# Patient Record
Sex: MALE | Race: BLACK OR AFRICAN AMERICAN | NOT HISPANIC OR LATINO | Employment: OTHER | URBAN - METROPOLITAN AREA
[De-identification: names, ages, dates, MRNs, and addresses within clinical notes are randomized per-mention and may not be internally consistent; named-entity substitution may affect disease eponyms.]

---

## 2017-01-04 ENCOUNTER — PATIENT MESSAGE (OUTPATIENT)
Dept: INTERNAL MEDICINE | Facility: CLINIC | Age: 32
End: 2017-01-04

## 2017-01-04 ENCOUNTER — PATIENT MESSAGE (OUTPATIENT)
Dept: PHYSICAL MEDICINE AND REHAB | Facility: CLINIC | Age: 32
End: 2017-01-04

## 2017-01-04 DIAGNOSIS — G82.20 PARAPLEGIA: ICD-10-CM

## 2017-01-04 DIAGNOSIS — R05.3 CHRONIC COUGH: Primary | ICD-10-CM

## 2017-01-04 NOTE — TELEPHONE ENCOUNTER
Chronic cough productive of sputum. Neg CXR. Will get CT scan. Please call pt to schedule. Also can you check on the ostomy supplies? I've filled out the paperwork for it and it should've been faxed. Can you see what else they need? Same goes for the transfer board as I've written for the script and should've been faxed also.

## 2017-01-05 ENCOUNTER — PATIENT MESSAGE (OUTPATIENT)
Dept: PHYSICAL MEDICINE AND REHAB | Facility: CLINIC | Age: 32
End: 2017-01-05

## 2017-01-05 RX ORDER — OXYCODONE HYDROCHLORIDE 30 MG/1
30 TABLET, FILM COATED, EXTENDED RELEASE ORAL EVERY 12 HOURS
Qty: 60 TABLET | Refills: 0 | Status: SHIPPED | OUTPATIENT
Start: 2017-01-05 | End: 2017-02-06 | Stop reason: SDUPTHER

## 2017-01-06 ENCOUNTER — PATIENT MESSAGE (OUTPATIENT)
Dept: INTERNAL MEDICINE | Facility: CLINIC | Age: 32
End: 2017-01-06

## 2017-01-06 ENCOUNTER — PATIENT MESSAGE (OUTPATIENT)
Dept: UROLOGY | Facility: CLINIC | Age: 32
End: 2017-01-06

## 2017-01-09 ENCOUNTER — PATIENT MESSAGE (OUTPATIENT)
Dept: INTERNAL MEDICINE | Facility: CLINIC | Age: 32
End: 2017-01-09

## 2017-01-10 ENCOUNTER — PATIENT MESSAGE (OUTPATIENT)
Dept: INTERNAL MEDICINE | Facility: CLINIC | Age: 32
End: 2017-01-10

## 2017-01-11 NOTE — TELEPHONE ENCOUNTER
i dont have anything previously done for him for the board or supplies, they refaxed the ostomy supply order its pending signature

## 2017-01-16 ENCOUNTER — PATIENT MESSAGE (OUTPATIENT)
Dept: PHYSICAL MEDICINE AND REHAB | Facility: CLINIC | Age: 32
End: 2017-01-16

## 2017-01-16 ENCOUNTER — PATIENT MESSAGE (OUTPATIENT)
Dept: UROLOGY | Facility: CLINIC | Age: 32
End: 2017-01-16

## 2017-01-16 RX ORDER — OXYCODONE AND ACETAMINOPHEN 10; 325 MG/1; MG/1
TABLET ORAL
Qty: 135 TABLET | Refills: 0 | Status: SHIPPED | OUTPATIENT
Start: 2017-01-16 | End: 2017-02-15 | Stop reason: SDUPTHER

## 2017-01-20 ENCOUNTER — OFFICE VISIT (OUTPATIENT)
Dept: UROLOGY | Facility: CLINIC | Age: 32
End: 2017-01-20
Payer: MEDICAID

## 2017-01-20 VITALS
HEIGHT: 68 IN | SYSTOLIC BLOOD PRESSURE: 132 MMHG | HEART RATE: 49 BPM | BODY MASS INDEX: 30.31 KG/M2 | WEIGHT: 200 LBS | DIASTOLIC BLOOD PRESSURE: 76 MMHG

## 2017-01-20 DIAGNOSIS — N31.9 NEUROGENIC BLADDER: ICD-10-CM

## 2017-01-20 DIAGNOSIS — R82.71 BACTERIA IN URINE: ICD-10-CM

## 2017-01-20 DIAGNOSIS — R82.90 ABNORMAL URINE SEDIMENT: ICD-10-CM

## 2017-01-20 DIAGNOSIS — Z43.5 ENCOUNTER FOR SUPRAPUBIC CATHETER CARE: Primary | ICD-10-CM

## 2017-01-20 PROCEDURE — 99214 OFFICE O/P EST MOD 30 MIN: CPT | Mod: S$PBB,25,, | Performed by: NURSE PRACTITIONER

## 2017-01-20 PROCEDURE — 99214 OFFICE O/P EST MOD 30 MIN: CPT | Mod: PBBFAC | Performed by: NURSE PRACTITIONER

## 2017-01-20 PROCEDURE — 51705 CHANGE OF BLADDER TUBE: CPT | Mod: PBBFAC | Performed by: NURSE PRACTITIONER

## 2017-01-20 PROCEDURE — 99999 PR PBB SHADOW E&M-EST. PATIENT-LVL IV: CPT | Mod: PBBFAC,,, | Performed by: NURSE PRACTITIONER

## 2017-01-20 PROCEDURE — 51705 CHANGE OF BLADDER TUBE: CPT | Mod: S$PBB,,, | Performed by: NURSE PRACTITIONER

## 2017-01-20 PROCEDURE — 51700 IRRIGATION OF BLADDER: CPT | Mod: PBBFAC | Performed by: NURSE PRACTITIONER

## 2017-01-20 RX ORDER — POTASSIUM CITRATE 10 MEQ/1
10 TABLET, EXTENDED RELEASE ORAL
Qty: 90 TABLET | Refills: 11 | Status: SHIPPED | OUTPATIENT
Start: 2017-01-20 | End: 2017-09-22
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
## 2017-01-20 NOTE — MR AVS SNAPSHOT
[facility] - Urology 4th Floor  [address]  [facility] [address]  Phone: [phone]                  Nghia Edgar   2017 9:00 AM   Office Visit    Description:  Male : 1985   Provider:  Sherry Knight NP   Department:  [facility] - Urology 4th Floor           Reason for Visit     Other     Urinary Retention           Diagnoses this Visit        Comments    Encounter for suprapubic catheter care    -  Primary     Abnormal urine sediment         Bacteria in urine         Neurogenic bladder                To Do List           Goals (5 Years of Data)     None      Follow-Up and Disposition     Return in about 3 weeks (around 2/10/2017) for K level; change SPT.       These Medications        Disp Refills Start End    potassium citrate (UROCIT-K) 10 mEq (1,080 mg) TbSR 90 tablet 11 2017    Take 1 tablet (10 mEq total) by mouth 3 (three) times daily with meals. - Oral    Pharmacy: [id number] - [place] - [address] [facility] Ph #: [phone]         [facility] On [facility]     [facility] On Call Nurse Care Line -  Assistance  Registered nurses in the [facility] On Call Center provide clinical advisement, health education, appointment booking, and other advisory services.  Call for this free service at [phone].             Medications           Message regarding Medications     Verify the changes and/or additions to your medication regime listed below are the same as discussed with your clinician today.  If any of these changes or additions are incorrect, please notify your healthcare provider.        START taking these NEW medications        Refills    potassium citrate (UROCIT-K) 10 mEq (1,080 mg) TbSR 11    Sig: Take 1 tablet (10 mEq total) by mouth 3 (three) times daily with meals.    Class: Normal    Route: Oral      STOP taking these medications     UNABLE TO FIND [employer] colostomy bags size 1 3/4. 44mm.    ostomy adhesive Misc Please  "dispense ostomy pouches, paste, powder and deodorizer. Use as directed.    Dispense 3 month supply with 3 refills.    miscellaneous medical supply 0.62 " Misc Dispense 1 short transfer board - thinner quality as he has a pressure ulcer.    fentaNYL (DURAGESIC) 75 mcg/hr Place 1 patch onto the skin every 72 hours.           Verify that the below list of medications is an accurate representation of the medications you are currently taking.  If none reported, the list may be blank. If incorrect, please contact your healthcare provider. Carry this list with you in case of emergency.           Current Medications     albuterol (PROAIR HFA) 90 mcg/actuation inhaler Inhale 1-2 puffs into the lungs every 6 (six) hours as needed for Wheezing or Shortness of Breath.    ascorbic acid (VITAMIN C) 500 MG tablet Take 500 mg by mouth every morning.     BACLOFEN IT by Intrathecal route.    blood pressure test kit-medium (IN CONTROL BP MONITOR) Kit Test daily    diazePAM (VALIUM) 10 MG Tab TAKE ONE TABLET BY MOUTH THREE TIMES DAILY    ferrous sulfate 325 (65 FE) MG EC tablet Take 325 mg by mouth once daily.    fosfomycin (MONUROL) 3 gram Pack Take 3 g by mouth every other day. for a total of 3 doses.    lactulose (CHRONULAC) 10 gram/15 mL solution     leg brace (ANKLE BRACE) Misc 2 each by Misc.(Non-Drug; Combo Route) route daily as needed. Please dispense dropped foot splints    multivitamin capsule Take 1 capsule by mouth every morning.    oxybutynin (DITROPAN) 5 MG Tab TAKE ONE TABLET BY MOUTH THREE TIMES DAILY AS NEEDED FOR  BLADDER  SPASMS    oxycodone (OXYCONTIN) 30 mg TR12 12 hr tablet Take 1 tablet (30 mg total) by mouth every 12 (twelve) hours.    oxycodone-acetaminophen (PERCOCET)  mg per tablet Take 1-1/2 tablets TID PRN (BTP)    polyethylene glycol (GLYCOLAX) 17 gram PwPk Take 17 g by mouth 2 (two) times daily as needed.    potassium citrate (UROCIT-K) 10 mEq (1,080 mg) TbSR Take 1 tablet (10 mEq total) by mouth 3 " "(three) times daily with meals.    pregabalin (LYRICA) 200 MG Cap Take 1 capsule (200 mg total) by mouth 3 (three) times daily.    sildenafil (VIAGRA) 100 MG tablet Take 1 tablet (100 mg total) by mouth as needed for Erectile Dysfunction.           Clinical Reference Information           Vital Signs - Last Recorded  Most recent update: 1/20/2017  9:36 AM by Lilibeth Betancur MA    BP Pulse Ht Wt BMI    132/76 (!) 49 5' 8" (1.727 m) 90.7 kg (200 lb) 30.41 kg/m2      Blood Pressure          Most Recent Value    BP  132/76      Allergies as of 1/20/2017     Zanaflex [Tizanidine]      Immunizations Administered on Date of Encounter - 1/20/2017     None      Orders Placed During Today's Visit     Future Labs/Procedures Expected by Expires    POTASSIUM  1/20/2017 3/21/2018      "

## 2017-01-25 ENCOUNTER — PATIENT MESSAGE (OUTPATIENT)
Dept: UROLOGY | Facility: CLINIC | Age: 32
End: 2017-01-25

## 2017-01-29 ENCOUNTER — PATIENT MESSAGE (OUTPATIENT)
Dept: INTERNAL MEDICINE | Facility: CLINIC | Age: 32
End: 2017-01-29

## 2017-01-30 ENCOUNTER — PATIENT MESSAGE (OUTPATIENT)
Dept: PHYSICAL MEDICINE AND REHAB | Facility: CLINIC | Age: 32
End: 2017-01-30

## 2017-01-30 RX ORDER — DIAZEPAM 10 MG/1
10 TABLET ORAL 3 TIMES DAILY
Qty: 90 TABLET | Refills: 2 | Status: SHIPPED | OUTPATIENT
Start: 2017-01-30 | End: 2017-05-09 | Stop reason: SDUPTHER

## 2017-02-03 ENCOUNTER — PATIENT MESSAGE (OUTPATIENT)
Dept: UROLOGY | Facility: CLINIC | Age: 32
End: 2017-02-03

## 2017-02-06 ENCOUNTER — PATIENT MESSAGE (OUTPATIENT)
Dept: PHYSICAL MEDICINE AND REHAB | Facility: CLINIC | Age: 32
End: 2017-02-06

## 2017-02-06 DIAGNOSIS — M79.2 NEUROGENIC PAIN: ICD-10-CM

## 2017-02-06 DIAGNOSIS — Z93.59 SUPRAPUBIC CATHETER: Chronic | ICD-10-CM

## 2017-02-06 DIAGNOSIS — N30.00 ACUTE CYSTITIS WITHOUT HEMATURIA: ICD-10-CM

## 2017-02-06 RX ORDER — GRANULES FOR ORAL 3 G/1
3 POWDER ORAL EVERY OTHER DAY
Qty: 3 G | Refills: 0 | Status: SHIPPED | OUTPATIENT
Start: 2017-02-06 | End: 2017-04-26

## 2017-02-06 RX ORDER — OXYCODONE HYDROCHLORIDE 30 MG/1
30 TABLET, FILM COATED, EXTENDED RELEASE ORAL EVERY 12 HOURS
Qty: 60 TABLET | Refills: 0 | Status: SHIPPED | OUTPATIENT
Start: 2017-02-06 | End: 2017-03-15 | Stop reason: SDUPTHER

## 2017-02-13 ENCOUNTER — PATIENT MESSAGE (OUTPATIENT)
Dept: UROLOGY | Facility: CLINIC | Age: 32
End: 2017-02-13

## 2017-02-13 ENCOUNTER — OFFICE VISIT (OUTPATIENT)
Dept: UROLOGY | Facility: CLINIC | Age: 32
End: 2017-02-13
Payer: MEDICAID

## 2017-02-13 VITALS
HEIGHT: 68 IN | HEART RATE: 57 BPM | WEIGHT: 200 LBS | BODY MASS INDEX: 30.31 KG/M2 | DIASTOLIC BLOOD PRESSURE: 74 MMHG | SYSTOLIC BLOOD PRESSURE: 108 MMHG

## 2017-02-13 DIAGNOSIS — R33.9 URINARY RETENTION: Primary | ICD-10-CM

## 2017-02-13 DIAGNOSIS — N31.9 NEUROGENIC BLADDER: ICD-10-CM

## 2017-02-13 DIAGNOSIS — Z43.5 ENCOUNTER FOR SUPRAPUBIC CATHETER CARE: ICD-10-CM

## 2017-02-13 PROCEDURE — 87077 CULTURE AEROBIC IDENTIFY: CPT

## 2017-02-13 PROCEDURE — 87186 SC STD MICRODIL/AGAR DIL: CPT

## 2017-02-13 PROCEDURE — 99999 PR PBB SHADOW E&M-EST. PATIENT-LVL IV: CPT | Mod: PBBFAC,,, | Performed by: NURSE PRACTITIONER

## 2017-02-13 PROCEDURE — 51705 CHANGE OF BLADDER TUBE: CPT | Mod: PBBFAC | Performed by: NURSE PRACTITIONER

## 2017-02-13 PROCEDURE — 87086 URINE CULTURE/COLONY COUNT: CPT

## 2017-02-13 PROCEDURE — 51705 CHANGE OF BLADDER TUBE: CPT | Mod: S$PBB,,, | Performed by: NURSE PRACTITIONER

## 2017-02-13 PROCEDURE — 51700 IRRIGATION OF BLADDER: CPT | Mod: PBBFAC | Performed by: NURSE PRACTITIONER

## 2017-02-13 PROCEDURE — 99213 OFFICE O/P EST LOW 20 MIN: CPT | Mod: S$PBB,25,, | Performed by: NURSE PRACTITIONER

## 2017-02-13 PROCEDURE — 87088 URINE BACTERIA CULTURE: CPT

## 2017-02-13 PROCEDURE — 99214 OFFICE O/P EST MOD 30 MIN: CPT | Mod: PBBFAC | Performed by: NURSE PRACTITIONER
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
## 2017-02-13 NOTE — MR AVS SNAPSHOT
[facility] - Urology 4th Floor  [address]  [facility] [address]  Phone: [phone]                  Nghia Edgar   2017 9:00 AM   Office Visit    Description:  Male : 1985   Provider:  Sherry Knight NP   Department:  [facility] - Urology 4th Floor           Reason for Visit     Catheterization     Urinary Retention           Diagnoses this Visit        Comments    Urinary retention    -  Primary     Neurogenic bladder         Encounter for suprapubic catheter care                To Do List           Future Appointments        Provider Department Dept Phone    2017 10:40 AM Brent Butcher MD [place] [facility] [phone]      Goals (5 Years of Data)     None      Follow-Up and Disposition     Return in about 3 weeks (around 3/6/2017).      [facility]     [facility] -  Assistance  Registered nurses in the [facility] provide clinical advisement, health education, appointment booking, and other advisory services.  Call for this free service at [phone].             Medications           Message regarding Medications     Verify the changes and/or additions to your medication regime listed below are the same as discussed with your clinician today.  If any of these changes or additions are incorrect, please notify your healthcare provider.             Verify that the below list of medications is an accurate representation of the medications you are currently taking.  If none reported, the list may be blank. If incorrect, please contact your healthcare provider. Carry this list with you in case of emergency.           Current Medications     albuterol (PROAIR HFA) 90 mcg/actuation inhaler Inhale 1-2 puffs into the lungs every 6 (six) hours as needed for Wheezing or Shortness of Breath.    ascorbic acid (VITAMIN C) 500 MG tablet Take 500 mg by mouth every morning.     BACLOFEN IT by Intrathecal route.    blood  "pressure test kit-medium (IN CONTROL BP MONITOR) Kit Test daily    diazePAM (VALIUM) 10 MG Tab Take 1 tablet (10 mg total) by mouth 3 (three) times daily.    ferrous sulfate 325 (65 FE) MG EC tablet Take 325 mg by mouth once daily.    fosfomycin (MONUROL) 3 gram Pack Take 3 g by mouth every other day. for a total of 3 doses.    lactulose (CHRONULAC) 10 gram/15 mL solution     leg brace (ANKLE BRACE) Misc 2 each by Misc.(Non-Drug; Combo Route) route daily as needed. Please dispense dropped foot splints    multivitamin capsule Take 1 capsule by mouth every morning.    oxybutynin (DITROPAN) 5 MG Tab TAKE ONE TABLET BY MOUTH THREE TIMES DAILY AS NEEDED FOR  BLADDER  SPASMS    oxycodone (OXYCONTIN) 30 mg TR12 12 hr tablet Take 1 tablet (30 mg total) by mouth every 12 (twelve) hours.    oxycodone-acetaminophen (PERCOCET)  mg per tablet Take 1-1/2 tablets TID PRN (BTP)    polyethylene glycol (GLYCOLAX) 17 gram PwPk Take 17 g by mouth 2 (two) times daily as needed.    potassium citrate (UROCIT-K) 10 mEq (1,080 mg) TbSR Take 1 tablet (10 mEq total) by mouth 3 (three) times daily with meals.    pregabalin (LYRICA) 200 MG Cap Take 1 capsule (200 mg total) by mouth 3 (three) times daily.    sildenafil (VIAGRA) 100 MG tablet Take 1 tablet (100 mg total) by mouth as needed for Erectile Dysfunction.           Clinical Reference Information           Your Vitals Were     BP Pulse Height Weight BMI    108/74 57 5' 8" (1.727 m) 90.7 kg (200 lb) 30.41 kg/m2      Blood Pressure          Most Recent Value    BP  108/74      Allergies as of 2/13/2017     Zanaflex [Tizanidine]      Immunizations Administered on Date of Encounter - 2/13/2017     None      Orders Placed During Today's Visit      Normal Orders This Visit    Urinalysis     Urine culture       Language Assistance Services     ATTENTION: Language assistance services are available, free of charge. Please call [phone].      ATENCIÓN: tyrell Burroughs " disposición servicios gratuitos de asistencia lingüística. Xiao al [phone].     VANESSA Ý: N?u b?n nói Ti?ng Vi?t, có các d?ch v? h? tr? ngôn ng? mi?n phí dành cho b?n. G?i s? [phone].         [facility] 4th Floor complies with applicable Federal civil rights laws and does not discriminate on the basis of race, color, national origin, age, disability, or sex.

## 2017-02-15 ENCOUNTER — PATIENT MESSAGE (OUTPATIENT)
Dept: INFECTIOUS DISEASES | Facility: CLINIC | Age: 32
End: 2017-02-15

## 2017-02-15 ENCOUNTER — PATIENT MESSAGE (OUTPATIENT)
Dept: PHYSICAL MEDICINE AND REHAB | Facility: CLINIC | Age: 32
End: 2017-02-15

## 2017-02-15 ENCOUNTER — PATIENT MESSAGE (OUTPATIENT)
Dept: UROLOGY | Facility: CLINIC | Age: 32
End: 2017-02-15

## 2017-02-15 RX ORDER — OXYCODONE AND ACETAMINOPHEN 10; 325 MG/1; MG/1
TABLET ORAL
Qty: 135 TABLET | Refills: 0 | Status: SHIPPED | OUTPATIENT
Start: 2017-02-15 | End: 2017-03-15 | Stop reason: SDUPTHER

## 2017-02-16 LAB — BACTERIA UR CULT: NORMAL

## 2017-02-27 ENCOUNTER — PATIENT MESSAGE (OUTPATIENT)
Dept: INTERNAL MEDICINE | Facility: CLINIC | Age: 32
End: 2017-02-27

## 2017-02-27 ENCOUNTER — PATIENT MESSAGE (OUTPATIENT)
Dept: UROLOGY | Facility: CLINIC | Age: 32
End: 2017-02-27

## 2017-02-27 DIAGNOSIS — N39.0 URINARY TRACT INFECTION ASSOCIATED WITH CATHETERIZATION OF URINARY TRACT, UNSPECIFIED INDWELLING URINARY CATHETER TYPE, SUBSEQUENT ENCOUNTER: Primary | ICD-10-CM

## 2017-02-27 DIAGNOSIS — T83.511D URINARY TRACT INFECTION ASSOCIATED WITH CATHETERIZATION OF URINARY TRACT, UNSPECIFIED INDWELLING URINARY CATHETER TYPE, SUBSEQUENT ENCOUNTER: Primary | ICD-10-CM

## 2017-02-27 DIAGNOSIS — N39.0 RECURRENT URINARY TRACT INFECTION: Primary | ICD-10-CM

## 2017-02-27 NOTE — TELEPHONE ENCOUNTER
Notify pt dr. hoang is out of the office Would recommend appt with id Follow up for further evaluation regarding longterm treatment recommendations   With recurrant infection assist in emilia

## 2017-03-01 ENCOUNTER — TELEPHONE (OUTPATIENT)
Dept: INTERNAL MEDICINE | Facility: CLINIC | Age: 32
End: 2017-03-01

## 2017-03-01 NOTE — TELEPHONE ENCOUNTER
Please call pt and let him know the bacteria is Klebsiella that's resistant to all oral medications except probably Monurol. Monurol dosing is only one dose per UTI. Alternative dosing will have to come from infectious disease. As his bacteria is resistant to multiple medications, he needs to follow w/ ID. Referral in. Please try to schedule on same day he's seeing Sherry Knight NP on 3/8/17.

## 2017-03-02 ENCOUNTER — PATIENT MESSAGE (OUTPATIENT)
Dept: PHYSICAL MEDICINE AND REHAB | Facility: CLINIC | Age: 32
End: 2017-03-02

## 2017-03-08 ENCOUNTER — PATIENT MESSAGE (OUTPATIENT)
Dept: UROLOGY | Facility: CLINIC | Age: 32
End: 2017-03-08

## 2017-03-08 ENCOUNTER — OFFICE VISIT (OUTPATIENT)
Dept: UROLOGY | Facility: CLINIC | Age: 32
End: 2017-03-08
Payer: MEDICAID

## 2017-03-08 VITALS
TEMPERATURE: 98 F | RESPIRATION RATE: 16 BRPM | HEIGHT: 68 IN | DIASTOLIC BLOOD PRESSURE: 58 MMHG | SYSTOLIC BLOOD PRESSURE: 112 MMHG | WEIGHT: 200 LBS | HEART RATE: 48 BPM | BODY MASS INDEX: 30.31 KG/M2

## 2017-03-08 DIAGNOSIS — R33.9 URINARY RETENTION: ICD-10-CM

## 2017-03-08 DIAGNOSIS — R82.71 BACTERIA IN URINE: ICD-10-CM

## 2017-03-08 DIAGNOSIS — N31.9 NEUROGENIC BLADDER: Primary | ICD-10-CM

## 2017-03-08 DIAGNOSIS — Z43.5 ENCOUNTER FOR SUPRAPUBIC CATHETER CARE: ICD-10-CM

## 2017-03-08 LAB
AMORPH CRY UR QL COMP ASSIST: ABNORMAL
BACTERIA #/AREA URNS AUTO: ABNORMAL /HPF
BILIRUB UR QL STRIP: NEGATIVE
CLARITY UR REFRACT.AUTO: CLEAR
COLOR UR AUTO: YELLOW
GLUCOSE UR QL STRIP: NEGATIVE
HGB UR QL STRIP: ABNORMAL
KETONES UR QL STRIP: NEGATIVE
LEUKOCYTE ESTERASE UR QL STRIP: ABNORMAL
MICROSCOPIC COMMENT: ABNORMAL
NITRITE UR QL STRIP: NEGATIVE
PH UR STRIP: 7 [PH] (ref 5–8)
PROT UR QL STRIP: NEGATIVE
RBC #/AREA URNS AUTO: 12 /HPF (ref 0–4)
SP GR UR STRIP: 1.01 (ref 1–1.03)
URN SPEC COLLECT METH UR: ABNORMAL
UROBILINOGEN UR STRIP-ACNC: NEGATIVE EU/DL
WBC #/AREA URNS AUTO: 2 /HPF (ref 0–5)

## 2017-03-08 PROCEDURE — 87181 SC STD AGAR DILUTION PER AGT: CPT

## 2017-03-08 PROCEDURE — 99215 OFFICE O/P EST HI 40 MIN: CPT | Mod: PBBFAC | Performed by: NURSE PRACTITIONER

## 2017-03-08 PROCEDURE — 87077 CULTURE AEROBIC IDENTIFY: CPT

## 2017-03-08 PROCEDURE — 81001 URINALYSIS AUTO W/SCOPE: CPT

## 2017-03-08 PROCEDURE — 51705 CHANGE OF BLADDER TUBE: CPT | Mod: S$PBB,,, | Performed by: NURSE PRACTITIONER

## 2017-03-08 PROCEDURE — 51705 CHANGE OF BLADDER TUBE: CPT | Mod: PBBFAC | Performed by: NURSE PRACTITIONER

## 2017-03-08 PROCEDURE — 87186 SC STD MICRODIL/AGAR DIL: CPT | Mod: 59

## 2017-03-08 PROCEDURE — 87086 URINE CULTURE/COLONY COUNT: CPT

## 2017-03-08 PROCEDURE — 99214 OFFICE O/P EST MOD 30 MIN: CPT | Mod: S$PBB,25,, | Performed by: NURSE PRACTITIONER

## 2017-03-08 PROCEDURE — 87088 URINE BACTERIA CULTURE: CPT

## 2017-03-08 PROCEDURE — 99999 PR PBB SHADOW E&M-EST. PATIENT-LVL V: CPT | Mod: PBBFAC,,, | Performed by: NURSE PRACTITIONER
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
## 2017-03-08 NOTE — MR AVS SNAPSHOT
[facility]  [address]  [facility] [address]  Phone: [phone]                  Nghia Edgar   3/8/2017 9:40 AM   Office Visit    Description:  Male : 1985   Provider:  Sherry Knigth NP   Department:  [facility]           Reason for Visit     Urinary Tract Infection     Urinary Retention           Diagnoses this Visit        Comments    Neurogenic bladder    -  Primary     Bacteria in urine         Urinary retention         Encounter for suprapubic catheter care                To Do List           Future Appointments        Provider Department Dept Phone    3/20/2017 10:00 AM MD [facility] [phone]    3/29/2017 10:00 AM JENN Osorio  [facility] [phone]    2017 10:40 AM Brent Butcher MD [place] - Physical Med & Rehab [phone]      Goals (5 Years of Data)     None      Follow-Up and Disposition     Return in about 3 weeks (around 3/29/2017).      [facility]     [facility] On Call Nurse Care Line -  Assistance  Registered nurses in the [facility] On Call Center provide clinical advisement, health education, appointment booking, and other advisory services.  Call for this free service at [phone].             Medications           Message regarding Medications     Verify the changes and/or additions to your medication regime listed below are the same as discussed with your clinician today.  If any of these changes or additions are incorrect, please notify your healthcare provider.             Verify that the below list of medications is an accurate representation of the medications you are currently taking.  If none reported, the list may be blank. If incorrect, please contact your healthcare provider. Carry this list with you in case of emergency.           Current Medications     albuterol (PROAIR HFA) 90 mcg/actuation inhaler Inhale 1-2 puffs into the lungs  "every 6 (six) hours as needed for Wheezing or Shortness of Breath.    ascorbic acid (VITAMIN C) 500 MG tablet Take 500 mg by mouth every morning.     BACLOFEN IT by Intrathecal route.    blood pressure test kit-medium (IN CONTROL BP MONITOR) Kit Test daily    diazePAM (VALIUM) 10 MG Tab Take 1 tablet (10 mg total) by mouth 3 (three) times daily.    ferrous sulfate 325 (65 FE) MG EC tablet Take 325 mg by mouth once daily.    fosfomycin (MONUROL) 3 gram Pack Take 3 g by mouth every other day. for a total of 3 doses.    lactulose (CHRONULAC) 10 gram/15 mL solution     leg brace (ANKLE BRACE) Misc 2 each by Misc.(Non-Drug; Combo Route) route daily as needed. Please dispense dropped foot splints    multivitamin capsule Take 1 capsule by mouth every morning.    oxybutynin (DITROPAN) 5 MG Tab TAKE ONE TABLET BY MOUTH THREE TIMES DAILY AS NEEDED FOR  BLADDER  SPASMS    oxycodone (OXYCONTIN) 30 mg TR12 12 hr tablet Take 1 tablet (30 mg total) by mouth every 12 (twelve) hours.    oxycodone-acetaminophen (PERCOCET)  mg per tablet Take 1-1/2 tablets TID PRN (BTP)    polyethylene glycol (GLYCOLAX) 17 gram PwPk Take 17 g by mouth 2 (two) times daily as needed.    potassium citrate (UROCIT-K) 10 mEq (1,080 mg) TbSR Take 1 tablet (10 mEq total) by mouth 3 (three) times daily with meals.    pregabalin (LYRICA) 200 MG Cap Take 1 capsule (200 mg total) by mouth 3 (three) times daily.    sildenafil (VIAGRA) 100 MG tablet Take 1 tablet (100 mg total) by mouth as needed for Erectile Dysfunction.           Clinical Reference Information           Your Vitals Were     BP Pulse Temp Resp Height Weight    112/58 48 98 °F (36.7 °C) (Axillary) 16 5' 8" (1.727 m) 90.7 kg (200 lb)    BMI                30.41 kg/m2          Blood Pressure          Most Recent Value    BP  (!)  112/58      Allergies as of 3/8/2017     Zanaflex [Tizanidine]      Immunizations Administered on Date of Encounter - 3/8/2017     None      Orders Placed During " Today's Visit      Normal Orders This Visit    Urinalysis     Urine culture       Language Assistance Services     ATTENTION: Language assistance services are available, free of charge. Please call [phone].      ATENCIÓN: Si habla niko, tiene a garcia disposición servicios gratuitos de asistencia lingüística. Llame al [phone].     CHÚ Ý: N?u b?n nói Ti?ng Vi?t, có các d?ch v? h? tr? ngôn ng? mi?n phí dành cho b?n. G?i s? [phone].         [facility] complies with applicable Federal civil rights laws and does not discriminate on the basis of race, color, national origin, age, disability, or sex.

## 2017-03-13 ENCOUNTER — PATIENT MESSAGE (OUTPATIENT)
Dept: UROLOGY | Facility: CLINIC | Age: 32
End: 2017-03-13

## 2017-03-14 ENCOUNTER — PATIENT MESSAGE (OUTPATIENT)
Dept: UROLOGY | Facility: CLINIC | Age: 32
End: 2017-03-14

## 2017-03-15 ENCOUNTER — PATIENT MESSAGE (OUTPATIENT)
Dept: PHYSICAL MEDICINE AND REHAB | Facility: CLINIC | Age: 32
End: 2017-03-15

## 2017-03-15 ENCOUNTER — PATIENT MESSAGE (OUTPATIENT)
Dept: INTERNAL MEDICINE | Facility: CLINIC | Age: 32
End: 2017-03-15

## 2017-03-15 RX ORDER — OXYCODONE HYDROCHLORIDE 30 MG/1
30 TABLET, FILM COATED, EXTENDED RELEASE ORAL EVERY 12 HOURS
Qty: 60 TABLET | Refills: 0 | Status: CANCELLED | OUTPATIENT
Start: 2017-03-15 | End: 2017-04-14

## 2017-03-15 RX ORDER — ALBUTEROL SULFATE 90 UG/1
1-2 AEROSOL, METERED RESPIRATORY (INHALATION) EVERY 6 HOURS PRN
Qty: 18 G | Refills: 3 | Status: SHIPPED | OUTPATIENT
Start: 2017-03-15 | End: 2018-09-18 | Stop reason: SDUPTHER

## 2017-03-15 RX ORDER — OXYCODONE AND ACETAMINOPHEN 10; 325 MG/1; MG/1
TABLET ORAL
Qty: 135 TABLET | Refills: 0 | Status: CANCELLED | OUTPATIENT
Start: 2017-03-15

## 2017-03-15 RX ORDER — OXYCODONE HCL 40 MG/1
40 TABLET, FILM COATED, EXTENDED RELEASE ORAL EVERY 12 HOURS
Qty: 60 TABLET | Refills: 0 | Status: SHIPPED | OUTPATIENT
Start: 2017-03-15 | End: 2017-05-15 | Stop reason: SDUPTHER

## 2017-03-15 RX ORDER — OXYCODONE AND ACETAMINOPHEN 10; 325 MG/1; MG/1
TABLET ORAL
Qty: 135 TABLET | Refills: 0 | Status: SHIPPED | OUTPATIENT
Start: 2017-03-15 | End: 2017-04-17 | Stop reason: SDUPTHER

## 2017-03-27 ENCOUNTER — PATIENT MESSAGE (OUTPATIENT)
Dept: UROLOGY | Facility: CLINIC | Age: 32
End: 2017-03-27

## 2017-03-30 ENCOUNTER — PATIENT MESSAGE (OUTPATIENT)
Dept: UROLOGY | Facility: CLINIC | Age: 32
End: 2017-03-30

## 2017-04-03 ENCOUNTER — PATIENT MESSAGE (OUTPATIENT)
Dept: UROLOGY | Facility: CLINIC | Age: 32
End: 2017-04-03

## 2017-04-03 ENCOUNTER — OFFICE VISIT (OUTPATIENT)
Dept: UROLOGY | Facility: CLINIC | Age: 32
End: 2017-04-03
Payer: MEDICAID

## 2017-04-03 VITALS — HEART RATE: 46 BPM | SYSTOLIC BLOOD PRESSURE: 143 MMHG | DIASTOLIC BLOOD PRESSURE: 82 MMHG

## 2017-04-03 DIAGNOSIS — N31.9 NEUROGENIC BLADDER: Primary | ICD-10-CM

## 2017-04-03 DIAGNOSIS — R33.9 URINARY RETENTION: ICD-10-CM

## 2017-04-03 DIAGNOSIS — Z43.5 ENCOUNTER FOR SUPRAPUBIC CATHETER CARE: ICD-10-CM

## 2017-04-03 DIAGNOSIS — R22.0 FACIAL SWELLING: ICD-10-CM

## 2017-04-03 DIAGNOSIS — R82.71 BACTERIA IN URINE: ICD-10-CM

## 2017-04-03 DIAGNOSIS — K04.7 DENTAL ABSCESS: ICD-10-CM

## 2017-04-03 LAB
BACTERIA #/AREA URNS AUTO: ABNORMAL /HPF
BILIRUB UR QL STRIP: NEGATIVE
CLARITY UR REFRACT.AUTO: CLEAR
COLOR UR AUTO: YELLOW
GLUCOSE UR QL STRIP: NEGATIVE
HGB UR QL STRIP: ABNORMAL
KETONES UR QL STRIP: NEGATIVE
LEUKOCYTE ESTERASE UR QL STRIP: ABNORMAL
MICROSCOPIC COMMENT: ABNORMAL
NITRITE UR QL STRIP: NEGATIVE
PH UR STRIP: 8 [PH] (ref 5–8)
PROT UR QL STRIP: NEGATIVE
RBC #/AREA URNS AUTO: 17 /HPF (ref 0–4)
SP GR UR STRIP: 1 (ref 1–1.03)
URN SPEC COLLECT METH UR: ABNORMAL
UROBILINOGEN UR STRIP-ACNC: NEGATIVE EU/DL
WBC #/AREA URNS AUTO: 11 /HPF (ref 0–5)

## 2017-04-03 PROCEDURE — 51705 CHANGE OF BLADDER TUBE: CPT | Mod: PBBFAC | Performed by: NURSE PRACTITIONER

## 2017-04-03 PROCEDURE — 87186 SC STD MICRODIL/AGAR DIL: CPT | Mod: 59

## 2017-04-03 PROCEDURE — 99214 OFFICE O/P EST MOD 30 MIN: CPT | Mod: S$PBB,25,, | Performed by: NURSE PRACTITIONER

## 2017-04-03 PROCEDURE — 99999 PR PBB SHADOW E&M-EST. PATIENT-LVL IV: CPT | Mod: PBBFAC,,, | Performed by: NURSE PRACTITIONER

## 2017-04-03 PROCEDURE — 87077 CULTURE AEROBIC IDENTIFY: CPT | Mod: 59

## 2017-04-03 PROCEDURE — 81001 URINALYSIS AUTO W/SCOPE: CPT

## 2017-04-03 PROCEDURE — 87088 URINE BACTERIA CULTURE: CPT

## 2017-04-03 PROCEDURE — 99214 OFFICE O/P EST MOD 30 MIN: CPT | Mod: PBBFAC | Performed by: NURSE PRACTITIONER

## 2017-04-03 PROCEDURE — 51705 CHANGE OF BLADDER TUBE: CPT | Mod: S$PBB,,, | Performed by: NURSE PRACTITIONER

## 2017-04-03 PROCEDURE — 87086 URINE CULTURE/COLONY COUNT: CPT

## 2017-04-03 RX ORDER — CEPHALEXIN 500 MG/1
500 CAPSULE ORAL EVERY 8 HOURS
Qty: 30 CAPSULE | Refills: 0 | Status: SHIPPED | OUTPATIENT
Start: 2017-04-03 | End: 2017-04-13

## 2017-04-03 NOTE — MR AVS SNAPSHOT
[facility] - Urology 4th Floor  [address]  [facility] [address]  Phone: [phone]                  Nghia Edgar   4/3/2017 9:00 AM   Office Visit    Description:  Male : 1985   Provider:  Sherry Knight NP   Department:  [facility] - Urology 4th Floor           Reason for Visit     Urinary Retention     Urinary Tract Infection     Facial Swelling     Fever           Diagnoses this Visit        Comments    Neurogenic bladder    -  Primary     Urinary retention         Bacteria in urine         Encounter for suprapubic catheter care         Facial swelling                To Do List           Future Appointments        Provider Department Dept Phone    4/3/2017 11:00 AM LAB, SAME DAY [facility] [phone]    2017 10:40 AM Brent Butcher MD [place] - Physical Med & Rehab [phone]      Goals (5 Years of Data)     None      Follow-Up and Disposition     Return for 16 fr spt change.      [facility]     [facility] Nurse Care Line - 24/ Assistance  Unless otherwise directed by your provider, please contact [facility], our nurse care line that is available for / assistance.     Registered nurses in the [facility] Center provide: appointment scheduling, clinical advisement, health education, and other advisory services.  Call: [phone] (toll free)               Medications           Message regarding Medications     Verify the changes and/or additions to your medication regime listed below are the same as discussed with your clinician today.  If any of these changes or additions are incorrect, please notify your healthcare provider.             Verify that the below list of medications is an accurate representation of the medications you are currently taking.  If none reported, the list may be blank. If incorrect, please contact your healthcare provider. Carry this list with you in case of emergency.           Current  Medications     albuterol (PROAIR HFA) 90 mcg/actuation inhaler Inhale 1-2 puffs into the lungs every 6 (six) hours as needed for Wheezing or Shortness of Breath.    ascorbic acid (VITAMIN C) 500 MG tablet Take 500 mg by mouth every morning.     BACLOFEN IT by Intrathecal route.    blood pressure test kit-medium (IN CONTROL BP MONITOR) Kit Test daily    diazePAM (VALIUM) 10 MG Tab Take 1 tablet (10 mg total) by mouth 3 (three) times daily.    ferrous sulfate 325 (65 FE) MG EC tablet Take 325 mg by mouth once daily.    fosfomycin (MONUROL) 3 gram Pack Take 3 g by mouth every other day. for a total of 3 doses.    lactulose (CHRONULAC) 10 gram/15 mL solution     leg brace (ANKLE BRACE) Misc 2 each by Misc.(Non-Drug; Combo Route) route daily as needed. Please dispense dropped foot splints    multivitamin capsule Take 1 capsule by mouth every morning.    oxybutynin (DITROPAN) 5 MG Tab TAKE ONE TABLET BY MOUTH THREE TIMES DAILY AS NEEDED FOR  BLADDER  SPASMS    oxycodone (OXYCONTIN) 40 mg 12 hr tablet Take 1 tablet (40 mg total) by mouth every 12 (twelve) hours.    oxycodone-acetaminophen (PERCOCET)  mg per tablet Take 1-1/2 tablets TID PRN (BTP)    polyethylene glycol (GLYCOLAX) 17 gram PwPk Take 17 g by mouth 2 (two) times daily as needed.    potassium citrate (UROCIT-K) 10 mEq (1,080 mg) TbSR Take 1 tablet (10 mEq total) by mouth 3 (three) times daily with meals.    pregabalin (LYRICA) 200 MG Cap Take 1 capsule (200 mg total) by mouth 3 (three) times daily.    sildenafil (VIAGRA) 100 MG tablet Take 1 tablet (100 mg total) by mouth as needed for Erectile Dysfunction.           Clinical Reference Information           Your Vitals Were     BP Pulse                143/82 (BP Location: Right arm, Patient Position: Lying, BP Method: Automatic) 46          Blood Pressure          Most Recent Value    BP  (!)  143/82      Allergies as of 4/3/2017     Zanaflex [Tizanidine]      Immunizations Administered on Date of  Encounter - 4/3/2017     None      Orders Placed During Today's Visit      Normal Orders This Visit    Urinalysis     Urine culture     Future Labs/Procedures Expected by Expires    CBC auto differential  4/3/2017 6/2/2018    Comprehensive metabolic panel  4/3/2017 6/2/2018    Magnesium  4/3/2017 6/2/2018    Phosphorus  4/3/2017 4/3/2018    Uric acid  4/3/2017 4/3/2018      Language Assistance Services     ATTENTION: Language assistance services are available, free of charge. Please call [phone].      ATENCIÓN: Si habla español, tiene a garcia disposición servicios gratuitos de asistencia lingüística. Llame al [phone].     CHÚ Ý: N?u b?n nói Ti?ng Vi?t, có các d?ch v? h? tr? ngôn ng? mi?n phí dành cho b?n. G?i s? [phone].         [facility] complies with applicable Federal civil rights laws and does not discriminate on the basis of race, color, national origin, age, disability, or sex.
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
## 2017-04-05 ENCOUNTER — PATIENT MESSAGE (OUTPATIENT)
Dept: UROLOGY | Facility: CLINIC | Age: 32
End: 2017-04-05

## 2017-04-05 LAB
BACTERIA UR CULT: NORMAL
BACTERIA UR CULT: NORMAL

## 2017-04-10 ENCOUNTER — PATIENT MESSAGE (OUTPATIENT)
Dept: INTERNAL MEDICINE | Facility: CLINIC | Age: 32
End: 2017-04-10

## 2017-04-10 DIAGNOSIS — M79.673 PAIN OF FOOT, UNSPECIFIED LATERALITY: Primary | ICD-10-CM

## 2017-04-11 ENCOUNTER — PATIENT MESSAGE (OUTPATIENT)
Dept: INFECTIOUS DISEASES | Facility: CLINIC | Age: 32
End: 2017-04-11

## 2017-04-12 LAB
BACTERIA UR CULT: NORMAL

## 2017-04-17 ENCOUNTER — PATIENT MESSAGE (OUTPATIENT)
Dept: PHYSICAL MEDICINE AND REHAB | Facility: CLINIC | Age: 32
End: 2017-04-17

## 2017-04-17 RX ORDER — OXYCODONE AND ACETAMINOPHEN 10; 325 MG/1; MG/1
TABLET ORAL
Qty: 135 TABLET | Refills: 0 | Status: SHIPPED | OUTPATIENT
Start: 2017-04-17 | End: 2017-05-15 | Stop reason: SDUPTHER

## 2017-04-24 ENCOUNTER — PATIENT MESSAGE (OUTPATIENT)
Dept: INFECTIOUS DISEASES | Facility: CLINIC | Age: 32
End: 2017-04-24

## 2017-04-24 ENCOUNTER — PATIENT MESSAGE (OUTPATIENT)
Dept: PHYSICAL MEDICINE AND REHAB | Facility: CLINIC | Age: 32
End: 2017-04-24

## 2017-04-24 ENCOUNTER — TELEPHONE (OUTPATIENT)
Dept: PHYSICAL MEDICINE AND REHAB | Facility: CLINIC | Age: 32
End: 2017-04-24

## 2017-04-24 ENCOUNTER — PATIENT MESSAGE (OUTPATIENT)
Dept: UROLOGY | Facility: CLINIC | Age: 32
End: 2017-04-24

## 2017-04-24 DIAGNOSIS — G82.50 SPASTIC QUADRIPLEGIA: Primary | ICD-10-CM

## 2017-04-24 DIAGNOSIS — G82.50 SPASTIC QUADRIPARESIS: Primary | ICD-10-CM

## 2017-04-26 ENCOUNTER — OFFICE VISIT (OUTPATIENT)
Dept: INFECTIOUS DISEASES | Facility: CLINIC | Age: 32
End: 2017-04-26
Payer: MEDICAID

## 2017-04-26 ENCOUNTER — OFFICE VISIT (OUTPATIENT)
Dept: UROLOGY | Facility: CLINIC | Age: 32
End: 2017-04-26
Payer: MEDICAID

## 2017-04-26 ENCOUNTER — PATIENT MESSAGE (OUTPATIENT)
Dept: UROLOGY | Facility: CLINIC | Age: 32
End: 2017-04-26

## 2017-04-26 VITALS
HEART RATE: 65 BPM | TEMPERATURE: 98 F | HEART RATE: 65 BPM | HEIGHT: 68 IN | WEIGHT: 199.94 LBS | DIASTOLIC BLOOD PRESSURE: 65 MMHG | SYSTOLIC BLOOD PRESSURE: 129 MMHG | DIASTOLIC BLOOD PRESSURE: 65 MMHG | BODY MASS INDEX: 30.3 KG/M2 | SYSTOLIC BLOOD PRESSURE: 129 MMHG

## 2017-04-26 DIAGNOSIS — Z43.5 ENCOUNTER FOR SUPRAPUBIC CATHETER CARE: ICD-10-CM

## 2017-04-26 DIAGNOSIS — R33.9 URINARY RETENTION: ICD-10-CM

## 2017-04-26 DIAGNOSIS — N30.00 ACUTE CYSTITIS WITHOUT HEMATURIA: ICD-10-CM

## 2017-04-26 DIAGNOSIS — N30.00 ACUTE CYSTITIS WITHOUT HEMATURIA: Primary | ICD-10-CM

## 2017-04-26 DIAGNOSIS — N31.9 NEUROGENIC BLADDER: Primary | ICD-10-CM

## 2017-04-26 DIAGNOSIS — R82.71 BACTERIA IN URINE: ICD-10-CM

## 2017-04-26 LAB
BACTERIA #/AREA URNS AUTO: ABNORMAL /HPF
BILIRUB UR QL STRIP: NEGATIVE
CLARITY UR REFRACT.AUTO: CLEAR
COLOR UR AUTO: ABNORMAL
GLUCOSE UR QL STRIP: NEGATIVE
HGB UR QL STRIP: ABNORMAL
KETONES UR QL STRIP: NEGATIVE
LEUKOCYTE ESTERASE UR QL STRIP: ABNORMAL
MICROSCOPIC COMMENT: ABNORMAL
NITRITE UR QL STRIP: NEGATIVE
PH UR STRIP: 8 [PH] (ref 5–8)
PROT UR QL STRIP: NEGATIVE
RBC #/AREA URNS AUTO: 11 /HPF (ref 0–4)
SP GR UR STRIP: 1 (ref 1–1.03)
URN SPEC COLLECT METH UR: ABNORMAL
UROBILINOGEN UR STRIP-ACNC: NEGATIVE EU/DL
WBC #/AREA URNS AUTO: 20 /HPF (ref 0–5)

## 2017-04-26 PROCEDURE — 99213 OFFICE O/P EST LOW 20 MIN: CPT | Mod: PBBFAC,25,27 | Performed by: INTERNAL MEDICINE

## 2017-04-26 PROCEDURE — 99214 OFFICE O/P EST MOD 30 MIN: CPT | Mod: S$PBB,,, | Performed by: INTERNAL MEDICINE

## 2017-04-26 PROCEDURE — 99999 PR PBB SHADOW E&M-EST. PATIENT-LVL III: CPT | Mod: PBBFAC,,, | Performed by: INTERNAL MEDICINE

## 2017-04-26 PROCEDURE — 99214 OFFICE O/P EST MOD 30 MIN: CPT | Mod: S$PBB,25,, | Performed by: NURSE PRACTITIONER

## 2017-04-26 PROCEDURE — 51705 CHANGE OF BLADDER TUBE: CPT | Mod: S$PBB,,, | Performed by: NURSE PRACTITIONER

## 2017-04-26 PROCEDURE — 99999 PR PBB SHADOW E&M-EST. PATIENT-LVL IV: CPT | Mod: PBBFAC,,, | Performed by: NURSE PRACTITIONER

## 2017-04-26 RX ORDER — GRANULES FOR ORAL 3 G/1
3 POWDER ORAL EVERY OTHER DAY
Qty: 9 G | Refills: 0 | Status: SHIPPED | OUTPATIENT
Start: 2017-04-26 | End: 2017-05-01

## 2017-04-26 RX ORDER — GRANULES FOR ORAL 3 G/1
3 POWDER ORAL EVERY OTHER DAY
Qty: 9 G | Refills: 0 | Status: CANCELLED | OUTPATIENT
Start: 2017-04-26 | End: 2017-05-01

## 2017-04-26 NOTE — PROGRESS NOTES
Subjective:      Patient ID: Nghia Edgar is a 31 y.o. male.    Chief Complaint:Follow-up      History of Present Illness    Mr. Edgar is a 30 y/o male with a history of cervical spine injury with subsequent quadriplegia.  He has a history of neurogenic bladder and has occasional UTIs.  He presents with night sweats, burning when urine going through catheter, lower abdominal pain, foul odor to urine.  Gets catheter changed every 3 weeks now instead of every 4 weeks.    Review of Systems   Constitution: Positive for weakness, malaise/fatigue, night sweats and weight gain. Negative for chills, decreased appetite, fever and weight loss.   HENT: Positive for headaches. Negative for congestion, ear pain, hearing loss, hoarse voice, sore throat and tinnitus.    Eyes: Negative for blurred vision, redness and visual disturbance.   Cardiovascular: Positive for leg swelling. Negative for chest pain and palpitations.   Respiratory: Positive for cough. Negative for hemoptysis, shortness of breath and sputum production.    Hematologic/Lymphatic: Negative for adenopathy. Does not bruise/bleed easily.   Skin: Negative for dry skin, itching, rash and suspicious lesions.   Musculoskeletal: Positive for joint pain, myalgias and neck pain. Negative for back pain.   Gastrointestinal: Positive for abdominal pain. Negative for constipation, diarrhea, heartburn, nausea and vomiting.   Genitourinary: Positive for frequency and urgency. Negative for dysuria, flank pain, hematuria and hesitancy.   Neurological: Positive for numbness and paresthesias. Negative for dizziness.   Psychiatric/Behavioral: Negative for depression and memory loss. The patient has insomnia. The patient is not nervous/anxious.      Objective:   Physical Exam   Constitutional: He is oriented to person, place, and time. He appears well-developed and well-nourished. No distress.   HENT:   Head: Normocephalic and atraumatic.   Right Ear: External ear normal.    Left Ear: External ear normal.   Nose: Nose normal.   Mouth/Throat: Oropharynx is clear and moist. No oropharyngeal exudate.   Eyes: Conjunctivae and EOM are normal. Pupils are equal, round, and reactive to light. Right eye exhibits no discharge. Left eye exhibits no discharge. No scleral icterus.   Neck: Normal range of motion. Neck supple. No JVD present. No tracheal deviation present. No thyromegaly present.   Cardiovascular: Normal rate, regular rhythm and intact distal pulses.  Exam reveals no gallop and no friction rub.    No murmur heard.  Pulmonary/Chest: Effort normal and breath sounds normal. No stridor. No respiratory distress. He has no wheezes. He has no rales. He exhibits no tenderness.   Abdominal: Soft. Bowel sounds are normal. He exhibits no distension and no mass. There is tenderness (Mild lower abdominal tenderness). There is no rebound and no guarding.   Left lower quadrant colostomy.  Right lower quadrant suprapubic catheter.   Musculoskeletal: Normal range of motion. He exhibits no edema or tenderness.   Lymphadenopathy:     He has no cervical adenopathy.   Neurological: He is alert and oriented to person, place, and time. He displays abnormal reflex. No cranial nerve deficit. He exhibits abnormal muscle tone. Coordination abnormal.   Paraplegia.   Skin: Skin is warm. No rash noted. He is not diaphoretic. No erythema. No pallor.   Psychiatric: He has a normal mood and affect. His behavior is normal. Judgment and thought content normal.   Nursing note and vitals reviewed.    Assessment:       1. Acute cystitis without hematuria        Mr. Edgar has ESBL Klebsiella UTI.  Patient is unable to afford fosfomycin.  Will plan to place a picc line and start IV ertapenem 1 gram q 24 hours for 7 days.  Plan:       Acute cystitis without hematuria  -     IV ertapenem.
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
## 2017-04-26 NOTE — MR AVS SNAPSHOT
[facility]  [address]  [facility] [address]  Phone: [phone]                  Nghia Edgar   2017 10:20 AM   Office Visit    Description:  Male : 1985   Provider:  Sherry Knight NP   Department:  [facility]           Reason for Visit     Neurogenic Bladder           Diagnoses this Visit        Comments    Neurogenic bladder    -  Primary     Encounter for suprapubic catheter care         Bacteria in urine         Urinary retention                To Do List           Future Appointments        Provider Department Dept Phone    2017 10:40 AM Brent Butcher MD [facility] [phone]      Goals (5 Years of Data)     None      Follow-Up and Disposition     Return in about 4 weeks (around 2017).      [facility]     [facility] Nurse Care Line -  Assistance  Unless otherwise directed by your provider, please contact [facility], our nurse care line that is available for  assistance.     Registered nurses in the [facility] provide: appointment scheduling, clinical advisement, health education, and other advisory services.  Call: [phone] (toll free)               Medications           Message regarding Medications     Verify the changes and/or additions to your medication regime listed below are the same as discussed with your clinician today.  If any of these changes or additions are incorrect, please notify your healthcare provider.             Verify that the below list of medications is an accurate representation of the medications you are currently taking.  If none reported, the list may be blank. If incorrect, please contact your healthcare provider. Carry this list with you in case of emergency.           Current Medications     albuterol (PROAIR HFA) 90 mcg/actuation inhaler Inhale 1-2 puffs into the lungs every 6 (six) hours as needed for Wheezing or Shortness of  Breath.    ascorbic acid (VITAMIN C) 500 MG tablet Take 500 mg by mouth every morning.     BACLOFEN IT by Intrathecal route.    blood pressure test kit-medium (IN CONTROL BP MONITOR) Kit Test daily    diazePAM (VALIUM) 10 MG Tab Take 1 tablet (10 mg total) by mouth 3 (three) times daily.    ferrous sulfate 325 (65 FE) MG EC tablet Take 325 mg by mouth once daily.    fosfomycin (MONUROL) 3 gram Pack Take 3 g by mouth every other day.    lactulose (CHRONULAC) 10 gram/15 mL solution     leg brace (ANKLE BRACE) Misc 2 each by Misc.(Non-Drug; Combo Route) route daily as needed. Please dispense dropped foot splints    multivitamin capsule Take 1 capsule by mouth every morning.    oxybutynin (DITROPAN) 5 MG Tab TAKE ONE TABLET BY MOUTH THREE TIMES DAILY AS NEEDED FOR  BLADDER  SPASMS    oxycodone (OXYCONTIN) 40 mg 12 hr tablet Take 1 tablet (40 mg total) by mouth every 12 (twelve) hours.    oxycodone-acetaminophen (PERCOCET)  mg per tablet Take 1-1/2 tablets TID PRN (BTP)    polyethylene glycol (GLYCOLAX) 17 gram PwPk Take 17 g by mouth 2 (two) times daily as needed.    potassium citrate (UROCIT-K) 10 mEq (1,080 mg) TbSR Take 1 tablet (10 mEq total) by mouth 3 (three) times daily with meals.    pregabalin (LYRICA) 200 MG Cap Take 1 capsule (200 mg total) by mouth 3 (three) times daily.    sildenafil (VIAGRA) 100 MG tablet Take 1 tablet (100 mg total) by mouth as needed for Erectile Dysfunction.           Clinical Reference Information           Your Vitals Were     BP Pulse                129/65 65          Blood Pressure          Most Recent Value    BP  129/65      Allergies as of 4/26/2017     Zanaflex [Tizanidine]      Immunizations Administered on Date of Encounter - 4/26/2017     None      Orders Placed During Today's Visit      Normal Orders This Visit    Urinalysis     Urine culture       Language Assistance Services     ATTENTION: Language assistance services are available, free of charge. Please call  [phone].      ATENCIÓN: Si habla español, tiene a garcia disposición servicios gratuitos de asistencia lingüística. Llame al [phone].     CHÚ Ý: N?u b?n nói Ti?ng Vi?t, có các d?ch v? h? tr? ngôn ng? mi?n phí dành cho b?n. G?i s? [phone].         [facility] complies with applicable Federal civil rights laws and does not discriminate on the basis of race, color, national origin, age, disability, or sex.

## 2017-04-28 ENCOUNTER — PATIENT MESSAGE (OUTPATIENT)
Dept: INFECTIOUS DISEASES | Facility: CLINIC | Age: 32
End: 2017-04-28

## 2017-04-28 ENCOUNTER — TELEPHONE (OUTPATIENT)
Dept: INFECTIOUS DISEASES | Facility: CLINIC | Age: 32
End: 2017-04-28

## 2017-04-28 DIAGNOSIS — T83.510D URINARY TRACT INFECTION ASSOCIATED WITH CYSTOSTOMY CATHETER, SUBSEQUENT ENCOUNTER: Primary | ICD-10-CM

## 2017-04-28 DIAGNOSIS — N39.0 URINARY TRACT INFECTION ASSOCIATED WITH CYSTOSTOMY CATHETER, SUBSEQUENT ENCOUNTER: Primary | ICD-10-CM

## 2017-04-28 LAB — BACTERIA UR CULT: NORMAL

## 2017-04-28 NOTE — TELEPHONE ENCOUNTER
Mr. Edgar has ESBL Klebsiella.  His insurance doesn't cover fosfomycin.  Plan is to place a picc line and start IV ertapenem 1 gram q 24 hours for 7 days.  Will place the order for picc line today.  Will need cbc, cmp once a week.

## 2017-05-01 ENCOUNTER — PATIENT MESSAGE (OUTPATIENT)
Dept: INFECTIOUS DISEASES | Facility: CLINIC | Age: 32
End: 2017-05-01

## 2017-05-02 ENCOUNTER — PATIENT MESSAGE (OUTPATIENT)
Dept: INTERNAL MEDICINE | Facility: CLINIC | Age: 32
End: 2017-05-02

## 2017-05-02 ENCOUNTER — PATIENT MESSAGE (OUTPATIENT)
Dept: UROLOGY | Facility: CLINIC | Age: 32
End: 2017-05-02

## 2017-05-02 ENCOUNTER — TELEPHONE (OUTPATIENT)
Dept: INFECTIOUS DISEASES | Facility: CLINIC | Age: 32
End: 2017-05-02

## 2017-05-02 NOTE — TELEPHONE ENCOUNTER
Tito boggs/ [facility] advised pt's vanc trough was 22.3 and a creatine of 1.3. Currently on 1g q12.    Per Dr. Awad, pt was decreased to 750mg q12, will hold a dose and have vanc trough repeated before the 4th dose.

## 2017-05-04 ENCOUNTER — PATIENT MESSAGE (OUTPATIENT)
Dept: PHYSICAL MEDICINE AND REHAB | Facility: CLINIC | Age: 32
End: 2017-05-04

## 2017-05-04 ENCOUNTER — OFFICE VISIT (OUTPATIENT)
Dept: PHYSICAL MEDICINE AND REHAB | Facility: CLINIC | Age: 32
End: 2017-05-04
Payer: MEDICAID

## 2017-05-04 DIAGNOSIS — M79.2 NEUROPATHIC PAIN: ICD-10-CM

## 2017-05-04 DIAGNOSIS — G82.50 SPASTIC QUADRIPARESIS: Primary | ICD-10-CM

## 2017-05-04 DIAGNOSIS — K59.03 THERAPEUTIC OPIOID INDUCED CONSTIPATION: ICD-10-CM

## 2017-05-04 DIAGNOSIS — T40.2X5A THERAPEUTIC OPIOID INDUCED CONSTIPATION: ICD-10-CM

## 2017-05-04 PROCEDURE — 62370 ANL SP INF PMP W/MDREPRG&FIL: CPT | Mod: PBBFAC,PO | Performed by: PHYSICAL MEDICINE & REHABILITATION

## 2017-05-04 PROCEDURE — 62370 ANL SP INF PMP W/MDREPRG&FIL: CPT | Mod: S$PBB,,, | Performed by: PHYSICAL MEDICINE & REHABILITATION

## 2017-05-04 PROCEDURE — 99211 OFF/OP EST MAY X REQ PHY/QHP: CPT | Mod: PBBFAC,25,PO | Performed by: PHYSICAL MEDICINE & REHABILITATION

## 2017-05-04 PROCEDURE — 99999 PR PBB SHADOW E&M-EST. PATIENT-LVL I: CPT | Mod: PBBFAC,,, | Performed by: PHYSICAL MEDICINE & REHABILITATION

## 2017-05-04 PROCEDURE — 99214 OFFICE O/P EST MOD 30 MIN: CPT | Mod: 25,S$PBB,, | Performed by: PHYSICAL MEDICINE & REHABILITATION

## 2017-05-04 RX ADMIN — BACLOFEN 25 MCG: 2 INJECTION INTRATHECAL at 11:05
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
## 2017-05-04 NOTE — MR AVS SNAPSHOT
[place] [facility]  [address]  [facility] [address]  Phone: [phone]                  Nghia Edgar   2017 10:40 AM   Office Visit    Description:  Male : 1985   Provider:  Brent Butcher MD   Department:  [facility]           Diagnoses this Visit        Comments    Spastic quadriparesis    -  Primary     Neuropathic pain         Therapeutic opioid induced constipation                To Do List           Future Appointments        Provider Department Dept Phone    2017  9:30 AM [facility] [id number] [facility] [phone]      Goals (5 Years of Data)     None      [facility]     [facility] Nurse Care Line -  Assistance  Unless otherwise directed by your provider, please contact [facility], our nurse care line that is available for  assistance.     Registered nurses in the [facility] provide: appointment scheduling, clinical advisement, health education, and other advisory services.  Call: [phone] (toll free)               Medications           Message regarding Medications     Verify the changes and/or additions to your medication regime listed below are the same as discussed with your clinician today.  If any of these changes or additions are incorrect, please notify your healthcare provider.             Verify that the below list of medications is an accurate representation of the medications you are currently taking.  If none reported, the list may be blank. If incorrect, please contact your healthcare provider. Carry this list with you in case of emergency.           Current Medications     albuterol (PROAIR HFA) 90 mcg/actuation inhaler Inhale 1-2 puffs into the lungs every 6 (six) hours as needed for Wheezing or Shortness of Breath.    ascorbic acid (VITAMIN C) 500 MG tablet Take 500 mg by mouth every morning.     BACLOFEN IT by Intrathecal route.    blood pressure  test kit-medium (IN CONTROL BP MONITOR) Kit Test daily    diazePAM (VALIUM) 10 MG Tab Take 1 tablet (10 mg total) by mouth 3 (three) times daily.    ferrous sulfate 325 (65 FE) MG EC tablet Take 325 mg by mouth once daily.    lactulose (CHRONULAC) 10 gram/15 mL solution     leg brace (ANKLE BRACE) Misc 2 each by Misc.(Non-Drug; Combo Route) route daily as needed. Please dispense dropped foot splints    multivitamin capsule Take 1 capsule by mouth every morning.    oxybutynin (DITROPAN) 5 MG Tab TAKE ONE TABLET BY MOUTH THREE TIMES DAILY AS NEEDED FOR  BLADDER  SPASMS    oxycodone (OXYCONTIN) 40 mg 12 hr tablet Take 1 tablet (40 mg total) by mouth every 12 (twelve) hours.    oxycodone-acetaminophen (PERCOCET)  mg per tablet Take 1-1/2 tablets TID PRN (BTP)    polyethylene glycol (GLYCOLAX) 17 gram PwPk Take 17 g by mouth 2 (two) times daily as needed.    potassium citrate (UROCIT-K) 10 mEq (1,080 mg) TbSR Take 1 tablet (10 mEq total) by mouth 3 (three) times daily with meals.    pregabalin (LYRICA) 200 MG Cap Take 1 capsule (200 mg total) by mouth 3 (three) times daily.    sildenafil (VIAGRA) 100 MG tablet Take 1 tablet (100 mg total) by mouth as needed for Erectile Dysfunction.           Clinical Reference Information           Allergies as of 5/4/2017     Zanaflex [Tizanidine]      Immunizations Administered on Date of Encounter - 5/4/2017     None      Language Assistance Services     ATTENTION: Language assistance services are available, free of charge. Please call [phone].      ATENCIÓN: Si habla español, tiene a garcia disposición servicios gratuitos de asistencia lingüística. Llame al [phone].     VANESSA Ý: N?u b?n nói Ti?ng Vi?t, có các d?ch v? h? tr? ngôn ng? mi?n phí dành cho b?n. G?i s? [phone].         [facility] complies with applicable Federal civil rights laws and does not discriminate on the basis of race, color, national origin, age, disability, or sex.

## 2017-05-05 ENCOUNTER — HOSPITAL ENCOUNTER (OUTPATIENT)
Dept: INTERVENTIONAL RADIOLOGY/VASCULAR | Facility: HOSPITAL | Age: 32
Discharge: HOME | End: 2017-05-05
Attending: INTERNAL MEDICINE
Payer: MEDICAID

## 2017-05-05 DIAGNOSIS — T83.510D URINARY TRACT INFECTION ASSOCIATED WITH CYSTOSTOMY CATHETER, SUBSEQUENT ENCOUNTER: ICD-10-CM

## 2017-05-05 DIAGNOSIS — N39.0 URINARY TRACT INFECTION ASSOCIATED WITH CYSTOSTOMY CATHETER, SUBSEQUENT ENCOUNTER: ICD-10-CM

## 2017-05-05 PROCEDURE — 36569 INSJ PICC 5 YR+ W/O IMAGING: CPT | Mod: LT,,, | Performed by: RADIOLOGY

## 2017-05-05 PROCEDURE — 76937 US GUIDE VASCULAR ACCESS: CPT | Mod: TC

## 2017-05-05 PROCEDURE — 76937 US GUIDE VASCULAR ACCESS: CPT | Mod: 26,,, | Performed by: RADIOLOGY

## 2017-05-05 PROCEDURE — 77001 FLUOROGUIDE FOR VEIN DEVICE: CPT | Mod: TC

## 2017-05-05 PROCEDURE — 77001 FLUOROGUIDE FOR VEIN DEVICE: CPT | Mod: 26,,, | Performed by: RADIOLOGY

## 2017-05-05 RX ORDER — SODIUM CHLORIDE 0.9 % (FLUSH) 0.9 %
10 SYRINGE (ML) INJECTION
Status: DISCONTINUED | OUTPATIENT
Start: 2017-05-05 | End: 2017-05-06 | Stop reason: HOSPADM

## 2017-05-05 RX ORDER — SODIUM CHLORIDE 0.9 % (FLUSH) 0.9 %
10 SYRINGE (ML) INJECTION EVERY 6 HOURS
Status: DISCONTINUED | OUTPATIENT
Start: 2017-05-05 | End: 2017-05-06 | Stop reason: HOSPADM

## 2017-05-05 NOTE — PROCEDURES
Radiology Post-Procedure Note    Pre Op Diagnosis: UTI    Post Op Diagnosis: Same    Procedure: PICC placement    Procedure performed by: Alex Dixon MD    Written Informed Consent Obtained: Yes    Specimen Removed: No    Estimated Blood Loss: Minimal    Findings:   Using realtime U/S guidance a 5 Fr PICC catheter was placed into the left Basilic Vein with tip of the catheter in the SVC.    PICC is ready for use.     Alex Dixon MD  Staff Radiologist  Pager: [phone]

## 2017-05-05 NOTE — DISCHARGE INSTRUCTIONS
[facility] [phone] (MON-FRI 8 AM- 5PM). Radiology Resident on call [phone].

## 2017-05-05 NOTE — IP AVS SNAPSHOT
[facility]  [address]  [facility] [address]  Phone: [phone]           Patient Discharge Instructions   Our goal is to set you up for success. This packet includes information on your condition, medications, and your home care.  It will help you care for yourself to prevent having to return to the hospital.     Please ask your nurse if you have any questions.      There are many details to remember when preparing to leave the hospital. Here is what you will need to do:    1. Take your medicine. If you are prescribed medications, review your Medication List on the following pages. You may have new medications to  at the pharmacy and others that you'll need to stop taking. Review the instructions for how and when to take your medications. Talk with your doctor or nurses if you are unsure of what to do.     2. Go to your follow-up appointments. Specific follow-up information is listed in the following pages. Your may be contacted by a nurse or clinical provider about future appointments. Be sure we have all of the phone numbers to reach you. Please contact your provider's office if you are unable to make an appointment.     3. Watch for warning signs. Your doctor or nurse will give you detailed warning signs to watch for and when to call for assistance. These instructions may also include educational information about your condition. If you experience any of warning signs to your health, call your doctor.           [facility]  Unless otherwise directed by your provider, please   contact [facility], our nurse care line   that is available for 24/7 assistance.     [phone] (toll-free)     Registered nurses in the [facility]   provide: appointment scheduling, clinical advisement, health education, and other advisory services.                  ** Verify the list of medication(s) below is accurate and up to date. Carry this with you in case of  emergency. If your medications have changed, please notify your healthcare provider.             Medication List      TAKE these medications        Additional Info                      albuterol 90 mcg/actuation inhaler   Commonly known as:  PROAIR HFA   Quantity:  18 g   Refills:  3   Dose:  1-2 puff    Instructions:  Inhale 1-2 puffs into the lungs every 6 (six) hours as needed for Wheezing or Shortness of Breath.     Begin Date    AM    Noon    PM    Bedtime       BACLOFEN IT   Refills:  0    Instructions:  by Intrathecal route.     Begin Date    AM    Noon    PM    Bedtime       blood pressure test kit-medium Kit   Commonly known as:  INCONTROL BP MONITOR   Quantity:  1 each   Refills:  0    Instructions:  Test daily     Begin Date    AM    Noon    PM    Bedtime       diazePAM 10 MG Tab   Commonly known as:  VALIUM   Quantity:  90 tablet   Refills:  2   Dose:  10 mg    Instructions:  Take 1 tablet (10 mg total) by mouth 3 (three) times daily.     Begin Date    AM    Noon    PM    Bedtime       ferrous sulfate 325 (65 FE) MG EC tablet   Refills:  0   Dose:  325 mg    Instructions:  Take 325 mg by mouth once daily.     Begin Date    AM    Noon    PM    Bedtime       lactulose 10 gram/15 mL solution   Commonly known as:  CHRONULAC   Refills:  0      Begin Date    AM    Noon    PM    Bedtime       leg brace Misc   Commonly known as:  ANKLE BRACE   Quantity:  2 each   Refills:  0   Dose:  2 each    Instructions:  2 each by Misc.(Non-Drug; Combo Route) route daily as needed. Please dispense dropped foot splints     Begin Date    AM    Noon    PM    Bedtime       multivitamin capsule   Refills:  0   Dose:  1 capsule    Instructions:  Take 1 capsule by mouth every morning.     Begin Date    AM    Noon    PM    Bedtime       oxybutynin 5 MG Tab   Commonly known as:  DITROPAN   Quantity:  90 tablet   Refills:  3    Instructions:  TAKE ONE TABLET BY MOUTH THREE TIMES DAILY AS NEEDED FOR  BLADDER  SPASMS     Begin Date     AM    Noon    PM    Bedtime       oxycodone 40 mg 12 hr tablet   Commonly known as:  OXYCONTIN   Quantity:  60 tablet   Refills:  0   Dose:  40 mg    Instructions:  Take 1 tablet (40 mg total) by mouth every 12 (twelve) hours.     Begin Date    AM    Noon    PM    Bedtime       oxycodone-acetaminophen  mg per tablet   Commonly known as:  PERCOCET   Quantity:  135 tablet   Refills:  0    Instructions:  Take 1-1/2 tablets TID PRN (BTP)     Begin Date    AM    Noon    PM    Bedtime       polyethylene glycol 17 gram Pwpk   Commonly known as:  GLYCOLAX   Quantity:  60 packet   Refills:  11   Dose:  17 g    Instructions:  Take 17 g by mouth 2 (two) times daily as needed.     Begin Date    AM    Noon    PM    Bedtime       potassium citrate 10 mEq (1,080 mg) Tbsr   Commonly known as:  UROCIT-K   Quantity:  90 tablet   Refills:  11   Dose:  10 mEq    Instructions:  Take 1 tablet (10 mEq total) by mouth 3 (three) times daily with meals.     Begin Date    AM    Noon    PM    Bedtime       pregabalin 200 MG Cap   Commonly known as:  LYRICA   Quantity:  90 capsule   Refills:  5   Dose:  200 mg    Instructions:  Take 1 capsule (200 mg total) by mouth 3 (three) times daily.     Begin Date    AM    Noon    PM    Bedtime       sildenafil 100 MG tablet   Commonly known as:  VIAGRA   Quantity:  6 tablet   Refills:  12   Dose:  100 mg    Instructions:  Take 1 tablet (100 mg total) by mouth as needed for Erectile Dysfunction.     Begin Date    AM    Noon    PM    Bedtime       VITAMIN C 500 MG tablet   Refills:  0   Dose:  500 mg   Generic drug:  ascorbic acid (vitamin C)    Instructions:  Take 500 mg by mouth every morning.     Begin Date    AM    Noon    PM    Bedtime                  Please bring to all follow up appointments:    1. A copy of your discharge instructions.  2. All medicines you are currently taking in their original bottles.  3. Identification and insurance card.    Please arrive 15 minutes ahead of scheduled  appointment time.    Please call 24 hours in advance if you must reschedule your appointment and/or time.            Discharge Instructions       [facility] [phone] (MON-FRI 8 AM- 5PM). Radiology Resident on call [phone].      Discharge References/Attachments     CHANGING THE DRESSING ON YOUR PERIPHERALLY INSERTED CENTRAL CATHETER (PICC), DISCHARGE INSTRUCTIONS  (ENGLISH)        Admission Information     Date & Time Provider Department CSN    5/5/2017  9:30 AM Rafi Awad MD [facility] [id number]      Care Providers     Provider Role Specialty Primary office phone    Rafi Awad MD Attending Provider Infectious Diseases [phone]      Recent Lab Values        7/20/2015                           3:25 PM           A1C 4.7                       Allergies as of 5/5/2017        Reactions    Zanaflex [Tizanidine] Other (See Comments)    Get hallucinations from meds      Advance Directives     An advance directive is a document which, in the event you are no longer able to make decisions for yourself, tells your healthcare team what kind of treatment you do or do not want to receive, or who you would like to make those decisions for you.  If you do not currently have an advance directive, [facility] encourages you to create one.  For more information call:  [phone]), [phone] ([phone]),  or log on to [email or web address].        Language Assistance Services     ATTENTION: Language assistance services are available, free of charge. Please call [phone].      ATENCIÓN: Si habla español, tiene a garcia disposición servicios gratuitos de asistencia lingüística. Llame al [phone].     [facility] Ý: N?u b?n nói Ti?ng Vi?t, có các d?ch v? h? tr? ngôn ng? mi?n phí dành cho b?n. G?i s? [phone].         [facility] complies with applicable Federal civil rights laws and does not discriminate on the basis of race, color, national origin, age,  disability, or sex.

## 2017-05-05 NOTE — PROGRESS NOTES
PICC line placement completed, pt tolerated well. No apparent distress noted. Dressing applied CDI. Discharge instructions reviewed and acknowledged. Pt discharged via stretcher, accompanied by EMS and family.

## 2017-05-05 NOTE — PROGRESS NOTES
Pt arrived to IR room 189 for PICC placement, no acute distress noted. Orders and labs reviewed on chart. Accompanied by EMS and family. Awaiting consent.
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
## 2017-05-09 ENCOUNTER — PATIENT MESSAGE (OUTPATIENT)
Dept: PHYSICAL MEDICINE AND REHAB | Facility: CLINIC | Age: 32
End: 2017-05-09

## 2017-05-09 RX ORDER — DIAZEPAM 10 MG/1
10 TABLET ORAL 3 TIMES DAILY PRN
Qty: 90 TABLET | Refills: 5 | Status: SHIPPED | OUTPATIENT
Start: 2017-05-09 | End: 2017-11-16 | Stop reason: SDUPTHER

## 2017-05-09 RX ORDER — DIAZEPAM 10 MG/1
TABLET ORAL
Qty: 90 TABLET | Refills: 0 | Status: SHIPPED | OUTPATIENT
Start: 2017-05-09 | End: 2017-05-09 | Stop reason: SDUPTHER

## 2017-05-10 ENCOUNTER — TELEPHONE (OUTPATIENT)
Dept: INFECTIOUS DISEASES | Facility: CLINIC | Age: 32
End: 2017-05-10

## 2017-05-10 NOTE — TELEPHONE ENCOUNTER
----- Message from Cammy Aguilar MA sent at 5/10/2017 11:29 AM CDT -----  [facility] advised pt's eoc for iv abx is tomorrow. Please advise.

## 2017-05-11 NOTE — TELEPHONE ENCOUNTER
Tito with [facility] advised to stop iv abx, spoke to pt and will arrange for him to go to the [facility] office in [place] to have his picc line removed.

## 2017-05-15 ENCOUNTER — PATIENT MESSAGE (OUTPATIENT)
Dept: PHYSICAL MEDICINE AND REHAB | Facility: CLINIC | Age: 32
End: 2017-05-15

## 2017-05-15 RX ORDER — OXYCODONE AND ACETAMINOPHEN 10; 325 MG/1; MG/1
TABLET ORAL
Qty: 135 TABLET | Refills: 0 | Status: SHIPPED | OUTPATIENT
Start: 2017-05-15 | End: 2017-06-15 | Stop reason: SDUPTHER

## 2017-05-15 RX ORDER — OXYCODONE HCL 40 MG/1
40 TABLET, FILM COATED, EXTENDED RELEASE ORAL EVERY 12 HOURS
Qty: 60 TABLET | Refills: 0 | Status: SHIPPED | OUTPATIENT
Start: 2017-05-15 | End: 2017-06-15 | Stop reason: SDUPTHER

## 2017-05-16 ENCOUNTER — PATIENT MESSAGE (OUTPATIENT)
Dept: INFECTIOUS DISEASES | Facility: CLINIC | Age: 32
End: 2017-05-16

## 2017-05-30 ENCOUNTER — PATIENT MESSAGE (OUTPATIENT)
Dept: UROLOGY | Facility: CLINIC | Age: 32
End: 2017-05-30

## 2017-06-05 ENCOUNTER — PATIENT MESSAGE (OUTPATIENT)
Dept: UROLOGY | Facility: CLINIC | Age: 32
End: 2017-06-05

## 2017-06-08 ENCOUNTER — PATIENT MESSAGE (OUTPATIENT)
Dept: UROLOGY | Facility: CLINIC | Age: 32
End: 2017-06-08

## 2017-06-08 ENCOUNTER — OFFICE VISIT (OUTPATIENT)
Dept: UROLOGY | Facility: CLINIC | Age: 32
End: 2017-06-08
Payer: MEDICAID

## 2017-06-08 VITALS — SYSTOLIC BLOOD PRESSURE: 122 MMHG | DIASTOLIC BLOOD PRESSURE: 57 MMHG | HEART RATE: 58 BPM

## 2017-06-08 DIAGNOSIS — N31.9 NEUROGENIC BLADDER: Primary | ICD-10-CM

## 2017-06-08 DIAGNOSIS — Z43.5 ENCOUNTER FOR SUPRAPUBIC CATHETER CARE: ICD-10-CM

## 2017-06-08 PROCEDURE — 99213 OFFICE O/P EST LOW 20 MIN: CPT | Mod: PBBFAC | Performed by: NURSE PRACTITIONER

## 2017-06-08 PROCEDURE — 99213 OFFICE O/P EST LOW 20 MIN: CPT | Mod: S$PBB,25,, | Performed by: NURSE PRACTITIONER

## 2017-06-08 PROCEDURE — 51705 CHANGE OF BLADDER TUBE: CPT | Mod: S$PBB,,, | Performed by: NURSE PRACTITIONER

## 2017-06-08 PROCEDURE — 51705 CHANGE OF BLADDER TUBE: CPT | Mod: PBBFAC | Performed by: NURSE PRACTITIONER

## 2017-06-08 PROCEDURE — 99999 PR PBB SHADOW E&M-EST. PATIENT-LVL III: CPT | Mod: PBBFAC,,, | Performed by: NURSE PRACTITIONER
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
## 2017-06-12 ENCOUNTER — PATIENT MESSAGE (OUTPATIENT)
Dept: UROLOGY | Facility: CLINIC | Age: 32
End: 2017-06-12

## 2017-06-13 ENCOUNTER — PATIENT MESSAGE (OUTPATIENT)
Dept: PHYSICAL MEDICINE AND REHAB | Facility: CLINIC | Age: 32
End: 2017-06-13

## 2017-06-13 RX ORDER — PREGABALIN 200 MG/1
CAPSULE ORAL
Qty: 90 CAPSULE | Refills: 5 | Status: SHIPPED | OUTPATIENT
Start: 2017-06-13 | End: 2018-01-08 | Stop reason: SDUPTHER

## 2017-06-14 RX ORDER — PREGABALIN 200 MG/1
200 CAPSULE ORAL 3 TIMES DAILY
Qty: 90 CAPSULE | Refills: 5 | OUTPATIENT
Start: 2017-06-14

## 2017-06-15 RX ORDER — OXYCODONE HCL 40 MG/1
40 TABLET, FILM COATED, EXTENDED RELEASE ORAL EVERY 12 HOURS
Qty: 60 TABLET | Refills: 0 | Status: SHIPPED | OUTPATIENT
Start: 2017-06-15 | End: 2017-07-14 | Stop reason: SDUPTHER

## 2017-06-15 RX ORDER — DIAZEPAM 10 MG/1
TABLET ORAL
Qty: 90 TABLET | Refills: 4 | Status: ON HOLD | OUTPATIENT
Start: 2017-06-15 | End: 2017-07-11 | Stop reason: SDUPTHER

## 2017-06-15 RX ORDER — OXYCODONE AND ACETAMINOPHEN 10; 325 MG/1; MG/1
TABLET ORAL
Qty: 135 TABLET | Refills: 0 | Status: SHIPPED | OUTPATIENT
Start: 2017-06-15 | End: 2017-07-14 | Stop reason: SDUPTHER

## 2017-06-19 ENCOUNTER — PATIENT MESSAGE (OUTPATIENT)
Dept: UROLOGY | Facility: CLINIC | Age: 32
End: 2017-06-19

## 2017-06-22 ENCOUNTER — OFFICE VISIT (OUTPATIENT)
Dept: UROLOGY | Facility: CLINIC | Age: 32
End: 2017-06-22
Payer: MEDICAID

## 2017-06-22 VITALS — HEART RATE: 88 BPM | DIASTOLIC BLOOD PRESSURE: 74 MMHG | SYSTOLIC BLOOD PRESSURE: 129 MMHG

## 2017-06-22 DIAGNOSIS — R33.9 URINARY RETENTION: Primary | ICD-10-CM

## 2017-06-22 DIAGNOSIS — R82.90 ABNORMAL URINE SEDIMENT: ICD-10-CM

## 2017-06-22 DIAGNOSIS — Z43.5 ENCOUNTER FOR SUPRAPUBIC CATHETER CARE: ICD-10-CM

## 2017-06-22 PROCEDURE — 87086 URINE CULTURE/COLONY COUNT: CPT

## 2017-06-22 PROCEDURE — 87088 URINE BACTERIA CULTURE: CPT

## 2017-06-22 PROCEDURE — 87077 CULTURE AEROBIC IDENTIFY: CPT

## 2017-06-22 PROCEDURE — 51705 CHANGE OF BLADDER TUBE: CPT | Mod: S$PBB,,, | Performed by: NURSE PRACTITIONER

## 2017-06-22 PROCEDURE — 99214 OFFICE O/P EST MOD 30 MIN: CPT | Mod: S$PBB,25,, | Performed by: NURSE PRACTITIONER

## 2017-06-22 PROCEDURE — 87186 SC STD MICRODIL/AGAR DIL: CPT

## 2017-06-22 PROCEDURE — 51705 CHANGE OF BLADDER TUBE: CPT | Mod: PBBFAC | Performed by: NURSE PRACTITIONER

## 2017-06-22 PROCEDURE — 99214 OFFICE O/P EST MOD 30 MIN: CPT | Mod: PBBFAC | Performed by: NURSE PRACTITIONER

## 2017-06-22 PROCEDURE — 99999 PR PBB SHADOW E&M-EST. PATIENT-LVL IV: CPT | Mod: PBBFAC,,, | Performed by: NURSE PRACTITIONER
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
## 2017-06-24 LAB — BACTERIA UR CULT: NORMAL

## 2017-06-28 ENCOUNTER — PATIENT MESSAGE (OUTPATIENT)
Dept: INTERNAL MEDICINE | Facility: CLINIC | Age: 32
End: 2017-06-28

## 2017-06-28 NOTE — TELEPHONE ENCOUNTER
Schedule follow up. I don't have any availabilities soon due to out of town and he usually needs a few days to arrange transport. Please see if he wants to see another provider sooner.

## 2017-07-03 ENCOUNTER — PATIENT MESSAGE (OUTPATIENT)
Dept: PLASTIC SURGERY | Facility: CLINIC | Age: 32
End: 2017-07-03

## 2017-07-04 ENCOUNTER — PATIENT MESSAGE (OUTPATIENT)
Dept: INTERNAL MEDICINE | Facility: CLINIC | Age: 32
End: 2017-07-04

## 2017-07-06 ENCOUNTER — PATIENT MESSAGE (OUTPATIENT)
Dept: UROLOGY | Facility: CLINIC | Age: 32
End: 2017-07-06

## 2017-07-06 NOTE — TELEPHONE ENCOUNTER
Patient declined UC appt, next avail is showing 08/31 for EP and 09/18 For urgent EP, pt had surgery with DR marya velásquez f/u with him.

## 2017-07-11 ENCOUNTER — HOSPITAL ENCOUNTER (INPATIENT)
Facility: HOSPITAL | Age: 32
LOS: 2 days | Discharge: HOME | DRG: 698 | End: 2017-07-13
Attending: HOSPITALIST | Admitting: HOSPITALIST
Payer: MEDICAID

## 2017-07-11 ENCOUNTER — HOSPITAL ENCOUNTER (EMERGENCY)
Facility: HOSPITAL | Age: 32
Discharge: ANOTHER HOSPITAL | End: 2017-07-11
Attending: SURGERY
Payer: MEDICAID

## 2017-07-11 ENCOUNTER — PATIENT MESSAGE (OUTPATIENT)
Dept: INTERNAL MEDICINE | Facility: CLINIC | Age: 32
End: 2017-07-11

## 2017-07-11 VITALS
RESPIRATION RATE: 18 BRPM | OXYGEN SATURATION: 98 % | BODY MASS INDEX: 30.26 KG/M2 | WEIGHT: 199 LBS | HEART RATE: 53 BPM | TEMPERATURE: 98 F | DIASTOLIC BLOOD PRESSURE: 49 MMHG | SYSTOLIC BLOOD PRESSURE: 105 MMHG

## 2017-07-11 DIAGNOSIS — E86.0 DEHYDRATION: ICD-10-CM

## 2017-07-11 DIAGNOSIS — N39.0 URINARY TRACT INFECTION: ICD-10-CM

## 2017-07-11 DIAGNOSIS — I95.9 HYPOTENSION: ICD-10-CM

## 2017-07-11 DIAGNOSIS — N39.0 URINARY TRACT INFECTION ASSOCIATED WITH CYSTOSTOMY CATHETER: ICD-10-CM

## 2017-07-11 DIAGNOSIS — R00.1 BRADYCARDIA: ICD-10-CM

## 2017-07-11 DIAGNOSIS — R79.89 ELEVATED TROPONIN: ICD-10-CM

## 2017-07-11 DIAGNOSIS — N39.0 COMPLICATED UTI (URINARY TRACT INFECTION): Primary | ICD-10-CM

## 2017-07-11 DIAGNOSIS — T83.510A URINARY TRACT INFECTION ASSOCIATED WITH CYSTOSTOMY CATHETER: ICD-10-CM

## 2017-07-11 PROBLEM — Z86.19 HISTORY OF ESBL KLEBSIELLA PNEUMONIAE INFECTION: Chronic | Status: ACTIVE | Noted: 2017-04-03

## 2017-07-11 LAB
ALBUMIN SERPL BCP-MCNC: 3.4 G/DL
ALP SERPL-CCNC: 115 U/L
ALT SERPL W/O P-5'-P-CCNC: 35 U/L
ANION GAP SERPL CALC-SCNC: 9 MMOL/L
APTT BLDCRRT: 31.6 SEC
AST SERPL-CCNC: 28 U/L
BACTERIA #/AREA URNS HPF: ABNORMAL /HPF
BASOPHILS # BLD AUTO: 0.03 K/UL
BASOPHILS NFR BLD: 0.3 %
BILIRUB SERPL-MCNC: 0.3 MG/DL
BILIRUB UR QL STRIP: NEGATIVE
BNP SERPL-MCNC: 25 PG/ML
BUN SERPL-MCNC: 8 MG/DL
CALCIUM SERPL-MCNC: 8.6 MG/DL
CHLORIDE SERPL-SCNC: 109 MMOL/L
CK MB SERPL-MCNC: 2 NG/ML
CK MB SERPL-RTO: 1.9 %
CK SERPL-CCNC: 107 U/L
CK SERPL-CCNC: 107 U/L
CLARITY UR: CLEAR
CO2 SERPL-SCNC: 23 MMOL/L
COLOR UR: YELLOW
CREAT SERPL-MCNC: 0.5 MG/DL
DIFFERENTIAL METHOD: ABNORMAL
EOSINOPHIL # BLD AUTO: 0.1 K/UL
EOSINOPHIL NFR BLD: 1 %
ERYTHROCYTE [DISTWIDTH] IN BLOOD BY AUTOMATED COUNT: 13.7 %
EST. GFR  (AFRICAN AMERICAN): >60 ML/MIN/1.73 M^2
EST. GFR  (NON AFRICAN AMERICAN): >60 ML/MIN/1.73 M^2
GLUCOSE SERPL-MCNC: 118 MG/DL
GLUCOSE UR QL STRIP: NEGATIVE
HCT VFR BLD AUTO: 41 %
HGB BLD-MCNC: 13 G/DL
HGB UR QL STRIP: ABNORMAL
INR PPP: 1.1
KETONES UR QL STRIP: NEGATIVE
LACTATE SERPL-SCNC: 0.8 MMOL/L
LEUKOCYTE ESTERASE UR QL STRIP: ABNORMAL
LIPASE SERPL-CCNC: 74 U/L
LYMPHOCYTES # BLD AUTO: 1.6 K/UL
LYMPHOCYTES NFR BLD: 13.7 %
MAGNESIUM SERPL-MCNC: 2 MG/DL
MCH RBC QN AUTO: 30 PG
MCHC RBC AUTO-ENTMCNC: 31.7 %
MCV RBC AUTO: 95 FL
MICROSCOPIC COMMENT: ABNORMAL
MONOCYTES # BLD AUTO: 0.8 K/UL
MONOCYTES NFR BLD: 7.1 %
NEUTROPHILS # BLD AUTO: 8.8 K/UL
NEUTROPHILS NFR BLD: 77.9 %
NITRITE UR QL STRIP: POSITIVE
PH UR STRIP: 8 [PH] (ref 5–8)
PHOSPHATE SERPL-MCNC: 3.5 MG/DL
PLATELET # BLD AUTO: 339 K/UL
PMV BLD AUTO: 10.8 FL
POCT GLUCOSE: 97 MG/DL (ref 70–110)
POTASSIUM SERPL-SCNC: 3.7 MMOL/L
PROT SERPL-MCNC: 6.7 G/DL
PROT UR QL STRIP: NEGATIVE
PROTHROMBIN TIME: 11.1 SEC
RBC # BLD AUTO: 4.34 M/UL
RBC #/AREA URNS HPF: 2 /HPF (ref 0–4)
SODIUM SERPL-SCNC: 141 MMOL/L
SP GR UR STRIP: 1.01 (ref 1–1.03)
SQUAMOUS #/AREA URNS HPF: 0 /HPF
TROPONIN I SERPL DL<=0.01 NG/ML-MCNC: 0.09 NG/ML
URATE CRY URNS QL MICRO: ABNORMAL
URN SPEC COLLECT METH UR: ABNORMAL
UROBILINOGEN UR STRIP-ACNC: NEGATIVE EU/DL
WBC # BLD AUTO: 11.35 K/UL
WBC #/AREA URNS HPF: 15 /HPF (ref 0–5)
WBC CLUMPS URNS QL MICRO: ABNORMAL

## 2017-07-11 PROCEDURE — 83880 ASSAY OF NATRIURETIC PEPTIDE: CPT

## 2017-07-11 PROCEDURE — 82962 GLUCOSE BLOOD TEST: CPT

## 2017-07-11 PROCEDURE — 93010 ELECTROCARDIOGRAM REPORT: CPT | Mod: ,,, | Performed by: INTERNAL MEDICINE

## 2017-07-11 PROCEDURE — 96361 HYDRATE IV INFUSION ADD-ON: CPT

## 2017-07-11 PROCEDURE — 85610 PROTHROMBIN TIME: CPT

## 2017-07-11 PROCEDURE — 85025 COMPLETE CBC W/AUTO DIFF WBC: CPT

## 2017-07-11 PROCEDURE — 87040 BLOOD CULTURE FOR BACTERIA: CPT | Mod: 59

## 2017-07-11 PROCEDURE — 27100171 HC OXYGEN HIGH FLOW UP TO 24 HOURS

## 2017-07-11 PROCEDURE — 84484 ASSAY OF TROPONIN QUANT: CPT

## 2017-07-11 PROCEDURE — 94761 N-INVAS EAR/PLS OXIMETRY MLT: CPT

## 2017-07-11 PROCEDURE — 87077 CULTURE AEROBIC IDENTIFY: CPT

## 2017-07-11 PROCEDURE — 87186 SC STD MICRODIL/AGAR DIL: CPT

## 2017-07-11 PROCEDURE — 81000 URINALYSIS NONAUTO W/SCOPE: CPT

## 2017-07-11 PROCEDURE — 80053 COMPREHEN METABOLIC PANEL: CPT

## 2017-07-11 PROCEDURE — 83735 ASSAY OF MAGNESIUM: CPT

## 2017-07-11 PROCEDURE — 96365 THER/PROPH/DIAG IV INF INIT: CPT

## 2017-07-11 PROCEDURE — 36415 COLL VENOUS BLD VENIPUNCTURE: CPT

## 2017-07-11 PROCEDURE — 82553 CREATINE MB FRACTION: CPT

## 2017-07-11 PROCEDURE — 99285 EMERGENCY DEPT VISIT HI MDM: CPT | Mod: 25

## 2017-07-11 PROCEDURE — 87088 URINE BACTERIA CULTURE: CPT

## 2017-07-11 PROCEDURE — 25000003 PHARM REV CODE 250: Performed by: SURGERY

## 2017-07-11 PROCEDURE — 96375 TX/PRO/DX INJ NEW DRUG ADDON: CPT

## 2017-07-11 PROCEDURE — 83690 ASSAY OF LIPASE: CPT

## 2017-07-11 PROCEDURE — 25000003 PHARM REV CODE 250: Performed by: PHYSICIAN ASSISTANT

## 2017-07-11 PROCEDURE — 63600175 PHARM REV CODE 636 W HCPCS: Performed by: HOSPITALIST

## 2017-07-11 PROCEDURE — 11000001 HC ACUTE MED/SURG PRIVATE ROOM

## 2017-07-11 PROCEDURE — 25000003 PHARM REV CODE 250: Performed by: HOSPITALIST

## 2017-07-11 PROCEDURE — 83605 ASSAY OF LACTIC ACID: CPT

## 2017-07-11 PROCEDURE — 87086 URINE CULTURE/COLONY COUNT: CPT

## 2017-07-11 PROCEDURE — 93005 ELECTROCARDIOGRAM TRACING: CPT

## 2017-07-11 PROCEDURE — 63600175 PHARM REV CODE 636 W HCPCS: Performed by: SURGERY

## 2017-07-11 PROCEDURE — 84100 ASSAY OF PHOSPHORUS: CPT

## 2017-07-11 PROCEDURE — 85730 THROMBOPLASTIN TIME PARTIAL: CPT

## 2017-07-11 RX ORDER — DIAZEPAM 5 MG/1
10 TABLET ORAL 3 TIMES DAILY PRN
Status: DISCONTINUED | OUTPATIENT
Start: 2017-07-11 | End: 2017-07-13 | Stop reason: HOSPADM

## 2017-07-11 RX ORDER — OXYCODONE AND ACETAMINOPHEN 10; 325 MG/1; MG/1
1 TABLET ORAL EVERY 8 HOURS PRN
Status: DISCONTINUED | OUTPATIENT
Start: 2017-07-11 | End: 2017-07-13 | Stop reason: HOSPADM

## 2017-07-11 RX ORDER — SODIUM CHLORIDE 9 MG/ML
1000 INJECTION, SOLUTION INTRAVENOUS
Status: COMPLETED | OUTPATIENT
Start: 2017-07-11 | End: 2017-07-11

## 2017-07-11 RX ORDER — ONDANSETRON 2 MG/ML
4 INJECTION INTRAMUSCULAR; INTRAVENOUS
Status: COMPLETED | OUTPATIENT
Start: 2017-07-11 | End: 2017-07-11

## 2017-07-11 RX ORDER — ACETAMINOPHEN 325 MG/1
650 TABLET ORAL EVERY 6 HOURS PRN
Status: DISCONTINUED | OUTPATIENT
Start: 2017-07-11 | End: 2017-07-13 | Stop reason: HOSPADM

## 2017-07-11 RX ORDER — ENOXAPARIN SODIUM 100 MG/ML
40 INJECTION SUBCUTANEOUS EVERY 24 HOURS
Status: DISCONTINUED | OUTPATIENT
Start: 2017-07-11 | End: 2017-07-13 | Stop reason: HOSPADM

## 2017-07-11 RX ORDER — CIPROFLOXACIN 2 MG/ML
400 INJECTION, SOLUTION INTRAVENOUS
Status: COMPLETED | OUTPATIENT
Start: 2017-07-11 | End: 2017-07-11

## 2017-07-11 RX ORDER — HYDROMORPHONE HYDROCHLORIDE 1 MG/ML
0.5 INJECTION, SOLUTION INTRAMUSCULAR; INTRAVENOUS; SUBCUTANEOUS
Status: COMPLETED | OUTPATIENT
Start: 2017-07-11 | End: 2017-07-11

## 2017-07-11 RX ORDER — OXYCODONE HCL 20 MG/1
40 TABLET, FILM COATED, EXTENDED RELEASE ORAL EVERY 12 HOURS
Status: DISCONTINUED | OUTPATIENT
Start: 2017-07-11 | End: 2017-07-13 | Stop reason: HOSPADM

## 2017-07-11 RX ORDER — FERROUS SULFATE 325(65) MG
325 TABLET, DELAYED RELEASE (ENTERIC COATED) ORAL DAILY
Status: DISCONTINUED | OUTPATIENT
Start: 2017-07-12 | End: 2017-07-13 | Stop reason: HOSPADM

## 2017-07-11 RX ORDER — IPRATROPIUM BROMIDE AND ALBUTEROL SULFATE 2.5; .5 MG/3ML; MG/3ML
3 SOLUTION RESPIRATORY (INHALATION) EVERY 4 HOURS PRN
Status: DISCONTINUED | OUTPATIENT
Start: 2017-07-11 | End: 2017-07-13 | Stop reason: HOSPADM

## 2017-07-11 RX ORDER — OXYBUTYNIN CHLORIDE 5 MG/1
5 TABLET ORAL 3 TIMES DAILY PRN
Status: DISCONTINUED | OUTPATIENT
Start: 2017-07-11 | End: 2017-07-13 | Stop reason: HOSPADM

## 2017-07-11 RX ORDER — POLYETHYLENE GLYCOL 3350 17 G/17G
17 POWDER, FOR SOLUTION ORAL 2 TIMES DAILY PRN
Status: DISCONTINUED | OUTPATIENT
Start: 2017-07-11 | End: 2017-07-13 | Stop reason: HOSPADM

## 2017-07-11 RX ORDER — ASCORBIC ACID 500 MG
500 TABLET ORAL EVERY MORNING
Status: DISCONTINUED | OUTPATIENT
Start: 2017-07-12 | End: 2017-07-13 | Stop reason: HOSPADM

## 2017-07-11 RX ORDER — POTASSIUM CITRATE 10 MEQ/1
10 TABLET, EXTENDED RELEASE ORAL
Status: DISCONTINUED | OUTPATIENT
Start: 2017-07-12 | End: 2017-07-13 | Stop reason: HOSPADM

## 2017-07-11 RX ADMIN — OXYBUTYNIN CHLORIDE 5 MG: 5 TABLET ORAL at 08:07

## 2017-07-11 RX ADMIN — HYDROMORPHONE HYDROCHLORIDE 0.5 MG: 1 INJECTION, SOLUTION INTRAMUSCULAR; INTRAVENOUS; SUBCUTANEOUS at 10:07

## 2017-07-11 RX ADMIN — ENOXAPARIN SODIUM 40 MG: 100 INJECTION SUBCUTANEOUS at 06:07

## 2017-07-11 RX ADMIN — SODIUM CHLORIDE 1000 ML: 0.9 INJECTION, SOLUTION INTRAVENOUS at 06:07

## 2017-07-11 RX ADMIN — PREGABALIN 200 MG: 75 CAPSULE ORAL at 09:07

## 2017-07-11 RX ADMIN — CIPROFLOXACIN 400 MG: 2 INJECTION, SOLUTION INTRAVENOUS at 08:07

## 2017-07-11 RX ADMIN — ONDANSETRON 4 MG: 2 INJECTION INTRAMUSCULAR; INTRAVENOUS at 10:07

## 2017-07-11 RX ADMIN — OXYCODONE HYDROCHLORIDE AND ACETAMINOPHEN 1 TABLET: 10; 325 TABLET ORAL at 08:07

## 2017-07-11 RX ADMIN — MEROPENEM 1 G: 1 INJECTION, POWDER, FOR SOLUTION INTRAVENOUS at 09:07

## 2017-07-11 RX ADMIN — OXYCODONE HYDROCHLORIDE 40 MG: 20 TABLET, FILM COATED, EXTENDED RELEASE ORAL at 09:07

## 2017-07-11 RX ADMIN — DIAZEPAM 10 MG: 5 TABLET ORAL at 08:07
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
## 2017-07-11 NOTE — ASSESSMENT & PLAN NOTE
Spastic quadriparesis; Autonomic dysreflexia; Neuropathic pain; Therapeutic opioid induced constipation  Has baclofen pump managed by Dr. Butcher, [facility]. Last clinic visit 5/4/2017. Due for refill on 10/26/2017.  Continue home diazepam, lyrica, oxycodone, Percocet, Miralax.
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
## 2017-07-11 NOTE — ED PROVIDER NOTES
[facility]                                        July 11, 2017                   Chief Complaint  31 y.o. male with Abdominal Pain; Hypotension; and Headache    History of Present Illness  Nghia Edgar presents to the emergency room with hypotension today  Patient awoke in the early morning with a headache with lower abdominal cramps   Patient states he was very diaphoretic, called EMS because he did not feel well  EMS states pt's initial systolic blood pressure was 68, requested [facility]  EMS did not think that patient was stable enough to [facility], diverted here  Patient on arrival is obviously diaphoretic and hypotensive, no fever on triage  States he has identical symptoms when he has recurrent urinary tract infections    The history is provided by the patient    Past Medical History   -- Abnormal EKG    -- Anemia    -- Anxiety    -- Arthritis    -- Asthma    -- Blood transfusion    -- Cervical spinal cord injury    -- Depression    -- Edema    -- Hypertension    -- Neurogenic bladder    -- Osteomyelitis    -- Paraplegia following spinal cord injury    -- Seizures    -- Suicide attempt    -- Urinary tract infection      Past Surgical History   -- ABDOMINAL SURGERY     -- BACK SURGERY     -- BACLOFEN PUMP IMPLANTATION     -- CERVICAL FUSION     -- COLOSTOMY     -- MUSCLE FLAP     -- sacral flaps     -- SPINE SURGERY     -- SUPRAPUBIC TUBE PLACEMENT        ALLERGIES: Zanaflex    Review of Systems and Physical Exam     Review of Systems  -- Constitution -  fever, fatigue, weakness, chills  -- Eyes - no tearing or redness, no visual disturbance  -- Ear, Nose - no tinnitus or earache, no nasal congestion or discharge  -- Mouth,Throat - no sore throat, no toothache, normal voice, normal swallowing  -- Respiratory - denies cough and congestion, no shortness of breath, no PETERSEN  -- Cardiovascular - denies chest pain, no palpitations, denies claudication  -- Gastrointestinal - nausea,  no abdominal pain vomiting, or diarrhea  -- Genitourinary - dysuria, no denies flank pain, no hematuria or frequency   -- Musculoskeletal - denies back pain, negative for myalgias and arthralgias   -- Neurological - headache, denies weakness or seizure; no LOC  -- Skin - denies pallor, rash, or changes in skin. no hives or welts noted    Vital Signs  -- His oral temperature is 97.7 °F (36.5 °C).   -- His blood pressure is 100/48 (abnormal) and his pulse is 51   -- His respiration is 20 and oxygen saturation is 99%.      Physical Exam  -- Nursing note and vitals reviewed  -- Head: Atraumatic. Normocephalic. No obvious abnormality  -- Eyes: Pupils are equal and reactive to light. Normal conjunctiva and lids  -- Nose: Nose normal in appearance, nares grossly normal. No discharge  -- Throat: Mucous membranes moist, pharynx normal, normal tonsils. No lesions   -- Ears: External ears and TM normal bilaterally. Normal hearing and no drainage  -- Cardiac: Normal rate, regular rhythm and normal heart sounds  -- Pulmonary: Normal respiratory effort, breath sounds clear to auscultation  -- Abdominal: Soft, no tenderness. Normal bowel sounds. Normal liver edge  -- Genitourinary: Suprapubic catheter in place, appears clean dry and intact  -- Musculoskeletal: Normal range of motion, no effusions. Joints stable   -- Neurological: No new weakness, previously described quadriplegia  -- Skin: Bilateral decubitus ulcers in the area between the thigh and buttocks    Emergency Room Course     Treatment and Evaluation  -- The CT of the head performed in the ER today was negative for acute pathology   -- The EKG findings today were without concerning findings from baseline   -- Chest x-ray showed no infiltrate and showed no acute pathology   -- The electrolytes drawn in the ER today were within normal limits   -- The CBC drawn in the ER today was within normal limits   -- Blood cultures have also been drawn, results are pending   -- The  urine today has been sent for lab culture, results pending   -- Lactic Acid drawn in the ER today was normal   -- The BNP drawn in the ER today were within normal limits   -- The PT, PTT, and INR were within normal limits.    -- The magnesium and phosphorus were within normal limits     Abnormal lab values  -- Lipase 74 (*)   -- Troponin I 0.090 (*)   -- Occult Blood UA Trace (*)   -- Nitrite, UA Positive (*)   -- Leukocytes, UA 3+ (*)   -- WBC, UA 15 (*)   -- WBC Clumps, UA Few (*)   -- Bacteria, UA Moderate (*)     Medications Given  -- ciprofloxacin (CIPRO)400mg/200ml D5W IVPB 400 mg (400 mg Intravenous New Bag 7/11/17 0838)   -- 0.9%  NaCl infusion (1,000 mLs Intravenous New Bag 7/11/17 0644)     Critical Care ED Physician Time (minutes):  -- Performed by: Eliud Payan M.D.  -- Date/Time: 8:41 AM 7/11/2017   -- Direct Patient Care (Face Time): 10  -- Additional History from Records or Taking Additional History:  10  -- Ordering, Reviewing, and Interpreting Diagnostic Studies: 10  -- Total Time in Documentation: 5  -- Consultation with Other Physicians: 10  -- Consultation with Family Related to Condition: 0  -- Total Critical Care Time: 45    Diagnosis  -- Complicated UTI (urinary tract infection)  -- Hypotension  -- Dehydration  -- Bradycardia     Disposition and Plan  -- Disposition: transfer  -- Condition: stable  -- The patient needs a higher level of care  -- The patient is appropriate for transfer    This note is dictated on Dragon Natural Speaking word recognition program.  There are word recognition mistakes that are occasionally missed on review.           Eliud Payan MD  07/11/17 0888

## 2017-07-11 NOTE — ASSESSMENT & PLAN NOTE
Suprapubic catheter  Pt has monthly catheter change at [facility] - Sherry Knight NP. Last changed 6/22/2017.  Consult Urology here for catheter exchange. Continue oxybutynin and potassium citrate. Routine catheter care.

## 2017-07-11 NOTE — ED NOTES
Spoke to William with transfer center. Pt going to Room 473 at [facility]. Awaiting transport per [facility]

## 2017-07-11 NOTE — ASSESSMENT & PLAN NOTE
History of ESBL Klebsiella pneumoniae infection  Pt hypotensive, bradycardic and diaphoretic this morning at home.  Afebrile. No elevation in WBC.  U/A: + nitrite, 3+ leukocytes, 15 WBC, moderate bacteria.  Pt has h/o recurrent UTIs. For 2017: 2/13 Klebsiella pneumoniae ESBL; 3/8 Klebsiella pneumoniae ESBL and E. Coli ESBL; 4/3 Klebsiella pneumoniae; 6/22 Proteus vulgaris.  Blood and urine cultures collected at [place].  Given ciprofloxacin and IVF bolus at [place] and transferred to [facility] for Urology consult.  Starting Meropenem here as per previous susceptibilities.
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
## 2017-07-11 NOTE — ED TRIAGE NOTES
Pt reports lower abd pain and head pain. Pt severely hypotensive in route. bp reported 60's/30's by EMS.
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
## 2017-07-12 ENCOUNTER — TELEPHONE (OUTPATIENT)
Dept: CARDIOLOGY | Facility: CLINIC | Age: 32
End: 2017-07-12

## 2017-07-12 PROBLEM — R79.89 ELEVATED TROPONIN: Status: ACTIVE | Noted: 2017-07-12

## 2017-07-12 LAB
ANION GAP SERPL CALC-SCNC: 6 MMOL/L
BASOPHILS # BLD AUTO: 0.02 K/UL
BASOPHILS NFR BLD: 0.3 %
BUN SERPL-MCNC: 4 MG/DL
CALCIUM SERPL-MCNC: 8.6 MG/DL
CHLORIDE SERPL-SCNC: 108 MMOL/L
CO2 SERPL-SCNC: 25 MMOL/L
CREAT SERPL-MCNC: 0.5 MG/DL
DIASTOLIC DYSFUNCTION: NO
DIFFERENTIAL METHOD: ABNORMAL
EOSINOPHIL # BLD AUTO: 0.1 K/UL
EOSINOPHIL NFR BLD: 1.3 %
ERYTHROCYTE [DISTWIDTH] IN BLOOD BY AUTOMATED COUNT: 13.6 %
EST. GFR  (AFRICAN AMERICAN): >60 ML/MIN/1.73 M^2
EST. GFR  (NON AFRICAN AMERICAN): >60 ML/MIN/1.73 M^2
ESTIMATED PA SYSTOLIC PRESSURE: 7.75
GLOBAL PERICARDIAL EFFUSION: NORMAL
GLUCOSE SERPL-MCNC: 88 MG/DL
HCT VFR BLD AUTO: 37.2 %
HGB BLD-MCNC: 11.9 G/DL
LYMPHOCYTES # BLD AUTO: 2.6 K/UL
LYMPHOCYTES NFR BLD: 37.8 %
MAGNESIUM SERPL-MCNC: 1.9 MG/DL
MCH RBC QN AUTO: 29.4 PG
MCHC RBC AUTO-ENTMCNC: 32 %
MCV RBC AUTO: 92 FL
MONOCYTES # BLD AUTO: 0.5 K/UL
MONOCYTES NFR BLD: 7.9 %
NEUTROPHILS # BLD AUTO: 3.6 K/UL
NEUTROPHILS NFR BLD: 52.4 %
PHOSPHATE SERPL-MCNC: 3.9 MG/DL
PLATELET # BLD AUTO: 301 K/UL
PMV BLD AUTO: 10.9 FL
POTASSIUM SERPL-SCNC: 3.6 MMOL/L
RBC # BLD AUTO: 4.05 M/UL
RETIRED EF AND QEF - SEE NOTES: 65 (ref 55–65)
SODIUM SERPL-SCNC: 139 MMOL/L
TRICUSPID VALVE REGURGITATION: NORMAL
TROPONIN I SERPL DL<=0.01 NG/ML-MCNC: 0.56 NG/ML
TROPONIN I SERPL DL<=0.01 NG/ML-MCNC: 0.67 NG/ML
TROPONIN I SERPL DL<=0.01 NG/ML-MCNC: 0.84 NG/ML
WBC # BLD AUTO: 6.82 K/UL

## 2017-07-12 PROCEDURE — 84484 ASSAY OF TROPONIN QUANT: CPT

## 2017-07-12 PROCEDURE — 51705 CHANGE OF BLADDER TUBE: CPT | Mod: ,,, | Performed by: UROLOGY

## 2017-07-12 PROCEDURE — 97161 PT EVAL LOW COMPLEX 20 MIN: CPT

## 2017-07-12 PROCEDURE — 25000003 PHARM REV CODE 250: Performed by: HOSPITALIST

## 2017-07-12 PROCEDURE — 84100 ASSAY OF PHOSPHORUS: CPT

## 2017-07-12 PROCEDURE — 99232 SBSQ HOSP IP/OBS MODERATE 35: CPT | Mod: ,,, | Performed by: INTERNAL MEDICINE

## 2017-07-12 PROCEDURE — 63600175 PHARM REV CODE 636 W HCPCS: Performed by: HOSPITALIST

## 2017-07-12 PROCEDURE — 93306 TTE W/DOPPLER COMPLETE: CPT | Mod: 26,,, | Performed by: INTERNAL MEDICINE

## 2017-07-12 PROCEDURE — 97165 OT EVAL LOW COMPLEX 30 MIN: CPT

## 2017-07-12 PROCEDURE — 0T2BX0Z CHANGE DRAINAGE DEVICE IN BLADDER, EXTERNAL APPROACH: ICD-10-PCS | Performed by: UROLOGY

## 2017-07-12 PROCEDURE — 94761 N-INVAS EAR/PLS OXIMETRY MLT: CPT

## 2017-07-12 PROCEDURE — 80048 BASIC METABOLIC PNL TOTAL CA: CPT

## 2017-07-12 PROCEDURE — 99231 SBSQ HOSP IP/OBS SF/LOW 25: CPT | Mod: 25,,, | Performed by: UROLOGY

## 2017-07-12 PROCEDURE — 83735 ASSAY OF MAGNESIUM: CPT

## 2017-07-12 PROCEDURE — 11000001 HC ACUTE MED/SURG PRIVATE ROOM

## 2017-07-12 PROCEDURE — 96374 THER/PROPH/DIAG INJ IV PUSH: CPT

## 2017-07-12 PROCEDURE — 25000003 PHARM REV CODE 250: Performed by: PHYSICIAN ASSISTANT

## 2017-07-12 PROCEDURE — 36415 COLL VENOUS BLD VENIPUNCTURE: CPT

## 2017-07-12 PROCEDURE — 85025 COMPLETE CBC W/AUTO DIFF WBC: CPT

## 2017-07-12 RX ORDER — ASPIRIN 325 MG
325 TABLET ORAL ONCE
Status: COMPLETED | OUTPATIENT
Start: 2017-07-12 | End: 2017-07-12

## 2017-07-12 RX ADMIN — OXYBUTYNIN CHLORIDE 5 MG: 5 TABLET ORAL at 12:07

## 2017-07-12 RX ADMIN — DIAZEPAM 10 MG: 5 TABLET ORAL at 12:07

## 2017-07-12 RX ADMIN — ERTAPENEM SODIUM 1 G: 1 INJECTION, POWDER, LYOPHILIZED, FOR SOLUTION INTRAMUSCULAR; INTRAVENOUS at 03:07

## 2017-07-12 RX ADMIN — DIAZEPAM 10 MG: 5 TABLET ORAL at 09:07

## 2017-07-12 RX ADMIN — PREGABALIN 200 MG: 75 CAPSULE ORAL at 02:07

## 2017-07-12 RX ADMIN — MEROPENEM 1 G: 1 INJECTION, POWDER, FOR SOLUTION INTRAVENOUS at 05:07

## 2017-07-12 RX ADMIN — POTASSIUM CITRATE 10 MEQ: 10 TABLET ORAL at 09:07

## 2017-07-12 RX ADMIN — POTASSIUM CITRATE 10 MEQ: 10 TABLET ORAL at 12:07

## 2017-07-12 RX ADMIN — POTASSIUM CITRATE 10 MEQ: 10 TABLET ORAL at 07:07

## 2017-07-12 RX ADMIN — OXYBUTYNIN CHLORIDE 5 MG: 5 TABLET ORAL at 09:07

## 2017-07-12 RX ADMIN — OXYCODONE HYDROCHLORIDE 40 MG: 20 TABLET, FILM COATED, EXTENDED RELEASE ORAL at 09:07

## 2017-07-12 RX ADMIN — FERROUS SULFATE TAB EC 325 MG (65 MG FE EQUIVALENT) 325 MG: 325 (65 FE) TABLET DELAYED RESPONSE at 09:07

## 2017-07-12 RX ADMIN — OXYCODONE HYDROCHLORIDE AND ACETAMINOPHEN 500 MG: 500 TABLET ORAL at 07:07

## 2017-07-12 RX ADMIN — ENOXAPARIN SODIUM 40 MG: 100 INJECTION SUBCUTANEOUS at 07:07

## 2017-07-12 RX ADMIN — PREGABALIN 200 MG: 75 CAPSULE ORAL at 09:07

## 2017-07-12 RX ADMIN — OXYCODONE HYDROCHLORIDE AND ACETAMINOPHEN 1 TABLET: 10; 325 TABLET ORAL at 09:07

## 2017-07-12 RX ADMIN — OXYCODONE HYDROCHLORIDE AND ACETAMINOPHEN 1 TABLET: 10; 325 TABLET ORAL at 07:07

## 2017-07-12 RX ADMIN — PREGABALIN 200 MG: 75 CAPSULE ORAL at 05:07

## 2017-07-12 RX ADMIN — ASPIRIN 325 MG ORAL TABLET 325 MG: 325 PILL ORAL at 12:07
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
## 2017-07-12 NOTE — HPI
Patient admitted to medicine for management of UTI.  Patient states he started getting hot and fevers.  Similar to symptoms in the past. Urology consulted for sp tube change. Patient feeling well today.

## 2017-07-12 NOTE — PLAN OF CARE
Problem: Physical Therapy Goal  Goal: Physical Therapy Goal  Goals to be met by: 17     Patient will increase functional independence with mobility by performin. Slide board transfer to wc with mod I  2.  Able to sit up in chair > 2hrs  3.  Pt able to perform wc pushup for pressure relief when up in chair    Outcome: Ongoing (interventions implemented as appropriate)  PT eval with OT.  Pt is mod I for bed mobility.  Will obtain sliding board and wc and have pt practice transfer at next visit to get OOB and prevent pt from any loss of mobility during hospital stay.  REC:  Home -resume OP PT  No DME needs

## 2017-07-12 NOTE — HOSPITAL COURSE
Suprapubic catheter changed by Urologist, Dr. Brent Roca.  Noted elevation in troponin 0.09 >> 0.844 which trended down to 0.563. Pt denies CP or h/o heart disease.  2D Echocardiogram: EF 65% with no diastolic dysfunction. Pt reported to have extra P waves this morning with HR in 30s. Asymptomatic. Cardiology reviewed and suspect either artifact or brief flutter.  Recommending Holter monitor at home.  Urine culture: Proteus penneri that is pan-sensitive. Discharging on Augmentin to complete 2 week course of antibiotics for catheter associated UTI.

## 2017-07-12 NOTE — TELEPHONE ENCOUNTER
----- Message from Ghada Baltazar sent at 7/12/2017 10:59 AM CDT -----  CONSULTS  Patient:  Nghia Edgar  Facility:  [facility]  Room /bed number:  Room 473  Referring MD:  DIANA Ball  Diagnosis:  Elevated Troponin   Person calling & phone number:  Claudette  No. [phone]

## 2017-07-12 NOTE — PLAN OF CARE
Problem: Patient Care Overview  Goal: Plan of Care Review  Outcome: Ongoing (interventions implemented as appropriate)  Pt on RA with sats of 99. Will continue to monitor.

## 2017-07-12 NOTE — PLAN OF CARE
Problem: Occupational Therapy Goal  Goal: Occupational Therapy Goal  Goals to be met by: 7/18/2017    Patient will increase functional independence with ADLs by performing:    Slide Board  transfers bed<>wc with Modified [place].    Outcome: Ongoing (interventions implemented as appropriate)  Pt. limited with OOB activity and fall risk for transfers at this time. Hx SCI with trunk and LB paralysis. OT to follow up for bed<>wc slide board t/f.  Pt. At baseline for ADLS Max  Assist for LB AD at bed level, UB ADLS min assist; bed mobility Lizbeth supine<>sit and scooting to HOB in long sitting.  No Dc needs anticipated. Continue with OT for OOB transfer 2/2 skin and  Debility if left in bed during hospitalization.
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
## 2017-07-12 NOTE — ASSESSMENT & PLAN NOTE
Suprapubic catheter  Catheter changed on 7/12/17 by Dr. Brent Roca, Urology.  Pt has monthly catheter change at [facility] - Sherry Knight NP. Continue oxybutynin and potassium citrate. Routine catheter care.
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
## 2017-07-12 NOTE — PLAN OF CARE
Problem: Fall Risk (Adult)  Goal: Absence of Falls  Patient will demonstrate the desired outcomes by discharge/transition of care.   Outcome: Ongoing (interventions implemented as appropriate)  Bed alarm on and bed in lowest position. Call bell in reach at all times.

## 2017-07-12 NOTE — ASSESSMENT & PLAN NOTE
"Pt woke in early morning on 7/11 with diaphoresis, "feeling bad".  Called 911 who noted hypotension and bradycardia. Troponin at [facility] 0.09.  Pt denies CP then or now.  Troponin increased to 0.844 this morning.  Denies personal or family h/o CAD. Denies smoking. Check serial troponin and monitor on telemetry. Giving  now.  Check lipid profile. 2D Echo. Consulting Cardiology.       "

## 2017-07-12 NOTE — PLAN OF CARE
Problem: Infection, Risk/Actual (Adult)  Goal: Infection Prevention/Resolution  Patient will demonstrate the desired outcomes by discharge/transition of care.   Outcome: Ongoing (interventions implemented as appropriate)  Administer antibiotic as ordered.

## 2017-07-12 NOTE — ASSESSMENT & PLAN NOTE
History of ESBL Klebsiella pneumoniae infection  Hypotension and bradycardia persist.  No elevation in WBC.  U/A: + nitrite, 3+ leukocytes, 15 WBC, moderate bacteria.  Pt has h/o recurrent UTIs. For 2017: 2/13 Klebsiella pneumoniae ESBL; 3/8 Klebsiella pneumoniae ESBL and E. Coli ESBL; 4/3 Klebsiella pneumoniae; 6/22 Proteus vulgaris.  Blood and urine cultures collected at [facility].  Given ciprofloxacin and IVF bolus at [facility] and transferred to [facility] for Urology consult.  Continue Meropenem, day #2, here as per previous susceptibilities.   Catheter changed by Dr. Brent Roca, Urology, this morning.
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
## 2017-07-12 NOTE — PLAN OF CARE
"Turn Q 2hours for comfort and prevent skin break down. Colostomy intact. Suprapubic catheter draining QS avis urine to drainage bag.telemetry monitor noted with HR as low as 34bpm. Pt asymptomatic. States" my heart rate runs that low when I am sleeping or resting"Bryan NP called and notified of bradycardia as low as 34bpm.no new order received.Resting quietly at present.  "

## 2017-07-12 NOTE — PLAN OF CARE
Problem: Pressure Ulcer Risk (Juan Jose Scale) (Adult,Obstetrics,Pediatric)  Goal: Skin Integrity  Patient will demonstrate the desired outcomes by discharge/transition of care.   Outcome: Ongoing (interventions implemented as appropriate)  Turn Q 2 hour and PRN to prevent further skin break down.

## 2017-07-13 VITALS
BODY MASS INDEX: 27.28 KG/M2 | SYSTOLIC BLOOD PRESSURE: 114 MMHG | TEMPERATURE: 98 F | DIASTOLIC BLOOD PRESSURE: 61 MMHG | RESPIRATION RATE: 18 BRPM | HEIGHT: 68 IN | WEIGHT: 180 LBS | OXYGEN SATURATION: 97 % | HEART RATE: 46 BPM

## 2017-07-13 DIAGNOSIS — I48.92 INTERMITTENT ATRIAL FLUTTER: ICD-10-CM

## 2017-07-13 DIAGNOSIS — R00.1 BRADYCARDIA: Primary | ICD-10-CM

## 2017-07-13 LAB
ANION GAP SERPL CALC-SCNC: 4 MMOL/L
BACTERIA UR CULT: NORMAL
BASOPHILS # BLD AUTO: 0.02 K/UL
BASOPHILS NFR BLD: 0.4 %
BUN SERPL-MCNC: 5 MG/DL
CALCIUM SERPL-MCNC: 8.5 MG/DL
CHLORIDE SERPL-SCNC: 108 MMOL/L
CHOLEST/HDLC SERPL: 4.8 {RATIO}
CO2 SERPL-SCNC: 28 MMOL/L
CREAT SERPL-MCNC: 0.6 MG/DL
DIFFERENTIAL METHOD: ABNORMAL
EOSINOPHIL # BLD AUTO: 0.1 K/UL
EOSINOPHIL NFR BLD: 1.5 %
ERYTHROCYTE [DISTWIDTH] IN BLOOD BY AUTOMATED COUNT: 13.7 %
EST. GFR  (AFRICAN AMERICAN): >60 ML/MIN/1.73 M^2
EST. GFR  (NON AFRICAN AMERICAN): >60 ML/MIN/1.73 M^2
GLUCOSE SERPL-MCNC: 106 MG/DL
HCT VFR BLD AUTO: 36.2 %
HDL/CHOLESTEROL RATIO: 20.6 %
HDLC SERPL-MCNC: 126 MG/DL
HDLC SERPL-MCNC: 26 MG/DL
HGB BLD-MCNC: 11.5 G/DL
LDLC SERPL CALC-MCNC: 84 MG/DL
LYMPHOCYTES # BLD AUTO: 2.2 K/UL
LYMPHOCYTES NFR BLD: 41.5 %
MCH RBC QN AUTO: 29.6 PG
MCHC RBC AUTO-ENTMCNC: 31.8 %
MCV RBC AUTO: 93 FL
MONOCYTES # BLD AUTO: 0.4 K/UL
MONOCYTES NFR BLD: 7.3 %
NEUTROPHILS # BLD AUTO: 2.5 K/UL
NEUTROPHILS NFR BLD: 48.9 %
NONHDLC SERPL-MCNC: 100 MG/DL
PLATELET # BLD AUTO: 252 K/UL
PMV BLD AUTO: 11.1 FL
POTASSIUM SERPL-SCNC: 3.9 MMOL/L
RBC # BLD AUTO: 3.89 M/UL
SODIUM SERPL-SCNC: 140 MMOL/L
TRIGL SERPL-MCNC: 80 MG/DL
WBC # BLD AUTO: 5.18 K/UL

## 2017-07-13 PROCEDURE — 25000003 PHARM REV CODE 250: Performed by: PHYSICIAN ASSISTANT

## 2017-07-13 PROCEDURE — 93005 ELECTROCARDIOGRAM TRACING: CPT

## 2017-07-13 PROCEDURE — 80061 LIPID PANEL: CPT

## 2017-07-13 PROCEDURE — 97530 THERAPEUTIC ACTIVITIES: CPT

## 2017-07-13 PROCEDURE — 93010 ELECTROCARDIOGRAM REPORT: CPT | Mod: ,,, | Performed by: INTERNAL MEDICINE

## 2017-07-13 PROCEDURE — 99232 SBSQ HOSP IP/OBS MODERATE 35: CPT | Mod: ,,, | Performed by: INTERNAL MEDICINE

## 2017-07-13 PROCEDURE — 94761 N-INVAS EAR/PLS OXIMETRY MLT: CPT

## 2017-07-13 PROCEDURE — 85025 COMPLETE CBC W/AUTO DIFF WBC: CPT

## 2017-07-13 PROCEDURE — 80048 BASIC METABOLIC PNL TOTAL CA: CPT

## 2017-07-13 RX ORDER — AMOXICILLIN AND CLAVULANATE POTASSIUM 875; 125 MG/1; MG/1
1 TABLET, FILM COATED ORAL EVERY 12 HOURS
Status: DISCONTINUED | OUTPATIENT
Start: 2017-07-13 | End: 2017-07-13 | Stop reason: HOSPADM

## 2017-07-13 RX ORDER — AMOXICILLIN AND CLAVULANATE POTASSIUM 875; 125 MG/1; MG/1
1 TABLET, FILM COATED ORAL EVERY 12 HOURS
Qty: 24 TABLET | Refills: 0 | Status: SHIPPED | OUTPATIENT
Start: 2017-07-13 | End: 2017-07-25

## 2017-07-13 RX ADMIN — DIAZEPAM 10 MG: 5 TABLET ORAL at 06:07

## 2017-07-13 RX ADMIN — OXYCODONE HYDROCHLORIDE AND ACETAMINOPHEN 500 MG: 500 TABLET ORAL at 06:07

## 2017-07-13 RX ADMIN — OXYCODONE HYDROCHLORIDE AND ACETAMINOPHEN 1 TABLET: 10; 325 TABLET ORAL at 02:07

## 2017-07-13 RX ADMIN — AMOXICILLIN AND CLAVULANATE POTASSIUM 1 TABLET: 875; 125 TABLET, FILM COATED ORAL at 12:07

## 2017-07-13 RX ADMIN — OXYCODONE HYDROCHLORIDE 40 MG: 20 TABLET, FILM COATED, EXTENDED RELEASE ORAL at 09:07

## 2017-07-13 RX ADMIN — FERROUS SULFATE TAB EC 325 MG (65 MG FE EQUIVALENT) 325 MG: 325 (65 FE) TABLET DELAYED RESPONSE at 09:07

## 2017-07-13 RX ADMIN — OXYBUTYNIN CHLORIDE 5 MG: 5 TABLET ORAL at 06:07

## 2017-07-13 RX ADMIN — POTASSIUM CITRATE 10 MEQ: 10 TABLET ORAL at 11:07

## 2017-07-13 RX ADMIN — OXYCODONE HYDROCHLORIDE AND ACETAMINOPHEN 1 TABLET: 10; 325 TABLET ORAL at 11:07

## 2017-07-13 RX ADMIN — PREGABALIN 200 MG: 75 CAPSULE ORAL at 06:07

## 2017-07-13 RX ADMIN — POTASSIUM CITRATE 10 MEQ: 10 TABLET ORAL at 09:07

## 2017-07-13 RX ADMIN — OXYBUTYNIN CHLORIDE 5 MG: 5 TABLET ORAL at 11:07

## 2017-07-13 NOTE — PROGRESS NOTES
[facility]  Cardiology  Progress Note    Patient Name: Nghia Edgar  MRN: [id number]  Admission Date: 7/11/2017  Hospital Length of Stay: 2 days  Code Status: Prior   Attending Physician: Dayton Gibson MD   Primary Care Physician: Sophia Massey MD  Expected Discharge Date: 7/13/2017  Principal Problem:Urinary tract infection associated with cystostomy catheter    Subjective:     Patient doing well. He denies chest pain or dyspnea. No dizziness or syncope. ECGs and telemetry reviewed.    Review of Systems   Cardiovascular: Negative for chest pain, dyspnea on exertion, leg swelling, orthopnea, palpitations, paroxysmal nocturnal dyspnea and syncope.   Respiratory: Negative for cough, shortness of breath and wheezing.      Objective:     Vital Signs (Most Recent):  Temp: 98.4 °F (36.9 °C) (07/13/17 0800)  Pulse: (!) 46 (nurse alva was notify) (07/13/17 0800)  Resp: 18 (07/13/17 0800)  BP: 114/61 (07/13/17 0800)  SpO2: 98 % (07/13/17 0758) Vital Signs (24h Range):  Temp:  [97.4 °F (36.3 °C)-98.4 °F (36.9 °C)] 98.4 °F (36.9 °C)  Pulse:  [44-47] 46  Resp:  [15-18] 18  SpO2:  [96 %-98 %] 98 %  BP: ()/(45-61) 114/61     Weight: 81.6 kg (180 lb)  Body mass index is 27.37 kg/m².    SpO2: 98 %  O2 Device (Oxygen Therapy): room air      Intake/Output Summary (Last 24 hours) at 07/13/17 1147  Last data filed at 07/13/17 0425   Gross per 24 hour   Intake              960 ml   Output             1500 ml   Net             -540 ml       Lines/Drains/Airways     Drain                 Colostomy  Descending/sigmoid LLQ 50383 days         Colostomy Ascending LLQ 13554 days         Colostomy LLQ 88020 days         Colostomy LLQ 07253 days         Suprapubic Catheter 97529 days         Suprapubic Catheter 44027 days         Colostomy 06/15/13 2248 LLQ 1488 days         Urethral Catheter 03/02/16 1040 Latex;Double-lumen 16 Fr. 498 days         Suprapubic Catheter 10/15/16 1613 16 Fr. 270 days          Pressure  Ulcer                 Pressure Ulcer 12/07/15 2216 Left medial buttocks Stage  days          Peripheral Intravenous Line                 Peripheral IV - Single Lumen 07/20/16 2144 Right Hand 357 days         Peripheral IV - Single Lumen 07/11/17 0607 Left Hand 2 days                Physical Exam   Constitutional: He appears well-developed and well-nourished. No distress.   HENT:   Head: Normocephalic and atraumatic.   Neck: No JVD present.   Cardiovascular: Regular rhythm.  Bradycardia present.    No murmur heard.  Pulmonary/Chest: Effort normal and breath sounds normal.   Musculoskeletal: He exhibits no edema.   Skin: Skin is warm and dry. He is not diaphoretic.   Psychiatric: He has a normal mood and affect. His behavior is normal. Judgment and thought content normal.       Significant Labs:   BMP:   Recent Labs  Lab 07/12/17  0549 07/13/17  0442   GLU 88 106    140   K 3.6 3.9    108   CO2 25 28   BUN 4* 5*   CREATININE 0.5 0.6   CALCIUM 8.6* 8.5*   MG 1.9  --    , CMP   Recent Labs  Lab 07/12/17  0549 07/13/17  0442    140   K 3.6 3.9    108   CO2 25 28   GLU 88 106   BUN 4* 5*   CREATININE 0.5 0.6   CALCIUM 8.6* 8.5*   ANIONGAP 6* 4*   ESTGFRAFRICA >60 >60   EGFRNONAA >60 >60   , CBC   Recent Labs  Lab 07/12/17  0549 07/13/17  0442   WBC 6.82 5.18   HGB 11.9* 11.5*   HCT 37.2* 36.2*    252   , Lipid Panel   Recent Labs  Lab 07/13/17  0442   CHOL 126   HDL 26*   LDLCALC 84.0   TRIG 80   CHOLHDL 20.6   , Troponin   Recent Labs  Lab 07/12/17  0549 07/12/17  1320 07/12/17  1930   TROPONINI 0.844* 0.668* 0.563*    and All pertinent lab results from the last 24 hours have been reviewed.    Significant Imaging: Echocardiogram: 2D echo with color flow doppler: No results found. However, due to the size of the patient record, not all encounters were searched. Please check Results Review for a complete set of results.  Assessment and Plan:     Brief HPI: Patient seen this morning on  rounds sitting in wheelchair anxious for discharge. Telemetry review and echo discussed with patient. Plan for DC today and holter as outpatient. Patient verbalized understanding of plan and agrees. All questions answered to patient's satisfaction.     Active Diagnoses:    Diagnosis Date Noted POA    Bradycardia felt to be related to SCI and AD; possible flutter waves noted on telemetry overnight; isolated incident lasting a few seconds without recurrence; plan for holter as outpatient to further evaluate  07/11/2017 Yes    Elevated troponin [R74.8].09, 0.84, 0.668, 0.563; and felt to be demand in setting of UTI and visceral sympathetic stimulation in setting of autonomic dysreflexia; echo with bubble study negative with normal EF and no WMA; [place] Risk Score 0.2% risk of MI in the next 10 years 07/12/2017 Yes      Problems Resolved During this Admission:    Diagnosis Date Noted Date Resolved POA       VTE Risk Mitigation         Ordered     enoxaparin injection 40 mg  Daily     Route:  Subcutaneous        07/11/17 1255     High Risk of VTE  Once      07/11/17 1255          Cardiology  [facility]

## 2017-07-13 NOTE — ASSESSMENT & PLAN NOTE
Suprapubic catheter  Catheter changed on 7/12/17 by Dr. Brent Roca, Urology.  Pt has monthly catheter change at [facility] - Sherry Knight NP. Continue oxybutynin and potassium citrate. Routine catheter care.

## 2017-07-13 NOTE — PLAN OF CARE
Problem: Physical Therapy Goal  Goal: Physical Therapy Goal  Goals to be met by: 17     Patient will increase functional independence with mobility by performin. Slide board transfer to  with mod I  2.  Able to sit up in chair > 2hrs MET 2017  3.  Pt able to perform wc pushup for pressure relief when up in chair MET 2017       Outcome: Ongoing (interventions implemented as appropriate)  Pt met 2 goals today and completed transfers with min A.

## 2017-07-13 NOTE — SUBJECTIVE & OBJECTIVE
Interval History: Pt c/o irritation around suprapubic catheter site. Denies CP,SOB, N/V.  States ready for discharge.  Cardiology evaluating for reported telemetry event with bradycardia.  Sent EPIC message to Sherry Knight NP, in Urology at [facility] to ask for antibiotic suggestion.     Review of Systems   Constitutional: Negative for chills and fatigue.   Respiratory: Negative for cough and shortness of breath.    Cardiovascular: Negative for chest pain and palpitations.   Gastrointestinal: Positive for abdominal pain.   Neurological: Negative for dizziness and light-headedness.   Psychiatric/Behavioral: Negative for confusion.     Objective:     Vital Signs (Most Recent):  Temp: 98.4 °F (36.9 °C) (07/13/17 0800)  Pulse: (!) 46 (nurse alva was notify) (07/13/17 0800)  Resp: 18 (07/13/17 0800)  BP: 114/61 (07/13/17 0800)  SpO2: 98 % (07/13/17 0758) Vital Signs (24h Range):  Temp:  [97.4 °F (36.3 °C)-98.4 °F (36.9 °C)] 98.4 °F (36.9 °C)  Pulse:  [44-47] 46  Resp:  [15-18] 18  SpO2:  [96 %-98 %] 98 %  BP: ()/(45-61) 114/61     Weight: 81.6 kg (180 lb)  Body mass index is 27.37 kg/m².    Intake/Output Summary (Last 24 hours) at 07/13/17 0920  Last data filed at 07/13/17 0427   Gross per 24 hour   Intake              960 ml   Output             1500 ml   Net             -540 ml      Physical Exam   Constitutional: He is oriented to person, place, and time. No distress.   Cardiovascular: Regular rhythm.    Bradycardia    Pulmonary/Chest: Effort normal and breath sounds normal. No respiratory distress. He has no wheezes.   Abdominal: Soft. Bowel sounds are normal. He exhibits no distension.   Musculoskeletal: He exhibits no edema.   Neurological: He is alert and oriented to person, place, and time.   No movement of BLE.    Psychiatric: He has a normal mood and affect. Thought content normal.   Nursing note and vitals reviewed.      Significant Labs:   BMP:   Recent Labs  Lab 07/12/17  0549 07/13/17  3082   GLU  88 106    140   K 3.6 3.9    108   CO2 25 28   BUN 4* 5*   CREATININE 0.5 0.6   CALCIUM 8.6* 8.5*   MG 1.9  --      CBC:   Recent Labs  Lab 07/12/17  0549 07/13/17  0442   WBC 6.82 5.18   HGB 11.9* 11.5*   HCT 37.2* 36.2*    252     Lipid Panel:   Recent Labs  Lab 07/13/17  0442   CHOL 126   HDL 26*   LDLCALC 84.0   TRIG 80   CHOLHDL 20.6     Troponin:   Recent Labs  Lab 07/12/17  0549 07/12/17  1320 07/12/17  1930   TROPONINI 0.844* 0.668* 0.563*       Significant Imaging:     Venous ultrasound of BLE 7/12/17  No evidence of lower extremity DVT.

## 2017-07-13 NOTE — PT/OT/SLP PROGRESS
Physical Therapy  Treatment    Nghia Edgar   MRN: [id number]   Admitting Diagnosis: Urinary tract infection associated with cystostomy catheter    PT Received On: 07/13/17  PT Start Time: 1039 (am) - 1229(pm)  PT Stop Time: 1053  (am) - 1237 (pm)  PT Total Time (min): 14 min  Co-tx in am;  8 min in pm     Billable Minutes:  Therapeutic Activity 15    Treatment Type: Treatment  PT/PTA: PT             General Precautions: Standard, fall  Orthopedic Precautions: N/A   Braces: N/A    Do you have any cultural, spiritual, Alevism conflicts, given your current situation?: Pentecostal    Subjective:  Communicated with RN Tito prior to session.  Pt reports his bed and w/c at home are much easier to get in/out of and his w/c is much easier to propel.    Pain/Comfort  Pain Rating 1: 0/10  Pain Rating Post-Intervention 1: 0/10    Objective:   Patient found with: telemetry    Functional Mobility:  Bed Mobility:   Scooting/Bridging: Independent  Supine to Sit: Modified Independent  Sit to Supine: Moderate Assistance (to raise BLEs into bed)    Transfers:  Sit <> Stand Assistance: Activity did not occur  Bed <> Chair Assistance: Activity did not occur    Gait:   Gait Distance: activity did not occur 2/2 pt's quadraplegic dx    Balance:   Static Sit: FAIR: Maintains without assist, but unable to take any challenges   Dynamic Sit: FAIR: Cannot move trunk without losing balance  Static Stand: activity did not occur  Dynamic stand: activity did not occur    Therapeutic Activities and Exercises:  Pt instructed in 3 lateral transfers from w/c>bed 2x and from bed>w/c 1x. Pt completed transfers with min A provided by PT to anterior shoulders due to pt leaning forward for momentum during transfer. Pt propelled manual w/c ~250 ft with BUEs and supervision.  Pt tolerated >2 hrs of sitting in w/c prior to assist to transfer back to hospital bed. Pt's personal Roho cushion used in w/c.    AM-PAC 6 CLICK MOBILITY  How much help from  another person does this patient currently need?   1 = Unable, Total/Dependent Assistance  2 = A lot, Maximum/Moderate Assistance  3 = A little, Minimum/Contact Guard/Supervision  4 = None, Modified [place]/Independent    Turning over in bed (including adjusting bedclothes, sheets and blankets)?: 4  Sitting down on and standing up from a chair with arms (e.g., wheelchair, bedside commode, etc.): 1  Moving from lying on back to sitting on the side of the bed?: 4  Moving to and from a bed to a chair (including a wheelchair)?: 3  Need to walk in hospital room?: 1  Climbing 3-5 steps with a railing?: 1  Total Score: 14    AM-PAC Raw Score CMS G-Code Modifier Level of Impairment Assistance   6 % Total / Unable   7 - 9 CM 80 - 100% Maximal Assist   10 - 14 CL 60 - 80% Moderate Assist   15 - 19 CK 40 - 60% Moderate Assist   20 - 22 CJ 20 - 40% Minimal Assist   23 CI 1-20% SBA / CGA   24 CH 0% Independent/ Mod I     Patient left HOB elevated with call button in reach and bed alarm on and RN notified.    Assessment:  Nghia Edgar is a 31 y.o. male with a medical diagnosis of Urinary tract infection associated with cystostomy catheter and presents with quadriplegia diagnosis. Pt completed transfers with min A to block anterior shoulders.     Rehab identified problem list/impairments: Rehab identified problem list/impairments: impaired functional mobilty, abnormal tone, impaired fine motor, weakness, impaired coordination    Rehab potential is good.    Activity tolerance: Good    Discharge recommendations: Discharge Facility/Level Of Care Needs: home     Barriers to discharge: Barriers to Discharge: None    Equipment recommendations: Equipment Needed After Discharge: none     GOALS:    Physical Therapy Goals        Problem: Physical Therapy Goal    Goal Priority Disciplines Outcome Goal Variances Interventions   Physical Therapy Goal     PT/OT, PT Ongoing (interventions implemented as appropriate)      Description:  Goals to be met by: 17     Patient will increase functional independence with mobility by performin. Slide board transfer to  with mod I  2.  Able to sit up in chair > 2hrs MET 2017  3.  Pt able to perform wc pushup for pressure relief when up in chair MET 2017                         PLAN:    Patient to be seen 6 x/week  to address the above listed problems via therapeutic activities, wheelchair management/training  Plan of Care expires: 17  Plan of Care reviewed with: patient         Sary Barcenas, PT  2017

## 2017-07-13 NOTE — ASSESSMENT & PLAN NOTE
"Trending down. 0.844 >> 0.668 >> 0.563.  2D Echo with no abnormalities. Lipid panel: , HDL 26, LDL 84, TG 80.  Pt woke in early morning on 7/11 with diaphoresis, "feeling bad".  Called 911 who noted hypotension and bradycardia. Troponin at [place] ED 0.09.  Pt denies CP then or now.   Denies personal or family h/o CAD. Denies smoking.  Appreciate Cardiology consult.       "

## 2017-07-13 NOTE — ASSESSMENT & PLAN NOTE
History of ESBL Klebsiella pneumoniae infection  HR 44-47. SBP .  No elevation in WBC.  U/A: + nitrite, 3+ leukocytes, 15 WBC, moderate bacteria.  Pt has h/o recurrent UTIs. For 2017: 2/13 Klebsiella pneumoniae ESBL; 3/8 Klebsiella pneumoniae ESBL and E. Coli ESBL; 4/3 Klebsiella pneumoniae; 6/22 Proteus vulgaris.  Given ciprofloxacin and IVF bolus at [place] and transferred to [facility] for Urology consult.  Continue Meropenem, day #3, here as per previous susceptibilities.   Catheter changed by Dr. Brent Roca, Urology, this morning.  Urine culture: presumptive Proteus species. Blood culture NG x 2 days.

## 2017-07-13 NOTE — PLAN OF CARE
Problem: Occupational Therapy Goal  Goal: Occupational Therapy Goal  Goals to be met by: 7/18/2017    Patient will increase functional independence with ADLs by performing:    Slide Board  transfers bed<>wc with Modified [place].---goal not met     Outcome: Unable to achieve outcome(s) by discharge  Pt. Min assist with bed>wc slide board t/f; he did state that his bed at home is more conductive to him doing slide board /scoot t/f Lizbeth level unlike current HB he is in now being too soft. He used his w/c roho cushion from home.  Pt. Performed wc mobility on unit; wc management to lock and unlock brakes Lizbeth; assisted with chair setup to remove leg rests and position chair next to bed for t/f.  Pt. Demonstrated lateral WS pressure releases with Supervision for safety. DC OT 2/2 pt DC home.  Pt at baseline with ADLS and mobility.  DC home to PLOF and assist.

## 2017-07-13 NOTE — ASSESSMENT & PLAN NOTE
History of ESBL Klebsiella pneumoniae infection  HR 44-47. SBP .  No elevation in WBC.  U/A: + nitrite, 3+ leukocytes, 15 WBC, moderate bacteria.  Pt has h/o recurrent UTIs. For 2017: 2/13 Klebsiella pneumoniae ESBL; 3/8 Klebsiella pneumoniae ESBL and E. Coli ESBL; 4/3 Klebsiella pneumoniae; 6/22 Proteus vulgaris.  Given ciprofloxacin and IVF bolus at [place] and transferred to [facility] for Urology consult.  Received Meropenem x 2 doses then Ertapenem x 1 dose. Catheter changed by Dr. Brent Roca, Urology, this morning. Urine culture: Proteus penneri that is pan-sensitive. Discharging on Augmentin to complete 2 week course of antibiotics for catheter associated UTI.  Blood culture NG x 2 days.

## 2017-07-13 NOTE — ASSESSMENT & PLAN NOTE
"Bradycardia   Troponin trended down. 0.844 >> 0.668 >> 0.563.  Was likely elevated from demand ischemia with hypotension and bradycardia.  2D Echo with no abnormalities. Lipid panel: , HDL 26, LDL 84, TG 80.  Pt woke in early morning on 7/11 with diaphoresis, "feeling bad".  Called 911 who noted hypotension and bradycardia. Troponin at [place] ED 0.09.  Pt denies CP then or now.   Denies personal or family h/o CAD. Denies smoking.  Appreciate Cardiology consult. Recommending Holter monitor for noted bradycardia while inpatient.  Cardiology will f/u with patient as needed.       "

## 2017-07-13 NOTE — ASSESSMENT & PLAN NOTE
Suprapubic catheter  Catheter changed on 7/12/17 by Dr. Brent Roca, Urology.  Pt has monthly catheter change at [facility] - Sherry Knight NP. Continue oxybutynin and potassium citrate. Routine catheter care.

## 2017-07-13 NOTE — PLAN OF CARE
AMbulance transport arranged by ELIZABETH Peralta.  Follow-up With  Details  Why  Contact Info   Sophia Massey MD    As needed  [address]  [place] [address]  [phone]   Sherry Knight NP  Go on 7/19/2017  @10:20am  [address] [facility] [address] [address]  [phone]   Brent Butcher MD    as scheduled   [address]  [facility] [address]  [phone]   Jaciel Denton NP    NP will contact patient with Holter monitor results   [address] [place] [address]  [phone]             07/13/17 1503   Final Note   Assessment Type Final Discharge Note   Discharge Disposition Home   Discharge planning education complete? Yes

## 2017-07-13 NOTE — PROGRESS NOTES
Pharmacy New Medication Education     Patient accepted medication education.     Pharmacy educated patient on the following medications, using the teach-back method.   ertapenem      Learners of pharmacy medication education included:  patient     Patient +/- learner response:  verbalize understanding
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
## 2017-07-13 NOTE — PT/OT/SLP PROGRESS
Occupational Therapy  Treatment/Discharge Summary    Nghia Edgar   MRN: [id number]   Admitting Diagnosis: Urinary tract infection associated with cystostomy catheter    OT Date of Treatment: 07/13/17   OT Start Time: 1020  OT Stop Time: 1053  OT Total Time (min): 33 min    Billable Minutes:  Therapeutic Activity 15 and Total Time 23 COTX with PT    General Precautions: Standard, fall  Orthopedic Precautions: N/A  Braces: N/A    Do you have any cultural, spiritual, Mosque conflicts, given your current situation?: Taoist    Subjective:  Communicated with nurse prior to session.    Pain/Comfort  Pain Rating 1:  (no compalints but stated he has constant pain from SCI)    Objective:  Patient found with: telemetry     Functional Mobility:  Bed Mobility:  Scooting/Bridging: Modified Independent  Supine to Sit: Modified Independent  Sit to Supine: Modified Independent    Transfers:   Sit <> Stand Assistance: Activity did not occur  Bed <> Chair Technique: Slide Board  Bed <> Chair Transfer Assistance: Minimum Assistance (blocking at shoulders )  Bed <> Chair Assistive Device: Slide Board    Functional Ambulation: Lizbeth wc on unit propelling with BUE    Activities of Daily Living:   NA                     Balance:   Static Sit: FAIR: Maintains without assist, but unable to take any challenges   Dynamic Sit: POOR: N/A  Static Stand: NA      Therapeutic Activities and Exercises:  Pt. Instructed in bed>wc slide board t/f min assist blocking at B shoulders 2/2 increased forward trunk momentum with anterior WS ; he did state that his bed at home is more conductive to him doing slide board /scoot t/f Lizbeth level unlike current HB he is in now being too soft. He used his w/c roho cushion from home.  Pt. Performed wc mobility on unit; wc management to lock and unlock brakes Lizbeth; assisted with chair setup to remove leg rests and position chair next to bed for t/f.  Pt. Demonstrated lateral WS pressure releases from wc with  "Supervision for safety        AM-PAC 6 CLICK ADL   How much help from another person does this patient currently need?   1 = Unable, Total/Dependent Assistance  2 = A lot, Maximum/Moderate Assistance  3 = A little, Minimum/Contact Guard/Supervision  4 = None, Modified [employer]/Independent    Putting on and taking off regular lower body clothing? : 2  Bathing (including washing, rinsing, drying)?: 2  Toileting, which includes using toilet, bedpan, or urinal? : 2  Putting on and taking off regular upper body clothing?: 3  Taking care of personal grooming such as brushing teeth?: 3  Eating meals?: 3  Total Score: 15     AM-PAC Raw Score CMS "G-Code Modifier Level of Impairment Assistance   6 % Total / Unable   7 - 8 CM 80 - 100% Maximal Assist   9-13 CL 60 - 80% Moderate Assist   14 - 19 CK 40 - 60% Moderate Assist   20 - 22 CJ 20 - 40% Minimal Assist   23 CI 1-20% SBA / CGA   24 CH 0% Independent/ Mod I       Patient left up in chair with chair alarm on, PT and nurse notified and PT present    ASSESSMENT:  Nghia Edgar is a 31 y.o. male with a medical diagnosis of Urinary tract infection associated with cystostomy catheter and presents with OT goals not met 2/2 environment not conductive to pt being Lizbeth level like at home per pt. report. He did get to  with min assist with slide board.  He has caregiver assist and environmental modifications at home. DC OT 2/2 pt to DC home today.  Pt at baseline with ADLS and mobility.  DC home to PLOF and assist.       Rehab identified problem list/impairments: Rehab identified problem list/impairments: impaired functional mobilty, weakness, abnormal tone, impaired fine motor, impaired coordination, pain    Rehab potential is good.    Activity tolerance: Good    Discharge recommendations: Discharge Facility/Level Of Care Needs: home     Barriers to discharge: Barriers to Discharge: None    Equipment recommendations: none     GOALS:    Occupational Therapy " Goals        Problem: Occupational Therapy Goal    Goal Priority Disciplines Outcome Interventions   Occupational Therapy Goal     OT, PT/OT Unable to achieve outcome(s) by discharge    Description:  Goals to be met by: 7/18/2017    Patient will increase functional independence with ADLs by performing:    Slide Board  transfers bed<>wc with Modified [place].---goal not met                       Plan:  Patient to be seen 3 x/week to address the above listed problems via therapeutic activities  Plan of Care expires: 08/12/17  Plan of Care reviewed with: patient         Luma Salo OT  07/13/2017

## 2017-07-13 NOTE — PLAN OF CARE
"Problem: Pressure Ulcer Risk (Juan Jose Scale) (Adult,Obstetrics,Pediatric)  Goal: Identify Related Risk Factors and Signs and Symptoms  Related risk factors and signs and symptoms are identified upon initiation of Human Response Clinical Practice Guideline (CPG)   Outcome: Ongoing (interventions implemented as appropriate)  HR  35-55 during shift. Patient states " that is how I sleep" Patient c/o pain during shift. Administered pain meds per MD order. Turn q2. Suprapubic and Colostomy in place. Call light within reach. Will continue to monitor.       "

## 2017-07-13 NOTE — ASSESSMENT & PLAN NOTE
Spastic quadriparesis; Autonomic dysreflexia; Neuropathic pain; Therapeutic opioid induced constipation  Has baclofen pump managed by Dr. Butcher, Physical Medicine and Rehab. Last clinic visit 5/4/2017. Due for refill on 10/26/2017.  Continue home diazepam, lyrica, oxycodone, Percocet, Miralax.  Continue outpatient PT, OT.

## 2017-07-13 NOTE — ASSESSMENT & PLAN NOTE
Spastic quadriparesis; Autonomic dysreflexia; Neuropathic pain; Therapeutic opioid induced constipation  Has baclofen pump managed by Dr. Butcher, Physical Medicine and Rehab. Last clinic visit 5/4/2017. Due for refill on 10/26/2017.  Continue home diazepam, lyrica, oxycodone, Percocet, Miralax.  Continue PT, OT.

## 2017-07-14 ENCOUNTER — PATIENT MESSAGE (OUTPATIENT)
Dept: INTERNAL MEDICINE | Facility: CLINIC | Age: 32
End: 2017-07-14

## 2017-07-14 ENCOUNTER — PATIENT MESSAGE (OUTPATIENT)
Dept: PHYSICAL MEDICINE AND REHAB | Facility: CLINIC | Age: 32
End: 2017-07-14

## 2017-07-14 RX ORDER — OXYCODONE AND ACETAMINOPHEN 10; 325 MG/1; MG/1
TABLET ORAL
Qty: 135 TABLET | Refills: 0 | Status: SHIPPED | OUTPATIENT
Start: 2017-07-15 | End: 2017-07-17 | Stop reason: SDUPTHER

## 2017-07-14 RX ORDER — OXYCODONE HCL 40 MG/1
40 TABLET, FILM COATED, EXTENDED RELEASE ORAL EVERY 12 HOURS
Qty: 60 TABLET | Refills: 0 | Status: SHIPPED | OUTPATIENT
Start: 2017-07-15 | End: 2017-07-17 | Stop reason: SDUPTHER

## 2017-07-16 LAB
BACTERIA BLD CULT: NORMAL
BACTERIA BLD CULT: NORMAL

## 2017-07-17 ENCOUNTER — PATIENT MESSAGE (OUTPATIENT)
Dept: PHYSICAL MEDICINE AND REHAB | Facility: CLINIC | Age: 32
End: 2017-07-17

## 2017-07-17 RX ORDER — OXYCODONE HCL 40 MG/1
40 TABLET, FILM COATED, EXTENDED RELEASE ORAL EVERY 12 HOURS
Qty: 60 TABLET | Refills: 0 | Status: SHIPPED | OUTPATIENT
Start: 2017-07-17 | End: 2017-08-18 | Stop reason: SDUPTHER

## 2017-07-17 RX ORDER — OXYCODONE AND ACETAMINOPHEN 10; 325 MG/1; MG/1
TABLET ORAL
Qty: 135 TABLET | Refills: 0 | Status: SHIPPED | OUTPATIENT
Start: 2017-07-17 | End: 2017-08-18 | Stop reason: SDUPTHER

## 2017-07-18 ENCOUNTER — PATIENT MESSAGE (OUTPATIENT)
Dept: PHYSICAL MEDICINE AND REHAB | Facility: CLINIC | Age: 32
End: 2017-07-18

## 2017-07-18 ENCOUNTER — PATIENT MESSAGE (OUTPATIENT)
Dept: UROLOGY | Facility: CLINIC | Age: 32
End: 2017-07-18

## 2017-07-26 ENCOUNTER — PATIENT MESSAGE (OUTPATIENT)
Dept: INTERNAL MEDICINE | Facility: CLINIC | Age: 32
End: 2017-07-26

## 2017-08-03 ENCOUNTER — PATIENT MESSAGE (OUTPATIENT)
Dept: INTERNAL MEDICINE | Facility: CLINIC | Age: 32
End: 2017-08-03

## 2017-08-03 ENCOUNTER — OFFICE VISIT (OUTPATIENT)
Dept: UROLOGY | Facility: CLINIC | Age: 32
End: 2017-08-03
Payer: MEDICAID

## 2017-08-03 ENCOUNTER — PATIENT MESSAGE (OUTPATIENT)
Dept: UROLOGY | Facility: CLINIC | Age: 32
End: 2017-08-03

## 2017-08-03 VITALS — DIASTOLIC BLOOD PRESSURE: 70 MMHG | HEART RATE: 70 BPM | SYSTOLIC BLOOD PRESSURE: 119 MMHG

## 2017-08-03 DIAGNOSIS — N31.9 NEUROGENIC BLADDER: ICD-10-CM

## 2017-08-03 DIAGNOSIS — N39.0 RECURRENT UTI: ICD-10-CM

## 2017-08-03 DIAGNOSIS — R33.9 URINARY RETENTION: Primary | ICD-10-CM

## 2017-08-03 DIAGNOSIS — Z43.5 ENCOUNTER FOR SUPRAPUBIC CATHETER CARE: ICD-10-CM

## 2017-08-03 DIAGNOSIS — R82.71 BACTERIA IN URINE: ICD-10-CM

## 2017-08-03 LAB
BACTERIA #/AREA URNS AUTO: ABNORMAL /HPF
BILIRUB UR QL STRIP: NEGATIVE
CLARITY UR REFRACT.AUTO: ABNORMAL
COLOR UR AUTO: ABNORMAL
GLUCOSE UR QL STRIP: NEGATIVE
HGB UR QL STRIP: ABNORMAL
HYALINE CASTS UR QL AUTO: 0 /LPF
KETONES UR QL STRIP: NEGATIVE
LEUKOCYTE ESTERASE UR QL STRIP: ABNORMAL
MICROSCOPIC COMMENT: ABNORMAL
NITRITE UR QL STRIP: NEGATIVE
PH UR STRIP: 7 [PH] (ref 5–8)
PROT UR QL STRIP: ABNORMAL
RBC #/AREA URNS AUTO: 15 /HPF (ref 0–4)
SP GR UR STRIP: 1 (ref 1–1.03)
SQUAMOUS #/AREA URNS AUTO: 1 /HPF
URN SPEC COLLECT METH UR: ABNORMAL
UROBILINOGEN UR STRIP-ACNC: NEGATIVE EU/DL
WBC #/AREA URNS AUTO: 6 /HPF (ref 0–5)

## 2017-08-03 PROCEDURE — 87077 CULTURE AEROBIC IDENTIFY: CPT

## 2017-08-03 PROCEDURE — 87086 URINE CULTURE/COLONY COUNT: CPT

## 2017-08-03 PROCEDURE — 87186 SC STD MICRODIL/AGAR DIL: CPT

## 2017-08-03 PROCEDURE — 87088 URINE BACTERIA CULTURE: CPT

## 2017-08-03 PROCEDURE — 51705 CHANGE OF BLADDER TUBE: CPT | Mod: S$PBB,,, | Performed by: NURSE PRACTITIONER

## 2017-08-03 PROCEDURE — 99999 PR PBB SHADOW E&M-EST. PATIENT-LVL IV: CPT | Mod: PBBFAC,,, | Performed by: NURSE PRACTITIONER

## 2017-08-03 PROCEDURE — 99214 OFFICE O/P EST MOD 30 MIN: CPT | Mod: PBBFAC | Performed by: NURSE PRACTITIONER

## 2017-08-03 PROCEDURE — 99214 OFFICE O/P EST MOD 30 MIN: CPT | Mod: S$PBB,25,, | Performed by: NURSE PRACTITIONER

## 2017-08-03 PROCEDURE — 51700 IRRIGATION OF BLADDER: CPT | Mod: PBBFAC | Performed by: NURSE PRACTITIONER

## 2017-08-03 PROCEDURE — 51705 CHANGE OF BLADDER TUBE: CPT | Mod: PBBFAC | Performed by: NURSE PRACTITIONER

## 2017-08-03 PROCEDURE — 81001 URINALYSIS AUTO W/SCOPE: CPT
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
## 2017-08-05 LAB — BACTERIA UR CULT: NORMAL

## 2017-08-06 ENCOUNTER — PATIENT MESSAGE (OUTPATIENT)
Dept: UROLOGY | Facility: CLINIC | Age: 32
End: 2017-08-06

## 2017-08-14 ENCOUNTER — PATIENT MESSAGE (OUTPATIENT)
Dept: PHYSICAL MEDICINE AND REHAB | Facility: CLINIC | Age: 32
End: 2017-08-14

## 2017-08-15 ENCOUNTER — PATIENT MESSAGE (OUTPATIENT)
Dept: PHYSICAL MEDICINE AND REHAB | Facility: CLINIC | Age: 32
End: 2017-08-15

## 2017-08-16 ENCOUNTER — PATIENT MESSAGE (OUTPATIENT)
Dept: PHYSICAL MEDICINE AND REHAB | Facility: CLINIC | Age: 32
End: 2017-08-16

## 2017-08-18 ENCOUNTER — OFFICE VISIT (OUTPATIENT)
Dept: PHYSICAL MEDICINE AND REHAB | Facility: CLINIC | Age: 32
End: 2017-08-18
Payer: MEDICAID

## 2017-08-18 DIAGNOSIS — M79.2 NEUROPATHIC PAIN: Chronic | ICD-10-CM

## 2017-08-18 DIAGNOSIS — G82.50 SPASTIC QUADRIPARESIS: Primary | Chronic | ICD-10-CM

## 2017-08-18 DIAGNOSIS — T40.2X5A THERAPEUTIC OPIOID INDUCED CONSTIPATION: ICD-10-CM

## 2017-08-18 DIAGNOSIS — K59.03 THERAPEUTIC OPIOID INDUCED CONSTIPATION: ICD-10-CM

## 2017-08-18 PROCEDURE — 3008F BODY MASS INDEX DOCD: CPT | Mod: ,,, | Performed by: PHYSICAL MEDICINE & REHABILITATION

## 2017-08-18 PROCEDURE — 99213 OFFICE O/P EST LOW 20 MIN: CPT | Mod: S$PBB,,, | Performed by: PHYSICAL MEDICINE & REHABILITATION

## 2017-08-18 RX ORDER — OXYCODONE HCL 40 MG/1
40 TABLET, FILM COATED, EXTENDED RELEASE ORAL EVERY 12 HOURS
Qty: 60 TABLET | Refills: 0 | Status: SHIPPED | OUTPATIENT
Start: 2017-08-18 | End: 2017-09-25 | Stop reason: SDUPTHER

## 2017-08-18 RX ORDER — OXYCODONE AND ACETAMINOPHEN 10; 325 MG/1; MG/1
TABLET ORAL
Qty: 135 TABLET | Refills: 0 | Status: SHIPPED | OUTPATIENT
Start: 2017-08-18 | End: 2017-09-25
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
## 2017-08-22 ENCOUNTER — PATIENT MESSAGE (OUTPATIENT)
Dept: UROLOGY | Facility: CLINIC | Age: 32
End: 2017-08-22

## 2017-08-22 DIAGNOSIS — R82.71 BACTERIA IN URINE: Primary | ICD-10-CM

## 2017-08-23 ENCOUNTER — PATIENT MESSAGE (OUTPATIENT)
Dept: UROLOGY | Facility: CLINIC | Age: 32
End: 2017-08-23

## 2017-08-23 RX ORDER — CEPHALEXIN 500 MG/1
500 CAPSULE ORAL EVERY 8 HOURS
Qty: 21 CAPSULE | Refills: 0 | Status: SHIPPED | OUTPATIENT
Start: 2017-08-23 | End: 2017-08-30

## 2017-08-23 NOTE — TELEPHONE ENCOUNTER
Discussed plan of care;  Keflex 500mg TID to ensure bladder clear for upcoming SPT change;  Suppressive therapy with Hiprex;  Discussed needed f/u labs.  Voiced understanding

## 2017-08-24 ENCOUNTER — OFFICE VISIT (OUTPATIENT)
Dept: UROLOGY | Facility: CLINIC | Age: 32
End: 2017-08-24
Payer: MEDICAID

## 2017-08-24 VITALS
WEIGHT: 180 LBS | SYSTOLIC BLOOD PRESSURE: 123 MMHG | BODY MASS INDEX: 27.28 KG/M2 | DIASTOLIC BLOOD PRESSURE: 76 MMHG | HEIGHT: 68 IN | HEART RATE: 60 BPM

## 2017-08-24 DIAGNOSIS — N39.0 RECURRENT UTI: ICD-10-CM

## 2017-08-24 DIAGNOSIS — N31.9 NEUROGENIC BLADDER: Primary | ICD-10-CM

## 2017-08-24 DIAGNOSIS — R82.71 BACTERIA IN URINE: ICD-10-CM

## 2017-08-24 DIAGNOSIS — Z43.5 ENCOUNTER FOR SUPRAPUBIC CATHETER CARE: ICD-10-CM

## 2017-08-24 DIAGNOSIS — Z93.59 CHRONIC SUPRAPUBIC CATHETER: ICD-10-CM

## 2017-08-24 PROCEDURE — 3074F SYST BP LT 130 MM HG: CPT | Mod: ,,, | Performed by: NURSE PRACTITIONER

## 2017-08-24 PROCEDURE — 51705 CHANGE OF BLADDER TUBE: CPT | Mod: S$PBB,,, | Performed by: NURSE PRACTITIONER

## 2017-08-24 PROCEDURE — 51705 CHANGE OF BLADDER TUBE: CPT | Mod: PBBFAC | Performed by: NURSE PRACTITIONER

## 2017-08-24 PROCEDURE — 3078F DIAST BP <80 MM HG: CPT | Mod: ,,, | Performed by: NURSE PRACTITIONER

## 2017-08-24 PROCEDURE — 99214 OFFICE O/P EST MOD 30 MIN: CPT | Mod: S$PBB,25,, | Performed by: NURSE PRACTITIONER

## 2017-08-24 PROCEDURE — 99215 OFFICE O/P EST HI 40 MIN: CPT | Mod: PBBFAC | Performed by: NURSE PRACTITIONER

## 2017-08-24 PROCEDURE — 51700 IRRIGATION OF BLADDER: CPT | Mod: PBBFAC | Performed by: NURSE PRACTITIONER

## 2017-08-24 PROCEDURE — 99999 PR PBB SHADOW E&M-EST. PATIENT-LVL V: CPT | Mod: PBBFAC,,, | Performed by: NURSE PRACTITIONER

## 2017-08-24 PROCEDURE — 3008F BODY MASS INDEX DOCD: CPT | Mod: ,,, | Performed by: NURSE PRACTITIONER

## 2017-08-24 RX ORDER — METHENAMINE HIPPURATE 1000 MG/1
1 TABLET ORAL 2 TIMES DAILY
Qty: 60 TABLET | Refills: 12 | Status: SHIPPED | OUTPATIENT
Start: 2017-08-24 | End: 2017-11-24

## 2017-08-24 NOTE — PATIENT INSTRUCTIONS
Methenamine Hippurate tablets  What is this medicine?  METHENAMINE (meth EN a meen) is used to prevent urinary tract infections due to bacteria. It is not used to treat an active infection. It will not work for colds, flu, or other viral infections.  How should I use this medicine?  Take this medicine by mouth with a full glass of water. Follow the directions on the prescription label. Do not crush or chew. Take your medicine at regular intervals. Do not take your medicine more often than directed. Take all of your medicine as directed even if you think you are better. Do not skip doses or stop your medicine early.  Talk to your pediatrician regarding the use of this medicine in children. While this drug may be prescribed for children as young as 6 years old for selected conditions, precautions do apply.  What side effects may I notice from receiving this medicine?  Side effects that you should report to your doctor or health care professional as soon as possible:  · allergic reactions like skin rash, itching or hives, swelling of the face, lips, or tongue  · bladder pain, irritation  · dark urine  · lower back pain  · painful, frequent urination  Side effects that usually do not require medical attention (report to your doctor or health care professional if they continue or are bothersome):  · diarrhea  · loss of appetite  · mouth sores  · nausea  · stomach upset  What may interact with this medicine?  Do not take this medicine with any of the following medications:  · sulfa drugs  This medicine may also interact with the following medications:  · acetazolamide  · antacids  · methazolamide  · sodium bicarbonate  What if I miss a dose?  If you miss a dose, take it as soon as you can. If it is almost time for your next dose, take only that dose. Do not take double or extra doses.  Where should I keep my medicine?  Keep out of the reach of children.  Store between 15 and 30 degrees C (59 and 86 degrees F). Keep  container tightly closed. Throw away any unused medicine after the expiration date.  What should I tell my health care provider before I take this medicine?  They need to know if you have any of these conditions:  · dehydrated  · kidney disease  · liver disease  · an unusual or allergic reaction to methenamine, tartrazine dye, other medicines, foods, dyes, or preservatives  · pregnant or trying to get pregnant  · breast-feeding  What should I watch for while using this medicine?  Tell your doctor or health care professional if your symptoms do not improve or if you get new symptoms.  You will need to be on a special diet while taking this medicine. Ask your health care professional how many glasses of water or other fluids to drink each day. Also, ask which foods to include and which to avoid to help keep your urine acidic. Your urine must be acidic for this medicine to work.  Date Last Reviewed:   NOTE:This sheet is a summary. It may not cover all possible information. If you have questions about this medicine, talk to your doctor, pharmacist, or health care provider. Copyright© 2016 [employer]
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
## 2017-08-25 ENCOUNTER — PATIENT MESSAGE (OUTPATIENT)
Dept: INTERNAL MEDICINE | Facility: CLINIC | Age: 32
End: 2017-08-25

## 2017-08-25 ENCOUNTER — PATIENT MESSAGE (OUTPATIENT)
Dept: UROLOGY | Facility: CLINIC | Age: 32
End: 2017-08-25

## 2017-08-25 DIAGNOSIS — L89.159 DECUBITUS ULCER OF SACRAL REGION, UNSPECIFIED ULCER STAGE: Primary | ICD-10-CM

## 2017-08-25 RX ORDER — OXYCODONE AND ACETAMINOPHEN 10; 325 MG/1; MG/1
TABLET ORAL
Qty: 135 TABLET | Refills: 0 | Status: SHIPPED | OUTPATIENT
Start: 2017-08-25 | End: 2017-09-25 | Stop reason: SDUPTHER

## 2017-08-25 NOTE — TELEPHONE ENCOUNTER
"Please remind pt to come in for f/u visit as I haven't seen him in a while. Please confirm where the "spot that opened up" is. Is it at the sacrum? (So I can associate the referral to Dr. Philippe w/ diagnosis). Once I have that info, I'll place the referral.  "

## 2017-08-26 ENCOUNTER — PATIENT MESSAGE (OUTPATIENT)
Dept: INTERNAL MEDICINE | Facility: CLINIC | Age: 32
End: 2017-08-26

## 2017-08-26 NOTE — TELEPHONE ENCOUNTER
Patient reports recent fall in which one of his previous sacral ulcer wound had opened up (size of pencil eraser).  Wants referral back to see Dr. Philippe which he has seen last year.  Will place referral. Pt to call his office to schedule.

## 2017-08-28 ENCOUNTER — PATIENT MESSAGE (OUTPATIENT)
Dept: PHYSICAL MEDICINE AND REHAB | Facility: CLINIC | Age: 32
End: 2017-08-28

## 2017-09-06 ENCOUNTER — PATIENT MESSAGE (OUTPATIENT)
Dept: INTERNAL MEDICINE | Facility: CLINIC | Age: 32
End: 2017-09-06

## 2017-09-08 ENCOUNTER — PATIENT MESSAGE (OUTPATIENT)
Dept: UROLOGY | Facility: CLINIC | Age: 32
End: 2017-09-08

## 2017-09-12 ENCOUNTER — TELEPHONE (OUTPATIENT)
Dept: INTERNAL MEDICINE | Facility: CLINIC | Age: 32
End: 2017-09-12

## 2017-09-12 NOTE — TELEPHONE ENCOUNTER
Dr. Massey-It looks like you have been trying to get Mr. Edgar in to see you but scheduling has been difficult.  Just as an FYI he is booked to see me tomorrow.  Looks like a fairly complex patient so please let me know if you have any thoughts on what you would like to get accomplished for him, and I will try to assist.

## 2017-09-12 NOTE — TELEPHONE ENCOUNTER
Breanna, he's supposed to come in to see me so that I can assess his sacral ulcer that he reports and I haven't seen him for a while so I have to have a face to face/office visit in order for me to continue signing for his colostomy supplies. I'm not sure how he ended up on your schedule. If you don't mind, can you just assess the sacral ulcer and possibly if needed to get HH for wound care if needed? I've already placed the referral for Dr. Philippe on 8/26 so he just needs to make an appointment. Also, I'll send this to [facility] to see where we can put him on the schedule for follow up. Thanks!

## 2017-09-13 ENCOUNTER — PATIENT MESSAGE (OUTPATIENT)
Dept: INTERNAL MEDICINE | Facility: CLINIC | Age: 32
End: 2017-09-13

## 2017-09-13 DIAGNOSIS — L98.429 SACRAL ULCER, WITH UNSPECIFIED SEVERITY: Primary | ICD-10-CM
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
## 2017-09-14 ENCOUNTER — PATIENT MESSAGE (OUTPATIENT)
Dept: UROLOGY | Facility: CLINIC | Age: 32
End: 2017-09-14

## 2017-09-18 NOTE — TELEPHONE ENCOUNTER
Please call let pt know that he hasn't been able to set up an appt w/ me or w/ Breanna Pulido. He needs to have this evaluated. Needs to be seen ASAP. Recommend he be added to the waiting list. Also will refer to wound care. Please schedule w/ him.

## 2017-09-22 ENCOUNTER — OFFICE VISIT (OUTPATIENT)
Dept: UROLOGY | Facility: CLINIC | Age: 32
End: 2017-09-22
Payer: MEDICAID

## 2017-09-22 VITALS — DIASTOLIC BLOOD PRESSURE: 69 MMHG | HEART RATE: 57 BPM | SYSTOLIC BLOOD PRESSURE: 106 MMHG

## 2017-09-22 DIAGNOSIS — R33.9 URINARY RETENTION: ICD-10-CM

## 2017-09-22 DIAGNOSIS — N31.9 NEUROGENIC BLADDER: Primary | ICD-10-CM

## 2017-09-22 DIAGNOSIS — R82.71 BACTERIA IN URINE: ICD-10-CM

## 2017-09-22 DIAGNOSIS — Z93.59 CHRONIC SUPRAPUBIC CATHETER: ICD-10-CM

## 2017-09-22 LAB
BACTERIA #/AREA URNS AUTO: ABNORMAL /HPF
BILIRUB UR QL STRIP: NEGATIVE
CLARITY UR REFRACT.AUTO: ABNORMAL
COLOR UR AUTO: YELLOW
GLUCOSE UR QL STRIP: NEGATIVE
HGB UR QL STRIP: ABNORMAL
HYALINE CASTS UR QL AUTO: 0 /LPF
KETONES UR QL STRIP: NEGATIVE
LEUKOCYTE ESTERASE UR QL STRIP: ABNORMAL
MICROSCOPIC COMMENT: ABNORMAL
NITRITE UR QL STRIP: NEGATIVE
PH UR STRIP: 6 [PH] (ref 5–8)
PROT UR QL STRIP: ABNORMAL
RBC #/AREA URNS AUTO: 8 /HPF (ref 0–4)
SP GR UR STRIP: 1 (ref 1–1.03)
URN SPEC COLLECT METH UR: ABNORMAL
UROBILINOGEN UR STRIP-ACNC: NEGATIVE EU/DL
WBC #/AREA URNS AUTO: 4 /HPF (ref 0–5)

## 2017-09-22 PROCEDURE — 87086 URINE CULTURE/COLONY COUNT: CPT

## 2017-09-22 PROCEDURE — 51705 CHANGE OF BLADDER TUBE: CPT | Mod: S$PBB,,, | Performed by: NURSE PRACTITIONER

## 2017-09-22 PROCEDURE — 99999 PR PBB SHADOW E&M-EST. PATIENT-LVL IV: CPT | Mod: PBBFAC,,, | Performed by: NURSE PRACTITIONER

## 2017-09-22 PROCEDURE — 99214 OFFICE O/P EST MOD 30 MIN: CPT | Mod: S$PBB,25,, | Performed by: NURSE PRACTITIONER

## 2017-09-22 PROCEDURE — 87077 CULTURE AEROBIC IDENTIFY: CPT

## 2017-09-22 PROCEDURE — 87186 SC STD MICRODIL/AGAR DIL: CPT

## 2017-09-22 PROCEDURE — 51705 CHANGE OF BLADDER TUBE: CPT | Mod: PBBFAC | Performed by: NURSE PRACTITIONER

## 2017-09-22 PROCEDURE — 87088 URINE BACTERIA CULTURE: CPT

## 2017-09-22 PROCEDURE — 81001 URINALYSIS AUTO W/SCOPE: CPT

## 2017-09-22 PROCEDURE — 3008F BODY MASS INDEX DOCD: CPT | Mod: ,,, | Performed by: NURSE PRACTITIONER

## 2017-09-22 PROCEDURE — 3074F SYST BP LT 130 MM HG: CPT | Mod: ,,, | Performed by: NURSE PRACTITIONER

## 2017-09-22 PROCEDURE — 3078F DIAST BP <80 MM HG: CPT | Mod: ,,, | Performed by: NURSE PRACTITIONER

## 2017-09-22 PROCEDURE — 99214 OFFICE O/P EST MOD 30 MIN: CPT | Mod: PBBFAC | Performed by: NURSE PRACTITIONER
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
## 2017-09-23 ENCOUNTER — TELEPHONE (OUTPATIENT)
Dept: INTERNAL MEDICINE | Facility: CLINIC | Age: 32
End: 2017-09-23

## 2017-09-24 NOTE — TELEPHONE ENCOUNTER
Pt has a follow up scheduled w/ me on 10/3 and 10/26. Please see which one is correct and cancel incorrect one.

## 2017-09-25 ENCOUNTER — PATIENT MESSAGE (OUTPATIENT)
Dept: PHYSICAL MEDICINE AND REHAB | Facility: CLINIC | Age: 32
End: 2017-09-25

## 2017-09-25 DIAGNOSIS — M79.2 NEUROPATHIC PAIN: Chronic | ICD-10-CM

## 2017-09-25 LAB — BACTERIA UR CULT: NORMAL

## 2017-09-25 RX ORDER — OXYCODONE AND ACETAMINOPHEN 10; 325 MG/1; MG/1
TABLET ORAL
Qty: 135 TABLET | Refills: 0 | Status: SHIPPED | OUTPATIENT
Start: 2017-12-24 | End: 2017-12-26 | Stop reason: SDUPTHER

## 2017-09-25 RX ORDER — OXYCODONE HCL 40 MG/1
40 TABLET, FILM COATED, EXTENDED RELEASE ORAL EVERY 12 HOURS
Qty: 60 TABLET | Refills: 0 | Status: SHIPPED | OUTPATIENT
Start: 2017-09-25 | End: 2017-09-25 | Stop reason: SDUPTHER

## 2017-09-25 RX ORDER — OXYCODONE HCL 40 MG/1
40 TABLET, FILM COATED, EXTENDED RELEASE ORAL EVERY 12 HOURS
Qty: 60 TABLET | Refills: 0 | Status: SHIPPED | OUTPATIENT
Start: 2017-10-25 | End: 2017-09-25 | Stop reason: SDUPTHER

## 2017-09-25 RX ORDER — OXYCODONE HCL 40 MG/1
40 TABLET, FILM COATED, EXTENDED RELEASE ORAL EVERY 12 HOURS
Qty: 60 TABLET | Refills: 0 | Status: SHIPPED | OUTPATIENT
Start: 2017-11-24 | End: 2017-09-25 | Stop reason: SDUPTHER

## 2017-09-25 RX ORDER — OXYCODONE HCL 40 MG/1
40 TABLET, FILM COATED, EXTENDED RELEASE ORAL EVERY 12 HOURS
Qty: 60 TABLET | Refills: 0 | Status: SHIPPED | OUTPATIENT
Start: 2017-12-24 | End: 2017-12-26 | Stop reason: SDUPTHER

## 2017-09-25 RX ORDER — OXYCODONE AND ACETAMINOPHEN 10; 325 MG/1; MG/1
TABLET ORAL
Qty: 135 TABLET | Refills: 0 | Status: SHIPPED | OUTPATIENT
Start: 2017-10-25 | End: 2017-09-25 | Stop reason: SDUPTHER

## 2017-09-25 RX ORDER — OXYCODONE AND ACETAMINOPHEN 10; 325 MG/1; MG/1
TABLET ORAL
Qty: 135 TABLET | Refills: 0 | Status: SHIPPED | OUTPATIENT
Start: 2017-11-24 | End: 2017-09-25 | Stop reason: SDUPTHER

## 2017-09-25 RX ORDER — OXYCODONE AND ACETAMINOPHEN 10; 325 MG/1; MG/1
TABLET ORAL
Qty: 135 TABLET | Refills: 0 | Status: SHIPPED | OUTPATIENT
Start: 2017-09-25 | End: 2017-09-25 | Stop reason: SDUPTHER

## 2017-09-26 ENCOUNTER — PATIENT MESSAGE (OUTPATIENT)
Dept: PHYSICAL MEDICINE AND REHAB | Facility: CLINIC | Age: 32
End: 2017-09-26

## 2017-09-26 ENCOUNTER — TELEPHONE (OUTPATIENT)
Dept: PHYSICAL MEDICINE AND REHAB | Facility: CLINIC | Age: 32
End: 2017-09-26

## 2017-09-26 DIAGNOSIS — G82.50 SPASTIC QUADRIPARESIS: Primary | ICD-10-CM

## 2017-10-02 ENCOUNTER — TELEPHONE (OUTPATIENT)
Dept: PLASTIC SURGERY | Facility: CLINIC | Age: 32
End: 2017-10-02

## 2017-10-02 NOTE — TELEPHONE ENCOUNTER
Pt was incorrectly scheduled on Dr. Philippe' Minor Schedule. Pt has not been seen by Dr. Philippe since July 2016. Pt has to come in for a consult first.

## 2017-10-03 ENCOUNTER — PATIENT MESSAGE (OUTPATIENT)
Dept: INTERNAL MEDICINE | Facility: CLINIC | Age: 32
End: 2017-10-03

## 2017-10-03 ENCOUNTER — OFFICE VISIT (OUTPATIENT)
Dept: WOUND CARE | Facility: CLINIC | Age: 32
End: 2017-10-03
Payer: MEDICAID

## 2017-10-03 VITALS
TEMPERATURE: 98 F | BODY MASS INDEX: 27.74 KG/M2 | SYSTOLIC BLOOD PRESSURE: 127 MMHG | HEART RATE: 75 BPM | DIASTOLIC BLOOD PRESSURE: 81 MMHG | WEIGHT: 183 LBS | HEIGHT: 68 IN

## 2017-10-03 DIAGNOSIS — G82.20 PARAPLEGIA FOLLOWING SPINAL CORD INJURY: Primary | Chronic | ICD-10-CM

## 2017-10-03 PROCEDURE — 99999 PR PBB SHADOW E&M-EST. PATIENT-LVL IV: CPT | Mod: PBBFAC,,, | Performed by: NURSE PRACTITIONER

## 2017-10-03 PROCEDURE — 99212 OFFICE O/P EST SF 10 MIN: CPT | Mod: S$PBB,,, | Performed by: NURSE PRACTITIONER

## 2017-10-03 PROCEDURE — 99214 OFFICE O/P EST MOD 30 MIN: CPT | Mod: PBBFAC | Performed by: NURSE PRACTITIONER
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
## 2017-10-03 NOTE — PATIENT INSTRUCTIONS
"Keep pressure off bony prominences at all times.  Rotate position every 2 hours.    Cleanse left ischial wounds with wound .  Medihoney gel to right ischial wound.  Medihoney gel to left ischial wound, pack with 2" roll gauze, and cover both wounds with ABD pad.  Use underwear to hold dressing in place.  Change dressing daily and as needed.  Keep appointment with Dr. Philippe as scheduled.    "

## 2017-10-06 ENCOUNTER — PATIENT MESSAGE (OUTPATIENT)
Dept: PLASTIC SURGERY | Facility: CLINIC | Age: 32
End: 2017-10-06

## 2017-10-13 ENCOUNTER — PATIENT MESSAGE (OUTPATIENT)
Dept: UROLOGY | Facility: CLINIC | Age: 32
End: 2017-10-13

## 2017-10-13 ENCOUNTER — TELEPHONE (OUTPATIENT)
Dept: INTERNAL MEDICINE | Facility: CLINIC | Age: 32
End: 2017-10-13

## 2017-10-13 ENCOUNTER — OFFICE VISIT (OUTPATIENT)
Dept: UROLOGY | Facility: CLINIC | Age: 32
End: 2017-10-13
Payer: MEDICAID

## 2017-10-13 VITALS — DIASTOLIC BLOOD PRESSURE: 70 MMHG | SYSTOLIC BLOOD PRESSURE: 120 MMHG | HEART RATE: 79 BPM

## 2017-10-13 DIAGNOSIS — R33.9 URINARY RETENTION: ICD-10-CM

## 2017-10-13 DIAGNOSIS — N31.9 NEUROGENIC BLADDER: Primary | ICD-10-CM

## 2017-10-13 DIAGNOSIS — Z43.5 ENCOUNTER FOR SUPRAPUBIC CATHETER CARE: ICD-10-CM

## 2017-10-13 DIAGNOSIS — Z93.59 CHRONIC SUPRAPUBIC CATHETER: ICD-10-CM

## 2017-10-13 DIAGNOSIS — R82.71 BACTERIA IN URINE: ICD-10-CM

## 2017-10-13 LAB
BACTERIA #/AREA URNS AUTO: ABNORMAL /HPF
BILIRUB UR QL STRIP: NEGATIVE
CLARITY UR REFRACT.AUTO: CLEAR
COLOR UR AUTO: ABNORMAL
GLUCOSE UR QL STRIP: NEGATIVE
HGB UR QL STRIP: ABNORMAL
KETONES UR QL STRIP: NEGATIVE
LEUKOCYTE ESTERASE UR QL STRIP: ABNORMAL
MICROSCOPIC COMMENT: ABNORMAL
NITRITE UR QL STRIP: NEGATIVE
PH UR STRIP: 8 [PH] (ref 5–8)
PROT UR QL STRIP: NEGATIVE
RBC #/AREA URNS AUTO: 47 /HPF (ref 0–4)
SP GR UR STRIP: 1.01 (ref 1–1.03)
URN SPEC COLLECT METH UR: ABNORMAL
UROBILINOGEN UR STRIP-ACNC: NEGATIVE EU/DL
WBC #/AREA URNS AUTO: 5 /HPF (ref 0–5)

## 2017-10-13 PROCEDURE — 99214 OFFICE O/P EST MOD 30 MIN: CPT | Mod: PBBFAC | Performed by: NURSE PRACTITIONER

## 2017-10-13 PROCEDURE — 99999 PR PBB SHADOW E&M-EST. PATIENT-LVL IV: CPT | Mod: PBBFAC,,, | Performed by: NURSE PRACTITIONER

## 2017-10-13 PROCEDURE — 81001 URINALYSIS AUTO W/SCOPE: CPT

## 2017-10-13 PROCEDURE — 51705 CHANGE OF BLADDER TUBE: CPT | Mod: S$PBB,,, | Performed by: NURSE PRACTITIONER

## 2017-10-13 PROCEDURE — 99214 OFFICE O/P EST MOD 30 MIN: CPT | Mod: S$PBB,25,, | Performed by: NURSE PRACTITIONER

## 2017-10-13 PROCEDURE — 51705 CHANGE OF BLADDER TUBE: CPT | Mod: PBBFAC | Performed by: NURSE PRACTITIONER

## 2017-10-13 PROCEDURE — 87086 URINE CULTURE/COLONY COUNT: CPT

## 2017-10-13 NOTE — TELEPHONE ENCOUNTER
Pt needs to be in a 40 min appt slot on 10/25/17 (hasn't been seen since 12/2016 and multiple visits and hosp visits since then). Please change him to the AREX if ok w/ pt - 40 min slot instead of the 20 min. Otherwise has to be scheduled for another day.
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
## 2017-10-16 LAB — BACTERIA UR CULT: NO GROWTH

## 2017-10-18 ENCOUNTER — OFFICE VISIT (OUTPATIENT)
Dept: PLASTIC SURGERY | Facility: CLINIC | Age: 32
End: 2017-10-18
Payer: MEDICAID

## 2017-10-18 VITALS — SYSTOLIC BLOOD PRESSURE: 115 MMHG | TEMPERATURE: 98 F | DIASTOLIC BLOOD PRESSURE: 80 MMHG | HEART RATE: 74 BPM

## 2017-10-18 DIAGNOSIS — L89.324 DECUBITUS ULCER OF LEFT ISCHIUM, STAGE 4: Primary | ICD-10-CM

## 2017-10-18 PROCEDURE — 99212 OFFICE O/P EST SF 10 MIN: CPT | Mod: PBBFAC,PO | Performed by: SURGERY

## 2017-10-18 PROCEDURE — 99999 PR PBB SHADOW E&M-EST. PATIENT-LVL II: CPT | Mod: PBBFAC,,, | Performed by: SURGERY

## 2017-10-18 PROCEDURE — 99212 OFFICE O/P EST SF 10 MIN: CPT | Mod: S$PBB,,, | Performed by: SURGERY

## 2017-10-18 NOTE — PROGRESS NOTES
Nghia Edgar is a patient well known to me.  We have done multiple pressure   sores on him in the past.  He now presents with a recurrent left ischial   pressure sore as well as a superficial sore developing on the right side.  He   understands that a reoperation on a pressure sore again the failure rates are   over 80%.  I think that we will not re-advance the biceps femoris flap, but we   will do a local fasciocutaneous flap and bring the gluteus jt muscle down.    We will go ahead and schedule him for this and he will be on a [place] bed and then   be transferred to a SNF shortly after surgery.        /kenny [id number] lora(s)        MARISSA  dd: 10/18/2017 12:30:33 (CDT)  td: 10/19/2017 06:07:54 (CDT)  Doc ID   #[id number]  Job ID #[id number]    CC:

## 2017-10-18 NOTE — LETTER
October 18, 2017      Elaine Acosta, JENN  [address]  [facility] [address]           [facility]  [address]  [facility] [address]  Phone: [phone]  Fax: [phone]          Patient: Nghia Edgar   MR Number: [id number]   YOB: 1985   Date of Visit: 10/18/2017       Dear Elaine Acosta:    Thank you for referring Nghia Edgar to me for evaluation. Attached you will find relevant portions of my assessment and plan of care.    If you have questions, please do not hesitate to call me. I look forward to following Nghia Edgar along with you.    Sincerely,    Kalin Philippe MD    Enclosure  CC:  No Recipients    If you would like to receive this communication electronically, please contact [email or web address] or [phone] to request more information on [employer] access.    For providers and/or their staff who would like to refer a patient to [facility], please contact us through our one-stop-shop provider referral line, [facility], at [phone].    If you feel you have received this communication in error or would no longer like to receive these types of communications, please e-mail [email or web address]

## 2017-10-25 ENCOUNTER — PATIENT MESSAGE (OUTPATIENT)
Dept: INTERNAL MEDICINE | Facility: CLINIC | Age: 32
End: 2017-10-25

## 2017-10-25 ENCOUNTER — OFFICE VISIT (OUTPATIENT)
Dept: INTERNAL MEDICINE | Facility: CLINIC | Age: 32
End: 2017-10-25
Payer: MEDICAID

## 2017-10-25 ENCOUNTER — LAB VISIT (OUTPATIENT)
Dept: LAB | Facility: HOSPITAL | Age: 32
End: 2017-10-25
Attending: INTERNAL MEDICINE
Payer: MEDICAID

## 2017-10-25 VITALS
RESPIRATION RATE: 17 BRPM | HEIGHT: 68 IN | HEART RATE: 71 BPM | SYSTOLIC BLOOD PRESSURE: 118 MMHG | DIASTOLIC BLOOD PRESSURE: 62 MMHG

## 2017-10-25 DIAGNOSIS — D64.9 NORMOCYTIC ANEMIA: ICD-10-CM

## 2017-10-25 DIAGNOSIS — G82.20 PARAPLEGIA FOLLOWING SPINAL CORD INJURY: Chronic | ICD-10-CM

## 2017-10-25 DIAGNOSIS — M21.372 FOOT DROP, BILATERAL: ICD-10-CM

## 2017-10-25 DIAGNOSIS — Z93.3 COLOSTOMY STATUS: Chronic | ICD-10-CM

## 2017-10-25 DIAGNOSIS — G82.50 SPASTIC QUADRIPARESIS: Chronic | ICD-10-CM

## 2017-10-25 DIAGNOSIS — F11.20 OPIATE DEPENDENCE, CONTINUOUS: ICD-10-CM

## 2017-10-25 DIAGNOSIS — L89.153 DECUBITUS ULCER OF SACRAL REGION, STAGE 3: ICD-10-CM

## 2017-10-25 DIAGNOSIS — R05.3 CHRONIC COUGH: Primary | ICD-10-CM

## 2017-10-25 DIAGNOSIS — M21.371 FOOT DROP, BILATERAL: ICD-10-CM

## 2017-10-25 LAB
BASOPHILS # BLD AUTO: 0.02 K/UL
BASOPHILS NFR BLD: 0.2 %
DIFFERENTIAL METHOD: ABNORMAL
EOSINOPHIL # BLD AUTO: 0.1 K/UL
EOSINOPHIL NFR BLD: 1 %
ERYTHROCYTE [DISTWIDTH] IN BLOOD BY AUTOMATED COUNT: 14.3 %
HCT VFR BLD AUTO: 37.8 %
HGB BLD-MCNC: 11.7 G/DL
LYMPHOCYTES # BLD AUTO: 1.5 K/UL
LYMPHOCYTES NFR BLD: 17.4 %
MCH RBC QN AUTO: 28.3 PG
MCHC RBC AUTO-ENTMCNC: 31 G/DL
MCV RBC AUTO: 92 FL
MONOCYTES # BLD AUTO: 0.8 K/UL
MONOCYTES NFR BLD: 9.3 %
NEUTROPHILS # BLD AUTO: 6.3 K/UL
NEUTROPHILS NFR BLD: 71.6 %
NRBC BLD-RTO: 0 /100 WBC
PLATELET # BLD AUTO: 291 K/UL
PMV BLD AUTO: 11.5 FL
RBC # BLD AUTO: 4.13 M/UL
WBC # BLD AUTO: 8.81 K/UL

## 2017-10-25 PROCEDURE — 85025 COMPLETE CBC W/AUTO DIFF WBC: CPT

## 2017-10-25 PROCEDURE — 36415 COLL VENOUS BLD VENIPUNCTURE: CPT

## 2017-10-25 PROCEDURE — 99214 OFFICE O/P EST MOD 30 MIN: CPT | Mod: PBBFAC | Performed by: INTERNAL MEDICINE

## 2017-10-25 PROCEDURE — 99215 OFFICE O/P EST HI 40 MIN: CPT | Mod: S$PBB,,, | Performed by: INTERNAL MEDICINE

## 2017-10-25 PROCEDURE — 99999 PR PBB SHADOW E&M-EST. PATIENT-LVL IV: CPT | Mod: PBBFAC,,, | Performed by: INTERNAL MEDICINE

## 2017-10-25 RX ORDER — BENZONATATE 100 MG/1
100 CAPSULE ORAL 3 TIMES DAILY PRN
Qty: 30 CAPSULE | Refills: 3 | Status: SHIPPED | OUTPATIENT
Start: 2017-10-25 | End: 2017-11-04
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
## 2017-10-26 ENCOUNTER — PATIENT MESSAGE (OUTPATIENT)
Dept: INTERNAL MEDICINE | Facility: CLINIC | Age: 32
End: 2017-10-26

## 2017-10-26 ENCOUNTER — OFFICE VISIT (OUTPATIENT)
Dept: PHYSICAL MEDICINE AND REHAB | Facility: CLINIC | Age: 32
End: 2017-10-26
Payer: MEDICAID

## 2017-10-26 ENCOUNTER — PATIENT MESSAGE (OUTPATIENT)
Dept: PHYSICAL MEDICINE AND REHAB | Facility: CLINIC | Age: 32
End: 2017-10-26

## 2017-10-26 ENCOUNTER — TELEPHONE (OUTPATIENT)
Dept: PHYSICAL MEDICINE AND REHAB | Facility: CLINIC | Age: 32
End: 2017-10-26

## 2017-10-26 DIAGNOSIS — M25.552 LEFT HIP PAIN: Primary | ICD-10-CM

## 2017-10-26 DIAGNOSIS — G82.50 SPASTIC QUADRIPARESIS: Chronic | ICD-10-CM

## 2017-10-26 DIAGNOSIS — R22.9 SKIN NODULE: ICD-10-CM

## 2017-10-26 DIAGNOSIS — R25.2 CRAMP OF LIMB: Primary | ICD-10-CM

## 2017-10-26 DIAGNOSIS — M25.551 RIGHT HIP PAIN: Primary | ICD-10-CM

## 2017-10-26 PROCEDURE — 99999 PR PBB SHADOW E&M-EST. PATIENT-LVL II: CPT | Mod: PBBFAC,,, | Performed by: PHYSICAL MEDICINE & REHABILITATION

## 2017-10-26 PROCEDURE — 62368 ANALYZE SP INF PUMP W/REPROG: CPT | Mod: PBBFAC,PO | Performed by: PHYSICAL MEDICINE & REHABILITATION

## 2017-10-26 PROCEDURE — 62368 ANALYZE SP INF PUMP W/REPROG: CPT | Mod: S$PBB,,, | Performed by: PHYSICAL MEDICINE & REHABILITATION

## 2017-10-26 PROCEDURE — 99212 OFFICE O/P EST SF 10 MIN: CPT | Mod: PBBFAC,PO | Performed by: PHYSICAL MEDICINE & REHABILITATION

## 2017-10-26 PROCEDURE — 99213 OFFICE O/P EST LOW 20 MIN: CPT | Mod: S$PBB,,, | Performed by: PHYSICAL MEDICINE & REHABILITATION

## 2017-10-26 RX ORDER — GUAIFENESIN/DEXTROMETHORPHAN 100-10MG/5
5 SYRUP ORAL 3 TIMES DAILY PRN
Qty: 118 ML | Refills: 0 | Status: SHIPPED | OUTPATIENT
Start: 2017-10-26 | End: 2017-11-03

## 2017-10-26 NOTE — TELEPHONE ENCOUNTER
Called and spoke w/ pt. Discussed our email exchange including reasoning for why I'm not prescribing tussionex. Pt also reports told me about the right hip pain but I do not recall. Pt says that there's a quarter sized knot that he feels on the right hip for the last week that's painful. No growth in size. No drainage. Not especially warm to touch. Not red. No fevers/chills. Will upload a picture via portal rafael.     Bijal, can you schedule Mr. Edgar for a pulmonary appt, hip xray, US and CT of the chest on same day if possible since transport is an issue for him. He says any day is ok, just let him know via the portal. Thanks!

## 2017-10-27 ENCOUNTER — PATIENT MESSAGE (OUTPATIENT)
Dept: INTERNAL MEDICINE | Facility: CLINIC | Age: 32
End: 2017-10-27
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
## 2017-10-30 ENCOUNTER — PATIENT MESSAGE (OUTPATIENT)
Dept: WOUND CARE | Facility: CLINIC | Age: 32
End: 2017-10-30

## 2017-10-31 ENCOUNTER — PATIENT MESSAGE (OUTPATIENT)
Dept: PLASTIC SURGERY | Facility: CLINIC | Age: 32
End: 2017-10-31

## 2017-11-03 ENCOUNTER — HOSPITAL ENCOUNTER (OUTPATIENT)
Dept: RADIOLOGY | Facility: HOSPITAL | Age: 32
Discharge: HOME | End: 2017-11-03
Attending: INTERNAL MEDICINE
Payer: MEDICAID

## 2017-11-03 ENCOUNTER — PATIENT MESSAGE (OUTPATIENT)
Dept: UROLOGY | Facility: CLINIC | Age: 32
End: 2017-11-03

## 2017-11-03 ENCOUNTER — TELEPHONE (OUTPATIENT)
Dept: INTERNAL MEDICINE | Facility: CLINIC | Age: 32
End: 2017-11-03

## 2017-11-03 ENCOUNTER — OFFICE VISIT (OUTPATIENT)
Dept: UROLOGY | Facility: CLINIC | Age: 32
End: 2017-11-03
Payer: MEDICAID

## 2017-11-03 VITALS — SYSTOLIC BLOOD PRESSURE: 156 MMHG | DIASTOLIC BLOOD PRESSURE: 76 MMHG | HEART RATE: 74 BPM

## 2017-11-03 DIAGNOSIS — R33.9 URINARY RETENTION: ICD-10-CM

## 2017-11-03 DIAGNOSIS — R22.9 SKIN NODULE: ICD-10-CM

## 2017-11-03 DIAGNOSIS — N31.9 NEUROGENIC BLADDER: Primary | ICD-10-CM

## 2017-11-03 DIAGNOSIS — Z43.5 ENCOUNTER FOR SUPRAPUBIC CATHETER CARE: ICD-10-CM

## 2017-11-03 DIAGNOSIS — M25.559 ARTHRALGIA OF HIP, UNSPECIFIED LATERALITY: Primary | ICD-10-CM

## 2017-11-03 DIAGNOSIS — Z93.59 CHRONIC SUPRAPUBIC CATHETER: ICD-10-CM

## 2017-11-03 DIAGNOSIS — M25.551 RIGHT HIP PAIN: ICD-10-CM

## 2017-11-03 DIAGNOSIS — R05.3 CHRONIC COUGH: ICD-10-CM

## 2017-11-03 PROCEDURE — 71250 CT THORAX DX C-: CPT | Mod: TC

## 2017-11-03 PROCEDURE — 99999 PR PBB SHADOW E&M-EST. PATIENT-LVL III: CPT | Mod: PBBFAC,,, | Performed by: NURSE PRACTITIONER

## 2017-11-03 PROCEDURE — 99213 OFFICE O/P EST LOW 20 MIN: CPT | Mod: PBBFAC,25 | Performed by: NURSE PRACTITIONER

## 2017-11-03 PROCEDURE — 99214 OFFICE O/P EST MOD 30 MIN: CPT | Mod: S$PBB,25,, | Performed by: NURSE PRACTITIONER

## 2017-11-03 PROCEDURE — 76882 US LMTD JT/FCL EVL NVASC XTR: CPT | Mod: TC

## 2017-11-03 PROCEDURE — 76882 US LMTD JT/FCL EVL NVASC XTR: CPT | Mod: 26,,, | Performed by: RADIOLOGY

## 2017-11-03 PROCEDURE — 73502 X-RAY EXAM HIP UNI 2-3 VIEWS: CPT | Mod: TC,RT

## 2017-11-03 PROCEDURE — 51705 CHANGE OF BLADDER TUBE: CPT | Mod: S$PBB,,, | Performed by: NURSE PRACTITIONER

## 2017-11-03 PROCEDURE — 51705 CHANGE OF BLADDER TUBE: CPT | Mod: PBBFAC | Performed by: NURSE PRACTITIONER

## 2017-11-03 PROCEDURE — 73502 X-RAY EXAM HIP UNI 2-3 VIEWS: CPT | Mod: 26,RT,, | Performed by: RADIOLOGY

## 2017-11-03 PROCEDURE — 71250 CT THORAX DX C-: CPT | Mod: 26,,, | Performed by: RADIOLOGY
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
## 2017-11-10 ENCOUNTER — PATIENT MESSAGE (OUTPATIENT)
Dept: INTERNAL MEDICINE | Facility: CLINIC | Age: 32
End: 2017-11-10

## 2017-11-16 ENCOUNTER — PATIENT MESSAGE (OUTPATIENT)
Dept: PHYSICAL MEDICINE AND REHAB | Facility: CLINIC | Age: 32
End: 2017-11-16

## 2017-11-16 ENCOUNTER — PATIENT MESSAGE (OUTPATIENT)
Dept: INTERNAL MEDICINE | Facility: CLINIC | Age: 32
End: 2017-11-16

## 2017-11-16 DIAGNOSIS — M62.838 MUSCLE SPASM: ICD-10-CM

## 2017-11-16 DIAGNOSIS — F41.9 ANXIETY: Primary | ICD-10-CM

## 2017-11-16 DIAGNOSIS — R25.2 SPASTICITY: ICD-10-CM

## 2017-11-16 DIAGNOSIS — R93.89 ABNORMAL CT OF THE CHEST: Primary | ICD-10-CM

## 2017-11-16 RX ORDER — DIAZEPAM 10 MG/1
10 TABLET ORAL 3 TIMES DAILY PRN
Qty: 90 TABLET | Refills: 5 | Status: SHIPPED | OUTPATIENT
Start: 2017-11-16 | End: 2018-05-19 | Stop reason: SDUPTHER

## 2017-11-18 ENCOUNTER — PATIENT MESSAGE (OUTPATIENT)
Dept: WOUND CARE | Facility: CLINIC | Age: 32
End: 2017-11-18

## 2017-11-20 ENCOUNTER — PATIENT MESSAGE (OUTPATIENT)
Dept: INTERNAL MEDICINE | Facility: CLINIC | Age: 32
End: 2017-11-20

## 2017-11-22 ENCOUNTER — PATIENT MESSAGE (OUTPATIENT)
Dept: PHYSICAL MEDICINE AND REHAB | Facility: CLINIC | Age: 32
End: 2017-11-22

## 2017-11-24 ENCOUNTER — OFFICE VISIT (OUTPATIENT)
Dept: UROLOGY | Facility: CLINIC | Age: 32
End: 2017-11-24
Payer: MEDICAID

## 2017-11-24 ENCOUNTER — PATIENT MESSAGE (OUTPATIENT)
Dept: UROLOGY | Facility: CLINIC | Age: 32
End: 2017-11-24

## 2017-11-24 VITALS
OXYGEN SATURATION: 97 % | RESPIRATION RATE: 18 BRPM | SYSTOLIC BLOOD PRESSURE: 110 MMHG | HEART RATE: 62 BPM | DIASTOLIC BLOOD PRESSURE: 69 MMHG

## 2017-11-24 DIAGNOSIS — R33.9 URINARY RETENTION: ICD-10-CM

## 2017-11-24 DIAGNOSIS — N31.9 NEUROGENIC BLADDER: Primary | ICD-10-CM

## 2017-11-24 DIAGNOSIS — Z43.5 ENCOUNTER FOR SUPRAPUBIC CATHETER CARE: ICD-10-CM

## 2017-11-24 DIAGNOSIS — Z93.59 CHRONIC SUPRAPUBIC CATHETER: ICD-10-CM

## 2017-11-24 PROCEDURE — 99214 OFFICE O/P EST MOD 30 MIN: CPT | Mod: S$PBB,25,, | Performed by: NURSE PRACTITIONER

## 2017-11-24 PROCEDURE — 99213 OFFICE O/P EST LOW 20 MIN: CPT | Mod: PBBFAC,25 | Performed by: NURSE PRACTITIONER

## 2017-11-24 PROCEDURE — 51705 CHANGE OF BLADDER TUBE: CPT | Mod: S$PBB,,, | Performed by: NURSE PRACTITIONER

## 2017-11-24 PROCEDURE — 51705 CHANGE OF BLADDER TUBE: CPT | Mod: PBBFAC | Performed by: NURSE PRACTITIONER

## 2017-11-24 PROCEDURE — 99999 PR PBB SHADOW E&M-EST. PATIENT-LVL III: CPT | Mod: PBBFAC,,, | Performed by: NURSE PRACTITIONER
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
## 2017-11-27 ENCOUNTER — PATIENT MESSAGE (OUTPATIENT)
Dept: INTERNAL MEDICINE | Facility: CLINIC | Age: 32
End: 2017-11-27

## 2017-11-27 ENCOUNTER — PATIENT MESSAGE (OUTPATIENT)
Dept: PHYSICAL MEDICINE AND REHAB | Facility: CLINIC | Age: 32
End: 2017-11-27

## 2017-11-29 ENCOUNTER — TELEPHONE (OUTPATIENT)
Dept: PHYSICAL MEDICINE AND REHAB | Facility: CLINIC | Age: 32
End: 2017-11-29

## 2017-11-29 ENCOUNTER — INITIAL CONSULT (OUTPATIENT)
Dept: OTOLARYNGOLOGY | Facility: CLINIC | Age: 32
End: 2017-11-29
Payer: MEDICAID

## 2017-11-29 ENCOUNTER — PATIENT MESSAGE (OUTPATIENT)
Dept: PHYSICAL MEDICINE AND REHAB | Facility: CLINIC | Age: 32
End: 2017-11-29

## 2017-11-29 ENCOUNTER — PATIENT MESSAGE (OUTPATIENT)
Dept: OTOLARYNGOLOGY | Facility: CLINIC | Age: 32
End: 2017-11-29

## 2017-11-29 ENCOUNTER — PATIENT MESSAGE (OUTPATIENT)
Dept: INTERNAL MEDICINE | Facility: CLINIC | Age: 32
End: 2017-11-29

## 2017-11-29 ENCOUNTER — TELEPHONE (OUTPATIENT)
Dept: PULMONOLOGY | Facility: CLINIC | Age: 32
End: 2017-11-29

## 2017-11-29 VITALS — HEART RATE: 71 BPM | SYSTOLIC BLOOD PRESSURE: 109 MMHG | DIASTOLIC BLOOD PRESSURE: 70 MMHG | TEMPERATURE: 98 F

## 2017-11-29 DIAGNOSIS — R93.89 ABNORMAL CHEST CT: ICD-10-CM

## 2017-11-29 DIAGNOSIS — M54.2 NECK PAIN: Primary | ICD-10-CM

## 2017-11-29 PROCEDURE — 99203 OFFICE O/P NEW LOW 30 MIN: CPT | Mod: S$PBB,,, | Performed by: OTOLARYNGOLOGY

## 2017-11-29 PROCEDURE — 99999 PR PBB SHADOW E&M-EST. PATIENT-LVL III: CPT | Mod: PBBFAC,,, | Performed by: OTOLARYNGOLOGY

## 2017-11-29 PROCEDURE — 99213 OFFICE O/P EST LOW 20 MIN: CPT | Mod: PBBFAC | Performed by: OTOLARYNGOLOGY

## 2017-11-29 NOTE — LETTER
December 5, 2017      Sophia Massey MD  [address]  [facility] [address]           [facility]  [address]  [facility] [address]  Phone: [phone]  Fax: [phone]          Patient: Nghia Edgar   MR Number: [id number]   YOB: 1985   Date of Visit: 11/29/2017       Dear Dr. Sophia Massey:    Thank you for referring Nghia Edgar to me for evaluation. Attached you will find relevant portions of my assessment and plan of care.    If you have questions, please do not hesitate to call me. I look forward to following Nghia Edgar along with you.    Sincerely,    Adelfo Saldivar  CC:  No Recipients    If you would like to receive this communication electronically, please contact [email or web address] or [phone] to request more information on [facility] access.    For providers and/or their staff who would like to refer a patient to [facility], please contact us through our one-stop-shop provider referral line, [facility], at [phone].    If you feel you have received this communication in error or would no longer like to receive these types of communications, please e-mail [email or web address]

## 2017-11-29 NOTE — TELEPHONE ENCOUNTER
"----- Message from Chloe Ojeda RN sent at 11/29/2017 10:46 AM CST -----  Regarding: referral  Hi Dr. Melendrez,  Mr. Edgar is being seen today by Dr. Patiño for a "neck mass". On review of his imaging, Dr. Patiño sees that it is a pretracheal mass just above the anushka. Can you please take a look at this image and let us know if you are the correct provider to refer him to or would it be cardiothoracic surgeons? Hoping for this guidance so we can inform the pt today.     Thank you,  Chloe Ojeda, MURALI  [id number]  "
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
## 2017-11-29 NOTE — TELEPHONE ENCOUNTER
Please let me know if this patient should see you or CTS and I will call Dr. Patiño's office back. Please note they are not asking for an appointment today.

## 2017-11-30 ENCOUNTER — TELEPHONE (OUTPATIENT)
Dept: OTOLARYNGOLOGY | Facility: CLINIC | Age: 32
End: 2017-11-30

## 2017-12-01 ENCOUNTER — PATIENT MESSAGE (OUTPATIENT)
Dept: UROLOGY | Facility: CLINIC | Age: 32
End: 2017-12-01

## 2017-12-01 ENCOUNTER — TELEPHONE (OUTPATIENT)
Dept: UROLOGY | Facility: CLINIC | Age: 32
End: 2017-12-01

## 2017-12-01 ENCOUNTER — TELEPHONE (OUTPATIENT)
Dept: WOUND CARE | Facility: CLINIC | Age: 32
End: 2017-12-01

## 2017-12-01 ENCOUNTER — PATIENT MESSAGE (OUTPATIENT)
Dept: WOUND CARE | Facility: CLINIC | Age: 32
End: 2017-12-01

## 2017-12-01 DIAGNOSIS — N32.89 BLADDER SPASM: ICD-10-CM

## 2017-12-01 DIAGNOSIS — R50.9 FEVER IN ADULT: Primary | ICD-10-CM

## 2017-12-01 DIAGNOSIS — R39.15 URGENCY OF URINATION: ICD-10-CM

## 2017-12-01 DIAGNOSIS — N31.9 NEUROGENIC BLADDER: ICD-10-CM

## 2017-12-01 DIAGNOSIS — Z93.59 CHRONIC SUPRAPUBIC CATHETER: ICD-10-CM

## 2017-12-01 DIAGNOSIS — R82.71 BACTERIA IN URINE: ICD-10-CM

## 2017-12-01 RX ORDER — OXYBUTYNIN CHLORIDE 5 MG/1
TABLET ORAL
Qty: 90 TABLET | Refills: 3 | Status: SHIPPED | OUTPATIENT
Start: 2017-12-01 | End: 2018-07-05 | Stop reason: SDUPTHER

## 2017-12-01 RX ORDER — SULFAMETHOXAZOLE AND TRIMETHOPRIM 800; 160 MG/1; MG/1
1 TABLET ORAL 2 TIMES DAILY
Qty: 20 TABLET | Refills: 0 | Status: SHIPPED | OUTPATIENT
Start: 2017-12-01 | End: 2017-12-11

## 2017-12-01 RX ORDER — OXYBUTYNIN CHLORIDE 5 MG/1
TABLET ORAL
Qty: 90 TABLET | Refills: 3 | Status: CANCELLED | OUTPATIENT
Start: 2017-12-01

## 2017-12-01 NOTE — TELEPHONE ENCOUNTER
----- Message from Liasha Gomez LPN sent at 11/30/2017 12:10 PM CST -----  Contact: pt [phone]      ----- Message -----  From: Ree Knight  Sent: 11/30/2017  11:34 AM  To: Eugene QUINTANILLA Staff    Pt states he is having chills and a low grade fever. Pt states temp was 103 last night. Currently 100.9 after taking motrin. Pt states he believes he has a UTI and would like antibiotics sent to his rx.     [facility] - [place], [place] -   [address]   [phone] (Phone)  [phone] (Fax)

## 2017-12-04 ENCOUNTER — PATIENT MESSAGE (OUTPATIENT)
Dept: PULMONOLOGY | Facility: CLINIC | Age: 32
End: 2017-12-04

## 2017-12-06 ENCOUNTER — TELEPHONE (OUTPATIENT)
Dept: PHYSICAL MEDICINE AND REHAB | Facility: CLINIC | Age: 32
End: 2017-12-06

## 2017-12-06 NOTE — TELEPHONE ENCOUNTER
----- Message from Celio Penaloza sent at 12/6/2017 11:01 AM CST -----  Contact: Pt. [phone]  The patient would like to speak to someone regarding a prior authorization for Percocet  mg. He had to pay out of pocket and needs the PA to receive his refund. Please contact the patient to discuss further.

## 2017-12-08 ENCOUNTER — PATIENT MESSAGE (OUTPATIENT)
Dept: WOUND CARE | Facility: CLINIC | Age: 32
End: 2017-12-08

## 2017-12-08 ENCOUNTER — OFFICE VISIT (OUTPATIENT)
Dept: WOUND CARE | Facility: CLINIC | Age: 32
End: 2017-12-08
Payer: MEDICAID

## 2017-12-08 ENCOUNTER — PATIENT MESSAGE (OUTPATIENT)
Dept: PLASTIC SURGERY | Facility: CLINIC | Age: 32
End: 2017-12-08

## 2017-12-08 ENCOUNTER — TELEPHONE (OUTPATIENT)
Dept: INTERNAL MEDICINE | Facility: CLINIC | Age: 32
End: 2017-12-08

## 2017-12-08 VITALS
HEIGHT: 68 IN | DIASTOLIC BLOOD PRESSURE: 84 MMHG | TEMPERATURE: 98 F | SYSTOLIC BLOOD PRESSURE: 122 MMHG | HEART RATE: 73 BPM

## 2017-12-08 DIAGNOSIS — L89.324 DECUBITUS ULCER OF ISCHIAL AREA, LEFT, STAGE IV: ICD-10-CM

## 2017-12-08 DIAGNOSIS — G82.20 PARAPLEGIA FOLLOWING SPINAL CORD INJURY: Primary | Chronic | ICD-10-CM

## 2017-12-08 DIAGNOSIS — L89.314 DECUBITUS ULCER OF RIGHT ISCHIUM, STAGE 4: ICD-10-CM

## 2017-12-08 DIAGNOSIS — L89.324 DECUBITUS ULCER OF LEFT BUTTOCK, STAGE 4: Primary | ICD-10-CM

## 2017-12-08 PROCEDURE — 99999 PR PBB SHADOW E&M-EST. PATIENT-LVL IV: CPT | Mod: PBBFAC,,, | Performed by: NURSE PRACTITIONER

## 2017-12-08 PROCEDURE — 99214 OFFICE O/P EST MOD 30 MIN: CPT | Mod: PBBFAC | Performed by: NURSE PRACTITIONER

## 2017-12-08 PROCEDURE — 99212 OFFICE O/P EST SF 10 MIN: CPT | Mod: S$PBB,,, | Performed by: NURSE PRACTITIONER

## 2017-12-08 RX ORDER — POTASSIUM CITRATE 10 MEQ/1
10 TABLET, EXTENDED RELEASE ORAL 3 TIMES DAILY
COMMUNITY
Start: 2017-12-01

## 2017-12-08 NOTE — TELEPHONE ENCOUNTER
Will consult CM to see which HH company pt can use. Once that's done, will order  for wound vac care.

## 2017-12-08 NOTE — PATIENT INSTRUCTIONS
"Keep pressure off bony prominences at all times.  Rotate position every 2 hours.    Cleanse ischial wounds with wound .  Medihoney gel to right ischial wound, cover with gauze and secure with mepore island dressing.  Medihoney gel to left ischial wound, pack with 2" roll gauze, and cover with ABD pad and secure with medipore tape.  Use underwear to hold dressing in place.  Change dressing daily and as needed.  "

## 2017-12-08 NOTE — Clinical Note
He is requesting wound vac placement but does not have home health.  I cannot sign home health orders.  If you can order home health and let me know which agency I can send them wound care orders. Thanks, Sara
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
## 2017-12-12 ENCOUNTER — PATIENT MESSAGE (OUTPATIENT)
Dept: INTERNAL MEDICINE | Facility: CLINIC | Age: 32
End: 2017-12-12

## 2017-12-12 DIAGNOSIS — L89.153 PRESSURE ULCER OF SACRAL REGION, STAGE 3: ICD-10-CM

## 2017-12-12 DIAGNOSIS — G82.20 PARAPLEGIA: Primary | ICD-10-CM

## 2017-12-13 ENCOUNTER — TELEPHONE (OUTPATIENT)
Dept: WOUND CARE | Facility: CLINIC | Age: 32
End: 2017-12-13

## 2017-12-13 ENCOUNTER — PATIENT MESSAGE (OUTPATIENT)
Dept: INTERNAL MEDICINE | Facility: CLINIC | Age: 32
End: 2017-12-13

## 2017-12-13 ENCOUNTER — PATIENT MESSAGE (OUTPATIENT)
Dept: PLASTIC SURGERY | Facility: CLINIC | Age: 32
End: 2017-12-13

## 2017-12-13 NOTE — TELEPHONE ENCOUNTER
Ordered . Please fax to [facility]  and then scan.    Cleanse ischial wounds with wound cleanser.   Apply white foam to undermined areas of left ischial wound and cover with black foam.  Run wound vac at 150 mmHg on continuous suction.   Apply medihoney gel to right ischial wound, cover with hydrofiber and gauze and secure with a mepore island dressing.   Change all dressings three times weekly    Let pt know when done.    Thanks!

## 2017-12-13 NOTE — TELEPHONE ENCOUNTER
----- Message from Celeste Lubin sent at 12/13/2017 10:25 AM CST -----  Contact: Replaced by [facility]/ mario Rader States that he  needs a call returned by a nurse in reference to last clinic notes for nick , Please call Mario @ [phone] ext [phone] . Thanks :)

## 2017-12-14 ENCOUNTER — OFFICE VISIT (OUTPATIENT)
Dept: UROLOGY | Facility: CLINIC | Age: 32
End: 2017-12-14
Payer: MEDICAID

## 2017-12-14 ENCOUNTER — PATIENT MESSAGE (OUTPATIENT)
Dept: INTERNAL MEDICINE | Facility: CLINIC | Age: 32
End: 2017-12-14

## 2017-12-14 ENCOUNTER — TELEPHONE (OUTPATIENT)
Dept: INTERNAL MEDICINE | Facility: CLINIC | Age: 32
End: 2017-12-14

## 2017-12-14 ENCOUNTER — PATIENT MESSAGE (OUTPATIENT)
Dept: UROLOGY | Facility: CLINIC | Age: 32
End: 2017-12-14

## 2017-12-14 VITALS
HEIGHT: 68 IN | DIASTOLIC BLOOD PRESSURE: 70 MMHG | WEIGHT: 200 LBS | SYSTOLIC BLOOD PRESSURE: 114 MMHG | HEART RATE: 60 BPM | BODY MASS INDEX: 30.31 KG/M2

## 2017-12-14 DIAGNOSIS — R33.9 URINARY RETENTION: ICD-10-CM

## 2017-12-14 DIAGNOSIS — Z43.5 ENCOUNTER FOR SUPRAPUBIC CATHETER CARE: ICD-10-CM

## 2017-12-14 DIAGNOSIS — Z93.59 CHRONIC SUPRAPUBIC CATHETER: ICD-10-CM

## 2017-12-14 DIAGNOSIS — N31.9 NEUROGENIC BLADDER: Primary | ICD-10-CM

## 2017-12-14 PROCEDURE — 99214 OFFICE O/P EST MOD 30 MIN: CPT | Mod: S$PBB,25,, | Performed by: NURSE PRACTITIONER

## 2017-12-14 PROCEDURE — 51705 CHANGE OF BLADDER TUBE: CPT | Mod: PBBFAC | Performed by: NURSE PRACTITIONER

## 2017-12-14 PROCEDURE — 51705 CHANGE OF BLADDER TUBE: CPT | Mod: S$PBB,,, | Performed by: NURSE PRACTITIONER

## 2017-12-14 PROCEDURE — 99213 OFFICE O/P EST LOW 20 MIN: CPT | Mod: PBBFAC,25 | Performed by: NURSE PRACTITIONER

## 2017-12-14 PROCEDURE — 99999 PR PBB SHADOW E&M-EST. PATIENT-LVL III: CPT | Mod: PBBFAC,,, | Performed by: NURSE PRACTITIONER

## 2017-12-14 NOTE — TELEPHONE ENCOUNTER
----- Message from Sera Loja sent at 12/13/2017  2:16 PM CST -----  Contact: Linda/Intake Nurse at [facility]/[phone]   Linda called in regards receiving a request for wound care, but she stated that that they don't serve at [facility].       Thank you!!!
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
## 2017-12-14 NOTE — TELEPHONE ENCOUNTER
Ok, will you let pt know then? He was the one that wanted to get [facility]  as they accept his insurance. There should be a  on board also.

## 2017-12-14 NOTE — TELEPHONE ENCOUNTER
Dr Massey, I spoke to Layla/ intake nurse  At [facility] and she stated, they do not have staffing ability to service patient at this time. Sergio advise.

## 2017-12-15 ENCOUNTER — PATIENT MESSAGE (OUTPATIENT)
Dept: INTERNAL MEDICINE | Facility: CLINIC | Age: 32
End: 2017-12-15

## 2017-12-15 ENCOUNTER — TELEPHONE (OUTPATIENT)
Dept: INTERNAL MEDICINE | Facility: CLINIC | Age: 32
End: 2017-12-15

## 2017-12-15 NOTE — TELEPHONE ENCOUNTER
----- Message from Cari Flores sent at 12/15/2017  8:18 AM CST -----  Contact: Sherry/[facility]/[phone]  Facility states that they can not accept patient because they are at the max of accepting patient's insurance types. Please advise

## 2017-12-15 NOTE — TELEPHONE ENCOUNTER
----- Message from Annie Richard sent at 12/15/2017 10:15 AM CST -----  Contact: self/[phone]  Pt is returning a call from the office. Please call and advise.    Thank You

## 2017-12-18 ENCOUNTER — PATIENT MESSAGE (OUTPATIENT)
Dept: WOUND CARE | Facility: CLINIC | Age: 32
End: 2017-12-18

## 2017-12-18 ENCOUNTER — PATIENT MESSAGE (OUTPATIENT)
Dept: INTERNAL MEDICINE | Facility: CLINIC | Age: 32
End: 2017-12-18

## 2017-12-19 ENCOUNTER — TELEPHONE (OUTPATIENT)
Dept: INTERNAL MEDICINE | Facility: CLINIC | Age: 32
End: 2017-12-19

## 2017-12-19 NOTE — TELEPHONE ENCOUNTER
----- Message from Tosin Limon sent at 12/19/2017  9:56 AM CST -----  Contact: Patient [phone]  Needs home health orders faxed to [facility] [phone] fax# [phone]. The orders are for a wound vac.    Please call and advise.    Thank You

## 2017-12-20 ENCOUNTER — OFFICE VISIT (OUTPATIENT)
Dept: PLASTIC SURGERY | Facility: CLINIC | Age: 32
End: 2017-12-20
Payer: MEDICAID

## 2017-12-20 VITALS
SYSTOLIC BLOOD PRESSURE: 122 MMHG | BODY MASS INDEX: 27.28 KG/M2 | DIASTOLIC BLOOD PRESSURE: 58 MMHG | TEMPERATURE: 98 F | WEIGHT: 180 LBS | HEART RATE: 62 BPM | HEIGHT: 68 IN

## 2017-12-20 DIAGNOSIS — Z09 SURGERY FOLLOW-UP EXAMINATION: Primary | ICD-10-CM

## 2017-12-20 PROCEDURE — 99213 OFFICE O/P EST LOW 20 MIN: CPT | Mod: PBBFAC,PO | Performed by: SURGERY

## 2017-12-20 PROCEDURE — 99999 PR PBB SHADOW E&M-EST. PATIENT-LVL III: CPT | Mod: PBBFAC,,, | Performed by: SURGERY

## 2017-12-20 PROCEDURE — 99212 OFFICE O/P EST SF 10 MIN: CPT | Mod: S$PBB,,, | Performed by: SURGERY

## 2017-12-20 NOTE — PROGRESS NOTES
Nghia Edgar presents to discuss his pressure sore.  I discussed with him that   we are currently setting up a Skilled Nursing Unit at [facility].  They built a   new facility.  We will have the availability of a [place] bed there.  He has   already had one failure of this surgery from years ago.  The second time again   has at least an 80% failure rate.  I would like to wait till everything is set   up.  We will contact him in the next few months to schedule his surgery.  If the   facility is not able to accommodate him by March or April, we will go ahead and   do the surgery in the [facility] and then transfer him to an outside   Nursing Facility.      DREW/JOSÉ  dd: 12/20/2017 14:28:46 (CST)  td: 12/21/2017 08:00:03 (CST)  Doc ID   #[id number]  Job ID #[id number]    CC:

## 2017-12-21 ENCOUNTER — OUTPATIENT CASE MANAGEMENT (OUTPATIENT)
Dept: ADMINISTRATIVE | Facility: OTHER | Age: 32
End: 2017-12-21

## 2017-12-21 ENCOUNTER — PATIENT MESSAGE (OUTPATIENT)
Dept: INTERNAL MEDICINE | Facility: CLINIC | Age: 32
End: 2017-12-21

## 2017-12-21 NOTE — TELEPHONE ENCOUNTER
Bijal, can you call  to give verbal for Medihoney, 4X4, SALINE SOLUTION, ABDPADS,GLOVES and have them fax the orders for me to sign? Do they need the actual orders for the supplies or do they just need directions on what to do with them?

## 2017-12-21 NOTE — PROGRESS NOTES
Please note the following patients information has been forwarded to [employer] for Case Management.    Please contact [facility]patient Complex Care Management at ext [phone] with any questions.    Thank you      Victorina Grissom, SSC

## 2017-12-22 RX ORDER — DRESSING,ODOR ABSORBENT,H-POLY 4" X 4"
BANDAGE MISCELLANEOUS
Qty: 30 EACH | Refills: 3 | Status: SHIPPED | OUTPATIENT
Start: 2017-12-22 | End: 2018-01-31 | Stop reason: SDUPTHER

## 2017-12-22 RX ORDER — CAMPHOR/EUCALYPTUS/MENTHOL
LIQUID (ML) MISCELLANEOUS
Qty: 30 EACH | Refills: 3 | Status: SHIPPED | OUTPATIENT
Start: 2017-12-22 | End: 2019-06-24

## 2017-12-22 NOTE — TELEPHONE ENCOUNTER
Per Elaine Acosta NP in wound care, the instructions are: Cleanse ischial wounds with wound cleanser.   Apply white foam to undermined areas of left ischial wound and cover with black foam.  Run wound vac at 150 mmHg on continuous suction.   Apply medihoney gel to right ischial wound, cover with hydrofiber and gauze and secure with a mepore island dressing.   Change all dressings three times weekly    Will order medihoney gel, gloves, gauze, hydrofiber and mepore island dressing. Please fax to [facility].

## 2017-12-26 ENCOUNTER — PATIENT MESSAGE (OUTPATIENT)
Dept: PHYSICAL MEDICINE AND REHAB | Facility: CLINIC | Age: 32
End: 2017-12-26

## 2017-12-26 DIAGNOSIS — M79.2 NEUROPATHIC PAIN: Chronic | ICD-10-CM

## 2017-12-26 RX ORDER — OXYCODONE HCL 40 MG/1
40 TABLET, FILM COATED, EXTENDED RELEASE ORAL EVERY 12 HOURS
Qty: 60 TABLET | Refills: 0 | Status: SHIPPED | OUTPATIENT
Start: 2017-12-26 | End: 2018-01-26 | Stop reason: SDUPTHER

## 2017-12-26 RX ORDER — OXYCODONE AND ACETAMINOPHEN 10; 325 MG/1; MG/1
TABLET ORAL
Qty: 135 TABLET | Refills: 0 | Status: SHIPPED | OUTPATIENT
Start: 2017-12-26 | End: 2018-01-26 | Stop reason: SDUPTHER

## 2017-12-26 NOTE — TELEPHONE ENCOUNTER
Last refill--  last office visit--10/26/2017  F/U--2018    Pharmacy states script  and patient  will need a new one

## 2017-12-26 NOTE — TELEPHONE ENCOUNTER
----- Message from Edwar Hoover MA sent at 12/26/2017 12:41 PM CST -----  Contact: Pt   Pt called and would like a refill on his meds Oxycontin 40 mgs , percocet  mgs.    Pt can be reached at [phone].    Thanks

## 2017-12-27 ENCOUNTER — PATIENT MESSAGE (OUTPATIENT)
Dept: INTERNAL MEDICINE | Facility: CLINIC | Age: 32
End: 2017-12-27

## 2018-01-04 ENCOUNTER — OFFICE VISIT (OUTPATIENT)
Dept: UROLOGY | Facility: CLINIC | Age: 33
End: 2018-01-04
Payer: MEDICAID

## 2018-01-04 ENCOUNTER — PATIENT MESSAGE (OUTPATIENT)
Dept: UROLOGY | Facility: CLINIC | Age: 33
End: 2018-01-04

## 2018-01-04 VITALS
SYSTOLIC BLOOD PRESSURE: 116 MMHG | WEIGHT: 180 LBS | HEART RATE: 70 BPM | HEIGHT: 68 IN | DIASTOLIC BLOOD PRESSURE: 62 MMHG | BODY MASS INDEX: 27.28 KG/M2

## 2018-01-04 DIAGNOSIS — N31.9 NEUROGENIC BLADDER: Primary | ICD-10-CM

## 2018-01-04 DIAGNOSIS — R33.9 URINARY RETENTION: ICD-10-CM

## 2018-01-04 DIAGNOSIS — R82.71 BACTERIA IN URINE: ICD-10-CM

## 2018-01-04 DIAGNOSIS — Z93.59 SUPRAPUBIC CATHETER: ICD-10-CM

## 2018-01-04 DIAGNOSIS — Z43.5 ENCOUNTER FOR CARE OR REPLACEMENT OF SUPRAPUBIC TUBE: ICD-10-CM

## 2018-01-04 PROCEDURE — 99214 OFFICE O/P EST MOD 30 MIN: CPT | Mod: PBBFAC | Performed by: NURSE PRACTITIONER

## 2018-01-04 PROCEDURE — 51705 CHANGE OF BLADDER TUBE: CPT | Mod: PBBFAC | Performed by: NURSE PRACTITIONER

## 2018-01-04 PROCEDURE — 99214 OFFICE O/P EST MOD 30 MIN: CPT | Mod: S$PBB,25,, | Performed by: NURSE PRACTITIONER

## 2018-01-04 PROCEDURE — 51705 CHANGE OF BLADDER TUBE: CPT | Mod: S$PBB,,, | Performed by: NURSE PRACTITIONER

## 2018-01-04 PROCEDURE — 99999 PR PBB SHADOW E&M-EST. PATIENT-LVL IV: CPT | Mod: PBBFAC,,, | Performed by: NURSE PRACTITIONER
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
## 2018-01-05 ENCOUNTER — OFFICE VISIT (OUTPATIENT)
Dept: WOUND CARE | Facility: CLINIC | Age: 33
End: 2018-01-05
Payer: MEDICAID

## 2018-01-05 ENCOUNTER — PATIENT MESSAGE (OUTPATIENT)
Dept: WOUND CARE | Facility: CLINIC | Age: 33
End: 2018-01-05

## 2018-01-05 VITALS
WEIGHT: 180 LBS | SYSTOLIC BLOOD PRESSURE: 119 MMHG | HEART RATE: 66 BPM | HEIGHT: 68 IN | DIASTOLIC BLOOD PRESSURE: 76 MMHG | BODY MASS INDEX: 27.28 KG/M2 | TEMPERATURE: 99 F

## 2018-01-05 DIAGNOSIS — L89.324 DECUBITUS ULCER OF ISCHIAL AREA, LEFT, STAGE IV: Primary | ICD-10-CM

## 2018-01-05 DIAGNOSIS — G82.20 PARAPLEGIA FOLLOWING SPINAL CORD INJURY: Chronic | ICD-10-CM

## 2018-01-05 DIAGNOSIS — L89.314 DECUBITUS ULCER OF RIGHT ISCHIUM, STAGE 4: ICD-10-CM

## 2018-01-05 PROCEDURE — 97605 NEG PRS WND THER DME<=50SQCM: CPT | Mod: S$PBB,,, | Performed by: NURSE PRACTITIONER

## 2018-01-05 PROCEDURE — 99215 OFFICE O/P EST HI 40 MIN: CPT | Mod: PBBFAC,25 | Performed by: NURSE PRACTITIONER

## 2018-01-05 PROCEDURE — 99999 PR PBB SHADOW E&M-EST. PATIENT-LVL V: CPT | Mod: PBBFAC,,, | Performed by: NURSE PRACTITIONER

## 2018-01-05 PROCEDURE — 99212 OFFICE O/P EST SF 10 MIN: CPT | Mod: 25,S$PBB,, | Performed by: NURSE PRACTITIONER

## 2018-01-05 PROCEDURE — 97605 NEG PRS WND THER DME<=50SQCM: CPT | Mod: PBBFAC | Performed by: NURSE PRACTITIONER
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
## 2018-01-08 ENCOUNTER — PATIENT MESSAGE (OUTPATIENT)
Dept: PULMONOLOGY | Facility: CLINIC | Age: 33
End: 2018-01-08

## 2018-01-08 ENCOUNTER — PATIENT MESSAGE (OUTPATIENT)
Dept: PHYSICAL MEDICINE AND REHAB | Facility: CLINIC | Age: 33
End: 2018-01-08

## 2018-01-08 DIAGNOSIS — R52 PAIN: Primary | ICD-10-CM

## 2018-01-08 RX ORDER — PREGABALIN 200 MG/1
200 CAPSULE ORAL 3 TIMES DAILY
Qty: 90 CAPSULE | Refills: 5 | Status: SHIPPED | OUTPATIENT
Start: 2018-01-08 | End: 2018-08-19 | Stop reason: SDUPTHER

## 2018-01-12 ENCOUNTER — TELEPHONE (OUTPATIENT)
Dept: WOUND CARE | Facility: CLINIC | Age: 33
End: 2018-01-12

## 2018-01-15 ENCOUNTER — TELEPHONE (OUTPATIENT)
Dept: INTERNAL MEDICINE | Facility: CLINIC | Age: 33
End: 2018-01-15

## 2018-01-15 ENCOUNTER — PATIENT MESSAGE (OUTPATIENT)
Dept: INFECTIOUS DISEASES | Facility: CLINIC | Age: 33
End: 2018-01-15

## 2018-01-15 ENCOUNTER — PATIENT MESSAGE (OUTPATIENT)
Dept: PLASTIC SURGERY | Facility: CLINIC | Age: 33
End: 2018-01-15

## 2018-01-15 ENCOUNTER — PATIENT MESSAGE (OUTPATIENT)
Dept: WOUND CARE | Facility: CLINIC | Age: 33
End: 2018-01-15

## 2018-01-15 NOTE — TELEPHONE ENCOUNTER
----- Message from Chandrika Stone sent at 1/15/2018  8:26 AM CST -----  Contact: Patient [phone]  Patient has swelling in left leg. Patient wants you to call him to see what to do.    Please call and advise.    Thank You

## 2018-01-17 ENCOUNTER — TELEPHONE (OUTPATIENT)
Dept: WOUND CARE | Facility: CLINIC | Age: 33
End: 2018-01-17

## 2018-01-17 ENCOUNTER — PATIENT MESSAGE (OUTPATIENT)
Dept: UROLOGY | Facility: CLINIC | Age: 33
End: 2018-01-17

## 2018-01-17 ENCOUNTER — PATIENT MESSAGE (OUTPATIENT)
Dept: WOUND CARE | Facility: CLINIC | Age: 33
End: 2018-01-17

## 2018-01-17 ENCOUNTER — PATIENT MESSAGE (OUTPATIENT)
Dept: INTERNAL MEDICINE | Facility: CLINIC | Age: 33
End: 2018-01-17

## 2018-01-17 ENCOUNTER — PATIENT MESSAGE (OUTPATIENT)
Dept: PULMONOLOGY | Facility: CLINIC | Age: 33
End: 2018-01-17

## 2018-01-17 ENCOUNTER — TELEPHONE (OUTPATIENT)
Dept: UROLOGY | Facility: CLINIC | Age: 33
End: 2018-01-17

## 2018-01-18 ENCOUNTER — PATIENT MESSAGE (OUTPATIENT)
Dept: INTERNAL MEDICINE | Facility: CLINIC | Age: 33
End: 2018-01-18

## 2018-01-18 ENCOUNTER — HOSPITAL ENCOUNTER (EMERGENCY)
Facility: HOSPITAL | Age: 33
Discharge: HOME | End: 2018-01-18
Attending: EMERGENCY MEDICINE
Payer: MEDICAID

## 2018-01-18 VITALS
SYSTOLIC BLOOD PRESSURE: 105 MMHG | RESPIRATION RATE: 20 BRPM | HEART RATE: 51 BPM | DIASTOLIC BLOOD PRESSURE: 59 MMHG | TEMPERATURE: 96 F | OXYGEN SATURATION: 99 %

## 2018-01-18 DIAGNOSIS — R07.89 CHEST PAIN, NON-CARDIAC: Primary | ICD-10-CM

## 2018-01-18 LAB
ALBUMIN SERPL BCP-MCNC: 3.1 G/DL
ALP SERPL-CCNC: 107 U/L
ALT SERPL W/O P-5'-P-CCNC: 17 U/L
ANION GAP SERPL CALC-SCNC: 6 MMOL/L
AST SERPL-CCNC: 12 U/L
BASOPHILS # BLD AUTO: 0.02 K/UL
BASOPHILS NFR BLD: 0.3 %
BILIRUB SERPL-MCNC: 0.2 MG/DL
BNP SERPL-MCNC: <10 PG/ML
BUN SERPL-MCNC: 5 MG/DL
CALCIUM SERPL-MCNC: 8.3 MG/DL
CHLORIDE SERPL-SCNC: 111 MMOL/L
CK MB SERPL-MCNC: 1.7 NG/ML
CK MB SERPL-RTO: 1.6 %
CK SERPL-CCNC: 107 U/L
CK SERPL-CCNC: 107 U/L
CO2 SERPL-SCNC: 24 MMOL/L
CREAT SERPL-MCNC: 0.5 MG/DL
D DIMER PPP IA.FEU-MCNC: 0.37 MG/L FEU
DIFFERENTIAL METHOD: ABNORMAL
EOSINOPHIL # BLD AUTO: 0.1 K/UL
EOSINOPHIL NFR BLD: 1.3 %
ERYTHROCYTE [DISTWIDTH] IN BLOOD BY AUTOMATED COUNT: 15.1 %
EST. GFR  (AFRICAN AMERICAN): >60 ML/MIN/1.73 M^2
EST. GFR  (NON AFRICAN AMERICAN): >60 ML/MIN/1.73 M^2
GLUCOSE SERPL-MCNC: 92 MG/DL
HCT VFR BLD AUTO: 37.3 %
HGB BLD-MCNC: 11.4 G/DL
LYMPHOCYTES # BLD AUTO: 1.3 K/UL
LYMPHOCYTES NFR BLD: 18.2 %
MCH RBC QN AUTO: 27.4 PG
MCHC RBC AUTO-ENTMCNC: 30.6 G/DL
MCV RBC AUTO: 90 FL
MONOCYTES # BLD AUTO: 0.5 K/UL
MONOCYTES NFR BLD: 7.7 %
NEUTROPHILS # BLD AUTO: 5 K/UL
NEUTROPHILS NFR BLD: 72.5 %
PLATELET # BLD AUTO: 237 K/UL
PMV BLD AUTO: 10.7 FL
POTASSIUM SERPL-SCNC: 4.1 MMOL/L
PROT SERPL-MCNC: 6.1 G/DL
RBC # BLD AUTO: 4.16 M/UL
SODIUM SERPL-SCNC: 141 MMOL/L
TROPONIN I SERPL DL<=0.01 NG/ML-MCNC: 0.08 NG/ML
WBC # BLD AUTO: 6.87 K/UL

## 2018-01-18 PROCEDURE — 84484 ASSAY OF TROPONIN QUANT: CPT

## 2018-01-18 PROCEDURE — 63600175 PHARM REV CODE 636 W HCPCS: Performed by: EMERGENCY MEDICINE

## 2018-01-18 PROCEDURE — 99285 EMERGENCY DEPT VISIT HI MDM: CPT | Mod: 25

## 2018-01-18 PROCEDURE — 96375 TX/PRO/DX INJ NEW DRUG ADDON: CPT

## 2018-01-18 PROCEDURE — 25500020 PHARM REV CODE 255: Performed by: EMERGENCY MEDICINE

## 2018-01-18 PROCEDURE — 82553 CREATINE MB FRACTION: CPT

## 2018-01-18 PROCEDURE — 96374 THER/PROPH/DIAG INJ IV PUSH: CPT | Mod: 59

## 2018-01-18 PROCEDURE — 93010 ELECTROCARDIOGRAM REPORT: CPT | Mod: ,,, | Performed by: INTERNAL MEDICINE

## 2018-01-18 PROCEDURE — 83880 ASSAY OF NATRIURETIC PEPTIDE: CPT

## 2018-01-18 PROCEDURE — 36415 COLL VENOUS BLD VENIPUNCTURE: CPT

## 2018-01-18 PROCEDURE — 80053 COMPREHEN METABOLIC PANEL: CPT

## 2018-01-18 PROCEDURE — 93005 ELECTROCARDIOGRAM TRACING: CPT

## 2018-01-18 PROCEDURE — 85025 COMPLETE CBC W/AUTO DIFF WBC: CPT

## 2018-01-18 PROCEDURE — 85379 FIBRIN DEGRADATION QUANT: CPT

## 2018-01-18 RX ORDER — KETOROLAC TROMETHAMINE 10 MG/1
10 TABLET, FILM COATED ORAL EVERY 6 HOURS
Qty: 20 TABLET | Refills: 0 | Status: SHIPPED | OUTPATIENT
Start: 2018-01-18 | End: 2018-02-08

## 2018-01-18 RX ORDER — KETOROLAC TROMETHAMINE 30 MG/ML
30 INJECTION, SOLUTION INTRAMUSCULAR; INTRAVENOUS
Status: COMPLETED | OUTPATIENT
Start: 2018-01-18 | End: 2018-01-18

## 2018-01-18 RX ORDER — HYDROMORPHONE HYDROCHLORIDE 2 MG/ML
1 INJECTION, SOLUTION INTRAMUSCULAR; INTRAVENOUS; SUBCUTANEOUS
Status: COMPLETED | OUTPATIENT
Start: 2018-01-18 | End: 2018-01-18

## 2018-01-18 RX ADMIN — KETOROLAC TROMETHAMINE 30 MG: 30 INJECTION, SOLUTION INTRAMUSCULAR at 12:01

## 2018-01-18 RX ADMIN — HYDROMORPHONE HYDROCHLORIDE 1 MG: 2 INJECTION INTRAMUSCULAR; INTRAVENOUS; SUBCUTANEOUS at 09:01

## 2018-01-18 RX ADMIN — IOHEXOL 100 ML: 350 INJECTION, SOLUTION INTRAVENOUS at 09:01

## 2018-01-18 NOTE — DISCHARGE INSTRUCTIONS
1.  You have been evaluated by cardiology today who does not feel that the source of your chest pain is coming from the heart or lungs.  Dr. Siu would like to reevaluate you in clinic but does not feel you need to be admitted to the hospital today.  You may call his office at the number above and make an appointment first available.  2.  The next step needed to properly evaluate your chest pain is for you to schedule a stress test with Dr. Siu.  No additional testing is available in the ER for this specific episode of pain.  You must go to clinic.  3.  We are not sure why you're having chest pain possible causes include acid reflux or a muscle strain.  4.  You have been prescribed Toradol which is a strong anti-inflammatory for pain.  You may take your normal pain medicines for pain as well.
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
## 2018-01-18 NOTE — TELEPHONE ENCOUNTER
Spoke with pt's wife. Pt's wife says that he is being seen over at [facility] location for chest pains & leg edema.

## 2018-01-19 ENCOUNTER — PATIENT MESSAGE (OUTPATIENT)
Dept: PHYSICAL MEDICINE AND REHAB | Facility: CLINIC | Age: 33
End: 2018-01-19

## 2018-01-23 ENCOUNTER — PATIENT MESSAGE (OUTPATIENT)
Dept: PHYSICAL MEDICINE AND REHAB | Facility: CLINIC | Age: 33
End: 2018-01-23

## 2018-01-24 ENCOUNTER — PATIENT MESSAGE (OUTPATIENT)
Dept: PHYSICAL MEDICINE AND REHAB | Facility: CLINIC | Age: 33
End: 2018-01-24

## 2018-01-24 ENCOUNTER — PATIENT MESSAGE (OUTPATIENT)
Dept: INTERNAL MEDICINE | Facility: CLINIC | Age: 33
End: 2018-01-24

## 2018-01-25 ENCOUNTER — OFFICE VISIT (OUTPATIENT)
Dept: WOUND CARE | Facility: CLINIC | Age: 33
End: 2018-01-25
Payer: MEDICAID

## 2018-01-25 ENCOUNTER — OFFICE VISIT (OUTPATIENT)
Dept: UROLOGY | Facility: CLINIC | Age: 33
End: 2018-01-25
Payer: MEDICAID

## 2018-01-25 ENCOUNTER — OFFICE VISIT (OUTPATIENT)
Dept: INFECTIOUS DISEASES | Facility: CLINIC | Age: 33
End: 2018-01-25
Payer: MEDICAID

## 2018-01-25 ENCOUNTER — PATIENT MESSAGE (OUTPATIENT)
Dept: WOUND CARE | Facility: CLINIC | Age: 33
End: 2018-01-25

## 2018-01-25 VITALS
SYSTOLIC BLOOD PRESSURE: 116 MMHG | HEIGHT: 68 IN | HEART RATE: 71 BPM | DIASTOLIC BLOOD PRESSURE: 85 MMHG | BODY MASS INDEX: 27.26 KG/M2 | WEIGHT: 179.88 LBS

## 2018-01-25 VITALS
WEIGHT: 180 LBS | BODY MASS INDEX: 27.28 KG/M2 | TEMPERATURE: 98 F | SYSTOLIC BLOOD PRESSURE: 134 MMHG | HEART RATE: 76 BPM | DIASTOLIC BLOOD PRESSURE: 79 MMHG | HEIGHT: 68 IN

## 2018-01-25 DIAGNOSIS — Z43.5 ENCOUNTER FOR SUPRAPUBIC CATHETER CARE: ICD-10-CM

## 2018-01-25 DIAGNOSIS — L89.893: Primary | ICD-10-CM

## 2018-01-25 DIAGNOSIS — L89.314 DECUBITUS ULCER OF RIGHT ISCHIUM, STAGE 4: Primary | ICD-10-CM

## 2018-01-25 DIAGNOSIS — Z93.59 CHRONIC SUPRAPUBIC CATHETER: ICD-10-CM

## 2018-01-25 DIAGNOSIS — L89.324 DECUBITUS ULCER OF ISCHIAL AREA, LEFT, STAGE IV: ICD-10-CM

## 2018-01-25 DIAGNOSIS — G82.20 PARAPLEGIA FOLLOWING SPINAL CORD INJURY: Chronic | ICD-10-CM

## 2018-01-25 DIAGNOSIS — R33.9 URINARY RETENTION: ICD-10-CM

## 2018-01-25 DIAGNOSIS — N31.9 NEUROGENIC BLADDER: Primary | ICD-10-CM

## 2018-01-25 PROCEDURE — 97605 NEG PRS WND THER DME<=50SQCM: CPT | Mod: PBBFAC | Performed by: NURSE PRACTITIONER

## 2018-01-25 PROCEDURE — 99214 OFFICE O/P EST MOD 30 MIN: CPT | Mod: S$PBB,25,, | Performed by: NURSE PRACTITIONER

## 2018-01-25 PROCEDURE — 51705 CHANGE OF BLADDER TUBE: CPT | Mod: S$PBB,,, | Performed by: NURSE PRACTITIONER

## 2018-01-25 PROCEDURE — 99213 OFFICE O/P EST LOW 20 MIN: CPT | Mod: PBBFAC,25,27 | Performed by: INTERNAL MEDICINE

## 2018-01-25 PROCEDURE — 97605 NEG PRS WND THER DME<=50SQCM: CPT | Mod: S$PBB,,, | Performed by: NURSE PRACTITIONER

## 2018-01-25 PROCEDURE — 99213 OFFICE O/P EST LOW 20 MIN: CPT | Mod: S$PBB,,, | Performed by: INTERNAL MEDICINE

## 2018-01-25 PROCEDURE — 99999 PR PBB SHADOW E&M-EST. PATIENT-LVL V: CPT | Mod: PBBFAC,,, | Performed by: NURSE PRACTITIONER

## 2018-01-25 PROCEDURE — 99215 OFFICE O/P EST HI 40 MIN: CPT | Mod: PBBFAC,27,25 | Performed by: NURSE PRACTITIONER

## 2018-01-25 PROCEDURE — 99999 PR PBB SHADOW E&M-EST. PATIENT-LVL IV: CPT | Mod: PBBFAC,,, | Performed by: NURSE PRACTITIONER

## 2018-01-25 PROCEDURE — 99214 OFFICE O/P EST MOD 30 MIN: CPT | Mod: PBBFAC,25 | Performed by: NURSE PRACTITIONER

## 2018-01-25 PROCEDURE — 99999 PR PBB SHADOW E&M-EST. PATIENT-LVL III: CPT | Mod: PBBFAC,,, | Performed by: INTERNAL MEDICINE

## 2018-01-25 PROCEDURE — 51705 CHANGE OF BLADDER TUBE: CPT | Mod: PBBFAC | Performed by: NURSE PRACTITIONER

## 2018-01-25 PROCEDURE — 99212 OFFICE O/P EST SF 10 MIN: CPT | Mod: 25,S$PBB,, | Performed by: NURSE PRACTITIONER
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
## 2018-01-26 ENCOUNTER — PATIENT MESSAGE (OUTPATIENT)
Dept: PHYSICAL MEDICINE AND REHAB | Facility: CLINIC | Age: 33
End: 2018-01-26

## 2018-01-26 DIAGNOSIS — M79.2 NEUROPATHIC PAIN: Chronic | ICD-10-CM

## 2018-01-26 RX ORDER — OXYCODONE HCL 40 MG/1
40 TABLET, FILM COATED, EXTENDED RELEASE ORAL EVERY 12 HOURS
Qty: 60 TABLET | Refills: 0 | Status: SHIPPED | OUTPATIENT
Start: 2018-01-26 | End: 2018-03-07 | Stop reason: SDUPTHER

## 2018-01-26 RX ORDER — OXYCODONE AND ACETAMINOPHEN 10; 325 MG/1; MG/1
TABLET ORAL
Qty: 135 TABLET | Refills: 0 | Status: SHIPPED | OUTPATIENT
Start: 2018-01-26 | End: 2018-02-26 | Stop reason: SDUPTHER

## 2018-01-29 ENCOUNTER — TELEPHONE (OUTPATIENT)
Dept: INTERNAL MEDICINE | Facility: CLINIC | Age: 33
End: 2018-01-29

## 2018-01-29 ENCOUNTER — TELEPHONE (OUTPATIENT)
Dept: PHYSICAL MEDICINE AND REHAB | Facility: CLINIC | Age: 33
End: 2018-01-29

## 2018-01-29 NOTE — TELEPHONE ENCOUNTER
I have not see this pt, and I am not taking new patients.  He will need to get this from his PCP    Please cancel any appt he has with me to establish care since I am not taking new patients

## 2018-01-29 NOTE — TELEPHONE ENCOUNTER
----- Message from Magi Charles sent at 1/29/2018 12:19 PM CST -----  Contact: Self [phone]  Pt is requesting a prior authorization be sent to the pharmacy.    oxyCODONE (OXYCONTIN) 40 mg 12 hr tablet 60 tablet 0 1/26/2018 2/25/2018      Pharmacy Contact     Telephone Fax  [phone]    Pt would like a phone call once that has been done and may be reached at [phone].    Thank you.  LC

## 2018-01-29 NOTE — TELEPHONE ENCOUNTER
----- Message from Boone Harvey MA sent at 1/29/2018 10:51 AM CST -----  Contact: Self [phone]  Please advise .  ----- Message -----  From: Tessa Hernandez  Sent: 1/29/2018  10:43 AM  To: Sandeep SAMUEL Staff    Pt states he will be a new pt to Dr Hopkins from Dr Sophia Massey but he states he needs a order for his medical supplies for his Wound Vac sent to [facility] Fax number [phone] . He states he needs 4x4 Gauges ,Skin Prep for the wound vac and saline solution to clean the wound,please call

## 2018-01-30 ENCOUNTER — PATIENT MESSAGE (OUTPATIENT)
Dept: INTERNAL MEDICINE | Facility: CLINIC | Age: 33
End: 2018-01-30

## 2018-01-31 ENCOUNTER — PATIENT MESSAGE (OUTPATIENT)
Dept: INTERNAL MEDICINE | Facility: CLINIC | Age: 33
End: 2018-01-31

## 2018-01-31 ENCOUNTER — PATIENT MESSAGE (OUTPATIENT)
Dept: PHYSICAL MEDICINE AND REHAB | Facility: CLINIC | Age: 33
End: 2018-01-31

## 2018-01-31 ENCOUNTER — TELEPHONE (OUTPATIENT)
Dept: PHYSICAL MEDICINE AND REHAB | Facility: CLINIC | Age: 33
End: 2018-01-31

## 2018-01-31 RX ORDER — DRESSING,ODOR ABSORBENT,H-POLY 4" X 4"
BANDAGE MISCELLANEOUS
Qty: 30 EACH | Refills: 3 | Status: SHIPPED | OUTPATIENT
Start: 2018-01-31 | End: 2018-12-04 | Stop reason: SDUPTHER

## 2018-01-31 NOTE — TELEPHONE ENCOUNTER
----- Message from Jelly Pollack sent at 1/31/2018 11:55 AM CST -----  Contact: Leon from [facility]  Leon is calling to speak with nurse in regards to prior authorization for Oxycodone and can be reached at [phone].    Thank you

## 2018-02-01 ENCOUNTER — PATIENT MESSAGE (OUTPATIENT)
Dept: PHYSICAL MEDICINE AND REHAB | Facility: CLINIC | Age: 33
End: 2018-02-01

## 2018-02-01 ENCOUNTER — TELEPHONE (OUTPATIENT)
Dept: PHYSICAL MEDICINE AND REHAB | Facility: CLINIC | Age: 33
End: 2018-02-01

## 2018-02-01 DIAGNOSIS — M62.838 MUSCLE SPASM: Primary | ICD-10-CM

## 2018-02-01 DIAGNOSIS — R25.2 SPASTICITY: ICD-10-CM

## 2018-02-02 ENCOUNTER — HOSPITAL ENCOUNTER (OUTPATIENT)
Dept: PULMONOLOGY | Facility: HOSPITAL | Age: 33
Discharge: HOME | End: 2018-02-02
Attending: INTERNAL MEDICINE
Payer: MEDICAID

## 2018-02-02 VITALS
RESPIRATION RATE: 20 BRPM | SYSTOLIC BLOOD PRESSURE: 155 MMHG | BODY MASS INDEX: 27.37 KG/M2 | WEIGHT: 180 LBS | HEART RATE: 121 BPM | DIASTOLIC BLOOD PRESSURE: 92 MMHG

## 2018-02-02 DIAGNOSIS — R79.89 ELEVATED TROPONIN: ICD-10-CM

## 2018-02-02 DIAGNOSIS — R07.9 CHEST PAIN, UNSPECIFIED: Primary | ICD-10-CM

## 2018-02-02 DIAGNOSIS — R26.1 PARAPLEGIC GAIT: ICD-10-CM

## 2018-02-02 LAB
DIASTOLIC DYSFUNCTION: NO
RETIRED EF AND QEF - SEE NOTES: 60 (ref 55–65)

## 2018-02-02 PROCEDURE — 93321 DOPPLER ECHO F-UP/LMTD STD: CPT

## 2018-02-02 PROCEDURE — 63600175 PHARM REV CODE 636 W HCPCS: Performed by: INTERNAL MEDICINE

## 2018-02-02 PROCEDURE — 25000003 PHARM REV CODE 250: Performed by: INTERNAL MEDICINE

## 2018-02-02 RX ORDER — ATROPINE SULFATE 0.1 MG/ML
0.25 INJECTION INTRAVENOUS
Status: COMPLETED | OUTPATIENT
Start: 2018-02-02 | End: 2018-02-02

## 2018-02-02 RX ORDER — ATROPINE SULFATE 1 MG/ML
1 INJECTION, SOLUTION INTRAMUSCULAR; INTRAVENOUS; SUBCUTANEOUS ONCE
Status: DISCONTINUED | OUTPATIENT
Start: 2018-02-02 | End: 2018-02-02

## 2018-02-02 RX ADMIN — SODIUM CHLORIDE 10 MCG/KG/MIN: 900 INJECTION, SOLUTION INTRAVENOUS at 11:02

## 2018-02-02 RX ADMIN — ATROPINE SULFATE 0.25 MG: 0.1 INJECTION, SOLUTION ENDOTRACHEAL; INTRAMUSCULAR; INTRAVENOUS; SUBCUTANEOUS at 12:02

## 2018-02-05 ENCOUNTER — TELEPHONE (OUTPATIENT)
Dept: PHYSICAL MEDICINE AND REHAB | Facility: CLINIC | Age: 33
End: 2018-02-05

## 2018-02-05 DIAGNOSIS — M62.838 MUSCLE SPASM: ICD-10-CM

## 2018-02-05 DIAGNOSIS — R25.2 SPASTICITY: Primary | ICD-10-CM

## 2018-02-05 NOTE — TELEPHONE ENCOUNTER
----- Message from Edwar Pretty sent at 2/5/2018  9:29 AM CST -----  Contact: [place] @ [phone]  Caller is calling to get a Prior Authorization for the (oxyCODONE (OXYCONTIN) 40 mg 12 hr tablet )     [id number] - [place] - [address]  [address]  [place] [address]  Phone: [phone] Fax: [phone]

## 2018-02-08 ENCOUNTER — HOSPITAL ENCOUNTER (OUTPATIENT)
Dept: PULMONOLOGY | Facility: CLINIC | Age: 33
Discharge: HOME | End: 2018-02-08
Payer: MEDICAID

## 2018-02-08 ENCOUNTER — PATIENT MESSAGE (OUTPATIENT)
Dept: PULMONOLOGY | Facility: CLINIC | Age: 33
End: 2018-02-08

## 2018-02-08 ENCOUNTER — OFFICE VISIT (OUTPATIENT)
Dept: PULMONOLOGY | Facility: CLINIC | Age: 33
End: 2018-02-08
Payer: MEDICAID

## 2018-02-08 VITALS
DIASTOLIC BLOOD PRESSURE: 68 MMHG | HEART RATE: 61 BPM | OXYGEN SATURATION: 96 % | HEIGHT: 68 IN | BODY MASS INDEX: 27.28 KG/M2 | SYSTOLIC BLOOD PRESSURE: 108 MMHG | WEIGHT: 180 LBS

## 2018-02-08 DIAGNOSIS — J45.20 MILD INTERMITTENT ASTHMA WITHOUT COMPLICATION: ICD-10-CM

## 2018-02-08 DIAGNOSIS — J98.4 BRONCHOGENIC CYST: ICD-10-CM

## 2018-02-08 DIAGNOSIS — J31.0 CHRONIC RHINITIS, UNSPECIFIED TYPE: ICD-10-CM

## 2018-02-08 DIAGNOSIS — J45.20 MILD INTERMITTENT ASTHMA WITHOUT COMPLICATION: Primary | ICD-10-CM

## 2018-02-08 DIAGNOSIS — R05.8 UPPER AIRWAY COUGH SYNDROME: ICD-10-CM

## 2018-02-08 LAB
POST FEV1 FVC: 0.85
POST FEV1: 2.57
POST FVC: 3.03
PRE FEV1 FVC: 87
PRE FEV1: 2.41
PRE FVC: 2.77
PREDICTED FEV1 FVC: 84
PREDICTED FEV1: 3.98
PREDICTED FVC: 4.78

## 2018-02-08 PROCEDURE — 99999 PR PBB SHADOW E&M-EST. PATIENT-LVL III: CPT | Mod: PBBFAC,,, | Performed by: INTERNAL MEDICINE

## 2018-02-08 PROCEDURE — 94060 EVALUATION OF WHEEZING: CPT | Mod: PBBFAC | Performed by: INTERNAL MEDICINE

## 2018-02-08 PROCEDURE — 94729 DIFFUSING CAPACITY: CPT | Mod: 26,S$PBB,, | Performed by: INTERNAL MEDICINE

## 2018-02-08 PROCEDURE — 3008F BODY MASS INDEX DOCD: CPT | Mod: ,,, | Performed by: INTERNAL MEDICINE

## 2018-02-08 PROCEDURE — 99213 OFFICE O/P EST LOW 20 MIN: CPT | Mod: PBBFAC | Performed by: INTERNAL MEDICINE

## 2018-02-08 PROCEDURE — 94729 DIFFUSING CAPACITY: CPT | Mod: PBBFAC | Performed by: INTERNAL MEDICINE

## 2018-02-08 PROCEDURE — 94060 EVALUATION OF WHEEZING: CPT | Mod: 26,S$PBB,, | Performed by: INTERNAL MEDICINE

## 2018-02-08 PROCEDURE — 99204 OFFICE O/P NEW MOD 45 MIN: CPT | Mod: 25,S$PBB,, | Performed by: INTERNAL MEDICINE

## 2018-02-08 RX ORDER — FLUTICASONE PROPIONATE 50 MCG
2 SPRAY, SUSPENSION (ML) NASAL DAILY
Qty: 16 G | Refills: 6 | Status: SHIPPED | OUTPATIENT
Start: 2018-02-08 | End: 2018-03-10

## 2018-02-08 NOTE — PATIENT INSTRUCTIONS
Simply saline Mist irrigate sinuses daily.  flonase 2 sprays per nostril daily    Allegra in morning.

## 2018-02-08 NOTE — LETTER
February 8, 2018      Carey Patiño MD  [address] [address]           [facility] - [facility]  [address]  [facility] [address]  Phone: [phone]          Patient: Nghia Edgar   MR Number: [id number]   YOB: 1985   Date of Visit: 2/8/2018       Dear Dr. Carey Patiño:    Thank you for referring Nghia Edgar to me for evaluation. Attached you will find relevant portions of my assessment and plan of care.    If you have questions, please do not hesitate to call me. I look forward to following Nghia Edgar along with you.    Sincerely,    Selina Melendrez MD    Enclosure  CC:  No Recipients    If you would like to receive this communication electronically, please contact [email or web address] or [phone] to request more information on [employer] access.    For providers and/or their staff who would like to refer a patient to [facility], please contact us through our one-stop-shop provider referral line, [facility], at [phone].    If you feel you have received this communication in error or would no longer like to receive these types of communications, please e-mail [email or web address]
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
## 2018-02-08 NOTE — LETTER
February 8, 2018        Carey Patiño MD  [address]  [facility] [address]             [facility] - [facility]  [address]  [facility] [address]  Phone: [phone]   Patient: Nghia Edgar   MR Number: [id number]   YOB: 1985   Date of Visit: 2/8/2018       Dear Dr. Patiño:    Thank you for referring Nghia Edgar to me for evaluation. Below are the relevant portions of my assessment and plan of care.        1. Mild intermittent asthma without complication    2. Chronic rhinitis, unspecified type    3. Bronchogenic cyst    4. Upper airway cough syndrome        No further follow up recommended for bronchogenic cyst.  This is a benign finding. There is a risk of infection but this is low.  No current recommendations for intervention.  Cough with phlegm production due to PND and upper airway cough.  PFTs not c/w asthma.  Mild restriction likely from mild diaphragmatic accessory muscle weakness in the setting of spinal cord injury.  Asymptomatic and seems to have a strong cough.  -Simply saline Mist irrigate sinuses daily.  -flonase 2 sprays per nostril daily  -Allegra in morning.    No pulmonary follow up is recommended.              If you have questions, please do not hesitate to call me. I look forward to following Nghia along with you.    Sincerely,      Selina Melendrez MD           CC  Sophia Massey MD

## 2018-02-13 ENCOUNTER — PATIENT MESSAGE (OUTPATIENT)
Dept: PLASTIC SURGERY | Facility: CLINIC | Age: 33
End: 2018-02-13

## 2018-02-15 ENCOUNTER — OFFICE VISIT (OUTPATIENT)
Dept: UROLOGY | Facility: CLINIC | Age: 33
End: 2018-02-15
Payer: MEDICAID

## 2018-02-15 VITALS
HEART RATE: 65 BPM | HEIGHT: 68 IN | WEIGHT: 180 LBS | SYSTOLIC BLOOD PRESSURE: 113 MMHG | BODY MASS INDEX: 27.28 KG/M2 | DIASTOLIC BLOOD PRESSURE: 66 MMHG

## 2018-02-15 DIAGNOSIS — R33.9 URINARY RETENTION: ICD-10-CM

## 2018-02-15 DIAGNOSIS — Z43.5 ENCOUNTER FOR SUPRAPUBIC CATHETER CARE: Primary | ICD-10-CM

## 2018-02-15 DIAGNOSIS — N31.9 NEUROGENIC BLADDER: Chronic | ICD-10-CM

## 2018-02-15 DIAGNOSIS — Z93.59 SUPRAPUBIC CATHETER: Chronic | ICD-10-CM

## 2018-02-15 PROCEDURE — 51705 CHANGE OF BLADDER TUBE: CPT | Mod: PBBFAC | Performed by: NURSE PRACTITIONER

## 2018-02-15 PROCEDURE — 3008F BODY MASS INDEX DOCD: CPT | Mod: ,,, | Performed by: NURSE PRACTITIONER

## 2018-02-15 PROCEDURE — 99999 PR PBB SHADOW E&M-EST. PATIENT-LVL IV: CPT | Mod: PBBFAC,,, | Performed by: NURSE PRACTITIONER

## 2018-02-15 PROCEDURE — 99214 OFFICE O/P EST MOD 30 MIN: CPT | Mod: 25,S$PBB,, | Performed by: NURSE PRACTITIONER

## 2018-02-15 PROCEDURE — 51705 CHANGE OF BLADDER TUBE: CPT | Mod: S$PBB,,, | Performed by: NURSE PRACTITIONER

## 2018-02-15 PROCEDURE — 99214 OFFICE O/P EST MOD 30 MIN: CPT | Mod: PBBFAC,25 | Performed by: NURSE PRACTITIONER

## 2018-02-15 NOTE — PATIENT INSTRUCTIONS
What Is a Urostomy?  Urostomy is surgery that provides a new way for the body to get rid of urine (waste fluid). It is done when the bladder is diseased or damaged. During the surgery, the surgeon brings part of the urinary tract or some of the digestive tract through the abdominal wall. Urostomy can be done in any of the ways described below. In each case, a small opening (stoma) is made on the abdomen (belly). This allows urine and mucus to pass out of the body.        The urinary tract  This organ system rids the body of urine and is made up of many parts. They include 2 kidneys, 2 ureters, the bladder, and the urethra. The kidneys remove unneeded substances and extra water from the blood. This makes urine. The ureters transport urine from the kidneys to the bladder. The bladder stores urine. The urethra releases urine from the bladder to outside the body.  Common types of urostomies  These include:  · Ileal conduit. This surgery makes a passage (conduit) from part of the ileum. This is the last section of the small intestine. Urine leaves the body through this passage. One end of the conduit is sewn shut. The other end is brought through the abdominal wall to form a stoma. The ureters are detached from the bladder and connected to the conduit. Urine flows through the ureters and into the conduit. Urine then leaves the body through the stoma. This surgery does not change the way stool passes from the body. An ileal conduit is the most common type of urostomy.  · Colon conduit. This surgery is done much like an ileal conduit. But with a colon conduit the passage is made from a piece of the colon and not the ileum. The resulting stoma is bigger, as the colon is wider than the ileum.  · Ureterostomy. This surgery brings the ureters through the abdominal wall to form one or two stomas. In this case, the stoma or stomas are smaller. This is because the ureters are more slender than the ileum or the  colon.  · Continent cutaneous diversion. A pouch may be created under the skin of the abdomen using tissue from the stomach or intestines. The urine is collected in the pouch and is kept from leaving because of a valve. The patient does not wear a bag. Instead, a thin tube called a catheter is used to occasionally empty the pouch.  · Orthotopic neobladder. This is commonly known as a neobladder because, in some people, it may be possible to create a new bladder from a section of bowel. Because the new bladder is connected to the urethra, a person can urinate normally. No stoma is needed since the urine empties naturally through the urethra. If the new bladder doesnt function as a normal bladder, a catheter may have to be inserted to drain urine.  The stoma  The stoma is an opening on the abdomen through which urine and mucus can pass. It is made by bringing the end of the ileum, the colon, or one or both ureters through the abdominal wall. This end is then turned back on itself, like a cuff.  · The stoma is pink or red and moist. This is because the insides of the ileum, the colon, and the ureters are like the inside of the mouth.  · The stoma shrinks to its final size 6 to 8 weeks after surgery. Then it will be round or oval. The stoma will either be flat or it will sit ¼ to ½ inch above the skin.  · With an ileal or a colon conduit, both urine and mucus pass through the stoma. After a ureterostomy, only urine comes out of the stoma.  · Urine draining from the stoma is typically collected into an attachable pouch. Urine can be drained from the pouch at your convenience or when the pouch is full.  Date Last Reviewed: 1/1/2017  © 8300-0215 [employer]. [address] [place] [address], [place] [address]. All rights reserved. This information is not intended as a substitute for professional medical care. Always follow your healthcare professional's instructions.        Total Cystectomy with Incontinent Urinary  Diversion in Men    Total cystectomy is surgery to remove the bladder. It is most often done to treat bladder cancer. After the bladder is removed, the surgeon must make a new way to drain urine from your body. This is called urinary diversion. With incontinent urinary diversion, urine drains through a hole in your abdomen into a bag. Continent urinary diversion involves creating an internal storage for urine. It is discussed in a different information sheet.  Changes to your body  Total cystectomy and urinary diversion result in permanent changes to your body:  · During a simple cystectomy, the bladder is removed. During a radical cystectomy, nearby lymph nodes and organs that the cancer may spread to are also removed. These may include some reproductive organs, such as the prostate and seminal vesicles. Removal of these organs can lead to problems with sexual function, including the ability to get or keep an erection. It can also cause infertility. Your doctor can tell you more about this and your options.  · After the surgery, urine no longer leaves the body through your urethra. Instead, it drains through a hole made in your lower belly called a stoma. Urine then passes through the stoma and drains into a bag worn outside the body, under your clothes. Youll need to wear a bag all of the time. And youll need to empty and change the bag regularly. Youll also need to take care of your stoma and the skin around it. Youll be taught how to do this while youre in the hospital.  Preparing for surgery  Prepare for the surgery as you have been told. In addition:  · Tell your doctor about all medicinesyou take. This includes herbs and other supplements. It also includes any blood thinners, such as warfarin, clopidogren, or daily aspirin. You may need to stop taking some or all of them before surgery, as directed by your doctor.  · Do not eat or drink during the 8 hours before your surgery. This includes coffee, water,  gum, and mints. (If you have been instructed to take medicines, take them with a small sip of water.)  · If you have been told to, prepare your bowel for surgery (bowel prep). This process begins 1 to 2 days before the surgery. Your doctor may tell you to restrict your diet to clear liquids. You may also be asked to take laxatives or to give yourself an enema. Follow all instructions you are given.  The day of surgery  The surgery takes 4 to 6 hours. Afterward, you will stay in the hospital for 5 to 7 nights.  Before the surgery begins:  · An IV line is put into a vein in your arm or hand. This delivers fluids and medicine (such as antibiotics). In some cases, a central or arterial line is inserted into a vein somewhere else on the body. Your doctor can tell you more.  · You may get medicine to prevent blood clots in your veins.  · To keep you free of pain during the surgery, youre given general anesthesia. This medicine puts you into a state like deep sleep through the surgery. A tube may be inserted into your throat to help you breathe.  · You may have an epidural to help control post-surgery pain. A small tube is inserted into your back to deliver pain medicine that numbs the lower body. Talk to your doctor or anesthesiologist about this option.  During the surgery:  · An incision is made in the lower abdomen.  · The lymph nodes near the bladder are removed. These are checked for cancer cells (a sign that cancer has spread).  · During a simple cystectomy, the bladder is removed. During radical cystectomy, certain nearby organs, such as the prostate, are also removed. If possible, nerves that affect sexual function are spared.  · A piece of your large or small intestine is removed. This is used to make a conduit for the urine to flow from the ureters out of the body. If a piece of small intestine is used, this is called an ileal conduit. If a piece of large intestine is used, this is called a colon conduit.  With either type, one end is connected to the ureters. The other end is brought through an incision in the abdominal wall, to form the stoma.  · When the surgery is complete, the incisions are closed with stitches (sutures) or staples.  · A small tube (drain) may be placed near the incisions. This drains fluid that may build up after the surgery.  · Tubes called stents may be placed through the stoma into the ureters. These help drain urine until healing is complete.  Recovering in the hospital  After the surgery, you will be taken to a recovery room. Here, youll wake up from the anesthesia. You may feel sleepy and nauseated. If a breathing tube was used, your throat may be sore at first. When you are ready, you will be taken to your hospital room. While in the hospital:  · You will be given medicine to manage pain. Let your providers know if your pain is not controlled.  · Youll first receive IV fluids. In a day or so, you will start on a liquid diet. You will then slowly return to a normal diet.  · As soon as youre able, you will get up and walk.  · Youll be taught coughing and breathing techniques to help keep your lungs clear and prevent pneumonia.  · An ostomy nurse will show you how to care for your stoma and bag.  · Drains and stents will likely be removed while you are in the hospital. If not, you will be shown how to care for them at home.  · Make sure to get a contact number for the doctor, ostomy nurse, and hospital before going home. This is in case you have problems or questions after the surgery.  Recovering at home  You will be sent home with an adult family member or friend. Have someone stay with you for the next few days. Recovery time varies for each person. Your doctor will tell you when you can return to your normal routine. Until then, follow the instructions you have been given. Make sure to:  · Take all medicines as directed.  · Care for your incision and stoma as instructed.  · Follow  your doctors guidelines for showering. Avoid swimming, bathing, using a hot tub, and other activities that cause the incision to be covered with water until your doctor says its OK.  · Avoid heavy lifting and strenuous activities as directed.  · Avoid driving until your doctor says its OK. Do not drive if youre taking medicines that make you drowsy or sleepy.  · Walk a few times daily. As you feel able, slowly increase your pace and distance.  · Avoid straining to pass stool. If needed, take stool softeners as directed by your doctor.  · Drink plenty of water every day. This helps prevent urine odor and dehydration.  When to call the doctor  Call the Doctor If You Have Any of the Following:  · Chest pain or trouble breathing (call 911)  · Fever of 100.4°F (38.0°C) or higher  · Unusual pain, redness, swelling, bleeding, or drainage at the incision site  · Pain, redness, swelling, odor, or drainage at the stoma site  · Decreased or no urine output for longer than 4 hours  · Bloody urine with clots  · Pain that cannot be controlled with medicines  · Nausea or vomiting that doesnt go away  · Pain or swelling in the legs   Follow-up care  You will have follow-up visits so your doctor can check how well youre healing. Sutures, staples, or tubes will be removed. You and your doctor can also discuss any further treatment you may need. If you are having trouble adjusting to your stoma and bag, talk to your doctor or ostomy nurse. You may also want to consider a support group for people with stomas. Ask your nurse for more information.  Risks and complications  Risks and possible complications include:   · Bleeding (may require a blood transfusion)  · Infection  · Blood clots  · Pneumonia or other lung problems  · Failure to remove all of the cancer, or cancer comes back  · Problems with the stoma  · Abnormal levels of vitamins or minerals in the blood, requiring lifelong medication  · Scarring and narrowing of the  ureters  · Bowel obstruction  · Problems with sexual function or with fertility  · Risks of anesthesia (the anesthesiologist will discuss these with you)   Date Last Reviewed: 5/31/2015 © 2000-2017 [employer] [address] [place] [address], [place] [address]. All rights reserved. This information is not intended as a substitute for professional medical care. Always follow your healthcare professional's instructions.        Total Cystectomy with Incontinent Urinary Diversion in Men    Total cystectomy is surgery to remove the bladder. It is most often done to treat bladder cancer. After the bladder is removed, the surgeon must make a new way to drain urine from your body. This is called urinary diversion. With incontinent urinary diversion, urine drains through a hole in your abdomen into a bag. Continent urinary diversion involves creating an internal storage for urine. It is discussed in a different information sheet.  Changes to your body  Total cystectomy and urinary diversion result in permanent changes to your body:  · During a simple cystectomy, the bladder is removed. During a radical cystectomy, nearby lymph nodes and organs that the cancer may spread to are also removed. These may include some reproductive organs, such as the prostate and seminal vesicles. Removal of these organs can lead to problems with sexual function, including the ability to get or keep an erection. It can also cause infertility. Your doctor can tell you more about this and your options.  · After the surgery, urine no longer leaves the body through your urethra. Instead, it drains through a hole made in your lower belly called a stoma. Urine then passes through the stoma and drains into a bag worn outside the body, under your clothes. Youll need to wear a bag all of the time. And youll need to empty and change the bag regularly. Youll also need to take care of your stoma and the skin around it. Youll be taught how to do  this while youre in the hospital.  Preparing for surgery  Prepare for the surgery as you have been told. In addition:  · Tell your doctor about all medicinesyou take. This includes herbs and other supplements. It also includes any blood thinners, such as warfarin, clopidogren, or daily aspirin. You may need to stop taking some or all of them before surgery, as directed by your doctor.  · Do not eat or drink during the 8 hours before your surgery. This includes coffee, water, gum, and mints. (If you have been instructed to take medicines, take them with a small sip of water.)  · If you have been told to, prepare your bowel for surgery (bowel prep). This process begins 1 to 2 days before the surgery. Your doctor may tell you to restrict your diet to clear liquids. You may also be asked to take laxatives or to give yourself an enema. Follow all instructions you are given.  The day of surgery  The surgery takes 4 to 6 hours. Afterward, you will stay in the hospital for 5 to 7 nights.  Before the surgery begins:  · An IV line is put into a vein in your arm or hand. This delivers fluids and medicine (such as antibiotics). In some cases, a central or arterial line is inserted into a vein somewhere else on the body. Your doctor can tell you more.  · You may get medicine to prevent blood clots in your veins.  · To keep you free of pain during the surgery, youre given general anesthesia. This medicine puts you into a state like deep sleep through the surgery. A tube may be inserted into your throat to help you breathe.  · You may have an epidural to help control post-surgery pain. A small tube is inserted into your back to deliver pain medicine that numbs the lower body. Talk to your doctor or anesthesiologist about this option.  During the surgery:  · An incision is made in the lower abdomen.  · The lymph nodes near the bladder are removed. These are checked for cancer cells (a sign that cancer has spread).  · During a  simple cystectomy, the bladder is removed. During radical cystectomy, certain nearby organs, such as the prostate, are also removed. If possible, nerves that affect sexual function are spared.  · A piece of your large or small intestine is removed. This is used to make a conduit for the urine to flow from the ureters out of the body. If a piece of small intestine is used, this is called an ileal conduit. If a piece of large intestine is used, this is called a colon conduit. With either type, one end is connected to the ureters. The other end is brought through an incision in the abdominal wall, to form the stoma.  · When the surgery is complete, the incisions are closed with stitches (sutures) or staples.  · A small tube (drain) may be placed near the incisions. This drains fluid that may build up after the surgery.  · Tubes called stents may be placed through the stoma into the ureters. These help drain urine until healing is complete.  Recovering in the hospital  After the surgery, you will be taken to a recovery room. Here, youll wake up from the anesthesia. You may feel sleepy and nauseated. If a breathing tube was used, your throat may be sore at first. When you are ready, you will be taken to your hospital room. While in the hospital:  · You will be given medicine to manage pain. Let your providers know if your pain is not controlled.  · Youll first receive IV fluids. In a day or so, you will start on a liquid diet. You will then slowly return to a normal diet.  · As soon as youre able, you will get up and walk.  · Youll be taught coughing and breathing techniques to help keep your lungs clear and prevent pneumonia.  · An ostomy nurse will show you how to care for your stoma and bag.  · Drains and stents will likely be removed while you are in the hospital. If not, you will be shown how to care for them at home.  · Make sure to get a contact number for the doctor, ostomy nurse, and hospital before going  home. This is in case you have problems or questions after the surgery.  Recovering at home  You will be sent home with an adult family member or friend. Have someone stay with you for the next few days. Recovery time varies for each person. Your doctor will tell you when you can return to your normal routine. Until then, follow the instructions you have been given. Make sure to:  · Take all medicines as directed.  · Care for your incision and stoma as instructed.  · Follow your doctors guidelines for showering. Avoid swimming, bathing, using a hot tub, and other activities that cause the incision to be covered with water until your doctor says its OK.  · Avoid heavy lifting and strenuous activities as directed.  · Avoid driving until your doctor says its OK. Do not drive if youre taking medicines that make you drowsy or sleepy.  · Walk a few times daily. As you feel able, slowly increase your pace and distance.  · Avoid straining to pass stool. If needed, take stool softeners as directed by your doctor.  · Drink plenty of water every day. This helps prevent urine odor and dehydration.  When to call the doctor  Call the Doctor If You Have Any of the Following:  · Chest pain or trouble breathing (call 911)  · Fever of 100.4°F (38.0°C) or higher  · Unusual pain, redness, swelling, bleeding, or drainage at the incision site  · Pain, redness, swelling, odor, or drainage at the stoma site  · Decreased or no urine output for longer than 4 hours  · Bloody urine with clots  · Pain that cannot be controlled with medicines  · Nausea or vomiting that doesnt go away  · Pain or swelling in the legs   Follow-up care  You will have follow-up visits so your doctor can check how well youre healing. Sutures, staples, or tubes will be removed. You and your doctor can also discuss any further treatment you may need. If you are having trouble adjusting to your stoma and bag, talk to your doctor or ostomy nurse. You may also want  to consider a support group for people with stomas. Ask your nurse for more information.  Risks and complications  Risks and possible complications include:   · Bleeding (may require a blood transfusion)  · Infection  · Blood clots  · Pneumonia or other lung problems  · Failure to remove all of the cancer, or cancer comes back  · Problems with the stoma  · Abnormal levels of vitamins or minerals in the blood, requiring lifelong medication  · Scarring and narrowing of the ureters  · Bowel obstruction  · Problems with sexual function or with fertility  · Risks of anesthesia (the anesthesiologist will discuss these with you)   Date Last Reviewed: 5/31/2015  © 7912-8986 [employer]. [address] [place] [address]. All rights reserved. This information is not intended as a substitute for professional medical care. Always follow your healthcare professional's instructions.        Total Cystectomy with Continent Urinary Diversion in Men    Total cystectomy is surgery to remove the bladder. It is most often done to treat bladder cancer. After the bladder is removed, a new bladder (neobladder) or a pouch is made to collect urine. This is called a continent urinary diversion. Incontinent urinary diversion involves continual drainage of urine into an external bag.  Changes to your body  During simple cystectomy, the bladder is removed. During radical cystectomy, nearby lymph nodes and organs that the cancer may spread to are also removed. These may include some reproductive organs, such as the prostate and seminal vesicles. Removal of these organs can lead to problems with sexual function, including the ability to get or keep an erection. It may also lead to infertility. Your doctor can tell you more about this and your options.  Two types of urinary diversion  To collect urine inside your body, you may have a neobladder or a pouch. Your doctor will discuss which option is best for you.  · A neobladder  allows urine to follow the usual path out of the body. A neobladder is made from a section of your small intestine that has been removed. With a neobladder, youll no longer have nerves that signal when your bladder is full. You will need to empty the bladder on a set schedule. To do this, you use your pelvic and abdominal muscles to help push the urine out of your body.  · A pouch connects to the ureters on one end. The other connects to a small, permanent opening (stoma) made in the wall of the abdomen. Most of the time, the stoma is covered with a small bandage. To empty urine from the pouch, you pass a catheter through the stoma into the pouch. This is done on a set schedule. Unlike with some treatments, no bag is needed to collect urine.  Preparing for surgery  Prepare for the surgery as you have been told. In addition:  · Tell your doctor about all medications you take. This includes herbs and other supplements. It also includes any blood thinners, such as warfarin, clopidogrel, or daily aspirin. You may need to stop taking some or all of them before surgery, as instructed by your doctor.  · Do not eat or drink during the 8 hours before your surgery. This includes coffee, water, gum, and mints. (If you have been instructed to take medications, take them with a small sip of water.)  · If you have been told to, prepare your bowel for surgery (bowel prep). This process begins 1-2 days before the surgery. Your doctor may tell you to restrict your diet to clear liquids. You may also be asked to take laxatives or to give yourself an enema. Follow all instructions you are given.  The day of surgery  The surgery takes 4 to 6 hours. Afterward, you will stay in the hospital for 5 to 7 nights.  Before the surgery begins:  · An IV line is put into a vein in your arm or hand. This delivers fluids and medications (such as antibiotics). In some cases, a central or arterial line is inserted into a blood vessel somewhere  else on the body. Your doctor can tell you more.  · You may get medicine to prevent blood clots in your veins.  · To keep you free of pain during the surgery, youre given general anesthesia. This medication puts you into a state like deep sleep through the surgery. A tube may be inserted into your throat to help you breathe.  · You may have an epidural to help control post-surgery pain. A small tube is inserted into your back to deliver pain medication that numbs the lower body. Talk to your doctor or anesthesiologist about this option.  During the surgery:   · An incision is made in the lower abdomen.  · The lymph nodes near the bladder may be removed. These are checked for cancer cells (a sign that cancer has spread).  · During simple cystectomy, the bladder is removed. During radical cystectomy, certain nearby organs, such as the prostate and seminal vesicles, may also be removed. If possible, nerves that affect sexual function are spared.  · For a neobladder, a piece of the small intestine is removed. It is attached to the ureters on one end and to the urethra on the other end.  · For a pouch, the end of the small intestine and first part of the large intestine is removed. A stoma is made in the wall of your lower belly. The piece of intestine is then connected to the ureters on one end and to the stoma on the other.  · Thin tubes (stents) may be placed through the abdomen into the ureters to the kidneys. These help drain urine during healing.  · If you have a neobladder, a catheter may be placed into it to help drain urine. If you have a pouch, a catheter may be placed through the stoma into the pouch to keep the pathway open. Another catheter may be placed through the abdomen into the pouch to help drain mucus and urine.  · Once surgery is done, the abdominal incision is closed with sutures or staples. A tube (drain) may be placed to drain excess fluid from the surgical area.  Recovering in the  hospital  After the surgery, you will be taken to a recovery room. Here, youll wake up from the anesthesia. You may feel sleepy and nauseated. If a breathing tube was used, your throat may be sore at first. When you are ready, you will be taken to your hospital room. While in the hospital:  · You will be given medication to manage pain. Let your providers know if your pain is not controlled.  · Youll first receive IV fluids. In a day or so, you will start on a liquid diet. You will then slowly return to a normal diet.  · As soon as youre able, you will get up and walk.  · Youll be taught coughing and breathing techniques to help keep your lungs clear and prevent pneumonia.  · A nurse or doctor will show you how to care for your neobladder or pouch and stoma. Youll also learn how to care for any drains and tubes that you have. You may be taught to flush your pouch with fluid, to remove mucus.  Recovering at home  After your hospital stay, you will be released to an adult family member or friend. Have someone stay with you for the next few days, to help care for you. Recovery time varies for each person. Your doctor will tell you when you can return to your normal routine. Until then, follow the instructions you have been given. Make sure to:  · Take all medications as directed.  · Care for your incision as instructed. If you went home with drains or catheters, care for these as you were shown.  · If you have a stoma, care for it as instructed.  · Follow your doctors guidelines for showering. Avoid swimming, bathing, using a hot tub, and other activities that cause the incision to be covered with water until your doctor says its OK.  · Avoid heavy lifting and strenuous activities as directed.  · Do not drive until your doctor says its okay. Do not drive if youre taking medications that make you drowsy or sleepy.  · Walk a few times daily. As you feel able, slowly increase your pace and distance.  · Avoid  straining to pass stool. If needed, take stool softeners as advised by your doctor.  · Do pelvic floor (Kegel) exercises as instructed.  When to call the doctor  Call the doctor if you have any of the following:  · Chest pain or trouble breathing (call 911)  · Fever of 100.4°F (38.0°C) or higher  · Symptoms of infection at an incision site, such as increased redness or swelling, warmth, worsening pain, or foul-smelling drainage  · Pain, redness, swelling, odor, or drainage at the stoma site  · Little or no urine output for longer than 4 hours  · Burning or pain when passing urine or frequent need to pass urine  · Bloody urine with clots  · Pain or swelling in the legs  · Nausea or vomiting that doesnt go away  · Pain that cannot be controlled with medications  · Problems with any drains, stents, or catheters   Follow-up care  You will have follow-up visits so your doctor can check how well youre healing. Sutures, staples, or tubes will be removed. You may be taught how to drain your pouch using a catheter. If you have a neobladder, you may be taught pelvic floor exercises to strengthen the muscles around it. This helps prevent urine leakage. You and your doctor can also discuss any further treatment you may need.  Risks and complications  Risks and complications may include:  · Bleeding (may require a blood transfusion)  · Infection  · Blood clots  · Pneumonia or other lung problems  · Abnormal levels of vitamins and minerals in the blood, requiring lifelong medication  · Cancer recurrence or failure to remove all of the cancer  · Problems with the neobladder or stoma  · Scarring and narrowing of the ureters  · Incontinence (unable to control urine)  · Bowel obstruction  · Problems with sexual function or with fertility  · Risks of anesthesia (the anesthesiologist will discuss these with you)   Date Last Reviewed: 7/6/2015  © 9462-4421 [employer]. [address] [place] [address], [place] [address]. All  rights reserved. This information is not intended as a substitute for professional medical care. Always follow your healthcare professional's instructions.        Urostomy: Selecting Your Pouch  After a urostomy, urine drains into a disposable pouch. The pouch sticks to your belly (abdomen) on the skin around the stoma. An adhesive skin barrier holds the pouch in place. This barrier also protects the skin and prevents leakage. Most pouches are made of lightweight, odorproof plastic and lie flat against the body.      One-piece drainable pouch       Two-piece drainable pouch      Types of pouches  There are many styles of pouches. Your Wound Ostomy and Continence (WOC) nurse or other healthcare provider will help you select the one thats best for you. The skin barrier has to fit around the stoma without touching it. The barrier must stick well to prevent leakage or odor from the pouch.  One-piece drainable pouch  · The skin barrier and pouch come as one piece. The skin barrier holds the pouch onto the skin.  · Urine is emptied from the bottom of the pouch through a drain. The drain can be opened and tightly closed. Drain styles differ, depending on the pouch selected.  Two-piece drainable pouch  · The skin barrier and pouch are separate pieces. The skin barrier is applied to the skin. The pouch snaps onto the skin barrier.  · Urine is emptied from the bottom of the pouch through a drain. The drain can be opened and tightly closed. Drain styles differ, depending on the pouch selected.  Sizing the stoma opening  For the pouch to fit around the stoma, the opening of the skin barrier must be the right size. Some openings are precut, and others must be measured and cut. To find the correct size for your stoma, use a measuring guide. Most pouches come with a guide in the box. Your health care provider may also give you one.  · Find the smallest hole on the guide that fits around the stoma without touching it. There should  "be about 1/8" between the stoma and the hole on the guide.  · To cut the opening yourself, center the guide hole on the back of the skin barrier. Trace the pattern. Then cut the opening using curved scissors.  · For precut openings, order pouches with the sized openings that match the size of the hole on the measuring guide.  Pouch accessories  You can buy other stoma care products through special catalogs, at medical supply stores, on the Internet, and at some drugstores. The accessories include the following:  · Powders and wipes add an extra layer of skin barrier. They also help protect irritated skin.  · Stoma paste and skin barrier wafers fill in uneven places in the skin around the stoma. This helps the pouch stick better.  · Convex skin barriers help make a better seal. They are used if the skin around the stoma is uneven or the stoma is level with or sinks below the skin. Some convex barriers come with a pouch attached. Others are inserts that can be added to any pouch.  · Pouch filters and deodorants help prevent odor.  · Ostomy belts help keep the pouch in place.  · Pouch covers help keep the skin under the pouch dry and comfortable.  · A night drainage system lets you sleep all night without emptying your pouch. It also helps prevent urine from pooling around the stoma, which leads to skin irritation and infection.   Date Last Reviewed: 1/1/2017  © 5700-0782 [employer]. [address] [place] [address], [place] [address]. All rights reserved. This information is not intended as a substitute for professional medical care. Always follow your healthcare professional's instructions.        "
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
## 2018-02-20 ENCOUNTER — PATIENT MESSAGE (OUTPATIENT)
Dept: INTERNAL MEDICINE | Facility: CLINIC | Age: 33
End: 2018-02-20

## 2018-02-21 ENCOUNTER — PATIENT MESSAGE (OUTPATIENT)
Dept: UROLOGY | Facility: CLINIC | Age: 33
End: 2018-02-21

## 2018-02-22 ENCOUNTER — TELEPHONE (OUTPATIENT)
Dept: WOUND CARE | Facility: CLINIC | Age: 33
End: 2018-02-22

## 2018-02-22 NOTE — TELEPHONE ENCOUNTER
----- Message from Celeste Lubin sent at 2/22/2018  9:01 AM CST -----  Contact: self   Nghia States that he needs a call returned by a nurse in reference to his transportation  From the ambulance not being able to bring him to his appointment and needs to reschedule.                        Please call Nghia @ [phone] . Thanks :)

## 2018-02-23 ENCOUNTER — TELEPHONE (OUTPATIENT)
Dept: INTERNAL MEDICINE | Facility: CLINIC | Age: 33
End: 2018-02-23

## 2018-02-26 ENCOUNTER — PATIENT MESSAGE (OUTPATIENT)
Dept: PHYSICAL MEDICINE AND REHAB | Facility: CLINIC | Age: 33
End: 2018-02-26

## 2018-02-26 DIAGNOSIS — M79.2 NEUROPATHIC PAIN: Chronic | ICD-10-CM

## 2018-02-26 RX ORDER — OXYCODONE AND ACETAMINOPHEN 10; 325 MG/1; MG/1
TABLET ORAL
Qty: 135 TABLET | Refills: 0 | Status: SHIPPED | OUTPATIENT
Start: 2018-02-26 | End: 2018-03-26 | Stop reason: SDUPTHER

## 2018-02-28 ENCOUNTER — PATIENT MESSAGE (OUTPATIENT)
Dept: WOUND CARE | Facility: CLINIC | Age: 33
End: 2018-02-28

## 2018-03-07 ENCOUNTER — PATIENT MESSAGE (OUTPATIENT)
Dept: PHYSICAL MEDICINE AND REHAB | Facility: CLINIC | Age: 33
End: 2018-03-07

## 2018-03-07 DIAGNOSIS — M79.2 NEUROPATHIC PAIN: Chronic | ICD-10-CM

## 2018-03-07 RX ORDER — OXYCODONE HCL 40 MG/1
40 TABLET, FILM COATED, EXTENDED RELEASE ORAL EVERY 12 HOURS
Qty: 60 TABLET | Refills: 0 | Status: SHIPPED | OUTPATIENT
Start: 2018-03-07 | End: 2018-04-05 | Stop reason: SDUPTHER

## 2018-03-08 ENCOUNTER — PATIENT MESSAGE (OUTPATIENT)
Dept: UROLOGY | Facility: CLINIC | Age: 33
End: 2018-03-08

## 2018-03-08 ENCOUNTER — PATIENT MESSAGE (OUTPATIENT)
Dept: WOUND CARE | Facility: CLINIC | Age: 33
End: 2018-03-08

## 2018-03-08 ENCOUNTER — OFFICE VISIT (OUTPATIENT)
Dept: UROLOGY | Facility: CLINIC | Age: 33
End: 2018-03-08
Payer: MEDICAID

## 2018-03-08 ENCOUNTER — LAB VISIT (OUTPATIENT)
Dept: LAB | Facility: HOSPITAL | Age: 33
End: 2018-03-08
Payer: MEDICAID

## 2018-03-08 VITALS
HEIGHT: 68 IN | WEIGHT: 180 LBS | BODY MASS INDEX: 27.28 KG/M2 | HEART RATE: 66 BPM | DIASTOLIC BLOOD PRESSURE: 58 MMHG | SYSTOLIC BLOOD PRESSURE: 99 MMHG

## 2018-03-08 DIAGNOSIS — G82.20 PARAPLEGIA FOLLOWING SPINAL CORD INJURY: ICD-10-CM

## 2018-03-08 DIAGNOSIS — Z43.5 ENCOUNTER FOR CARE OR REPLACEMENT OF SUPRAPUBIC TUBE: ICD-10-CM

## 2018-03-08 DIAGNOSIS — N31.9 NEUROGENIC BLADDER: ICD-10-CM

## 2018-03-08 DIAGNOSIS — R82.71 BACTERIA IN URINE: ICD-10-CM

## 2018-03-08 DIAGNOSIS — Z97.8 CHRONIC INDWELLING FOLEY CATHETER: ICD-10-CM

## 2018-03-08 DIAGNOSIS — N31.9 NEUROGENIC BLADDER: Primary | ICD-10-CM

## 2018-03-08 LAB
ANION GAP SERPL CALC-SCNC: 7 MMOL/L
BASOPHILS # BLD AUTO: 0.04 K/UL
BASOPHILS NFR BLD: 0.5 %
BUN SERPL-MCNC: 8 MG/DL
CALCIUM SERPL-MCNC: 8.8 MG/DL
CHLORIDE SERPL-SCNC: 105 MMOL/L
CO2 SERPL-SCNC: 27 MMOL/L
CREAT SERPL-MCNC: 0.6 MG/DL
DIFFERENTIAL METHOD: ABNORMAL
EOSINOPHIL # BLD AUTO: 0.1 K/UL
EOSINOPHIL NFR BLD: 1.7 %
ERYTHROCYTE [DISTWIDTH] IN BLOOD BY AUTOMATED COUNT: 15.1 %
EST. GFR  (AFRICAN AMERICAN): >60 ML/MIN/1.73 M^2
EST. GFR  (NON AFRICAN AMERICAN): >60 ML/MIN/1.73 M^2
GLUCOSE SERPL-MCNC: 84 MG/DL
HCT VFR BLD AUTO: 37.4 %
HGB BLD-MCNC: 11.3 G/DL
IMM GRANULOCYTES # BLD AUTO: 0.03 K/UL
IMM GRANULOCYTES NFR BLD AUTO: 0.4 %
LYMPHOCYTES # BLD AUTO: 1.9 K/UL
LYMPHOCYTES NFR BLD: 23.5 %
MCH RBC QN AUTO: 26.5 PG
MCHC RBC AUTO-ENTMCNC: 30.2 G/DL
MCV RBC AUTO: 88 FL
MONOCYTES # BLD AUTO: 0.8 K/UL
MONOCYTES NFR BLD: 10.1 %
NEUTROPHILS # BLD AUTO: 5.3 K/UL
NEUTROPHILS NFR BLD: 63.8 %
NRBC BLD-RTO: 0 /100 WBC
PLATELET # BLD AUTO: 268 K/UL
PMV BLD AUTO: 10.4 FL
POTASSIUM SERPL-SCNC: 4.4 MMOL/L
RBC # BLD AUTO: 4.26 M/UL
SODIUM SERPL-SCNC: 139 MMOL/L
WBC # BLD AUTO: 8.23 K/UL

## 2018-03-08 PROCEDURE — 99214 OFFICE O/P EST MOD 30 MIN: CPT | Mod: S$PBB,25,, | Performed by: NURSE PRACTITIONER

## 2018-03-08 PROCEDURE — 51705 CHANGE OF BLADDER TUBE: CPT | Mod: S$PBB,,, | Performed by: NURSE PRACTITIONER

## 2018-03-08 PROCEDURE — 80048 BASIC METABOLIC PNL TOTAL CA: CPT

## 2018-03-08 PROCEDURE — 36415 COLL VENOUS BLD VENIPUNCTURE: CPT

## 2018-03-08 PROCEDURE — 85025 COMPLETE CBC W/AUTO DIFF WBC: CPT

## 2018-03-08 PROCEDURE — 99999 PR PBB SHADOW E&M-EST. PATIENT-LVL IV: CPT | Mod: PBBFAC,,, | Performed by: NURSE PRACTITIONER

## 2018-03-08 PROCEDURE — 99214 OFFICE O/P EST MOD 30 MIN: CPT | Mod: PBBFAC,25 | Performed by: NURSE PRACTITIONER

## 2018-03-08 PROCEDURE — 51705 CHANGE OF BLADDER TUBE: CPT | Mod: PBBFAC | Performed by: NURSE PRACTITIONER
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
## 2018-03-12 ENCOUNTER — PATIENT MESSAGE (OUTPATIENT)
Dept: UROLOGY | Facility: CLINIC | Age: 33
End: 2018-03-12

## 2018-03-13 ENCOUNTER — TELEPHONE (OUTPATIENT)
Dept: UROLOGY | Facility: CLINIC | Age: 33
End: 2018-03-13

## 2018-03-13 NOTE — TELEPHONE ENCOUNTER
"Called patient in response to message sent by patient, stated he has been having a fever. I asked what was his temperature, he replied 102.3. I informed patient if his temp is over 102 he should go to the ER. Patient also receives home health he was encouraged  To notify home health nurse with any problems. Patient was very rude and abrupt on the phone, he want urine cultures to be done with each SPT change, I explained to him we collected a sterile sample and dip it in the office, that last two visits his urine dipped clear. The patient said very rudely "I wasnt ya'll to do your job and do what I ask for. Patient also have a wound with a wound vac, I explained his fever may be coming from his wound. He raised his voice and hung up the phone.  "

## 2018-03-14 ENCOUNTER — OFFICE VISIT (OUTPATIENT)
Dept: PLASTIC SURGERY | Facility: CLINIC | Age: 33
End: 2018-03-14
Payer: MEDICAID

## 2018-03-14 ENCOUNTER — PATIENT MESSAGE (OUTPATIENT)
Dept: PHYSICAL MEDICINE AND REHAB | Facility: CLINIC | Age: 33
End: 2018-03-14

## 2018-03-14 ENCOUNTER — PATIENT MESSAGE (OUTPATIENT)
Dept: UROLOGY | Facility: CLINIC | Age: 33
End: 2018-03-14

## 2018-03-14 VITALS
DIASTOLIC BLOOD PRESSURE: 91 MMHG | HEART RATE: 87 BPM | WEIGHT: 180 LBS | BODY MASS INDEX: 27.28 KG/M2 | SYSTOLIC BLOOD PRESSURE: 138 MMHG | HEIGHT: 68 IN | TEMPERATURE: 100 F

## 2018-03-14 DIAGNOSIS — K59.00 CONSTIPATION, UNSPECIFIED CONSTIPATION TYPE: Primary | ICD-10-CM

## 2018-03-14 DIAGNOSIS — G82.20 PARAPLEGIA FOLLOWING SPINAL CORD INJURY: Primary | ICD-10-CM

## 2018-03-14 PROCEDURE — 99212 OFFICE O/P EST SF 10 MIN: CPT | Mod: S$PBB,,, | Performed by: SURGERY

## 2018-03-14 PROCEDURE — 99213 OFFICE O/P EST LOW 20 MIN: CPT | Mod: PBBFAC,PO | Performed by: SURGERY

## 2018-03-14 PROCEDURE — 99999 PR PBB SHADOW E&M-EST. PATIENT-LVL III: CPT | Mod: PBBFAC,,, | Performed by: SURGERY

## 2018-03-14 RX ORDER — POLYETHYLENE GLYCOL 3350 17 G/17G
17 POWDER, FOR SOLUTION ORAL 2 TIMES DAILY PRN
Qty: 60 PACKET | Refills: 11 | Status: SHIPPED | OUTPATIENT
Start: 2018-03-14 | End: 2019-06-24

## 2018-03-14 RX ORDER — POTASSIUM CITRATE 10 MEQ/1
TABLET, EXTENDED RELEASE ORAL
Status: CANCELLED | OUTPATIENT
Start: 2018-03-14

## 2018-03-14 NOTE — PROGRESS NOTES
Nghia Edgar presented today.  He was upset.  He has had multiple recurrent   ischial pressure sores.  He is a huge risk surgically for failure.  I discussed   with him as I have the past three months that I would do his surgery when they   open up the new [facility].  They will actually purchase the   [place] bed, which he will need.  It is very difficult to find placement for   him after surgery and get the appropriate bed that I need.  He is frustrated by   this, but I told him that I would be more than happy to refer him down to   [facility] if he would like to try to get it done earlier, but I   think he has to fully understand that his chance of success is maybe 10% at   best.      DREW/JOSÉ  dd: 03/14/2018 14:26:24 (CDT)  td: 03/15/2018 07:45:32 (CDT)  Doc ID   #[id number]  Job ID #[id number]    CC:

## 2018-03-15 ENCOUNTER — PATIENT MESSAGE (OUTPATIENT)
Dept: UROLOGY | Facility: CLINIC | Age: 33
End: 2018-03-15

## 2018-03-15 ENCOUNTER — TELEPHONE (OUTPATIENT)
Dept: UROLOGY | Facility: CLINIC | Age: 33
End: 2018-03-15

## 2018-03-15 NOTE — TELEPHONE ENCOUNTER
Discussed that I will send in refill for K citrate. He states he went to the ED at [facility] yesterday for fever and treated with IV antibiotics, IVF, and d/c'ed home on po antibiotics. He believes a UC was ordered. Afebrile now. He wanted to be able to irrigate SPT. Explained I could educated him at the next visit. Verbalized understanding.

## 2018-03-19 ENCOUNTER — HOSPITAL ENCOUNTER (EMERGENCY)
Facility: HOSPITAL | Age: 33
Discharge: HOME | End: 2018-03-19
Attending: SURGERY
Payer: MEDICAID

## 2018-03-19 VITALS
DIASTOLIC BLOOD PRESSURE: 73 MMHG | RESPIRATION RATE: 16 BRPM | HEART RATE: 66 BPM | SYSTOLIC BLOOD PRESSURE: 122 MMHG | TEMPERATURE: 97 F | BODY MASS INDEX: 27.37 KG/M2 | WEIGHT: 180 LBS

## 2018-03-19 DIAGNOSIS — R53.83 FATIGUE, UNSPECIFIED TYPE: Primary | ICD-10-CM

## 2018-03-19 DIAGNOSIS — R50.9 FEVER: ICD-10-CM

## 2018-03-19 LAB
ALBUMIN SERPL BCP-MCNC: 3 G/DL
ALP SERPL-CCNC: 109 U/L
ALT SERPL W/O P-5'-P-CCNC: 39 U/L
ANION GAP SERPL CALC-SCNC: 8 MMOL/L
APTT BLDCRRT: 31.3 SEC
AST SERPL-CCNC: 22 U/L
BASOPHILS # BLD AUTO: 0.01 K/UL
BASOPHILS NFR BLD: 0.1 %
BILIRUB SERPL-MCNC: 0.3 MG/DL
BILIRUB UR QL STRIP: NEGATIVE
BUN SERPL-MCNC: 8 MG/DL
CALCIUM SERPL-MCNC: 8.6 MG/DL
CHLORIDE SERPL-SCNC: 103 MMOL/L
CK MB SERPL-MCNC: 1.1 NG/ML
CK MB SERPL-RTO: 0.6 %
CK SERPL-CCNC: 182 U/L
CK SERPL-CCNC: 182 U/L
CLARITY UR: CLEAR
CO2 SERPL-SCNC: 26 MMOL/L
COLOR UR: YELLOW
CREAT SERPL-MCNC: 0.5 MG/DL
DIFFERENTIAL METHOD: ABNORMAL
EOSINOPHIL # BLD AUTO: 0.2 K/UL
EOSINOPHIL NFR BLD: 2.1 %
ERYTHROCYTE [DISTWIDTH] IN BLOOD BY AUTOMATED COUNT: 15.4 %
EST. GFR  (AFRICAN AMERICAN): >60 ML/MIN/1.73 M^2
EST. GFR  (NON AFRICAN AMERICAN): >60 ML/MIN/1.73 M^2
FLUAV AG SPEC QL IA: NEGATIVE
FLUBV AG SPEC QL IA: NEGATIVE
GLUCOSE SERPL-MCNC: 106 MG/DL
GLUCOSE UR QL STRIP: NEGATIVE
HCT VFR BLD AUTO: 33.8 %
HGB BLD-MCNC: 10.5 G/DL
HGB UR QL STRIP: NEGATIVE
INR PPP: 1.1
KETONES UR QL STRIP: NEGATIVE
LACTATE SERPL-SCNC: 0.7 MMOL/L
LEUKOCYTE ESTERASE UR QL STRIP: NEGATIVE
LYMPHOCYTES # BLD AUTO: 1.6 K/UL
LYMPHOCYTES NFR BLD: 19.9 %
MAGNESIUM SERPL-MCNC: 2 MG/DL
MCH RBC QN AUTO: 27.7 PG
MCHC RBC AUTO-ENTMCNC: 31.1 G/DL
MCV RBC AUTO: 89 FL
MONOCYTES # BLD AUTO: 0.7 K/UL
MONOCYTES NFR BLD: 9 %
NEUTROPHILS # BLD AUTO: 5.7 K/UL
NEUTROPHILS NFR BLD: 68.9 %
NITRITE UR QL STRIP: NEGATIVE
PH UR STRIP: 7 [PH] (ref 5–8)
PHOSPHATE SERPL-MCNC: 3.2 MG/DL
PLATELET # BLD AUTO: 279 K/UL
PMV BLD AUTO: 10.2 FL
POTASSIUM SERPL-SCNC: 3.9 MMOL/L
PROT SERPL-MCNC: 7 G/DL
PROT UR QL STRIP: NEGATIVE
PROTHROMBIN TIME: 11 SEC
RBC # BLD AUTO: 3.79 M/UL
SODIUM SERPL-SCNC: 137 MMOL/L
SP GR UR STRIP: <=1.005 (ref 1–1.03)
SPECIMEN SOURCE: NORMAL
TROPONIN I SERPL DL<=0.01 NG/ML-MCNC: 0.03 NG/ML
URN SPEC COLLECT METH UR: ABNORMAL
UROBILINOGEN UR STRIP-ACNC: NEGATIVE EU/DL
WBC # BLD AUTO: 8.25 K/UL

## 2018-03-19 PROCEDURE — 82553 CREATINE MB FRACTION: CPT

## 2018-03-19 PROCEDURE — 87040 BLOOD CULTURE FOR BACTERIA: CPT | Mod: 59

## 2018-03-19 PROCEDURE — 83605 ASSAY OF LACTIC ACID: CPT

## 2018-03-19 PROCEDURE — 83735 ASSAY OF MAGNESIUM: CPT

## 2018-03-19 PROCEDURE — 85025 COMPLETE CBC W/AUTO DIFF WBC: CPT

## 2018-03-19 PROCEDURE — 87086 URINE CULTURE/COLONY COUNT: CPT

## 2018-03-19 PROCEDURE — 84484 ASSAY OF TROPONIN QUANT: CPT

## 2018-03-19 PROCEDURE — 87400 INFLUENZA A/B EACH AG IA: CPT | Mod: 59

## 2018-03-19 PROCEDURE — 96361 HYDRATE IV INFUSION ADD-ON: CPT

## 2018-03-19 PROCEDURE — 82550 ASSAY OF CK (CPK): CPT

## 2018-03-19 PROCEDURE — 81003 URINALYSIS AUTO W/O SCOPE: CPT

## 2018-03-19 PROCEDURE — 80053 COMPREHEN METABOLIC PANEL: CPT

## 2018-03-19 PROCEDURE — 93010 ELECTROCARDIOGRAM REPORT: CPT | Mod: ,,, | Performed by: INTERNAL MEDICINE

## 2018-03-19 PROCEDURE — 93005 ELECTROCARDIOGRAM TRACING: CPT

## 2018-03-19 PROCEDURE — 36415 COLL VENOUS BLD VENIPUNCTURE: CPT

## 2018-03-19 PROCEDURE — 85610 PROTHROMBIN TIME: CPT

## 2018-03-19 PROCEDURE — 25000003 PHARM REV CODE 250: Performed by: SURGERY

## 2018-03-19 PROCEDURE — 85730 THROMBOPLASTIN TIME PARTIAL: CPT

## 2018-03-19 PROCEDURE — 96360 HYDRATION IV INFUSION INIT: CPT

## 2018-03-19 PROCEDURE — 99285 EMERGENCY DEPT VISIT HI MDM: CPT | Mod: 25

## 2018-03-19 PROCEDURE — 84100 ASSAY OF PHOSPHORUS: CPT

## 2018-03-19 RX ORDER — SODIUM CHLORIDE 9 MG/ML
1000 INJECTION, SOLUTION INTRAVENOUS
Status: COMPLETED | OUTPATIENT
Start: 2018-03-19 | End: 2018-03-19

## 2018-03-19 RX ORDER — HYDROCODONE BITARTRATE AND ACETAMINOPHEN 10; 325 MG/1; MG/1
1 TABLET ORAL
Status: COMPLETED | OUTPATIENT
Start: 2018-03-19 | End: 2018-03-19

## 2018-03-19 RX ORDER — CEFUROXIME AXETIL 500 MG/1
500 TABLET ORAL
COMMUNITY
End: 2018-03-28

## 2018-03-19 RX ADMIN — HYDROCODONE BITARTRATE AND ACETAMINOPHEN 1 TABLET: 10; 325 TABLET ORAL at 02:03

## 2018-03-19 RX ADMIN — SODIUM CHLORIDE 1000 ML: 0.9 INJECTION, SOLUTION INTRAVENOUS at 01:03

## 2018-03-19 RX ADMIN — SODIUM CHLORIDE 1000 ML: 0.9 INJECTION, SOLUTION INTRAVENOUS at 02:03

## 2018-03-19 NOTE — ED TRIAGE NOTES
Dx with UTI 1 week ago at [facility]  Still remains with fever  States was 105 earlier today   The lowest he could get fever was 101.

## 2018-03-19 NOTE — ED NOTES
[facility] is here for transfer    Discharged to home/self care.    - Condition at discharge: Good  - Mode of Discharge: stretcher  - The patient left the ED accompanied by EMS.  - The discharge instructions were discussed with the patient and family member.  - They stated an understanding of the discharge instructions.  -follow-up instructions given to patient, verbalized understanding.

## 2018-03-19 NOTE — ED NOTES
Received verbal report from Claudette, RN.  Pt in ED room 1B lying in stretcher, HOB 30 degrees. Stretcher is in low, locked position, side rails up x2. Call bell within reach of pt, pt instructed on use, pt verbalizes understanding of call bell use. The patient is awake, alert and cooperative with a calm affect, patient is aware of environment. Airway is open and patent, respirations are spontaneous, normal respiratory effort and rate noted, skin warm and dry, appearance: NAD noted, resting comfortably. Agree with previous assessment.

## 2018-03-19 NOTE — ED PROVIDER NOTES
[facility]                                                 Chief Complaint  32 y.o. male with Fever (dx with UTI 1 week ago)    History of Present Illness  Nghia Edgar presents to the emergency room with fever today  Patient states that he went to the emergency room at [place] last week  Patient states he was diagnosed with a UTI, was prescribed antibiotics then  Patient then states he's had fever this week, no fever in the ER this morning  Patient has chronic UTIs due to the suprapubic catheter, last one in 2017  Pt has no fever, no signs of meningitis, no complicating factors on exam    The history is provided by the patient     Past Medical History   -- Abnormal EKG     -- Anemia     -- Anxiety     -- Arthritis     -- Asthma     -- Blood transfusion     -- Cervical spinal cord injury     -- Depression     -- Edema     -- Hypertension     -- Neurogenic bladder     -- Osteomyelitis     -- Paraplegia following spinal cord injury     -- Seizures     -- Suicide attempt     -- Urinary tract infection        Past Surgical History   -- ABDOMINAL SURGERY       -- BACK SURGERY       -- BACLOFEN PUMP IMPLANTATION       -- CERVICAL FUSION       -- COLOSTOMY       -- MUSCLE FLAP       -- sacral flaps       -- SPINE SURGERY       -- SUPRAPUBIC TUBE PLACEMENT          ALLERGIES: Zanaflex    Review of Systems and Physical Exam      Review of Systems  -- Constitution - fever, denies fatigue, no weakness, no chills  -- Eyes - no tearing or redness, no visual disturbance  -- Ear, Nose - no tinnitus or earache, no nasal congestion or discharge  -- Mouth,Throat - no sore throat, no toothache, normal voice, normal swallowing  -- Respiratory - denies cough and congestion, no shortness of breath, no PETERSEN  -- Cardiovascular - denies chest pain, no palpitations, denies claudication  -- Gastrointestinal - denies abdominal pain, nausea, vomiting, or diarrhea  -- Genitourinary - no dysuria, no denies flank  pain, no hematuria or frequency   -- Musculoskeletal - denies back pain, negative for myalgias and arthralgias   -- Neurological - no headache, denies weakness or seizure; no LOC  -- Skin - denies pallor, rash, or changes in skin. no hives or welts noted    BP (!) 108/56  Pulse 67   Temp 96.9 °F (36.1 °C) (Oral)   Resp 20     Physical Exam  -- Head: Atraumatic. Normocephalic. No obvious abnormality  -- Eyes: Pupils are equal and reactive to light. Normal conjunctiva and lids  -- Nose: Nose normal in appearance, nares grossly normal. No discharge  -- Throat: Mucous membranes moist, pharynx normal, normal tonsils. No lesions   -- Ears: External ears and TM normal bilaterally. Normal hearing and no drainage  -- Neck: Normal range of motion. Neck supple. No masses, trachea midline  -- Cardiac: Normal rate, regular rhythm and normal heart sounds  -- Pulmonary: Normal respiratory effort, breath sounds clear to auscultation  -- Abdominal: Soft, no tenderness. Normal bowel sounds. Normal liver edge  -- Musculoskeletal: Normal range of motion, no effusions. Joints stable   -- Neurological: Showed good interaction with staff and no new deficits  -- Skin: Wound VAC to the sacral area    Emergency Room Course      Labs     K 3.9      CO2 26   BUN 8   CREATININE 0.5      ALKPHOS 109   AST 22   ALT 39   BILITOT 0.3   ALBUMIN 3.0 (L)   PROT 7.0   WBC 8.25   HGB 10.5 (L)   HCT 33.8 (L)            CPKMB 1.1   TROPONINI 0.027 (H)   INR 1.1   BNP <10   DDIMER 0.37   LACTATE 0.7   MG 2.0     Urinalysis  -- Urinalysis performed during this ER visit showed no signs of infection     EKG  -- The EKG findings today were without concerning findings from baseline    Radiology  -- Chest x-ray showed no infiltrate and showed no acute pathology    Medications Given  -- 0.9%  NaCl infusion (0 mLs Intravenous Stopped 3/19/18 6021)   -- 0.9%  NaCl infusion (1,000 mLs Intravenous New Bag 3/19/18 6118)   --  hydrocodone-acetaminophen 10-325mg per tablet 1 tablet (1 tablet Oral Given 3/19/18 8537)     Medical Decision Making  -- Diagnosis management comments: 32 y.o. male with reported fever at home  -- No fever with a normal white count, normal lactic acid and negative urinalysis  -- Patient is asymptomatic on arrival, good vital signs on ER evaluation today  -- Made and appointment in 48 hours with internal medicine for further assessment    Diagnosis  -- Fatigue    Disposition and Plan  -- Disposition: home  -- Condition: stable  -- Follow-up: Follow up with internal medicine in 48 hours on Wednesday, March 21  -- I advised the patient that we have found no life threatening condition today  -- At this time, I believe the patient is clinically stable for discharge.   -- The patient acknowledges that close follow up with a MD is required   -- Patient agrees to comply with all instruction and direction given in the ER    This note is dictated on Dragon Natural Speaking word recognition program.  There are word recognition mistakes that are occasionally missed on review.             Eliud Payan MD  03/19/18 2897

## 2018-03-19 NOTE — ED NOTES
The patient is awake, alert and cooperative with a calm affect, patient is aware of environment. Airway is open and patent, respirations are spontaneous, normal respiratory effort and rate noted, skin warm and dry, appearance: no apparent distress noted, resting comfortably.  VSS, no change from previous assessment.  Bed in low, locked position, SR x2, HOB 30 degrees. Call bell within reach of pt.  Pt verbalizes understanding of use.  Will continue to monitor.

## 2018-03-20 ENCOUNTER — TELEPHONE (OUTPATIENT)
Dept: INTERNAL MEDICINE | Facility: CLINIC | Age: 33
End: 2018-03-20

## 2018-03-20 ENCOUNTER — PATIENT MESSAGE (OUTPATIENT)
Dept: INTERNAL MEDICINE | Facility: CLINIC | Age: 33
End: 2018-03-20

## 2018-03-20 LAB — BACTERIA UR CULT: NO GROWTH

## 2018-03-20 NOTE — TELEPHONE ENCOUNTER
From ED note from yesterday pt was seen ER in [facility] for UTI. Pt then went to ED on yesterday.      FYI

## 2018-03-20 NOTE — TELEPHONE ENCOUNTER
----- Message from Frannie Rahman sent at 3/14/2018 10:50 AM CDT -----  Contact: self/[phone]  Pt called in regards to getting a Rx due to having a uti, low grade fever and  order for colostomy bag and Rx refill. He would like someone to call him ASAP.    [facility] pharmacy  Please advise

## 2018-03-21 ENCOUNTER — PATIENT MESSAGE (OUTPATIENT)
Dept: UROLOGY | Facility: CLINIC | Age: 33
End: 2018-03-21

## 2018-03-22 ENCOUNTER — PATIENT MESSAGE (OUTPATIENT)
Dept: WOUND CARE | Facility: CLINIC | Age: 33
End: 2018-03-22

## 2018-03-24 LAB
BACTERIA BLD CULT: NORMAL
BACTERIA BLD CULT: NORMAL

## 2018-03-25 ENCOUNTER — PATIENT MESSAGE (OUTPATIENT)
Dept: INTERNAL MEDICINE | Facility: CLINIC | Age: 33
End: 2018-03-25

## 2018-03-25 ENCOUNTER — HOSPITAL ENCOUNTER (EMERGENCY)
Facility: HOSPITAL | Age: 33
Discharge: HOME | End: 2018-03-26
Attending: EMERGENCY MEDICINE
Payer: MEDICAID

## 2018-03-25 DIAGNOSIS — M79.604 ACUTE LEG PAIN, RIGHT: ICD-10-CM

## 2018-03-25 DIAGNOSIS — N39.0 URINARY TRACT INFECTION ASSOCIATED WITH CYSTOSTOMY CATHETER, INITIAL ENCOUNTER: Primary | ICD-10-CM

## 2018-03-25 DIAGNOSIS — R50.9 FEVER: ICD-10-CM

## 2018-03-25 DIAGNOSIS — T83.510A URINARY TRACT INFECTION ASSOCIATED WITH CYSTOSTOMY CATHETER, INITIAL ENCOUNTER: Primary | ICD-10-CM

## 2018-03-25 LAB
ALBUMIN SERPL BCP-MCNC: 2.9 G/DL
ALP SERPL-CCNC: 100 U/L
ALT SERPL W/O P-5'-P-CCNC: 36 U/L
ANION GAP SERPL CALC-SCNC: 8 MMOL/L
AST SERPL-CCNC: 28 U/L
BACTERIA #/AREA URNS AUTO: ABNORMAL /HPF
BASOPHILS # BLD AUTO: 0.05 K/UL
BASOPHILS NFR BLD: 0.4 %
BILIRUB SERPL-MCNC: 0.4 MG/DL
BILIRUB UR QL STRIP: NEGATIVE
BUN SERPL-MCNC: 8 MG/DL
CALCIUM SERPL-MCNC: 8.5 MG/DL
CHLORIDE SERPL-SCNC: 104 MMOL/L
CLARITY UR REFRACT.AUTO: CLEAR
CO2 SERPL-SCNC: 24 MMOL/L
COLOR UR AUTO: YELLOW
CREAT SERPL-MCNC: 0.6 MG/DL
DIFFERENTIAL METHOD: ABNORMAL
EOSINOPHIL # BLD AUTO: 0.1 K/UL
EOSINOPHIL NFR BLD: 0.4 %
ERYTHROCYTE [DISTWIDTH] IN BLOOD BY AUTOMATED COUNT: 15.3 %
ERYTHROCYTE [SEDIMENTATION RATE] IN BLOOD BY WESTERGREN METHOD: 45 MM/HR
EST. GFR  (AFRICAN AMERICAN): >60 ML/MIN/1.73 M^2
EST. GFR  (NON AFRICAN AMERICAN): >60 ML/MIN/1.73 M^2
GLUCOSE SERPL-MCNC: 93 MG/DL
GLUCOSE UR QL STRIP: NEGATIVE
HCT VFR BLD AUTO: 37.5 %
HGB BLD-MCNC: 11.5 G/DL
HGB UR QL STRIP: NEGATIVE
IMM GRANULOCYTES # BLD AUTO: 0.07 K/UL
IMM GRANULOCYTES NFR BLD AUTO: 0.5 %
KETONES UR QL STRIP: ABNORMAL
LACTATE SERPL-SCNC: 1.1 MMOL/L
LEUKOCYTE ESTERASE UR QL STRIP: ABNORMAL
LIPASE SERPL-CCNC: 13 U/L
LYMPHOCYTES # BLD AUTO: 1.8 K/UL
LYMPHOCYTES NFR BLD: 13.4 %
MCH RBC QN AUTO: 27.6 PG
MCHC RBC AUTO-ENTMCNC: 30.7 G/DL
MCV RBC AUTO: 90 FL
MICROSCOPIC COMMENT: ABNORMAL
MONOCYTES # BLD AUTO: 0.8 K/UL
MONOCYTES NFR BLD: 6 %
NEUTROPHILS # BLD AUTO: 10.5 K/UL
NEUTROPHILS NFR BLD: 79.3 %
NITRITE UR QL STRIP: NEGATIVE
NRBC BLD-RTO: 0 /100 WBC
PH UR STRIP: 6 [PH] (ref 5–8)
PLATELET # BLD AUTO: 369 K/UL
PMV BLD AUTO: 11.8 FL
POTASSIUM SERPL-SCNC: 4.3 MMOL/L
PROT SERPL-MCNC: 7.1 G/DL
PROT UR QL STRIP: NEGATIVE
RBC # BLD AUTO: 4.17 M/UL
RBC #/AREA URNS AUTO: 0 /HPF (ref 0–4)
SODIUM SERPL-SCNC: 136 MMOL/L
SP GR UR STRIP: 1 (ref 1–1.03)
TSH SERPL DL<=0.005 MIU/L-ACNC: 0.47 UIU/ML
URN SPEC COLLECT METH UR: ABNORMAL
UROBILINOGEN UR STRIP-ACNC: NEGATIVE EU/DL
WBC # BLD AUTO: 13.27 K/UL
WBC #/AREA URNS AUTO: 11 /HPF (ref 0–5)

## 2018-03-25 PROCEDURE — 85025 COMPLETE CBC W/AUTO DIFF WBC: CPT

## 2018-03-25 PROCEDURE — 87086 URINE CULTURE/COLONY COUNT: CPT

## 2018-03-25 PROCEDURE — 93005 ELECTROCARDIOGRAM TRACING: CPT

## 2018-03-25 PROCEDURE — 85651 RBC SED RATE NONAUTOMATED: CPT

## 2018-03-25 PROCEDURE — 81001 URINALYSIS AUTO W/SCOPE: CPT

## 2018-03-25 PROCEDURE — 63600175 PHARM REV CODE 636 W HCPCS: Performed by: EMERGENCY MEDICINE

## 2018-03-25 PROCEDURE — 87088 URINE BACTERIA CULTURE: CPT

## 2018-03-25 PROCEDURE — 93010 ELECTROCARDIOGRAM REPORT: CPT | Mod: ,,, | Performed by: INTERNAL MEDICINE

## 2018-03-25 PROCEDURE — 87040 BLOOD CULTURE FOR BACTERIA: CPT | Mod: 59

## 2018-03-25 PROCEDURE — 87186 SC STD MICRODIL/AGAR DIL: CPT

## 2018-03-25 PROCEDURE — 83605 ASSAY OF LACTIC ACID: CPT

## 2018-03-25 PROCEDURE — 99284 EMERGENCY DEPT VISIT MOD MDM: CPT | Mod: 25

## 2018-03-25 PROCEDURE — 83690 ASSAY OF LIPASE: CPT

## 2018-03-25 PROCEDURE — 80053 COMPREHEN METABOLIC PANEL: CPT

## 2018-03-25 PROCEDURE — 96374 THER/PROPH/DIAG INJ IV PUSH: CPT

## 2018-03-25 PROCEDURE — 87077 CULTURE AEROBIC IDENTIFY: CPT | Mod: 59

## 2018-03-25 PROCEDURE — 86140 C-REACTIVE PROTEIN: CPT

## 2018-03-25 PROCEDURE — 84443 ASSAY THYROID STIM HORMONE: CPT

## 2018-03-25 RX ORDER — KETOROLAC TROMETHAMINE 30 MG/ML
10 INJECTION, SOLUTION INTRAMUSCULAR; INTRAVENOUS
Status: DISCONTINUED | OUTPATIENT
Start: 2018-03-25 | End: 2018-03-26 | Stop reason: HOSPADM

## 2018-03-25 RX ORDER — OXYCODONE HYDROCHLORIDE 5 MG/1
15 TABLET ORAL
Status: COMPLETED | OUTPATIENT
Start: 2018-03-26 | End: 2018-03-25

## 2018-03-25 RX ORDER — CEFTRIAXONE 1 G/1
1 INJECTION, POWDER, FOR SOLUTION INTRAMUSCULAR; INTRAVENOUS
Status: COMPLETED | OUTPATIENT
Start: 2018-03-26 | End: 2018-03-25

## 2018-03-25 RX ADMIN — CEFTRIAXONE SODIUM 1 G: 1 INJECTION, POWDER, FOR SOLUTION INTRAMUSCULAR; INTRAVENOUS at 11:03

## 2018-03-25 RX ADMIN — OXYCODONE HYDROCHLORIDE 15 MG: 5 TABLET ORAL at 11:03

## 2018-03-26 ENCOUNTER — OFFICE VISIT (OUTPATIENT)
Dept: PHYSICAL MEDICINE AND REHAB | Facility: CLINIC | Age: 33
End: 2018-03-26
Payer: MEDICAID

## 2018-03-26 ENCOUNTER — TELEPHONE (OUTPATIENT)
Dept: INTERNAL MEDICINE | Facility: CLINIC | Age: 33
End: 2018-03-26

## 2018-03-26 VITALS
WEIGHT: 175 LBS | HEIGHT: 68 IN | DIASTOLIC BLOOD PRESSURE: 87 MMHG | HEART RATE: 72 BPM | BODY MASS INDEX: 26.52 KG/M2 | SYSTOLIC BLOOD PRESSURE: 144 MMHG

## 2018-03-26 VITALS
HEIGHT: 68 IN | OXYGEN SATURATION: 98 % | RESPIRATION RATE: 18 BRPM | HEART RATE: 69 BPM | TEMPERATURE: 99 F | DIASTOLIC BLOOD PRESSURE: 59 MMHG | SYSTOLIC BLOOD PRESSURE: 104 MMHG | BODY MASS INDEX: 25.76 KG/M2 | WEIGHT: 170 LBS

## 2018-03-26 DIAGNOSIS — Z93.3 COLOSTOMY IN PLACE: Primary | ICD-10-CM

## 2018-03-26 DIAGNOSIS — M79.2 NEUROPATHIC PAIN: Chronic | ICD-10-CM

## 2018-03-26 LAB — CRP SERPL-MCNC: 42.8 MG/L

## 2018-03-26 PROCEDURE — 62367 ANALYZE SPINE INFUS PUMP: CPT | Mod: S$PBB,,, | Performed by: PHYSICAL MEDICINE & REHABILITATION

## 2018-03-26 PROCEDURE — 99213 OFFICE O/P EST LOW 20 MIN: CPT | Mod: PBBFAC,25,PO | Performed by: PHYSICAL MEDICINE & REHABILITATION

## 2018-03-26 PROCEDURE — 99213 OFFICE O/P EST LOW 20 MIN: CPT | Mod: 25,S$PBB,, | Performed by: PHYSICAL MEDICINE & REHABILITATION

## 2018-03-26 PROCEDURE — 99999 PR PBB SHADOW E&M-EST. PATIENT-LVL III: CPT | Mod: PBBFAC,,, | Performed by: PHYSICAL MEDICINE & REHABILITATION

## 2018-03-26 PROCEDURE — 62367 ANALYZE SPINE INFUS PUMP: CPT | Mod: PBBFAC,PO | Performed by: PHYSICAL MEDICINE & REHABILITATION

## 2018-03-26 PROCEDURE — 25000003 PHARM REV CODE 250: Performed by: EMERGENCY MEDICINE

## 2018-03-26 PROCEDURE — 63600175 PHARM REV CODE 636 W HCPCS: Performed by: EMERGENCY MEDICINE

## 2018-03-26 RX ORDER — CIPROFLOXACIN 500 MG/1
500 TABLET ORAL 2 TIMES DAILY
Qty: 20 TABLET | Refills: 0 | Status: SHIPPED | OUTPATIENT
Start: 2018-03-26 | End: 2018-04-05

## 2018-03-26 RX ORDER — OXYCODONE AND ACETAMINOPHEN 10; 325 MG/1; MG/1
TABLET ORAL
Qty: 135 TABLET | Refills: 0 | Status: SHIPPED | OUTPATIENT
Start: 2018-03-26 | End: 2018-04-26 | Stop reason: SDUPTHER

## 2018-03-26 NOTE — TELEPHONE ENCOUNTER
----- Message from Argenis Bella sent at 3/26/2018 12:17 PM CDT -----  Contact: self  Pt is calling in regards to having his prescription for his colostomy bag sent over to [facility]. Their number is [phone]. Per pt would like this done as soon as possible.     Pt can be reached at [phone].    Thank you

## 2018-03-26 NOTE — ED NOTES
Patient snow catheter bag emptied, patient updated on plan of care - awaiting radiology results. Patient acknowledged understanding, patient denies any further needs/questions at this time.
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
## 2018-03-26 NOTE — TELEPHONE ENCOUNTER
Can you call pt to see how often he's changing his ostomy bag and how many he goes through in 3 months?

## 2018-03-26 NOTE — ED NOTES
Adult Physical Assessment  LOC: Nghia Edgar, 32 y.o. male verified via two identifiers.  The patient is awake, alert, oriented and speaking appropriately at this time.    Patient arrived in personal wheelchair d/t paraplegia - assisted to ED cinthya per this nurse and [facility] ER tech. Patient has indwelling snow catheter, clear yellow urine output noted. Patient c/o intermittent fever ongoing for 3 weeks pta. Pt states has been seen in numerous hospitals for ongoing fever, last ED visit being 3/20. Patient states discharged from ED every time. Patient is A&O x's 4, no acute distress noted. Patient denies any current pain.     APPEARANCE: Patient resting comfortably and appears to be in no acute distress at this time. Patient is clean and well groomed, patient's clothing is properly fastened.  SKIN:The skin is warm and dry, color consistent with ethnicity, patient has normal skin turgor and moist mucus membranes, skin intact, no breakdown or brusing noted.  MUSCULOSKELETAL: Patient moving upper extremities well, unable to move lower extremities d/t paraplegia.   RESPIRATORY: Airway is open and patent, respirations are spontaneous, patient has a normal effort and rate, no accessory muscle use noted.  CARDIAC: Patient has a normal rate and rhythm, no periphreal edema noted in any extremity, capillary refill < 3 seconds in all extremities  ABDOMEN: Soft and non tender to palpation, no abdominal distention noted.   NEUROLOGIC: Eyes open spontaneously, behavior appropriate to situation, follows commands, facial expression symmetrical, bilateral hand grasp equal and even, purposeful motor response noted

## 2018-03-26 NOTE — TELEPHONE ENCOUNTER
----- Message from Wes Norris sent at 3/23/2018  4:44 PM CDT -----  Contact: Patient [phone]  Patient is calling in a request to Refill  His Holister 1 piece Pouch. Address and contact below.    Contact number [phone] [address]   [place] [id number]    Please call and advise  Thank you

## 2018-03-26 NOTE — ED NOTES
"Patient states "toradol doesn't do anything for me that doesn't even work the only thing he can give me for pain is 2mg of Dilaudid". Toradol IVP held/not given, Dr Gustavo Mitchell notified.  "

## 2018-03-27 ENCOUNTER — PES CALL (OUTPATIENT)
Dept: ADMINISTRATIVE | Facility: CLINIC | Age: 33
End: 2018-03-27

## 2018-03-27 ENCOUNTER — PATIENT MESSAGE (OUTPATIENT)
Dept: INFECTIOUS DISEASES | Facility: CLINIC | Age: 33
End: 2018-03-27

## 2018-03-27 NOTE — TELEPHONE ENCOUNTER
Clarify what supplies pt is needing  Or have company fax request  For supplies or he can bring to his appt

## 2018-03-28 ENCOUNTER — PATIENT MESSAGE (OUTPATIENT)
Dept: INFECTIOUS DISEASES | Facility: CLINIC | Age: 33
End: 2018-03-28

## 2018-03-28 ENCOUNTER — TELEPHONE (OUTPATIENT)
Dept: INFECTIOUS DISEASES | Facility: CLINIC | Age: 33
End: 2018-03-28

## 2018-03-28 DIAGNOSIS — N30.00 ACUTE CYSTITIS WITHOUT HEMATURIA: Primary | ICD-10-CM

## 2018-03-28 LAB — BACTERIA UR CULT: NORMAL

## 2018-03-28 RX ORDER — AMOXICILLIN 500 MG/1
500 CAPSULE ORAL 3 TIMES DAILY
Qty: 21 CAPSULE | Refills: 0 | Status: SHIPPED | OUTPATIENT
Start: 2018-03-28 | End: 2018-04-05

## 2018-03-28 NOTE — TELEPHONE ENCOUNTER
Urine cultures were positive for E faecalis.  Lab reporting it as sensitive to cipro but it is known that cipro doesn't have activity against E faecalis.      Plan  1.  Will start amoxicillin 500 mg tid for 7 days.

## 2018-03-28 NOTE — TELEPHONE ENCOUNTER
Can you call [employer] to see if they can send me his supply request? I need the size of the pouches and the specific skin protector.

## 2018-03-28 NOTE — TELEPHONE ENCOUNTER
Pt confirmed he changes his colostomy daily, script for supplies should be for 30 days pt req that you include paste and skin protector

## 2018-03-29 ENCOUNTER — OFFICE VISIT (OUTPATIENT)
Dept: UROLOGY | Facility: CLINIC | Age: 33
End: 2018-03-29
Payer: MEDICAID

## 2018-03-29 ENCOUNTER — PATIENT MESSAGE (OUTPATIENT)
Dept: UROLOGY | Facility: CLINIC | Age: 33
End: 2018-03-29

## 2018-03-29 VITALS
WEIGHT: 175 LBS | BODY MASS INDEX: 26.52 KG/M2 | HEIGHT: 68 IN | DIASTOLIC BLOOD PRESSURE: 69 MMHG | HEART RATE: 70 BPM | SYSTOLIC BLOOD PRESSURE: 108 MMHG

## 2018-03-29 DIAGNOSIS — Z93.59 SUPRAPUBIC CATHETER: Chronic | ICD-10-CM

## 2018-03-29 DIAGNOSIS — N31.9 NEUROGENIC BLADDER: Primary | Chronic | ICD-10-CM

## 2018-03-29 PROCEDURE — 99999 PR PBB SHADOW E&M-EST. PATIENT-LVL IV: CPT | Mod: PBBFAC,,, | Performed by: NURSE PRACTITIONER

## 2018-03-29 PROCEDURE — 51705 CHANGE OF BLADDER TUBE: CPT | Mod: S$PBB,,, | Performed by: NURSE PRACTITIONER

## 2018-03-29 PROCEDURE — 99213 OFFICE O/P EST LOW 20 MIN: CPT | Mod: S$PBB,25,, | Performed by: NURSE PRACTITIONER

## 2018-03-29 PROCEDURE — 99214 OFFICE O/P EST MOD 30 MIN: CPT | Mod: PBBFAC,25 | Performed by: NURSE PRACTITIONER

## 2018-03-29 PROCEDURE — 51705 CHANGE OF BLADDER TUBE: CPT | Mod: PBBFAC | Performed by: NURSE PRACTITIONER
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
## 2018-03-30 LAB
BACTERIA BLD CULT: NORMAL
BACTERIA BLD CULT: NORMAL

## 2018-04-02 ENCOUNTER — PATIENT MESSAGE (OUTPATIENT)
Dept: UROLOGY | Facility: CLINIC | Age: 33
End: 2018-04-02
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
## 2018-04-05 ENCOUNTER — PATIENT MESSAGE (OUTPATIENT)
Dept: PHYSICAL MEDICINE AND REHAB | Facility: CLINIC | Age: 33
End: 2018-04-05

## 2018-04-05 ENCOUNTER — PATIENT MESSAGE (OUTPATIENT)
Dept: INTERNAL MEDICINE | Facility: CLINIC | Age: 33
End: 2018-04-05

## 2018-04-05 ENCOUNTER — OFFICE VISIT (OUTPATIENT)
Dept: INTERNAL MEDICINE | Facility: CLINIC | Age: 33
End: 2018-04-05
Payer: MEDICAID

## 2018-04-05 VITALS — DIASTOLIC BLOOD PRESSURE: 82 MMHG | HEART RATE: 64 BPM | SYSTOLIC BLOOD PRESSURE: 122 MMHG

## 2018-04-05 DIAGNOSIS — L89.153 PRESSURE ULCER OF SACRAL REGION, STAGE 3: Primary | ICD-10-CM

## 2018-04-05 DIAGNOSIS — N31.9 NEUROGENIC BLADDER: Chronic | ICD-10-CM

## 2018-04-05 DIAGNOSIS — J45.20 MILD INTERMITTENT ASTHMA WITHOUT COMPLICATION: ICD-10-CM

## 2018-04-05 DIAGNOSIS — Z93.3 COLOSTOMY STATUS: Chronic | ICD-10-CM

## 2018-04-05 DIAGNOSIS — K59.2 NEUROGENIC BOWEL: Chronic | ICD-10-CM

## 2018-04-05 DIAGNOSIS — M79.2 NEUROPATHIC PAIN: Chronic | ICD-10-CM

## 2018-04-05 DIAGNOSIS — R89.9 ABNORMAL LABORATORY TEST: ICD-10-CM

## 2018-04-05 DIAGNOSIS — G82.20 PARAPLEGIA FOLLOWING SPINAL CORD INJURY: Chronic | ICD-10-CM

## 2018-04-05 PROCEDURE — 99214 OFFICE O/P EST MOD 30 MIN: CPT | Mod: S$PBB,,, | Performed by: INTERNAL MEDICINE

## 2018-04-05 PROCEDURE — 99214 OFFICE O/P EST MOD 30 MIN: CPT | Mod: PBBFAC | Performed by: INTERNAL MEDICINE

## 2018-04-05 PROCEDURE — 99999 PR PBB SHADOW E&M-EST. PATIENT-LVL IV: CPT | Mod: PBBFAC,,, | Performed by: INTERNAL MEDICINE

## 2018-04-05 RX ORDER — OXYCODONE HCL 40 MG/1
40 TABLET, FILM COATED, EXTENDED RELEASE ORAL EVERY 12 HOURS
Qty: 60 TABLET | Refills: 0 | Status: SHIPPED | OUTPATIENT
Start: 2018-04-05 | End: 2018-05-07 | Stop reason: SDUPTHER
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
## 2018-04-06 ENCOUNTER — PATIENT MESSAGE (OUTPATIENT)
Dept: UROLOGY | Facility: CLINIC | Age: 33
End: 2018-04-06

## 2018-04-06 ENCOUNTER — PATIENT MESSAGE (OUTPATIENT)
Dept: PHYSICAL MEDICINE AND REHAB | Facility: CLINIC | Age: 33
End: 2018-04-06

## 2018-04-12 ENCOUNTER — PATIENT MESSAGE (OUTPATIENT)
Dept: UROLOGY | Facility: CLINIC | Age: 33
End: 2018-04-12

## 2018-04-13 ENCOUNTER — PATIENT MESSAGE (OUTPATIENT)
Dept: INFECTIOUS DISEASES | Facility: CLINIC | Age: 33
End: 2018-04-13

## 2018-04-13 ENCOUNTER — PATIENT MESSAGE (OUTPATIENT)
Dept: WOUND CARE | Facility: CLINIC | Age: 33
End: 2018-04-13

## 2018-04-13 ENCOUNTER — OFFICE VISIT (OUTPATIENT)
Dept: WOUND CARE | Facility: CLINIC | Age: 33
End: 2018-04-13
Payer: MEDICAID

## 2018-04-13 VITALS
WEIGHT: 172 LBS | SYSTOLIC BLOOD PRESSURE: 136 MMHG | TEMPERATURE: 100 F | HEART RATE: 101 BPM | DIASTOLIC BLOOD PRESSURE: 83 MMHG | HEIGHT: 68 IN | BODY MASS INDEX: 26.07 KG/M2

## 2018-04-13 DIAGNOSIS — L89.314 DECUBITUS ULCER OF RIGHT ISCHIUM, STAGE 4: ICD-10-CM

## 2018-04-13 DIAGNOSIS — L89.324 DECUBITUS ULCER OF ISCHIAL AREA, LEFT, STAGE IV: Primary | ICD-10-CM

## 2018-04-13 DIAGNOSIS — G82.20 PARAPLEGIA FOLLOWING SPINAL CORD INJURY: Chronic | ICD-10-CM

## 2018-04-13 PROCEDURE — 99999 PR PBB SHADOW E&M-EST. PATIENT-LVL V: CPT | Mod: PBBFAC,,, | Performed by: NURSE PRACTITIONER

## 2018-04-13 PROCEDURE — 97605 NEG PRS WND THER DME<=50SQCM: CPT | Mod: S$PBB,,, | Performed by: NURSE PRACTITIONER

## 2018-04-13 PROCEDURE — 99215 OFFICE O/P EST HI 40 MIN: CPT | Mod: PBBFAC,25 | Performed by: NURSE PRACTITIONER

## 2018-04-13 PROCEDURE — 97605 NEG PRS WND THER DME<=50SQCM: CPT | Mod: PBBFAC | Performed by: NURSE PRACTITIONER

## 2018-04-13 PROCEDURE — 99212 OFFICE O/P EST SF 10 MIN: CPT | Mod: 25,S$PBB,, | Performed by: NURSE PRACTITIONER
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
## 2018-04-16 ENCOUNTER — TELEPHONE (OUTPATIENT)
Dept: UROLOGY | Facility: CLINIC | Age: 33
End: 2018-04-16

## 2018-04-16 ENCOUNTER — PATIENT MESSAGE (OUTPATIENT)
Dept: INTERNAL MEDICINE | Facility: CLINIC | Age: 33
End: 2018-04-16

## 2018-04-16 DIAGNOSIS — N31.9 NEUROGENIC BLADDER: Primary | ICD-10-CM

## 2018-04-16 DIAGNOSIS — Z93.3 COLOSTOMY IN PLACE: ICD-10-CM

## 2018-04-16 NOTE — TELEPHONE ENCOUNTER
Diagnoses and all orders for this visit:    Neurogenic bladder  -     Case Request Operating Room: CYSTOSCOPY, INJECTION-BOTOX

## 2018-04-18 ENCOUNTER — OFFICE VISIT (OUTPATIENT)
Dept: INFECTIOUS DISEASES | Facility: CLINIC | Age: 33
End: 2018-04-18
Payer: MEDICAID

## 2018-04-18 ENCOUNTER — LAB VISIT (OUTPATIENT)
Dept: LAB | Facility: HOSPITAL | Age: 33
End: 2018-04-18
Payer: MEDICAID

## 2018-04-18 ENCOUNTER — TELEPHONE (OUTPATIENT)
Dept: INTERNAL MEDICINE | Facility: CLINIC | Age: 33
End: 2018-04-18

## 2018-04-18 ENCOUNTER — PATIENT MESSAGE (OUTPATIENT)
Dept: INTERNAL MEDICINE | Facility: CLINIC | Age: 33
End: 2018-04-18

## 2018-04-18 VITALS
HEIGHT: 68 IN | HEART RATE: 83 BPM | TEMPERATURE: 98 F | DIASTOLIC BLOOD PRESSURE: 65 MMHG | WEIGHT: 172 LBS | SYSTOLIC BLOOD PRESSURE: 110 MMHG | BODY MASS INDEX: 26.07 KG/M2

## 2018-04-18 DIAGNOSIS — T83.510A URINARY TRACT INFECTION ASSOCIATED WITH CYSTOSTOMY CATHETER, INITIAL ENCOUNTER: ICD-10-CM

## 2018-04-18 DIAGNOSIS — Z87.440 HISTORY OF RECURRENT UTIS: ICD-10-CM

## 2018-04-18 DIAGNOSIS — G82.20 PARAPLEGIA FOLLOWING SPINAL CORD INJURY: Chronic | ICD-10-CM

## 2018-04-18 DIAGNOSIS — Z86.19 HISTORY OF ESBL KLEBSIELLA PNEUMONIAE INFECTION: Chronic | ICD-10-CM

## 2018-04-18 DIAGNOSIS — R50.9 FUO (FEVER OF UNKNOWN ORIGIN): ICD-10-CM

## 2018-04-18 DIAGNOSIS — L89.324 DECUBITUS ULCER OF ISCHIAL AREA, LEFT, STAGE IV: ICD-10-CM

## 2018-04-18 DIAGNOSIS — N31.9 NEUROGENIC BLADDER: Chronic | ICD-10-CM

## 2018-04-18 DIAGNOSIS — Z93.59 SUPRAPUBIC CATHETER: Chronic | ICD-10-CM

## 2018-04-18 DIAGNOSIS — R50.9 FUO (FEVER OF UNKNOWN ORIGIN): Primary | ICD-10-CM

## 2018-04-18 DIAGNOSIS — G82.50 SPASTIC QUADRIPARESIS: Chronic | ICD-10-CM

## 2018-04-18 DIAGNOSIS — Z93.3 COLOSTOMY STATUS: Chronic | ICD-10-CM

## 2018-04-18 DIAGNOSIS — L89.314 DECUBITUS ULCER OF RIGHT ISCHIUM, STAGE 4: ICD-10-CM

## 2018-04-18 DIAGNOSIS — N39.0 URINARY TRACT INFECTION ASSOCIATED WITH CYSTOSTOMY CATHETER, INITIAL ENCOUNTER: ICD-10-CM

## 2018-04-18 DIAGNOSIS — S14.106S: ICD-10-CM

## 2018-04-18 DIAGNOSIS — R10.2 SUPRAPUBIC PAIN: ICD-10-CM

## 2018-04-18 LAB
ALBUMIN SERPL BCP-MCNC: 2.9 G/DL
ALP SERPL-CCNC: 107 U/L
ALT SERPL W/O P-5'-P-CCNC: 22 U/L
ANION GAP SERPL CALC-SCNC: 7 MMOL/L
AST SERPL-CCNC: 14 U/L
BASOPHILS # BLD AUTO: 0.03 K/UL
BASOPHILS NFR BLD: 0.3 %
BILIRUB SERPL-MCNC: 0.2 MG/DL
BILIRUB UR QL STRIP: NEGATIVE
BUN SERPL-MCNC: 7 MG/DL
CALCIUM SERPL-MCNC: 8.7 MG/DL
CHLORIDE SERPL-SCNC: 100 MMOL/L
CLARITY UR REFRACT.AUTO: CLEAR
CO2 SERPL-SCNC: 30 MMOL/L
COLOR UR AUTO: YELLOW
CREAT SERPL-MCNC: 0.6 MG/DL
CRP SERPL-MCNC: 51.1 MG/L
DIFFERENTIAL METHOD: ABNORMAL
EOSINOPHIL # BLD AUTO: 0.1 K/UL
EOSINOPHIL NFR BLD: 1.6 %
ERYTHROCYTE [DISTWIDTH] IN BLOOD BY AUTOMATED COUNT: 15.3 %
ERYTHROCYTE [SEDIMENTATION RATE] IN BLOOD BY WESTERGREN METHOD: 65 MM/HR
EST. GFR  (AFRICAN AMERICAN): >60 ML/MIN/1.73 M^2
EST. GFR  (NON AFRICAN AMERICAN): >60 ML/MIN/1.73 M^2
GLUCOSE SERPL-MCNC: 97 MG/DL
GLUCOSE UR QL STRIP: NEGATIVE
HCT VFR BLD AUTO: 33.9 %
HGB BLD-MCNC: 9.9 G/DL
HGB UR QL STRIP: NEGATIVE
IMM GRANULOCYTES # BLD AUTO: 0.03 K/UL
IMM GRANULOCYTES NFR BLD AUTO: 0.3 %
KETONES UR QL STRIP: NEGATIVE
LEUKOCYTE ESTERASE UR QL STRIP: NEGATIVE
LYMPHOCYTES # BLD AUTO: 1.8 K/UL
LYMPHOCYTES NFR BLD: 20.3 %
MCH RBC QN AUTO: 25.6 PG
MCHC RBC AUTO-ENTMCNC: 29.2 G/DL
MCV RBC AUTO: 88 FL
MONOCYTES # BLD AUTO: 0.8 K/UL
MONOCYTES NFR BLD: 9 %
NEUTROPHILS # BLD AUTO: 5.9 K/UL
NEUTROPHILS NFR BLD: 68.5 %
NITRITE UR QL STRIP: NEGATIVE
NRBC BLD-RTO: 0 /100 WBC
PH UR STRIP: 8 [PH] (ref 5–8)
PLATELET # BLD AUTO: 359 K/UL
PMV BLD AUTO: 9.8 FL
POTASSIUM SERPL-SCNC: 3.8 MMOL/L
PROT SERPL-MCNC: 7.1 G/DL
PROT UR QL STRIP: NEGATIVE
RBC # BLD AUTO: 3.87 M/UL
SODIUM SERPL-SCNC: 137 MMOL/L
SP GR UR STRIP: 1.01 (ref 1–1.03)
URN SPEC COLLECT METH UR: NORMAL
UROBILINOGEN UR STRIP-ACNC: NEGATIVE EU/DL
WBC # BLD AUTO: 8.66 K/UL

## 2018-04-18 PROCEDURE — 87086 URINE CULTURE/COLONY COUNT: CPT

## 2018-04-18 PROCEDURE — 99214 OFFICE O/P EST MOD 30 MIN: CPT | Mod: S$PBB,,, | Performed by: PHYSICIAN ASSISTANT

## 2018-04-18 PROCEDURE — 99215 OFFICE O/P EST HI 40 MIN: CPT | Mod: PBBFAC | Performed by: PHYSICIAN ASSISTANT

## 2018-04-18 PROCEDURE — 81003 URINALYSIS AUTO W/O SCOPE: CPT

## 2018-04-18 PROCEDURE — 87040 BLOOD CULTURE FOR BACTERIA: CPT | Mod: 59

## 2018-04-18 PROCEDURE — 36415 COLL VENOUS BLD VENIPUNCTURE: CPT

## 2018-04-18 PROCEDURE — 80053 COMPREHEN METABOLIC PANEL: CPT

## 2018-04-18 PROCEDURE — 85651 RBC SED RATE NONAUTOMATED: CPT

## 2018-04-18 PROCEDURE — 99999 PR PBB SHADOW E&M-EST. PATIENT-LVL V: CPT | Mod: PBBFAC,,, | Performed by: PHYSICIAN ASSISTANT

## 2018-04-18 PROCEDURE — 85025 COMPLETE CBC W/AUTO DIFF WBC: CPT

## 2018-04-18 PROCEDURE — 86140 C-REACTIVE PROTEIN: CPT

## 2018-04-18 RX ORDER — AMOXICILLIN AND CLAVULANATE POTASSIUM 500; 125 MG/1; MG/1
1 TABLET, FILM COATED ORAL 3 TIMES DAILY
Qty: 14 TABLET | Refills: 0 | Status: SHIPPED | OUTPATIENT
Start: 2018-04-18 | End: 2018-05-03 | Stop reason: ALTCHOICE

## 2018-04-18 NOTE — TELEPHONE ENCOUNTER
Spoke w pt; informed that Rx was sent. Pt verbalized understanding and states that he will give the company a call.

## 2018-04-18 NOTE — TELEPHONE ENCOUNTER
----- Message from Aida Varela sent at 4/18/2018  2:58 PM CDT -----  Contact: self/[phone]  Pt would like the doctor to fax over a prescription for ostomy supplies to [facility] fax [phone]. Pt stated he only has 3 bags left.
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
## 2018-04-19 LAB — BACTERIA UR CULT: NORMAL

## 2018-04-20 ENCOUNTER — PATIENT MESSAGE (OUTPATIENT)
Dept: INFECTIOUS DISEASES | Facility: CLINIC | Age: 33
End: 2018-04-20

## 2018-04-20 ENCOUNTER — TELEPHONE (OUTPATIENT)
Dept: INFECTIOUS DISEASES | Facility: CLINIC | Age: 33
End: 2018-04-20

## 2018-04-23 ENCOUNTER — PATIENT MESSAGE (OUTPATIENT)
Dept: UROLOGY | Facility: CLINIC | Age: 33
End: 2018-04-23

## 2018-04-23 LAB
BACTERIA BLD CULT: NORMAL
BACTERIA BLD CULT: NORMAL

## 2018-04-25 ENCOUNTER — OFFICE VISIT (OUTPATIENT)
Dept: UROLOGY | Facility: CLINIC | Age: 33
End: 2018-04-25
Payer: MEDICAID

## 2018-04-25 VITALS — SYSTOLIC BLOOD PRESSURE: 128 MMHG | DIASTOLIC BLOOD PRESSURE: 79 MMHG | HEART RATE: 54 BPM

## 2018-04-25 DIAGNOSIS — Z01.818 PRE-OP TESTING: ICD-10-CM

## 2018-04-25 DIAGNOSIS — Z93.59 CHRONIC SUPRAPUBIC CATHETER: ICD-10-CM

## 2018-04-25 DIAGNOSIS — N32.89 BLADDER SPASMS: ICD-10-CM

## 2018-04-25 DIAGNOSIS — N31.9 NEUROGENIC BLADDER: Primary | ICD-10-CM

## 2018-04-25 DIAGNOSIS — Z43.5 ENCOUNTER FOR CARE OR REPLACEMENT OF SUPRAPUBIC TUBE: ICD-10-CM

## 2018-04-25 LAB
BACTERIA #/AREA URNS AUTO: NORMAL /HPF
BILIRUB UR QL STRIP: NEGATIVE
CLARITY UR REFRACT.AUTO: CLEAR
COLOR UR AUTO: YELLOW
GLUCOSE UR QL STRIP: NEGATIVE
HGB UR QL STRIP: ABNORMAL
KETONES UR QL STRIP: NEGATIVE
LEUKOCYTE ESTERASE UR QL STRIP: NEGATIVE
MICROSCOPIC COMMENT: NORMAL
NITRITE UR QL STRIP: NEGATIVE
PH UR STRIP: 5 [PH] (ref 5–8)
PROT UR QL STRIP: NEGATIVE
RBC #/AREA URNS AUTO: 3 /HPF (ref 0–4)
SP GR UR STRIP: 1 (ref 1–1.03)
URN SPEC COLLECT METH UR: ABNORMAL
UROBILINOGEN UR STRIP-ACNC: NEGATIVE EU/DL
WBC #/AREA URNS AUTO: 1 /HPF (ref 0–5)

## 2018-04-25 PROCEDURE — 81001 URINALYSIS AUTO W/SCOPE: CPT

## 2018-04-25 PROCEDURE — 51705 CHANGE OF BLADDER TUBE: CPT | Mod: S$PBB,,, | Performed by: NURSE PRACTITIONER

## 2018-04-25 PROCEDURE — 87086 URINE CULTURE/COLONY COUNT: CPT

## 2018-04-25 PROCEDURE — 99215 OFFICE O/P EST HI 40 MIN: CPT | Mod: PBBFAC | Performed by: NURSE PRACTITIONER

## 2018-04-25 PROCEDURE — 99999 PR PBB SHADOW E&M-EST. PATIENT-LVL V: CPT | Mod: PBBFAC,,, | Performed by: NURSE PRACTITIONER

## 2018-04-25 PROCEDURE — 51705 CHANGE OF BLADDER TUBE: CPT | Mod: PBBFAC | Performed by: NURSE PRACTITIONER

## 2018-04-25 PROCEDURE — 99214 OFFICE O/P EST MOD 30 MIN: CPT | Mod: S$PBB,25,, | Performed by: NURSE PRACTITIONER
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
## 2018-04-26 ENCOUNTER — PATIENT MESSAGE (OUTPATIENT)
Dept: PHYSICAL MEDICINE AND REHAB | Facility: CLINIC | Age: 33
End: 2018-04-26

## 2018-04-26 ENCOUNTER — ANESTHESIA EVENT (OUTPATIENT)
Dept: SURGERY | Facility: HOSPITAL | Age: 33
End: 2018-04-26
Payer: MEDICAID

## 2018-04-26 DIAGNOSIS — M79.2 NEUROPATHIC PAIN: Chronic | ICD-10-CM

## 2018-04-26 LAB — BACTERIA UR CULT: NO GROWTH

## 2018-04-26 RX ORDER — OXYCODONE AND ACETAMINOPHEN 10; 325 MG/1; MG/1
TABLET ORAL
Qty: 135 TABLET | Refills: 0 | Status: SHIPPED | OUTPATIENT
Start: 2018-04-26 | End: 2018-07-02 | Stop reason: SDUPTHER

## 2018-04-26 RX ORDER — ASPIRIN 81 MG/1
81 TABLET ORAL DAILY
Status: ON HOLD | COMMUNITY
End: 2020-07-08 | Stop reason: HOSPADM
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
## 2018-05-03 ENCOUNTER — PATIENT MESSAGE (OUTPATIENT)
Dept: UROLOGY | Facility: CLINIC | Age: 33
End: 2018-05-03

## 2018-05-03 DIAGNOSIS — Z93.59 CHRONIC SUPRAPUBIC CATHETER: Primary | ICD-10-CM

## 2018-05-03 DIAGNOSIS — R82.71 BACTERIA IN URINE: ICD-10-CM

## 2018-05-03 DIAGNOSIS — N32.89 BLADDER SPASM: ICD-10-CM

## 2018-05-03 RX ORDER — AMOXICILLIN AND CLAVULANATE POTASSIUM 500; 125 MG/1; MG/1
1 TABLET, FILM COATED ORAL 2 TIMES DAILY
Qty: 14 TABLET | Refills: 0 | Status: SHIPPED | OUTPATIENT
Start: 2018-05-03 | End: 2018-05-10

## 2018-05-03 NOTE — PROGRESS NOTES
Cysto with Botox scheduled 5/09/2018 with Dr. Luna.  Last urine cx clear;  Will start Augmentin to ensure bladder clear of infection

## 2018-05-06 ENCOUNTER — PATIENT MESSAGE (OUTPATIENT)
Dept: PLASTIC SURGERY | Facility: CLINIC | Age: 33
End: 2018-05-06

## 2018-05-07 ENCOUNTER — TELEPHONE (OUTPATIENT)
Dept: PHYSICAL MEDICINE AND REHAB | Facility: CLINIC | Age: 33
End: 2018-05-07

## 2018-05-07 ENCOUNTER — PATIENT MESSAGE (OUTPATIENT)
Dept: PHYSICAL MEDICINE AND REHAB | Facility: CLINIC | Age: 33
End: 2018-05-07

## 2018-05-07 DIAGNOSIS — G82.20 PARAPLEGIA FOLLOWING SPINAL CORD INJURY: Primary | ICD-10-CM

## 2018-05-07 DIAGNOSIS — M79.2 NEUROPATHIC PAIN: Chronic | ICD-10-CM

## 2018-05-07 RX ORDER — OXYCODONE HCL 40 MG/1
40 TABLET, FILM COATED, EXTENDED RELEASE ORAL EVERY 12 HOURS
Qty: 60 TABLET | Refills: 0 | Status: SHIPPED | OUTPATIENT
Start: 2018-05-07 | End: 2018-07-02 | Stop reason: SDUPTHER

## 2018-05-08 ENCOUNTER — TELEPHONE (OUTPATIENT)
Dept: UROLOGY | Facility: CLINIC | Age: 33
End: 2018-05-08

## 2018-05-08 NOTE — TELEPHONE ENCOUNTER
Called pt to confirm 1pm arrival time for procedure. Gave pt NPO instructions and gave pt opportunity to ask questions. Pt verbalized understanding.

## 2018-05-09 ENCOUNTER — PATIENT MESSAGE (OUTPATIENT)
Dept: SURGERY | Facility: HOSPITAL | Age: 33
End: 2018-05-09

## 2018-05-09 ENCOUNTER — HOSPITAL ENCOUNTER (OUTPATIENT)
Facility: HOSPITAL | Age: 33
Discharge: HOME | End: 2018-05-09
Attending: UROLOGY | Admitting: UROLOGY
Payer: MEDICAID

## 2018-05-09 ENCOUNTER — ANESTHESIA (OUTPATIENT)
Dept: SURGERY | Facility: HOSPITAL | Age: 33
End: 2018-05-09
Payer: MEDICAID

## 2018-05-09 DIAGNOSIS — N31.9 NEUROGENIC BLADDER: Primary | ICD-10-CM

## 2018-05-09 PROCEDURE — 52287 CYSTOSCOPY CHEMODENERVATION: CPT | Mod: ,,, | Performed by: UROLOGY

## 2018-05-09 PROCEDURE — 51705 CHANGE OF BLADDER TUBE: CPT | Mod: 51,,, | Performed by: UROLOGY

## 2018-05-09 PROCEDURE — 36000706: Performed by: UROLOGY

## 2018-05-09 PROCEDURE — 25000003 PHARM REV CODE 250: Performed by: UROLOGY

## 2018-05-09 PROCEDURE — 63600175 PHARM REV CODE 636 W HCPCS: Performed by: STUDENT IN AN ORGANIZED HEALTH CARE EDUCATION/TRAINING PROGRAM

## 2018-05-09 PROCEDURE — 25000003 PHARM REV CODE 250: Performed by: NURSE ANESTHETIST, CERTIFIED REGISTERED

## 2018-05-09 PROCEDURE — 36000707: Performed by: UROLOGY

## 2018-05-09 PROCEDURE — 37000009 HC ANESTHESIA EA ADD 15 MINS: Performed by: UROLOGY

## 2018-05-09 PROCEDURE — 71000015 HC POSTOP RECOV 1ST HR: Performed by: UROLOGY

## 2018-05-09 PROCEDURE — 71000044 HC DOSC ROUTINE RECOVERY FIRST HOUR: Performed by: UROLOGY

## 2018-05-09 PROCEDURE — D9220A PRA ANESTHESIA: Mod: CRNA,,, | Performed by: NURSE ANESTHETIST, CERTIFIED REGISTERED

## 2018-05-09 PROCEDURE — 00910 ANES TRANSURETHRAL PX NOS: CPT | Performed by: UROLOGY

## 2018-05-09 PROCEDURE — 63600175 PHARM REV CODE 636 W HCPCS: Performed by: NURSE ANESTHETIST, CERTIFIED REGISTERED

## 2018-05-09 PROCEDURE — D9220A PRA ANESTHESIA: Mod: ANES,,, | Performed by: ANESTHESIOLOGY

## 2018-05-09 PROCEDURE — 25000003 PHARM REV CODE 250: Performed by: STUDENT IN AN ORGANIZED HEALTH CARE EDUCATION/TRAINING PROGRAM

## 2018-05-09 PROCEDURE — 37000008 HC ANESTHESIA 1ST 15 MINUTES: Performed by: UROLOGY

## 2018-05-09 PROCEDURE — 63600175 PHARM REV CODE 636 W HCPCS: Mod: JG | Performed by: UROLOGY

## 2018-05-09 RX ORDER — FENTANYL CITRATE 50 UG/ML
50 INJECTION, SOLUTION INTRAMUSCULAR; INTRAVENOUS EVERY 5 MIN PRN
Status: DISCONTINUED | OUTPATIENT
Start: 2018-05-09 | End: 2018-05-09 | Stop reason: HOSPADM

## 2018-05-09 RX ORDER — SODIUM CHLORIDE 9 MG/ML
INJECTION, SOLUTION INTRAVENOUS CONTINUOUS
Status: DISCONTINUED | OUTPATIENT
Start: 2018-05-09 | End: 2018-05-09 | Stop reason: HOSPADM

## 2018-05-09 RX ORDER — OXYCODONE HYDROCHLORIDE 5 MG/1
10 TABLET ORAL ONCE AS NEEDED
Status: DISCONTINUED | OUTPATIENT
Start: 2018-05-09 | End: 2018-05-09 | Stop reason: HOSPADM

## 2018-05-09 RX ORDER — PROPOFOL 10 MG/ML
VIAL (ML) INTRAVENOUS CONTINUOUS PRN
Status: DISCONTINUED | OUTPATIENT
Start: 2018-05-09 | End: 2018-05-09

## 2018-05-09 RX ORDER — FENTANYL CITRATE 50 UG/ML
INJECTION, SOLUTION INTRAMUSCULAR; INTRAVENOUS
Status: DISCONTINUED | OUTPATIENT
Start: 2018-05-09 | End: 2018-05-09

## 2018-05-09 RX ORDER — LIDOCAINE HCL/PF 100 MG/5ML
SYRINGE (ML) INTRAVENOUS
Status: DISCONTINUED | OUTPATIENT
Start: 2018-05-09 | End: 2018-05-09

## 2018-05-09 RX ORDER — MIDAZOLAM HYDROCHLORIDE 1 MG/ML
INJECTION, SOLUTION INTRAMUSCULAR; INTRAVENOUS
Status: DISCONTINUED | OUTPATIENT
Start: 2018-05-09 | End: 2018-05-09

## 2018-05-09 RX ORDER — PROPOFOL 10 MG/ML
VIAL (ML) INTRAVENOUS
Status: DISCONTINUED | OUTPATIENT
Start: 2018-05-09 | End: 2018-05-09

## 2018-05-09 RX ORDER — SODIUM CHLORIDE 0.9 % (FLUSH) 0.9 %
3 SYRINGE (ML) INJECTION
Status: DISCONTINUED | OUTPATIENT
Start: 2018-05-09 | End: 2018-05-09 | Stop reason: HOSPADM

## 2018-05-09 RX ORDER — LIDOCAINE HYDROCHLORIDE 10 MG/ML
1 INJECTION, SOLUTION EPIDURAL; INFILTRATION; INTRACAUDAL; PERINEURAL ONCE
Status: COMPLETED | OUTPATIENT
Start: 2018-05-09 | End: 2018-05-09

## 2018-05-09 RX ADMIN — LIDOCAINE HYDROCHLORIDE 2 MG: 10 INJECTION, SOLUTION EPIDURAL; INFILTRATION; INTRACAUDAL; PERINEURAL at 01:05

## 2018-05-09 RX ADMIN — SODIUM CHLORIDE: 0.9 INJECTION, SOLUTION INTRAVENOUS at 01:05

## 2018-05-09 RX ADMIN — MIDAZOLAM HYDROCHLORIDE 2 MG: 1 INJECTION, SOLUTION INTRAMUSCULAR; INTRAVENOUS at 03:05

## 2018-05-09 RX ADMIN — AMPICILLIN SODIUM 1 G: 1 INJECTION, POWDER, FOR SOLUTION INTRAMUSCULAR; INTRAVENOUS at 03:05

## 2018-05-09 RX ADMIN — GENTAMICIN SULFATE 240 MG: 40 INJECTION, SOLUTION INTRAMUSCULAR; INTRAVENOUS at 03:05

## 2018-05-09 RX ADMIN — PROPOFOL 50 MG: 10 INJECTION, EMULSION INTRAVENOUS at 03:05

## 2018-05-09 RX ADMIN — PROPOFOL 50 MCG/KG/MIN: 10 INJECTION, EMULSION INTRAVENOUS at 03:05

## 2018-05-09 RX ADMIN — LIDOCAINE HYDROCHLORIDE 60 MG: 20 INJECTION, SOLUTION INTRAVENOUS at 03:05

## 2018-05-09 RX ADMIN — FENTANYL CITRATE 25 MCG: 50 INJECTION, SOLUTION INTRAMUSCULAR; INTRAVENOUS at 03:05

## 2018-05-09 RX ADMIN — SODIUM CHLORIDE, SODIUM GLUCONATE, SODIUM ACETATE, POTASSIUM CHLORIDE, MAGNESIUM CHLORIDE, SODIUM PHOSPHATE, DIBASIC, AND POTASSIUM PHOSPHATE: .53; .5; .37; .037; .03; .012; .00082 INJECTION, SOLUTION INTRAVENOUS at 04:05

## 2018-05-09 NOTE — INTERVAL H&P NOTE
The patient has been examined and the H&P has been reviewed:    I concur with the findings and no changes have occurred since H&P was written.    Anesthesia/Surgery risks, benefits and alternative options discussed and understood by patient/family.          Active Hospital Problems    Diagnosis  POA    Neurogenic bladder [N31.9]  Yes     Chronic      Resolved Hospital Problems    Diagnosis Date Resolved POA   No resolved problems to display.

## 2018-05-09 NOTE — DISCHARGE SUMMARY
[facility]  Discharge Note  Short Stay    Admit Date: 5/9/2018    Discharge Date and Time: 05/09/2018 4:12 PM      Attending Physician: Dk Luna MD     Discharge Provider: Clement Alarcon    Diagnoses:  Active Hospital Problems    Diagnosis  POA    *Neurogenic bladder [N31.9]  Yes     Chronic      Resolved Hospital Problems    Diagnosis Date Resolved POA   No resolved problems to display.       Discharged Condition: good    Hospital Course: Patient was admitted for cystoscopy and botox injections and tolerated the procedure well with no complications. The patient was discharged home in good condition on the same day.       Final Diagnoses: Same as principal problem.    Disposition: Home or Self Care    Follow up/Patient Instructions:    Medications:  Reconciled Home Medications: Current Discharge Medication List      CONTINUE these medications which have NOT CHANGED    Details   albuterol (PROAIR HFA) 90 mcg/actuation inhaler Inhale 1-2 puffs into the lungs every 6 (six) hours as needed for Wheezing or Shortness of Breath.  Qty: 18 g, Refills: 3      amoxicillin-clavulanate 500-125mg (AUGMENTIN) 500-125 mg Tab Take 1 tablet (500 mg total) by mouth 2 (two) times daily.  Qty: 14 tablet, Refills: 0    Associated Diagnoses: Chronic suprapubic catheter; Bladder spasm; Bacteria in urine      ascorbic acid (VITAMIN C) 500 MG tablet Take 500 mg by mouth every morning.       aspirin (ECOTRIN) 81 MG EC tablet Take 81 mg by mouth once daily.      BACLOFEN IT by Intrathecal route.      diazePAM (VALIUM) 10 MG Tab Take 1 tablet (10 mg total) by mouth 3 (three) times daily as needed.  Qty: 90 tablet, Refills: 5    Associated Diagnoses: Anxiety      ferrous sulfate 325 (65 FE) MG EC tablet Take 325 mg by mouth once daily.      lactulose (CHRONULAC) 10 gram/15 mL solution       leg brace (ANKLE BRACE) Misc 2 each by Misc.(Non-Drug; Combo Route) route daily as needed. Please dispense dropped foot splints  Qty: 2 each,  "Refills: 0    Associated Diagnoses: Foot drop, bilateral      multivitamin capsule Take 1 capsule by mouth every morning.      oxybutynin (DITROPAN) 5 MG Tab TAKE ONE TABLET BY MOUTH THREE TIMES DAILY AS NEEDED FOR  BLADDER  SPASMS  Qty: 90 tablet, Refills: 3    Associated Diagnoses: Urgency of urination; Bladder spasm; Neurogenic bladder      oxyCODONE (OXYCONTIN) 40 mg 12 hr tablet Take 1 tablet (40 mg total) by mouth every 12 (twelve) hours.  Qty: 60 tablet, Refills: 0    Associated Diagnoses: Neuropathic pain      oxyCODONE-acetaminophen (PERCOCET)  mg per tablet Take 1-1/2 tablets (15mg) TID PRN (pain) ICD-10: M79.2  Qty: 135 tablet, Refills: 0    Associated Diagnoses: Neuropathic pain      polyethylene glycol (GLYCOLAX) 17 gram PwPk Take 17 g by mouth 2 (two) times daily as needed.  Qty: 60 packet, Refills: 11    Associated Diagnoses: Constipation, unspecified constipation type      potassium citrate (UROCIT-K) 10 mEq (1,080 mg) TbSR       pregabalin (LYRICA) 200 MG Cap Take 1 capsule (200 mg total) by mouth 3 (three) times daily.  Qty: 90 capsule, Refills: 5    Associated Diagnoses: Pain      adhesive bandage 3 1/2 X 10 " Bndg Apply medihoney gel to right ischial wound, cover with hydrofiber and gauze and secure with a mepore island dressing. Change all dressings three times weekly  Qty: 30 each, Refills: 3      blood pressure test kit-medium (IN CONTROL BP MONITOR) Kit Test daily  Qty: 1 each, Refills: 0      colostomy bags Misc Change daily  Qty: 30 each, Refills: 11    Comments: Holister 1 piece pouch. Please also dispense paste and skin protector.  Associated Diagnoses: Colostomy in place      disposable gloves Misc Apply medihoney gel to right ischial wound, cover with hydrofiber and gauze and secure with a mepore island dressing. Change all dressings three times weekly  Qty: 30 each, Refills: 3      gauze bandage Bndg Apply medihoney gel to right ischial wound, cover with hydrofiber and gauze and " "secure with a mepore island dressing. Change all dressings three times weekly  Qty: 30 each, Refills: 3      honey (MEDIHONEY, HONEY,) 80 % Gel Apply 1 each topically as needed. Apply medihoney gel to right ischial wound, cover with hydrofiber and gauze and secure with a mepore island dressing. Change all dressings three times weekly  Qty: 44 mL, Refills: 3      hydrocolloid dressing 4 X 4 " Bndg Apply medihoney gel to right ischial wound, cover with hydrofiber and gauze and secure with a mepore island dressing. Change all dressings three times weekly  Qty: 30 each, Refills: 3             Discharge Procedure Orders  Call MD for:  persistent nausea and vomiting or diarrhea     Call MD for:  severe uncontrolled pain     Call MD for:  persistent dizziness, light-headedness, or visual disturbances     Call MD for:   Order Comments: Temperature > 101F       Follow-up Information     Dk Luna MD In 3 months.    Specialty:  Urology  Contact information:  [address] [place] [id number]  [phone]                   Discharge Procedure Orders (must include Diet, Follow-up, Activity):    Discharge Procedure Orders (must include Diet, Follow-up, Activity)  Call MD for:  persistent nausea and vomiting or diarrhea     Call MD for:  severe uncontrolled pain     Call MD for:  persistent dizziness, light-headedness, or visual disturbances     Call MD for:   Order Comments: Temperature > 101F          "
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
## 2018-05-09 NOTE — TRANSFER OF CARE
"Anesthesia Transfer of Care Note    Patient: Nghia Edgar    Procedure(s) Performed: Procedure(s) (LRB):  CYSTOSCOPY (N/A)  INJECTION-BOTOX (N/A)  EXCHANGE -SUPRA PUBIC CATHETER (N/A)    Patient location: [facility]    Anesthesia Type: general    Transport from OR: Transported from OR on room air with adequate spontaneous ventilation    Post pain: adequate analgesia    Post assessment: no apparent anesthetic complications and tolerated procedure well    Post vital signs: stable    Level of consciousness: awake, alert and oriented    Nausea/Vomiting: no nausea/vomiting    Complications: none    Transfer of care protocol was followed      Last vitals:   Visit Vitals  BP (!) 110/58 (BP Location: Right arm, Patient Position: Lying)   Pulse 81   Temp 37.2 °C (99 °F) (Temporal)   Resp 16   Ht 5' 7" (1.702 m)   Wt 77.1 kg (170 lb)   SpO2 99%   BMI 26.63 kg/m²     "

## 2018-05-09 NOTE — INTERVAL H&P NOTE
The patient has been examined and the H&P has been reviewed:    Cysto botox injection 300 units with suprapubic catheter replacement    I concur with the findings and no changes have occurred since H&P was written.    Anesthesia/Surgery risks, benefits and alternative options discussed and understood by patient/family.          Active Hospital Problems    Diagnosis  POA    Neurogenic bladder [N31.9]  Yes     Chronic      Resolved Hospital Problems    Diagnosis Date Resolved POA   No resolved problems to display.

## 2018-05-09 NOTE — DISCHARGE INSTRUCTIONS
Recovery After Procedural Sedation (Adult)  You have been given medicine by vein to make you sleep during your surgery. This may have included both a pain medicine and sleeping medicine. Most of the effects have worn off. But you may still have some drowsiness for the next 6 to 8 hours.  Home care  Follow these guidelines when you get home:  · For the next 8 hours, you should be watched by a responsible adult. This person should make sure your condition is not getting worse.  · Don't drink any alcohol for the next 24 hours.  · Don't drive, operate dangerous machinery, or make important business or personal decisions during the next 24 hours.  Note: Your healthcare provider may tell you not to take any medicine by mouth for pain or sleep in the next 4 hours. These medicines may react with the medicines you were given in the hospital. This could cause a much stronger response than usual.  Follow-up care  Follow up with your healthcare provider if you are not alert and back to your usual level of activity within 12 hours.  When to seek medical advice  Call your healthcare provider right away if any of these occur:  · Drowsiness gets worse  · Weakness or dizziness gets worse  · Repeated vomiting  · You can't be awakened   Date Last Reviewed: 10/18/2016  © 2445-2450 [employer]. [address] [place] [address], [place] [address]. All rights reserved. This information is not intended as a substitute for professional medical care. Always follow your healthcare professional's instructions.        Cystoscopy    Cystoscopy is a procedure that lets your doctor look directly inside your urethra and bladder. It can be used to:  · Help diagnose a problem with your urethra, bladder, or kidneys.  · Take a sample (biopsy) of bladder or urethral tissue.  · Treat certain problems (such as removing kidney stones).  · Place a stent to bypass an obstruction.  · Take special X-rays of the kidneys.  Based on the findings, your  doctor may recommend other tests or treatments.  What is a cystoscope?  A cystoscope is a telescope-like instrument that contains lenses and fiberoptics (small glass wires that make bright light). The cystoscope may be straight and rigid, or flexible to bend around curves in the urethra. The doctor may look directly into the cystoscope, or project the image onto a monitor.  Getting ready  · Ask your doctor if you should stop taking any medicines before the procedure.  · Ask whether you should avoid eating or drinking anything after midnight before the procedure.  · Follow any other instructions your doctor gives you.  Tell your doctor before the exam if you:  · Take any medicines, such as aspirin or blood thinners  · Have allergies to any medicines  · Are pregnant   The procedure  Cystoscopy is done in the doctors office, surgery center, or hospital. The doctor and a nurse are present during the procedure. It takes only a few minutes, longer if a biopsy, X-ray, or treatment needs to be done.  During the procedure:  · You lie on an exam table on your back, knees bent and legs apart. You are covered with a drape.  · Your urethra and the area around it are washed. Anesthetic jelly may be applied to numb the urethra. Other pain medicine is usually not needed. In some cases, you may be offered a mild sedative to help you relax. If a more extensive procedure is to be done, such as a biopsy or kidney stone removal, general anesthesia may be needed.  · The cystoscope is inserted. A sterile fluid is put into the bladder to expand it. You may feel pressure from this fluid.  · When the procedure is done, the cystoscope is removed.  After the procedure  If you had a sedative, general anesthesia, or spinal anesthesia, you must have someone drive you home. Once youre home:  · Drink plenty of fluids.  · You may have burning or light bleeding when you urinate--this is normal.  · Medicines may be prescribed to ease any discomfort  or prevent infection. Take these as directed.  · Call your doctor if you have heavy bleeding or blood clots, burning that lasts more than a day, a fever over 100°F  (38° C), or trouble urinating.  Date Last Reviewed: 1/1/2017  © 0094-3378 [employer]. [address] [place] [address], [place] [address]. All rights reserved. This information is not intended as a substitute for professional medical care. Always follow your healthcare professional's instructions.

## 2018-05-09 NOTE — PLAN OF CARE
Patient and girlfriend state they are ready to be discharged. Instructions given to patient and SO. Both verbalize understanding. Patient tolerating po liquids with no difficulty. Patient states pain is at a tolerable level for them. Anesthesia consent and surgical consent in chart upon patient's discharge from [facility].

## 2018-05-09 NOTE — OP NOTE
[facility]  Operative Note    Date: 05/09/2018    Pre-Op Diagnosis: neurogenic bladder  Patient Active Problem List   Diagnosis    Injury at C6 level of cervical spinal cord    Neurogenic bladder    Neurogenic bowel    Suprapubic catheter    Colostomy status    Paraplegia following spinal cord injury    Major depression in partial remission    Spastic quadriparesis    Bursitis of shoulder    Autonomic dysreflexia    Neuropathic pain    Abnormal EKG    Spina bifida    Abnormal albumin    Urinary urgency    Anemia    Therapeutic opioid induced constipation    Urinary tract infection associated with cystostomy catheter    History of ESBL Klebsiella pneumoniae infection    Elevated troponin    Decubitus ulcer of ischial area, left, stage IV    Decubitus ulcer of right ischium, stage 4    Mild intermittent asthma without complication       Post-Op Diagnosis: same    Procedure(s) Performed:   1.  Cystoscopy with bladder botox injection  2.  Suprapubic catheter exchange    Specimen(s): none    Staff Surgeon:  Dk Luna MD    Assistant Surgeon: Clement Alarcon MD    Anesthesia: Monitored Local Anesthesia with Sedation    Indications: Nghia Edgar is a 32 y.o. male with neurogenic bladder who benefits from bladder botox injections.     Findings:   300u in 30cc injected into bladder detrusor  18fr suprapubic catheter exchanged    Estimated Blood Loss: min    Drains: none    Procedure in Detail:  After informed consent was obtained the patient was brought to the cystoscopy suite and placed in the supine position.  SCDs were applied and working.  Anesthesia was administered.  When the patient was adequately sedated she was placed in the dorsal lithotomy position and prepped and draped in the usual sterile fashion.      Before the start of the procedure we removed the patient's indwelling suprapubic catheter. We then prepped the site with betadine and placed a new 18 fr snow  cathter through the suprapubic site and placed 10cc in the snow balloon.     A rigid cystoscope in a 22 Fr sheath was introduced into the patients's bladder via the urethra.  This passed easily.  Formal cystoscopy was performed which revealed the ureteral orifices in their normal anatomic position bilaterally.  No bladder masses, trabeculations, stones or diverticuli were seen.      300 units of botox was injected into the detrusor muscle throughout the bladder.  Good wheals were raised.  The patient's bladder was drained and the cystoscope was removed.     The patient tolerated the procedure well and was transferred to the recovery room in stable condition.      Disposition:  The patient will follow up with Dr. Luna in 3 months.     Clement Alarcon MD

## 2018-05-10 VITALS
HEART RATE: 82 BPM | OXYGEN SATURATION: 99 % | HEIGHT: 67 IN | TEMPERATURE: 98 F | BODY MASS INDEX: 26.68 KG/M2 | DIASTOLIC BLOOD PRESSURE: 68 MMHG | WEIGHT: 170 LBS | SYSTOLIC BLOOD PRESSURE: 111 MMHG | RESPIRATION RATE: 16 BRPM

## 2018-05-10 NOTE — ANESTHESIA POSTPROCEDURE EVALUATION
"Anesthesia Post Evaluation    Patient: Nghia Edgar    Procedure(s) Performed: Procedure(s) (LRB):  CYSTOSCOPY (N/A)  INJECTION-BOTOX (N/A)  EXCHANGE -SUPRA PUBIC CATHETER (N/A)    Final Anesthesia Type: MAC  Patient location during evaluation: PACU  Patient participation: Yes- Able to Participate  Level of consciousness: awake and alert  Post-procedure vital signs: reviewed and stable  Pain management: adequate  Airway patency: patent  PONV status at discharge: No PONV  Anesthetic complications: no      Cardiovascular status: stable  Respiratory status: unassisted and spontaneous ventilation  Hydration status: euvolemic  Follow-up not needed.        Visit Vitals  /68   Pulse 82   Temp 36.8 °C (98.3 °F) (Skin)   Resp 16   Ht 5' 7" (1.702 m)   Wt 77.1 kg (170 lb)   SpO2 99%   BMI 26.63 kg/m²       Pain/Carmina Score: Pain Assessment Performed: Yes (5/9/2018  5:00 PM)  Presence of Pain: denies (5/9/2018  5:00 PM)  Carmina Score: 10 (5/9/2018  4:22 PM)      "

## 2018-05-11 ENCOUNTER — OFFICE VISIT (OUTPATIENT)
Dept: WOUND CARE | Facility: CLINIC | Age: 33
End: 2018-05-11
Payer: MEDICAID

## 2018-05-11 ENCOUNTER — PATIENT MESSAGE (OUTPATIENT)
Dept: WOUND CARE | Facility: CLINIC | Age: 33
End: 2018-05-11

## 2018-05-11 VITALS — TEMPERATURE: 99 F | SYSTOLIC BLOOD PRESSURE: 124 MMHG | DIASTOLIC BLOOD PRESSURE: 72 MMHG | HEART RATE: 89 BPM

## 2018-05-11 DIAGNOSIS — L89.324 DECUBITUS ULCER OF ISCHIAL AREA, LEFT, STAGE IV: Primary | ICD-10-CM

## 2018-05-11 DIAGNOSIS — L89.314 DECUBITUS ULCER OF RIGHT ISCHIUM, STAGE 4: ICD-10-CM

## 2018-05-11 PROCEDURE — 97605 NEG PRS WND THER DME<=50SQCM: CPT | Mod: PBBFAC | Performed by: NURSE PRACTITIONER

## 2018-05-11 PROCEDURE — 99999 PR PBB SHADOW E&M-EST. PATIENT-LVL IV: CPT | Mod: PBBFAC,,, | Performed by: NURSE PRACTITIONER

## 2018-05-11 PROCEDURE — 97605 NEG PRS WND THER DME<=50SQCM: CPT | Mod: S$PBB,,, | Performed by: NURSE PRACTITIONER

## 2018-05-11 PROCEDURE — 99214 OFFICE O/P EST MOD 30 MIN: CPT | Mod: PBBFAC,25 | Performed by: NURSE PRACTITIONER

## 2018-05-11 PROCEDURE — 99212 OFFICE O/P EST SF 10 MIN: CPT | Mod: 25,S$PBB,, | Performed by: NURSE PRACTITIONER
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
## 2018-05-14 DIAGNOSIS — G82.20 PARAPLEGIA: Primary | ICD-10-CM

## 2018-05-15 ENCOUNTER — ANESTHESIA EVENT (OUTPATIENT)
Dept: SURGERY | Facility: HOSPITAL | Age: 33
DRG: 573 | End: 2018-05-15
Payer: MEDICAID
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
## 2018-05-16 ENCOUNTER — PATIENT MESSAGE (OUTPATIENT)
Dept: PLASTIC SURGERY | Facility: CLINIC | Age: 33
End: 2018-05-16

## 2018-05-16 ENCOUNTER — PATIENT MESSAGE (OUTPATIENT)
Dept: UROLOGY | Facility: CLINIC | Age: 33
End: 2018-05-16

## 2018-05-16 ENCOUNTER — PATIENT MESSAGE (OUTPATIENT)
Dept: SURGERY | Facility: HOSPITAL | Age: 33
End: 2018-05-16

## 2018-05-19 DIAGNOSIS — F41.9 ANXIETY: ICD-10-CM

## 2018-05-21 ENCOUNTER — PATIENT MESSAGE (OUTPATIENT)
Dept: PHYSICAL MEDICINE AND REHAB | Facility: CLINIC | Age: 33
End: 2018-05-21

## 2018-05-21 ENCOUNTER — TELEPHONE (OUTPATIENT)
Dept: PLASTIC SURGERY | Facility: CLINIC | Age: 33
End: 2018-05-21

## 2018-05-21 RX ORDER — DIAZEPAM 10 MG/1
TABLET ORAL
Qty: 90 TABLET | Refills: 5 | Status: SHIPPED | OUTPATIENT
Start: 2018-05-21 | End: 2018-07-02 | Stop reason: SDUPTHER

## 2018-05-21 NOTE — TELEPHONE ENCOUNTER
Mr. Edgar wasn't available to personally accept my call, but I was able to leave a voice message asking him to report to 2nd floor [facility] on Tuesday, May 22, 2018 at 0800 with a surgery start time of 1000. I reminded him of NPO status and to shower using antibacterial soap. I left my direct contact information for Mr. Edgar if he had any questions.

## 2018-05-22 ENCOUNTER — PATIENT MESSAGE (OUTPATIENT)
Dept: SURGERY | Facility: HOSPITAL | Age: 33
End: 2018-05-22

## 2018-05-22 ENCOUNTER — ANESTHESIA (OUTPATIENT)
Dept: SURGERY | Facility: HOSPITAL | Age: 33
DRG: 573 | End: 2018-05-22
Payer: MEDICAID

## 2018-05-22 ENCOUNTER — HOSPITAL ENCOUNTER (INPATIENT)
Facility: HOSPITAL | Age: 33
LOS: 21 days | Discharge: NURSING, REHAB OR LONG-TERM CARE | DRG: 573 | End: 2018-06-12
Attending: SURGERY | Admitting: SURGERY
Payer: MEDICAID

## 2018-05-22 DIAGNOSIS — L89.324 DECUBITUS ULCER OF ISCHIAL AREA, LEFT, STAGE IV: ICD-10-CM

## 2018-05-22 DIAGNOSIS — L89.324: ICD-10-CM

## 2018-05-22 DIAGNOSIS — L89.314 DECUBITUS ULCER OF RIGHT ISCHIAL AREA, STAGE IV: Primary | ICD-10-CM

## 2018-05-22 LAB
GRAM STN SPEC: NORMAL

## 2018-05-22 PROCEDURE — 63600175 PHARM REV CODE 636 W HCPCS: Performed by: NURSE ANESTHETIST, CERTIFIED REGISTERED

## 2018-05-22 PROCEDURE — 63600175 PHARM REV CODE 636 W HCPCS: Performed by: STUDENT IN AN ORGANIZED HEALTH CARE EDUCATION/TRAINING PROGRAM

## 2018-05-22 PROCEDURE — 25000003 PHARM REV CODE 250: Performed by: STUDENT IN AN ORGANIZED HEALTH CARE EDUCATION/TRAINING PROGRAM

## 2018-05-22 PROCEDURE — 87077 CULTURE AEROBIC IDENTIFY: CPT

## 2018-05-22 PROCEDURE — 11000001 HC ACUTE MED/SURG PRIVATE ROOM

## 2018-05-22 PROCEDURE — D9220A PRA ANESTHESIA: Mod: ANES,,, | Performed by: ANESTHESIOLOGY

## 2018-05-22 PROCEDURE — 87102 FUNGUS ISOLATION CULTURE: CPT

## 2018-05-22 PROCEDURE — 0QB20ZZ EXCISION OF RIGHT PELVIC BONE, OPEN APPROACH: ICD-10-PCS | Performed by: SURGERY

## 2018-05-22 PROCEDURE — 36000706: Performed by: SURGERY

## 2018-05-22 PROCEDURE — 87070 CULTURE OTHR SPECIMN AEROBIC: CPT | Mod: 59

## 2018-05-22 PROCEDURE — 25000003 PHARM REV CODE 250: Performed by: SURGERY

## 2018-05-22 PROCEDURE — 88304 TISSUE EXAM BY PATHOLOGIST: CPT | Mod: 26,,, | Performed by: PATHOLOGY

## 2018-05-22 PROCEDURE — 71000039 HC RECOVERY, EACH ADD'L HOUR: Performed by: SURGERY

## 2018-05-22 PROCEDURE — 88304 TISSUE EXAM BY PATHOLOGIST: CPT | Performed by: PATHOLOGY

## 2018-05-22 PROCEDURE — 15946 EXC ISCH PR ULC PREP MUS FLP: CPT | Mod: 50,,, | Performed by: SURGERY

## 2018-05-22 PROCEDURE — 0JXM0ZC TRANSFER LEFT UPPER LEG SUBCUTANEOUS TISSUE AND FASCIA WITH SKIN, SUBCUTANEOUS TISSUE AND FASCIA, OPEN APPROACH: ICD-10-PCS | Performed by: SURGERY

## 2018-05-22 PROCEDURE — 36000707: Performed by: SURGERY

## 2018-05-22 PROCEDURE — 87205 SMEAR GRAM STAIN: CPT | Mod: 59

## 2018-05-22 PROCEDURE — 87147 CULTURE TYPE IMMUNOLOGIC: CPT

## 2018-05-22 PROCEDURE — C1729 CATH, DRAINAGE: HCPCS | Performed by: SURGERY

## 2018-05-22 PROCEDURE — 87186 SC STD MICRODIL/AGAR DIL: CPT

## 2018-05-22 PROCEDURE — 94761 N-INVAS EAR/PLS OXIMETRY MLT: CPT

## 2018-05-22 PROCEDURE — 63600175 PHARM REV CODE 636 W HCPCS: Performed by: ANESTHESIOLOGY

## 2018-05-22 PROCEDURE — D9220A PRA ANESTHESIA: Mod: CRNA,,, | Performed by: NURSE ANESTHETIST, CERTIFIED REGISTERED

## 2018-05-22 PROCEDURE — 63600175 PHARM REV CODE 636 W HCPCS: Performed by: SURGERY

## 2018-05-22 PROCEDURE — 27000221 HC OXYGEN, UP TO 24 HOURS

## 2018-05-22 PROCEDURE — 25000003 PHARM REV CODE 250: Performed by: NURSE ANESTHETIST, CERTIFIED REGISTERED

## 2018-05-22 PROCEDURE — 87076 CULTURE ANAEROBE IDENT EACH: CPT | Mod: 59

## 2018-05-22 PROCEDURE — 15734 MUSCLE-SKIN GRAFT TRUNK: CPT | Mod: 51,,, | Performed by: SURGERY

## 2018-05-22 PROCEDURE — 87176 TISSUE HOMOGENIZATION CULTR: CPT

## 2018-05-22 PROCEDURE — 25000003 PHARM REV CODE 250

## 2018-05-22 PROCEDURE — 0JXL0ZC TRANSFER RIGHT UPPER LEG SUBCUTANEOUS TISSUE AND FASCIA WITH SKIN, SUBCUTANEOUS TISSUE AND FASCIA, OPEN APPROACH: ICD-10-PCS | Performed by: SURGERY

## 2018-05-22 PROCEDURE — 71000033 HC RECOVERY, INTIAL HOUR: Performed by: SURGERY

## 2018-05-22 PROCEDURE — 87075 CULTR BACTERIA EXCEPT BLOOD: CPT

## 2018-05-22 PROCEDURE — 37000008 HC ANESTHESIA 1ST 15 MINUTES: Performed by: SURGERY

## 2018-05-22 PROCEDURE — 37000009 HC ANESTHESIA EA ADD 15 MINS: Performed by: SURGERY

## 2018-05-22 PROCEDURE — 87106 FUNGI IDENTIFICATION YEAST: CPT

## 2018-05-22 PROCEDURE — 15002 WOUND PREP TRK/ARM/LEG: CPT | Mod: 51,,, | Performed by: SURGERY

## 2018-05-22 PROCEDURE — 0QB30ZZ EXCISION OF LEFT PELVIC BONE, OPEN APPROACH: ICD-10-PCS | Performed by: SURGERY

## 2018-05-22 RX ORDER — ACETAMINOPHEN 325 MG/1
650 TABLET ORAL EVERY 4 HOURS PRN
Status: DISCONTINUED | OUTPATIENT
Start: 2018-05-22 | End: 2018-05-22

## 2018-05-22 RX ORDER — OXYCODONE HYDROCHLORIDE 5 MG/1
10 TABLET ORAL ONCE AS NEEDED
Status: ACTIVE | OUTPATIENT
Start: 2018-05-22 | End: 2018-05-22

## 2018-05-22 RX ORDER — MIDAZOLAM HYDROCHLORIDE 1 MG/ML
INJECTION, SOLUTION INTRAMUSCULAR; INTRAVENOUS
Status: DISCONTINUED | OUTPATIENT
Start: 2018-05-22 | End: 2018-05-22

## 2018-05-22 RX ORDER — PREGABALIN 50 MG/1
200 CAPSULE ORAL 3 TIMES DAILY
Status: DISCONTINUED | OUTPATIENT
Start: 2018-05-22 | End: 2018-06-12 | Stop reason: HOSPADM

## 2018-05-22 RX ORDER — OXYCODONE AND ACETAMINOPHEN 10; 325 MG/1; MG/1
1 TABLET ORAL EVERY 6 HOURS PRN
Status: DISCONTINUED | OUTPATIENT
Start: 2018-05-22 | End: 2018-05-22

## 2018-05-22 RX ORDER — HYDROCODONE BITARTRATE AND ACETAMINOPHEN 5; 325 MG/1; MG/1
1 TABLET ORAL EVERY 4 HOURS PRN
Status: DISCONTINUED | OUTPATIENT
Start: 2018-05-22 | End: 2018-05-22

## 2018-05-22 RX ORDER — LIDOCAINE HYDROCHLORIDE 10 MG/ML
1 INJECTION, SOLUTION EPIDURAL; INFILTRATION; INTRACAUDAL; PERINEURAL ONCE
Status: COMPLETED | OUTPATIENT
Start: 2018-05-22 | End: 2018-05-22

## 2018-05-22 RX ORDER — CEFAZOLIN SODIUM 1 G/3ML
2 INJECTION, POWDER, FOR SOLUTION INTRAMUSCULAR; INTRAVENOUS
Status: DISCONTINUED | OUTPATIENT
Start: 2018-05-22 | End: 2018-05-22

## 2018-05-22 RX ORDER — ASCORBIC ACID 500 MG
500 TABLET ORAL EVERY MORNING
Status: DISCONTINUED | OUTPATIENT
Start: 2018-05-23 | End: 2018-06-12 | Stop reason: HOSPADM

## 2018-05-22 RX ORDER — OXYCODONE AND ACETAMINOPHEN 7.5; 325 MG/1; MG/1
2 TABLET ORAL EVERY 8 HOURS PRN
Status: DISCONTINUED | OUTPATIENT
Start: 2018-05-22 | End: 2018-06-12 | Stop reason: HOSPADM

## 2018-05-22 RX ORDER — NEOSTIGMINE METHYLSULFATE 1 MG/ML
INJECTION, SOLUTION INTRAVENOUS
Status: DISCONTINUED | OUTPATIENT
Start: 2018-05-22 | End: 2018-05-22

## 2018-05-22 RX ORDER — POTASSIUM CITRATE 10 MEQ/1
10 TABLET, EXTENDED RELEASE ORAL DAILY
Status: DISCONTINUED | OUTPATIENT
Start: 2018-05-23 | End: 2018-06-12 | Stop reason: HOSPADM

## 2018-05-22 RX ORDER — GLYCOPYRROLATE 0.2 MG/ML
INJECTION INTRAMUSCULAR; INTRAVENOUS
Status: DISCONTINUED | OUTPATIENT
Start: 2018-05-22 | End: 2018-05-22

## 2018-05-22 RX ORDER — CEFAZOLIN SODIUM 1 G/3ML
2 INJECTION, POWDER, FOR SOLUTION INTRAMUSCULAR; INTRAVENOUS
Status: COMPLETED | OUTPATIENT
Start: 2018-05-22 | End: 2018-05-23

## 2018-05-22 RX ORDER — DEXAMETHASONE SODIUM PHOSPHATE 4 MG/ML
INJECTION, SOLUTION INTRA-ARTICULAR; INTRALESIONAL; INTRAMUSCULAR; INTRAVENOUS; SOFT TISSUE
Status: DISCONTINUED | OUTPATIENT
Start: 2018-05-22 | End: 2018-05-22

## 2018-05-22 RX ORDER — KETAMINE HYDROCHLORIDE 100 MG/ML
INJECTION, SOLUTION INTRAMUSCULAR; INTRAVENOUS
Status: DISCONTINUED | OUTPATIENT
Start: 2018-05-22 | End: 2018-05-22

## 2018-05-22 RX ORDER — PHENYLEPHRINE HYDROCHLORIDE 10 MG/ML
INJECTION INTRAVENOUS
Status: DISCONTINUED | OUTPATIENT
Start: 2018-05-22 | End: 2018-05-22

## 2018-05-22 RX ORDER — ROCURONIUM BROMIDE 10 MG/ML
INJECTION, SOLUTION INTRAVENOUS
Status: DISCONTINUED | OUTPATIENT
Start: 2018-05-22 | End: 2018-05-22

## 2018-05-22 RX ORDER — CEFAZOLIN SODIUM 1 G/3ML
2 INJECTION, POWDER, FOR SOLUTION INTRAMUSCULAR; INTRAVENOUS
Status: COMPLETED | OUTPATIENT
Start: 2018-05-22 | End: 2018-05-22

## 2018-05-22 RX ORDER — HEPARIN SODIUM 5000 [USP'U]/ML
5000 INJECTION, SOLUTION INTRAVENOUS; SUBCUTANEOUS EVERY 8 HOURS
Status: DISCONTINUED | OUTPATIENT
Start: 2018-05-22 | End: 2018-05-22 | Stop reason: HOSPADM

## 2018-05-22 RX ORDER — FENTANYL CITRATE 50 UG/ML
INJECTION, SOLUTION INTRAMUSCULAR; INTRAVENOUS
Status: DISCONTINUED | OUTPATIENT
Start: 2018-05-22 | End: 2018-05-22

## 2018-05-22 RX ORDER — PROPOFOL 10 MG/ML
VIAL (ML) INTRAVENOUS
Status: DISCONTINUED | OUTPATIENT
Start: 2018-05-22 | End: 2018-05-22

## 2018-05-22 RX ORDER — OXYCODONE HCL 20 MG/1
40 TABLET, FILM COATED, EXTENDED RELEASE ORAL EVERY 12 HOURS
Status: DISCONTINUED | OUTPATIENT
Start: 2018-05-22 | End: 2018-06-12 | Stop reason: HOSPADM

## 2018-05-22 RX ORDER — HYDROMORPHONE HYDROCHLORIDE 1 MG/ML
0.2 INJECTION, SOLUTION INTRAMUSCULAR; INTRAVENOUS; SUBCUTANEOUS EVERY 5 MIN PRN
Status: DISCONTINUED | OUTPATIENT
Start: 2018-05-22 | End: 2018-05-22 | Stop reason: HOSPADM

## 2018-05-22 RX ORDER — MUPIROCIN 20 MG/G
OINTMENT TOPICAL
Status: DISCONTINUED | OUTPATIENT
Start: 2018-05-22 | End: 2018-05-22 | Stop reason: HOSPADM

## 2018-05-22 RX ORDER — SODIUM CHLORIDE 9 MG/ML
INJECTION, SOLUTION INTRAVENOUS CONTINUOUS
Status: DISCONTINUED | OUTPATIENT
Start: 2018-05-22 | End: 2018-06-12 | Stop reason: HOSPADM

## 2018-05-22 RX ORDER — ACETAMINOPHEN 10 MG/ML
INJECTION, SOLUTION INTRAVENOUS
Status: DISCONTINUED | OUTPATIENT
Start: 2018-05-22 | End: 2018-05-22

## 2018-05-22 RX ORDER — DIAZEPAM 5 MG/1
10 TABLET ORAL 3 TIMES DAILY PRN
Status: DISCONTINUED | OUTPATIENT
Start: 2018-05-22 | End: 2018-06-12 | Stop reason: HOSPADM

## 2018-05-22 RX ORDER — LIDOCAINE HCL/PF 100 MG/5ML
SYRINGE (ML) INTRAVENOUS
Status: DISCONTINUED | OUTPATIENT
Start: 2018-05-22 | End: 2018-05-22

## 2018-05-22 RX ORDER — ALBUTEROL SULFATE 90 UG/1
2 AEROSOL, METERED RESPIRATORY (INHALATION) EVERY 6 HOURS PRN
Status: DISCONTINUED | OUTPATIENT
Start: 2018-05-22 | End: 2018-06-12 | Stop reason: HOSPADM

## 2018-05-22 RX ORDER — OXYBUTYNIN CHLORIDE 5 MG/1
5 TABLET ORAL 2 TIMES DAILY
Status: DISCONTINUED | OUTPATIENT
Start: 2018-05-22 | End: 2018-06-12 | Stop reason: HOSPADM

## 2018-05-22 RX ORDER — LIDOCAINE HYDROCHLORIDE 10 MG/ML
1 INJECTION, SOLUTION EPIDURAL; INFILTRATION; INTRACAUDAL; PERINEURAL ONCE
Status: DISCONTINUED | OUTPATIENT
Start: 2018-05-22 | End: 2018-05-22

## 2018-05-22 RX ORDER — ONDANSETRON 8 MG/1
8 TABLET, ORALLY DISINTEGRATING ORAL EVERY 8 HOURS PRN
Status: DISCONTINUED | OUTPATIENT
Start: 2018-05-22 | End: 2018-06-12 | Stop reason: HOSPADM

## 2018-05-22 RX ORDER — OXYCODONE AND ACETAMINOPHEN 10; 325 MG/1; MG/1
TABLET ORAL
Status: COMPLETED
Start: 2018-05-22 | End: 2018-05-22

## 2018-05-22 RX ADMIN — LIDOCAINE HYDROCHLORIDE 100 MG: 20 INJECTION, SOLUTION INTRAVENOUS at 12:05

## 2018-05-22 RX ADMIN — Medication 0.2 MG: at 05:05

## 2018-05-22 RX ADMIN — HEPARIN SODIUM 5000 UNITS: 5000 INJECTION, SOLUTION INTRAVENOUS; SUBCUTANEOUS at 11:05

## 2018-05-22 RX ADMIN — OXYCODONE HYDROCHLORIDE AND ACETAMINOPHEN 1 TABLET: 10; 325 TABLET ORAL at 10:05

## 2018-05-22 RX ADMIN — CEFAZOLIN 2 G: 330 INJECTION, POWDER, FOR SOLUTION INTRAMUSCULAR; INTRAVENOUS at 12:05

## 2018-05-22 RX ADMIN — DEXAMETHASONE SODIUM PHOSPHATE 4 MG: 4 INJECTION, SOLUTION INTRAMUSCULAR; INTRAVENOUS at 02:05

## 2018-05-22 RX ADMIN — SODIUM CHLORIDE, SODIUM GLUCONATE, SODIUM ACETATE, POTASSIUM CHLORIDE, MAGNESIUM CHLORIDE, SODIUM PHOSPHATE, DIBASIC, AND POTASSIUM PHOSPHATE: .53; .5; .37; .037; .03; .012; .00082 INJECTION, SOLUTION INTRAVENOUS at 12:05

## 2018-05-22 RX ADMIN — SODIUM CHLORIDE 1000 ML: 0.9 INJECTION, SOLUTION INTRAVENOUS at 04:05

## 2018-05-22 RX ADMIN — KETAMINE HYDROCHLORIDE 30 MG: 100 INJECTION, SOLUTION, CONCENTRATE INTRAMUSCULAR; INTRAVENOUS at 01:05

## 2018-05-22 RX ADMIN — OXYCODONE HYDROCHLORIDE 40 MG: 20 TABLET, FILM COATED, EXTENDED RELEASE ORAL at 10:05

## 2018-05-22 RX ADMIN — DIAZEPAM 10 MG: 5 TABLET ORAL at 10:05

## 2018-05-22 RX ADMIN — SODIUM CHLORIDE: 0.9 INJECTION, SOLUTION INTRAVENOUS at 02:05

## 2018-05-22 RX ADMIN — PROPOFOL 200 MG: 10 INJECTION, EMULSION INTRAVENOUS at 12:05

## 2018-05-22 RX ADMIN — FENTANYL CITRATE 100 MCG: 50 INJECTION, SOLUTION INTRAMUSCULAR; INTRAVENOUS at 12:05

## 2018-05-22 RX ADMIN — ACETAMINOPHEN 1000 MG: 10 INJECTION, SOLUTION INTRAVENOUS at 01:05

## 2018-05-22 RX ADMIN — ROCURONIUM BROMIDE 40 MG: 10 INJECTION, SOLUTION INTRAVENOUS at 12:05

## 2018-05-22 RX ADMIN — NEOSTIGMINE METHYLSULFATE 4 MG: 1 INJECTION INTRAVENOUS at 03:05

## 2018-05-22 RX ADMIN — GLYCOPYRROLATE 0.4 MG: 0.2 INJECTION, SOLUTION INTRAMUSCULAR; INTRAVENOUS at 03:05

## 2018-05-22 RX ADMIN — MIDAZOLAM HYDROCHLORIDE 2 MG: 1 INJECTION, SOLUTION INTRAMUSCULAR; INTRAVENOUS at 12:05

## 2018-05-22 RX ADMIN — PHENYLEPHRINE HYDROCHLORIDE 50 MCG: 10 INJECTION INTRAVENOUS at 02:05

## 2018-05-22 RX ADMIN — PHENYLEPHRINE HYDROCHLORIDE 100 MCG: 10 INJECTION INTRAVENOUS at 02:05

## 2018-05-22 RX ADMIN — PHENYLEPHRINE HYDROCHLORIDE 50 MCG: 10 INJECTION INTRAVENOUS at 03:05

## 2018-05-22 RX ADMIN — SODIUM CHLORIDE: 0.9 INJECTION, SOLUTION INTRAVENOUS at 12:05

## 2018-05-22 RX ADMIN — LIDOCAINE HYDROCHLORIDE 10 MG: 10 INJECTION, SOLUTION EPIDURAL; INFILTRATION; INTRACAUDAL; PERINEURAL at 09:05

## 2018-05-22 RX ADMIN — OXYBUTYNIN CHLORIDE 5 MG: 5 TABLET ORAL at 10:05

## 2018-05-22 RX ADMIN — PREGABALIN 200 MG: 50 CAPSULE ORAL at 10:05

## 2018-05-22 RX ADMIN — OXYCODONE HYDROCHLORIDE AND ACETAMINOPHEN 1 TABLET: 10; 325 TABLET ORAL at 03:05

## 2018-05-22 RX ADMIN — CEFAZOLIN 2 G: 1 INJECTION, POWDER, FOR SOLUTION INTRAMUSCULAR; INTRAVENOUS at 10:05

## 2018-05-22 RX ADMIN — FENTANYL CITRATE 50 MCG: 50 INJECTION, SOLUTION INTRAMUSCULAR; INTRAVENOUS at 01:05

## 2018-05-22 RX ADMIN — SODIUM CHLORIDE 1000 ML: 0.9 INJECTION, SOLUTION INTRAVENOUS at 09:05

## 2018-05-22 RX ADMIN — FENTANYL CITRATE 25 MCG: 50 INJECTION, SOLUTION INTRAMUSCULAR; INTRAVENOUS at 03:05

## 2018-05-22 RX ADMIN — MUPIROCIN: 20 OINTMENT TOPICAL at 11:05
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
## 2018-05-22 NOTE — OP NOTE
DATE OF PROCEDURE:  05/22/2018.    PREOPERATIVE DIAGNOSIS:  Bilateral ischial tuberosity pressure sores.    POSTOPERATIVE DIAGNOSIS:  Bilateral ischial tuberosity pressure sores.    PROCEDURES PERFORMED:  1.  Wound preparation for flap.  2.  Right partial ischiectomy.  3.  Closure of right ischial pressure sore using a fasciocutaneous flap.  4.  Wound preparation for flap.  5.  Partial left ischiectomy.  6.  Closure of left ischial pressure sore using a fasciocutaneous flap.    SURGEON:  Kalin Philippe M.D., F.A.C.S.    ANESTHESIA:  General.    BLOOD LOSS:  300 mL.    COMPLICATIONS:  None.    SPECIMENS SENT:  1.  Bone for culture from both right and left ischium.  2.  Bone and soft tissue for pathology.    DESCRIPTION OF PROCEDURE:  The patient was placed in the supine position after   adequate general endotracheal anesthesia and was repositioned in the prone   position.  The patient had two large ischial pressure sores with exposed bone.    Methylene blue was then inserted into the wounds.  The wound was completely   reexcised and all the methylene blue stained tissue was excised.  Next, an   osteotome and mallet were then used to perform partial ischiectomy.  All bone   was removed that was exposed; only bleeding healthy bone remained.  The wound   was then irrigated with copious amounts of irrigation.  The patient was then   reprepped using peroxide on the skin.  Gloves were changed.  Two fasciocutaneous   flaps were marked just lateral to the defect.  The incisions were made.  The   incision was deepened down through the deep fascia.  The deep fascia was   included with the flap.  It was then transposed and easily covered the defect.    A drain was then placed.  The deep tissue was closed using running 2-0 Vicryl   sutures.  The skin was closed using 0 Vicryl medially, alternating vertical   mattress with simple sutures.  The remainder were closed with interrupted 2-0   nylon sutures, alternating simple  and vertical mattress sutures.  Similar   fasciocutaneous flap and closure were performed on the opposite side.  There   were no complications.      CRB/HN  dd: 05/22/2018 15:22:01 (CDT)  td: 05/22/2018 16:01:00 (CDT)  Doc ID   #[id number]  Job ID #[id number]    CC:

## 2018-05-22 NOTE — TRANSFER OF CARE
"Anesthesia Transfer of Care Note    Patient: Nghia Edgar Jr.    Procedure(s) Performed: Procedure(s) (LRB):  OSTECTOMY (N/A)  DEBRIDEMENT    Patient location: PACU    Anesthesia Type: general    Transport from OR: Transported from OR on 6-10 L/min O2 by face mask with adequate spontaneous ventilation    Post pain: adequate analgesia    Post assessment: no apparent anesthetic complications and tolerated procedure well    Post vital signs: stable    Level of consciousness: awake and alert    Nausea/Vomiting: no nausea/vomiting    Complications: none    Transfer of care protocol was followed      Last vitals:   Visit Vitals  BP (!) 94/55   Pulse 67   Temp 36.6 °C (97.8 °F) (Temporal)   Resp 20   Ht 5' 8" (1.727 m)   Wt 81.6 kg (180 lb)   SpO2 100%   BMI 27.37 kg/m²     "

## 2018-05-22 NOTE — OP NOTE
DATE OF PROCEDURE:  05/22/2018    PREOPERATIVE DIAGNOSIS:  Bilateral ischial tuberosity pressure sores.    POSTOPERATIVE DIAGNOSIS:  Bilateral ischial tuberosity pressure sores.    PROCEDURES PERFORMED:  1.  Wound preparation for flap.  2.  Right partial ischiectomy.  3.  Closure of right ischial pressure sore using a fasciocutaneous flap.  4.  Wound preparation for flap.  5.  Partial left ischiectomy.  6.  Closure of left ischial pressure sore using a fasciocutaneous flap.    SURGEON:  Kalin Philippe M.D., F.A.C.S.    ANESTHESIA:  General.    BLOOD LOSS:  300 mL.    COMPLICATIONS:  None.    SPECIMENS SENT:  1.  Bone for culture from both right and left ischium.  2.  Bone and soft tissue for pathology.    DESCRIPTION OF PROCEDURE:  The patient was placed in the supine position after   adequate general endotracheal anesthesia and was repositioned in the prone   position.  The patient had two large ischial pressure sores with exposed bone.    Methylene blue was then inserted into the wounds.  The wound was completely   reexcised and all the methylene blue stained tissue was excised.  Next, an   osteotome and mallet were then used to perform partial ischiectomy.  All bone   was removed that was exposed; only bleeding healthy bone remained.  The wound   was then irrigated with copious amounts of irrigation.  The patient was then   reprepped using peroxide on the skin.  Gloves were changed.  Two fasciocutaneous   flaps were marked just lateral to the defect.  The incisions were made.  The   incision was deepened down through the deep fascia.  The deep fascia was   included with the flap.  It was then transposed and easily covered the defect.    A drain was then placed.  The deep tissue was closed using running 2-0 Vicryl   sutures.  The skin was closed using 0 Vicryl medially, alternating vertical   mattress with simple sutures.  The remainder were closed with interrupted 2-0   nylon sutures, alternating simple and  vertical mattress sutures.  Similar   fasciocutaneous flap and closure were performed on the opposite side.  There   were no complications.    ADDENDUM:  After the fasciocutaneous flaps were inset and closed, that left a large defect   in each thigh.  Thus, advancement flap closure of the right thigh was then   performed.  The advancement flap on the right side measured approximately 173   cm.  The defect was 11 x 10 cm and the flap was 7 x 9 cm for a total advancement   flap of 173 cm.  Next is closure of the left thigh using an advancement flap.    Flap dimensions were, the defect was 11 x 9 cm and the flap 9 x 8 cm for a total   advancement flap of 161 cm.        DREW/JOSÉ  dd: 05/22/2018 15:22:01 (CDT)  td: 05/22/2018 16:01:00 (CDT)  Doc ID   #[id number]  Job ID #[id number]    CC:

## 2018-05-22 NOTE — BRIEF OP NOTE
[facility]  Brief Operative Note    SUMMARY     Surgery Date: 5/22/2018     Surgeon(s) and Role:     * Isidro Quinones MD - Resident - Assisting     * Kalin Philippe MD - Primary        Pre-op Diagnosis:  Paraplegia [G82.20]    Post-op Diagnosis:  Post-Op Diagnosis Codes:     * Paraplegia [G82.20]    Procedure(s) (LRB):  OSTECTOMY (N/A)  DEBRIDEMENT    Anesthesia: General    Description of Procedure: bilateral posterior thigh fasciocutaneous flap     Description of the findings of the procedure: ichial tuberosity debridement bilaterally. Excision of both ischial ulcers guided with methylene blue, partial ischiectomies    Estimated Blood Loss: 300 mL         Specimens:   Specimen (12h ago through future)    Start     Ordered    05/22/18 1432  Specimen to Pathology - Surgery  Once     Comments:  1. Right ischial tissue - permanent 2. Left ischial tissue - permanent      05/22/18 1432

## 2018-05-22 NOTE — PROGRESS NOTES
Call placed to [facility] Resident at 0835 for pt orders, resident called back and stated they will put in orders after they finish the case they are in.

## 2018-05-22 NOTE — NURSING TRANSFER
Nursing Transfer Note      5/22/2018     Transfer To: [id number]    Transfer via bed    Transfer with none    Transported by rnx2    Medicines sent: none    Chart send with patient: Yes    Notified: mom and girlfriend has gone to the room    Patient reassessed at: 5/22/18

## 2018-05-22 NOTE — PLAN OF CARE
Pt vital signs wnl.  Pt verbalizes pain is tolerable.  Pt verbalizes no nausea.  Sacral dressing intact.  Bilateral анна drains intact.  Bilateral thigh incisions intact.

## 2018-05-23 LAB
ALBUMIN SERPL BCP-MCNC: 2.5 G/DL
ALP SERPL-CCNC: 81 U/L
ALT SERPL W/O P-5'-P-CCNC: 12 U/L
ANION GAP SERPL CALC-SCNC: 6 MMOL/L
AST SERPL-CCNC: 12 U/L
BASOPHILS # BLD AUTO: 0.03 K/UL
BASOPHILS NFR BLD: 0.3 %
BILIRUB SERPL-MCNC: 0.2 MG/DL
BUN SERPL-MCNC: 5 MG/DL
CALCIUM SERPL-MCNC: 8.2 MG/DL
CHLORIDE SERPL-SCNC: 105 MMOL/L
CO2 SERPL-SCNC: 26 MMOL/L
CREAT SERPL-MCNC: 0.5 MG/DL
DIFFERENTIAL METHOD: ABNORMAL
EOSINOPHIL # BLD AUTO: 0 K/UL
EOSINOPHIL NFR BLD: 0.2 %
ERYTHROCYTE [DISTWIDTH] IN BLOOD BY AUTOMATED COUNT: 15.9 %
EST. GFR  (AFRICAN AMERICAN): >60 ML/MIN/1.73 M^2
EST. GFR  (NON AFRICAN AMERICAN): >60 ML/MIN/1.73 M^2
GLUCOSE SERPL-MCNC: 83 MG/DL
HCT VFR BLD AUTO: 26.4 %
HGB BLD-MCNC: 7.8 G/DL
IMM GRANULOCYTES # BLD AUTO: 0.06 K/UL
IMM GRANULOCYTES NFR BLD AUTO: 0.5 %
LYMPHOCYTES # BLD AUTO: 2.2 K/UL
LYMPHOCYTES NFR BLD: 20.5 %
MCH RBC QN AUTO: 24.5 PG
MCHC RBC AUTO-ENTMCNC: 29.5 G/DL
MCV RBC AUTO: 83 FL
MONOCYTES # BLD AUTO: 0.9 K/UL
MONOCYTES NFR BLD: 8.4 %
NEUTROPHILS # BLD AUTO: 7.7 K/UL
NEUTROPHILS NFR BLD: 70.1 %
NRBC BLD-RTO: 0 /100 WBC
PLATELET # BLD AUTO: 278 K/UL
PMV BLD AUTO: 10.5 FL
POTASSIUM SERPL-SCNC: 3.6 MMOL/L
PROT SERPL-MCNC: 5.4 G/DL
RBC # BLD AUTO: 3.19 M/UL
SODIUM SERPL-SCNC: 137 MMOL/L
WBC # BLD AUTO: 10.94 K/UL

## 2018-05-23 PROCEDURE — 99233 SBSQ HOSP IP/OBS HIGH 50: CPT | Mod: ,,, | Performed by: INTERNAL MEDICINE

## 2018-05-23 PROCEDURE — 80053 COMPREHEN METABOLIC PANEL: CPT

## 2018-05-23 PROCEDURE — 63600175 PHARM REV CODE 636 W HCPCS: Performed by: SURGERY

## 2018-05-23 PROCEDURE — 25000003 PHARM REV CODE 250: Performed by: STUDENT IN AN ORGANIZED HEALTH CARE EDUCATION/TRAINING PROGRAM

## 2018-05-23 PROCEDURE — 25000003 PHARM REV CODE 250: Performed by: PHYSICIAN ASSISTANT

## 2018-05-23 PROCEDURE — 11000001 HC ACUTE MED/SURG PRIVATE ROOM

## 2018-05-23 PROCEDURE — 63600175 PHARM REV CODE 636 W HCPCS: Performed by: STUDENT IN AN ORGANIZED HEALTH CARE EDUCATION/TRAINING PROGRAM

## 2018-05-23 PROCEDURE — 36415 COLL VENOUS BLD VENIPUNCTURE: CPT

## 2018-05-23 PROCEDURE — 63600175 PHARM REV CODE 636 W HCPCS: Performed by: PHYSICIAN ASSISTANT

## 2018-05-23 PROCEDURE — 25000003 PHARM REV CODE 250: Performed by: SURGERY

## 2018-05-23 PROCEDURE — 85025 COMPLETE CBC W/AUTO DIFF WBC: CPT

## 2018-05-23 RX ORDER — HEPARIN SODIUM 5000 [USP'U]/ML
5000 INJECTION, SOLUTION INTRAVENOUS; SUBCUTANEOUS EVERY 8 HOURS
Status: DISCONTINUED | OUTPATIENT
Start: 2018-05-23 | End: 2018-06-12 | Stop reason: HOSPADM

## 2018-05-23 RX ORDER — MORPHINE SULFATE 2 MG/ML
2 INJECTION, SOLUTION INTRAMUSCULAR; INTRAVENOUS EVERY 6 HOURS PRN
Status: DISCONTINUED | OUTPATIENT
Start: 2018-05-23 | End: 2018-06-12 | Stop reason: HOSPADM

## 2018-05-23 RX ADMIN — Medication 2 MG: at 04:05

## 2018-05-23 RX ADMIN — PREGABALIN 200 MG: 50 CAPSULE ORAL at 08:05

## 2018-05-23 RX ADMIN — PREGABALIN 200 MG: 50 CAPSULE ORAL at 04:05

## 2018-05-23 RX ADMIN — OXYBUTYNIN CHLORIDE 5 MG: 5 TABLET ORAL at 08:05

## 2018-05-23 RX ADMIN — DIAZEPAM 10 MG: 5 TABLET ORAL at 09:05

## 2018-05-23 RX ADMIN — VANCOMYCIN HYDROCHLORIDE 1250 MG: 1 INJECTION, POWDER, LYOPHILIZED, FOR SOLUTION INTRAVENOUS at 04:05

## 2018-05-23 RX ADMIN — CEFAZOLIN 2 G: 1 INJECTION, POWDER, FOR SOLUTION INTRAMUSCULAR; INTRAVENOUS at 09:05

## 2018-05-23 RX ADMIN — AMPICILLIN SODIUM AND SULBACTAM SODIUM 3 G: 2; 1 INJECTION, POWDER, FOR SOLUTION INTRAMUSCULAR; INTRAVENOUS at 10:05

## 2018-05-23 RX ADMIN — OXYCODONE AND ACETAMINOPHEN 2 TABLET: 7.5; 325 TABLET ORAL at 10:05

## 2018-05-23 RX ADMIN — CEFAZOLIN 2 G: 1 INJECTION, POWDER, FOR SOLUTION INTRAMUSCULAR; INTRAVENOUS at 04:05

## 2018-05-23 RX ADMIN — HEPARIN SODIUM 5000 UNITS: 5000 INJECTION, SOLUTION INTRAVENOUS; SUBCUTANEOUS at 10:05

## 2018-05-23 RX ADMIN — OXYCODONE HYDROCHLORIDE 40 MG: 20 TABLET, FILM COATED, EXTENDED RELEASE ORAL at 08:05

## 2018-05-23 RX ADMIN — Medication 2 MG: at 11:05

## 2018-05-23 RX ADMIN — DIAZEPAM 10 MG: 5 TABLET ORAL at 05:05

## 2018-05-23 RX ADMIN — AMPICILLIN SODIUM AND SULBACTAM SODIUM 3 G: 2; 1 INJECTION, POWDER, FOR SOLUTION INTRAMUSCULAR; INTRAVENOUS at 06:05

## 2018-05-23 RX ADMIN — OXYCODONE AND ACETAMINOPHEN 2 TABLET: 7.5; 325 TABLET ORAL at 01:05

## 2018-05-23 RX ADMIN — Medication 1 CAPSULE: at 06:05

## 2018-05-23 RX ADMIN — POTASSIUM CITRATE 10 MEQ: 10 TABLET ORAL at 11:05

## 2018-05-23 RX ADMIN — OXYCODONE AND ACETAMINOPHEN 2 TABLET: 7.5; 325 TABLET ORAL at 04:05

## 2018-05-23 RX ADMIN — AMPICILLIN SODIUM AND SULBACTAM SODIUM 3 G: 2; 1 INJECTION, POWDER, FOR SOLUTION INTRAMUSCULAR; INTRAVENOUS at 11:05
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
## 2018-05-23 NOTE — PLAN OF CARE
CM met with patient to obtain discharge planning assessment. Patient is POD 1 for flap. Planned discharge is LTAC - Plan (A) or Rehab - Plan (B).    PCP:  Nohelia Hoover MD     Payor:  Payor: MEDICAID / Plan: HE[place]Y BLUE [place] / Product Type: Managed Medicaid /      Pharmacy:    [id number] - [place], [place] - [address]  [address]  [place] [address]  Phone: [phone] Fax: [phone]       05/23/18 1239   Discharge Assessment   Assessment Type Discharge Planning Assessment   Confirmed/corrected address and phone number on facesheet? Yes   Assessment information obtained from? Patient   Communicated expected length of stay with patient/caregiver no   Prior to hospitilization cognitive status: Alert/Oriented   Prior to hospitalization functional status: Assistive Equipment   Current cognitive status: Alert/Oriented   Current Functional Status: Assistive Equipment   Lives With alone   Able to Return to Prior Arrangements yes   Is patient able to care for self after discharge? Yes   Who are your caregiver(s) and their phone number(s)? Diana Larios - S/O [phone]   Patient's perception of discharge disposition long-term acute care facility (LTAC)   Readmission Within The Last 30 Days no previous admission in last 30 days   Patient currently being followed by outpatient case management? No   Patient currently receives any other outside agency services? Yes   Name and contact number of agency or person providing outside services Janet  for management of wound vc   Is it the patient/care giver preference to resume care with the current outside agency? Yes   Equipment Currently Used at Home wheelchair;shower chair;power chair;lift device;other (see comments)  (hand braces, foot drop braces)   Do you have any problems affording any of your prescribed medications? No   Is the patient taking medications as prescribed? yes   Does the patient have transportation home? Yes    Transportation Available family or friend will provide   Does the patient receive services at the [facility]? No   Discharge Plan A Long-term acute care facility (LTAC)   Discharge Plan B Rehab   Patient/Family In Agreement With Plan yes

## 2018-05-23 NOTE — ANESTHESIA POSTPROCEDURE EVALUATION
"Anesthesia Post Evaluation    Patient: Nghia Edgar Jr.    Procedure(s) Performed: Procedure(s) (LRB):  OSTECTOMY (N/A)  DEBRIDEMENT    Final Anesthesia Type: general  Patient location during evaluation: floor  Patient participation: Yes- Able to Participate  Level of consciousness: awake and alert  Post-procedure vital signs: reviewed and stable  Pain management: adequate  Airway patency: patent  PONV status at discharge: No PONV  Anesthetic complications: no      Cardiovascular status: blood pressure returned to baseline and hemodynamically stable  Respiratory status: unassisted and spontaneous ventilation  Hydration status: euvolemic  Follow-up not needed.        Visit Vitals  BP (!) 91/53 (BP Location: Left arm, Patient Position: Lying)   Pulse 80   Temp 36.7 °C (98.1 °F) (Oral)   Resp 18   Ht 5' 8" (1.727 m)   Wt 81.6 kg (180 lb)   SpO2 100%   BMI 27.37 kg/m²       Pain/Carmina Score: Pain Assessment Performed: Yes (5/23/2018  9:51 AM)  Presence of Pain: complains of pain/discomfort (5/23/2018  9:51 AM)  Pain Rating Prior to Med Admin: 10 (5/23/2018 11:16 AM)  Pain Rating Post Med Admin: 8 (5/23/2018  9:51 AM)  Carmina Score: 9 (5/22/2018  5:52 PM)      "

## 2018-05-23 NOTE — CONSULTS
[facility]  Infectious Disease  Consult Note    Patient Name: Nghia Edgar Jr.  MRN: [id number]  Admission Date: 5/22/2018  Hospital Length of Stay: 1 days  Attending Physician: Kalin Philippe, *  Primary Care Provider: Nohelia Hoover MD     Isolation Status: No active isolations      Inpatient consult to Infectious Diseases  Consult performed by: DAMIAN VELAZQUEZ  Consult ordered by: KALIN PORTILLO  Reason for consult: will most likely need antibiotic for chronic osteo. intraop cultrues sent (patient underwent bilateral ischial  ulcer flap closure)              ID consult received. Chart being reviewed. Pt underwent debridement and flap closure of bilateral ischial wounds. Started empiric abx with vancomycin and unasyn based on prior cultures. Tissue cultures s/p debridement growing proteus. Will follow and tailor abx accordingly.  Full note with recommendations to follow.    Thank you,  Damian Velazquez PA-C.  [facility] [phone]
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
## 2018-05-23 NOTE — CONSULTS
Spoke with Dr. Quinones who reports no need for wound care team.  Pt had bilateral posterior thigh fasciocutaneous flap yesterday.  [id number]

## 2018-05-23 NOTE — PLAN OF CARE
Problem: Patient Care Overview  Goal: Plan of Care Review    Recommendations    Recommendation/Intervention:   1. Continue regular diet.   2. Recommend adding beneprotein w/ all meals.   3. RD following.    Goals: meet >85% of EEN/EPN via po intake  Nutrition Goal Status: new

## 2018-05-23 NOTE — PLAN OF CARE
Problem: Patient Care Overview  Goal: Plan of Care Review  Outcome: Ongoing (interventions implemented as appropriate)  Patient AAOx4. No complaints of pain. VS stable, afrebrile. Neurovascular intact. Skin intact, with exception of bilateral posterior LE. Free from falls. Frequent rounds made for safety, pain and comfort. Bed at lowest position, call light within reach, side rails up x2. Mother at bedside.
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
## 2018-05-23 NOTE — ASSESSMENT & PLAN NOTE
Contributing Nutrition Diagnosis  Increased protein needs    Related to (etiology):   Wound healing    Signs and Symptoms (as evidenced by):   Decubitus ulcer of left ischial area (stage IV)    Interventions/Recommendations (treatment strategy):  See recs    Nutrition Diagnosis Status:   New
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
## 2018-05-23 NOTE — PLAN OF CARE
Problem: Patient Care Overview  Goal: Plan of Care Review  Outcome: Ongoing (interventions implemented as appropriate)  Patient resting in bed comfortably. IV intact and infusing free of infection and irration, fall precautions maintained no falls noted. Call light in reach bed locked and in lowest position. scd on, Patient instructed to call for assistance. Skin integrity maintained as patient is assisted with frequent positioning, C/o pain managed with PRN meds, No other complaints or concerns. Will continue to monitor and follow careplan of care.

## 2018-05-24 LAB
BACTERIA SPEC AEROBE CULT: NORMAL
BACTERIA SPEC AEROBE CULT: NORMAL
VANCOMYCIN TROUGH SERPL-MCNC: 9.1 UG/ML

## 2018-05-24 PROCEDURE — 11000001 HC ACUTE MED/SURG PRIVATE ROOM

## 2018-05-24 PROCEDURE — 63600175 PHARM REV CODE 636 W HCPCS: Performed by: SURGERY

## 2018-05-24 PROCEDURE — 99233 SBSQ HOSP IP/OBS HIGH 50: CPT | Mod: ,,, | Performed by: PHYSICIAN ASSISTANT

## 2018-05-24 PROCEDURE — 25000003 PHARM REV CODE 250: Performed by: STUDENT IN AN ORGANIZED HEALTH CARE EDUCATION/TRAINING PROGRAM

## 2018-05-24 PROCEDURE — 25000003 PHARM REV CODE 250: Performed by: SURGERY

## 2018-05-24 PROCEDURE — 36415 COLL VENOUS BLD VENIPUNCTURE: CPT

## 2018-05-24 PROCEDURE — 87040 BLOOD CULTURE FOR BACTERIA: CPT

## 2018-05-24 PROCEDURE — 63600175 PHARM REV CODE 636 W HCPCS: Performed by: STUDENT IN AN ORGANIZED HEALTH CARE EDUCATION/TRAINING PROGRAM

## 2018-05-24 PROCEDURE — 25000003 PHARM REV CODE 250: Performed by: PHYSICIAN ASSISTANT

## 2018-05-24 PROCEDURE — 63600175 PHARM REV CODE 636 W HCPCS: Performed by: PHYSICIAN ASSISTANT

## 2018-05-24 PROCEDURE — 80202 ASSAY OF VANCOMYCIN: CPT

## 2018-05-24 RX ADMIN — PREGABALIN 200 MG: 50 CAPSULE ORAL at 09:05

## 2018-05-24 RX ADMIN — DIAZEPAM 10 MG: 5 TABLET ORAL at 08:05

## 2018-05-24 RX ADMIN — VANCOMYCIN HYDROCHLORIDE 1250 MG: 1 INJECTION, POWDER, LYOPHILIZED, FOR SOLUTION INTRAVENOUS at 12:05

## 2018-05-24 RX ADMIN — Medication 2 MG: at 04:05

## 2018-05-24 RX ADMIN — VANCOMYCIN HYDROCHLORIDE 1250 MG: 1 INJECTION, POWDER, LYOPHILIZED, FOR SOLUTION INTRAVENOUS at 08:05

## 2018-05-24 RX ADMIN — AMPICILLIN SODIUM AND SULBACTAM SODIUM 3 G: 2; 1 INJECTION, POWDER, FOR SOLUTION INTRAMUSCULAR; INTRAVENOUS at 05:05

## 2018-05-24 RX ADMIN — Medication 2 MG: at 10:05

## 2018-05-24 RX ADMIN — PREGABALIN 200 MG: 50 CAPSULE ORAL at 02:05

## 2018-05-24 RX ADMIN — VANCOMYCIN HYDROCHLORIDE 1750 MG: 10 INJECTION, POWDER, LYOPHILIZED, FOR SOLUTION INTRAVENOUS at 05:05

## 2018-05-24 RX ADMIN — OXYBUTYNIN CHLORIDE 5 MG: 5 TABLET ORAL at 09:05

## 2018-05-24 RX ADMIN — OXYCODONE HYDROCHLORIDE 40 MG: 20 TABLET, FILM COATED, EXTENDED RELEASE ORAL at 09:05

## 2018-05-24 RX ADMIN — AMPICILLIN SODIUM AND SULBACTAM SODIUM 3 G: 2; 1 INJECTION, POWDER, FOR SOLUTION INTRAMUSCULAR; INTRAVENOUS at 12:05

## 2018-05-24 RX ADMIN — DIAZEPAM 10 MG: 5 TABLET ORAL at 09:05

## 2018-05-24 RX ADMIN — OXYBUTYNIN CHLORIDE 5 MG: 5 TABLET ORAL at 08:05

## 2018-05-24 RX ADMIN — AMPICILLIN SODIUM AND SULBACTAM SODIUM 3 G: 2; 1 INJECTION, POWDER, FOR SOLUTION INTRAMUSCULAR; INTRAVENOUS at 11:05

## 2018-05-24 RX ADMIN — Medication 2 MG: at 03:05

## 2018-05-24 RX ADMIN — OXYCODONE AND ACETAMINOPHEN 2 TABLET: 7.5; 325 TABLET ORAL at 02:05

## 2018-05-24 RX ADMIN — HEPARIN SODIUM 5000 UNITS: 5000 INJECTION, SOLUTION INTRAVENOUS; SUBCUTANEOUS at 09:05

## 2018-05-24 RX ADMIN — Medication 1 CAPSULE: at 08:05

## 2018-05-24 RX ADMIN — OXYCODONE AND ACETAMINOPHEN 2 TABLET: 7.5; 325 TABLET ORAL at 11:05

## 2018-05-24 RX ADMIN — PREGABALIN 200 MG: 50 CAPSULE ORAL at 08:05

## 2018-05-24 RX ADMIN — HEPARIN SODIUM 5000 UNITS: 5000 INJECTION, SOLUTION INTRAVENOUS; SUBCUTANEOUS at 02:05

## 2018-05-24 RX ADMIN — OXYCODONE HYDROCHLORIDE 40 MG: 20 TABLET, FILM COATED, EXTENDED RELEASE ORAL at 08:05

## 2018-05-24 RX ADMIN — OXYCODONE AND ACETAMINOPHEN 2 TABLET: 7.5; 325 TABLET ORAL at 06:05

## 2018-05-24 RX ADMIN — HEPARIN SODIUM 5000 UNITS: 5000 INJECTION, SOLUTION INTRAVENOUS; SUBCUTANEOUS at 05:05

## 2018-05-24 RX ADMIN — POTASSIUM CITRATE 10 MEQ: 10 TABLET ORAL at 08:05
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
## 2018-05-24 NOTE — SUBJECTIVE & OBJECTIVE
Interval History: 2 Days Post-Op BL ischial flaps. Doing well without complaints      Medications:  Continuous Infusions:   sodium chloride 0.9% 0 mL/hr at 05/22/18 1254     Scheduled Meds:   ampicillin-sulbactim (UNASYN) IVPB  3 g Intravenous Q6H    ascorbic acid (vitamin C)  500 mg Oral QAM    heparin (porcine)  5,000 Units Subcutaneous Q8H    Lactobacillus rhamnosus GG  1 capsule Oral Daily    oxybutynin  5 mg Oral BID    oxyCODONE  40 mg Oral Q12H    potassium citrate  10 mEq Oral Daily    pregabalin  200 mg Oral TID    vancomycin (VANCOCIN) IVPB  1,250 mg Intravenous Q8H     PRN Meds:albuterol, diazePAM, morphine, ondansetron, oxyCODONE-acetaminophen     Review of patient's allergies indicates:   Allergen Reactions    Zanaflex [tizanidine] Other (See Comments)     Get hallucinations from meds     Objective:     Vital Signs (Most Recent):  Temp: (!) 100.6 °F (38.1 °C) (05/24/18 0718)  Pulse: 89 (05/24/18 0718)  Resp: 16 (05/24/18 0718)  BP: (!) 113/58 (05/24/18 0718)  SpO2: 100 % (05/24/18 0718) Vital Signs (24h Range):  Temp:  [96.7 °F (35.9 °C)-100.6 °F (38.1 °C)] 100.6 °F (38.1 °C)  Pulse:  [80-94] 89  Resp:  [16-18] 16  SpO2:  [96 %-100 %] 100 %  BP: ()/(53-60) 113/58     Weight: 81.6 kg (180 lb)  Body mass index is 27.37 kg/m².    Intake/Output - Last 3 Shifts       05/22 0700 - 05/23 0659 05/23 0700 - 05/24 0659 05/24 0700 - 05/25 0659    P.O. 420 1100     I.V. (mL/kg) 2900 (35.5) 322 (3.9)     IV Piggyback 1000 450     Total Intake(mL/kg) 4320 (52.9) 1872 (22.9)     Urine (mL/kg/hr) 2200 4800 (2.5)     Drains 230 350 (0.2)     Blood 300      Total Output 2730 5150      Net +1590 -3278                   Physical Exam  A&O, NAD  RRR  No Resp distress  Incisions c/d/i, drains SS output    Significant Labs:  None    Significant Diagnostics:  none

## 2018-05-24 NOTE — PLAN OF CARE
Problem: Patient Care Overview  Goal: Plan of Care Review  Outcome: Ongoing (interventions implemented as appropriate)  Pt AAOx4 and VSS. Pt is progressing with plan of care. Free of skin breakdown.EMMA in functioning. SCDs in place at all times. Incentive spirometer at bedside and pt instructed on its use. Pain controlled well with PRN meds. Frequent rounds made to assess pain and safety and no complaints at this time noted. Side rails up x 2. Bed locked. Call light within reach. No falls noted. Will continue to monitor.

## 2018-05-24 NOTE — SUBJECTIVE & OBJECTIVE
Interval History:   Febrile today 100.6. No acute complaints or concerns.   Tissue cultures +proteus  Bone cultures +Group B Strep  On vancomycin and unasyn    Review of Systems   Constitutional: Negative for chills, diaphoresis, fatigue and fever.   Respiratory: Negative for cough and shortness of breath.    Gastrointestinal: Negative for abdominal pain.   Genitourinary: Negative for dysuria.   Skin: Positive for wound.   Neurological:        Paraplegia   All other systems reviewed and are negative.    Objective:     Vital Signs (Most Recent):  Temp: 98.8 °F (37.1 °C) (05/24/18 1135)  Pulse: 77 (05/24/18 1135)  Resp: 16 (05/24/18 1135)  BP: (!) 104/55 (05/24/18 1135)  SpO2: 97 % (05/24/18 1135) Vital Signs (24h Range):  Temp:  [96.7 °F (35.9 °C)-100.6 °F (38.1 °C)] 98.8 °F (37.1 °C)  Pulse:  [77-94] 77  Resp:  [16-18] 16  SpO2:  [96 %-100 %] 97 %  BP: ()/(54-60) 104/55     Weight: 81.6 kg (180 lb)  Body mass index is 27.37 kg/m².    Estimated Creatinine Clearance: 201.4 mL/min (based on SCr of 0.5 mg/dL).    Physical Exam   Constitutional: He appears well-developed and well-nourished. No distress.   HENT:   Head: Normocephalic.   Cardiovascular: Normal rate, regular rhythm and normal heart sounds.    No murmur heard.  Pulmonary/Chest: Effort normal. No respiratory distress. He has no wheezes. He has no rales.   Abdominal: Soft. He exhibits no distension. There is no tenderness.   Neurological: He is alert.   Skin: Skin is warm and dry. No rash noted. He is not diaphoretic. No erythema.   Flap wound without any signs of infection. Incisional wounds healing well. No surrounding redness   Psychiatric: He has a normal mood and affect.   Nursing note and vitals reviewed.      Significant Labs:   Blood Culture:   Recent Labs  Lab 03/19/18  1321 03/19/18  1322 03/25/18  2121 03/25/18  2212 04/18/18  1353   [facility]. No growth after 5 days. No growth after 5 days. No growth after 5 days. No  growth after 5 days.  No growth after 5 days.     CBC:   Recent Labs  Lab 05/23/18  1145   WBC 10.94   HGB 7.8*   HCT 26.4*        CMP:   Recent Labs  Lab 05/23/18  1144      K 3.6      CO2 26   GLU 83   BUN 5*   CREATININE 0.5   CALCIUM 8.2*   PROT 5.4*   ALBUMIN 2.5*   BILITOT 0.2   ALKPHOS 81   AST 12   ALT 12   ANIONGAP 6*   EGFRNONAA >60.0     Wound Culture:   Recent Labs  Lab 05/22/18  1345 05/22/18  1348 05/22/18  1401   LABAERO GRAM NEGATIVE ELISA, LACTOSE FERMENTERRareIdentification and susceptibility pendingSkin dave also present  PROTEUS MIRABILISRare STREPTOCOCCUS AGALACTIAE (GROUP B)RareBeta-hemolytic streptococci are routinely susceptible to penicillins,cephalosporins and carbapenems.Susceptibility testing not routinely performedSkin dave also present Skin dave,  no predominant organism     All pertinent labs within the past 24 hours have been reviewed.    Significant Imaging: I have reviewed all pertinent imaging results/findings within the past 24 hours.

## 2018-05-24 NOTE — ASSESSMENT & PLAN NOTE
2 Days Post-Op BL ischial flaps. Doing well, drains SS    - ABx per ID, appreciate recs  - target 100g protein/day, Boost supplements  - DVT ppx  - home meds  - Social work consult for placement, appreciate help
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
## 2018-05-24 NOTE — PLAN OF CARE
SHAE following for DC needs. SHAE in communication with CM.    SHAE sent to [facility] via [facility]. SHAE informed [facility] that patient needs clinitron bed and final ID recs.     Mlau Quintanilla, KARIME  [id number]

## 2018-05-24 NOTE — PROGRESS NOTES
[facility]  Plastic Surgery  Progress Note    Subjective:     History of Present Illness:  No notes on file    Post-Op Info:  Procedure(s) (LRB):  OSTECTOMY (N/A)  DEBRIDEMENT   2 Days Post-Op     Interval History: 2 Days Post-Op BL ischial flaps. Doing well without complaints      Medications:  Continuous Infusions:   sodium chloride 0.9% 0 mL/hr at 05/22/18 1254     Scheduled Meds:   ampicillin-sulbactim (UNASYN) IVPB  3 g Intravenous Q6H    ascorbic acid (vitamin C)  500 mg Oral QAM    heparin (porcine)  5,000 Units Subcutaneous Q8H    Lactobacillus rhamnosus GG  1 capsule Oral Daily    oxybutynin  5 mg Oral BID    oxyCODONE  40 mg Oral Q12H    potassium citrate  10 mEq Oral Daily    pregabalin  200 mg Oral TID    vancomycin (VANCOCIN) IVPB  1,250 mg Intravenous Q8H     PRN Meds:albuterol, diazePAM, morphine, ondansetron, oxyCODONE-acetaminophen     Review of patient's allergies indicates:   Allergen Reactions    Zanaflex [tizanidine] Other (See Comments)     Get hallucinations from meds     Objective:     Vital Signs (Most Recent):  Temp: (!) 100.6 °F (38.1 °C) (05/24/18 0718)  Pulse: 89 (05/24/18 0718)  Resp: 16 (05/24/18 0718)  BP: (!) 113/58 (05/24/18 0718)  SpO2: 100 % (05/24/18 0718) Vital Signs (24h Range):  Temp:  [96.7 °F (35.9 °C)-100.6 °F (38.1 °C)] 100.6 °F (38.1 °C)  Pulse:  [80-94] 89  Resp:  [16-18] 16  SpO2:  [96 %-100 %] 100 %  BP: ()/(53-60) 113/58     Weight: 81.6 kg (180 lb)  Body mass index is 27.37 kg/m².    Intake/Output - Last 3 Shifts       05/22 0700 - 05/23 0659 05/23 0700 - 05/24 0659 05/24 0700 - 05/25 0659    P.O. 420 1100     I.V. (mL/kg) 2900 (35.5) 322 (3.9)     IV Piggyback 1000 450     Total Intake(mL/kg) 4320 (52.9) 1872 (22.9)     Urine (mL/kg/hr) 2200 4800 (2.5)     Drains 230 350 (0.2)     Blood 300      Total Output 2730 5150      Net +1590 -3278                   Physical Exam  A&O, NAD  RRR  No Resp distress  Incisions c/d/i, drains SS  output    Significant Labs:  None    Significant Diagnostics:  none    Assessment/Plan:     * Decubitus ulcer of ischial area, left, stage IV    2 Days Post-Op BL ischial flaps. Doing well, drains SS    - ABx per ID, appreciate recs  - target 100g protein/day, Boost supplements  - DVT ppx  - home meds  - Social work consult for placement, appreciate help          Decubitus ulcer of left ischial area, stage IV    See principal             Isidro Quinones MD  Plastic Surgery  [facility]

## 2018-05-25 PROBLEM — L89.314: Status: ACTIVE | Noted: 2018-05-22

## 2018-05-25 LAB
ANION GAP SERPL CALC-SCNC: 7 MMOL/L
BACTERIA SPEC AEROBE CULT: NORMAL
BASOPHILS # BLD AUTO: 0.02 K/UL
BASOPHILS NFR BLD: 0.4 %
BUN SERPL-MCNC: 5 MG/DL
CALCIUM SERPL-MCNC: 8 MG/DL
CHLORIDE SERPL-SCNC: 103 MMOL/L
CO2 SERPL-SCNC: 28 MMOL/L
CREAT SERPL-MCNC: 0.5 MG/DL
DIFFERENTIAL METHOD: ABNORMAL
EOSINOPHIL # BLD AUTO: 0.1 K/UL
EOSINOPHIL NFR BLD: 1.1 %
ERYTHROCYTE [DISTWIDTH] IN BLOOD BY AUTOMATED COUNT: 16.3 %
EST. GFR  (AFRICAN AMERICAN): >60 ML/MIN/1.73 M^2
EST. GFR  (NON AFRICAN AMERICAN): >60 ML/MIN/1.73 M^2
GLUCOSE SERPL-MCNC: 122 MG/DL
HCT VFR BLD AUTO: 25 %
HGB BLD-MCNC: 7.2 G/DL
IMM GRANULOCYTES # BLD AUTO: 0.02 K/UL
IMM GRANULOCYTES NFR BLD AUTO: 0.4 %
LYMPHOCYTES # BLD AUTO: 1.3 K/UL
LYMPHOCYTES NFR BLD: 24.6 %
MCH RBC QN AUTO: 24.2 PG
MCHC RBC AUTO-ENTMCNC: 28.8 G/DL
MCV RBC AUTO: 84 FL
MONOCYTES # BLD AUTO: 0.5 K/UL
MONOCYTES NFR BLD: 9.6 %
NEUTROPHILS # BLD AUTO: 3.4 K/UL
NEUTROPHILS NFR BLD: 63.9 %
NRBC BLD-RTO: 0 /100 WBC
PLATELET # BLD AUTO: 243 K/UL
PMV BLD AUTO: 11 FL
POTASSIUM SERPL-SCNC: 3.8 MMOL/L
RBC # BLD AUTO: 2.98 M/UL
SODIUM SERPL-SCNC: 138 MMOL/L
WBC # BLD AUTO: 5.29 K/UL

## 2018-05-25 PROCEDURE — 63600175 PHARM REV CODE 636 W HCPCS: Performed by: STUDENT IN AN ORGANIZED HEALTH CARE EDUCATION/TRAINING PROGRAM

## 2018-05-25 PROCEDURE — 80048 BASIC METABOLIC PNL TOTAL CA: CPT

## 2018-05-25 PROCEDURE — 11000001 HC ACUTE MED/SURG PRIVATE ROOM

## 2018-05-25 PROCEDURE — 25000003 PHARM REV CODE 250: Performed by: SURGERY

## 2018-05-25 PROCEDURE — 63600175 PHARM REV CODE 636 W HCPCS: Performed by: PHYSICIAN ASSISTANT

## 2018-05-25 PROCEDURE — 25000003 PHARM REV CODE 250: Performed by: STUDENT IN AN ORGANIZED HEALTH CARE EDUCATION/TRAINING PROGRAM

## 2018-05-25 PROCEDURE — 36415 COLL VENOUS BLD VENIPUNCTURE: CPT

## 2018-05-25 PROCEDURE — 85025 COMPLETE CBC W/AUTO DIFF WBC: CPT

## 2018-05-25 PROCEDURE — 25000003 PHARM REV CODE 250: Performed by: PHYSICIAN ASSISTANT

## 2018-05-25 PROCEDURE — 99233 SBSQ HOSP IP/OBS HIGH 50: CPT | Mod: ,,, | Performed by: PHYSICIAN ASSISTANT

## 2018-05-25 PROCEDURE — 63600175 PHARM REV CODE 636 W HCPCS: Performed by: SURGERY

## 2018-05-25 RX ORDER — FLUCONAZOLE 200 MG/1
800 TABLET ORAL ONCE
Status: COMPLETED | OUTPATIENT
Start: 2018-05-25 | End: 2018-05-25

## 2018-05-25 RX ORDER — FLUCONAZOLE 200 MG/1
400 TABLET ORAL DAILY
Status: DISCONTINUED | OUTPATIENT
Start: 2018-05-26 | End: 2018-06-12 | Stop reason: HOSPADM

## 2018-05-25 RX ADMIN — Medication 2 MG: at 07:05

## 2018-05-25 RX ADMIN — OXYCODONE HYDROCHLORIDE 40 MG: 20 TABLET, FILM COATED, EXTENDED RELEASE ORAL at 09:05

## 2018-05-25 RX ADMIN — AMPICILLIN SODIUM AND SULBACTAM SODIUM 3 G: 2; 1 INJECTION, POWDER, FOR SOLUTION INTRAMUSCULAR; INTRAVENOUS at 05:05

## 2018-05-25 RX ADMIN — VANCOMYCIN HYDROCHLORIDE 1750 MG: 10 INJECTION, POWDER, LYOPHILIZED, FOR SOLUTION INTRAVENOUS at 05:05

## 2018-05-25 RX ADMIN — OXYCODONE AND ACETAMINOPHEN 2 TABLET: 7.5; 325 TABLET ORAL at 09:05

## 2018-05-25 RX ADMIN — HEPARIN SODIUM 5000 UNITS: 5000 INJECTION, SOLUTION INTRAVENOUS; SUBCUTANEOUS at 09:05

## 2018-05-25 RX ADMIN — ERTAPENEM SODIUM 1 G: 1 INJECTION, POWDER, LYOPHILIZED, FOR SOLUTION INTRAMUSCULAR; INTRAVENOUS at 03:05

## 2018-05-25 RX ADMIN — PREGABALIN 200 MG: 50 CAPSULE ORAL at 09:05

## 2018-05-25 RX ADMIN — HEPARIN SODIUM 5000 UNITS: 5000 INJECTION, SOLUTION INTRAVENOUS; SUBCUTANEOUS at 05:05

## 2018-05-25 RX ADMIN — OXYBUTYNIN CHLORIDE 5 MG: 5 TABLET ORAL at 09:05

## 2018-05-25 RX ADMIN — AMPICILLIN SODIUM AND SULBACTAM SODIUM 3 G: 2; 1 INJECTION, POWDER, FOR SOLUTION INTRAMUSCULAR; INTRAVENOUS at 11:05

## 2018-05-25 RX ADMIN — POTASSIUM CITRATE 10 MEQ: 10 TABLET ORAL at 09:05

## 2018-05-25 RX ADMIN — DIAZEPAM 10 MG: 5 TABLET ORAL at 10:05

## 2018-05-25 RX ADMIN — PREGABALIN 200 MG: 50 CAPSULE ORAL at 02:05

## 2018-05-25 RX ADMIN — Medication 2 MG: at 06:05

## 2018-05-25 RX ADMIN — OXYCODONE AND ACETAMINOPHEN 2 TABLET: 7.5; 325 TABLET ORAL at 05:05

## 2018-05-25 RX ADMIN — Medication 1 CAPSULE: at 09:05

## 2018-05-25 RX ADMIN — Medication 2 MG: at 12:05

## 2018-05-25 RX ADMIN — FLUCONAZOLE 800 MG: 200 TABLET ORAL at 03:05

## 2018-05-25 RX ADMIN — VANCOMYCIN HYDROCHLORIDE 1750 MG: 10 INJECTION, POWDER, LYOPHILIZED, FOR SOLUTION INTRAVENOUS at 12:05

## 2018-05-25 RX ADMIN — HEPARIN SODIUM 5000 UNITS: 5000 INJECTION, SOLUTION INTRAVENOUS; SUBCUTANEOUS at 02:05

## 2018-05-25 RX ADMIN — VANCOMYCIN HYDROCHLORIDE 1750 MG: 10 INJECTION, POWDER, LYOPHILIZED, FOR SOLUTION INTRAVENOUS at 09:05
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
## 2018-05-25 NOTE — PLAN OF CARE
Problem: Patient Care Overview  Goal: Plan of Care Review  Outcome: Ongoing (interventions implemented as appropriate)  Pt AAOx4 and VSS. Pt is progressing with plan of care. Free of skin breakdown as the pt positioned/repositioned with wedge. Clean, dry, and intact dressing noted on sacrum. EMMA drain in place and functioning. SCDs in place at all times. Incentive spirometer at bedside and pt instructed on its use. Pain controlled well with PRN meds. Frequent rounds made to assess pain and safety and no complaints at this time noted. Side rails up x 2. Bed locked. Call light within reach. No falls noted. Will continue to monitor.

## 2018-05-25 NOTE — PLAN OF CARE
SHAE following for DC needs. SHAE in communication with CM.    [facility] denied the patient due to not being able to meet his needs (no clinitron bed).     SHAE sent referral to [facility] via [facility].     Malu Quintanilla LMSW  [id number]
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
## 2018-05-25 NOTE — PLAN OF CARE
Problem: Patient Care Overview  Goal: Plan of Care Review  Outcome: Ongoing (interventions implemented as appropriate)  Pt verbalized understanding plan of care. Frequent assessments done and fall precautions maintained. Pain and discomfort controlled. Will continue to monitor.

## 2018-05-25 NOTE — PROGRESS NOTES
[facility]  Plastic Surgery  Progress Note    Subjective:     History of Present Illness:  No notes on file    Post-Op Info:  Procedure(s) (LRB):  OSTECTOMY (N/A)  DEBRIDEMENT   3 Days Post-Op     Interval History: 3 Days Post-Op BL ischial flaps. Doing well without complaints except mild left leg pain relieved with PO meds       Medications:  Continuous Infusions:   sodium chloride 0.9% 0 mL/hr at 05/22/18 1254     Scheduled Meds:   ampicillin-sulbactim (UNASYN) IVPB  3 g Intravenous Q6H    ascorbic acid (vitamin C)  500 mg Oral QAM    heparin (porcine)  5,000 Units Subcutaneous Q8H    Lactobacillus rhamnosus GG  1 capsule Oral Daily    oxybutynin  5 mg Oral BID    oxyCODONE  40 mg Oral Q12H    potassium citrate  10 mEq Oral Daily    pregabalin  200 mg Oral TID    vancomycin (VANCOCIN) IVPB  1,750 mg Intravenous Q8H     PRN Meds:albuterol, diazePAM, morphine, ondansetron, oxyCODONE-acetaminophen     Review of patient's allergies indicates:   Allergen Reactions    Zanaflex [tizanidine] Other (See Comments)     Get hallucinations from meds     Objective:     Vital Signs (Most Recent):  Temp: 98.2 °F (36.8 °C) (05/25/18 0736)  Pulse: 78 (05/25/18 0736)  Resp: 16 (05/25/18 0736)  BP: 121/76 (05/25/18 0736)  SpO2: 95 % (05/25/18 0736) Vital Signs (24h Range):  Temp:  [97.6 °F (36.4 °C)-100.2 °F (37.9 °C)] 98.2 °F (36.8 °C)  Pulse:  [77-94] 78  Resp:  [16-18] 16  SpO2:  [95 %-97 %] 95 %  BP: (104-121)/(50-76) 121/76     Weight: 81.6 kg (180 lb)  Body mass index is 27.37 kg/m².    Intake/Output - Last 3 Shifts       05/23 0700 - 05/24 0659 05/24 0700 - 05/25 0659 05/25 0700 - 05/26 0659    P.O. 1100 1494     I.V. (mL/kg) 322 (3.9)      IV Piggyback 450      Total Intake(mL/kg) 1872 (22.9) 1494 (18.3)     Urine (mL/kg/hr) 4800 (2.5) 6150 (3.1)     Emesis/NG output  0 (0)     Drains 350 (0.2) 230 (0.1)     Stool  0 (0)     Blood  0 (0)     Total Output 1837 2135      Net -[phone] -[phone]              Stool Occurrence  0 x           Physical Exam    A&O, NAD  RRR  No Resp distress  Incisions c/d/i, drains SS output    Significant Labs:  None    Significant Diagnostics:  none    Assessment/Plan:     * Decubitus ulcer of ischial area, left, stage IV    3 Days Post-Op BL ischial flaps. Doing well, drains SS    - ABx per ID, appreciate recs  - target 100g protein/day, Boost supplements  - DVT ppx  - home meds including chronic pain meds + breakthrough  - Social work consult for placement, appreciate help          Decubitus ulcer of right ischial area, stage IV    See principal             Isidro Quinones MD  Plastic Surgery  [facility]

## 2018-05-25 NOTE — SUBJECTIVE & OBJECTIVE
Interval History:   NAEON. Reports feeling warm overnight. tmax 100.2 cultures +enterobacter and proteus pansensitive. Also growing Group B strep, anaerobes, and skin dave. Have asked microlab to further workup the skin dave. On empiric vancomycin and unasyn.     Review of Systems   Constitutional: Positive for diaphoresis. Negative for chills, fatigue and fever.   Respiratory: Negative for cough and shortness of breath.    Gastrointestinal: Negative for abdominal pain.   Genitourinary: Negative for dysuria.   Musculoskeletal:        +wound pain   Skin: Positive for wound.   Neurological:        Paraplegia   All other systems reviewed and are negative.    Objective:     Vital Signs (Most Recent):  Temp: 98.6 °F (37 °C) (05/25/18 1124)  Pulse: 75 (05/25/18 1124)  Resp: 18 (05/25/18 1124)  BP: (!) 103/55 (05/25/18 1124)  SpO2: 98 % (05/25/18 1124) Vital Signs (24h Range):  Temp:  [97.6 °F (36.4 °C)-100.2 °F (37.9 °C)] 98.6 °F (37 °C)  Pulse:  [75-94] 75  Resp:  [16-18] 18  SpO2:  [95 %-98 %] 98 %  BP: (103-121)/(50-76) 103/55     Weight: 81.6 kg (180 lb)  Body mass index is 27.37 kg/m².    Estimated Creatinine Clearance: 201.4 mL/min (based on SCr of 0.5 mg/dL).    Physical Exam   Constitutional: He appears well-developed and well-nourished. No distress.   HENT:   Head: Normocephalic.   Cardiovascular: Normal rate, regular rhythm and normal heart sounds.    No murmur heard.  Pulmonary/Chest: Effort normal. No respiratory distress. He has no wheezes. He has no rales.   Abdominal: Soft. He exhibits no distension. There is no tenderness.   Neurological: He is alert.   Skin: Skin is warm and dry. No rash noted. He is not diaphoretic. No erythema.   Flap wound without any signs of infection. Incisional wounds healing well. No surrounding redness   Psychiatric: He has a normal mood and affect.   Nursing note and vitals reviewed.      Significant Labs:   Blood Culture:   Recent Labs  Lab 03/25/18 2121 03/25/18  2214  04/18/18  1353 05/24/18  1748 05/24/18  1749   LABBLOO No growth after 5 days. No growth after 5 days. No growth after 5 days.  No growth after 5 days. No Growth to date No Growth to date     CBC:   Recent Labs  Lab 05/25/18  1057   WBC 5.29   HGB 7.2*   HCT 25.0*        CMP:   Recent Labs  Lab 05/25/18  1057      K 3.8      CO2 28   *   BUN 5*   CREATININE 0.5   CALCIUM 8.0*   ANIONGAP 7*   EGFRNONAA >60.0     Wound Culture:   Recent Labs  Lab 05/22/18  1345 05/22/18  1348 05/22/18  1401   LABAERO ENTEROBACTER AEROGENESRareSkin dave also present  PROTEUS MIRABILISRare STREPTOCOCCUS AGALACTIAE (GROUP B)RareBeta-hemolytic streptococci are routinely susceptible to penicillins,cephalosporins and carbapenems.Susceptibility testing not routinely performedSkin dave also present Skin dave,  no predominant organism     All pertinent labs within the past 24 hours have been reviewed.    Significant Imaging: I have reviewed all pertinent imaging results/findings within the past 24 hours.

## 2018-05-25 NOTE — ASSESSMENT & PLAN NOTE
3 Days Post-Op BL ischial flaps. Doing well, drains SS    - ABx per ID, appreciate recs  - target 100g protein/day, Boost supplements  - DVT ppx  - home meds including chronic pain meds + breakthrough  - Social work consult for placement, appreciate help

## 2018-05-25 NOTE — SUBJECTIVE & OBJECTIVE
Interval History: 3 Days Post-Op BL ischial flaps. Doing well without complaints except mild left leg pain relieved with PO meds       Medications:  Continuous Infusions:   sodium chloride 0.9% 0 mL/hr at 05/22/18 1254     Scheduled Meds:   ampicillin-sulbactim (UNASYN) IVPB  3 g Intravenous Q6H    ascorbic acid (vitamin C)  500 mg Oral QAM    heparin (porcine)  5,000 Units Subcutaneous Q8H    Lactobacillus rhamnosus GG  1 capsule Oral Daily    oxybutynin  5 mg Oral BID    oxyCODONE  40 mg Oral Q12H    potassium citrate  10 mEq Oral Daily    pregabalin  200 mg Oral TID    vancomycin (VANCOCIN) IVPB  1,750 mg Intravenous Q8H     PRN Meds:albuterol, diazePAM, morphine, ondansetron, oxyCODONE-acetaminophen     Review of patient's allergies indicates:   Allergen Reactions    Zanaflex [tizanidine] Other (See Comments)     Get hallucinations from meds     Objective:     Vital Signs (Most Recent):  Temp: 98.2 °F (36.8 °C) (05/25/18 0736)  Pulse: 78 (05/25/18 0736)  Resp: 16 (05/25/18 0736)  BP: 121/76 (05/25/18 0736)  SpO2: 95 % (05/25/18 0736) Vital Signs (24h Range):  Temp:  [97.6 °F (36.4 °C)-100.2 °F (37.9 °C)] 98.2 °F (36.8 °C)  Pulse:  [77-94] 78  Resp:  [16-18] 16  SpO2:  [95 %-97 %] 95 %  BP: (104-121)/(50-76) 121/76     Weight: 81.6 kg (180 lb)  Body mass index is 27.37 kg/m².    Intake/Output - Last 3 Shifts       05/23 0700 - 05/24 0659 05/24 0700 - 05/25 0659 05/25 0700 - 05/26 0659    P.O. 1100 1494     I.V. (mL/kg) 322 (3.9)      IV Piggyback 450      Total Intake(mL/kg) 1872 (22.9) 1494 (18.3)     Urine (mL/kg/hr) 4800 (2.5) 6150 (3.1)     Emesis/NG output  0 (0)     Drains 350 (0.2) 230 (0.1)     Stool  0 (0)     Blood  0 (0)     Total Output 0230 7580      Net -0193 -7049             Stool Occurrence  0 x           Physical Exam    A&O, NAD  RRR  No Resp distress  Incisions c/d/i, drains SS output    Significant Labs:  None    Significant Diagnostics:  none
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
## 2018-05-26 LAB
BACTERIA SPEC AEROBE CULT: NORMAL
BACTERIA SPEC AEROBE CULT: NORMAL
VANCOMYCIN TROUGH SERPL-MCNC: 18.3 UG/ML

## 2018-05-26 PROCEDURE — 63600175 PHARM REV CODE 636 W HCPCS: Performed by: SURGERY

## 2018-05-26 PROCEDURE — 11000001 HC ACUTE MED/SURG PRIVATE ROOM

## 2018-05-26 PROCEDURE — 25000003 PHARM REV CODE 250: Performed by: PHYSICIAN ASSISTANT

## 2018-05-26 PROCEDURE — 25000003 PHARM REV CODE 250: Performed by: SURGERY

## 2018-05-26 PROCEDURE — 63600175 PHARM REV CODE 636 W HCPCS: Performed by: PHYSICIAN ASSISTANT

## 2018-05-26 PROCEDURE — 80202 ASSAY OF VANCOMYCIN: CPT

## 2018-05-26 PROCEDURE — 63600175 PHARM REV CODE 636 W HCPCS: Performed by: STUDENT IN AN ORGANIZED HEALTH CARE EDUCATION/TRAINING PROGRAM

## 2018-05-26 PROCEDURE — 25000003 PHARM REV CODE 250: Performed by: STUDENT IN AN ORGANIZED HEALTH CARE EDUCATION/TRAINING PROGRAM

## 2018-05-26 PROCEDURE — 36415 COLL VENOUS BLD VENIPUNCTURE: CPT

## 2018-05-26 RX ADMIN — POTASSIUM CITRATE 10 MEQ: 10 TABLET ORAL at 09:05

## 2018-05-26 RX ADMIN — OXYBUTYNIN CHLORIDE 5 MG: 5 TABLET ORAL at 09:05

## 2018-05-26 RX ADMIN — PREGABALIN 200 MG: 50 CAPSULE ORAL at 09:05

## 2018-05-26 RX ADMIN — HEPARIN SODIUM 5000 UNITS: 5000 INJECTION, SOLUTION INTRAVENOUS; SUBCUTANEOUS at 09:05

## 2018-05-26 RX ADMIN — DIAZEPAM 10 MG: 5 TABLET ORAL at 05:05

## 2018-05-26 RX ADMIN — OXYCODONE HYDROCHLORIDE 40 MG: 20 TABLET, FILM COATED, EXTENDED RELEASE ORAL at 09:05

## 2018-05-26 RX ADMIN — OXYCODONE AND ACETAMINOPHEN 2 TABLET: 7.5; 325 TABLET ORAL at 11:05

## 2018-05-26 RX ADMIN — HEPARIN SODIUM 5000 UNITS: 5000 INJECTION, SOLUTION INTRAVENOUS; SUBCUTANEOUS at 02:05

## 2018-05-26 RX ADMIN — VANCOMYCIN HYDROCHLORIDE 1750 MG: 10 INJECTION, POWDER, LYOPHILIZED, FOR SOLUTION INTRAVENOUS at 07:05

## 2018-05-26 RX ADMIN — FLUCONAZOLE 400 MG: 200 TABLET ORAL at 02:05

## 2018-05-26 RX ADMIN — OXYCODONE AND ACETAMINOPHEN 2 TABLET: 7.5; 325 TABLET ORAL at 09:05

## 2018-05-26 RX ADMIN — Medication 1 CAPSULE: at 09:05

## 2018-05-26 RX ADMIN — HEPARIN SODIUM 5000 UNITS: 5000 INJECTION, SOLUTION INTRAVENOUS; SUBCUTANEOUS at 05:05

## 2018-05-26 RX ADMIN — Medication 2 MG: at 05:05

## 2018-05-26 RX ADMIN — VANCOMYCIN HYDROCHLORIDE 1750 MG: 10 INJECTION, POWDER, LYOPHILIZED, FOR SOLUTION INTRAVENOUS at 09:05

## 2018-05-26 RX ADMIN — VANCOMYCIN HYDROCHLORIDE 1750 MG: 10 INJECTION, POWDER, LYOPHILIZED, FOR SOLUTION INTRAVENOUS at 01:05

## 2018-05-26 RX ADMIN — ERTAPENEM SODIUM 1 G: 1 INJECTION, POWDER, LYOPHILIZED, FOR SOLUTION INTRAMUSCULAR; INTRAVENOUS at 03:05

## 2018-05-26 RX ADMIN — DIAZEPAM 10 MG: 5 TABLET ORAL at 09:05

## 2018-05-26 RX ADMIN — PREGABALIN 200 MG: 50 CAPSULE ORAL at 02:05

## 2018-05-26 RX ADMIN — Medication 2 MG: at 03:05

## 2018-05-26 RX ADMIN — OXYCODONE AND ACETAMINOPHEN 2 TABLET: 7.5; 325 TABLET ORAL at 01:05

## 2018-05-26 RX ADMIN — DIAZEPAM 10 MG: 5 TABLET ORAL at 03:05

## 2018-05-27 LAB — VANCOMYCIN TROUGH SERPL-MCNC: 21.2 UG/ML

## 2018-05-27 PROCEDURE — 99499 UNLISTED E&M SERVICE: CPT | Mod: ,,, | Performed by: PHYSICIAN ASSISTANT

## 2018-05-27 PROCEDURE — 25000003 PHARM REV CODE 250: Performed by: SURGERY

## 2018-05-27 PROCEDURE — 63600175 PHARM REV CODE 636 W HCPCS: Performed by: SURGERY

## 2018-05-27 PROCEDURE — 99233 SBSQ HOSP IP/OBS HIGH 50: CPT | Mod: ,,, | Performed by: INTERNAL MEDICINE

## 2018-05-27 PROCEDURE — 25000003 PHARM REV CODE 250: Performed by: PHYSICIAN ASSISTANT

## 2018-05-27 PROCEDURE — 36415 COLL VENOUS BLD VENIPUNCTURE: CPT

## 2018-05-27 PROCEDURE — 63600175 PHARM REV CODE 636 W HCPCS: Performed by: PHYSICIAN ASSISTANT

## 2018-05-27 PROCEDURE — 25000003 PHARM REV CODE 250: Performed by: STUDENT IN AN ORGANIZED HEALTH CARE EDUCATION/TRAINING PROGRAM

## 2018-05-27 PROCEDURE — 11000001 HC ACUTE MED/SURG PRIVATE ROOM

## 2018-05-27 PROCEDURE — 63600175 PHARM REV CODE 636 W HCPCS: Performed by: STUDENT IN AN ORGANIZED HEALTH CARE EDUCATION/TRAINING PROGRAM

## 2018-05-27 PROCEDURE — 80202 ASSAY OF VANCOMYCIN: CPT

## 2018-05-27 RX ORDER — VANCOMYCIN HYDROCHLORIDE
1500
Status: DISCONTINUED | OUTPATIENT
Start: 2018-05-27 | End: 2018-05-28

## 2018-05-27 RX ORDER — VANCOMYCIN HYDROCHLORIDE
1500
Status: DISCONTINUED | OUTPATIENT
Start: 2018-05-28 | End: 2018-05-27

## 2018-05-27 RX ADMIN — ERTAPENEM SODIUM 1 G: 1 INJECTION, POWDER, LYOPHILIZED, FOR SOLUTION INTRAMUSCULAR; INTRAVENOUS at 06:05

## 2018-05-27 RX ADMIN — Medication 1 CAPSULE: at 09:05

## 2018-05-27 RX ADMIN — DIAZEPAM 10 MG: 5 TABLET ORAL at 09:05

## 2018-05-27 RX ADMIN — OXYBUTYNIN CHLORIDE 5 MG: 5 TABLET ORAL at 09:05

## 2018-05-27 RX ADMIN — Medication 2 MG: at 03:05

## 2018-05-27 RX ADMIN — VANCOMYCIN HYDROCHLORIDE 1750 MG: 10 INJECTION, POWDER, LYOPHILIZED, FOR SOLUTION INTRAVENOUS at 12:05

## 2018-05-27 RX ADMIN — OXYCODONE HYDROCHLORIDE 40 MG: 20 TABLET, FILM COATED, EXTENDED RELEASE ORAL at 09:05

## 2018-05-27 RX ADMIN — PREGABALIN 200 MG: 50 CAPSULE ORAL at 09:05

## 2018-05-27 RX ADMIN — POTASSIUM CITRATE 10 MEQ: 10 TABLET ORAL at 09:05

## 2018-05-27 RX ADMIN — PREGABALIN 200 MG: 50 CAPSULE ORAL at 06:05

## 2018-05-27 RX ADMIN — Medication 2 MG: at 08:05

## 2018-05-27 RX ADMIN — HEPARIN SODIUM 5000 UNITS: 5000 INJECTION, SOLUTION INTRAVENOUS; SUBCUTANEOUS at 06:05

## 2018-05-27 RX ADMIN — VANCOMYCIN HYDROCHLORIDE 1500 MG: 10 INJECTION, POWDER, LYOPHILIZED, FOR SOLUTION INTRAVENOUS at 10:05

## 2018-05-27 RX ADMIN — Medication 2 MG: at 09:05

## 2018-05-27 RX ADMIN — VANCOMYCIN HYDROCHLORIDE 1750 MG: 10 INJECTION, POWDER, LYOPHILIZED, FOR SOLUTION INTRAVENOUS at 03:05

## 2018-05-27 RX ADMIN — FLUCONAZOLE 400 MG: 200 TABLET ORAL at 02:05

## 2018-05-27 RX ADMIN — OXYCODONE AND ACETAMINOPHEN 2 TABLET: 7.5; 325 TABLET ORAL at 06:05

## 2018-05-27 RX ADMIN — OXYCODONE AND ACETAMINOPHEN 2 TABLET: 7.5; 325 TABLET ORAL at 02:05

## 2018-05-27 RX ADMIN — HEPARIN SODIUM 5000 UNITS: 5000 INJECTION, SOLUTION INTRAVENOUS; SUBCUTANEOUS at 09:05

## 2018-05-27 NOTE — NURSING
Patient complained of tingling to R leg, upon assessment; stitches intact, do drainage/pooling of blood. EMMA drain intact, clamped and bulb inverted.  Notified Dr. Quinones of patient's complaint. Will continue to monitor.

## 2018-05-27 NOTE — PROGRESS NOTES
Patient seen and examined.  He is doing well overall with minimal discomfort.  No acute events over the weekend.    VS WNL    On exam, bilateral fasciocutaneous flaps intact and viable. No wound dehiscence. No wound drainage, no skin necrosis, no cellulitis.  Drains serosang output.    Plan:  Continue Clinitron bed  Continue abx per ID recommendations  Continue dispo planning  Strip and empty drains 3-4 times a day.

## 2018-05-27 NOTE — SUBJECTIVE & OBJECTIVE
Interval History: No AEON.  Afebrile and WBC WNL.  Doing well overall.  The patient denies any recent fever, chills, or sweats.      Review of Systems   Constitutional: Positive for diaphoresis. Negative for chills, fatigue and fever.   Respiratory: Negative for cough and shortness of breath.    Gastrointestinal: Negative for abdominal pain.   Genitourinary: Negative for dysuria.   Musculoskeletal:        +wound pain   Skin: Positive for wound.   Neurological:        Paraplegia   All other systems reviewed and are negative.    Objective:     Vital Signs (Most Recent):  Temp: 97.9 °F (36.6 °C) (05/27/18 1100)  Pulse: 70 (05/27/18 1100)  Resp: 18 (05/27/18 1100)  BP: (!) 101/57 (05/27/18 1100)  SpO2: 95 % (05/27/18 1100) Vital Signs (24h Range):  Temp:  [97.9 °F (36.6 °C)-98.5 °F (36.9 °C)] 97.9 °F (36.6 °C)  Pulse:  [69-77] 70  Resp:  [16-18] 18  SpO2:  [95 %-99 %] 95 %  BP: (101-109)/(55-65) 101/57     Weight: 81.6 kg (180 lb)  Body mass index is 27.37 kg/m².    Estimated Creatinine Clearance: 201.4 mL/min (based on SCr of 0.5 mg/dL).    Physical Exam   Constitutional: He appears well-developed and well-nourished. No distress.   HENT:   Head: Normocephalic.   Cardiovascular: Normal rate, regular rhythm and normal heart sounds.    No murmur heard.  Pulmonary/Chest: Effort normal. No respiratory distress. He has no wheezes. He has no rales.   Abdominal: Soft. He exhibits no distension. There is no tenderness.   Neurological: He is alert.   Skin: Skin is warm and dry. No rash noted. He is not diaphoretic. No erythema.   Not viewed today 5/27   Psychiatric: He has a normal mood and affect.   Nursing note and vitals reviewed.      Significant Labs:   Blood Culture:   Recent Labs  Lab 03/25/18  2121 03/25/18  2212 04/18/18  1353 05/24/18  1748 05/24/18  1749   LABBLOO No growth after 5 days. No growth after 5 days. No growth after 5 days.  No growth after 5 days. No Growth to date  No Growth to date  No Growth to date  No Growth to date  No Growth to date  No Growth to date     CBC: No results for input(s): WBC, HGB, HCT, PLT in the last 48 hours.  CMP: No results for input(s): NA, K, CL, CO2, GLU, BUN, CREATININE, CALCIUM, PROT, ALBUMIN, BILITOT, ALKPHOS, AST, ALT, ANIONGAP, EGFRNONAA in the last 48 hours.    Invalid input(s): ESTGFAFRICA  Wound Culture:   Recent Labs  Lab 05/22/18  1345 05/22/18  1348 05/22/18  1401   LABAERO ENTEROBACTER AEROGENESRareSkin dave also present  PROTEUS MIRABILISRare STREPTOCOCCUS AGALACTIAE (GROUP B)RareBeta-hemolytic streptococci are routinely susceptible to penicillins,cephalosporins and carbapenems.Susceptibility testing not routinely performedSkin dave also present STAPHYLOCOCCUS EPIDERMIDISRare  CORYNEBACTERIUM STRIATUMRare     All pertinent labs within the past 24 hours have been reviewed.    Significant Imaging: I have reviewed all pertinent imaging results/findings within the past 24 hours.   Blood culture [[id number]] Collected: 05/24/18 1749   Order Status: Completed Specimen: Blood Updated: 05/26/18 1822    Blood Culture, Routine No Growth to date    Blood Culture, Routine No Growth to date    Blood Culture, Routine No Growth to date   Blood culture [[id number]] Collected: 05/24/18 1748   Order Status: Completed Specimen: Blood Updated: 05/26/18 1822    Blood Culture, Routine No Growth to date    Blood Culture, Routine No Growth to date    Blood Culture, Routine No Growth to date   Narrative:     Please draw from 2 separate sites 30 minutes apart.  Thank you   Fungus culture [[id number]] Collected: 05/22/18 1417   Order Status: Completed Specimen: Bone from Sacrum Updated: 05/25/18 0944    Fungus (Mycology) Culture Culture in progress   Narrative:     Right ischial   Culture, Anaerobe [[id number]] Collected: 05/22/18 1401   Order Status: Completed Specimen: Bone from Sacrum Updated: 05/24/18 0949    Anaerobic Culture Culture in progress   Narrative:     Right ischial   Aerobic culture  "[[id number]]  Collected: 05/22/18 1401   Order Status: Completed Specimen: Bone from Sacrum Updated: 05/26/18 1405    Aerobic Bacterial Culture --    STAPHYLOCOCCUS EPIDERMIDIS   Rare     Aerobic Bacterial Culture --    CORYNEBACTERIUM STRIATUM   Rare    Narrative:     Right ischial   Susceptibility      Staphylococcus epidermidis     CULTURE, AEROBIC  (SPECIFY SOURCE)     Clindamycin <=0.5 "><=0.5  Sensitive     Erythromycin >4  Resistant     Oxacillin >2  Resistant     Penicillin >8  Resistant     Tetracycline <=4 "><=4  Sensitive     Trimeth/Sulfa <=0.5/9.5 "><=0.5/9.5  Sensitive     Vancomycin 1  Sensitive                 Gram stain [[id number]] Collected: 05/22/18 1401   Order Status: Completed Specimen: Bone from Sacrum Updated: 05/22/18 1809    Gram Stain Result Rare WBC's     No organisms seen   Narrative:     Right ischial   Culture, Anaerobe [[id number]] Collected: 05/22/18 1356   Order Status: Completed Specimen: Tissue from Sacrum Updated: 05/24/18 0948    Anaerobic Culture Culture in progress   Narrative:     Right ischial tissue   Culture, Anaerobe [[id number]] Collected: 05/22/18 1348   Order Status: Completed Specimen: Bone from Sacrum Updated: 05/24/18 0947    Anaerobic Culture Culture in progress   Narrative:     Left ischial   Aerobic culture [[id number]] Collected: 05/22/18 1348   Order Status: Completed Specimen: Bone from Sacrum Updated: 05/24/18 1121    Aerobic Bacterial Culture --    STREPTOCOCCUS AGALACTIAE (GROUP B)   Rare   Beta-hemolytic streptococci are routinely susceptible to   penicillins,cephalosporins and carbapenems.   Susceptibility testing not routinely performed   Skin dave also present    Narrative:     Left ischial   Fungus culture [[id number]] Collected: 05/22/18 1348   Order Status: Completed Specimen: Bone from Sacrum Updated: 05/25/18 0944    Fungus (Mycology) Culture Culture in progress   Narrative:     Left ischial   Gram stain [[id number]] Collected: 05/22/18 1348   Order " "Status: Completed Specimen: Bone from Sacrum Updated: 05/22/18 1806    Gram Stain Result No WBC's     No organisms seen   Narrative:     Left ischial   Culture, Anaerobe [[id number]] Collected: 05/22/18 1345   Order Status: Completed Specimen: Tissue from Sacrum Updated: 05/24/18 1239    Anaerobic Culture --    DERMABACTER HOMINIS   Few    Narrative:     Left ischial tissue   Aerobic culture [[id number]]  Collected: 05/22/18 1345   Order Status: Completed Specimen: Tissue from Sacrum Updated: 05/25/18 1002    Aerobic Bacterial Culture --    ENTEROBACTER AEROGENES   Rare   Skin dave also present    Narrative:     Left ischial tissue   Susceptibility      Enterobacter aerogenes     CULTURE, AEROBIC  (SPECIFY SOURCE)     Cefepime <=8 "><=8  Sensitive     Ceftriaxone <=8 "><=8  Sensitive     Ciprofloxacin <=1 "><=1  Sensitive     Ertapenem <=2 "><=2  Sensitive     Gentamicin <=4 "><=4  Sensitive     Meropenem <=4 "><=4  Sensitive     Piperacillin/Tazo <=16 "><=16  Sensitive     Tetracycline <=4 "><=4  Sensitive     Tobramycin <=4 "><=4  Sensitive     Trimeth/Sulfa <=2/38 "><=2/38  Sensitive                 Fungus culture [[id number]] Collected: 05/22/18 1345   Order Status: Completed Specimen: Tissue from Sacrum Updated: 05/26/18 1325    Fungus (Mycology) Culture --    CANDIDA ALBICANS   Rare    Narrative:     Left ischial tissue   Gram stain [[id number]] Collected: 05/22/18 1345   Order Status: Completed Specimen: Tissue from Sacrum Updated: 05/22/18 1736    Gram Stain Result No WBC's     No organisms seen   Narrative:     Left ischial tissue   Culture, Anaerobe [[id number]] Collected: 05/22/18 1345   Order Status: Sent Specimen: Tissue from Sacrum Updated: 05/22/18 1345   Aerobic culture [[id number]]  Collected: 05/22/18 1345   Order Status: Completed Specimen: Tissue from Sacrum Updated: 05/24/18 1025    Aerobic Bacterial Culture --    PROTEUS MIRABILIS   Rare    Narrative:     Right ischial tissue   Susceptibility      " "Proteus mirabilis     CULTURE, AEROBIC  (SPECIFY SOURCE)     Amox/K Clav'ate <=8/4 "><=8/4  Sensitive     Amp/Sulbactam <=8/4 "><=8/4  Sensitive     Ampicillin <=8 "><=8  Sensitive     Cefazolin <=8 "><=8  Sensitive     Cefepime <=8 "><=8  Sensitive     Ceftriaxone <=8 "><=8  Sensitive     Ciprofloxacin <=1 "><=1  Sensitive     Ertapenem <=2 "><=2  Sensitive     Gentamicin <=4 "><=4  Sensitive     Meropenem <=4 "><=4  Sensitive     Piperacillin/Tazo <=16 "><=16  Sensitive     Tobramycin <=4 "><=4  Sensitive     Trimeth/Sulfa <=2/38 "><=2/38  Sensitive                 Gram stain [[id number]] Collected: 05/22/18 1345   Order Status: Completed Specimen: Tissue from Sacrum Updated: 05/22/18 1808    Gram Stain Result No WBC's     No organisms seen   Narrative:     Right ischial tissue   Fungus culture [[id number]] Collected: 05/22/18 1345   Order Status: Completed Specimen: Tissue from Sacrum Updated: 05/25/18 0944    Fungus (Mycology) Culture Culture in progress   Narrative:     Right ischial tissue   Urine culture [[id number]] Collected: 04/25/18 1057   Order Status: Completed Specimen: Urine from Urine, Catheterized Updated: 04/26/18 2041    Urine Culture, Routine No growth   Narrative:     Collected with new SPT placement   Blood culture [[id number]] Collected: 04/18/18 1353   Order Status: Completed Specimen: Blood Updated: 04/23/18 1612    Blood Culture, Routine No growth after 5 days.   Blood culture [[id number]] Collected: 04/18/18 1353   Order Status: Completed Specimen: Blood Updated: 04/23/18 1612    Blood Culture, Routine No growth after 5 days.   Urine culture [[id number]] Collected: 04/18/18 1336   Order Status: Completed Specimen: Urine from Urine, Catheterized Updated: 04/19/18 1653    Urine Culture, Routine No significant growth   Blood culture #2 **CANNOT BE ORDERED STAT** [[id number]] Collected: 03/25/18 2212   Order Status: Completed Specimen: Blood from Peripheral, Wrist, Right Updated: 03/30/18 2312    " "Blood Culture, Routine No growth after 5 days.   Urine culture [[id number]]  Collected: 03/25/18 2150   Order Status: Completed Specimen: Urine Updated: 03/28/18 0820    Urine Culture, Routine --    ENTEROCOCCUS FAECALIS   >100,000 cfu/ml    Narrative:     Preferred Collection Type->Urine, Clean Catch   Susceptibility      Enterococcus faecalis     CULTURE, URINE     Ampicillin <=2 "><=2  Sensitive     Ciprofloxacin <=1 "><=1  Sensitive     Nitrofurantoin <=32 "><=32  Sensitive     Tetracycline >8  Resistant     Vancomycin 2  Sensitive                 Blood culture #1 **CANNOT BE ORDERED STAT** [[id number]] Collected: 03/25/18 2121   Order Status: Completed Specimen: Blood from Peripheral, Antecubital, Right Updated: 03/30/18 2312    Blood Culture, Routine No growth after 5 days.   Blood culture [[id number]] Collected: 03/19/18 1322   Order Status: Completed Specimen: Blood Updated: 03/24/18 2212    Blood Culture, Routine No growth after 5 days.   Blood culture [[id number]] Collected: 03/19/18 1321   Order Status: Completed Specimen: Blood Updated: 03/24/18 2212    Blood Culture, Routine No growth after 5 days.   Urine culture [[id number]] Collected: 03/19/18 1303   Order Status: Completed Specimen: Urine from Clean Catch Updated: 03/20/18 2052    Urine Culture, Routine No growth   Urine culture [[id number]]    Order Status: Completed Specimen: Urine from Urine, Clean Catch           "

## 2018-05-27 NOTE — NURSING
Changed patient's colostomy bag, patient tolerated well; patient had 1 BM of  formed stool. EMMA drains emptied at 30 mL each; clear, pink fluid noted. Will continue to monitor.
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
## 2018-05-28 LAB
BACTERIA SPEC ANAEROBE CULT: NORMAL
CREAT SERPL-MCNC: 0.6 MG/DL
EST. GFR  (AFRICAN AMERICAN): >60 ML/MIN/1.73 M^2
EST. GFR  (NON AFRICAN AMERICAN): >60 ML/MIN/1.73 M^2
VANCOMYCIN TROUGH SERPL-MCNC: 26.1 UG/ML

## 2018-05-28 PROCEDURE — 25000003 PHARM REV CODE 250: Performed by: STUDENT IN AN ORGANIZED HEALTH CARE EDUCATION/TRAINING PROGRAM

## 2018-05-28 PROCEDURE — 63600175 PHARM REV CODE 636 W HCPCS: Performed by: STUDENT IN AN ORGANIZED HEALTH CARE EDUCATION/TRAINING PROGRAM

## 2018-05-28 PROCEDURE — 63600175 PHARM REV CODE 636 W HCPCS: Performed by: SURGERY

## 2018-05-28 PROCEDURE — 82565 ASSAY OF CREATININE: CPT

## 2018-05-28 PROCEDURE — 11000001 HC ACUTE MED/SURG PRIVATE ROOM

## 2018-05-28 PROCEDURE — 25000003 PHARM REV CODE 250: Performed by: PHYSICIAN ASSISTANT

## 2018-05-28 PROCEDURE — 63600175 PHARM REV CODE 636 W HCPCS: Performed by: PHYSICIAN ASSISTANT

## 2018-05-28 PROCEDURE — 80202 ASSAY OF VANCOMYCIN: CPT

## 2018-05-28 PROCEDURE — 99233 SBSQ HOSP IP/OBS HIGH 50: CPT | Mod: ,,, | Performed by: PHYSICIAN ASSISTANT

## 2018-05-28 PROCEDURE — 36415 COLL VENOUS BLD VENIPUNCTURE: CPT

## 2018-05-28 PROCEDURE — 25000003 PHARM REV CODE 250: Performed by: SURGERY

## 2018-05-28 RX ORDER — VANCOMYCIN HYDROCHLORIDE
1500
Status: DISCONTINUED | OUTPATIENT
Start: 2018-05-29 | End: 2018-06-06

## 2018-05-28 RX ORDER — CIPROFLOXACIN 500 MG/1
500 TABLET ORAL EVERY 12 HOURS
Status: DISCONTINUED | OUTPATIENT
Start: 2018-05-28 | End: 2018-06-12 | Stop reason: HOSPADM

## 2018-05-28 RX ADMIN — Medication 2 MG: at 05:05

## 2018-05-28 RX ADMIN — VANCOMYCIN HYDROCHLORIDE 1500 MG: 10 INJECTION, POWDER, LYOPHILIZED, FOR SOLUTION INTRAVENOUS at 06:05

## 2018-05-28 RX ADMIN — OXYCODONE AND ACETAMINOPHEN 2 TABLET: 7.5; 325 TABLET ORAL at 02:05

## 2018-05-28 RX ADMIN — Medication 2 MG: at 06:05

## 2018-05-28 RX ADMIN — DIAZEPAM 10 MG: 5 TABLET ORAL at 10:05

## 2018-05-28 RX ADMIN — OXYBUTYNIN CHLORIDE 5 MG: 5 TABLET ORAL at 08:05

## 2018-05-28 RX ADMIN — PREGABALIN 200 MG: 50 CAPSULE ORAL at 02:05

## 2018-05-28 RX ADMIN — PREGABALIN 200 MG: 50 CAPSULE ORAL at 08:05

## 2018-05-28 RX ADMIN — HEPARIN SODIUM 5000 UNITS: 5000 INJECTION, SOLUTION INTRAVENOUS; SUBCUTANEOUS at 09:05

## 2018-05-28 RX ADMIN — OXYBUTYNIN CHLORIDE 5 MG: 5 TABLET ORAL at 09:05

## 2018-05-28 RX ADMIN — POTASSIUM CITRATE 10 MEQ: 10 TABLET ORAL at 08:05

## 2018-05-28 RX ADMIN — HEPARIN SODIUM 5000 UNITS: 5000 INJECTION, SOLUTION INTRAVENOUS; SUBCUTANEOUS at 02:05

## 2018-05-28 RX ADMIN — OXYCODONE HYDROCHLORIDE 40 MG: 20 TABLET, FILM COATED, EXTENDED RELEASE ORAL at 09:05

## 2018-05-28 RX ADMIN — FLUCONAZOLE 400 MG: 200 TABLET ORAL at 02:05

## 2018-05-28 RX ADMIN — OXYCODONE HYDROCHLORIDE 40 MG: 20 TABLET, FILM COATED, EXTENDED RELEASE ORAL at 08:05

## 2018-05-28 RX ADMIN — VANCOMYCIN HYDROCHLORIDE 1500 MG: 10 INJECTION, POWDER, LYOPHILIZED, FOR SOLUTION INTRAVENOUS at 02:05

## 2018-05-28 RX ADMIN — HEPARIN SODIUM 5000 UNITS: 5000 INJECTION, SOLUTION INTRAVENOUS; SUBCUTANEOUS at 05:05

## 2018-05-28 RX ADMIN — DIAZEPAM 10 MG: 5 TABLET ORAL at 09:05

## 2018-05-28 RX ADMIN — Medication 1 CAPSULE: at 08:05

## 2018-05-28 RX ADMIN — OXYCODONE AND ACETAMINOPHEN 2 TABLET: 7.5; 325 TABLET ORAL at 05:05

## 2018-05-28 RX ADMIN — CIPROFLOXACIN HYDROCHLORIDE 500 MG: 500 TABLET, FILM COATED ORAL at 09:05

## 2018-05-28 RX ADMIN — PREGABALIN 200 MG: 50 CAPSULE ORAL at 09:05

## 2018-05-28 RX ADMIN — Medication 2 MG: at 12:05
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
## 2018-05-28 NOTE — PLAN OF CARE
Problem: Pressure Ulcer Risk (Juan Jose Scale) (Adult,Obstetrics,Pediatric)  Intervention: Promote/Optimize Nutrition  Pt plan of care reviewed and updated. Pt frequently assessed for pain and safety issues. Pt receives PRN pain meds when needed. VS charted. Will continue to monitor.

## 2018-05-28 NOTE — PLAN OF CARE
SHAE following for DC needs. [facility]AE in communication with CM.    [facility] is reviewing the patient's referral. Eduard with [facility] stated that [facility] is closed for [facility] Day on Monday but he will contact them on Tuesday.     Malu Quintanilla, KARIME  [id number]

## 2018-05-28 NOTE — PLAN OF CARE
Problem: Patient Care Overview  Goal: Plan of Care Review  Outcome: Ongoing (interventions implemented as appropriate)  No acute distress noted.  No complaints of pain voiced at this time.  Q2H rounding in progress.  Call light in reach.  Fall precautions in place.  Vitals stable at this time.

## 2018-05-28 NOTE — PLAN OF CARE
Vancomycin trough 21.2.      Vanc dose reduced to 1.5g IV q8h    Will redraw vanc level prior to next 3rd dose - ordered for 1300 on 5/28    Tomer Santana PA-C [facility]

## 2018-05-28 NOTE — SUBJECTIVE & OBJECTIVE
Interval History:   NAEON. Denies having any fever or night sweats. Reports having chills. Pain of ischial wounds. Stable. Nonseptic appearing. Drains in place. Anguiano in place with clear urine output.   No complaints today.       Review of Systems   Constitutional: Positive for chills. Negative for diaphoresis, fatigue and fever.   Respiratory: Negative for cough and shortness of breath.    Gastrointestinal: Negative for abdominal pain.   Genitourinary: Negative for dysuria.   Musculoskeletal:        +wound pain   Skin: Positive for wound.   Neurological:        Paraplegia   All other systems reviewed and are negative.    Objective:     Vital Signs (Most Recent):  Temp: 97.2 °F (36.2 °C) (05/28/18 0757)  Pulse: 75 (05/28/18 0757)  Resp: 16 (05/28/18 0757)  BP: (!) 108/57 (05/28/18 0757)  SpO2: (!) 92 % (05/28/18 0757) Vital Signs (24h Range):  Temp:  [97.2 °F (36.2 °C)-99.3 °F (37.4 °C)] 97.2 °F (36.2 °C)  Pulse:  [71-81] 75  Resp:  [16-18] 16  SpO2:  [92 %-98 %] 92 %  BP: (103-140)/(57-66) 108/57     Weight: 81.6 kg (180 lb)  Body mass index is 27.37 kg/m².    Estimated Creatinine Clearance: 167.8 mL/min (based on SCr of 0.6 mg/dL).    Physical Exam   Constitutional: He appears well-developed and well-nourished. No distress.   HENT:   Head: Normocephalic.   Eyes: Pupils are equal, round, and reactive to light.   Cardiovascular: Normal rate, regular rhythm and normal heart sounds.    No murmur heard.  Pulmonary/Chest: Effort normal. No respiratory distress. He has no wheezes. He has no rales.   Abdominal: Soft. He exhibits no distension. There is no tenderness.   Neurological: He is alert.   Skin: Skin is warm and dry. No rash noted. He is not diaphoretic. No erythema.   Psychiatric: He has a normal mood and affect.   Nursing note and vitals reviewed.      Significant Labs:   Blood Culture:   Recent Labs  Lab 03/25/18 2121 03/25/18  2212 04/18/18  1353 05/24/18  1748 05/24/18  1749   LABBLOO No growth after 5 days.  No growth after 5 days. No growth after 5 days.  No growth after 5 days. No Growth to date  No Growth to date  No Growth to date  No Growth to date No Growth to date  No Growth to date  No Growth to date  No Growth to date     CBC: No results for input(s): WBC, HGB, HCT, PLT in the last 48 hours.  CMP:   Recent Labs  Lab 05/28/18  0857   CREATININE 0.6   EGFRNONAA >60.0     Wound Culture:   Recent Labs  Lab 05/22/18  1345 05/22/18  1348 05/22/18  1401   LABAERO ENTEROBACTER AEROGENESRareSkin dave also present  PROTEUS MIRABILISRare STREPTOCOCCUS AGALACTIAE (GROUP B)RareBeta-hemolytic streptococci are routinely susceptible to penicillins,cephalosporins and carbapenems.Susceptibility testing not routinely performedSkin dave also present STAPHYLOCOCCUS EPIDERMIDISRare  CORYNEBACTERIUM STRIATUMRare     All pertinent labs within the past 24 hours have been reviewed.    Significant Imaging: I have reviewed all pertinent imaging results/findings within the past 24 hours.

## 2018-05-28 NOTE — PHYSICIAN QUERY
PT Name: Nghia Edgar Jr.  MR #: [id number]     Physician Query Form - Documentation Clarification      CDS/: Terri Major               Contact information: [email or web address]    This form is a permanent document in the medical record.     Query Date: May 28, 2018    By submitting this query, we are merely seeking further clarification of documentation. Please utilize your independent clinical judgment when addressing the question(s) below.    The Medical record reflects the following:    Supporting Clinical Findings Location in Medical Record   Reason for consult: will most likely need antibiotic for chronic osteo. intraop cultrues sent (patient underwent bilateral ischial ulcer flap closure) Infectious Disease Consult Note filed 05/23 10:36am   Right partial ischiectomy.  Partial left ischiectomy.  Next, an osteotome and mallet were then used to perform partial ischiectomy.  All bone was removed that was exposed; only bleeding healthy bone remained.   Op Note   Osteomyelitis  Treated   Plastic Surgery H&P, Past Medical History Diagnosis   S/p debridement and bilateral flap closure POD 2. Tissue cultures +Proteus, Dermabacter hominis, and Enterobacter and C Albicans. Bone cultures with Group B Strep, Staph Epi, Corynebacterium.    Infectious Disease PN 05/27   Staphylococcus epidermidis  Rare  Corynebacterium striatum  Rare      Streptococcus agalactiae (Group B)   Rare   Aerobic Culture, Right Ischial Bone Collected 05/22          Aerobic Culture, Left Ischial Bone Collected 05/22                                                                            Doctor, Please specify diagnosis or diagnoses associated with above clinical findings.    Provider Use Only      [  ] Acute Osteomyelitis    [  ] Chronic Osteomyelitis    [ x] Acute on Chronic Osteomyelitis    [  ] History of Osteomyelitis    [  ] Other diagnosis, please specify:  ______________________                                                                                                                [  ] Clinically undetermined
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
## 2018-05-28 NOTE — PHYSICIAN QUERY
"PT Name: Nghia Edgar Jr.  MR #: [id number]    Physician Query Form - Hematology Clarification      CDS/: Terri Major               Contact information: [email or web address]  This form is a permanent document in the medical record.      Query Date: May 28, 2018    By submitting this query, we are merely seeking further clarification of documentation. Please utilize your independent clinical judgment when addressing the question(s) below.    The Medical record contains the following:   Indicators  Supporting Clinical Findings Location in Medical Record   x "Anemia" documented Anemia      Anemia:  Iron Deficiency Anemia Hematology Comments: 4/18/2018 h/h 9.9/33.9  Plastic Surgery H&P, Past Medical History Diagnosis    Anesthesia Report   x H & H = Hgb= 7.8 - 7.2  Hct= 26.4 - 25.0 Labs, CBC 05/23 & 05/22    BP =                     HR=      "GI bleeding" documented     x Acute bleeding (Non GI site) Blood Loss: 300 mL    Wound preparation for flaps.  Right and Left partial ischiectomy.  Closure of right and left ischial pressure sore using a fasciocutaneous flap. Op Note    Transfusion(s)      Treatment:      Other:        Provider, please specify diagnosis or diagnoses associated with above clinical findings.    [x ] Acute blood loss anemia expected post-operatively    [  ] Iron deficiency anemia    [  ] Anemia of chronic disease ( Specify chronic disease)      [  ] Other (Specify) _______________________________     [  ] Clinically Undetermined     [  ] Other Hematological Diagnosis (please specify): _________________________________    [  ] Clinically Undetermined       Please document in your progress notes daily for the duration of treatment, until resolved, and include in your discharge summary.                                                                                                      "

## 2018-05-28 NOTE — PROGRESS NOTES
Patient seen and examined this morning.  Overall doing okay, more complaints of pain this AM.  No acute events.    AFVSS  Bilateral fasciocutaneous flaps intact and viable. No wound dehiscence. No wound drainage, no skin necrosis, no cellulitis.  Drains with minimal seroang, 30cc R leg and 25cc L leg over the previous 24 hours.     Plan:  Pain control  Continue Clinitron bed  Continue abx per ID recommendations  Continue dispo planning for rehab/LTAC  Strip and empty drains 3-4 times a day.

## 2018-05-28 NOTE — PROGRESS NOTES
Spoke with Dr Jeff regarding pt vanc trough result of 21.2.  MD decreased dose to 1.5G.  1930 dose of vanc 1.75 G not held per MD order.

## 2018-05-28 NOTE — PLAN OF CARE
POD # 3 S/P debridement/bilateral flap closure. D/C plan: Rehab facility. SW/CM will assist placement.        05/28/18 6617   Right Care Assessment   Can the patient answer the patient profile reliably? Yes, cognitively intact   How often would a person be available to care for the patient? Whenever needed   Describe the patient's ability to walk at the present time. Minor restrictions or changes   How does the patient rate their overall health at the present time? Good   Number of comorbid conditions (as recorded on the chart) Three

## 2018-05-29 LAB
BACTERIA BLD CULT: NORMAL
BACTERIA BLD CULT: NORMAL
VANCOMYCIN TROUGH SERPL-MCNC: 7.8 UG/ML

## 2018-05-29 PROCEDURE — 11000001 HC ACUTE MED/SURG PRIVATE ROOM

## 2018-05-29 PROCEDURE — 25000003 PHARM REV CODE 250: Performed by: SURGERY

## 2018-05-29 PROCEDURE — 36415 COLL VENOUS BLD VENIPUNCTURE: CPT

## 2018-05-29 PROCEDURE — 25000003 PHARM REV CODE 250: Performed by: PHYSICIAN ASSISTANT

## 2018-05-29 PROCEDURE — 63600175 PHARM REV CODE 636 W HCPCS: Performed by: STUDENT IN AN ORGANIZED HEALTH CARE EDUCATION/TRAINING PROGRAM

## 2018-05-29 PROCEDURE — 63600175 PHARM REV CODE 636 W HCPCS: Performed by: SURGERY

## 2018-05-29 PROCEDURE — 25000003 PHARM REV CODE 250: Performed by: STUDENT IN AN ORGANIZED HEALTH CARE EDUCATION/TRAINING PROGRAM

## 2018-05-29 PROCEDURE — 63600175 PHARM REV CODE 636 W HCPCS: Performed by: PHYSICIAN ASSISTANT

## 2018-05-29 PROCEDURE — 80202 ASSAY OF VANCOMYCIN: CPT

## 2018-05-29 RX ADMIN — OXYCODONE AND ACETAMINOPHEN 2 TABLET: 7.5; 325 TABLET ORAL at 02:05

## 2018-05-29 RX ADMIN — PREGABALIN 200 MG: 50 CAPSULE ORAL at 09:05

## 2018-05-29 RX ADMIN — Medication 2 MG: at 06:05

## 2018-05-29 RX ADMIN — OXYCODONE AND ACETAMINOPHEN 2 TABLET: 7.5; 325 TABLET ORAL at 05:05

## 2018-05-29 RX ADMIN — OXYBUTYNIN CHLORIDE 5 MG: 5 TABLET ORAL at 09:05

## 2018-05-29 RX ADMIN — Medication 2 MG: at 12:05

## 2018-05-29 RX ADMIN — CIPROFLOXACIN HYDROCHLORIDE 500 MG: 500 TABLET, FILM COATED ORAL at 09:05

## 2018-05-29 RX ADMIN — VANCOMYCIN HYDROCHLORIDE 1500 MG: 10 INJECTION, POWDER, LYOPHILIZED, FOR SOLUTION INTRAVENOUS at 09:05

## 2018-05-29 RX ADMIN — OXYCODONE AND ACETAMINOPHEN 2 TABLET: 7.5; 325 TABLET ORAL at 11:05

## 2018-05-29 RX ADMIN — DIAZEPAM 10 MG: 5 TABLET ORAL at 09:05

## 2018-05-29 RX ADMIN — OXYCODONE HYDROCHLORIDE 40 MG: 20 TABLET, FILM COATED, EXTENDED RELEASE ORAL at 09:05

## 2018-05-29 RX ADMIN — FLUCONAZOLE 400 MG: 200 TABLET ORAL at 02:05

## 2018-05-29 RX ADMIN — PREGABALIN 200 MG: 50 CAPSULE ORAL at 03:05

## 2018-05-29 RX ADMIN — Medication 1 CAPSULE: at 09:05

## 2018-05-29 RX ADMIN — HEPARIN SODIUM 5000 UNITS: 5000 INJECTION, SOLUTION INTRAVENOUS; SUBCUTANEOUS at 02:05

## 2018-05-29 RX ADMIN — POTASSIUM CITRATE 10 MEQ: 10 TABLET ORAL at 09:05

## 2018-05-29 RX ADMIN — HEPARIN SODIUM 5000 UNITS: 5000 INJECTION, SOLUTION INTRAVENOUS; SUBCUTANEOUS at 09:05

## 2018-05-29 RX ADMIN — DIAZEPAM 10 MG: 5 TABLET ORAL at 03:05

## 2018-05-29 RX ADMIN — HEPARIN SODIUM 5000 UNITS: 5000 INJECTION, SOLUTION INTRAVENOUS; SUBCUTANEOUS at 05:05

## 2018-05-29 RX ADMIN — OXYCODONE HYDROCHLORIDE AND ACETAMINOPHEN 500 MG: 500 TABLET ORAL at 06:05

## 2018-05-29 NOTE — PROGRESS NOTES
Patient seen and examined this morning.  Overall doing okay, still some pain, but controlled with current pain regimen.  No acute events.    AFVSS  Bilateral fasciocutaneous flaps intact and viable. No wound dehiscence. No wound drainage, no skin necrosis, no cellulitis.  Drains with minimal seroang, 25cc R leg and 15cc L leg over the previous 24 hours.     Plan:  Awaiting placement, continue dispo planning for rehab/LTAC  Pain control  Continue Clinitron bed  Continue abx per ID recommendations  Strip and empty drains 3-4 times a day.

## 2018-05-29 NOTE — PLAN OF CARE
Problem: Patient Care Overview  Goal: Plan of Care Review  Outcome: Ongoing (interventions implemented as appropriate)  Pt is AAOx4. VSS. No falls/injury as pt calls for assistance when needed. Fall precautions remain in place. Pt remains on special air mattress. Pain being monitored and controlled with PRN and scheduled meds. Antibiotics given as ordered. Bed in lowest position. Call light within reach. Will continue to monitor.

## 2018-05-29 NOTE — PLAN OF CARE
SHAE following for DC needs. SHAE in communication with CM.    SW spoke to patient in regards to LTAC options. The patient expressed that he would like to be closer to home. SHAE explained that a referral was sent to [facility] in [place] but they denied and they do not have the clinitron bed that the patient needs. The patient stated that he is okay with [facility]. SHAE left message for Eduard at [facility] stating that the patient is agreeable to go to [facility].     Malu Quintanilla LMSW  [facility]  [id number]

## 2018-05-30 ENCOUNTER — PATIENT MESSAGE (OUTPATIENT)
Dept: UROLOGY | Facility: CLINIC | Age: 33
End: 2018-05-30

## 2018-05-30 LAB
BACTERIA SPEC ANAEROBE CULT: NORMAL
VANCOMYCIN TROUGH SERPL-MCNC: 9.3 UG/ML

## 2018-05-30 PROCEDURE — 25000003 PHARM REV CODE 250: Performed by: PHYSICIAN ASSISTANT

## 2018-05-30 PROCEDURE — 63600175 PHARM REV CODE 636 W HCPCS: Performed by: PHYSICIAN ASSISTANT

## 2018-05-30 PROCEDURE — 25000003 PHARM REV CODE 250: Performed by: STUDENT IN AN ORGANIZED HEALTH CARE EDUCATION/TRAINING PROGRAM

## 2018-05-30 PROCEDURE — 63600175 PHARM REV CODE 636 W HCPCS: Performed by: SURGERY

## 2018-05-30 PROCEDURE — 11000001 HC ACUTE MED/SURG PRIVATE ROOM

## 2018-05-30 PROCEDURE — 63600175 PHARM REV CODE 636 W HCPCS: Performed by: STUDENT IN AN ORGANIZED HEALTH CARE EDUCATION/TRAINING PROGRAM

## 2018-05-30 PROCEDURE — 25000003 PHARM REV CODE 250: Performed by: SURGERY

## 2018-05-30 PROCEDURE — 80202 ASSAY OF VANCOMYCIN: CPT

## 2018-05-30 PROCEDURE — 36415 COLL VENOUS BLD VENIPUNCTURE: CPT

## 2018-05-30 RX ADMIN — POTASSIUM CITRATE 10 MEQ: 10 TABLET ORAL at 09:05

## 2018-05-30 RX ADMIN — VANCOMYCIN HYDROCHLORIDE 1500 MG: 10 INJECTION, POWDER, LYOPHILIZED, FOR SOLUTION INTRAVENOUS at 09:05

## 2018-05-30 RX ADMIN — Medication 2 MG: at 02:05

## 2018-05-30 RX ADMIN — HEPARIN SODIUM 5000 UNITS: 5000 INJECTION, SOLUTION INTRAVENOUS; SUBCUTANEOUS at 07:05

## 2018-05-30 RX ADMIN — Medication 2 MG: at 09:05

## 2018-05-30 RX ADMIN — CIPROFLOXACIN HYDROCHLORIDE 500 MG: 500 TABLET, FILM COATED ORAL at 09:05

## 2018-05-30 RX ADMIN — HEPARIN SODIUM 5000 UNITS: 5000 INJECTION, SOLUTION INTRAVENOUS; SUBCUTANEOUS at 02:05

## 2018-05-30 RX ADMIN — Medication 1 CAPSULE: at 09:05

## 2018-05-30 RX ADMIN — OXYCODONE AND ACETAMINOPHEN 2 TABLET: 7.5; 325 TABLET ORAL at 07:05

## 2018-05-30 RX ADMIN — HEPARIN SODIUM 5000 UNITS: 5000 INJECTION, SOLUTION INTRAVENOUS; SUBCUTANEOUS at 09:05

## 2018-05-30 RX ADMIN — OXYCODONE HYDROCHLORIDE 40 MG: 20 TABLET, FILM COATED, EXTENDED RELEASE ORAL at 09:05

## 2018-05-30 RX ADMIN — DIAZEPAM 10 MG: 5 TABLET ORAL at 05:05

## 2018-05-30 RX ADMIN — FLUCONAZOLE 400 MG: 200 TABLET ORAL at 03:05

## 2018-05-30 RX ADMIN — Medication 2 MG: at 05:05

## 2018-05-30 RX ADMIN — OXYBUTYNIN CHLORIDE 5 MG: 5 TABLET ORAL at 09:05

## 2018-05-30 RX ADMIN — PREGABALIN 200 MG: 50 CAPSULE ORAL at 09:05

## 2018-05-30 RX ADMIN — OXYCODONE AND ACETAMINOPHEN 2 TABLET: 7.5; 325 TABLET ORAL at 03:05

## 2018-05-30 RX ADMIN — PREGABALIN 200 MG: 50 CAPSULE ORAL at 03:05

## 2018-05-30 RX ADMIN — DIAZEPAM 10 MG: 5 TABLET ORAL at 09:05

## 2018-05-30 NOTE — PLAN OF CARE
SHAE following for DC needs. SHAE in communication with JOSEF.    SHAE sent updated notes to [facility] via [employer].     UPDATE 4:16 PM  SHAE received call from Eduard at [facility] that the patient is declined. Eduard stated that the patient reported that he has a few follow up appts at [facility] that they would not be able to transport him back and forth. Eduard also stated that the patient stated that he wanted specific pain meds and his mother to stay 24/7. Eduard stated that they can accommodate family members staying but not always 24/7.   JOSEF relayed this information to the patient and the patient stated that he was going to call Eduard at [facility].       Malu Quintanilla, KARIME  [facility]  [id number]

## 2018-05-30 NOTE — PROGRESS NOTES
" [facility]  Adult Nutrition  Progress Note    SUMMARY       Recommendations    Recommendation/Intervention:   1. Continue regular diet + boost plus w/ all meals.   2. Recommend adding beneprotein w/ all meals.   3. RD following.     Goals: meet >85% of EEN/EPN via po intake  Nutrition Goal Status: goal met  Communication of RD Recs:  (POC)    Reason for Assessment    Reason for Assessment: RD follow-up  Diagnosis:  (decubitus ulcer of L ischial area, stage IV)  Relevant Medical History: paraplegia (2012), anemia, asthma, HTN  Interdisciplinary Rounds: attended  General Information Comments: POD8 BL ischial flaps. Per infectious disease, tissue cultures +Proteus, Dermabacter hominis, and Enterobacter and C Albicans. Bone cultures with Group B Strep, Staph Epi, Corynebacterium. Per chart, pt eating 100%.   Nutrition Discharge Planning: regular diet    Nutrition Risk Screen    Nutrition Risk Screen: no indicators present    Nutrition/Diet History    Do you have any cultural, spiritual, Evangelical conflicts, given your current situation?: none  Factors Affecting Nutritional Intake: None identified at this time    Anthropometrics    Temp: 98.3 °F (36.8 °C)  Height Method: Stated  Height: 5' 8" (172.7 cm)  Height (inches): 68 in  Weight Method: Bed Scale  Weight: 81.6 kg (180 lb)  Weight (lb): 180 lb  Ideal Body Weight (IBW), Male: 154 lb  % Ideal Body Weight, Male (lb): 116.88 lb  BMI (Calculated): 27.4  BMI Grade: 25 - 29.9 - overweight       Lab/Procedures/Meds    Pertinent Labs Reviewed: reviewed  Pertinent Labs Comments: BUN 5L, glucose 122H  Pertinent Medications Reviewed: reviewed  Pertinent Medications Comments: Vitamin C, lactobacillus rhamnosus, osycodone, K citrate     Physical Findings/Assessment    Overall Physical Appearance: nourished  Tubes: other (see comments) (colostomy )  Oral/Mouth Cavity: WDL  Skin: pressure ulcer(s) (stage IV)    Estimated/Assessed Needs    Weight Used For " Calorie Calculations: 81.6 kg (179 lb 14.3 oz)  Energy Calorie Requirements (kcal): 1903 kcals/day  Energy Need Method: [place]-St Jeor (X 1.1)  Protein Requirements:  gm/day (1.1-1.5 gm/day)  Weight Used For Protein Calculations: 81.6 kg (179 lb 14.3 oz)  Fluid Requirements (mL): 1 mL/hr or per MD  Fluid Need Method: RDA Method  RDA Method (mL): 1903       Nutrition Prescription Ordered    Current Diet Order: regular   Oral Nutrition Supplement: boost plus w/ all meals     Evaluation of Received Nutrient/Fluid Intake    % Intake of Estimated Energy Needs: 75 - 100 %  % Meal Intake: 75 - 100 %    Nutrition Risk    Level of Risk/Frequency of Follow-up: low (f/u 1 X wk)     Assessment and Plan    Decubitus ulcer of right ischial area, stage IV    Contributing Nutrition Diagnosis  Increased protein needs    Related to (etiology):   Wound healing    Signs and Symptoms (as evidenced by):   Decubitus ulcer of left ischial area (stage IV)    Interventions/Recommendations (treatment strategy):  See recs    Nutrition Diagnosis Status:   Continues                  Monitor and Evaluation    Food and Nutrient Intake: energy intake, food and beverage intake  Food and Nutrient Adminstration: diet order  Physical Activity and Function: nutrition-related ADLs and IADLs  Anthropometric Measurements: weight, weight change, body mass index  Biochemical Data, Medical Tests and Procedures: electrolyte and renal panel, gastrointestinal profile, glucose/endocrine profile, inflammatory profile, lipid profile  Nutrition-Focused Physical Findings: overall appearance     Nutrition Follow-Up    RD Follow-up?: Yes

## 2018-05-30 NOTE — PLAN OF CARE
Problem: Patient Care Overview  Goal: Plan of Care Review  Outcome: Ongoing (interventions implemented as appropriate)  Pt in bed resting. C/o pain relieved with PRN pain med. In no acute distress. Activity promoted. repositioning encouraged Regular diet tolerated well with no c/o n/v. No falls noted. Will continue to monitor. Call bell intact and in reach.

## 2018-05-30 NOTE — ASSESSMENT & PLAN NOTE
Contributing Nutrition Diagnosis  Increased protein needs    Related to (etiology):   Wound healing    Signs and Symptoms (as evidenced by):   Decubitus ulcer of left ischial area (stage IV)    Interventions/Recommendations (treatment strategy):  See recs    Nutrition Diagnosis Status:   Continues

## 2018-05-31 PROCEDURE — 11000001 HC ACUTE MED/SURG PRIVATE ROOM

## 2018-05-31 PROCEDURE — 25000003 PHARM REV CODE 250: Performed by: STUDENT IN AN ORGANIZED HEALTH CARE EDUCATION/TRAINING PROGRAM

## 2018-05-31 PROCEDURE — 25000003 PHARM REV CODE 250: Performed by: SURGERY

## 2018-05-31 PROCEDURE — 25000003 PHARM REV CODE 250: Performed by: PHYSICIAN ASSISTANT

## 2018-05-31 PROCEDURE — 63600175 PHARM REV CODE 636 W HCPCS: Performed by: SURGERY

## 2018-05-31 PROCEDURE — 63600175 PHARM REV CODE 636 W HCPCS: Performed by: STUDENT IN AN ORGANIZED HEALTH CARE EDUCATION/TRAINING PROGRAM

## 2018-05-31 PROCEDURE — 63600175 PHARM REV CODE 636 W HCPCS: Performed by: PHYSICIAN ASSISTANT

## 2018-05-31 PROCEDURE — 94761 N-INVAS EAR/PLS OXIMETRY MLT: CPT

## 2018-05-31 RX ADMIN — PREGABALIN 200 MG: 50 CAPSULE ORAL at 09:05

## 2018-05-31 RX ADMIN — CIPROFLOXACIN HYDROCHLORIDE 500 MG: 500 TABLET, FILM COATED ORAL at 09:05

## 2018-05-31 RX ADMIN — Medication 2 MG: at 11:05

## 2018-05-31 RX ADMIN — DIAZEPAM 10 MG: 5 TABLET ORAL at 04:05

## 2018-05-31 RX ADMIN — HEPARIN SODIUM 5000 UNITS: 5000 INJECTION, SOLUTION INTRAVENOUS; SUBCUTANEOUS at 09:05

## 2018-05-31 RX ADMIN — VANCOMYCIN HYDROCHLORIDE 1500 MG: 10 INJECTION, POWDER, LYOPHILIZED, FOR SOLUTION INTRAVENOUS at 09:05

## 2018-05-31 RX ADMIN — OXYCODONE AND ACETAMINOPHEN 2 TABLET: 7.5; 325 TABLET ORAL at 06:05

## 2018-05-31 RX ADMIN — OXYBUTYNIN CHLORIDE 5 MG: 5 TABLET ORAL at 09:05

## 2018-05-31 RX ADMIN — FLUCONAZOLE 400 MG: 200 TABLET ORAL at 03:05

## 2018-05-31 RX ADMIN — Medication 2 MG: at 04:05

## 2018-05-31 RX ADMIN — Medication 1 CAPSULE: at 09:05

## 2018-05-31 RX ADMIN — DIAZEPAM 10 MG: 5 TABLET ORAL at 11:05

## 2018-05-31 RX ADMIN — PREGABALIN 200 MG: 50 CAPSULE ORAL at 04:05

## 2018-05-31 RX ADMIN — OXYCODONE HYDROCHLORIDE 40 MG: 20 TABLET, FILM COATED, EXTENDED RELEASE ORAL at 09:05

## 2018-05-31 RX ADMIN — DIAZEPAM 10 MG: 5 TABLET ORAL at 09:05

## 2018-05-31 RX ADMIN — HEPARIN SODIUM 5000 UNITS: 5000 INJECTION, SOLUTION INTRAVENOUS; SUBCUTANEOUS at 06:05

## 2018-05-31 RX ADMIN — HEPARIN SODIUM 5000 UNITS: 5000 INJECTION, SOLUTION INTRAVENOUS; SUBCUTANEOUS at 02:05

## 2018-05-31 RX ADMIN — Medication 2 MG: at 09:05

## 2018-05-31 RX ADMIN — OXYCODONE HYDROCHLORIDE AND ACETAMINOPHEN 500 MG: 500 TABLET ORAL at 09:05

## 2018-05-31 RX ADMIN — Medication 2 MG: at 02:05

## 2018-05-31 RX ADMIN — POTASSIUM CITRATE 10 MEQ: 10 TABLET ORAL at 09:05

## 2018-05-31 NOTE — PROGRESS NOTES
Patient seen and examined this morning.  No acute events. Pain, but controlled with current pain regimen.    AFVSS  NAD, comfortable in bed  Drains with minimal seroang, 20cc R leg and 20cc L leg over the previous 24 hours.     Plan:  Still awaiting placement, continue dispo planning for rehab/LTAC  Pain control  Continue Clinitron bed  Continue abx per ID recommendations  Strip and empty drains 3-4 times a day

## 2018-05-31 NOTE — PLAN OF CARE
Planned discharge is LTAC- Plan (A) or Rehab - Plan (B).  Patient is POD 9 for flap. Patient is on Clinitron bed and will need to be on Clinitron Bed for 6-8 weeks with IVAB therapy.     05/31/18 1203   Discharge Reassessment   Assessment Type Discharge Planning Reassessment   Provided patient/caregiver education on the expected discharge date and the discharge plan No   Do you have any problems affording any of your prescribed medications? No   Discharge Plan A Long-term acute care facility (LTAC)   Discharge Plan B Rehab   Patient choice form signed by patient/caregiver N/A   Can the patient answer the patient profile reliably? Yes, cognitively intact   How does the patient rate their overall health at the present time? Good   Describe the patient's ability to walk at the present time. Does not walk or unable to take any steps at all   How often would a person be available to care for the patient? Whenever needed   Number of comorbid conditions (as recorded on the chart) Three   During the past month, has the patient often been bothered by feeling down, depressed or hopeless? No   During the past month, has the patient often been bothered by little interest or pleasure in doing things? No

## 2018-06-01 LAB — VANCOMYCIN TROUGH SERPL-MCNC: 10.8 UG/ML

## 2018-06-01 PROCEDURE — 63600175 PHARM REV CODE 636 W HCPCS: Performed by: SURGERY

## 2018-06-01 PROCEDURE — 63600175 PHARM REV CODE 636 W HCPCS: Performed by: PHYSICIAN ASSISTANT

## 2018-06-01 PROCEDURE — 25000003 PHARM REV CODE 250: Performed by: SURGERY

## 2018-06-01 PROCEDURE — 11000001 HC ACUTE MED/SURG PRIVATE ROOM

## 2018-06-01 PROCEDURE — 25000003 PHARM REV CODE 250: Performed by: PHYSICIAN ASSISTANT

## 2018-06-01 PROCEDURE — 25000003 PHARM REV CODE 250: Performed by: STUDENT IN AN ORGANIZED HEALTH CARE EDUCATION/TRAINING PROGRAM

## 2018-06-01 PROCEDURE — 36415 COLL VENOUS BLD VENIPUNCTURE: CPT

## 2018-06-01 PROCEDURE — 63600175 PHARM REV CODE 636 W HCPCS: Performed by: STUDENT IN AN ORGANIZED HEALTH CARE EDUCATION/TRAINING PROGRAM

## 2018-06-01 PROCEDURE — 80202 ASSAY OF VANCOMYCIN: CPT

## 2018-06-01 RX ADMIN — HEPARIN SODIUM 5000 UNITS: 5000 INJECTION, SOLUTION INTRAVENOUS; SUBCUTANEOUS at 05:06

## 2018-06-01 RX ADMIN — OXYCODONE HYDROCHLORIDE 40 MG: 20 TABLET, FILM COATED, EXTENDED RELEASE ORAL at 09:06

## 2018-06-01 RX ADMIN — OXYCODONE AND ACETAMINOPHEN 2 TABLET: 7.5; 325 TABLET ORAL at 06:06

## 2018-06-01 RX ADMIN — PREGABALIN 200 MG: 50 CAPSULE ORAL at 09:06

## 2018-06-01 RX ADMIN — CIPROFLOXACIN HYDROCHLORIDE 500 MG: 500 TABLET, FILM COATED ORAL at 09:06

## 2018-06-01 RX ADMIN — DIAZEPAM 10 MG: 5 TABLET ORAL at 09:06

## 2018-06-01 RX ADMIN — OXYBUTYNIN CHLORIDE 5 MG: 5 TABLET ORAL at 09:06

## 2018-06-01 RX ADMIN — FLUCONAZOLE 400 MG: 200 TABLET ORAL at 03:06

## 2018-06-01 RX ADMIN — Medication 2 MG: at 02:06

## 2018-06-01 RX ADMIN — VANCOMYCIN HYDROCHLORIDE 1500 MG: 10 INJECTION, POWDER, LYOPHILIZED, FOR SOLUTION INTRAVENOUS at 09:06

## 2018-06-01 RX ADMIN — Medication 1 CAPSULE: at 09:06

## 2018-06-01 RX ADMIN — OXYCODONE AND ACETAMINOPHEN 2 TABLET: 7.5; 325 TABLET ORAL at 05:06

## 2018-06-01 RX ADMIN — HEPARIN SODIUM 5000 UNITS: 5000 INJECTION, SOLUTION INTRAVENOUS; SUBCUTANEOUS at 03:06

## 2018-06-01 RX ADMIN — DIAZEPAM 10 MG: 5 TABLET ORAL at 03:06

## 2018-06-01 RX ADMIN — POTASSIUM CITRATE 10 MEQ: 10 TABLET ORAL at 09:06

## 2018-06-01 RX ADMIN — PREGABALIN 200 MG: 50 CAPSULE ORAL at 03:06

## 2018-06-01 RX ADMIN — HEPARIN SODIUM 5000 UNITS: 5000 INJECTION, SOLUTION INTRAVENOUS; SUBCUTANEOUS at 09:06

## 2018-06-01 NOTE — SUBJECTIVE & OBJECTIVE
Interval History:   No acute events. VSS. Pain mostly controlled. Denied by another LTAC yesterday.    Medications:  Continuous Infusions:   sodium chloride 0.9% 0 mL/hr at 05/22/18 1254     Scheduled Meds:   ascorbic acid (vitamin C)  500 mg Oral QAM    ciprofloxacin HCl  500 mg Oral Q12H    fluconazole  400 mg Oral Daily    heparin (porcine)  5,000 Units Subcutaneous Q8H    Lactobacillus rhamnosus GG  1 capsule Oral Daily    oxybutynin  5 mg Oral BID    oxyCODONE  40 mg Oral Q12H    potassium citrate  10 mEq Oral Daily    pregabalin  200 mg Oral TID    vancomycin (VANCOCIN) IVPB  1,500 mg Intravenous Q12H     PRN Meds:albuterol, diazePAM, morphine, ondansetron, oxyCODONE-acetaminophen     Review of patient's allergies indicates:   Allergen Reactions    Zanaflex [tizanidine] Other (See Comments)     Get hallucinations from meds     Objective:     Vital Signs (Most Recent):  Temp: 98 °F (36.7 °C) (06/01/18 0734)  Pulse: 64 (06/01/18 0734)  Resp: 16 (06/01/18 0734)  BP: (!) 110/56 (06/01/18 0734)  SpO2: 99 % (06/01/18 0734) Vital Signs (24h Range):  Temp:  [97.5 °F (36.4 °C)-99.3 °F (37.4 °C)] 98 °F (36.7 °C)  Pulse:  [62-84] 64  Resp:  [16-20] 16  SpO2:  [95 %-100 %] 99 %  BP: (110-122)/(51-63) 110/56     Weight: 81.6 kg (180 lb)  Body mass index is 27.37 kg/m².    Intake/Output - Last 3 Shifts       05/30 0700 - 05/31 0659 05/31 0700 - 06/01 0659 06/01 0700 - 06/02 0659    P.O. 1440 1200     I.V. (mL/kg)  250 (3.1)     Total Intake(mL/kg) 1440 (17.6) 1450 (17.8)     Urine (mL/kg/hr) 2350 (1.2) 2450 (1.3)     Drains 40 (0) 35 (0)     Stool 450 (0.2) 500 (0.3)     Total Output 2840 2985      Net -1400 -1535                   Physical Exam   Constitutional: He is oriented to person, place, and time. No distress.   Musculoskeletal:   Flap sites healing well, c/d/i, no s/s of infection  Drains ss, minimal output   Neurological: He is alert and oriented to person, place, and time.   Nursing note and vitals  reviewed.      Significant Labs:  CBC:   Recent Labs  Lab 05/25/18  1057   WBC 5.29   RBC 2.98*   HGB 7.2*   HCT 25.0*      MCV 84   MCH 24.2*   MCHC 28.8*     BMP:   Recent Labs  Lab 05/25/18  1057 05/28/18  0857   *  --      --    K 3.8  --      --    CO2 28  --    BUN 5*  --    CREATININE 0.5 0.6   CALCIUM 8.0*  --        Significant Diagnostics:  none

## 2018-06-01 NOTE — PROGRESS NOTES
[facility]  Plastic Surgery  Progress Note    Subjective:     History of Present Illness:  No notes on file    Post-Op Info:  Procedure(s) (LRB):  OSTECTOMY (N/A)  DEBRIDEMENT   10 Days Post-Op     Interval History:   No acute events. VSS. Pain mostly controlled. Denied by another LTAC yesterday.    Medications:  Continuous Infusions:   sodium chloride 0.9% 0 mL/hr at 05/22/18 1254     Scheduled Meds:   ascorbic acid (vitamin C)  500 mg Oral QAM    ciprofloxacin HCl  500 mg Oral Q12H    fluconazole  400 mg Oral Daily    heparin (porcine)  5,000 Units Subcutaneous Q8H    Lactobacillus rhamnosus GG  1 capsule Oral Daily    oxybutynin  5 mg Oral BID    oxyCODONE  40 mg Oral Q12H    potassium citrate  10 mEq Oral Daily    pregabalin  200 mg Oral TID    vancomycin (VANCOCIN) IVPB  1,500 mg Intravenous Q12H     PRN Meds:albuterol, diazePAM, morphine, ondansetron, oxyCODONE-acetaminophen     Review of patient's allergies indicates:   Allergen Reactions    Zanaflex [tizanidine] Other (See Comments)     Get hallucinations from meds     Objective:     Vital Signs (Most Recent):  Temp: 98 °F (36.7 °C) (06/01/18 0734)  Pulse: 64 (06/01/18 0734)  Resp: 16 (06/01/18 0734)  BP: (!) 110/56 (06/01/18 0734)  SpO2: 99 % (06/01/18 0734) Vital Signs (24h Range):  Temp:  [97.5 °F (36.4 °C)-99.3 °F (37.4 °C)] 98 °F (36.7 °C)  Pulse:  [62-84] 64  Resp:  [16-20] 16  SpO2:  [95 %-100 %] 99 %  BP: (110-122)/(51-63) 110/56     Weight: 81.6 kg (180 lb)  Body mass index is 27.37 kg/m².    Intake/Output - Last 3 Shifts       05/30 0700 - 05/31 0659 05/31 0700 - 06/01 0659 06/01 0700 - 06/02 0659    P.O. 1440 1200     I.V. (mL/kg)  250 (3.1)     Total Intake(mL/kg) 1440 (17.6) 1450 (17.8)     Urine (mL/kg/hr) 2350 (1.2) 2450 (1.3)     Drains 40 (0) 35 (0)     Stool 450 (0.2) 500 (0.3)     Total Output 2840 3973      Net -2101 -4084                   Physical Exam   Constitutional: He is oriented to person, place,  and time. No distress.   Musculoskeletal:   Flap sites healing well, c/d/i, no s/s of infection  Drains ss, minimal output   Neurological: He is alert and oriented to person, place, and time.   Nursing note and vitals reviewed.      Significant Labs:  CBC:   Recent Labs  Lab 05/25/18  1057   WBC 5.29   RBC 2.98*   HGB 7.2*   HCT 25.0*      MCV 84   MCH 24.2*   MCHC 28.8*     BMP:   Recent Labs  Lab 05/25/18  1057 05/28/18  0857   *  --      --    K 3.8  --      --    CO2 28  --    BUN 5*  --    CREATININE 0.5 0.6   CALCIUM 8.0*  --        Significant Diagnostics:  none    Assessment/Plan:     * Decubitus ulcer of ischial area, left, stage IV    10 Days Post-Op BL ischial flaps. Doing well, drains SS    - ABx per ID, appreciate recs: 6 weeks from 5/22-7/3/18  - target 100g protein/day, Boost supplements  - DVT ppx  - home meds including chronic pain meds + breakthrough  - dispo pending LTAC placement, will continue discussions with SW to attempt placement          Decubitus ulcer of right ischial area, stage IV    See principal            Cedric Spears MD  Plastic Surgery  [facility]

## 2018-06-01 NOTE — PLAN OF CARE
Problem: Patient Care Overview  Goal: Plan of Care Review  Outcome: Ongoing (interventions implemented as appropriate)  Pt continues with plan of care. Pt free from falls. All drains intact. Dressing intact. Q 2 hour monitoring for pain and safety. Pain managed with PRN medication. Call light in reach.
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
## 2018-06-01 NOTE — ASSESSMENT & PLAN NOTE
10 Days Post-Op BL ischial flaps. Doing well, drains SS    - ABx per ID, appreciate recs: 6 weeks from 5/22-7/3/18  - target 100g protein/day, Boost supplements  - DVT ppx  - home meds including chronic pain meds + breakthrough  - dispo pending LTAC placement, will continue discussions with SW to attempt placement

## 2018-06-02 PROCEDURE — 25000003 PHARM REV CODE 250: Performed by: SURGERY

## 2018-06-02 PROCEDURE — 25000003 PHARM REV CODE 250: Performed by: STUDENT IN AN ORGANIZED HEALTH CARE EDUCATION/TRAINING PROGRAM

## 2018-06-02 PROCEDURE — 63600175 PHARM REV CODE 636 W HCPCS: Performed by: SURGERY

## 2018-06-02 PROCEDURE — 63600175 PHARM REV CODE 636 W HCPCS: Performed by: PHYSICIAN ASSISTANT

## 2018-06-02 PROCEDURE — 63600175 PHARM REV CODE 636 W HCPCS: Performed by: STUDENT IN AN ORGANIZED HEALTH CARE EDUCATION/TRAINING PROGRAM

## 2018-06-02 PROCEDURE — 25000003 PHARM REV CODE 250: Performed by: PHYSICIAN ASSISTANT

## 2018-06-02 PROCEDURE — 11000001 HC ACUTE MED/SURG PRIVATE ROOM

## 2018-06-02 RX ADMIN — DIAZEPAM 10 MG: 5 TABLET ORAL at 09:06

## 2018-06-02 RX ADMIN — PREGABALIN 200 MG: 50 CAPSULE ORAL at 03:06

## 2018-06-02 RX ADMIN — OXYBUTYNIN CHLORIDE 5 MG: 5 TABLET ORAL at 09:06

## 2018-06-02 RX ADMIN — CIPROFLOXACIN HYDROCHLORIDE 500 MG: 500 TABLET, FILM COATED ORAL at 08:06

## 2018-06-02 RX ADMIN — Medication 1 CAPSULE: at 09:06

## 2018-06-02 RX ADMIN — OXYBUTYNIN CHLORIDE 5 MG: 5 TABLET ORAL at 08:06

## 2018-06-02 RX ADMIN — DIAZEPAM 10 MG: 5 TABLET ORAL at 03:06

## 2018-06-02 RX ADMIN — DIAZEPAM 10 MG: 5 TABLET ORAL at 08:06

## 2018-06-02 RX ADMIN — OXYCODONE HYDROCHLORIDE 40 MG: 20 TABLET, FILM COATED, EXTENDED RELEASE ORAL at 09:06

## 2018-06-02 RX ADMIN — HEPARIN SODIUM 5000 UNITS: 5000 INJECTION, SOLUTION INTRAVENOUS; SUBCUTANEOUS at 02:06

## 2018-06-02 RX ADMIN — OXYCODONE HYDROCHLORIDE 40 MG: 20 TABLET, FILM COATED, EXTENDED RELEASE ORAL at 08:06

## 2018-06-02 RX ADMIN — VANCOMYCIN HYDROCHLORIDE 1500 MG: 10 INJECTION, POWDER, LYOPHILIZED, FOR SOLUTION INTRAVENOUS at 09:06

## 2018-06-02 RX ADMIN — Medication 2 MG: at 02:06

## 2018-06-02 RX ADMIN — POTASSIUM CITRATE 10 MEQ: 10 TABLET ORAL at 09:06

## 2018-06-02 RX ADMIN — OXYCODONE AND ACETAMINOPHEN 2 TABLET: 7.5; 325 TABLET ORAL at 02:06

## 2018-06-02 RX ADMIN — VANCOMYCIN HYDROCHLORIDE 1500 MG: 10 INJECTION, POWDER, LYOPHILIZED, FOR SOLUTION INTRAVENOUS at 08:06

## 2018-06-02 RX ADMIN — PREGABALIN 200 MG: 50 CAPSULE ORAL at 09:06

## 2018-06-02 RX ADMIN — PREGABALIN 200 MG: 50 CAPSULE ORAL at 08:06

## 2018-06-02 RX ADMIN — OXYCODONE AND ACETAMINOPHEN 2 TABLET: 7.5; 325 TABLET ORAL at 06:06

## 2018-06-02 RX ADMIN — Medication 2 MG: at 08:06

## 2018-06-02 RX ADMIN — CIPROFLOXACIN HYDROCHLORIDE 500 MG: 500 TABLET, FILM COATED ORAL at 09:06

## 2018-06-02 RX ADMIN — FLUCONAZOLE 400 MG: 200 TABLET ORAL at 02:06

## 2018-06-02 RX ADMIN — HEPARIN SODIUM 5000 UNITS: 5000 INJECTION, SOLUTION INTRAVENOUS; SUBCUTANEOUS at 06:06

## 2018-06-02 RX ADMIN — HEPARIN SODIUM 5000 UNITS: 5000 INJECTION, SOLUTION INTRAVENOUS; SUBCUTANEOUS at 09:06
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
## 2018-06-02 NOTE — PLAN OF CARE
Problem: Patient Care Overview  Goal: Plan of Care Review  Outcome: Ongoing (interventions implemented as appropriate)  Pt is AAOx4. VSS. No falls/injury as pt calls for assistance when needed. Fall precautions remain in place. Pt remains on special air mattress. EMMA drains stripped every four hours. Output recorded. Sacral foam dressing replaced today. Ostomy bag changed. Suprapubic cath cleansed with betadine. Pain being monitored and controlled with PRN and scheduled meds. Antibiotics given as ordered. Bed in lowest position. Call light within reach. Will continue to monitor.

## 2018-06-02 NOTE — PROGRESS NOTES
Dr. Mata returned page. Verbal order to page on call for urology and have them come exchange catheter. If they are not in house, it can be done tomorrow. Dr. Parekh on call. Awaiting return page.    Dr. Parekh returned page. States urology is not in house and they do not do routine changes on the floor- if we have any problems or issues with the cath, we can consult them.

## 2018-06-03 LAB — VANCOMYCIN TROUGH SERPL-MCNC: 13.9 UG/ML

## 2018-06-03 PROCEDURE — 25000003 PHARM REV CODE 250: Performed by: PHYSICIAN ASSISTANT

## 2018-06-03 PROCEDURE — 63600175 PHARM REV CODE 636 W HCPCS: Performed by: STUDENT IN AN ORGANIZED HEALTH CARE EDUCATION/TRAINING PROGRAM

## 2018-06-03 PROCEDURE — 80202 ASSAY OF VANCOMYCIN: CPT

## 2018-06-03 PROCEDURE — 63600175 PHARM REV CODE 636 W HCPCS: Performed by: SURGERY

## 2018-06-03 PROCEDURE — 11000001 HC ACUTE MED/SURG PRIVATE ROOM

## 2018-06-03 PROCEDURE — 63600175 PHARM REV CODE 636 W HCPCS: Performed by: PHYSICIAN ASSISTANT

## 2018-06-03 PROCEDURE — 25000003 PHARM REV CODE 250: Performed by: STUDENT IN AN ORGANIZED HEALTH CARE EDUCATION/TRAINING PROGRAM

## 2018-06-03 PROCEDURE — 36415 COLL VENOUS BLD VENIPUNCTURE: CPT

## 2018-06-03 PROCEDURE — 25000003 PHARM REV CODE 250: Performed by: SURGERY

## 2018-06-03 RX ADMIN — POTASSIUM CITRATE 10 MEQ: 10 TABLET ORAL at 09:06

## 2018-06-03 RX ADMIN — CIPROFLOXACIN HYDROCHLORIDE 500 MG: 500 TABLET, FILM COATED ORAL at 09:06

## 2018-06-03 RX ADMIN — OXYCODONE HYDROCHLORIDE 40 MG: 20 TABLET, FILM COATED, EXTENDED RELEASE ORAL at 08:06

## 2018-06-03 RX ADMIN — FLUCONAZOLE 400 MG: 200 TABLET ORAL at 02:06

## 2018-06-03 RX ADMIN — OXYBUTYNIN CHLORIDE 5 MG: 5 TABLET ORAL at 08:06

## 2018-06-03 RX ADMIN — DIAZEPAM 10 MG: 5 TABLET ORAL at 09:06

## 2018-06-03 RX ADMIN — DIAZEPAM 10 MG: 5 TABLET ORAL at 08:06

## 2018-06-03 RX ADMIN — Medication 2 MG: at 02:06

## 2018-06-03 RX ADMIN — PREGABALIN 200 MG: 50 CAPSULE ORAL at 02:06

## 2018-06-03 RX ADMIN — DIAZEPAM 10 MG: 5 TABLET ORAL at 02:06

## 2018-06-03 RX ADMIN — OXYCODONE HYDROCHLORIDE 40 MG: 20 TABLET, FILM COATED, EXTENDED RELEASE ORAL at 09:06

## 2018-06-03 RX ADMIN — HEPARIN SODIUM 5000 UNITS: 5000 INJECTION, SOLUTION INTRAVENOUS; SUBCUTANEOUS at 09:06

## 2018-06-03 RX ADMIN — PREGABALIN 200 MG: 50 CAPSULE ORAL at 08:06

## 2018-06-03 RX ADMIN — VANCOMYCIN HYDROCHLORIDE 1500 MG: 10 INJECTION, POWDER, LYOPHILIZED, FOR SOLUTION INTRAVENOUS at 09:06

## 2018-06-03 RX ADMIN — Medication 2 MG: at 03:06

## 2018-06-03 RX ADMIN — HEPARIN SODIUM 5000 UNITS: 5000 INJECTION, SOLUTION INTRAVENOUS; SUBCUTANEOUS at 06:06

## 2018-06-03 RX ADMIN — CIPROFLOXACIN HYDROCHLORIDE 500 MG: 500 TABLET, FILM COATED ORAL at 08:06

## 2018-06-03 RX ADMIN — PREGABALIN 200 MG: 50 CAPSULE ORAL at 09:06

## 2018-06-03 RX ADMIN — HEPARIN SODIUM 5000 UNITS: 5000 INJECTION, SOLUTION INTRAVENOUS; SUBCUTANEOUS at 02:06

## 2018-06-03 RX ADMIN — OXYBUTYNIN CHLORIDE 5 MG: 5 TABLET ORAL at 09:06

## 2018-06-03 RX ADMIN — Medication 2 MG: at 08:06

## 2018-06-03 RX ADMIN — OXYCODONE AND ACETAMINOPHEN 2 TABLET: 7.5; 325 TABLET ORAL at 12:06

## 2018-06-03 RX ADMIN — OXYCODONE AND ACETAMINOPHEN 2 TABLET: 7.5; 325 TABLET ORAL at 05:06

## 2018-06-03 RX ADMIN — VANCOMYCIN HYDROCHLORIDE 1500 MG: 10 INJECTION, POWDER, LYOPHILIZED, FOR SOLUTION INTRAVENOUS at 08:06

## 2018-06-03 RX ADMIN — Medication 1 CAPSULE: at 09:06
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
## 2018-06-03 NOTE — PLAN OF CARE
Problem: Patient Care Overview  Goal: Plan of Care Review  Outcome: Ongoing (interventions implemented as appropriate)  Pt is AAOx4. VSS. No falls/injury as pt calls for assistance when needed. Fall precautions remain in place. Pt remains on clinitron bed. EMMA drains stripped and output recorded. Sacral foam dressing clean, dry, intact. Pain being monitored and controlled with PRN and scheduled meds. Antibiotics given as ordered. Bed in lowest position. Call light within reach. Will continue to monitor.

## 2018-06-04 ENCOUNTER — PATIENT MESSAGE (OUTPATIENT)
Dept: PHYSICAL MEDICINE AND REHAB | Facility: CLINIC | Age: 33
End: 2018-06-04

## 2018-06-04 PROCEDURE — 25000003 PHARM REV CODE 250: Performed by: STUDENT IN AN ORGANIZED HEALTH CARE EDUCATION/TRAINING PROGRAM

## 2018-06-04 PROCEDURE — 25000003 PHARM REV CODE 250: Performed by: PHYSICIAN ASSISTANT

## 2018-06-04 PROCEDURE — 63600175 PHARM REV CODE 636 W HCPCS: Performed by: SURGERY

## 2018-06-04 PROCEDURE — 25000003 PHARM REV CODE 250: Performed by: SURGERY

## 2018-06-04 PROCEDURE — 63600175 PHARM REV CODE 636 W HCPCS: Performed by: PHYSICIAN ASSISTANT

## 2018-06-04 PROCEDURE — 11000001 HC ACUTE MED/SURG PRIVATE ROOM

## 2018-06-04 PROCEDURE — 63600175 PHARM REV CODE 636 W HCPCS: Performed by: STUDENT IN AN ORGANIZED HEALTH CARE EDUCATION/TRAINING PROGRAM

## 2018-06-04 RX ADMIN — CIPROFLOXACIN HYDROCHLORIDE 500 MG: 500 TABLET, FILM COATED ORAL at 08:06

## 2018-06-04 RX ADMIN — OXYCODONE AND ACETAMINOPHEN 2 TABLET: 7.5; 325 TABLET ORAL at 03:06

## 2018-06-04 RX ADMIN — OXYCODONE HYDROCHLORIDE 40 MG: 20 TABLET, FILM COATED, EXTENDED RELEASE ORAL at 08:06

## 2018-06-04 RX ADMIN — POTASSIUM CITRATE 10 MEQ: 10 TABLET ORAL at 08:06

## 2018-06-04 RX ADMIN — PREGABALIN 200 MG: 50 CAPSULE ORAL at 08:06

## 2018-06-04 RX ADMIN — FLUCONAZOLE 400 MG: 200 TABLET ORAL at 02:06

## 2018-06-04 RX ADMIN — OXYBUTYNIN CHLORIDE 5 MG: 5 TABLET ORAL at 08:06

## 2018-06-04 RX ADMIN — Medication 2 MG: at 02:06

## 2018-06-04 RX ADMIN — HEPARIN SODIUM 5000 UNITS: 5000 INJECTION, SOLUTION INTRAVENOUS; SUBCUTANEOUS at 02:06

## 2018-06-04 RX ADMIN — VANCOMYCIN HYDROCHLORIDE 1500 MG: 10 INJECTION, POWDER, LYOPHILIZED, FOR SOLUTION INTRAVENOUS at 10:06

## 2018-06-04 RX ADMIN — HEPARIN SODIUM 5000 UNITS: 5000 INJECTION, SOLUTION INTRAVENOUS; SUBCUTANEOUS at 06:06

## 2018-06-04 RX ADMIN — VANCOMYCIN HYDROCHLORIDE 1500 MG: 10 INJECTION, POWDER, LYOPHILIZED, FOR SOLUTION INTRAVENOUS at 08:06

## 2018-06-04 RX ADMIN — Medication 2 MG: at 08:06

## 2018-06-04 RX ADMIN — Medication 1 CAPSULE: at 08:06

## 2018-06-04 RX ADMIN — HEPARIN SODIUM 5000 UNITS: 5000 INJECTION, SOLUTION INTRAVENOUS; SUBCUTANEOUS at 10:06

## 2018-06-04 RX ADMIN — PREGABALIN 200 MG: 50 CAPSULE ORAL at 02:06

## 2018-06-04 RX ADMIN — OXYCODONE AND ACETAMINOPHEN 2 TABLET: 7.5; 325 TABLET ORAL at 12:06

## 2018-06-04 RX ADMIN — DIAZEPAM 10 MG: 5 TABLET ORAL at 04:06

## 2018-06-04 RX ADMIN — DIAZEPAM 10 MG: 5 TABLET ORAL at 11:06

## 2018-06-04 RX ADMIN — Medication 2 MG: at 11:06

## 2018-06-04 RX ADMIN — DIAZEPAM 10 MG: 5 TABLET ORAL at 08:06

## 2018-06-04 NOTE — SUBJECTIVE & OBJECTIVE
Interval History:   No acute events. AFVSS. Pain controlled. Abx infusing. Awaiting placement    Medications:  Continuous Infusions:   sodium chloride 0.9% 0 mL/hr at 05/22/18 1254     Scheduled Meds:   ascorbic acid (vitamin C)  500 mg Oral QAM    ciprofloxacin HCl  500 mg Oral Q12H    fluconazole  400 mg Oral Daily    heparin (porcine)  5,000 Units Subcutaneous Q8H    Lactobacillus rhamnosus GG  1 capsule Oral Daily    oxybutynin  5 mg Oral BID    oxyCODONE  40 mg Oral Q12H    potassium citrate  10 mEq Oral Daily    pregabalin  200 mg Oral TID    vancomycin (VANCOCIN) IVPB  1,500 mg Intravenous Q12H     PRN Meds:albuterol, diazePAM, morphine, ondansetron, oxyCODONE-acetaminophen     Review of patient's allergies indicates:   Allergen Reactions    Zanaflex [tizanidine] Other (See Comments)     Get hallucinations from meds     Objective:     Vital Signs (Most Recent):  Temp: 98.6 °F (37 °C) (06/04/18 1541)  Pulse: 67 (06/04/18 1541)  Resp: 18 (06/04/18 1541)  BP: 113/65 (06/04/18 1541)  SpO2: 96 % (06/04/18 1541) Vital Signs (24h Range):  Temp:  [96.8 °F (36 °C)-98.6 °F (37 °C)] 98.6 °F (37 °C)  Pulse:  [62-76] 67  Resp:  [17-18] 18  SpO2:  [96 %-100 %] 96 %  BP: ()/(54-73) 113/65     Weight: 81.6 kg (180 lb)  Body mass index is 27.37 kg/m².    Intake/Output - Last 3 Shifts       06/02 0700 - 06/03 0659 06/03 0700 - 06/04 0659 06/04 0700 - 06/05 0659    P.O. 1920 1200 240    Other  0     IV Piggyback 250 250 500    Total Intake(mL/kg) 2170 (26.6) 1450 (17.8) 740 (9.1)    Urine (mL/kg/hr) 4475 (2.3) 4850 (2.5)     Drains 40 (0) 25 (0)     Stool  0 (0)     Total Output 4515 4875      Net -2345 -3425 +740                 Physical Exam   Constitutional: He is oriented to person, place, and time. No distress.   Musculoskeletal:   Flap sites healing well, c/d/i, no s/s of infection  Drains ss, minimal output   Neurological: He is alert and oriented to person, place, and time.   Nursing note and vitals  reviewed.      Significant Labs:  none    Significant Diagnosticnonenone

## 2018-06-04 NOTE — PLAN OF CARE
Problem: Patient Care Overview  Goal: Plan of Care Review  Outcome: Ongoing (interventions implemented as appropriate)  Patient resting in bed comfortably. IV intact and free of infection and irration, fall precautions maintained no falls noted. Call light in reach bed locked and in lowest position Patient instructed to call for assistance. Skin integrity maintained as patient is in specialized bed and assisted with positioning, C/o pain managed with PRN meds,. Progressing towards goals. Will continue to monitor and follow careplan of care.

## 2018-06-04 NOTE — ASSESSMENT & PLAN NOTE
13 Days Post-Op BL ischial flaps. Doing well, drains SS    - ABx per ID, appreciate recs: 6 weeks from 5/22-7/3/18  - target 100g protein/day, Boost supplements  - DVT ppx  - home meds including chronic pain meds + breakthrough  - dispo pending LTAC placement, will continue discussions with SW to attempt placement
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
## 2018-06-05 LAB — VANCOMYCIN TROUGH SERPL-MCNC: 6.4 UG/ML

## 2018-06-05 PROCEDURE — 25000003 PHARM REV CODE 250: Performed by: PHYSICIAN ASSISTANT

## 2018-06-05 PROCEDURE — 63600175 PHARM REV CODE 636 W HCPCS: Performed by: STUDENT IN AN ORGANIZED HEALTH CARE EDUCATION/TRAINING PROGRAM

## 2018-06-05 PROCEDURE — 63600175 PHARM REV CODE 636 W HCPCS: Performed by: PHYSICIAN ASSISTANT

## 2018-06-05 PROCEDURE — 25000003 PHARM REV CODE 250: Performed by: STUDENT IN AN ORGANIZED HEALTH CARE EDUCATION/TRAINING PROGRAM

## 2018-06-05 PROCEDURE — 11000001 HC ACUTE MED/SURG PRIVATE ROOM

## 2018-06-05 PROCEDURE — 80202 ASSAY OF VANCOMYCIN: CPT

## 2018-06-05 PROCEDURE — 63600175 PHARM REV CODE 636 W HCPCS: Performed by: SURGERY

## 2018-06-05 PROCEDURE — 25000003 PHARM REV CODE 250: Performed by: SURGERY

## 2018-06-05 PROCEDURE — 36415 COLL VENOUS BLD VENIPUNCTURE: CPT

## 2018-06-05 RX ADMIN — PREGABALIN 200 MG: 50 CAPSULE ORAL at 08:06

## 2018-06-05 RX ADMIN — DIAZEPAM 10 MG: 5 TABLET ORAL at 10:06

## 2018-06-05 RX ADMIN — PREGABALIN 200 MG: 50 CAPSULE ORAL at 02:06

## 2018-06-05 RX ADMIN — HEPARIN SODIUM 5000 UNITS: 5000 INJECTION, SOLUTION INTRAVENOUS; SUBCUTANEOUS at 10:06

## 2018-06-05 RX ADMIN — HEPARIN SODIUM 5000 UNITS: 5000 INJECTION, SOLUTION INTRAVENOUS; SUBCUTANEOUS at 02:06

## 2018-06-05 RX ADMIN — CIPROFLOXACIN HYDROCHLORIDE 500 MG: 500 TABLET, FILM COATED ORAL at 02:06

## 2018-06-05 RX ADMIN — Medication 2 MG: at 02:06

## 2018-06-05 RX ADMIN — CIPROFLOXACIN HYDROCHLORIDE 500 MG: 500 TABLET, FILM COATED ORAL at 08:06

## 2018-06-05 RX ADMIN — PREGABALIN 200 MG: 50 CAPSULE ORAL at 10:06

## 2018-06-05 RX ADMIN — POTASSIUM CITRATE 10 MEQ: 10 TABLET ORAL at 08:06

## 2018-06-05 RX ADMIN — FLUCONAZOLE 400 MG: 200 TABLET ORAL at 02:06

## 2018-06-05 RX ADMIN — DIAZEPAM 10 MG: 5 TABLET ORAL at 06:06

## 2018-06-05 RX ADMIN — VANCOMYCIN HYDROCHLORIDE 1500 MG: 10 INJECTION, POWDER, LYOPHILIZED, FOR SOLUTION INTRAVENOUS at 08:06

## 2018-06-05 RX ADMIN — OXYBUTYNIN CHLORIDE 5 MG: 5 TABLET ORAL at 10:06

## 2018-06-05 RX ADMIN — OXYBUTYNIN CHLORIDE 5 MG: 5 TABLET ORAL at 08:06

## 2018-06-05 RX ADMIN — OXYCODONE HYDROCHLORIDE 40 MG: 20 TABLET, FILM COATED, EXTENDED RELEASE ORAL at 10:06

## 2018-06-05 RX ADMIN — HEPARIN SODIUM 5000 UNITS: 5000 INJECTION, SOLUTION INTRAVENOUS; SUBCUTANEOUS at 06:06

## 2018-06-05 RX ADMIN — Medication 2 MG: at 11:06

## 2018-06-05 RX ADMIN — OXYCODONE AND ACETAMINOPHEN 2 TABLET: 7.5; 325 TABLET ORAL at 06:06

## 2018-06-05 RX ADMIN — CIPROFLOXACIN HYDROCHLORIDE 500 MG: 500 TABLET, FILM COATED ORAL at 10:06

## 2018-06-05 RX ADMIN — DIAZEPAM 10 MG: 5 TABLET ORAL at 08:06

## 2018-06-05 RX ADMIN — OXYCODONE AND ACETAMINOPHEN 2 TABLET: 7.5; 325 TABLET ORAL at 10:06

## 2018-06-05 RX ADMIN — OXYCODONE HYDROCHLORIDE 40 MG: 20 TABLET, FILM COATED, EXTENDED RELEASE ORAL at 08:06

## 2018-06-05 RX ADMIN — Medication 1 CAPSULE: at 08:06

## 2018-06-05 NOTE — ASSESSMENT & PLAN NOTE
14 Days Post-Op BL ischial flaps. Doing well, drains SS    - ABx per ID, appreciate recs: 6 weeks from 5/22-7/3/18  - target 100g protein/day, Boost supplements  - DVT ppx  - home meds including chronic pain meds + breakthrough  - dispo pending LTAC placement, will continue discussions with SW to attempt placement  - possible placement this week per SW
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
## 2018-06-05 NOTE — SUBJECTIVE & OBJECTIVE
Interval History:   No acute events. SP tube changed yesterday. Pain controlled.     Medications:  Continuous Infusions:   sodium chloride 0.9% 0 mL/hr at 05/22/18 1254     Scheduled Meds:   ascorbic acid (vitamin C)  500 mg Oral QAM    ciprofloxacin HCl  500 mg Oral Q12H    fluconazole  400 mg Oral Daily    heparin (porcine)  5,000 Units Subcutaneous Q8H    Lactobacillus rhamnosus GG  1 capsule Oral Daily    oxybutynin  5 mg Oral BID    oxyCODONE  40 mg Oral Q12H    potassium citrate  10 mEq Oral Daily    pregabalin  200 mg Oral TID    vancomycin (VANCOCIN) IVPB  1,500 mg Intravenous Q12H     PRN Meds:albuterol, diazePAM, morphine, ondansetron, oxyCODONE-acetaminophen     Review of patient's allergies indicates:   Allergen Reactions    Zanaflex [tizanidine] Other (See Comments)     Get hallucinations from meds     Objective:     Vital Signs (Most Recent):  Temp: 98.5 °F (36.9 °C) (06/05/18 1149)  Pulse: 73 (06/05/18 1149)  Resp: 20 (06/05/18 1149)  BP: (!) 103/55 (06/05/18 1149)  SpO2: 99 % (06/05/18 1149) Vital Signs (24h Range):  Temp:  [96.1 °F (35.6 °C)-99.6 °F (37.6 °C)] 98.5 °F (36.9 °C)  Pulse:  [67-78] 73  Resp:  [16-20] 20  SpO2:  [96 %-99 %] 99 %  BP: (103-121)/(55-68) 103/55     Weight: 81.6 kg (180 lb)  Body mass index is 27.37 kg/m².    Intake/Output - Last 3 Shifts       06/03 0700 - 06/04 0659 06/04 0700 - 06/05 0659 06/05 0700 - 06/06 0659    P.O. 1200 2950 1200    Other 0      IV Piggyback 250 500     Total Intake(mL/kg) 1450 (17.8) 3450 (42.3) 1200 (14.7)    Urine (mL/kg/hr) 4850 (2.5) 2750 (1.4) 300 (0.4)    Drains 25 (0) 15 (0)     Stool 0 (0)      Total Output 4875 2765 300    Net -3425 +685 +900                 Physical Exam   Constitutional: He is oriented to person, place, and time. No distress.   Musculoskeletal:   Flap sites healing well, c/d/i, no s/s of infection  Drains ss, minimal output   Neurological: He is alert and oriented to person, place, and time.   Nursing note and  vitals reviewed.      Significant Labs:  none    Significant Diagnostics:  none

## 2018-06-05 NOTE — PLAN OF CARE
Problem: Patient Care Overview  Goal: Plan of Care Review  Outcome: Ongoing (interventions implemented as appropriate)  Patient resting in bed comfortably. IV intact and free of infection and irration, SP and colostomy intact, fall precautions maintained no falls noted. Call light in reach bed locked and in lowest position.. Patient instructed to call for assistance. Skin integrity maintained as patient is assisted with positioning, C/o pain managed with PRN meds, No other complaints or concerns.Will continue to monitor and follow careplan of care.

## 2018-06-06 LAB
CREAT SERPL-MCNC: 0.6 MG/DL
EST. GFR  (AFRICAN AMERICAN): >60 ML/MIN/1.73 M^2
EST. GFR  (NON AFRICAN AMERICAN): >60 ML/MIN/1.73 M^2

## 2018-06-06 PROCEDURE — 82565 ASSAY OF CREATININE: CPT

## 2018-06-06 PROCEDURE — 25000003 PHARM REV CODE 250: Performed by: PHYSICIAN ASSISTANT

## 2018-06-06 PROCEDURE — 11000001 HC ACUTE MED/SURG PRIVATE ROOM

## 2018-06-06 PROCEDURE — 63600175 PHARM REV CODE 636 W HCPCS: Performed by: STUDENT IN AN ORGANIZED HEALTH CARE EDUCATION/TRAINING PROGRAM

## 2018-06-06 PROCEDURE — 25000003 PHARM REV CODE 250: Performed by: STUDENT IN AN ORGANIZED HEALTH CARE EDUCATION/TRAINING PROGRAM

## 2018-06-06 PROCEDURE — 63600175 PHARM REV CODE 636 W HCPCS: Performed by: SURGERY

## 2018-06-06 PROCEDURE — 63600175 PHARM REV CODE 636 W HCPCS: Performed by: PHYSICIAN ASSISTANT

## 2018-06-06 PROCEDURE — 25000003 PHARM REV CODE 250: Performed by: SURGERY

## 2018-06-06 PROCEDURE — 36415 COLL VENOUS BLD VENIPUNCTURE: CPT

## 2018-06-06 RX ADMIN — OXYBUTYNIN CHLORIDE 5 MG: 5 TABLET ORAL at 08:06

## 2018-06-06 RX ADMIN — CIPROFLOXACIN HYDROCHLORIDE 500 MG: 500 TABLET, FILM COATED ORAL at 09:06

## 2018-06-06 RX ADMIN — PREGABALIN 200 MG: 50 CAPSULE ORAL at 08:06

## 2018-06-06 RX ADMIN — OXYCODONE AND ACETAMINOPHEN 2 TABLET: 7.5; 325 TABLET ORAL at 05:06

## 2018-06-06 RX ADMIN — VANCOMYCIN HYDROCHLORIDE 250 MG: 500 INJECTION, POWDER, LYOPHILIZED, FOR SOLUTION INTRAVENOUS at 03:06

## 2018-06-06 RX ADMIN — VANCOMYCIN HYDROCHLORIDE 1500 MG: 10 INJECTION, POWDER, LYOPHILIZED, FOR SOLUTION INTRAVENOUS at 12:06

## 2018-06-06 RX ADMIN — PREGABALIN 200 MG: 50 CAPSULE ORAL at 02:06

## 2018-06-06 RX ADMIN — OXYCODONE HYDROCHLORIDE 40 MG: 20 TABLET, FILM COATED, EXTENDED RELEASE ORAL at 08:06

## 2018-06-06 RX ADMIN — HEPARIN SODIUM 5000 UNITS: 5000 INJECTION, SOLUTION INTRAVENOUS; SUBCUTANEOUS at 05:06

## 2018-06-06 RX ADMIN — PREGABALIN 200 MG: 50 CAPSULE ORAL at 09:06

## 2018-06-06 RX ADMIN — POTASSIUM CITRATE 10 MEQ: 10 TABLET ORAL at 08:06

## 2018-06-06 RX ADMIN — FLUCONAZOLE 400 MG: 200 TABLET ORAL at 02:06

## 2018-06-06 RX ADMIN — DIAZEPAM 10 MG: 5 TABLET ORAL at 09:06

## 2018-06-06 RX ADMIN — OXYCODONE HYDROCHLORIDE AND ACETAMINOPHEN 500 MG: 500 TABLET ORAL at 08:06

## 2018-06-06 RX ADMIN — OXYCODONE AND ACETAMINOPHEN 2 TABLET: 7.5; 325 TABLET ORAL at 01:06

## 2018-06-06 RX ADMIN — Medication 2 MG: at 05:06

## 2018-06-06 RX ADMIN — Medication 2 MG: at 01:06

## 2018-06-06 RX ADMIN — OXYCODONE AND ACETAMINOPHEN 2 TABLET: 7.5; 325 TABLET ORAL at 09:06

## 2018-06-06 RX ADMIN — VANCOMYCIN HYDROCHLORIDE 1750 MG: 1 INJECTION, POWDER, LYOPHILIZED, FOR SOLUTION INTRAVENOUS at 11:06

## 2018-06-06 RX ADMIN — CIPROFLOXACIN HYDROCHLORIDE 500 MG: 500 TABLET, FILM COATED ORAL at 08:06

## 2018-06-06 RX ADMIN — HEPARIN SODIUM 5000 UNITS: 5000 INJECTION, SOLUTION INTRAVENOUS; SUBCUTANEOUS at 09:06

## 2018-06-06 RX ADMIN — HEPARIN SODIUM 5000 UNITS: 5000 INJECTION, SOLUTION INTRAVENOUS; SUBCUTANEOUS at 01:06

## 2018-06-06 RX ADMIN — OXYBUTYNIN CHLORIDE 5 MG: 5 TABLET ORAL at 09:06

## 2018-06-06 RX ADMIN — Medication 1 CAPSULE: at 08:06

## 2018-06-06 RX ADMIN — Medication 2 MG: at 11:06

## 2018-06-06 RX ADMIN — DIAZEPAM 10 MG: 5 TABLET ORAL at 08:06

## 2018-06-06 RX ADMIN — OXYCODONE HYDROCHLORIDE 40 MG: 20 TABLET, FILM COATED, EXTENDED RELEASE ORAL at 09:06

## 2018-06-06 NOTE — PLAN OF CARE
SHAE following for DC needs. SHAE in communication with CM.    SHAE called [facility] to follow up on patient's referral dn to notify them that the bed does not have to be clinitron but has to be less than 32 mm Hg pressure.     SHAE left a message in admissions ([phone]).    UPDATE 2:39 PM  SHAE spoke to Janel at [facility] who stated that she submitted everything to Mayito Sheehan to work on a single case agreement (SCA). Janel stated that they generally use the dolphin bed for these patient's and she will check to verify that the dolphin bed is less than 32 mm Hg pressure.     Malu Quintanilla, KARIME  [facility]  [id number]

## 2018-06-06 NOTE — PLAN OF CARE
Problem: Patient Care Overview  Goal: Plan of Care Review    Recommendations    Recommendation/Intervention:   1. Continue regular diet.   2. Recommend adding beneprotein w/ meals for wound healing.   3. RD following.     Goals: meet >85% of EEN/EPN via po intake  Nutrition Goal Status: goal met

## 2018-06-06 NOTE — ASSESSMENT & PLAN NOTE
15 Days Post-Op BL ischial flaps. Doing well, drains SS    - ABx per ID, appreciate recs: 6 weeks from 5/22-7/3/18  - target 100g protein/day, Boost supplements  - DVT ppx  - home meds including chronic pain meds + breakthrough  - dispo pending LTAC placement, will continue discussions with SW to attempt placement  - possible placement this week per SW

## 2018-06-06 NOTE — PROGRESS NOTES
" [facility]  Adult Nutrition  Progress Note    SUMMARY       Recommendations    Recommendation/Intervention:   1. Continue regular diet.   2. Recommend adding beneprotein w/ meals for wound healing.   3. RD following.     Goals: meet >85% of EEN/EPN via po intake  Nutrition Goal Status: goal met  Communication of RD Recs:  (POC)    Reason for Assessment    Reason for Assessment: RD follow-up  Diagnosis:  (decubitus ulcer of L ischial area, stage IV)  Relevant Medical History: paraplegia (2012), anemia, asthma, HTN  Interdisciplinary Rounds: attended  General Information Comments: POD 14 BL ischial flaps. Pt w/ good appetite and eating 100% of meals. Pt waiting LTAC placement.   Nutrition Discharge Planning: regular diet    Nutrition Risk Screen    Nutrition Risk Screen: no indicators present    Nutrition/Diet History    Do you have any cultural, spiritual, Zoroastrianism conflicts, given your current situation?: none  Factors Affecting Nutritional Intake: None identified at this time    Anthropometrics    Temp: 98 °F (36.7 °C)  Height Method: Stated  Height: 5' 8" (172.7 cm)  Height (inches): 68 in  Weight Method: Bed Scale  Weight: 81.6 kg (180 lb)  Weight (lb): 180 lb  Ideal Body Weight (IBW), Male: 154 lb  % Ideal Body Weight, Male (lb): 116.88 lb  BMI (Calculated): 27.4  BMI Grade: 25 - 29.9 - overweight     Lab/Procedures/Meds    Pertinent Labs Reviewed: reviewed  Pertinent Labs Comments: Noted  Pertinent Medications Reviewed: reviewed  Pertinent Medications Comments: ascorbic acid, K citrate     Physical Findings/Assessment    Overall Physical Appearance: nourished  Tubes: other (see comments) (colostomy )  Oral/Mouth Cavity: WDL  Skin: incision(s), pressure ulcer(s), other (see comments) (flaps)    Estimated/Assessed Needs    Weight Used For Calorie Calculations: 81.6 kg (179 lb 14.3 oz)  Energy Calorie Requirements (kcal): 1903 kcals/day  Energy Need Method: [place] (X 1.1)  Protein " Requirements:  gm/day (1.1-1.5 gm/day)  Weight Used For Protein Calculations: 81.6 kg (179 lb 14.3 oz)  Fluid Requirements (mL): 1 mL/hr or per MD  Fluid Need Method: RDA Method  RDA Method (mL): 1903     Nutrition Prescription Ordered    Current Diet Order: regular     Evaluation of Received Nutrient/Fluid Intake    I/O: -33.7L since admit  Tolerance: tolerating     % Intake of Estimated Energy Needs: 75 - 100 %  % Meal Intake: 75 - 100 %    Nutrition Risk    Level of Risk/Frequency of Follow-up: low (f/u 1 X wk)     Assessment and Plan    Decubitus ulcer of right ischial area, stage IV    Contributing Nutrition Diagnosis  Increased protein needs    Related to (etiology):   Wound healing    Signs and Symptoms (as evidenced by):   Decubitus ulcer of left ischial area (stage IV)    Interventions/Recommendations (treatment strategy):  See recs    Nutrition Diagnosis Status:   Continues                    Monitor and Evaluation    Food and Nutrient Intake: energy intake, food and beverage intake  Food and Nutrient Adminstration: diet order  Physical Activity and Function: nutrition-related ADLs and IADLs  Anthropometric Measurements: weight, weight change, body mass index  Biochemical Data, Medical Tests and Procedures: electrolyte and renal panel, gastrointestinal profile, glucose/endocrine profile, inflammatory profile, lipid profile  Nutrition-Focused Physical Findings: overall appearance     Nutrition Follow-Up    RD Follow-up?: Yes

## 2018-06-06 NOTE — SUBJECTIVE & OBJECTIVE
Interval History:   No acute events. Continues to await placement.     Medications:  Continuous Infusions:   sodium chloride 0.9% 0 mL/hr at 05/22/18 1254     Scheduled Meds:   ascorbic acid (vitamin C)  500 mg Oral QAM    ciprofloxacin HCl  500 mg Oral Q12H    fluconazole  400 mg Oral Daily    heparin (porcine)  5,000 Units Subcutaneous Q8H    Lactobacillus rhamnosus GG  1 capsule Oral Daily    oxybutynin  5 mg Oral BID    oxyCODONE  40 mg Oral Q12H    potassium citrate  10 mEq Oral Daily    pregabalin  200 mg Oral TID    vancomycin (VANCOCIN) IVPB  1,500 mg Intravenous Q12H     PRN Meds:albuterol, diazePAM, morphine, ondansetron, oxyCODONE-acetaminophen     Review of patient's allergies indicates:   Allergen Reactions    Zanaflex [tizanidine] Other (See Comments)     Get hallucinations from meds     Objective:     Vital Signs (Most Recent):  Temp: 96.3 °F (35.7 °C) (06/06/18 1130)  Pulse: 66 (06/06/18 1130)  Resp: 16 (06/06/18 1130)  BP: (!) 105/56 (06/06/18 1130)  SpO2: (!) 94 % (06/06/18 1130) Vital Signs (24h Range):  Temp:  [96.3 °F (35.7 °C)-98.9 °F (37.2 °C)] 96.3 °F (35.7 °C)  Pulse:  [66-71] 66  Resp:  [16-20] 16  SpO2:  [94 %-100 %] 94 %  BP: (101-122)/(50-67) 105/56     Weight: 81.6 kg (180 lb)  Body mass index is 27.37 kg/m².    Intake/Output - Last 3 Shifts       06/04 0700 - 06/05 0659 06/05 0700 - 06/06 0659 06/06 0700 - 06/07 0659    P.O. 2950 1500 240    IV Piggyback 500      Total Intake(mL/kg) 3450 (42.3) 1500 (18.4) 240 (2.9)    Urine (mL/kg/hr) 2750 (1.4) 1250 (0.6) 500 (0.8)    Emesis/NG output   0 (0)    Drains 15 (0) 0 (0) 5 (0)    Stool   0 (0)    Blood   0 (0)    Total Output 2765 1250 505    Net +685 +250 -265                 Physical Exam   Constitutional: He is oriented to person, place, and time. No distress.   Musculoskeletal:   Flap sites healing well, c/d/i, no s/s of infection  Drains ss, minimal output   Neurological: He is alert and oriented to person, place, and  time.   Nursing note and vitals reviewed.      Significant Labs:  none    Significant Diagnostics:  none

## 2018-06-07 ENCOUNTER — PATIENT MESSAGE (OUTPATIENT)
Dept: PLASTIC SURGERY | Facility: CLINIC | Age: 33
End: 2018-06-07

## 2018-06-07 LAB — VANCOMYCIN TROUGH SERPL-MCNC: 15.2 UG/ML

## 2018-06-07 PROCEDURE — 25000003 PHARM REV CODE 250: Performed by: STUDENT IN AN ORGANIZED HEALTH CARE EDUCATION/TRAINING PROGRAM

## 2018-06-07 PROCEDURE — 25000003 PHARM REV CODE 250: Performed by: PHYSICIAN ASSISTANT

## 2018-06-07 PROCEDURE — 63600175 PHARM REV CODE 636 W HCPCS: Performed by: STUDENT IN AN ORGANIZED HEALTH CARE EDUCATION/TRAINING PROGRAM

## 2018-06-07 PROCEDURE — 36569 INSJ PICC 5 YR+ W/O IMAGING: CPT

## 2018-06-07 PROCEDURE — 63600175 PHARM REV CODE 636 W HCPCS: Performed by: SURGERY

## 2018-06-07 PROCEDURE — 80202 ASSAY OF VANCOMYCIN: CPT

## 2018-06-07 PROCEDURE — 25000003 PHARM REV CODE 250: Performed by: SURGERY

## 2018-06-07 PROCEDURE — 11000001 HC ACUTE MED/SURG PRIVATE ROOM

## 2018-06-07 PROCEDURE — C1751 CATH, INF, PER/CENT/MIDLINE: HCPCS

## 2018-06-07 PROCEDURE — 36415 COLL VENOUS BLD VENIPUNCTURE: CPT

## 2018-06-07 PROCEDURE — 76937 US GUIDE VASCULAR ACCESS: CPT

## 2018-06-07 RX ADMIN — HEPARIN SODIUM 5000 UNITS: 5000 INJECTION, SOLUTION INTRAVENOUS; SUBCUTANEOUS at 06:06

## 2018-06-07 RX ADMIN — CIPROFLOXACIN HYDROCHLORIDE 500 MG: 500 TABLET, FILM COATED ORAL at 09:06

## 2018-06-07 RX ADMIN — OXYBUTYNIN CHLORIDE 5 MG: 5 TABLET ORAL at 09:06

## 2018-06-07 RX ADMIN — FLUCONAZOLE 400 MG: 200 TABLET ORAL at 03:06

## 2018-06-07 RX ADMIN — HEPARIN SODIUM 5000 UNITS: 5000 INJECTION, SOLUTION INTRAVENOUS; SUBCUTANEOUS at 01:06

## 2018-06-07 RX ADMIN — PREGABALIN 200 MG: 50 CAPSULE ORAL at 09:06

## 2018-06-07 RX ADMIN — Medication 2 MG: at 11:06

## 2018-06-07 RX ADMIN — POTASSIUM CITRATE 10 MEQ: 10 TABLET ORAL at 09:06

## 2018-06-07 RX ADMIN — OXYCODONE AND ACETAMINOPHEN 2 TABLET: 7.5; 325 TABLET ORAL at 06:06

## 2018-06-07 RX ADMIN — OXYCODONE HYDROCHLORIDE 40 MG: 20 TABLET, FILM COATED, EXTENDED RELEASE ORAL at 09:06

## 2018-06-07 RX ADMIN — VANCOMYCIN HYDROCHLORIDE 1750 MG: 1 INJECTION, POWDER, LYOPHILIZED, FOR SOLUTION INTRAVENOUS at 11:06

## 2018-06-07 RX ADMIN — DIAZEPAM 10 MG: 5 TABLET ORAL at 09:06

## 2018-06-07 RX ADMIN — HEPARIN SODIUM 5000 UNITS: 5000 INJECTION, SOLUTION INTRAVENOUS; SUBCUTANEOUS at 09:06

## 2018-06-07 RX ADMIN — OXYCODONE AND ACETAMINOPHEN 2 TABLET: 7.5; 325 TABLET ORAL at 09:06

## 2018-06-07 RX ADMIN — Medication 2 MG: at 04:06

## 2018-06-07 RX ADMIN — Medication 2 MG: at 09:06

## 2018-06-07 RX ADMIN — PREGABALIN 200 MG: 50 CAPSULE ORAL at 03:06

## 2018-06-07 RX ADMIN — OXYCODONE AND ACETAMINOPHEN 2 TABLET: 7.5; 325 TABLET ORAL at 01:06

## 2018-06-07 RX ADMIN — VANCOMYCIN HYDROCHLORIDE 1750 MG: 1 INJECTION, POWDER, LYOPHILIZED, FOR SOLUTION INTRAVENOUS at 12:06

## 2018-06-07 RX ADMIN — DIAZEPAM 10 MG: 5 TABLET ORAL at 04:06

## 2018-06-07 RX ADMIN — Medication 1 CAPSULE: at 09:06

## 2018-06-07 NOTE — CARE UPDATE
Called to patient's room by staff nurse, Marci. Marci states the patient has requested to speak with me. Upon entering room the curtain was drawn and patient's mother has stuff for bath set up. Patient states he is very upset with Stanford the [facility]. He states he made an agreement with Stanford to come after Stanford was done with VS and the patient had finished eating that he would get a bath. He states when he was ready he called [facility] and he told him it would be a while longer until he got in the room. Patient then told Marci that [facility] was unable to come give him a bath and it would be a while. Patient then states Marci just okay and then he asked to speak with the UD. Upon UD entering the room patient states what is wrong with my staff. If Stanford can not come right now to bath him why can't the nurse or another  Staff member bath him. Explained to him I would be glad to assist him with a bath. Patient and mother reluctant to take assistance, both stating you should not have to do this. Explained to patient I was glad to assist. Bath and ostomy bag change performed. Did explain to patient bath wait time may very due to priority of other patient calls and apologized. Told patient I would speak to staff about prompt service and better utilization of rounding time.

## 2018-06-07 NOTE — CONSULTS
Single lumen 20G x 8CM midline placed right basilic vein. Max dwell date 7/5/18 Lot#[id number] .  Needle advanced into the vessel under real time ultrasound guidance.  Image recorded and saved.

## 2018-06-07 NOTE — PLAN OF CARE
Planned discharge is LTAC - Plan (A) or Rehab - Plan (B).     06/07/18 1139   Discharge Reassessment   Assessment Type Discharge Planning Reassessment   Provided patient/caregiver education on the expected discharge date and the discharge plan No   Do you have any problems affording any of your prescribed medications? No   Discharge Plan A Long-term acute care facility (LTAC)   Discharge Plan B Rehab   Patient choice form signed by patient/caregiver N/A   Can the patient answer the patient profile reliably? Yes, cognitively intact   How does the patient rate their overall health at the present time? Good   Describe the patient's ability to walk at the present time. Does not walk or unable to take any steps at all   How often would a person be available to care for the patient? Whenever needed   Number of comorbid conditions (as recorded on the chart) Three   During the past month, has the patient often been bothered by feeling down, depressed or hopeless? No   During the past month, has the patient often been bothered by little interest or pleasure in doing things? No

## 2018-06-08 PROCEDURE — 25000003 PHARM REV CODE 250: Performed by: STUDENT IN AN ORGANIZED HEALTH CARE EDUCATION/TRAINING PROGRAM

## 2018-06-08 PROCEDURE — 63600175 PHARM REV CODE 636 W HCPCS: Performed by: STUDENT IN AN ORGANIZED HEALTH CARE EDUCATION/TRAINING PROGRAM

## 2018-06-08 PROCEDURE — 25000003 PHARM REV CODE 250: Performed by: PHYSICIAN ASSISTANT

## 2018-06-08 PROCEDURE — 63600175 PHARM REV CODE 636 W HCPCS: Performed by: SURGERY

## 2018-06-08 PROCEDURE — 11000001 HC ACUTE MED/SURG PRIVATE ROOM

## 2018-06-08 PROCEDURE — 25000003 PHARM REV CODE 250: Performed by: SURGERY

## 2018-06-08 RX ADMIN — PREGABALIN 200 MG: 50 CAPSULE ORAL at 09:06

## 2018-06-08 RX ADMIN — OXYCODONE AND ACETAMINOPHEN 2 TABLET: 7.5; 325 TABLET ORAL at 05:06

## 2018-06-08 RX ADMIN — DIAZEPAM 10 MG: 5 TABLET ORAL at 08:06

## 2018-06-08 RX ADMIN — Medication 2 MG: at 10:06

## 2018-06-08 RX ADMIN — POTASSIUM CITRATE 10 MEQ: 10 TABLET ORAL at 09:06

## 2018-06-08 RX ADMIN — Medication 2 MG: at 01:06

## 2018-06-08 RX ADMIN — OXYCODONE AND ACETAMINOPHEN 2 TABLET: 7.5; 325 TABLET ORAL at 08:06

## 2018-06-08 RX ADMIN — VANCOMYCIN HYDROCHLORIDE 1750 MG: 1 INJECTION, POWDER, LYOPHILIZED, FOR SOLUTION INTRAVENOUS at 12:06

## 2018-06-08 RX ADMIN — FLUCONAZOLE 400 MG: 200 TABLET ORAL at 02:06

## 2018-06-08 RX ADMIN — OXYCODONE HYDROCHLORIDE 40 MG: 20 TABLET, FILM COATED, EXTENDED RELEASE ORAL at 08:06

## 2018-06-08 RX ADMIN — PREGABALIN 200 MG: 50 CAPSULE ORAL at 02:06

## 2018-06-08 RX ADMIN — PREGABALIN 200 MG: 50 CAPSULE ORAL at 08:06

## 2018-06-08 RX ADMIN — OXYCODONE HYDROCHLORIDE 40 MG: 20 TABLET, FILM COATED, EXTENDED RELEASE ORAL at 09:06

## 2018-06-08 RX ADMIN — Medication 1 CAPSULE: at 09:06

## 2018-06-08 RX ADMIN — DIAZEPAM 10 MG: 5 TABLET ORAL at 02:06

## 2018-06-08 RX ADMIN — Medication 2 MG: at 06:06

## 2018-06-08 RX ADMIN — HEPARIN SODIUM 5000 UNITS: 5000 INJECTION, SOLUTION INTRAVENOUS; SUBCUTANEOUS at 01:06

## 2018-06-08 RX ADMIN — OXYBUTYNIN CHLORIDE 5 MG: 5 TABLET ORAL at 09:06

## 2018-06-08 RX ADMIN — CIPROFLOXACIN HYDROCHLORIDE 500 MG: 500 TABLET, FILM COATED ORAL at 08:06

## 2018-06-08 RX ADMIN — CIPROFLOXACIN HYDROCHLORIDE 500 MG: 500 TABLET, FILM COATED ORAL at 09:06

## 2018-06-08 RX ADMIN — OXYCODONE AND ACETAMINOPHEN 2 TABLET: 7.5; 325 TABLET ORAL at 12:06

## 2018-06-08 RX ADMIN — HEPARIN SODIUM 5000 UNITS: 5000 INJECTION, SOLUTION INTRAVENOUS; SUBCUTANEOUS at 05:06

## 2018-06-08 RX ADMIN — OXYBUTYNIN CHLORIDE 5 MG: 5 TABLET ORAL at 08:06

## 2018-06-08 RX ADMIN — HEPARIN SODIUM 5000 UNITS: 5000 INJECTION, SOLUTION INTRAVENOUS; SUBCUTANEOUS at 10:06

## 2018-06-08 NOTE — ASSESSMENT & PLAN NOTE
16 Days Post-Op BL ischial flaps. Doing well, drains SS    - ABx per ID, appreciate recs: 6 weeks from 5/22-7/3/18  - target 100g protein/day, Boost supplements  - DVT ppx  - home meds including chronic pain meds + breakthrough  - dispo pending LTAC placement, will continue discussions with SW to attempt placement  - possible placement this week per SW

## 2018-06-08 NOTE — SUBJECTIVE & OBJECTIVE
Subjective:     Interval History:   No acute events. Still working on placement    Post-Op Info:  Procedure(s) (LRB):  OSTECTOMY (N/A)  DEBRIDEMENT   16 Days Post-Op      Medications:  Continuous Infusions:   sodium chloride 0.9% 0 mL/hr at 05/22/18 1254     Scheduled Meds:   ascorbic acid (vitamin C)  500 mg Oral QAM    ciprofloxacin HCl  500 mg Oral Q12H    fluconazole  400 mg Oral Daily    heparin (porcine)  5,000 Units Subcutaneous Q8H    Lactobacillus rhamnosus GG  1 capsule Oral Daily    oxybutynin  5 mg Oral BID    oxyCODONE  40 mg Oral Q12H    potassium citrate  10 mEq Oral Daily    pregabalin  200 mg Oral TID    vancomycin (VANCOCIN) IVPB (custom)  1,750 mg Intravenous Q12H     PRN Meds:   albuterol    diazePAM    morphine    ondansetron    oxyCODONE-acetaminophen        Objective:     Vital Signs (Most Recent):  Temp: (!) 95.6 °F (35.3 °C) (06/07/18 1935)  Pulse: 70 (06/07/18 1935)  Resp: 20 (06/07/18 1935)  BP: 112/66 (06/07/18 1935)  SpO2: 99 % (06/07/18 1935) Vital Signs (24h Range):  Temp:  [95.6 °F (35.3 °C)-98.7 °F (37.1 °C)] 95.6 °F (35.3 °C)  Pulse:  [58-72] 70  Resp:  [16-20] 20  SpO2:  [95 %-99 %] 99 %  BP: (105-137)/(54-77) 112/66     Intake/Output - Last 3 Shifts       06/05 0700 - 06/06 0659 06/06 0700 - 06/07 0659 06/07 0700 - 06/08 0659    P.O. 1500 3880 4346    Total Intake(mL/kg) 1500 (18.4) 3880 (47.5) 4346 (53.3)    Urine (mL/kg/hr) 1250 (0.6) 4450 (2.3) 3250 (2.6)    Emesis/NG output  0 (0) 0 (0)    Drains 0 (0) 20 (0) 2 (0)    Stool  0 (0) 0 (0)    Blood  0 (0) 0 (0)    Total Output 1250 4470 3252    Net +250 -590 +1094           Stool Occurrence  0 x 0 x          Physical Exam   Constitutional: He is oriented to person, place, and time. No distress.   Musculoskeletal:   Flap sites healing well, c/d/i, no s/s of infection  Drains ss, minimal output   Neurological: He is alert and oriented to person, place, and time.   Nursing note and vitals reviewed.      Anorectal  "Exam:  Anal Skin: {CRS Anal Skin Physical Exam:57905}    Digital Rectal Exam:  Resting Tone {desc; low/normal/high/v high:56108}  Squeeze {desc; low/normal/high/v high:49487}  Relaxation with bear down {DESC; PRESENT/ABSENT:91791::"present"}  Mass {NONE:23741}  Rectocele  {DESC; PRESENT/ABSENT:86113::"present"}  Tenderness  {DESC; PRESENT/ABSENT:01469::"present"}    Anoscopy:  Hemorrhoids  {DESC; PRESENT/ABSENT:73851::"present"}  Stigmata of bleeding  {DESC; PRESENT/ABSENT:82778::"present"}  Stigmata of prolapsed  {DESC; PRESENT/ABSENT:49927::"present"}   Distal rectal mucosa {normal, inflamed, ulcerated, radiation changes:62590}    Significant Labs:  {Select Labs:47646}    Significant Diagnostics:  {Imaging Review:17727}  "

## 2018-06-08 NOTE — ASSESSMENT & PLAN NOTE
17 Days Post-Op BL ischial flaps. Doing well, drains SS    - ABx per ID, appreciate recs: 6 weeks from 5/22-7/3/18  - target 100g protein/day, Boost supplements  - DVT ppx  - home meds including chronic pain meds + breakthrough  - dispo pending LTAC placement, will continue discussions with SW to attempt placement  - possible placement this week per SW
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
## 2018-06-08 NOTE — SUBJECTIVE & OBJECTIVE
Interval History:   No acute events. Feeling well this AM.    Medications:  Continuous Infusions:   sodium chloride 0.9% 0 mL/hr at 05/22/18 1254     Scheduled Meds:   ascorbic acid (vitamin C)  500 mg Oral QAM    ciprofloxacin HCl  500 mg Oral Q12H    fluconazole  400 mg Oral Daily    heparin (porcine)  5,000 Units Subcutaneous Q8H    Lactobacillus rhamnosus GG  1 capsule Oral Daily    oxybutynin  5 mg Oral BID    oxyCODONE  40 mg Oral Q12H    potassium citrate  10 mEq Oral Daily    pregabalin  200 mg Oral TID    vancomycin (VANCOCIN) IVPB (custom)  1,750 mg Intravenous Q12H     PRN Meds:albuterol, diazePAM, morphine, ondansetron, oxyCODONE-acetaminophen     Review of patient's allergies indicates:   Allergen Reactions    Zanaflex [tizanidine] Other (See Comments)     Get hallucinations from meds     Objective:     Vital Signs (Most Recent):  Temp: 97.8 °F (36.6 °C) (06/08/18 0738)  Pulse: (!) 57 (06/08/18 0738)  Resp: 18 (06/08/18 0738)  BP: (!) 101/59 (06/08/18 0738)  SpO2: 98 % (06/08/18 0738) Vital Signs (24h Range):  Temp:  [95.6 °F (35.3 °C)-97.8 °F (36.6 °C)] 97.8 °F (36.6 °C)  Pulse:  [57-72] 57  Resp:  [16-20] 18  SpO2:  [96 %-99 %] 98 %  BP: (101-115)/(54-72) 101/59     Weight: 81.6 kg (180 lb)  Body mass index is 27.37 kg/m².    Intake/Output - Last 3 Shifts       06/06 0700 - 06/07 0659 06/07 0700 - 06/08 0659 06/08 0700 - 06/09 0659    P.O. 3880 4346     Total Intake(mL/kg) 3880 (47.5) 4346 (53.3)     Urine (mL/kg/hr) 4450 (2.3) 5000 (2.6) 400 (4.6)    Emesis/NG output 0 (0) 0 (0)     Drains 20 (0) 17 (0)     Stool 0 (0) 0 (0)     Blood 0 (0) 0 (0)     Total Output 1767 8643 748    Net -715 -000 -232           Stool Occurrence 0 x 0 x           Physical Exam   Constitutional: He is oriented to person, place, and time. No distress.   Musculoskeletal:   Flap sites healing well, c/d/i, no s/s of infection  Drains ss, minimal output   Neurological: He is alert and oriented to person, place, and  time.   Nursing note and vitals reviewed.      Significant Labs:  none    Significant Diagnostics:  none
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
## 2018-06-08 NOTE — SUBJECTIVE & OBJECTIVE
Interval History:   No acute events. Continues to await placement.    Medications:  Continuous Infusions:   sodium chloride 0.9% 0 mL/hr at 05/22/18 1254     Scheduled Meds:   ascorbic acid (vitamin C)  500 mg Oral QAM    ciprofloxacin HCl  500 mg Oral Q12H    fluconazole  400 mg Oral Daily    heparin (porcine)  5,000 Units Subcutaneous Q8H    Lactobacillus rhamnosus GG  1 capsule Oral Daily    oxybutynin  5 mg Oral BID    oxyCODONE  40 mg Oral Q12H    potassium citrate  10 mEq Oral Daily    pregabalin  200 mg Oral TID    vancomycin (VANCOCIN) IVPB (custom)  1,750 mg Intravenous Q12H     PRN Meds:albuterol, diazePAM, morphine, ondansetron, oxyCODONE-acetaminophen     Review of patient's allergies indicates:   Allergen Reactions    Zanaflex [tizanidine] Other (See Comments)     Get hallucinations from meds     Objective:     Vital Signs (Most Recent):  Temp: (!) 95.6 °F (35.3 °C) (06/07/18 1935)  Pulse: 70 (06/07/18 1935)  Resp: 20 (06/07/18 1935)  BP: 112/66 (06/07/18 1935)  SpO2: 99 % (06/07/18 1935) Vital Signs (24h Range):  Temp:  [95.6 °F (35.3 °C)-98.7 °F (37.1 °C)] 95.6 °F (35.3 °C)  Pulse:  [58-72] 70  Resp:  [16-20] 20  SpO2:  [95 %-99 %] 99 %  BP: (105-137)/(54-77) 112/66     Weight: 81.6 kg (180 lb)  Body mass index is 27.37 kg/m².    Intake/Output - Last 3 Shifts       06/05 0700 - 06/06 0659 06/06 0700 - 06/07 0659 06/07 0700 - 06/08 0659    P.O. 1500 3880 4346    Total Intake(mL/kg) 1500 (18.4) 3880 (47.5) 4346 (53.3)    Urine (mL/kg/hr) 1250 (0.6) 4450 (2.3) 3250 (2.6)    Emesis/NG output  0 (0) 0 (0)    Drains 0 (0) 20 (0) 2 (0)    Stool  0 (0) 0 (0)    Blood  0 (0) 0 (0)    Total Output 1250 4470 3252    Net +250 -590 +1094           Stool Occurrence  0 x 0 x          Physical Exam   Constitutional: He is oriented to person, place, and time. No distress.   Musculoskeletal:   Flap sites healing well, c/d/i, no s/s of infection  Drains ss, minimal output   Neurological: He is alert and  oriented to person, place, and time.   Nursing note and vitals reviewed.      Significant Labs:  CBC: No results for input(s): WBC, RBC, HGB, HCT, PLT, MCV, MCH, MCHC in the last 168 hours.  CMP:   Recent Labs  Lab 06/06/18  1536   CREATININE 0.6       Significant Diagnostics:  none

## 2018-06-09 LAB — VANCOMYCIN TROUGH SERPL-MCNC: 15.7 UG/ML

## 2018-06-09 PROCEDURE — 63600175 PHARM REV CODE 636 W HCPCS: Performed by: STUDENT IN AN ORGANIZED HEALTH CARE EDUCATION/TRAINING PROGRAM

## 2018-06-09 PROCEDURE — 11000001 HC ACUTE MED/SURG PRIVATE ROOM

## 2018-06-09 PROCEDURE — 63600175 PHARM REV CODE 636 W HCPCS: Performed by: SURGERY

## 2018-06-09 PROCEDURE — 25000003 PHARM REV CODE 250: Performed by: STUDENT IN AN ORGANIZED HEALTH CARE EDUCATION/TRAINING PROGRAM

## 2018-06-09 PROCEDURE — 36415 COLL VENOUS BLD VENIPUNCTURE: CPT

## 2018-06-09 PROCEDURE — 25000003 PHARM REV CODE 250: Performed by: PHYSICIAN ASSISTANT

## 2018-06-09 PROCEDURE — 80202 ASSAY OF VANCOMYCIN: CPT

## 2018-06-09 PROCEDURE — 25000003 PHARM REV CODE 250: Performed by: SURGERY

## 2018-06-09 RX ADMIN — OXYCODONE AND ACETAMINOPHEN 2 TABLET: 7.5; 325 TABLET ORAL at 01:06

## 2018-06-09 RX ADMIN — FLUCONAZOLE 400 MG: 200 TABLET ORAL at 03:06

## 2018-06-09 RX ADMIN — POTASSIUM CITRATE 10 MEQ: 10 TABLET ORAL at 09:06

## 2018-06-09 RX ADMIN — VANCOMYCIN HYDROCHLORIDE 1750 MG: 1 INJECTION, POWDER, LYOPHILIZED, FOR SOLUTION INTRAVENOUS at 01:06

## 2018-06-09 RX ADMIN — Medication 2 MG: at 05:06

## 2018-06-09 RX ADMIN — PREGABALIN 200 MG: 50 CAPSULE ORAL at 03:06

## 2018-06-09 RX ADMIN — HEPARIN SODIUM 5000 UNITS: 5000 INJECTION, SOLUTION INTRAVENOUS; SUBCUTANEOUS at 05:06

## 2018-06-09 RX ADMIN — HEPARIN SODIUM 5000 UNITS: 5000 INJECTION, SOLUTION INTRAVENOUS; SUBCUTANEOUS at 03:06

## 2018-06-09 RX ADMIN — Medication 2 MG: at 02:06

## 2018-06-09 RX ADMIN — OXYBUTYNIN CHLORIDE 5 MG: 5 TABLET ORAL at 09:06

## 2018-06-09 RX ADMIN — PREGABALIN 200 MG: 50 CAPSULE ORAL at 08:06

## 2018-06-09 RX ADMIN — DIAZEPAM 10 MG: 5 TABLET ORAL at 03:06

## 2018-06-09 RX ADMIN — OXYCODONE AND ACETAMINOPHEN 2 TABLET: 7.5; 325 TABLET ORAL at 09:06

## 2018-06-09 RX ADMIN — HEPARIN SODIUM 5000 UNITS: 5000 INJECTION, SOLUTION INTRAVENOUS; SUBCUTANEOUS at 09:06

## 2018-06-09 RX ADMIN — OXYCODONE HYDROCHLORIDE 40 MG: 20 TABLET, FILM COATED, EXTENDED RELEASE ORAL at 09:06

## 2018-06-09 RX ADMIN — OXYCODONE AND ACETAMINOPHEN 2 TABLET: 7.5; 325 TABLET ORAL at 04:06

## 2018-06-09 RX ADMIN — PREGABALIN 200 MG: 50 CAPSULE ORAL at 09:06

## 2018-06-09 RX ADMIN — VANCOMYCIN HYDROCHLORIDE 1750 MG: 1 INJECTION, POWDER, LYOPHILIZED, FOR SOLUTION INTRAVENOUS at 11:06

## 2018-06-09 RX ADMIN — Medication 1 CAPSULE: at 08:06

## 2018-06-09 RX ADMIN — CIPROFLOXACIN HYDROCHLORIDE 500 MG: 500 TABLET, FILM COATED ORAL at 09:06

## 2018-06-09 RX ADMIN — Medication 2 MG: at 11:06

## 2018-06-10 PROCEDURE — 63600175 PHARM REV CODE 636 W HCPCS: Performed by: STUDENT IN AN ORGANIZED HEALTH CARE EDUCATION/TRAINING PROGRAM

## 2018-06-10 PROCEDURE — 25000003 PHARM REV CODE 250: Performed by: STUDENT IN AN ORGANIZED HEALTH CARE EDUCATION/TRAINING PROGRAM

## 2018-06-10 PROCEDURE — 63600175 PHARM REV CODE 636 W HCPCS: Performed by: SURGERY

## 2018-06-10 PROCEDURE — 25000003 PHARM REV CODE 250: Performed by: PHYSICIAN ASSISTANT

## 2018-06-10 PROCEDURE — 25000003 PHARM REV CODE 250: Performed by: SURGERY

## 2018-06-10 PROCEDURE — 11000001 HC ACUTE MED/SURG PRIVATE ROOM

## 2018-06-10 RX ADMIN — PREGABALIN 200 MG: 50 CAPSULE ORAL at 10:06

## 2018-06-10 RX ADMIN — PREGABALIN 200 MG: 50 CAPSULE ORAL at 08:06

## 2018-06-10 RX ADMIN — POTASSIUM CITRATE 10 MEQ: 10 TABLET ORAL at 08:06

## 2018-06-10 RX ADMIN — DIAZEPAM 10 MG: 5 TABLET ORAL at 09:06

## 2018-06-10 RX ADMIN — OXYCODONE HYDROCHLORIDE 40 MG: 20 TABLET, FILM COATED, EXTENDED RELEASE ORAL at 10:06

## 2018-06-10 RX ADMIN — CIPROFLOXACIN HYDROCHLORIDE 500 MG: 500 TABLET, FILM COATED ORAL at 08:06

## 2018-06-10 RX ADMIN — VANCOMYCIN HYDROCHLORIDE 1750 MG: 1 INJECTION, POWDER, LYOPHILIZED, FOR SOLUTION INTRAVENOUS at 12:06

## 2018-06-10 RX ADMIN — OXYCODONE AND ACETAMINOPHEN 2 TABLET: 7.5; 325 TABLET ORAL at 11:06

## 2018-06-10 RX ADMIN — OXYBUTYNIN CHLORIDE 5 MG: 5 TABLET ORAL at 10:06

## 2018-06-10 RX ADMIN — CIPROFLOXACIN HYDROCHLORIDE 500 MG: 500 TABLET, FILM COATED ORAL at 10:06

## 2018-06-10 RX ADMIN — OXYCODONE HYDROCHLORIDE AND ACETAMINOPHEN 500 MG: 500 TABLET ORAL at 07:06

## 2018-06-10 RX ADMIN — HEPARIN SODIUM 5000 UNITS: 5000 INJECTION, SOLUTION INTRAVENOUS; SUBCUTANEOUS at 07:06

## 2018-06-10 RX ADMIN — FLUCONAZOLE 400 MG: 200 TABLET ORAL at 03:06

## 2018-06-10 RX ADMIN — HEPARIN SODIUM 5000 UNITS: 5000 INJECTION, SOLUTION INTRAVENOUS; SUBCUTANEOUS at 01:06

## 2018-06-10 RX ADMIN — OXYCODONE AND ACETAMINOPHEN 2 TABLET: 7.5; 325 TABLET ORAL at 07:06

## 2018-06-10 RX ADMIN — VANCOMYCIN HYDROCHLORIDE 1750 MG: 1 INJECTION, POWDER, LYOPHILIZED, FOR SOLUTION INTRAVENOUS at 11:06

## 2018-06-10 RX ADMIN — Medication 1 CAPSULE: at 08:06

## 2018-06-10 RX ADMIN — DIAZEPAM 10 MG: 5 TABLET ORAL at 10:06

## 2018-06-10 RX ADMIN — HEPARIN SODIUM 5000 UNITS: 5000 INJECTION, SOLUTION INTRAVENOUS; SUBCUTANEOUS at 10:06

## 2018-06-10 RX ADMIN — OXYCODONE AND ACETAMINOPHEN 2 TABLET: 7.5; 325 TABLET ORAL at 03:06

## 2018-06-10 RX ADMIN — OXYBUTYNIN CHLORIDE 5 MG: 5 TABLET ORAL at 08:06

## 2018-06-10 RX ADMIN — Medication 2 MG: at 01:06

## 2018-06-10 RX ADMIN — PREGABALIN 200 MG: 50 CAPSULE ORAL at 03:06

## 2018-06-10 RX ADMIN — OXYCODONE HYDROCHLORIDE 40 MG: 20 TABLET, FILM COATED, EXTENDED RELEASE ORAL at 08:06

## 2018-06-10 NOTE — PLAN OF CARE
Problem: Patient Care Overview  Goal: Plan of Care Review  Outcome: Ongoing (interventions implemented as appropriate)  Pt is progressing with plan of care. Frequent rounds made to assess pain and safety. Pt's pain controlled with pain medication ordered at this time. Anguiano care done. Ostomy care done. Pt on special bed with air matress. SCDs in place. Bed locked and at lowest position. Side rails up x 2. Call light within reach. Will continue to monitor.

## 2018-06-10 NOTE — PROGRESS NOTES
Patient seen and examined.   He is now  POD19 s/p bilateral ischial ulcer excision and fasciocutaneous flaps.  His post op course has been uneventful.  Incisions are c/d/i with no erythema or dehiscence.  Drains with minimal output     Plan:  Drains removed today with no complications  Anticipate dc home in the next couple of days.

## 2018-06-10 NOTE — PLAN OF CARE
Problem: Patient Care Overview  Goal: Plan of Care Review  Outcome: Ongoing (interventions implemented as appropriate)  Pt VSS, pt. Changed ostomy bag per self, pt. Pt continues to rate pain at a 10 of 10.  Pt able to roll and reposition self with use of bed rails, will continue to monitor.

## 2018-06-11 PROBLEM — L89.324 DECUBITUS ULCER OF ISCHIAL AREA, LEFT, STAGE IV: Status: RESOLVED | Noted: 2017-12-08 | Resolved: 2018-06-11

## 2018-06-11 PROBLEM — L89.314: Status: RESOLVED | Noted: 2018-05-22 | Resolved: 2018-06-11

## 2018-06-11 LAB — VANCOMYCIN TROUGH SERPL-MCNC: 12.7 UG/ML

## 2018-06-11 PROCEDURE — 36415 COLL VENOUS BLD VENIPUNCTURE: CPT

## 2018-06-11 PROCEDURE — 63600175 PHARM REV CODE 636 W HCPCS: Performed by: STUDENT IN AN ORGANIZED HEALTH CARE EDUCATION/TRAINING PROGRAM

## 2018-06-11 PROCEDURE — 25000003 PHARM REV CODE 250: Performed by: SURGERY

## 2018-06-11 PROCEDURE — 80202 ASSAY OF VANCOMYCIN: CPT

## 2018-06-11 PROCEDURE — 25000003 PHARM REV CODE 250: Performed by: PHYSICIAN ASSISTANT

## 2018-06-11 PROCEDURE — 11000001 HC ACUTE MED/SURG PRIVATE ROOM

## 2018-06-11 PROCEDURE — 25000003 PHARM REV CODE 250: Performed by: STUDENT IN AN ORGANIZED HEALTH CARE EDUCATION/TRAINING PROGRAM

## 2018-06-11 PROCEDURE — 94761 N-INVAS EAR/PLS OXIMETRY MLT: CPT

## 2018-06-11 PROCEDURE — 63600175 PHARM REV CODE 636 W HCPCS: Performed by: SURGERY

## 2018-06-11 RX ADMIN — HEPARIN SODIUM 5000 UNITS: 5000 INJECTION, SOLUTION INTRAVENOUS; SUBCUTANEOUS at 06:06

## 2018-06-11 RX ADMIN — FLUCONAZOLE 400 MG: 200 TABLET ORAL at 03:06

## 2018-06-11 RX ADMIN — POTASSIUM CITRATE 10 MEQ: 10 TABLET ORAL at 08:06

## 2018-06-11 RX ADMIN — PREGABALIN 200 MG: 50 CAPSULE ORAL at 10:06

## 2018-06-11 RX ADMIN — HEPARIN SODIUM 5000 UNITS: 5000 INJECTION, SOLUTION INTRAVENOUS; SUBCUTANEOUS at 03:06

## 2018-06-11 RX ADMIN — OXYBUTYNIN CHLORIDE 5 MG: 5 TABLET ORAL at 10:06

## 2018-06-11 RX ADMIN — Medication 2 MG: at 04:06

## 2018-06-11 RX ADMIN — DIAZEPAM 10 MG: 5 TABLET ORAL at 10:06

## 2018-06-11 RX ADMIN — OXYBUTYNIN CHLORIDE 5 MG: 5 TABLET ORAL at 08:06

## 2018-06-11 RX ADMIN — OXYCODONE HYDROCHLORIDE 40 MG: 20 TABLET, FILM COATED, EXTENDED RELEASE ORAL at 10:06

## 2018-06-11 RX ADMIN — CIPROFLOXACIN HYDROCHLORIDE 500 MG: 500 TABLET, FILM COATED ORAL at 08:06

## 2018-06-11 RX ADMIN — Medication 1 CAPSULE: at 08:06

## 2018-06-11 RX ADMIN — OXYCODONE AND ACETAMINOPHEN 2 TABLET: 7.5; 325 TABLET ORAL at 11:06

## 2018-06-11 RX ADMIN — DIAZEPAM 10 MG: 5 TABLET ORAL at 08:06

## 2018-06-11 RX ADMIN — VANCOMYCIN HYDROCHLORIDE 1750 MG: 1 INJECTION, POWDER, LYOPHILIZED, FOR SOLUTION INTRAVENOUS at 11:06

## 2018-06-11 RX ADMIN — OXYCODONE AND ACETAMINOPHEN 2 TABLET: 7.5; 325 TABLET ORAL at 03:06

## 2018-06-11 RX ADMIN — Medication 2 MG: at 10:06

## 2018-06-11 RX ADMIN — VANCOMYCIN HYDROCHLORIDE 1750 MG: 1 INJECTION, POWDER, LYOPHILIZED, FOR SOLUTION INTRAVENOUS at 12:06

## 2018-06-11 RX ADMIN — Medication 2 MG: at 05:06

## 2018-06-11 RX ADMIN — OXYCODONE HYDROCHLORIDE 40 MG: 20 TABLET, FILM COATED, EXTENDED RELEASE ORAL at 08:06

## 2018-06-11 RX ADMIN — HEPARIN SODIUM 5000 UNITS: 5000 INJECTION, SOLUTION INTRAVENOUS; SUBCUTANEOUS at 10:06

## 2018-06-11 RX ADMIN — PREGABALIN 200 MG: 50 CAPSULE ORAL at 08:06

## 2018-06-11 RX ADMIN — PREGABALIN 200 MG: 50 CAPSULE ORAL at 03:06

## 2018-06-11 RX ADMIN — CIPROFLOXACIN HYDROCHLORIDE 500 MG: 500 TABLET, FILM COATED ORAL at 10:06

## 2018-06-11 RX ADMIN — DIAZEPAM 10 MG: 5 TABLET ORAL at 06:06

## 2018-06-11 NOTE — PLAN OF CARE
Problem: Patient Care Overview  Goal: Plan of Care Review  Patient AAOX4, Midline IV clean, dry and intact, saline locked.  Patient has vanc trough due today prior to Vanc administration.  Patient on aerated bed that is operating well.  Assistance provided with ADL's this morning. Urine is clear and yellow with 350ml output this morning.  Linh Matson LPN

## 2018-06-11 NOTE — PLAN OF CARE
SHAE following for DC needs. SHAE in communication with CM.    SHAE received call from Janel at [facility] stating that they can admit the patient to LTAC today.    SHAE paged plastics resident to request orders. Awaiting call back.     UPDATE 10:56 AM  SHAE received call back from the OR. SHAE left message for physicians that facility transfer orders are needed.      UPDATE 2:30PM  SHAE paged plastics team again. SHAE received call back and asked for facility transfer orders. SHAE was told that the team just started another surgery which will last 3 hours. SHAE asked to leave a message for team to write facility transfer orders in the morning so the patient can discharge. SHAE also asked to notify team that orders are the only thing stopping the patient from discharging.     SHAE notified Janel that the patient will not be discharging today.     Malu Quintanilla, SHAE  [facility]  [id number]

## 2018-06-12 VITALS
BODY MASS INDEX: 27.28 KG/M2 | HEART RATE: 97 BPM | OXYGEN SATURATION: 99 % | SYSTOLIC BLOOD PRESSURE: 111 MMHG | HEIGHT: 68 IN | TEMPERATURE: 98 F | DIASTOLIC BLOOD PRESSURE: 60 MMHG | RESPIRATION RATE: 16 BRPM | WEIGHT: 180 LBS

## 2018-06-12 PROCEDURE — 94761 N-INVAS EAR/PLS OXIMETRY MLT: CPT

## 2018-06-12 PROCEDURE — 63600175 PHARM REV CODE 636 W HCPCS: Performed by: STUDENT IN AN ORGANIZED HEALTH CARE EDUCATION/TRAINING PROGRAM

## 2018-06-12 PROCEDURE — 25000003 PHARM REV CODE 250: Performed by: SURGERY

## 2018-06-12 PROCEDURE — 25000003 PHARM REV CODE 250: Performed by: STUDENT IN AN ORGANIZED HEALTH CARE EDUCATION/TRAINING PROGRAM

## 2018-06-12 PROCEDURE — 63600175 PHARM REV CODE 636 W HCPCS: Performed by: SURGERY

## 2018-06-12 PROCEDURE — 25000003 PHARM REV CODE 250: Performed by: PHYSICIAN ASSISTANT

## 2018-06-12 RX ADMIN — HEPARIN SODIUM 5000 UNITS: 5000 INJECTION, SOLUTION INTRAVENOUS; SUBCUTANEOUS at 05:06

## 2018-06-12 RX ADMIN — Medication 1 CAPSULE: at 09:06

## 2018-06-12 RX ADMIN — VANCOMYCIN HYDROCHLORIDE 1750 MG: 1 INJECTION, POWDER, LYOPHILIZED, FOR SOLUTION INTRAVENOUS at 12:06

## 2018-06-12 RX ADMIN — PREGABALIN 200 MG: 50 CAPSULE ORAL at 09:06

## 2018-06-12 RX ADMIN — POTASSIUM CITRATE 10 MEQ: 10 TABLET ORAL at 09:06

## 2018-06-12 RX ADMIN — OXYBUTYNIN CHLORIDE 5 MG: 5 TABLET ORAL at 09:06

## 2018-06-12 RX ADMIN — DIAZEPAM 10 MG: 5 TABLET ORAL at 09:06

## 2018-06-12 RX ADMIN — OXYCODONE HYDROCHLORIDE 40 MG: 20 TABLET, FILM COATED, EXTENDED RELEASE ORAL at 09:06

## 2018-06-12 RX ADMIN — CIPROFLOXACIN HYDROCHLORIDE 500 MG: 500 TABLET, FILM COATED ORAL at 09:06

## 2018-06-12 NOTE — PLAN OF CARE
SHAE following for DC needs. SHAE in communication with CM.    Patient is discharging today to [facility].     SHAE arranged transport with Katie for 1:15PM.     Nurse call report to [phone]. Patient is going to Rm# [phone].    SHAE paged the plastics team. Plastics nurse called SHAE and confirmed that Dr. Philippe has signed off on the patient and confirmed that [place] need a plastics follow up appt.     SHAE notified patient of discharge.     SHAE notified nurse of the above.     Malu Quintanilla, LMSW [facility]  [id number]

## 2018-06-12 NOTE — PLAN OF CARE
[facility]    FACILITY TRANSFER ORDERS      Patient Name: Nghia Edgar Jr.  YOB: 1985    PCP: Nohelia Hoover MD   PCP Address: [facility] [address] / [place] [address]  PCP Phone Number: [phone]  PCP Fax: [phone]    Encounter Date: 06/12/2018    Admit to: LTAC facility    Vital Signs:  Routine    Diagnoses:   Active Hospital Problems    Diagnosis  POA   No active problems to display.      Resolved Hospital Problems    Diagnosis Date Resolved POA    *Decubitus ulcer of ischial area, left, stage IV [L89.324] 06/11/2018 Yes    Decubitus ulcer of right ischial area, stage IV [L89.314] 06/11/2018 Yes    Suprapubic catheter [Z93.59] 06/05/2018 Not Applicable     Chronic       Allergies:  Review of patient's allergies indicates:   Allergen Reactions    Zanaflex [tizanidine] Other (See Comments)     Get hallucinations from meds       Diet: regular diet    Activities: Bed rest    Nursing: snow care, skin care (sacral and ischial area)     Labs: per facility MD  CONSULTS:    Physical Therapy to evaluate and treat.       WOUND CARE ORDERS  Yes: Surgical Wound:  Location: bilateral ischial area        Medications: Review discharge medications with patient and family and provide education.      Current Discharge Medication List      CONTINUE these medications which have NOT CHANGED    Details   albuterol (PROAIR HFA) 90 mcg/actuation inhaler Inhale 1-2 puffs into the lungs every 6 (six) hours as needed for Wheezing or Shortness of Breath.  Qty: 18 g, Refills: 3      ascorbic acid (VITAMIN C) 500 MG tablet Take 500 mg by mouth every morning.       aspirin (ECOTRIN) 81 MG EC tablet Take 81 mg by mouth once daily.      BACLOFEN IT by Intrathecal route.      diazePAM (VALIUM) 10 MG Tab TAKE ONE TABLET BY MOUTH THREE TIMES DAILY AS NEEDED  Qty: 90 tablet, Refills: 5    Associated Diagnoses: Anxiety      ferrous sulfate 325 (65 FE) MG EC tablet Take 325 mg by mouth once daily.  "     honey (MEDIHONEY, HONEY,) 80 % Gel Apply 1 each topically as needed. Apply medihoney gel to right ischial wound, cover with hydrofiber and gauze and secure with a mepore island dressing. Change all dressings three times weekly  Qty: 44 mL, Refills: 3      lactulose (CHRONULAC) 10 gram/15 mL solution       multivitamin capsule Take 1 capsule by mouth every morning.      oxybutynin (DITROPAN) 5 MG Tab TAKE ONE TABLET BY MOUTH THREE TIMES DAILY AS NEEDED FOR  BLADDER  SPASMS  Qty: 90 tablet, Refills: 3    Associated Diagnoses: Urgency of urination; Bladder spasm; Neurogenic bladder      oxyCODONE (OXYCONTIN) 40 mg 12 hr tablet Take 1 tablet (40 mg total) by mouth every 12 (twelve) hours.  Qty: 60 tablet, Refills: 0    Associated Diagnoses: Neuropathic pain      oxyCODONE-acetaminophen (PERCOCET)  mg per tablet Take 1-1/2 tablets (15mg) TID PRN (pain) ICD-10: M79.2  Qty: 135 tablet, Refills: 0    Associated Diagnoses: Neuropathic pain      polyethylene glycol (GLYCOLAX) 17 gram PwPk Take 17 g by mouth 2 (two) times daily as needed.  Qty: 60 packet, Refills: 11    Associated Diagnoses: Constipation, unspecified constipation type      potassium citrate (UROCIT-K) 10 mEq (1,080 mg) TbSR       pregabalin (LYRICA) 200 MG Cap Take 1 capsule (200 mg total) by mouth 3 (three) times daily.  Qty: 90 capsule, Refills: 5    Associated Diagnoses: Pain      adhesive bandage 3 1/2 X 10 " Bndg Apply medihoney gel to right ischial wound, cover with hydrofiber and gauze and secure with a mepore island dressing. Change all dressings three times weekly  Qty: 30 each, Refills: 3      blood pressure test kit-medium (IN CONTROL BP MONITOR) Kit Test daily  Qty: 1 each, Refills: 0      colostomy bags Misc Change daily  Qty: 30 each, Refills: 11    Comments: Holister 1 piece pouch. Please also dispense paste and skin protector.  Associated Diagnoses: Colostomy in place      disposable gloves Misc Apply medihoney gel to right ischial " "wound, cover with hydrofiber and gauze and secure with a mepore island dressing. Change all dressings three times weekly  Qty: 30 each, Refills: 3      gauze bandage Bndg Apply medihoney gel to right ischial wound, cover with hydrofiber and gauze and secure with a mepore island dressing. Change all dressings three times weekly  Qty: 30 each, Refills: 3      hydrocolloid dressing 4 X 4 " Bndg Apply medihoney gel to right ischial wound, cover with hydrofiber and gauze and secure with a mepore island dressing. Change all dressings three times weekly  Qty: 30 each, Refills: 3      leg brace (ANKLE BRACE) Misc 2 each by Misc.(Non-Drug; Combo Route) route daily as needed. Please dispense dropped foot splints  Qty: 2 each, Refills: 0    Associated Diagnoses: Foot drop, bilateral                  _________________________________  Kalin Valentine MD  06/12/2018          "

## 2018-06-12 NOTE — PLAN OF CARE
Problem: Patient Care Overview  Goal: Plan of Care Review  Patient AAOX4, being discharged today, Midline dressing changed today, patient voiding clear yellow urine via suprapubic catheter.  Colostomy changed this shift with no breakdown noted.  Report called to Count includes the [facility] at 150pm. Linh Matson LPN

## 2018-06-13 ENCOUNTER — PATIENT MESSAGE (OUTPATIENT)
Dept: PLASTIC SURGERY | Facility: CLINIC | Age: 33
End: 2018-06-13

## 2018-06-13 ENCOUNTER — TELEPHONE (OUTPATIENT)
Dept: PLASTIC SURGERY | Facility: CLINIC | Age: 33
End: 2018-06-13

## 2018-06-13 NOTE — TELEPHONE ENCOUNTER
----- Message from Papi Obrien sent at 6/13/2018  1:21 PM CDT -----  Contact: alexandria [email or web address] [phone]  Caller states that the pt was released without orders for IV antibiotics and clinitron bed and she needs to get the orders rewritten asap//please call/thank you

## 2018-06-13 NOTE — PLAN OF CARE
6/13/18 2:00 Dr. Philippe' clinic nurse notified that patients follow up appointment needs to be scheduled.  Nurse also notified that [facility] needs antibiotic order for the duration of his stay. Nurse stated that she will fax the orders to me to send to [facility] as they have been unsuccessful with getting these sent over.

## 2018-06-13 NOTE — PLAN OF CARE
6/13/2018    SW received call from Sarai at [facility] ([phone]) who stated that the patient's orders did not include any IVAB and the patient states that he needs them.   Lyn BAHENA, facilitated getting new orders.     SHAE faxed orders for additional antibiotics to [facility] at [phone].    Malu Quintanilla, SHAE  [facility]  [id number]

## 2018-06-13 NOTE — TELEPHONE ENCOUNTER
Left VM with my direct contact information, patient advised to give a return call with any questions or concerns regarding written orders

## 2018-06-14 ENCOUNTER — PATIENT MESSAGE (OUTPATIENT)
Dept: PLASTIC SURGERY | Facility: CLINIC | Age: 33
End: 2018-06-14

## 2018-06-14 ENCOUNTER — PATIENT MESSAGE (OUTPATIENT)
Dept: INFECTIOUS DISEASES | Facility: CLINIC | Age: 33
End: 2018-06-14

## 2018-06-14 ENCOUNTER — TELEPHONE (OUTPATIENT)
Dept: PHYSICAL MEDICINE AND REHAB | Facility: CLINIC | Age: 33
End: 2018-06-14

## 2018-06-14 ENCOUNTER — PATIENT MESSAGE (OUTPATIENT)
Dept: PHYSICAL MEDICINE AND REHAB | Facility: CLINIC | Age: 33
End: 2018-06-14

## 2018-06-14 NOTE — TELEPHONE ENCOUNTER
----- Message from Sparkle Henriquez sent at 6/14/2018  4:32 PM CDT -----  Contact: pt @ [phone]  Asking to speak with Dr. Diaz regarding his baclofen pump, says he is not at [facility] but it at another facility and needs to speak with the doctor to have his pump maintained. Please call.

## 2018-06-14 NOTE — PLAN OF CARE
6/14/19 2:20    Sage spoke with Sarai at [facility]. They have received the orders for the antibiotics.

## 2018-06-14 NOTE — DISCHARGE SUMMARY
"[facility]  Discharge Summary  Plastic Surgery      Admit Date: 5/22/2018    Discharge Date and Time: 6/12/2018  2:28 PM    Attending Physician: Dr Philippe  Discharge Provider: Klain Valentine    Reason for Admission: bilateral ischial ulcers stage IV  Procedures Performed: Procedure(s) (LRB):  OSTECTOMY (N/A)  DEBRIDEMENT    Hospital Course (synopsis of major diagnoses, care, treatment, and services provided during the course of the hospital stay): uncomplicated overall. Wounds healed nicely. No overall complications          Final Diagnoses:    Principal Problem: Decubitus ulcer of ischial area, left, stage IV   Secondary Diagnoses:   Active Hospital Problems    Diagnosis  POA   No active problems to display.      Resolved Hospital Problems    Diagnosis Date Resolved POA    *Decubitus ulcer of ischial area, left, stage IV [L89.324] 06/11/2018 Yes    Decubitus ulcer of right ischial area, stage IV [L89.314] 06/11/2018 Yes    Suprapubic catheter [Z93.59] 06/05/2018 Not Applicable     Chronic       Discharged Condition: good    Disposition: Long Term Care    Follow Up/Patient Instructions:     Medications:  Reconciled Home Medications:      Medication List      ASK your doctor about these medications    adhesive bandage 3 1/2 X 10 " Bndg  Apply medihoney gel to right ischial wound, cover with hydrofiber and gauze and secure with a mepore island dressing. Change all dressings three times weekly     albuterol 90 mcg/actuation inhaler  Commonly known as:  PROAIR HFA  Inhale 1-2 puffs into the lungs every 6 (six) hours as needed for Wheezing or Shortness of Breath.     aspirin 81 MG EC tablet  Commonly known as:  ECOTRIN  Take 81 mg by mouth once daily.     BACLOFEN IT  by Intrathecal route.     blood pressure test kit-medium Kit  Commonly known as:  INCONTROL BP MONITOR  Test daily     colostomy bags Misc  Change daily     diazePAM 10 MG Tab  Commonly known as:  VALIUM  TAKE ONE TABLET BY MOUTH " "THREE TIMES DAILY AS NEEDED     disposable gloves Misc  Apply medihoney gel to right ischial wound, cover with hydrofiber and gauze and secure with a mepore island dressing. Change all dressings three times weekly     ferrous sulfate 325 (65 FE) MG EC tablet  Take 325 mg by mouth once daily.     gauze bandage Bndg  Apply medihoney gel to right ischial wound, cover with hydrofiber and gauze and secure with a mepore island dressing. Change all dressings three times weekly     honey 80 % Gel  Commonly known as:  MEDIHONEY (HONEY)  Apply 1 each topically as needed. Apply medihoney gel to right ischial wound, cover with hydrofiber and gauze and secure with a mepore island dressing. Change all dressings three times weekly     hydrocolloid dressing 4 X 4 " Bndg  Apply medihoney gel to right ischial wound, cover with hydrofiber and gauze and secure with a mepore island dressing. Change all dressings three times weekly     lactulose 10 gram/15 mL solution  Commonly known as:  CHRONULAC     leg brace Misc  Commonly known as:  ANKLE BRACE  2 each by Misc.(Non-Drug; Combo Route) route daily as needed. Please dispense dropped foot splints     multivitamin capsule  Take 1 capsule by mouth every morning.     oxybutynin 5 MG Tab  Commonly known as:  DITROPAN  TAKE ONE TABLET BY MOUTH THREE TIMES DAILY AS NEEDED FOR  BLADDER  SPASMS     oxyCODONE 40 mg 12 hr tablet  Commonly known as:  OXYCONTIN  Take 1 tablet (40 mg total) by mouth every 12 (twelve) hours.     oxyCODONE-acetaminophen  mg per tablet  Commonly known as:  PERCOCET  Take 1-1/2 tablets (15mg) TID PRN (pain) ICD-10: M79.2     polyethylene glycol 17 gram Pwpk  Commonly known as:  GLYCOLAX  Take 17 g by mouth 2 (two) times daily as needed.     potassium citrate 10 mEq (1,080 mg) Tbsr  Commonly known as:  UROCIT-K     pregabalin 200 MG Cap  Commonly known as:  LYRICA  Take 1 capsule (200 mg total) by mouth 3 (three) times daily.     VITAMIN C 500 MG tablet  Generic " drug:  ascorbic acid (vitamin C)  Take 500 mg by mouth every morning.          No discharge procedures on file.

## 2018-06-15 ENCOUNTER — TELEPHONE (OUTPATIENT)
Dept: PHYSICAL MEDICINE AND REHAB | Facility: CLINIC | Age: 33
End: 2018-06-15

## 2018-06-15 DIAGNOSIS — R25.2 SPASTICITY: ICD-10-CM

## 2018-06-15 DIAGNOSIS — M62.838 MUSCLE SPASM: Primary | ICD-10-CM

## 2018-06-15 NOTE — TELEPHONE ENCOUNTER
Spoke with the patient and told him he not due refill until August and his medication will be managed by the Dr day he is

## 2018-06-17 ENCOUNTER — PATIENT MESSAGE (OUTPATIENT)
Dept: PLASTIC SURGERY | Facility: CLINIC | Age: 33
End: 2018-06-17

## 2018-06-20 ENCOUNTER — PATIENT MESSAGE (OUTPATIENT)
Dept: PLASTIC SURGERY | Facility: CLINIC | Age: 33
End: 2018-06-20

## 2018-06-24 ENCOUNTER — PATIENT MESSAGE (OUTPATIENT)
Dept: PLASTIC SURGERY | Facility: CLINIC | Age: 33
End: 2018-06-24

## 2018-06-26 LAB
FUNGUS SPEC CULT: NORMAL

## 2018-07-02 ENCOUNTER — PATIENT MESSAGE (OUTPATIENT)
Dept: PHYSICAL MEDICINE AND REHAB | Facility: CLINIC | Age: 33
End: 2018-07-02

## 2018-07-02 ENCOUNTER — PATIENT MESSAGE (OUTPATIENT)
Dept: INTERNAL MEDICINE | Facility: CLINIC | Age: 33
End: 2018-07-02

## 2018-07-02 ENCOUNTER — PATIENT MESSAGE (OUTPATIENT)
Dept: UROLOGY | Facility: CLINIC | Age: 33
End: 2018-07-02

## 2018-07-02 ENCOUNTER — PATIENT MESSAGE (OUTPATIENT)
Dept: INFECTIOUS DISEASES | Facility: CLINIC | Age: 33
End: 2018-07-02

## 2018-07-02 DIAGNOSIS — M79.2 NEUROPATHIC PAIN: Chronic | ICD-10-CM

## 2018-07-02 DIAGNOSIS — F41.9 ANXIETY: ICD-10-CM

## 2018-07-02 RX ORDER — OXYCODONE AND ACETAMINOPHEN 10; 325 MG/1; MG/1
TABLET ORAL
Qty: 135 TABLET | Refills: 0 | Status: SHIPPED | OUTPATIENT
Start: 2018-07-02 | End: 2018-08-02 | Stop reason: SDUPTHER

## 2018-07-02 RX ORDER — OXYCODONE HCL 40 MG/1
40 TABLET, FILM COATED, EXTENDED RELEASE ORAL EVERY 12 HOURS
Qty: 60 TABLET | Refills: 0 | Status: SHIPPED | OUTPATIENT
Start: 2018-07-02 | End: 2018-08-02 | Stop reason: SDUPTHER

## 2018-07-02 RX ORDER — DIAZEPAM 10 MG/1
10 TABLET ORAL 3 TIMES DAILY PRN
Qty: 90 TABLET | Refills: 5 | Status: SHIPPED | OUTPATIENT
Start: 2018-07-02 | End: 2018-09-05 | Stop reason: SDUPTHER

## 2018-07-02 NOTE — TELEPHONE ENCOUNTER
Last visit--03/26/2018  Last refills    Valium--05/21/2018  Oxycontin--05/07/2018  Percocet--04/26/2018

## 2018-07-05 DIAGNOSIS — N32.89 BLADDER SPASM: ICD-10-CM

## 2018-07-05 DIAGNOSIS — N31.9 NEUROGENIC BLADDER: ICD-10-CM

## 2018-07-05 DIAGNOSIS — R39.15 URGENCY OF URINATION: ICD-10-CM

## 2018-07-05 RX ORDER — OXYBUTYNIN CHLORIDE 5 MG/1
TABLET ORAL
Qty: 90 TABLET | Refills: 3 | Status: ON HOLD | OUTPATIENT
Start: 2018-07-05 | End: 2020-01-31

## 2018-07-06 ENCOUNTER — PATIENT MESSAGE (OUTPATIENT)
Dept: INFECTIOUS DISEASES | Facility: CLINIC | Age: 33
End: 2018-07-06

## 2018-07-09 ENCOUNTER — TELEPHONE (OUTPATIENT)
Dept: VASCULAR SURGERY | Facility: CLINIC | Age: 33
End: 2018-07-09

## 2018-07-09 ENCOUNTER — PATIENT MESSAGE (OUTPATIENT)
Dept: PHYSICAL MEDICINE AND REHAB | Facility: CLINIC | Age: 33
End: 2018-07-09

## 2018-07-09 ENCOUNTER — PATIENT MESSAGE (OUTPATIENT)
Dept: WOUND CARE | Facility: CLINIC | Age: 33
End: 2018-07-09

## 2018-07-09 DIAGNOSIS — Z87.828 HX OF SPINAL CORD INJURY: Primary | ICD-10-CM

## 2018-07-09 NOTE — NURSING
Left message on patient's voicemail that call was returned and to call back for an appointment with callback number provided. GSL/LPN

## 2018-07-10 ENCOUNTER — HOSPITAL ENCOUNTER (EMERGENCY)
Facility: HOSPITAL | Age: 33
Discharge: HOME | End: 2018-07-10
Payer: MEDICAID

## 2018-07-10 ENCOUNTER — PATIENT MESSAGE (OUTPATIENT)
Dept: INFECTIOUS DISEASES | Facility: CLINIC | Age: 33
End: 2018-07-10

## 2018-07-10 ENCOUNTER — TELEPHONE (OUTPATIENT)
Dept: WOUND CARE | Facility: CLINIC | Age: 33
End: 2018-07-10

## 2018-07-10 VITALS
DIASTOLIC BLOOD PRESSURE: 74 MMHG | HEART RATE: 67 BPM | OXYGEN SATURATION: 98 % | RESPIRATION RATE: 27 BRPM | TEMPERATURE: 98 F | SYSTOLIC BLOOD PRESSURE: 134 MMHG

## 2018-07-10 DIAGNOSIS — T83.510A URINARY TRACT INFECTION ASSOCIATED WITH CYSTOSTOMY CATHETER, INITIAL ENCOUNTER: Primary | ICD-10-CM

## 2018-07-10 DIAGNOSIS — N39.0 URINARY TRACT INFECTION ASSOCIATED WITH CYSTOSTOMY CATHETER, INITIAL ENCOUNTER: Primary | ICD-10-CM

## 2018-07-10 DIAGNOSIS — R05.9 COUGH: ICD-10-CM

## 2018-07-10 LAB
ALBUMIN SERPL BCP-MCNC: 3.3 G/DL
ALP SERPL-CCNC: 119 U/L
ALT SERPL W/O P-5'-P-CCNC: 60 U/L
ANION GAP SERPL CALC-SCNC: 9 MMOL/L
AST SERPL-CCNC: 27 U/L
BACTERIA #/AREA URNS AUTO: ABNORMAL /HPF
BASOPHILS # BLD AUTO: 0.03 K/UL
BASOPHILS NFR BLD: 0.2 %
BILIRUB SERPL-MCNC: 0.4 MG/DL
BILIRUB UR QL STRIP: NEGATIVE
BUN SERPL-MCNC: 9 MG/DL
CALCIUM SERPL-MCNC: 9.1 MG/DL
CHLORIDE SERPL-SCNC: 103 MMOL/L
CLARITY UR REFRACT.AUTO: ABNORMAL
CO2 SERPL-SCNC: 27 MMOL/L
COLOR UR AUTO: ABNORMAL
CREAT SERPL-MCNC: 0.5 MG/DL
DIFFERENTIAL METHOD: ABNORMAL
EOSINOPHIL # BLD AUTO: 0.1 K/UL
EOSINOPHIL NFR BLD: 0.6 %
ERYTHROCYTE [DISTWIDTH] IN BLOOD BY AUTOMATED COUNT: 20.8 %
EST. GFR  (AFRICAN AMERICAN): >60 ML/MIN/1.73 M^2
EST. GFR  (NON AFRICAN AMERICAN): >60 ML/MIN/1.73 M^2
GLUCOSE SERPL-MCNC: 90 MG/DL
GLUCOSE UR QL STRIP: NEGATIVE
HCT VFR BLD AUTO: 40.5 %
HGB BLD-MCNC: 11.8 G/DL
HGB UR QL STRIP: ABNORMAL
HYALINE CASTS UR QL AUTO: 0 /LPF
IMM GRANULOCYTES # BLD AUTO: 0.05 K/UL
IMM GRANULOCYTES NFR BLD AUTO: 0.4 %
KETONES UR QL STRIP: NEGATIVE
LACTATE SERPL-SCNC: 0.6 MMOL/L
LEUKOCYTE ESTERASE UR QL STRIP: ABNORMAL
LYMPHOCYTES # BLD AUTO: 1.7 K/UL
LYMPHOCYTES NFR BLD: 12.2 %
MCH RBC QN AUTO: 24.4 PG
MCHC RBC AUTO-ENTMCNC: 29.1 G/DL
MCV RBC AUTO: 84 FL
MICROSCOPIC COMMENT: ABNORMAL
MONOCYTES # BLD AUTO: 1.2 K/UL
MONOCYTES NFR BLD: 8.5 %
NEUTROPHILS # BLD AUTO: 11.1 K/UL
NEUTROPHILS NFR BLD: 78.1 %
NITRITE UR QL STRIP: POSITIVE
NON-SQ EPI CELLS #/AREA URNS AUTO: 1 /HPF
NRBC BLD-RTO: 0 /100 WBC
PH UR STRIP: 5 [PH] (ref 5–8)
PLATELET # BLD AUTO: 166 K/UL
PMV BLD AUTO: 11.6 FL
POTASSIUM SERPL-SCNC: 3.9 MMOL/L
PROCALCITONIN SERPL IA-MCNC: 0.1 NG/ML
PROT SERPL-MCNC: 6.9 G/DL
PROT UR QL STRIP: ABNORMAL
RBC # BLD AUTO: 4.84 M/UL
RBC #/AREA URNS AUTO: 24 /HPF (ref 0–4)
SODIUM SERPL-SCNC: 139 MMOL/L
SP GR UR STRIP: 1.03 (ref 1–1.03)
URATE CRY UR QL COMP ASSIST: ABNORMAL
URN SPEC COLLECT METH UR: ABNORMAL
UROBILINOGEN UR STRIP-ACNC: NEGATIVE EU/DL
WBC # BLD AUTO: 14.14 K/UL
WBC #/AREA URNS AUTO: 58 /HPF (ref 0–5)

## 2018-07-10 PROCEDURE — 99283 EMERGENCY DEPT VISIT LOW MDM: CPT | Mod: ,,,

## 2018-07-10 PROCEDURE — 87086 URINE CULTURE/COLONY COUNT: CPT

## 2018-07-10 PROCEDURE — 99284 EMERGENCY DEPT VISIT MOD MDM: CPT | Mod: 25

## 2018-07-10 PROCEDURE — 25000003 PHARM REV CODE 250: Performed by: PHYSICIAN ASSISTANT

## 2018-07-10 PROCEDURE — 81001 URINALYSIS AUTO W/SCOPE: CPT

## 2018-07-10 PROCEDURE — 84145 PROCALCITONIN (PCT): CPT

## 2018-07-10 PROCEDURE — 87077 CULTURE AEROBIC IDENTIFY: CPT

## 2018-07-10 PROCEDURE — 96361 HYDRATE IV INFUSION ADD-ON: CPT

## 2018-07-10 PROCEDURE — 96365 THER/PROPH/DIAG IV INF INIT: CPT

## 2018-07-10 PROCEDURE — 87186 SC STD MICRODIL/AGAR DIL: CPT

## 2018-07-10 PROCEDURE — 85025 COMPLETE CBC W/AUTO DIFF WBC: CPT

## 2018-07-10 PROCEDURE — 87088 URINE BACTERIA CULTURE: CPT

## 2018-07-10 PROCEDURE — 87040 BLOOD CULTURE FOR BACTERIA: CPT | Mod: 59

## 2018-07-10 PROCEDURE — 83605 ASSAY OF LACTIC ACID: CPT

## 2018-07-10 PROCEDURE — 63600175 PHARM REV CODE 636 W HCPCS: Performed by: PHYSICIAN ASSISTANT

## 2018-07-10 PROCEDURE — 80053 COMPREHEN METABOLIC PANEL: CPT

## 2018-07-10 RX ORDER — CEPHALEXIN 500 MG/1
500 CAPSULE ORAL 4 TIMES DAILY
Qty: 28 CAPSULE | Refills: 0 | Status: ON HOLD | OUTPATIENT
Start: 2018-07-10 | End: 2018-07-16

## 2018-07-10 RX ORDER — CEPHALEXIN 500 MG/1
500 CAPSULE ORAL
Status: DISCONTINUED | OUTPATIENT
Start: 2018-07-10 | End: 2018-07-10 | Stop reason: HOSPADM

## 2018-07-10 RX ORDER — VANCOMYCIN 2 GRAM/500 ML IN 0.9 % SODIUM CHLORIDE INTRAVENOUS
2000
Status: COMPLETED | OUTPATIENT
Start: 2018-07-10 | End: 2018-07-10

## 2018-07-10 RX ORDER — PSEUDOEPHEDRINE HCL 30 MG
60 TABLET ORAL ONCE
Status: COMPLETED | OUTPATIENT
Start: 2018-07-10 | End: 2018-07-10

## 2018-07-10 RX ADMIN — PSEUDOEPHEDRINE HCL 60 MG: 30 TABLET, FILM COATED ORAL at 03:07

## 2018-07-10 RX ADMIN — VANCOMYCIN HYDROCHLORIDE 2000 MG: 10 INJECTION, POWDER, LYOPHILIZED, FOR SOLUTION INTRAVENOUS at 04:07

## 2018-07-10 RX ADMIN — PIPERACILLIN AND TAZOBACTAM 4.5 G: 4; .5 INJECTION, POWDER, LYOPHILIZED, FOR SOLUTION INTRAVENOUS; PARENTERAL at 03:07

## 2018-07-10 RX ADMIN — SODIUM CHLORIDE 1000 ML: 0.9 INJECTION, SOLUTION INTRAVENOUS at 02:07

## 2018-07-10 NOTE — ED TRIAGE NOTES
Patient reports developing fever two days ago. Patient recently had operation to repair wounds present to bilateral buttocks on 06/22/2018. Patient reports that he had no complications from procedure. Patient states a home health comes to his home 3x per week to complete dressing changes and had not mentioned any s/s of infection at incision sites. Patient reports that he has been treating fever at home with tylenol. Patient reports that fever will resolve temporarily, but always comes back. Patient presents afebrile (98.3 oral temp) and reports he last took tylenol at 0830 this AM. Patient is A&Ox4, VSS, and following commands.

## 2018-07-10 NOTE — ED NOTES
LOC: The patient is awake, alert and aware of environment with an appropriate affect, the patient is oriented x 3 and speaking appropriately. PERRLA present. Patient with weak hand grasps bilaterally. Patient with numbness/tingling present to BLE at baseline.   APPEARANCE: Patient resting comfortably and in no acute distress, patient is clean and well groomed, patient's clothing is properly fastened.  SKIN: The skin is warm and dry, patient has poor skin turgor and dry mucus membranes. Patient with transverse incision sites present to bilateral buttocks; sutures in place and well approximated; dried serosanguineous drainage noted to dressings; dressing are intact. No apparent s/s of infection noted to incision sites. Patient does report pain to palpation. Dressing reapplied.   MUSCULOSKELETAL: Patient with decreased ROM to BUE; ROM absent to BLE secondary to paraplegia. Patient reports numbness and tingling present to BLE and patient is aware of touch. Patient non-ambulatory. Patient with mild bilateral foot drop present. Patient with movement present at hips.  RESPIRATORY: Airway is open and patent, respirations are spontaneous, patient has a normal effort and rate. Breath sounds are clear and equal bilaterally in upper lobes. Patient with diminished breath sounds present to bilateral lower lobes. Patient denies cough. Patient does report nasal congestion x two days.  CARDIAC: Normal heart sounds. No peripheral edema. Patient denies SOA or chest pain.  ABDOMEN: Soft and non tender to palpation, no distention noted. Bowel sounds present. Patient with colostomy present to LLQ of abdomen. Appliance is clean, dry, and intact. Elenita-stomal skin is clean, dry, and intact; no s/s of breakdown or maceration.   : Patient with suprapubic cath in place; patient with avis urine output present which patient states is unusual. Catheter site is clean, dry, and intact.

## 2018-07-10 NOTE — ED NOTES
Patient assisted to place wedge under right hip to alleviate pressure and find position of comfort per RN.

## 2018-07-10 NOTE — DISCHARGE INSTRUCTIONS
Please schedule an appointment with your primary care doctor for follow up. You should have your catheter switched out tomorrow. If you start to have a high fever again, please return to the emergency department. We are culturing your urine so if it grows a bacteria not covered by the antibiotic we gave, you will receive a call.
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
## 2018-07-11 ENCOUNTER — OFFICE VISIT (OUTPATIENT)
Dept: UROLOGY | Facility: CLINIC | Age: 33
DRG: 698 | End: 2018-07-11
Payer: MEDICAID

## 2018-07-11 ENCOUNTER — PATIENT MESSAGE (OUTPATIENT)
Dept: UROLOGY | Facility: CLINIC | Age: 33
End: 2018-07-11

## 2018-07-11 VITALS
HEART RATE: 92 BPM | HEIGHT: 68 IN | BODY MASS INDEX: 27.28 KG/M2 | DIASTOLIC BLOOD PRESSURE: 72 MMHG | WEIGHT: 180 LBS | SYSTOLIC BLOOD PRESSURE: 116 MMHG

## 2018-07-11 DIAGNOSIS — N31.9 NEUROGENIC BLADDER: Primary | ICD-10-CM

## 2018-07-11 DIAGNOSIS — Z43.5 ENCOUNTER FOR CARE OR REPLACEMENT OF SUPRAPUBIC TUBE: ICD-10-CM

## 2018-07-11 DIAGNOSIS — Z93.59 CHRONIC SUPRAPUBIC CATHETER: ICD-10-CM

## 2018-07-11 PROCEDURE — 51705 CHANGE OF BLADDER TUBE: CPT | Mod: S$PBB,,, | Performed by: NURSE PRACTITIONER

## 2018-07-11 PROCEDURE — 99213 OFFICE O/P EST LOW 20 MIN: CPT | Mod: PBBFAC,25 | Performed by: NURSE PRACTITIONER

## 2018-07-11 PROCEDURE — 99214 OFFICE O/P EST MOD 30 MIN: CPT | Mod: 25,S$PBB,, | Performed by: NURSE PRACTITIONER

## 2018-07-11 PROCEDURE — 51705 CHANGE OF BLADDER TUBE: CPT | Mod: PBBFAC | Performed by: NURSE PRACTITIONER

## 2018-07-11 PROCEDURE — 99999 PR PBB SHADOW E&M-EST. PATIENT-LVL III: CPT | Mod: PBBFAC,,, | Performed by: NURSE PRACTITIONER
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
## 2018-07-12 ENCOUNTER — HOSPITAL ENCOUNTER (EMERGENCY)
Facility: HOSPITAL | Age: 33
End: 2018-07-12
Attending: SURGERY
Payer: MEDICAID

## 2018-07-12 ENCOUNTER — HOSPITAL ENCOUNTER (INPATIENT)
Facility: HOSPITAL | Age: 33
LOS: 4 days | Discharge: HOME HEALTH | DRG: 698 | End: 2018-07-16
Attending: HOSPITALIST | Admitting: HOSPITALIST
Payer: MEDICAID

## 2018-07-12 VITALS
WEIGHT: 180 LBS | RESPIRATION RATE: 16 BRPM | TEMPERATURE: 97 F | HEIGHT: 68 IN | SYSTOLIC BLOOD PRESSURE: 115 MMHG | OXYGEN SATURATION: 99 % | HEART RATE: 70 BPM | BODY MASS INDEX: 27.28 KG/M2 | DIASTOLIC BLOOD PRESSURE: 80 MMHG

## 2018-07-12 DIAGNOSIS — R50.9 FEVER: ICD-10-CM

## 2018-07-12 DIAGNOSIS — N30.00 ACUTE CYSTITIS WITHOUT HEMATURIA: ICD-10-CM

## 2018-07-12 DIAGNOSIS — A41.9 SEPSIS, DUE TO UNSPECIFIED ORGANISM: Primary | ICD-10-CM

## 2018-07-12 DIAGNOSIS — A41.9 SEPSIS: ICD-10-CM

## 2018-07-12 LAB
ALBUMIN SERPL BCP-MCNC: 3 G/DL
ALBUMIN SERPL BCP-MCNC: 3.3 G/DL
ALP SERPL-CCNC: 135 U/L
ALP SERPL-CCNC: 137 U/L
ALT SERPL W/O P-5'-P-CCNC: 105 U/L
ALT SERPL W/O P-5'-P-CCNC: 96 U/L
ANION GAP SERPL CALC-SCNC: 11 MMOL/L
ANION GAP SERPL CALC-SCNC: 9 MMOL/L
AST SERPL-CCNC: 68 U/L
AST SERPL-CCNC: 72 U/L
BACTERIA #/AREA URNS HPF: ABNORMAL /HPF
BASOPHILS # BLD AUTO: 0.01 K/UL
BASOPHILS # BLD AUTO: 0.01 K/UL
BASOPHILS NFR BLD: 0.2 %
BASOPHILS NFR BLD: 0.2 %
BILIRUB SERPL-MCNC: 0.6 MG/DL
BILIRUB SERPL-MCNC: 0.7 MG/DL
BILIRUB UR QL STRIP: ABNORMAL
BNP SERPL-MCNC: 103 PG/ML
BUN SERPL-MCNC: 6 MG/DL
BUN SERPL-MCNC: 8 MG/DL
CALCIUM SERPL-MCNC: 8.6 MG/DL
CALCIUM SERPL-MCNC: 9 MG/DL
CHLORIDE SERPL-SCNC: 100 MMOL/L
CHLORIDE SERPL-SCNC: 106 MMOL/L
CK MB SERPL-MCNC: 1.9 NG/ML
CK MB SERPL-RTO: 0.7 %
CK SERPL-CCNC: 259 U/L
CK SERPL-CCNC: 259 U/L
CLARITY UR: CLEAR
CO2 SERPL-SCNC: 23 MMOL/L
CO2 SERPL-SCNC: 27 MMOL/L
COLOR UR: YELLOW
CREAT SERPL-MCNC: 0.5 MG/DL
CREAT SERPL-MCNC: 0.6 MG/DL
DEPRECATED S PYO AG THROAT QL EIA: NEGATIVE
DIFFERENTIAL METHOD: ABNORMAL
DIFFERENTIAL METHOD: ABNORMAL
EOSINOPHIL # BLD AUTO: 0 K/UL
EOSINOPHIL # BLD AUTO: 0 K/UL
EOSINOPHIL NFR BLD: 0 %
EOSINOPHIL NFR BLD: 0 %
ERYTHROCYTE [DISTWIDTH] IN BLOOD BY AUTOMATED COUNT: 20.6 %
ERYTHROCYTE [DISTWIDTH] IN BLOOD BY AUTOMATED COUNT: 20.9 %
EST. GFR  (AFRICAN AMERICAN): >60 ML/MIN/1.73 M^2
EST. GFR  (AFRICAN AMERICAN): >60 ML/MIN/1.73 M^2
EST. GFR  (NON AFRICAN AMERICAN): >60 ML/MIN/1.73 M^2
EST. GFR  (NON AFRICAN AMERICAN): >60 ML/MIN/1.73 M^2
FLUAV AG SPEC QL IA: NEGATIVE
FLUBV AG SPEC QL IA: NEGATIVE
GLUCOSE SERPL-MCNC: 114 MG/DL
GLUCOSE SERPL-MCNC: 98 MG/DL
GLUCOSE UR QL STRIP: NEGATIVE
HCT VFR BLD AUTO: 38.6 %
HCT VFR BLD AUTO: 40.8 %
HETEROPH AB SERPL QL IA: NEGATIVE
HGB BLD-MCNC: 11.8 G/DL
HGB BLD-MCNC: 12.5 G/DL
HGB UR QL STRIP: ABNORMAL
HYALINE CASTS #/AREA URNS LPF: 0 /LPF
IMM GRANULOCYTES # BLD AUTO: 0.02 K/UL
IMM GRANULOCYTES NFR BLD AUTO: 0.3 %
KETONES UR QL STRIP: ABNORMAL
LACTATE SERPL-SCNC: 0.5 MMOL/L
LACTATE SERPL-SCNC: 0.5 MMOL/L
LACTATE SERPL-SCNC: 0.7 MMOL/L
LACTATE SERPL-SCNC: 0.8 MMOL/L
LACTATE SERPL-SCNC: 0.9 MMOL/L
LEUKOCYTE ESTERASE UR QL STRIP: ABNORMAL
LYMPHOCYTES # BLD AUTO: 0.3 K/UL
LYMPHOCYTES # BLD AUTO: 0.3 K/UL
LYMPHOCYTES NFR BLD: 5.7 %
LYMPHOCYTES NFR BLD: 7 %
MAGNESIUM SERPL-MCNC: 2 MG/DL
MCH RBC QN AUTO: 24.8 PG
MCH RBC QN AUTO: 25.7 PG
MCHC RBC AUTO-ENTMCNC: 30.6 G/DL
MCHC RBC AUTO-ENTMCNC: 30.6 G/DL
MCV RBC AUTO: 81 FL
MCV RBC AUTO: 84 FL
MICROSCOPIC COMMENT: ABNORMAL
MONOCYTES # BLD AUTO: 0.2 K/UL
MONOCYTES # BLD AUTO: 0.6 K/UL
MONOCYTES NFR BLD: 4.6 %
MONOCYTES NFR BLD: 9.6 %
NEUTROPHILS # BLD AUTO: 4 K/UL
NEUTROPHILS # BLD AUTO: 4.9 K/UL
NEUTROPHILS NFR BLD: 84.2 %
NEUTROPHILS NFR BLD: 88.2 %
NITRITE UR QL STRIP: NEGATIVE
NRBC BLD-RTO: 0 /100 WBC
PH UR STRIP: >8 [PH] (ref 5–8)
PHOSPHATE SERPL-MCNC: 3.1 MG/DL
PLATELET # BLD AUTO: 129 K/UL
PLATELET # BLD AUTO: 155 K/UL
PMV BLD AUTO: 12.1 FL
PMV BLD AUTO: 13.3 FL
POTASSIUM SERPL-SCNC: 3.6 MMOL/L
POTASSIUM SERPL-SCNC: 3.8 MMOL/L
PROT SERPL-MCNC: 6.4 G/DL
PROT SERPL-MCNC: 7 G/DL
PROT UR QL STRIP: ABNORMAL
RBC # BLD AUTO: 4.59 M/UL
RBC # BLD AUTO: 5.04 M/UL
RBC #/AREA URNS HPF: 25 /HPF (ref 0–4)
SODIUM SERPL-SCNC: 138 MMOL/L
SODIUM SERPL-SCNC: 138 MMOL/L
SP GR UR STRIP: 1.02 (ref 1–1.03)
SPECIMEN SOURCE: NORMAL
TOBRAMYCIN PEAK SERPL-MCNC: 25.5 UG/ML
TROPONIN I SERPL DL<=0.01 NG/ML-MCNC: 0.11 NG/ML
URN SPEC COLLECT METH UR: ABNORMAL
UROBILINOGEN UR STRIP-ACNC: NEGATIVE EU/DL
WBC # BLD AUTO: 4.55 K/UL
WBC # BLD AUTO: 5.83 K/UL
WBC #/AREA URNS HPF: 50 /HPF (ref 0–5)

## 2018-07-12 PROCEDURE — 36415 COLL VENOUS BLD VENIPUNCTURE: CPT

## 2018-07-12 PROCEDURE — 87400 INFLUENZA A/B EACH AG IA: CPT

## 2018-07-12 PROCEDURE — 25000003 PHARM REV CODE 250: Performed by: SURGERY

## 2018-07-12 PROCEDURE — 86308 HETEROPHILE ANTIBODY SCREEN: CPT

## 2018-07-12 PROCEDURE — 25000003 PHARM REV CODE 250: Performed by: STUDENT IN AN ORGANIZED HEALTH CARE EDUCATION/TRAINING PROGRAM

## 2018-07-12 PROCEDURE — 96365 THER/PROPH/DIAG IV INF INIT: CPT | Performed by: SURGERY

## 2018-07-12 PROCEDURE — 84484 ASSAY OF TROPONIN QUANT: CPT

## 2018-07-12 PROCEDURE — 87081 CULTURE SCREEN ONLY: CPT | Mod: 59

## 2018-07-12 PROCEDURE — 96366 THER/PROPH/DIAG IV INF ADDON: CPT | Performed by: SURGERY

## 2018-07-12 PROCEDURE — 82550 ASSAY OF CK (CPK): CPT

## 2018-07-12 PROCEDURE — 80053 COMPREHEN METABOLIC PANEL: CPT | Mod: 91

## 2018-07-12 PROCEDURE — 63600175 PHARM REV CODE 636 W HCPCS: Performed by: STUDENT IN AN ORGANIZED HEALTH CARE EDUCATION/TRAINING PROGRAM

## 2018-07-12 PROCEDURE — 83735 ASSAY OF MAGNESIUM: CPT

## 2018-07-12 PROCEDURE — 93005 ELECTROCARDIOGRAM TRACING: CPT

## 2018-07-12 PROCEDURE — 87040 BLOOD CULTURE FOR BACTERIA: CPT | Mod: 59

## 2018-07-12 PROCEDURE — 85025 COMPLETE CBC W/AUTO DIFF WBC: CPT

## 2018-07-12 PROCEDURE — 99223 1ST HOSP IP/OBS HIGH 75: CPT | Mod: ,,, | Performed by: HOSPITALIST

## 2018-07-12 PROCEDURE — 81000 URINALYSIS NONAUTO W/SCOPE: CPT

## 2018-07-12 PROCEDURE — 85025 COMPLETE CBC W/AUTO DIFF WBC: CPT | Mod: 91

## 2018-07-12 PROCEDURE — 63600175 PHARM REV CODE 636 W HCPCS: Performed by: SURGERY

## 2018-07-12 PROCEDURE — 82553 CREATINE MB FRACTION: CPT

## 2018-07-12 PROCEDURE — 80200 ASSAY OF TOBRAMYCIN: CPT

## 2018-07-12 PROCEDURE — 83880 ASSAY OF NATRIURETIC PEPTIDE: CPT

## 2018-07-12 PROCEDURE — 99291 CRITICAL CARE FIRST HOUR: CPT | Mod: 25 | Performed by: SURGERY

## 2018-07-12 PROCEDURE — 87088 URINE BACTERIA CULTURE: CPT

## 2018-07-12 PROCEDURE — 84100 ASSAY OF PHOSPHORUS: CPT

## 2018-07-12 PROCEDURE — 93010 ELECTROCARDIOGRAM REPORT: CPT | Mod: ,,, | Performed by: INTERNAL MEDICINE

## 2018-07-12 PROCEDURE — 87077 CULTURE AEROBIC IDENTIFY: CPT | Mod: 59

## 2018-07-12 PROCEDURE — 83605 ASSAY OF LACTIC ACID: CPT | Mod: 91

## 2018-07-12 PROCEDURE — 87086 URINE CULTURE/COLONY COUNT: CPT

## 2018-07-12 PROCEDURE — 25500020 PHARM REV CODE 255: Performed by: SURGERY

## 2018-07-12 PROCEDURE — 87880 STREP A ASSAY W/OPTIC: CPT

## 2018-07-12 PROCEDURE — 11000001 HC ACUTE MED/SURG PRIVATE ROOM

## 2018-07-12 PROCEDURE — 83605 ASSAY OF LACTIC ACID: CPT

## 2018-07-12 PROCEDURE — 87186 SC STD MICRODIL/AGAR DIL: CPT

## 2018-07-12 PROCEDURE — 80053 COMPREHEN METABOLIC PANEL: CPT

## 2018-07-12 PROCEDURE — 96361 HYDRATE IV INFUSION ADD-ON: CPT | Performed by: SURGERY

## 2018-07-12 PROCEDURE — 96375 TX/PRO/DX INJ NEW DRUG ADDON: CPT | Mod: 59 | Performed by: SURGERY

## 2018-07-12 RX ORDER — ASPIRIN 81 MG/1
81 TABLET ORAL DAILY
Status: DISCONTINUED | OUTPATIENT
Start: 2018-07-12 | End: 2018-07-16 | Stop reason: HOSPADM

## 2018-07-12 RX ORDER — VANCOMYCIN HYDROCHLORIDE 1 G/20ML
INJECTION, POWDER, LYOPHILIZED, FOR SOLUTION INTRAVENOUS
Status: DISCONTINUED
Start: 2018-07-12 | End: 2018-07-12 | Stop reason: HOSPADM

## 2018-07-12 RX ORDER — OXYCODONE HYDROCHLORIDE 5 MG/1
10 TABLET ORAL EVERY 6 HOURS PRN
Status: DISCONTINUED | OUTPATIENT
Start: 2018-07-12 | End: 2018-07-16 | Stop reason: HOSPADM

## 2018-07-12 RX ORDER — ONDANSETRON 2 MG/ML
4 INJECTION INTRAMUSCULAR; INTRAVENOUS
Status: COMPLETED | OUTPATIENT
Start: 2018-07-12 | End: 2018-07-12

## 2018-07-12 RX ORDER — OXYBUTYNIN CHLORIDE 5 MG/1
5 TABLET ORAL 4 TIMES DAILY
Status: DISCONTINUED | OUTPATIENT
Start: 2018-07-12 | End: 2018-07-14

## 2018-07-12 RX ORDER — GLUCAGON 1 MG
1 KIT INJECTION
Status: DISCONTINUED | OUTPATIENT
Start: 2018-07-12 | End: 2018-07-16 | Stop reason: HOSPADM

## 2018-07-12 RX ORDER — DIAZEPAM 5 MG/1
10 TABLET ORAL EVERY 8 HOURS PRN
Status: DISCONTINUED | OUTPATIENT
Start: 2018-07-12 | End: 2018-07-16 | Stop reason: HOSPADM

## 2018-07-12 RX ORDER — VANCOMYCIN HCL IN 5 % DEXTROSE 1G/250ML
15 PLASTIC BAG, INJECTION (ML) INTRAVENOUS ONCE
Status: COMPLETED | OUTPATIENT
Start: 2018-07-12 | End: 2018-07-12

## 2018-07-12 RX ORDER — IBUPROFEN 200 MG
16 TABLET ORAL
Status: DISCONTINUED | OUTPATIENT
Start: 2018-07-12 | End: 2018-07-16 | Stop reason: HOSPADM

## 2018-07-12 RX ORDER — IBUPROFEN 200 MG
24 TABLET ORAL
Status: DISCONTINUED | OUTPATIENT
Start: 2018-07-12 | End: 2018-07-16 | Stop reason: HOSPADM

## 2018-07-12 RX ORDER — OXYCODONE HCL 20 MG/1
40 TABLET, FILM COATED, EXTENDED RELEASE ORAL EVERY 12 HOURS
Status: DISCONTINUED | OUTPATIENT
Start: 2018-07-12 | End: 2018-07-16 | Stop reason: HOSPADM

## 2018-07-12 RX ORDER — CEFEPIME HYDROCHLORIDE 2 G/1
2 INJECTION, POWDER, FOR SOLUTION INTRAVENOUS
Status: DISCONTINUED | OUTPATIENT
Start: 2018-07-12 | End: 2018-07-12

## 2018-07-12 RX ORDER — SODIUM CHLORIDE 0.9 % (FLUSH) 0.9 %
5 SYRINGE (ML) INJECTION
Status: DISCONTINUED | OUTPATIENT
Start: 2018-07-12 | End: 2018-07-16 | Stop reason: HOSPADM

## 2018-07-12 RX ORDER — SODIUM CHLORIDE 9 MG/ML
1000 INJECTION, SOLUTION INTRAVENOUS
Status: COMPLETED | OUTPATIENT
Start: 2018-07-12 | End: 2018-07-12

## 2018-07-12 RX ORDER — CEFEPIME HYDROCHLORIDE 1 G/50ML
INJECTION, SOLUTION INTRAVENOUS
Status: DISPENSED
Start: 2018-07-12 | End: 2018-07-12

## 2018-07-12 RX ORDER — MORPHINE SULFATE 2 MG/ML
2 INJECTION, SOLUTION INTRAMUSCULAR; INTRAVENOUS
Status: COMPLETED | OUTPATIENT
Start: 2018-07-12 | End: 2018-07-12

## 2018-07-12 RX ORDER — ACETAMINOPHEN 500 MG
1000 TABLET ORAL
Status: COMPLETED | OUTPATIENT
Start: 2018-07-12 | End: 2018-07-12

## 2018-07-12 RX ORDER — ENOXAPARIN SODIUM 100 MG/ML
40 INJECTION SUBCUTANEOUS EVERY 24 HOURS
Status: DISCONTINUED | OUTPATIENT
Start: 2018-07-12 | End: 2018-07-16 | Stop reason: HOSPADM

## 2018-07-12 RX ORDER — OXYCODONE AND ACETAMINOPHEN 5; 325 MG/1; MG/1
1 TABLET ORAL EVERY 6 HOURS PRN
Status: DISCONTINUED | OUTPATIENT
Start: 2018-07-12 | End: 2018-07-12

## 2018-07-12 RX ORDER — ACETAMINOPHEN 325 MG/1
650 TABLET ORAL EVERY 6 HOURS PRN
Status: DISCONTINUED | OUTPATIENT
Start: 2018-07-13 | End: 2018-07-16 | Stop reason: HOSPADM

## 2018-07-12 RX ORDER — PREGABALIN 100 MG/1
200 CAPSULE ORAL 3 TIMES DAILY
Status: DISCONTINUED | OUTPATIENT
Start: 2018-07-12 | End: 2018-07-16 | Stop reason: HOSPADM

## 2018-07-12 RX ORDER — CEFEPIME HYDROCHLORIDE 1 G/50ML
2 INJECTION, SOLUTION INTRAVENOUS
Status: DISCONTINUED | OUTPATIENT
Start: 2018-07-12 | End: 2018-07-12 | Stop reason: HOSPADM

## 2018-07-12 RX ORDER — IBUPROFEN 800 MG/1
800 TABLET ORAL
Status: COMPLETED | OUTPATIENT
Start: 2018-07-12 | End: 2018-07-12

## 2018-07-12 RX ORDER — POLYETHYLENE GLYCOL 3350 17 G/17G
17 POWDER, FOR SOLUTION ORAL 2 TIMES DAILY PRN
Status: DISCONTINUED | OUTPATIENT
Start: 2018-07-12 | End: 2018-07-16 | Stop reason: HOSPADM

## 2018-07-12 RX ADMIN — MORPHINE SULFATE 2 MG: 2 INJECTION, SOLUTION INTRAMUSCULAR; INTRAVENOUS at 02:07

## 2018-07-12 RX ADMIN — VANCOMYCIN HYDROCHLORIDE 1250 MG: 1 INJECTION, POWDER, LYOPHILIZED, FOR SOLUTION INTRAVENOUS at 02:07

## 2018-07-12 RX ADMIN — VANCOMYCIN HYDROCHLORIDE 1000 MG: 1 INJECTION, POWDER, LYOPHILIZED, FOR SOLUTION INTRAVENOUS at 11:07

## 2018-07-12 RX ADMIN — ENOXAPARIN SODIUM 40 MG: 100 INJECTION SUBCUTANEOUS at 06:07

## 2018-07-12 RX ADMIN — IOHEXOL 100 ML: 350 INJECTION, SOLUTION INTRAVENOUS at 03:07

## 2018-07-12 RX ADMIN — ACETAMINOPHEN 650 MG: 325 TABLET, FILM COATED ORAL at 11:07

## 2018-07-12 RX ADMIN — IBUPROFEN 600 MG: 600 TABLET ORAL at 02:07

## 2018-07-12 RX ADMIN — OXYCODONE HYDROCHLORIDE 40 MG: 20 TABLET, FILM COATED, EXTENDED RELEASE ORAL at 10:07

## 2018-07-12 RX ADMIN — ASPIRIN 81 MG: 81 TABLET, COATED ORAL at 02:07

## 2018-07-12 RX ADMIN — OXYBUTYNIN CHLORIDE 5 MG: 5 TABLET ORAL at 04:07

## 2018-07-12 RX ADMIN — ACETAMINOPHEN 1000 MG: 500 TABLET ORAL at 12:07

## 2018-07-12 RX ADMIN — SODIUM CHLORIDE 1000 ML: 0.9 INJECTION, SOLUTION INTRAVENOUS at 08:07

## 2018-07-12 RX ADMIN — CEFTRIAXONE SODIUM 2 G: 2 INJECTION, POWDER, FOR SOLUTION INTRAMUSCULAR; INTRAVENOUS at 06:07

## 2018-07-12 RX ADMIN — IBUPROFEN 800 MG: 800 TABLET ORAL at 12:07

## 2018-07-12 RX ADMIN — SODIUM CHLORIDE 1000 ML: 0.9 INJECTION, SOLUTION INTRAVENOUS at 12:07

## 2018-07-12 RX ADMIN — OXYCODONE HYDROCHLORIDE 10 MG: 5 TABLET ORAL at 02:07

## 2018-07-12 RX ADMIN — ONDANSETRON 4 MG: 2 INJECTION INTRAMUSCULAR; INTRAVENOUS at 02:07

## 2018-07-12 RX ADMIN — SODIUM CHLORIDE 1000 ML: 0.9 INJECTION, SOLUTION INTRAVENOUS at 04:07

## 2018-07-12 RX ADMIN — PREGABALIN 200 MG: 150 CAPSULE ORAL at 10:07

## 2018-07-12 RX ADMIN — PREGABALIN 200 MG: 150 CAPSULE ORAL at 02:07

## 2018-07-12 RX ADMIN — DIAZEPAM 10 MG: 5 TABLET ORAL at 03:07

## 2018-07-12 RX ADMIN — TOBRAMYCIN SULFATE 525 MG: 40 INJECTION, SOLUTION INTRAMUSCULAR; INTRAVENOUS at 03:07

## 2018-07-12 RX ADMIN — CEFEPIME HYDROCHLORIDE 2 G: 2 INJECTION, SOLUTION INTRAVENOUS at 01:07

## 2018-07-12 RX ADMIN — OXYBUTYNIN CHLORIDE 5 MG: 5 TABLET ORAL at 10:07

## 2018-07-12 NOTE — HOSPITAL COURSE
Pt given keflex as outpatient for his UTI with E Coli (sensitivities had not resulted yet). Cefepime 2g given in the ED. Pt given Tobramycin 7mg/kg IV once and then started on IV Vancomycin 15mg/kg. Changed to Cefepime 1g q8 IV due to presence of pseudomonas. Once sensitivities resulted, pt changed to ceftriaxone. Pt was afebrile after his first 24 hours in the hospital. He was given a second dose of tobramycin prior to discharge and then sent home with keflex. Pt's blood cultures NGTD.
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
## 2018-07-12 NOTE — ED NOTES
Pt accepted to [facility] rm [id number] per dr. Farris.  [phone]. [facility] contacted for transfer and will be in er within an hour.

## 2018-07-12 NOTE — PLAN OF CARE
07/12/18 1409   Discharge Assessment   Assessment Type Discharge Planning Assessment   Confirmed/corrected address and phone number on facesheet? Yes   Assessment information obtained from? Patient;Medical Record   Expected Length of Stay (days) 3   Communicated expected length of stay with patient/caregiver yes   Prior to hospitilization cognitive status: Alert/Oriented   Prior to hospitalization functional status: Assistive Equipment;Needs Assistance   Current cognitive status: Alert/Oriented   Current Functional Status: Assistive Equipment;Needs Assistance   Facility Arrived From: [facility]   Lives With significant other  (Diana)   Able to Return to Prior Arrangements yes   Is patient able to care for self after discharge? Yes   Who are your caregiver(s) and their phone number(s)? Diana Larios [phone]   Patient's perception of discharge disposition home or selfcare   Readmission Within The Last 30 Days no previous admission in last 30 days   Patient currently being followed by outpatient case management? No   Patient currently receives any other outside agency services? No   Equipment Currently Used at Home wheelchair;power chair;shower chair;colostomy/ostomy supplies;lift device;medication pump;other (see comments);urinary catheter supplies  (KRYSTA mattress uses on regular bed)   Do you have any problems affording any of your prescribed medications? No   Is the patient taking medications as prescribed? yes   Does the patient have transportation home? Yes   Transportation Available ambulance   Does the patient receive services at the [facility]? No   Discharge Plan A Home with family   Discharge Plan B Home with family;Home Health   Patient/Family In Agreement With Plan yes
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
## 2018-07-12 NOTE — ED NOTES
C/o  Buttocks pain from a previous sx procedure, rated 5 of 10. ERMD notified and new orders noted.

## 2018-07-12 NOTE — NURSING TRANSFER
Nursing Transfer Note      7/12/2018     Transfer From: [facility]    Transfer via stretcher    Transfer with IVF    Transported by Ambulance    Medicines sent:     Chart send with patient: Yes    Notified:     Patient reassessed at: 1115 (date, time)    Upon arrival to floor: patient oriented to room, call bell in reach and bed in lowest position. Paged Dr Farris, [facility] to inform of Pt's arrival.

## 2018-07-12 NOTE — ED NOTES
Pt is accepted at [facility] but does not have a bed assignment as of this time.
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
## 2018-07-13 LAB
ALBUMIN SERPL BCP-MCNC: 2.8 G/DL
ALP SERPL-CCNC: 132 U/L
ALT SERPL W/O P-5'-P-CCNC: 108 U/L
ANION GAP SERPL CALC-SCNC: 9 MMOL/L
AST SERPL-CCNC: 71 U/L
BASOPHILS # BLD AUTO: 0.01 K/UL
BASOPHILS NFR BLD: 0.2 %
BILIRUB SERPL-MCNC: 0.4 MG/DL
BUN SERPL-MCNC: 4 MG/DL
CALCIUM SERPL-MCNC: 8.2 MG/DL
CHLORIDE SERPL-SCNC: 107 MMOL/L
CO2 SERPL-SCNC: 26 MMOL/L
CREAT SERPL-MCNC: 0.5 MG/DL
DIFFERENTIAL METHOD: ABNORMAL
EOSINOPHIL # BLD AUTO: 0 K/UL
EOSINOPHIL NFR BLD: 0 %
ERYTHROCYTE [DISTWIDTH] IN BLOOD BY AUTOMATED COUNT: 20.6 %
EST. GFR  (AFRICAN AMERICAN): >60 ML/MIN/1.73 M^2
EST. GFR  (NON AFRICAN AMERICAN): >60 ML/MIN/1.73 M^2
GLUCOSE SERPL-MCNC: 114 MG/DL
HAV IGM SERPL QL IA: NEGATIVE
HBV CORE IGM SERPL QL IA: NEGATIVE
HBV SURFACE AG SERPL QL IA: NEGATIVE
HCT VFR BLD AUTO: 35.9 %
HCV AB SERPL QL IA: NEGATIVE
HGB BLD-MCNC: 10.5 G/DL
IMM GRANULOCYTES # BLD AUTO: 0.01 K/UL
IMM GRANULOCYTES NFR BLD AUTO: 0.2 %
LYMPHOCYTES # BLD AUTO: 1.1 K/UL
LYMPHOCYTES NFR BLD: 27.1 %
MCH RBC QN AUTO: 24.5 PG
MCHC RBC AUTO-ENTMCNC: 29.2 G/DL
MCV RBC AUTO: 84 FL
MONOCYTES # BLD AUTO: 0.7 K/UL
MONOCYTES NFR BLD: 16.6 %
NEUTROPHILS # BLD AUTO: 2.4 K/UL
NEUTROPHILS NFR BLD: 55.9 %
NRBC BLD-RTO: 0 /100 WBC
PLATELET # BLD AUTO: 118 K/UL
PMV BLD AUTO: 11.7 FL
POTASSIUM SERPL-SCNC: 3.4 MMOL/L
PROT SERPL-MCNC: 6.1 G/DL
RBC # BLD AUTO: 4.29 M/UL
SODIUM SERPL-SCNC: 142 MMOL/L
WBC # BLD AUTO: 4.21 K/UL

## 2018-07-13 PROCEDURE — 94761 N-INVAS EAR/PLS OXIMETRY MLT: CPT

## 2018-07-13 PROCEDURE — 25000003 PHARM REV CODE 250: Performed by: STUDENT IN AN ORGANIZED HEALTH CARE EDUCATION/TRAINING PROGRAM

## 2018-07-13 PROCEDURE — 11000001 HC ACUTE MED/SURG PRIVATE ROOM

## 2018-07-13 PROCEDURE — 99900037 HC PT THERAPY SCREENING (STAT)

## 2018-07-13 PROCEDURE — 80074 ACUTE HEPATITIS PANEL: CPT

## 2018-07-13 PROCEDURE — 99232 SBSQ HOSP IP/OBS MODERATE 35: CPT | Mod: ,,, | Performed by: HOSPITALIST

## 2018-07-13 PROCEDURE — 97165 OT EVAL LOW COMPLEX 30 MIN: CPT

## 2018-07-13 PROCEDURE — 80053 COMPREHEN METABOLIC PANEL: CPT

## 2018-07-13 PROCEDURE — 85025 COMPLETE CBC W/AUTO DIFF WBC: CPT

## 2018-07-13 PROCEDURE — 36415 COLL VENOUS BLD VENIPUNCTURE: CPT

## 2018-07-13 PROCEDURE — 63600175 PHARM REV CODE 636 W HCPCS: Performed by: STUDENT IN AN ORGANIZED HEALTH CARE EDUCATION/TRAINING PROGRAM

## 2018-07-13 RX ORDER — POTASSIUM CHLORIDE 20 MEQ/15ML
40 SOLUTION ORAL ONCE
Status: COMPLETED | OUTPATIENT
Start: 2018-07-13 | End: 2018-07-13

## 2018-07-13 RX ORDER — CEFEPIME HYDROCHLORIDE 1 G/1
1 INJECTION, POWDER, FOR SOLUTION INTRAMUSCULAR; INTRAVENOUS
Status: DISCONTINUED | OUTPATIENT
Start: 2018-07-13 | End: 2018-07-15

## 2018-07-13 RX ADMIN — OXYBUTYNIN CHLORIDE 5 MG: 5 TABLET ORAL at 09:07

## 2018-07-13 RX ADMIN — OXYBUTYNIN CHLORIDE 5 MG: 5 TABLET ORAL at 08:07

## 2018-07-13 RX ADMIN — OXYCODONE HYDROCHLORIDE 40 MG: 20 TABLET, FILM COATED, EXTENDED RELEASE ORAL at 08:07

## 2018-07-13 RX ADMIN — OXYBUTYNIN CHLORIDE 5 MG: 5 TABLET ORAL at 05:07

## 2018-07-13 RX ADMIN — DIAZEPAM 10 MG: 5 TABLET ORAL at 03:07

## 2018-07-13 RX ADMIN — OXYCODONE HYDROCHLORIDE 40 MG: 20 TABLET, FILM COATED, EXTENDED RELEASE ORAL at 09:07

## 2018-07-13 RX ADMIN — PREGABALIN 200 MG: 150 CAPSULE ORAL at 09:07

## 2018-07-13 RX ADMIN — OXYCODONE HYDROCHLORIDE 10 MG: 5 TABLET ORAL at 12:07

## 2018-07-13 RX ADMIN — PREGABALIN 200 MG: 150 CAPSULE ORAL at 04:07

## 2018-07-13 RX ADMIN — ASPIRIN 81 MG: 81 TABLET, COATED ORAL at 09:07

## 2018-07-13 RX ADMIN — DIAZEPAM 10 MG: 5 TABLET ORAL at 12:07

## 2018-07-13 RX ADMIN — OXYBUTYNIN CHLORIDE 5 MG: 5 TABLET ORAL at 12:07

## 2018-07-13 RX ADMIN — CEFEPIME 1 G: 1 INJECTION, POWDER, FOR SOLUTION INTRAMUSCULAR; INTRAVENOUS at 12:07

## 2018-07-13 RX ADMIN — CEFEPIME 1 G: 1 INJECTION, POWDER, FOR SOLUTION INTRAMUSCULAR; INTRAVENOUS at 08:07

## 2018-07-13 RX ADMIN — ENOXAPARIN SODIUM 40 MG: 100 INJECTION SUBCUTANEOUS at 05:07

## 2018-07-13 RX ADMIN — POTASSIUM CHLORIDE 40 MEQ: 20 SOLUTION ORAL at 09:07

## 2018-07-13 RX ADMIN — PREGABALIN 200 MG: 150 CAPSULE ORAL at 08:07

## 2018-07-13 NOTE — PROGRESS NOTES
Pt resting in bed, turning every 2 hrs and PRN with assistance, wedge and pillows in use. NAD. Plan to transfer to room 926 -- report called to receiving nurse.

## 2018-07-13 NOTE — SUBJECTIVE & OBJECTIVE
Interval history: Pt looks well this morning, resting comfortably on exam. Pt states he is feeling better. E coli and pseudomonas from culture taken 7/12.      Review of Systems   Constitutional: Positive for fatigue and fever. Negative for appetite change, chills and diaphoresis.   HENT: Negative.    Eyes: Negative.    Respiratory: Negative for cough, chest tightness and shortness of breath.    Gastrointestinal: Negative for abdominal distention, abdominal pain and nausea.   Endocrine: Negative.    Genitourinary: Positive for difficulty urinating (nephrogenic bladder, chronic, indwelling catheter.).        Pt has indwelling snow. It was changed 2 days ago when UTI was dx.   Musculoskeletal: Positive for myalgias (chronic).   Skin: Positive for wound (6/22 flap procedure to cover sacral decubitus wound).   Neurological: Negative for tremors and headaches.   Psychiatric/Behavioral: Negative.      Objective:     Vital Signs (Most Recent):  Temp: 97.8 °F (36.6 °C) (07/13/18 1535)  Pulse: 71 (07/13/18 1543)  Resp: 18 (07/13/18 1535)  BP: (!) 101/55 (07/13/18 1543)  SpO2: 97 % (07/13/18 1535) Vital Signs (24h Range):  Temp:  [97.8 °F (36.6 °C)-102.2 °F (39 °C)] 97.8 °F (36.6 °C)  Pulse:  [71-95] 71  Resp:  [16-18] 18  SpO2:  [94 %-98 %] 97 %  BP: ()/(53-62) 101/55     Weight: 74.8 kg (165 lb)  Body mass index is 25.09 kg/m².    Intake/Output Summary (Last 24 hours) at 07/13/18 1703  Last data filed at 07/13/18 0818   Gross per 24 hour   Intake             3200 ml   Output             4550 ml   Net            -1350 ml      Physical Exam   Constitutional: He is oriented to person, place, and time. He appears well-nourished. No distress.   HENT:   Head: Normocephalic and atraumatic.   Eyes: Conjunctivae and EOM are normal. Pupils are equal, round, and reactive to light.   Neck: No tracheal deviation present.   Limited ROM due to spinal injury.   Cardiovascular: Regular rhythm.    Pulmonary/Chest: Effort normal and  breath sounds normal.   Abdominal: Soft.   Colostomy bag changed by RN just prior to my exam   Genitourinary:   Genitourinary Comments: Anguiano in place. Urine appears red, but pt states this is normal for him due to a medication that he takes (he could not tell me which one causes this)   Musculoskeletal:   Pt is a paraplegic with limited use of the BUE and full paralysis of the BLE   Neurological: He is alert and oriented to person, place, and time.   Skin: Skin is warm. He is not diaphoretic.   Psychiatric: He has a normal mood and affect.   Nursing note and vitals reviewed.      Significant Labs:   Blood Culture:     Recent Labs  Lab 07/12/18  0024 07/12/18  1329 07/12/18  1330   LABBLOO No Growth to date  No Growth to date  No Growth to date  No Growth to date No Growth to date  No Growth to date No Growth to date  No Growth to date     CBC:     Recent Labs  Lab 07/12/18  0024 07/12/18  1328 07/13/18  0429   WBC 4.55 5.83 4.21   HGB 12.5* 11.8* 10.5*   HCT 40.8 38.6* 35.9*    129* 118*     CMP:     Recent Labs  Lab 07/12/18  0023 07/12/18  1328 07/13/18  0429    138 142   K 3.6 3.8 3.4*    106 107   CO2 27 23 26   * 98 114*   BUN 8 6 4*   CREATININE 0.6 0.5 0.5   CALCIUM 9.0 8.6* 8.2*   PROT 7.0 6.4 6.1   ALBUMIN 3.3* 3.0* 2.8*   BILITOT 0.6 0.7 0.4   ALKPHOS 135 137* 132   AST 68* 72* 71*   ALT 96* 105* 108*   ANIONGAP 11 9 9   EGFRNONAA >60 >60.0 >60.0     Lactic Acid:     Recent Labs  Lab 07/12/18  0352 07/12/18  0804 07/12/18  1403   LACTATE 0.8 0.5  0.5 0.7     Urine Culture:     Recent Labs  Lab 07/12/18  0020   LABURIN PSEUDOMONAS RHZWRBKETM90,000 - 49,999 cfu/mlSusceptibility pending  ESCHERICHIA COLI10,000 - 49,999 cfu/mlSusceptibility pending     Urine Studies:     Recent Labs  Lab 07/12/18  0020   COLORU Yellow   APPEARANCEUA Clear   PHUR >8.0   SPECGRAV 1.020   PROTEINUA 2+*   GLUCUA Negative   KETONESU 3+*   BILIRUBINUA 1+*   OCCULTUA 3+*   NITRITE Negative    UROBILINOGEN Negative   LEUKOCYTESUR 1+*   RBCUA 25*   WBCUA 50*   BACTERIA Moderate*   HYALINECASTS 0     All pertinent labs within the past 24 hours have been reviewed.    Significant Imaging: I have reviewed all pertinent imaging results/findings within the past 24 hours.

## 2018-07-13 NOTE — NURSING
Transferred to [facility] from 5th floor. With Anguiano cath, draining good amount of urine. Greeted him and gained consent for nursing care. Oriented to room. Made him comfortable in bed. Bed on lowest position, locked. Call bell within reach. Assisted in his needs. Will continue to monitor.

## 2018-07-13 NOTE — ASSESSMENT & PLAN NOTE
UCx shows e coli. Pending sensitivities.  -Cefepime 2g in ED  -tobramycin 7mg/kg once IV  -vancomycin 15mg/kg initiated, will dose based on trough

## 2018-07-13 NOTE — PLAN OF CARE
Problem: Occupational Therapy Goal  Goal: Occupational Therapy Goal  Outcome: Outcome(s) achieved Date Met: 07/13/18  Evaluation complete.  Pt at baseline functional performance and is able to return to home OT/PT upon DC   Jacinta Cage, OT  7/13/2018
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
## 2018-07-13 NOTE — ASSESSMENT & PLAN NOTE
-blood cultures obtained while pt experiencing chills/rigors  -cefepime 2 g given in the ED  -tobramycin 7mg/kg given once IV  -vancomysin 15mg/kg started, will dose with troughs.
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
## 2018-07-13 NOTE — PT/OT/SLP PROGRESS
Physical Therapy Screen      Patient Name:  Nghia Edgar Jr.   MRN:  [id number]    Patient is AIS A C6 quadriplegic and lives in handicap accessible apartment with 24/7 skilled assistant and receives PT/OT services 3x/week. No barriers to d/c and patient reports function at baseline mobility. No further need for skilled physical therapy evaluation. If functional status changes, re-consult.    Malu Cintron, SPT   7/13/2018

## 2018-07-13 NOTE — PHARMACY MED REC
"Admission Medication Reconciliation - Pharmacy Consult Note    The home medication history was taken by Taylor Tate, pharmacy technician.  Based on information gathered and subsequent review by the clinical pharmacist, the items below may need attention.     You may go to "Admission" then "Reconcile Home Medications" tabs to review and/or act upon these items.     Potentially problematic discrepancies with current MAR  o Patient IS taking the following which was not ordered upon admit  o Albuterol inh Q 6 hr PRN SOB  o Ascorbic acid 1000 mg QD  o Ferrous sulfate 325 mg QD  o Potassium citrate 10 mEq TID   o Patient is taking a drug DIFFERENTLY than how ordered upon admit  o Oxybutynin 5 mg TID PRN bladder spasm     Please address this information as you see fit.  Feel free to contact us if you have any questions or require assistance.  Tram Cabezas  EXT [id number]     .    .            "

## 2018-07-14 LAB
ALBUMIN SERPL BCP-MCNC: 2.9 G/DL
ALP SERPL-CCNC: 129 U/L
ALT SERPL W/O P-5'-P-CCNC: 78 U/L
ANION GAP SERPL CALC-SCNC: 7 MMOL/L
AST SERPL-CCNC: 37 U/L
BACTERIA THROAT CULT: NORMAL
BACTERIA UR CULT: NORMAL
BASOPHILS # BLD AUTO: 0.02 K/UL
BASOPHILS NFR BLD: 0.5 %
BILIRUB SERPL-MCNC: 0.4 MG/DL
BUN SERPL-MCNC: 4 MG/DL
CALCIUM SERPL-MCNC: 8.7 MG/DL
CHLORIDE SERPL-SCNC: 103 MMOL/L
CO2 SERPL-SCNC: 28 MMOL/L
CREAT SERPL-MCNC: 0.5 MG/DL
DIFFERENTIAL METHOD: ABNORMAL
EOSINOPHIL # BLD AUTO: 0 K/UL
EOSINOPHIL NFR BLD: 0.7 %
ERYTHROCYTE [DISTWIDTH] IN BLOOD BY AUTOMATED COUNT: 20.1 %
EST. GFR  (AFRICAN AMERICAN): >60 ML/MIN/1.73 M^2
EST. GFR  (NON AFRICAN AMERICAN): >60 ML/MIN/1.73 M^2
GLUCOSE SERPL-MCNC: 98 MG/DL
HCT VFR BLD AUTO: 36 %
HGB BLD-MCNC: 10.8 G/DL
IMM GRANULOCYTES # BLD AUTO: 0.01 K/UL
IMM GRANULOCYTES NFR BLD AUTO: 0.2 %
LYMPHOCYTES # BLD AUTO: 1.5 K/UL
LYMPHOCYTES NFR BLD: 37 %
MCH RBC QN AUTO: 25 PG
MCHC RBC AUTO-ENTMCNC: 30 G/DL
MCV RBC AUTO: 83 FL
MONOCYTES # BLD AUTO: 0.7 K/UL
MONOCYTES NFR BLD: 18 %
NEUTROPHILS # BLD AUTO: 1.8 K/UL
NEUTROPHILS NFR BLD: 43.6 %
NRBC BLD-RTO: 0 /100 WBC
PLATELET # BLD AUTO: 128 K/UL
PMV BLD AUTO: 12.5 FL
POTASSIUM SERPL-SCNC: 4.1 MMOL/L
PROT SERPL-MCNC: 6.5 G/DL
RBC # BLD AUTO: 4.32 M/UL
SODIUM SERPL-SCNC: 138 MMOL/L
WBC # BLD AUTO: 4.05 K/UL

## 2018-07-14 PROCEDURE — 85025 COMPLETE CBC W/AUTO DIFF WBC: CPT

## 2018-07-14 PROCEDURE — 11000001 HC ACUTE MED/SURG PRIVATE ROOM

## 2018-07-14 PROCEDURE — 63600175 PHARM REV CODE 636 W HCPCS: Performed by: STUDENT IN AN ORGANIZED HEALTH CARE EDUCATION/TRAINING PROGRAM

## 2018-07-14 PROCEDURE — 99232 SBSQ HOSP IP/OBS MODERATE 35: CPT | Mod: ,,, | Performed by: HOSPITALIST

## 2018-07-14 PROCEDURE — 25000003 PHARM REV CODE 250: Performed by: STUDENT IN AN ORGANIZED HEALTH CARE EDUCATION/TRAINING PROGRAM

## 2018-07-14 PROCEDURE — 36415 COLL VENOUS BLD VENIPUNCTURE: CPT

## 2018-07-14 PROCEDURE — 80053 COMPREHEN METABOLIC PANEL: CPT

## 2018-07-14 RX ORDER — OXYBUTYNIN CHLORIDE 5 MG/1
5 TABLET ORAL EVERY 8 HOURS PRN
Status: DISCONTINUED | OUTPATIENT
Start: 2018-07-14 | End: 2018-07-16 | Stop reason: HOSPADM

## 2018-07-14 RX ORDER — POVIDONE-IODINE 10 MG/G
OINTMENT TOPICAL
Status: DISCONTINUED | OUTPATIENT
Start: 2018-07-14 | End: 2018-07-16 | Stop reason: HOSPADM

## 2018-07-14 RX ADMIN — CEFEPIME 1 G: 1 INJECTION, POWDER, FOR SOLUTION INTRAMUSCULAR; INTRAVENOUS at 03:07

## 2018-07-14 RX ADMIN — CEFEPIME 1 G: 1 INJECTION, POWDER, FOR SOLUTION INTRAMUSCULAR; INTRAVENOUS at 12:07

## 2018-07-14 RX ADMIN — OXYBUTYNIN CHLORIDE 5 MG: 5 TABLET ORAL at 12:07

## 2018-07-14 RX ADMIN — OXYCODONE HYDROCHLORIDE 40 MG: 20 TABLET, FILM COATED, EXTENDED RELEASE ORAL at 09:07

## 2018-07-14 RX ADMIN — OXYBUTYNIN CHLORIDE 5 MG: 5 TABLET ORAL at 09:07

## 2018-07-14 RX ADMIN — PREGABALIN 200 MG: 150 CAPSULE ORAL at 09:07

## 2018-07-14 RX ADMIN — OXYCODONE HYDROCHLORIDE 10 MG: 5 TABLET ORAL at 12:07

## 2018-07-14 RX ADMIN — ASPIRIN 81 MG: 81 TABLET, COATED ORAL at 09:07

## 2018-07-14 RX ADMIN — ENOXAPARIN SODIUM 40 MG: 100 INJECTION SUBCUTANEOUS at 05:07

## 2018-07-14 RX ADMIN — DIAZEPAM 10 MG: 5 TABLET ORAL at 01:07

## 2018-07-14 RX ADMIN — OXYBUTYNIN CHLORIDE 5 MG: 5 TABLET ORAL at 05:07

## 2018-07-14 RX ADMIN — OXYCODONE HYDROCHLORIDE 10 MG: 5 TABLET ORAL at 06:07

## 2018-07-14 RX ADMIN — DIAZEPAM 10 MG: 5 TABLET ORAL at 03:07

## 2018-07-14 RX ADMIN — CEFEPIME 1 G: 1 INJECTION, POWDER, FOR SOLUTION INTRAMUSCULAR; INTRAVENOUS at 09:07

## 2018-07-14 RX ADMIN — PREGABALIN 200 MG: 150 CAPSULE ORAL at 03:07
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
## 2018-07-14 NOTE — PLAN OF CARE
Problem: Patient Care Overview  Goal: Plan of Care Review   07/14/18 1828   Coping/Psychosocial   Plan Of Care Reviewed With patient       Problem: Infection, Risk/Actual (Adult)  Goal: Identify Related Risk Factors and Signs and Symptoms  Related risk factors and signs and symptoms are identified upon initiation of Human Response Clinical Practice Guideline (CPG)   Outcome: Ongoing (interventions implemented as appropriate)   07/14/18 1828   Infection, Risk/Actual   Related Risk Factors (Infection, Risk/Actual) chronic illness/condition;exposure to microbes;prolonged hospitalization;skin integrity impairment;surgery/procedure   Signs and Symptoms (Infection, Risk/Actual) body temperature changes;chills;lab value changes;cultures positive;heart rate increase;respiratory rate increase       Problem: Pressure Ulcer Risk (Juan Jose Scale) (Adult,Obstetrics,Pediatric)  Goal: Identify Related Risk Factors and Signs and Symptoms  Related risk factors and signs and symptoms are identified upon initiation of Human Response Clinical Practice Guideline (CPG)   Outcome: Ongoing (interventions implemented as appropriate)   07/14/18 1828   Pressure Ulcer Risk (Juan Jose Scale)   Related Risk Factors (Pressure Ulcer Risk (Juan Jose Scale)) hospitalization prolonged;mobility impaired       Problem: Pain, Acute (Adult)  Goal: Identify Related Risk Factors and Signs and Symptoms  Related risk factors and signs and symptoms are identified upon initiation of Human Response Clinical Practice Guideline (CPG)   Outcome: Ongoing (interventions implemented as appropriate)   07/14/18 1828   Pain, Acute   Related Risk Factors (Acute Pain) disease process;infection   Signs and Symptoms (Acute Pain) autonomic responses (sympathetic stimulation)

## 2018-07-14 NOTE — PLAN OF CARE
Problem: Patient Care Overview  Goal: Plan of Care Review  Outcome: Ongoing (interventions implemented as appropriate)  Greeted patient and gained consent for nursing care. Awake, alert, oriented. With colostomy bag in-situ. Suprapubic catheter draining good amount of urine. POC reviewed, verbalized understanding. Prepped and made comfortable for the night. Repositioned him regularly during the night. He can turn from side to side independently. Bed on lowest position, locked. Call bell within reach. Will continue to monitor.

## 2018-07-14 NOTE — SUBJECTIVE & OBJECTIVE
Interval History: Pt feels well this morning. Denies fever/chills. He is eating well now and sleeping pretty well except for the interruptions q2 to turn him.    Review of Systems   Constitutional: Negative for chills, diaphoresis and fever.   Respiratory: Negative for chest tightness and shortness of breath.    Skin:        S/p sacral flap, pt c/o discomfort to that area   Neurological: Negative for dizziness, tremors, weakness and headaches.     Objective:     Vital Signs (Most Recent):  Temp: 98.1 °F (36.7 °C) (07/14/18 1246)  Pulse: 70 (07/14/18 1246)  Resp: 18 (07/14/18 1246)  BP: (!) 104/56 (07/14/18 1246)  SpO2: 96 % (07/14/18 0847) Vital Signs (24h Range):  Temp:  [98 °F (36.7 °C)-98.8 °F (37.1 °C)] 98.1 °F (36.7 °C)  Pulse:  [67-83] 70  Resp:  [13-18] 18  SpO2:  [95 %-96 %] 96 %  BP: (101-130)/(55-77) 104/56     Weight: 74.8 kg (165 lb)  Body mass index is 25.09 kg/m².    Intake/Output Summary (Last 24 hours) at 07/14/18 1536  Last data filed at 07/14/18 1200   Gross per 24 hour   Intake             2850 ml   Output             6525 ml   Net            -3675 ml      Physical Exam   Constitutional: He is oriented to person, place, and time. He appears well-nourished.   Cardiovascular: Normal rate, regular rhythm and normal heart sounds.    Pulmonary/Chest: Effort normal and breath sounds normal. No respiratory distress.   Abdominal: Soft. There is no tenderness.   Genitourinary:   Genitourinary Comments: Suprapubic catheter   Musculoskeletal:   Pt cannot move his lower extremities, but can shift his weight slightly on his hips. He can move his left extremity more than the right.   Neurological: He is alert and oriented to person, place, and time.   Skin: Skin is warm and dry.   Nursing note and vitals reviewed.      Significant Labs:   CBC:   Recent Labs  Lab 07/13/18  0429 07/14/18  0434   WBC 4.21 4.05   HGB 10.5* 10.8*   HCT 35.9* 36.0*   * 128*     CMP:   Recent Labs  Lab 07/13/18  042  07/14/18  0434    138   K 3.4* 4.1    103   CO2 26 28   * 98   BUN 4* 4*   CREATININE 0.5 0.5   CALCIUM 8.2* 8.7   PROT 6.1 6.5   ALBUMIN 2.8* 2.9*   BILITOT 0.4 0.4   ALKPHOS 132 129   AST 71* 37   * 78*   ANIONGAP 9 7*   EGFRNONAA >60.0 >60.0     All pertinent labs within the past 24 hours have been reviewed.    Significant Imaging: I have reviewed all pertinent imaging results/findings within the past 24 hours.

## 2018-07-15 LAB
ALBUMIN SERPL BCP-MCNC: 3 G/DL
ALP SERPL-CCNC: 132 U/L
ALT SERPL W/O P-5'-P-CCNC: 68 U/L
ANION GAP SERPL CALC-SCNC: 9 MMOL/L
AST SERPL-CCNC: 31 U/L
BACTERIA BLD CULT: NORMAL
BACTERIA BLD CULT: NORMAL
BACTERIA UR CULT: NORMAL
BACTERIA UR CULT: NORMAL
BASOPHILS # BLD AUTO: 0.01 K/UL
BASOPHILS NFR BLD: 0.3 %
BILIRUB SERPL-MCNC: 0.3 MG/DL
BUN SERPL-MCNC: 7 MG/DL
CALCIUM SERPL-MCNC: 8.9 MG/DL
CHLORIDE SERPL-SCNC: 102 MMOL/L
CO2 SERPL-SCNC: 28 MMOL/L
CREAT SERPL-MCNC: 0.5 MG/DL
DIFFERENTIAL METHOD: ABNORMAL
EOSINOPHIL # BLD AUTO: 0.1 K/UL
EOSINOPHIL NFR BLD: 3.7 %
ERYTHROCYTE [DISTWIDTH] IN BLOOD BY AUTOMATED COUNT: 19.7 %
EST. GFR  (AFRICAN AMERICAN): >60 ML/MIN/1.73 M^2
EST. GFR  (NON AFRICAN AMERICAN): >60 ML/MIN/1.73 M^2
GLUCOSE SERPL-MCNC: 95 MG/DL
HCT VFR BLD AUTO: 37.8 %
HGB BLD-MCNC: 11.2 G/DL
IMM GRANULOCYTES # BLD AUTO: 0 K/UL
IMM GRANULOCYTES NFR BLD AUTO: 0 %
LYMPHOCYTES # BLD AUTO: 1.2 K/UL
LYMPHOCYTES NFR BLD: 39.1 %
MCH RBC QN AUTO: 24.8 PG
MCHC RBC AUTO-ENTMCNC: 29.6 G/DL
MCV RBC AUTO: 84 FL
MONOCYTES # BLD AUTO: 0.5 K/UL
MONOCYTES NFR BLD: 15.7 %
NEUTROPHILS # BLD AUTO: 1.2 K/UL
NEUTROPHILS NFR BLD: 41.2 %
NRBC BLD-RTO: 0 /100 WBC
PLATELET # BLD AUTO: 150 K/UL
PMV BLD AUTO: 11.5 FL
POTASSIUM SERPL-SCNC: 4.2 MMOL/L
PROT SERPL-MCNC: 6.6 G/DL
RBC # BLD AUTO: 4.52 M/UL
SODIUM SERPL-SCNC: 139 MMOL/L
WBC # BLD AUTO: 2.99 K/UL

## 2018-07-15 PROCEDURE — 11000001 HC ACUTE MED/SURG PRIVATE ROOM

## 2018-07-15 PROCEDURE — 63600175 PHARM REV CODE 636 W HCPCS: Performed by: STUDENT IN AN ORGANIZED HEALTH CARE EDUCATION/TRAINING PROGRAM

## 2018-07-15 PROCEDURE — 80053 COMPREHEN METABOLIC PANEL: CPT

## 2018-07-15 PROCEDURE — 99232 SBSQ HOSP IP/OBS MODERATE 35: CPT | Mod: ,,, | Performed by: HOSPITALIST

## 2018-07-15 PROCEDURE — 36415 COLL VENOUS BLD VENIPUNCTURE: CPT

## 2018-07-15 PROCEDURE — 25000003 PHARM REV CODE 250: Performed by: STUDENT IN AN ORGANIZED HEALTH CARE EDUCATION/TRAINING PROGRAM

## 2018-07-15 PROCEDURE — 85025 COMPLETE CBC W/AUTO DIFF WBC: CPT

## 2018-07-15 RX ORDER — CEFTRIAXONE 1 G/1
1 INJECTION, POWDER, FOR SOLUTION INTRAMUSCULAR; INTRAVENOUS
Status: DISCONTINUED | OUTPATIENT
Start: 2018-07-15 | End: 2018-07-16

## 2018-07-15 RX ADMIN — CEFTRIAXONE SODIUM 1 G: 1 INJECTION, POWDER, FOR SOLUTION INTRAMUSCULAR; INTRAVENOUS at 01:07

## 2018-07-15 RX ADMIN — ASPIRIN 81 MG: 81 TABLET, COATED ORAL at 10:07

## 2018-07-15 RX ADMIN — PREGABALIN 200 MG: 150 CAPSULE ORAL at 08:07

## 2018-07-15 RX ADMIN — OXYCODONE HYDROCHLORIDE 40 MG: 20 TABLET, FILM COATED, EXTENDED RELEASE ORAL at 08:07

## 2018-07-15 RX ADMIN — OXYCODONE HYDROCHLORIDE 10 MG: 5 TABLET ORAL at 08:07

## 2018-07-15 RX ADMIN — OXYCODONE HYDROCHLORIDE 10 MG: 5 TABLET ORAL at 01:07

## 2018-07-15 RX ADMIN — OXYCODONE HYDROCHLORIDE 10 MG: 5 TABLET ORAL at 09:07

## 2018-07-15 RX ADMIN — OXYCODONE HYDROCHLORIDE 40 MG: 20 TABLET, FILM COATED, EXTENDED RELEASE ORAL at 10:07

## 2018-07-15 RX ADMIN — OXYCODONE HYDROCHLORIDE 10 MG: 5 TABLET ORAL at 02:07

## 2018-07-15 RX ADMIN — DIAZEPAM 10 MG: 5 TABLET ORAL at 01:07

## 2018-07-15 RX ADMIN — ENOXAPARIN SODIUM 40 MG: 100 INJECTION SUBCUTANEOUS at 06:07

## 2018-07-15 RX ADMIN — PREGABALIN 200 MG: 150 CAPSULE ORAL at 10:07

## 2018-07-15 RX ADMIN — CEFEPIME 1 G: 1 INJECTION, POWDER, FOR SOLUTION INTRAMUSCULAR; INTRAVENOUS at 04:07

## 2018-07-15 RX ADMIN — PREGABALIN 200 MG: 150 CAPSULE ORAL at 02:07

## 2018-07-15 RX ADMIN — DIAZEPAM 10 MG: 5 TABLET ORAL at 06:07

## 2018-07-15 NOTE — ASSESSMENT & PLAN NOTE
UCx shows e coli and pseudomonas.   -changed abx to ceftriaxone 1g qd  -will give one dose of tobramycin upon DC  -home with PO keflex to complete 7 total days of abx

## 2018-07-15 NOTE — SUBJECTIVE & OBJECTIVE
Interval History: Pt feels well this morning. Denies fever/chills. He is eating well now and sleeping pretty well except for the interruptions q2 to turn him. He says he feels well enough to go home, but he doesn't want to leave too early and risk coming back with another infection.     Review of Systems   Constitutional: Negative for chills, diaphoresis and fever.   Respiratory: Negative for chest tightness and shortness of breath.    Skin:        S/p sacral flap, pt c/o discomfort to that area   Neurological: Negative for dizziness, tremors, weakness and headaches.     Objective:     Vital Signs (Most Recent):  Temp: 97.8 °F (36.6 °C) (07/15/18 1412)  Pulse: 67 (07/15/18 1412)  Resp: 20 (07/15/18 1412)  BP: 100/65 (07/15/18 1412)  SpO2: 95 % (07/15/18 1412) Vital Signs (24h Range):  Temp:  [97.4 °F (36.3 °C)-98.8 °F (37.1 °C)] 97.8 °F (36.6 °C)  Pulse:  [66-74] 67  Resp:  [16-20] 20  SpO2:  [94 %-97 %] 95 %  BP: (100-125)/(56-71) 100/65     Weight: 74.8 kg (165 lb)  Body mass index is 25.09 kg/m².    Intake/Output Summary (Last 24 hours) at 07/15/18 1525  Last data filed at 07/15/18 0830   Gross per 24 hour   Intake             2170 ml   Output             2200 ml   Net              -30 ml      Physical Exam   Constitutional: He is oriented to person, place, and time. He appears well-nourished.   Cardiovascular: Normal rate, regular rhythm and normal heart sounds.    Pulmonary/Chest: Effort normal and breath sounds normal. No respiratory distress.   Abdominal: Soft. There is no tenderness.   Genitourinary:   Genitourinary Comments: Suprapubic catheter   Musculoskeletal:   Pt cannot move his lower extremities, but can shift his weight slightly on his hips. He can move his left extremity more than the right.   Neurological: He is alert and oriented to person, place, and time.   Skin: Skin is warm and dry.   Nursing note and vitals reviewed.      Significant Labs:   CBC:     Recent Labs  Lab 07/14/18  2825  07/15/18  0442   WBC 4.05 2.99*   HGB 10.8* 11.2*   HCT 36.0* 37.8*   * 150     CMP:     Recent Labs  Lab 07/14/18 0434 07/15/18  0442    139   K 4.1 4.2    102   CO2 28 28   GLU 98 95   BUN 4* 7   CREATININE 0.5 0.5   CALCIUM 8.7 8.9   PROT 6.5 6.6   ALBUMIN 2.9* 3.0*   BILITOT 0.4 0.3   ALKPHOS 129 132   AST 37 31   ALT 78* 68*   ANIONGAP 7* 9   EGFRNONAA >60.0 >60.0     All pertinent labs within the past 24 hours have been reviewed.    Significant Imaging: I have reviewed all pertinent imaging results/findings within the past 24 hours.

## 2018-07-15 NOTE — PLAN OF CARE
Problem: Patient Care Overview  Goal: Plan of Care Review  Outcome: Ongoing (interventions implemented as appropriate)  Greeted patient and gained consent for nursing care. With suprapubic catheter draining good amount of urine. Colostomy bag in-situ. Awake, alert, oriented. POC reviewed, verbalized understanding. Prepped and made comfortable for the night. Assisted in repositioning in bed. Regularly checked on him. Assisted in his needs. Will continue to monitor.
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
## 2018-07-16 VITALS
HEIGHT: 68 IN | WEIGHT: 165 LBS | TEMPERATURE: 98 F | SYSTOLIC BLOOD PRESSURE: 105 MMHG | HEART RATE: 65 BPM | OXYGEN SATURATION: 97 % | BODY MASS INDEX: 25.01 KG/M2 | RESPIRATION RATE: 18 BRPM | DIASTOLIC BLOOD PRESSURE: 62 MMHG

## 2018-07-16 PROBLEM — A41.9 SEPSIS: Status: RESOLVED | Noted: 2018-07-12 | Resolved: 2018-07-16

## 2018-07-16 PROBLEM — Z86.19 HISTORY OF ESBL KLEBSIELLA PNEUMONIAE INFECTION: Chronic | Status: RESOLVED | Noted: 2017-04-03 | Resolved: 2018-07-16

## 2018-07-16 LAB
ALBUMIN SERPL BCP-MCNC: 3.1 G/DL
ALP SERPL-CCNC: 138 U/L
ALT SERPL W/O P-5'-P-CCNC: 64 U/L
ANION GAP SERPL CALC-SCNC: 10 MMOL/L
AST SERPL-CCNC: 27 U/L
BASOPHILS # BLD AUTO: 0.02 K/UL
BASOPHILS NFR BLD: 0.5 %
BILIRUB SERPL-MCNC: 0.3 MG/DL
BUN SERPL-MCNC: 7 MG/DL
CALCIUM SERPL-MCNC: 9.3 MG/DL
CHLORIDE SERPL-SCNC: 103 MMOL/L
CO2 SERPL-SCNC: 27 MMOL/L
CREAT SERPL-MCNC: 0.5 MG/DL
DIFFERENTIAL METHOD: ABNORMAL
EOSINOPHIL # BLD AUTO: 0.2 K/UL
EOSINOPHIL NFR BLD: 4.9 %
ERYTHROCYTE [DISTWIDTH] IN BLOOD BY AUTOMATED COUNT: 19.9 %
EST. GFR  (AFRICAN AMERICAN): >60 ML/MIN/1.73 M^2
EST. GFR  (NON AFRICAN AMERICAN): >60 ML/MIN/1.73 M^2
GLUCOSE SERPL-MCNC: 109 MG/DL
HCT VFR BLD AUTO: 39.9 %
HGB BLD-MCNC: 11.8 G/DL
IMM GRANULOCYTES # BLD AUTO: 0.02 K/UL
IMM GRANULOCYTES NFR BLD AUTO: 0.5 %
LYMPHOCYTES # BLD AUTO: 1.3 K/UL
LYMPHOCYTES NFR BLD: 36.4 %
MCH RBC QN AUTO: 24.9 PG
MCHC RBC AUTO-ENTMCNC: 29.6 G/DL
MCV RBC AUTO: 84 FL
MONOCYTES # BLD AUTO: 0.5 K/UL
MONOCYTES NFR BLD: 13.7 %
NEUTROPHILS # BLD AUTO: 1.6 K/UL
NEUTROPHILS NFR BLD: 44 %
NRBC BLD-RTO: 0 /100 WBC
PLATELET # BLD AUTO: 175 K/UL
PMV BLD AUTO: 12 FL
POTASSIUM SERPL-SCNC: 4.2 MMOL/L
PROT SERPL-MCNC: 7.1 G/DL
RBC # BLD AUTO: 4.73 M/UL
SODIUM SERPL-SCNC: 140 MMOL/L
WBC # BLD AUTO: 3.65 K/UL

## 2018-07-16 PROCEDURE — 25000003 PHARM REV CODE 250: Performed by: STUDENT IN AN ORGANIZED HEALTH CARE EDUCATION/TRAINING PROGRAM

## 2018-07-16 PROCEDURE — 63600175 PHARM REV CODE 636 W HCPCS: Performed by: STUDENT IN AN ORGANIZED HEALTH CARE EDUCATION/TRAINING PROGRAM

## 2018-07-16 PROCEDURE — 80053 COMPREHEN METABOLIC PANEL: CPT

## 2018-07-16 PROCEDURE — 99238 HOSP IP/OBS DSCHRG MGMT 30/<: CPT | Mod: ,,, | Performed by: HOSPITALIST

## 2018-07-16 PROCEDURE — 85025 COMPLETE CBC W/AUTO DIFF WBC: CPT

## 2018-07-16 PROCEDURE — 36415 COLL VENOUS BLD VENIPUNCTURE: CPT

## 2018-07-16 RX ORDER — CEFTRIAXONE 1 G/1
1 INJECTION, POWDER, FOR SOLUTION INTRAMUSCULAR; INTRAVENOUS
Status: DISCONTINUED | OUTPATIENT
Start: 2018-07-16 | End: 2018-07-16 | Stop reason: HOSPADM

## 2018-07-16 RX ORDER — CEPHALEXIN 500 MG/1
500 CAPSULE ORAL EVERY 12 HOURS
Status: DISCONTINUED | OUTPATIENT
Start: 2018-07-16 | End: 2018-07-16

## 2018-07-16 RX ORDER — CEPHALEXIN 500 MG/1
500 CAPSULE ORAL EVERY 8 HOURS
Qty: 12 CAPSULE | Refills: 0 | Status: SHIPPED | OUTPATIENT
Start: 2018-07-16 | End: 2018-07-20

## 2018-07-16 RX ADMIN — PREGABALIN 200 MG: 150 CAPSULE ORAL at 08:07

## 2018-07-16 RX ADMIN — PREGABALIN 200 MG: 150 CAPSULE ORAL at 04:07

## 2018-07-16 RX ADMIN — ASPIRIN 81 MG: 81 TABLET, COATED ORAL at 08:07

## 2018-07-16 RX ADMIN — TOBRAMYCIN SULFATE 495 MG: 40 INJECTION, SOLUTION INTRAMUSCULAR; INTRAVENOUS at 10:07

## 2018-07-16 RX ADMIN — CEFTRIAXONE SODIUM 1 G: 1 INJECTION, POWDER, FOR SOLUTION INTRAMUSCULAR; INTRAVENOUS at 01:07

## 2018-07-16 RX ADMIN — OXYCODONE HYDROCHLORIDE 40 MG: 20 TABLET, FILM COATED, EXTENDED RELEASE ORAL at 08:07

## 2018-07-16 RX ADMIN — OXYCODONE HYDROCHLORIDE 10 MG: 5 TABLET ORAL at 11:07
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
## 2018-07-16 NOTE — CONSULTS
Wound care consult received for sacral flap.  Pt planning to discharge today with appt to see Plastic Surgery on 7/18 for sutures to be removed.  Pt is off-loading sacrum and has KRYSTA at home.  Upon assessment, noted intact sutures with incision approximated. No drainage, protecting incision with Aquacel borders to keep clean and dry.  Left upper buttock with small skin tear resolving.  No wound care needs at this time as pt plans for discharge. [id number]

## 2018-07-16 NOTE — PLAN OF CARE
Patient ready for discharge home today and is current with [facility]. JOSEF called and spoke with Monie about patient's pending discharge. Face sheet, H&P, and orders sent via  ([phone]).

## 2018-07-16 NOTE — PLAN OF CARE
07/16/18 1610   Final Note   Assessment Type Final Discharge Note   Discharge Disposition Home-Health  ([facility])

## 2018-07-16 NOTE — NURSING
Stoma active, formed stool. Changed stoma bag. Patient said it is the first time he passed stool in many days.

## 2018-07-16 NOTE — SUBJECTIVE & OBJECTIVE
Interval History: Pt feels well this morning. Denies fever/chills. Pt states he is ready to go home. He would like his medications to be sent to the [facility] that we have on file and has already arranges transportation to his appointments on Wednesday with plastics and ID.      Review of Systems   Constitutional: Negative for chills, diaphoresis and fever.   Respiratory: Negative for chest tightness and shortness of breath.    Skin:        S/p sacral flap, pt c/o discomfort to that area   Neurological: Negative for dizziness, tremors, weakness and headaches.     Objective:     Vital Signs (Most Recent):  Temp: 98.7 °F (37.1 °C) (07/16/18 1059)  Pulse: 70 (07/16/18 1059)  Resp: 18 (07/16/18 1059)  BP: 123/73 (07/16/18 1059)  SpO2: 98 % (07/16/18 1059) Vital Signs (24h Range):  Temp:  [97.9 °F (36.6 °C)-98.7 °F (37.1 °C)] 98.7 °F (37.1 °C)  Pulse:  [66-73] 70  Resp:  [13-18] 18  SpO2:  [93 %-98 %] 98 %  BP: ()/(53-73) 123/73     Weight: 74.8 kg (165 lb)  Body mass index is 25.09 kg/m².    Intake/Output Summary (Last 24 hours) at 07/16/18 1446  Last data filed at 07/16/18 0600   Gross per 24 hour   Intake             1500 ml   Output             4880 ml   Net            -3380 ml      Physical Exam   Constitutional: He is oriented to person, place, and time. He appears well-nourished.   Cardiovascular: Normal rate, regular rhythm and normal heart sounds.    Pulmonary/Chest: Effort normal and breath sounds normal. No respiratory distress.   Abdominal: Soft. There is no tenderness.   Genitourinary:   Genitourinary Comments: Suprapubic catheter   Musculoskeletal:   Pt cannot move his lower extremities, but can shift his weight slightly on his hips. He can move his left extremity more than the right.   Neurological: He is alert and oriented to person, place, and time.   Skin: Skin is warm and dry.   Nursing note and vitals reviewed.      Significant Labs:   CBC:     Recent Labs  Lab 07/15/18  3113  07/16/18  0615   WBC 2.99* 3.65*   HGB 11.2* 11.8*   HCT 37.8* 39.9*    175     CMP:     Recent Labs  Lab 07/15/18  0442 07/16/18 0615    140   K 4.2 4.2    103   CO2 28 27   GLU 95 109   BUN 7 7   CREATININE 0.5 0.5   CALCIUM 8.9 9.3   PROT 6.6 7.1   ALBUMIN 3.0* 3.1*   BILITOT 0.3 0.3   ALKPHOS 132 138*   AST 31 27   ALT 68* 64*   ANIONGAP 9 10   EGFRNONAA >60.0 >60.0     All pertinent labs within the past 24 hours have been reviewed.    Significant Imaging: I have reviewed all pertinent imaging results/findings within the past 24 hours.

## 2018-07-16 NOTE — PHYSICIAN QUERY
"PT Name: Nghia Edgar Jr.  MR #: [id number]     Physician Query Form - Documentation Clarification      CDS: Linh House RN, CCDS         Contact information :[phone]  [email or web address]       This form is a permanent document in the medical record.     Query Date: July 16, 2018    By submitting this query, we are merely seeking further clarification of documentation. Please utilize your independent clinical judgment when addressing the question(s) below.    The Medical record reflects the following:    Supporting Clinical Findings Location in Medical Record     "sepsis/CAUTI"    "sepsis"  "Urinary tract infection associated with cystostomy catheter     UCx shows e coli and pseudomonas."    "changed abx to ceftriaxone 1g qd  -will give one dose of tobramycin upon DC  -home with PO keflex to complete 7 total days of abx"     "Neurogenic bladder     Suprapubic cath in place"     Urine culture results  PSEUDOMONAS AERUGINOSA    10,000 - 49,999 cfu/ml      ESCHERICHIA COLI   10,000 - 49,999 cfu/ml         H&P 7/12/18    Progress note 7/15/18                Progress note 7/15/18      Lab 7/12/18                                                                                Doctor, Please specify diagnosis or diagnoses associated with above clinical findings.  Please document known or suspected organism(s) associated with the sepsis diagnosis.    Provider Use Only      _x___PSEUDOMONAS AERUGINOSA     ___x_ ESCHERICHIA COLI     ____other, please specify, _both___                                                                                                               [  ] Clinically undetermined            "

## 2018-07-16 NOTE — PLAN OF CARE
CM notified that patient needs transport home. Transport arranged via [facility], ETA within the hour.

## 2018-07-16 NOTE — PLAN OF CARE
[facility]    HOME HEALTH ORDERS  FACE TO FACE ENCOUNTER    Patient Name: Nghia Edgar Jr.  YOB: 1985    PCP: Nohelia Hoover MD   PCP Address: [address] [facility] [place] [address]  PCP Phone Number: [phone]  PCP Fax: [phone]    Encounter Date: 07/16/2018    Admit to Home Health    Diagnoses:  Active Hospital Problems    Diagnosis  POA    *Sepsis [A41.9]  Yes    Urinary tract infection associated with cystostomy catheter [T83.510A, N39.0]  Yes    History of ESBL Klebsiella pneumoniae infection [Z86.19]  Yes     Chronic    Neuropathic pain [M79.2]  Yes     Chronic    Autonomic dysreflexia [G90.4]  Yes     Chronic    Neurogenic bladder [N31.9]  Yes     Chronic    Neurogenic bowel [K59.2]  Yes     Chronic      Resolved Hospital Problems    Diagnosis Date Resolved POA   No resolved problems to display.       Future Appointments  Date Time Provider Department Center   7/18/2018 11:00 AM Rafi Awad MD [facility] ID [facility]   7/18/2018 2:00 PM Kalin Philippe MD [facility] PLASTIC [facility]   7/31/2018 2:00 PM Elaine Acosta NP [facility] WOUND [facility]   8/1/2018 10:40 AM [facility]           I have seen and examined this patient face to face today. My clinical findings that support the need for the home health skilled services and home bound status are the following:  Weakness/numbness causing balance and gait disturbance due to Weakness/Debility making it taxing to leave home.  Requiring assistive device to leave home due to unsteady gait caused by  Weakness/Debility.    Allergies:  Review of patient's allergies indicates:   Allergen Reactions    Zanaflex [tizanidine] Other (See Comments)     Get hallucinations from meds       Diet: regular diet    Activities: activity as tolerated    Nursing:   SN to complete comprehensive assessment including routine vital signs. Instruct on disease process and s/s of complications to  report to MD. Review/verify medication list sent home with the patient at time of discharge  and instruct patient/caregiver as needed. Frequency may be adjusted depending on start of care date.    Notify MD if SBP > 160 or < 90; DBP > 90 or < 50; HR > 120 or < 50; Temp > 101    WOUND CARE ORDERS  Wound Care Orders:  Yes resumed previous wound care orders     MISCELLANEOUS CARE:  Colostomy Care:  Instruct patient/caregiver to empty bag when full and PRN., Change and clean site as needed.     Medications: Review discharge medications with patient and family and provide education.      Current Discharge Medication List      CONTINUE these medications which have CHANGED    Details   cephALEXin (KEFLEX) 500 MG capsule Take 1 capsule (500 mg total) by mouth every 8 (eight) hours. for 4 days  Qty: 12 capsule, Refills: 0         CONTINUE these medications which have NOT CHANGED    Details   albuterol (PROAIR HFA) 90 mcg/actuation inhaler Inhale 1-2 puffs into the lungs every 6 (six) hours as needed for Wheezing or Shortness of Breath.  Qty: 18 g, Refills: 3      ascorbic acid, vitamin C, (VITAMIN C) 1000 MG tablet Take 1,000 mg by mouth every morning.       aspirin (ECOTRIN) 81 MG EC tablet Take 81 mg by mouth once daily.      colostomy bags Misc Change daily  Qty: 30 each, Refills: 11    Comments: Holister 1 piece pouch. Please also dispense paste and skin protector.  Associated Diagnoses: Colostomy in place      diazePAM (VALIUM) 10 MG Tab Take 1 tablet (10 mg total) by mouth 3 (three) times daily as needed.  Qty: 90 tablet, Refills: 5    Associated Diagnoses: Anxiety      disposable gloves Misc Apply medihoney gel to right ischial wound, cover with hydrofiber and gauze and secure with a mepore island dressing. Change all dressings three times weekly  Qty: 30 each, Refills: 3      ferrous sulfate 325 (65 FE) MG EC tablet Take 325 mg by mouth once daily.      gauze bandage Bndg Apply medihoney gel to right ischial wound,  "cover with hydrofiber and gauze and secure with a mepore island dressing. Change all dressings three times weekly  Qty: 30 each, Refills: 3      hydrocolloid dressing 4 X 4 " Bndg Apply medihoney gel to right ischial wound, cover with hydrofiber and gauze and secure with a mepore island dressing. Change all dressings three times weekly  Qty: 30 each, Refills: 3      lactulose (CHRONULAC) 10 gram/15 mL solution Take by mouth as needed (constipation).       multivitamin capsule Take 1 capsule by mouth every morning.      oxybutynin (DITROPAN) 5 MG Tab TAKE ONE TABLET BY MOUTH THREE TIMES DAILY AS NEEDED FOR  BLADDER  SPASMS  Qty: 90 tablet, Refills: 3    Comments: Please consider 90 day supplies to promote better adherence  Associated Diagnoses: Urgency of urination; Bladder spasm; Neurogenic bladder      oxyCODONE (OXYCONTIN) 40 mg 12 hr tablet Take 1 tablet (40 mg total) by mouth every 12 (twelve) hours.  Qty: 60 tablet, Refills: 0    Associated Diagnoses: Neuropathic pain      oxyCODONE-acetaminophen (PERCOCET)  mg per tablet Take 1-1/2 tablets (15mg) TID PRN (pain) ICD-10: M79.2  Qty: 135 tablet, Refills: 0    Associated Diagnoses: Neuropathic pain      polyethylene glycol (GLYCOLAX) 17 gram PwPk Take 17 g by mouth 2 (two) times daily as needed.  Qty: 60 packet, Refills: 11    Associated Diagnoses: Constipation, unspecified constipation type      potassium citrate (UROCIT-K) 10 mEq (1,080 mg) TbSR Take 10 mEq by mouth 3 (three) times daily.       pregabalin (LYRICA) 200 MG Cap Take 1 capsule (200 mg total) by mouth 3 (three) times daily.  Qty: 90 capsule, Refills: 5    Associated Diagnoses: Pain           I certify that this patient is confined to his home and needs intermittent skilled nursing care, physical therapy and occupational therapy.    "
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
## 2018-07-16 NOTE — PLAN OF CARE
Problem: Patient Care Overview  Goal: Plan of Care Review  Outcome: Ongoing (interventions implemented as appropriate)  Greeted patient and gained consent for nursing care. Awake, alert, oriented. With suprapubic catheter. Colostomy bag in-situ.  POC reviewed, verbalized understanding. Assisted in repositioning in bed. Assisted in his needs. Regularly checked on him during the night. Will continue to monitor.

## 2018-07-17 ENCOUNTER — PATIENT MESSAGE (OUTPATIENT)
Dept: PHYSICAL MEDICINE AND REHAB | Facility: CLINIC | Age: 33
End: 2018-07-17

## 2018-07-17 LAB
BACTERIA BLD CULT: NORMAL
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
## 2018-07-18 ENCOUNTER — OFFICE VISIT (OUTPATIENT)
Dept: INFECTIOUS DISEASES | Facility: CLINIC | Age: 33
End: 2018-07-18
Payer: MEDICAID

## 2018-07-18 ENCOUNTER — OFFICE VISIT (OUTPATIENT)
Dept: PLASTIC SURGERY | Facility: CLINIC | Age: 33
End: 2018-07-18
Payer: MEDICAID

## 2018-07-18 ENCOUNTER — PATIENT MESSAGE (OUTPATIENT)
Dept: INFECTIOUS DISEASES | Facility: CLINIC | Age: 33
End: 2018-07-18

## 2018-07-18 VITALS
WEIGHT: 165 LBS | HEIGHT: 68 IN | SYSTOLIC BLOOD PRESSURE: 109 MMHG | TEMPERATURE: 98 F | HEART RATE: 57 BPM | BODY MASS INDEX: 25.01 KG/M2 | DIASTOLIC BLOOD PRESSURE: 78 MMHG

## 2018-07-18 VITALS
RESPIRATION RATE: 18 BRPM | HEART RATE: 56 BPM | BODY MASS INDEX: 25.09 KG/M2 | DIASTOLIC BLOOD PRESSURE: 70 MMHG | TEMPERATURE: 98 F | HEIGHT: 68 IN | SYSTOLIC BLOOD PRESSURE: 109 MMHG

## 2018-07-18 DIAGNOSIS — Z09 SURGERY FOLLOW-UP EXAMINATION: Primary | ICD-10-CM

## 2018-07-18 DIAGNOSIS — R50.9 FUO (FEVER OF UNKNOWN ORIGIN): Primary | ICD-10-CM

## 2018-07-18 DIAGNOSIS — L89.314 DECUBITUS ULCER OF RIGHT ISCHIUM, STAGE 4: ICD-10-CM

## 2018-07-18 PROCEDURE — 99024 POSTOP FOLLOW-UP VISIT: CPT | Mod: ,,, | Performed by: SURGERY

## 2018-07-18 PROCEDURE — 99213 OFFICE O/P EST LOW 20 MIN: CPT | Mod: S$PBB,,, | Performed by: INTERNAL MEDICINE

## 2018-07-18 PROCEDURE — 99213 OFFICE O/P EST LOW 20 MIN: CPT | Mod: PBBFAC,27 | Performed by: INTERNAL MEDICINE

## 2018-07-18 PROCEDURE — 99999 PR PBB SHADOW E&M-EST. PATIENT-LVL III: CPT | Mod: PBBFAC,,, | Performed by: SURGERY

## 2018-07-18 PROCEDURE — 99999 PR PBB SHADOW E&M-EST. PATIENT-LVL III: CPT | Mod: PBBFAC,,, | Performed by: INTERNAL MEDICINE

## 2018-07-18 PROCEDURE — 99213 OFFICE O/P EST LOW 20 MIN: CPT | Mod: PBBFAC,PO | Performed by: SURGERY

## 2018-07-18 NOTE — PROGRESS NOTES
Subjective:      Patient ID: Nghia Edgar Jr. is a 32 y.o. male.    Chief Complaint:Follow-up    History of Present Illness    31 y/o male with quadriplegia whom I previously followed for a decubitus ulcer.  He is now s/p flap surgery to close the decubitus followed by 6 week course of IV vancomycin and ciprofloxacin.  He started having fevers in early July and eventually presented to the ER.  He was admitted on July 12 and diagnosed with a UTI.  He was treated with antibiotics and discharged.  He is here today for follow up.  He still has about 2 more days of oral antibiotics.    Review of Systems   Constitution: Positive for chills, decreased appetite, fever, weakness, malaise/fatigue and weight loss. Negative for night sweats and weight gain.   HENT: Positive for congestion. Negative for ear pain, hearing loss, hoarse voice, sore throat and tinnitus.    Eyes: Negative for blurred vision, redness and visual disturbance.   Cardiovascular: Negative for chest pain, leg swelling and palpitations.   Respiratory: Negative for cough, hemoptysis, shortness of breath and sputum production.    Hematologic/Lymphatic: Negative for adenopathy. Does not bruise/bleed easily.   Skin: Negative for dry skin, itching, rash and suspicious lesions.   Musculoskeletal: Positive for back pain, joint pain and neck pain. Negative for myalgias.   Gastrointestinal: Positive for abdominal pain and heartburn. Negative for constipation, diarrhea, nausea and vomiting.   Genitourinary: Positive for frequency. Negative for dysuria, flank pain, hematuria, hesitancy and urgency.   Neurological: Positive for numbness and paresthesias. Negative for dizziness and headaches.   Psychiatric/Behavioral: Negative for depression and memory loss. The patient has insomnia and is nervous/anxious.      Objective:   Physical Exam   Constitutional: He is oriented to person, place, and time. He appears well-developed and well-nourished. No distress.    HENT:   Head: Normocephalic and atraumatic.   Right Ear: External ear normal.   Left Ear: External ear normal.   Nose: Nose normal.   Mouth/Throat: Oropharynx is clear and moist. No oropharyngeal exudate.   Eyes: Conjunctivae and EOM are normal. Pupils are equal, round, and reactive to light. Right eye exhibits no discharge. Left eye exhibits no discharge. No scleral icterus.   Neck: Normal range of motion. Neck supple. No JVD present. No tracheal deviation present. No thyromegaly present.   Cardiovascular: Normal rate, regular rhythm and intact distal pulses.  Exam reveals no gallop and no friction rub.    No murmur heard.  Pulmonary/Chest: Effort normal and breath sounds normal. No stridor. No respiratory distress. He has no wheezes. He has no rales. He exhibits no tenderness.   Abdominal: Soft. Bowel sounds are normal. He exhibits no distension and no mass. There is no tenderness. There is no rebound and no guarding.   #1 RLQ baclofen pump  #2 LLQ colostomy  #3 suprapubic catheter in place   Musculoskeletal: He exhibits no tenderness.   Lymphadenopathy:     He has no cervical adenopathy.   Neurological: He is alert and oriented to person, place, and time. He has normal reflexes. No cranial nerve deficit. Coordination abnormal.   Quadriplegia   Skin: Skin is warm. No rash noted. He is not diaphoretic. No erythema. No pallor.   Surgical wounds from his flap surgery are healing.  Sutures are still in place.   Psychiatric: He has a normal mood and affect. His behavior is normal. Judgment and thought content normal.   Nursing note and vitals reviewed.    Assessment:       1. FUO (fever of unknown origin) :  Likely related to urinary tract infection.  This has resolved.   2. Decubitus ulcer of right ischium, stage 4 :  S/p flap and IV antibiotics.  Wounds are healing well.         Plan:       FUO (fever of unknown origin)   -follow up as needed    Decubitus ulcer of right ischium, stage 4   -follow up as needed

## 2018-07-18 NOTE — PROGRESS NOTES
Nghia Edgar presents after having bilateral ischial pressure sores debrided   and closed using fasciocutaneous flaps.  He has done well.  Everything has   healed nicely.  Sutures removed.  He will return back in several months.      CRB/[facility]  dd: 07/18/2018 16:46:34 (CDT)  td: 07/19/2018 03:46:57 (CDT)  Doc ID   #[id number]  Job ID #[id number]    CC:

## 2018-07-19 ENCOUNTER — TELEPHONE (OUTPATIENT)
Dept: INFECTIOUS DISEASES | Facility: CLINIC | Age: 33
End: 2018-07-19

## 2018-07-25 ENCOUNTER — PATIENT MESSAGE (OUTPATIENT)
Dept: PHYSICAL MEDICINE AND REHAB | Facility: CLINIC | Age: 33
End: 2018-07-25

## 2018-07-25 ENCOUNTER — PATIENT MESSAGE (OUTPATIENT)
Dept: INTERNAL MEDICINE | Facility: CLINIC | Age: 33
End: 2018-07-25

## 2018-07-25 DIAGNOSIS — Z87.828 HISTORY OF SPINAL CORD INJURY: Primary | ICD-10-CM

## 2018-07-31 ENCOUNTER — PATIENT MESSAGE (OUTPATIENT)
Dept: PHYSICAL MEDICINE AND REHAB | Facility: CLINIC | Age: 33
End: 2018-07-31

## 2018-07-31 ENCOUNTER — PATIENT MESSAGE (OUTPATIENT)
Dept: UROLOGY | Facility: CLINIC | Age: 33
End: 2018-07-31

## 2018-07-31 ENCOUNTER — PATIENT MESSAGE (OUTPATIENT)
Dept: WOUND CARE | Facility: CLINIC | Age: 33
End: 2018-07-31

## 2018-07-31 ENCOUNTER — TELEPHONE (OUTPATIENT)
Dept: INTERNAL MEDICINE | Facility: CLINIC | Age: 33
End: 2018-07-31

## 2018-07-31 NOTE — TELEPHONE ENCOUNTER
Spoke w medical supply; informed of documents received medical supply states that their is more supplies that need to be refilled. Will fax documents today.

## 2018-07-31 NOTE — TELEPHONE ENCOUNTER
----- Message from Wes Norris sent at 7/31/2018  9:44 AM CDT -----  Contact: [email or web address] [facility] [phone] Fax [phone]  [facility] is calling to check the Order request for Sterile gloves and Ostomy pouches Reference number when calling back [phone], Contact [phone] Fax [phone], requesting a response.    Please call an advise  Thank you

## 2018-08-02 ENCOUNTER — PATIENT MESSAGE (OUTPATIENT)
Dept: PHYSICAL MEDICINE AND REHAB | Facility: CLINIC | Age: 33
End: 2018-08-02

## 2018-08-02 DIAGNOSIS — M79.2 NEUROPATHIC PAIN: Chronic | ICD-10-CM

## 2018-08-02 RX ORDER — OXYCODONE AND ACETAMINOPHEN 10; 325 MG/1; MG/1
TABLET ORAL
Qty: 135 TABLET | Refills: 0 | Status: SHIPPED | OUTPATIENT
Start: 2018-08-02 | End: 2018-08-31 | Stop reason: SDUPTHER

## 2018-08-02 RX ORDER — OXYCODONE HCL 40 MG/1
40 TABLET, FILM COATED, EXTENDED RELEASE ORAL EVERY 12 HOURS
Qty: 60 TABLET | Refills: 0 | Status: SHIPPED | OUTPATIENT
Start: 2018-08-02 | End: 2018-08-31 | Stop reason: SDUPTHER

## 2018-08-06 ENCOUNTER — PATIENT MESSAGE (OUTPATIENT)
Dept: PHYSICAL MEDICINE AND REHAB | Facility: CLINIC | Age: 33
End: 2018-08-06

## 2018-08-06 ENCOUNTER — TELEPHONE (OUTPATIENT)
Dept: PHYSICAL MEDICINE AND REHAB | Facility: CLINIC | Age: 33
End: 2018-08-06

## 2018-08-06 NOTE — TELEPHONE ENCOUNTER
Spoke with Carmen and she said she will be getting with [facility] to set up and new Eval for the patient .

## 2018-08-08 ENCOUNTER — PATIENT MESSAGE (OUTPATIENT)
Dept: UROLOGY | Facility: CLINIC | Age: 33
End: 2018-08-08

## 2018-08-08 ENCOUNTER — OFFICE VISIT (OUTPATIENT)
Dept: UROLOGY | Facility: CLINIC | Age: 33
End: 2018-08-08
Payer: MEDICAID

## 2018-08-08 ENCOUNTER — PATIENT MESSAGE (OUTPATIENT)
Dept: PHYSICAL MEDICINE AND REHAB | Facility: CLINIC | Age: 33
End: 2018-08-08

## 2018-08-08 ENCOUNTER — OFFICE VISIT (OUTPATIENT)
Dept: PLASTIC SURGERY | Facility: CLINIC | Age: 33
End: 2018-08-08
Payer: MEDICAID

## 2018-08-08 VITALS
BODY MASS INDEX: 25.76 KG/M2 | TEMPERATURE: 99 F | HEIGHT: 68 IN | WEIGHT: 170 LBS | RESPIRATION RATE: 12 BRPM | SYSTOLIC BLOOD PRESSURE: 103 MMHG | HEART RATE: 63 BPM | DIASTOLIC BLOOD PRESSURE: 62 MMHG

## 2018-08-08 VITALS — SYSTOLIC BLOOD PRESSURE: 96 MMHG | HEART RATE: 58 BPM | DIASTOLIC BLOOD PRESSURE: 65 MMHG

## 2018-08-08 DIAGNOSIS — Z09 SURGERY FOLLOW-UP EXAMINATION: Primary | ICD-10-CM

## 2018-08-08 DIAGNOSIS — N31.9 NEUROGENIC BLADDER: Primary | Chronic | ICD-10-CM

## 2018-08-08 DIAGNOSIS — Z43.5 ENCOUNTER FOR CARE OR REPLACEMENT OF SUPRAPUBIC TUBE: ICD-10-CM

## 2018-08-08 PROCEDURE — 99999 PR PBB SHADOW E&M-EST. PATIENT-LVL III: CPT | Mod: PBBFAC,,, | Performed by: NURSE PRACTITIONER

## 2018-08-08 PROCEDURE — 99213 OFFICE O/P EST LOW 20 MIN: CPT | Mod: PBBFAC,25,27,PO | Performed by: SURGERY

## 2018-08-08 PROCEDURE — 99213 OFFICE O/P EST LOW 20 MIN: CPT | Mod: PBBFAC,25 | Performed by: NURSE PRACTITIONER

## 2018-08-08 PROCEDURE — 51705 CHANGE OF BLADDER TUBE: CPT | Mod: S$PBB,,, | Performed by: NURSE PRACTITIONER

## 2018-08-08 PROCEDURE — 99999 PR PBB SHADOW E&M-EST. PATIENT-LVL III: CPT | Mod: PBBFAC,,, | Performed by: SURGERY

## 2018-08-08 PROCEDURE — 99024 POSTOP FOLLOW-UP VISIT: CPT | Mod: S$PBB,,, | Performed by: SURGERY

## 2018-08-08 PROCEDURE — 51705 CHANGE OF BLADDER TUBE: CPT | Mod: PBBFAC | Performed by: NURSE PRACTITIONER

## 2018-08-08 PROCEDURE — 99213 OFFICE O/P EST LOW 20 MIN: CPT | Mod: S$PBB,25,, | Performed by: NURSE PRACTITIONER
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
## 2018-08-08 NOTE — PROGRESS NOTES
32M recently seen in clinic for f/u of his bilat ischial wound closures with fasciocutaneous flaps. He is here to get the rest of his staples removed    On exam, the incisions are well healed, and the remaining sutures were removed    -f/u 3 months, sooner if needed  -continue pressure offloading techniques

## 2018-08-15 ENCOUNTER — PATIENT MESSAGE (OUTPATIENT)
Dept: INTERNAL MEDICINE | Facility: CLINIC | Age: 33
End: 2018-08-15

## 2018-08-19 DIAGNOSIS — R52 PAIN: ICD-10-CM

## 2018-08-19 RX ORDER — PREGABALIN 200 MG/1
CAPSULE ORAL
Qty: 90 CAPSULE | Refills: 5 | Status: SHIPPED | OUTPATIENT
Start: 2018-08-19 | End: 2019-04-19 | Stop reason: SDUPTHER

## 2018-08-22 ENCOUNTER — PATIENT MESSAGE (OUTPATIENT)
Dept: PHYSICAL MEDICINE AND REHAB | Facility: CLINIC | Age: 33
End: 2018-08-22

## 2018-08-25 ENCOUNTER — HOSPITAL ENCOUNTER (OUTPATIENT)
Facility: HOSPITAL | Age: 33
Discharge: HOME | DRG: 690 | End: 2018-08-28
Attending: SURGERY | Admitting: INTERNAL MEDICINE
Payer: MEDICAID

## 2018-08-25 DIAGNOSIS — J18.9 PNEUMONIA OF RIGHT LOWER LOBE DUE TO INFECTIOUS ORGANISM: ICD-10-CM

## 2018-08-25 DIAGNOSIS — R51.9 HEADACHE: ICD-10-CM

## 2018-08-25 DIAGNOSIS — Z86.69 HISTORY OF QUADRIPLEGIA: ICD-10-CM

## 2018-08-25 DIAGNOSIS — N39.0 COMPLICATED UTI (URINARY TRACT INFECTION): Primary | ICD-10-CM

## 2018-08-25 DIAGNOSIS — I49.9 ABNORMAL HEART RHYTHM: ICD-10-CM

## 2018-08-25 PROBLEM — N30.00 ACUTE CYSTITIS WITHOUT HEMATURIA: Status: ACTIVE | Noted: 2018-08-25

## 2018-08-25 LAB
ALBUMIN SERPL BCP-MCNC: 3.7 G/DL
ALP SERPL-CCNC: 143 U/L
ALT SERPL W/O P-5'-P-CCNC: 33 U/L
ANION GAP SERPL CALC-SCNC: 11 MMOL/L
APTT BLDCRRT: 30.7 SEC
AST SERPL-CCNC: 19 U/L
BACTERIA #/AREA URNS HPF: ABNORMAL /HPF
BASOPHILS # BLD AUTO: 0.02 K/UL
BASOPHILS NFR BLD: 0.2 %
BILIRUB SERPL-MCNC: 0.4 MG/DL
BILIRUB UR QL STRIP: NEGATIVE
BNP SERPL-MCNC: <10 PG/ML
BUN SERPL-MCNC: 9 MG/DL
CALCIUM SERPL-MCNC: 8.8 MG/DL
CHLORIDE SERPL-SCNC: 106 MMOL/L
CK MB SERPL-MCNC: 1.4 NG/ML
CK MB SERPL-RTO: 1.4 %
CK SERPL-CCNC: 97 U/L
CK SERPL-CCNC: 97 U/L
CLARITY UR: ABNORMAL
CO2 SERPL-SCNC: 22 MMOL/L
COLOR UR: YELLOW
CREAT SERPL-MCNC: 0.6 MG/DL
DIFFERENTIAL METHOD: ABNORMAL
EOSINOPHIL # BLD AUTO: 0.2 K/UL
EOSINOPHIL NFR BLD: 1.8 %
ERYTHROCYTE [DISTWIDTH] IN BLOOD BY AUTOMATED COUNT: 21.1 %
EST. GFR  (AFRICAN AMERICAN): >60 ML/MIN/1.73 M^2
EST. GFR  (NON AFRICAN AMERICAN): >60 ML/MIN/1.73 M^2
GLUCOSE SERPL-MCNC: 94 MG/DL
GLUCOSE UR QL STRIP: NEGATIVE
HCT VFR BLD AUTO: 40.7 %
HGB BLD-MCNC: 12.8 G/DL
HGB UR QL STRIP: NEGATIVE
INR PPP: 1.1
KETONES UR QL STRIP: NEGATIVE
LACTATE SERPL-SCNC: 0.8 MMOL/L
LACTATE SERPL-SCNC: 0.8 MMOL/L
LACTATE SERPL-SCNC: 1.4 MMOL/L
LEUKOCYTE ESTERASE UR QL STRIP: ABNORMAL
LYMPHOCYTES # BLD AUTO: 0.9 K/UL
LYMPHOCYTES NFR BLD: 10.2 %
MAGNESIUM SERPL-MCNC: 2.1 MG/DL
MCH RBC QN AUTO: 27.1 PG
MCHC RBC AUTO-ENTMCNC: 31.4 G/DL
MCV RBC AUTO: 86 FL
MICROSCOPIC COMMENT: ABNORMAL
MONOCYTES # BLD AUTO: 0.6 K/UL
MONOCYTES NFR BLD: 7.2 %
NEUTROPHILS # BLD AUTO: 7 K/UL
NEUTROPHILS NFR BLD: 80.6 %
NITRITE UR QL STRIP: POSITIVE
OTHER ELEMENTS URNS MICRO: ABNORMAL
PH UR STRIP: 7 [PH] (ref 5–8)
PHOSPHATE SERPL-MCNC: 3.8 MG/DL
PLATELET # BLD AUTO: 175 K/UL
PMV BLD AUTO: 11.1 FL
POTASSIUM SERPL-SCNC: 4.2 MMOL/L
PROT SERPL-MCNC: 7.2 G/DL
PROT UR QL STRIP: NEGATIVE
PROTHROMBIN TIME: 10.8 SEC
RBC # BLD AUTO: 4.73 M/UL
RBC #/AREA URNS HPF: 10 /HPF (ref 0–4)
SODIUM SERPL-SCNC: 139 MMOL/L
SP GR UR STRIP: 1.02 (ref 1–1.03)
TROPONIN I SERPL DL<=0.01 NG/ML-MCNC: 0.03 NG/ML
TROPONIN I SERPL DL<=0.01 NG/ML-MCNC: 0.03 NG/ML
TROPONIN I SERPL DL<=0.01 NG/ML-MCNC: 0.04 NG/ML
TROPONIN I SERPL DL<=0.01 NG/ML-MCNC: 0.05 NG/ML
TSH SERPL DL<=0.005 MIU/L-ACNC: 0.5 UIU/ML
URN SPEC COLLECT METH UR: ABNORMAL
UROBILINOGEN UR STRIP-ACNC: NEGATIVE EU/DL
WBC # BLD AUTO: 8.69 K/UL
WBC #/AREA URNS HPF: >100 /HPF (ref 0–5)

## 2018-08-25 PROCEDURE — 82553 CREATINE MB FRACTION: CPT

## 2018-08-25 PROCEDURE — 84484 ASSAY OF TROPONIN QUANT: CPT

## 2018-08-25 PROCEDURE — 80053 COMPREHEN METABOLIC PANEL: CPT

## 2018-08-25 PROCEDURE — 83605 ASSAY OF LACTIC ACID: CPT

## 2018-08-25 PROCEDURE — 87086 URINE CULTURE/COLONY COUNT: CPT

## 2018-08-25 PROCEDURE — 81000 URINALYSIS NONAUTO W/SCOPE: CPT

## 2018-08-25 PROCEDURE — 93005 ELECTROCARDIOGRAM TRACING: CPT

## 2018-08-25 PROCEDURE — 83880 ASSAY OF NATRIURETIC PEPTIDE: CPT

## 2018-08-25 PROCEDURE — 84484 ASSAY OF TROPONIN QUANT: CPT | Mod: 91

## 2018-08-25 PROCEDURE — 85730 THROMBOPLASTIN TIME PARTIAL: CPT

## 2018-08-25 PROCEDURE — 93010 ELECTROCARDIOGRAM REPORT: CPT | Mod: ,,, | Performed by: INTERNAL MEDICINE

## 2018-08-25 PROCEDURE — G0378 HOSPITAL OBSERVATION PER HR: HCPCS

## 2018-08-25 PROCEDURE — 99291 CRITICAL CARE FIRST HOUR: CPT | Mod: 25

## 2018-08-25 PROCEDURE — 82550 ASSAY OF CK (CPK): CPT

## 2018-08-25 PROCEDURE — 87077 CULTURE AEROBIC IDENTIFY: CPT

## 2018-08-25 PROCEDURE — 36415 COLL VENOUS BLD VENIPUNCTURE: CPT

## 2018-08-25 PROCEDURE — 87040 BLOOD CULTURE FOR BACTERIA: CPT | Mod: 59

## 2018-08-25 PROCEDURE — 87088 URINE BACTERIA CULTURE: CPT

## 2018-08-25 PROCEDURE — 94760 N-INVAS EAR/PLS OXIMETRY 1: CPT

## 2018-08-25 PROCEDURE — 94640 AIRWAY INHALATION TREATMENT: CPT

## 2018-08-25 PROCEDURE — 25000003 PHARM REV CODE 250: Performed by: SURGERY

## 2018-08-25 PROCEDURE — 85025 COMPLETE CBC W/AUTO DIFF WBC: CPT

## 2018-08-25 PROCEDURE — 25000242 PHARM REV CODE 250 ALT 637 W/ HCPCS: Performed by: INTERNAL MEDICINE

## 2018-08-25 PROCEDURE — 84443 ASSAY THYROID STIM HORMONE: CPT

## 2018-08-25 PROCEDURE — 63600175 PHARM REV CODE 636 W HCPCS: Performed by: SURGERY

## 2018-08-25 PROCEDURE — 87186 SC STD MICRODIL/AGAR DIL: CPT

## 2018-08-25 PROCEDURE — 96365 THER/PROPH/DIAG IV INF INIT: CPT

## 2018-08-25 PROCEDURE — 83735 ASSAY OF MAGNESIUM: CPT

## 2018-08-25 PROCEDURE — 84100 ASSAY OF PHOSPHORUS: CPT

## 2018-08-25 PROCEDURE — 25000003 PHARM REV CODE 250: Performed by: INTERNAL MEDICINE

## 2018-08-25 PROCEDURE — 85610 PROTHROMBIN TIME: CPT

## 2018-08-25 PROCEDURE — 99222 1ST HOSP IP/OBS MODERATE 55: CPT | Mod: ,,, | Performed by: INTERNAL MEDICINE

## 2018-08-25 RX ORDER — ALBUTEROL SULFATE 2.5 MG/.5ML
2.5 SOLUTION RESPIRATORY (INHALATION) EVERY 6 HOURS PRN
Status: DISCONTINUED | OUTPATIENT
Start: 2018-08-25 | End: 2018-08-26

## 2018-08-25 RX ORDER — ONDANSETRON 2 MG/ML
4 INJECTION INTRAMUSCULAR; INTRAVENOUS EVERY 8 HOURS PRN
Status: DISCONTINUED | OUTPATIENT
Start: 2018-08-25 | End: 2018-08-28 | Stop reason: HOSPADM

## 2018-08-25 RX ORDER — DIAZEPAM 10 MG/1
10 TABLET ORAL 3 TIMES DAILY PRN
Status: DISCONTINUED | OUTPATIENT
Start: 2018-08-25 | End: 2018-08-28 | Stop reason: HOSPADM

## 2018-08-25 RX ORDER — PANTOPRAZOLE SODIUM 40 MG/1
40 TABLET, DELAYED RELEASE ORAL DAILY
Status: DISCONTINUED | OUTPATIENT
Start: 2018-08-25 | End: 2018-08-28 | Stop reason: HOSPADM

## 2018-08-25 RX ORDER — HYDROCODONE BITARTRATE AND ACETAMINOPHEN 10; 325 MG/1; MG/1
1 TABLET ORAL
Status: COMPLETED | OUTPATIENT
Start: 2018-08-25 | End: 2018-08-25

## 2018-08-25 RX ORDER — OXYCODONE AND ACETAMINOPHEN 10; 325 MG/1; MG/1
1 TABLET ORAL EVERY 8 HOURS PRN
Status: DISCONTINUED | OUTPATIENT
Start: 2018-08-25 | End: 2018-08-28 | Stop reason: HOSPADM

## 2018-08-25 RX ORDER — POTASSIUM CITRATE 10 MEQ/1
10 TABLET, EXTENDED RELEASE ORAL 3 TIMES DAILY
Status: DISCONTINUED | OUTPATIENT
Start: 2018-08-25 | End: 2018-08-28 | Stop reason: HOSPADM

## 2018-08-25 RX ORDER — SODIUM CHLORIDE 9 MG/ML
INJECTION, SOLUTION INTRAVENOUS CONTINUOUS
Status: DISCONTINUED | OUTPATIENT
Start: 2018-08-25 | End: 2018-08-27

## 2018-08-25 RX ORDER — SODIUM CHLORIDE 9 MG/ML
1000 INJECTION, SOLUTION INTRAVENOUS
Status: COMPLETED | OUTPATIENT
Start: 2018-08-25 | End: 2018-08-25

## 2018-08-25 RX ORDER — HYDROCODONE BITARTRATE AND ACETAMINOPHEN 5; 325 MG/1; MG/1
1 TABLET ORAL EVERY 4 HOURS PRN
Status: DISCONTINUED | OUTPATIENT
Start: 2018-08-25 | End: 2018-08-28 | Stop reason: HOSPADM

## 2018-08-25 RX ORDER — ASPIRIN 81 MG/1
81 TABLET ORAL DAILY
Status: DISCONTINUED | OUTPATIENT
Start: 2018-08-25 | End: 2018-08-28 | Stop reason: HOSPADM

## 2018-08-25 RX ORDER — LEVOFLOXACIN 5 MG/ML
500 INJECTION, SOLUTION INTRAVENOUS
Status: DISCONTINUED | OUTPATIENT
Start: 2018-08-25 | End: 2018-08-28

## 2018-08-25 RX ORDER — ACETAMINOPHEN 325 MG/1
650 TABLET ORAL EVERY 8 HOURS PRN
Status: DISCONTINUED | OUTPATIENT
Start: 2018-08-25 | End: 2018-08-28 | Stop reason: HOSPADM

## 2018-08-25 RX ORDER — POLYETHYLENE GLYCOL 3350 17 G/17G
17 POWDER, FOR SOLUTION ORAL 2 TIMES DAILY PRN
Status: DISCONTINUED | OUTPATIENT
Start: 2018-08-25 | End: 2018-08-28 | Stop reason: HOSPADM

## 2018-08-25 RX ADMIN — HYDROCODONE BITARTRATE AND ACETAMINOPHEN 1 TABLET: 5; 325 TABLET ORAL at 06:08

## 2018-08-25 RX ADMIN — PANTOPRAZOLE SODIUM 40 MG: 40 TABLET, DELAYED RELEASE ORAL at 11:08

## 2018-08-25 RX ADMIN — OXYCODONE HYDROCHLORIDE AND ACETAMINOPHEN 1 TABLET: 10; 325 TABLET ORAL at 11:08

## 2018-08-25 RX ADMIN — POTASSIUM CITRATE 10 MEQ: 10 TABLET ORAL at 08:08

## 2018-08-25 RX ADMIN — POTASSIUM CITRATE 10 MEQ: 10 TABLET ORAL at 03:08

## 2018-08-25 RX ADMIN — DIAZEPAM 10 MG: 10 TABLET ORAL at 11:08

## 2018-08-25 RX ADMIN — HYDROCODONE BITARTRATE AND ACETAMINOPHEN 1 TABLET: 10; 325 TABLET ORAL at 07:08

## 2018-08-25 RX ADMIN — ACETAMINOPHEN 650 MG: 325 TABLET ORAL at 08:08

## 2018-08-25 RX ADMIN — ASPIRIN 81 MG: 81 TABLET, COATED ORAL at 11:08

## 2018-08-25 RX ADMIN — PREGABALIN 200 MG: 75 CAPSULE ORAL at 06:08

## 2018-08-25 RX ADMIN — SODIUM CHLORIDE: 0.9 INJECTION, SOLUTION INTRAVENOUS at 05:08

## 2018-08-25 RX ADMIN — ALBUTEROL SULFATE 2.5 MG: 2.5 SOLUTION RESPIRATORY (INHALATION) at 10:08

## 2018-08-25 RX ADMIN — SODIUM CHLORIDE 1000 ML: 0.9 INJECTION, SOLUTION INTRAVENOUS at 07:08

## 2018-08-25 RX ADMIN — SODIUM CHLORIDE: 0.9 INJECTION, SOLUTION INTRAVENOUS at 11:08

## 2018-08-25 RX ADMIN — THERA TABS 1 TABLET: TAB at 11:08

## 2018-08-25 RX ADMIN — OXYCODONE HYDROCHLORIDE AND ACETAMINOPHEN 1 TABLET: 10; 325 TABLET ORAL at 09:08

## 2018-08-25 RX ADMIN — LEVOFLOXACIN 500 MG: 5 INJECTION, SOLUTION INTRAVENOUS at 07:08

## 2018-08-25 RX ADMIN — PREGABALIN 200 MG: 75 CAPSULE ORAL at 11:08
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
## 2018-08-25 NOTE — ED NOTES
Report taken from MURALI Doherty. Pt resting comfortably on ED stretcher. NADN. AAOx3. Respirations even and unlabored. Bed locked in lowest position. Call bell within reach. Awaiting MD orders.

## 2018-08-25 NOTE — ED PROVIDER NOTES
[facility]                                                 Chief Complaint  32 y.o. male with Headache (for 2 days)    History of Present Illness  Nghia Edgar . presents to emergency room with headache x2 days  Pt has had a low-grade temperature and headache with cough and cold symptoms  Patient states that he thinks he has pneumonia, temperature is 99.5° Fahrenheit  Patient has good oxygenation without course cough in the ER, rhonchi noted now  Additionally, patient has a long history of urinary tract infections from suprapubic  Patient's urine looks grossly infected, however he does not look toxic on exam now  Patient was recently admitted to [facility] from sepsis, had UTI source noted  Reviewed previous urine culture and start on IV Levaquin, admit Internal Medicine    The history is provided by the patient    Past Medical History   -- Abnormal EKG     -- Anemia     -- Anxiety     -- Arthritis     -- Asthma     -- Blood transfusion     -- Cervical spinal cord injury     -- Depression     -- Edema     -- Hypertension     -- Neurogenic bladder     -- Osteomyelitis     -- Paraplegia following spinal cord injury     -- Seizures     -- Suicide attempt     -- Urinary tract infection        Past Surgical History   -- ABDOMINAL SURGERY       -- BACK SURGERY       -- BACLOFEN PUMP IMPLANTATION       -- CERVICAL FUSION       -- COLOSTOMY       -- MUSCLE FLAP       -- sacral flaps       -- SPINE SURGERY       -- SUPRAPUBIC TUBE PLACEMENT          ALLERGIES: Zanaflex    Review of Systems and Physical Exam      Review of Systems  -- Constitution - fever, fatigue, no weakness, no chills  -- Eyes - no tearing or redness, no visual disturbance  -- Ear, Nose - no tinnitus or earache, no nasal congestion or discharge  -- Mouth,Throat - no sore throat, no toothache, normal voice, normal swallowing  -- Respiratory - cough and congestion, no shortness of breath, no PETERSEN  -- Cardiovascular - denies  chest pain, no palpitations, denies claudication  -- Gastrointestinal - denies abdominal pain, nausea, vomiting, or diarrhea  -- Genitourinary - no dysuria, denies flank pain, no hematuria, no STD risk  -- Musculoskeletal - denies back pain, negative for myalgias and arthralgias   -- Neurological - headache, denies weakness or seizure; no LOC  -- Skin - denies pallor, rash, or changes in skin. no hives or welts noted    Vital Signs  His oral temperature is 97.5 °F (36.4 °C).   His blood pressure is 109/63 and his pulse is 75.   His respiration is 16 and oxygen saturation is 97%.     Physical Exam  -- Nursing note and vitals reviewed  -- Head: Atraumatic. Normocephalic. No obvious abnormality  -- Eyes: Pupils are equal and reactive to light. Normal conjunctiva and lids  -- Cardiac: Normal rate, regular rhythm and normal heart sounds  -- Pulmonary: faint rhonchi at the bilateral bases with no active wheezing   -- Abdominal:  Colostomy is clean dry and intact with normal stool content  -- Genitourinary:  Suprapubic catheter placed with infected urine  -- Musculoskeletal: Normal range of motion, no effusions. Joints stable   -- Neurological:  History of quadriplegia, no acute change  -- Vascular: Posterior tibial, dorsalis pedis and radial pulses 2+ bilaterally    -- Lymphatics: No cervical or peripheral lymphadenopathy. No edema noted    Emergency Room Course      Lab Results     K 4.2      CO2 22 (L)   BUN 9   CREATININE 0.6   GLU 94   ALKPHOS 143 (H)   AST 19   ALT 33   BILITOT 0.4   ALBUMIN 3.7   PROT 7.2   WBC 8.69   HGB 12.8 (L)   HCT 40.7      CPK 97   CPK 97   CPKMB 1.4   TROPONINI 0.051 (H)   INR 1.1   BNP <10   LACTATE 1.4   MG 2.1   TSH 0.502     Urinalysis  -- Urinalysis performed during this ER visit showed signs of infection      EKG  -- The EKG findings today were without concerning findings from baseline    Radiology  -- The CT of the head performed in the ER today was negative for  acute pathology  -- Chest x-ray shows a right lower lobe infiltrate    Additional Work up  -- Blood cultures have also been drawn, results are pending     Medications Given  levoFLOXacin 500 mg/100 mL IVPB 500 mg (500 mg Intravenous New Bag 8/25/18 0756)   0.9%  NaCl infusion (1,000 mLs Intravenous New Bag 8/25/18 2116)   HYDROcodone-acetaminophen  mg per tablet 1 tablet (1 tablet Oral Given 8/25/18 6302)     Critical Care ED Physician Time (minutes):  -- Performed by: Eliud Payan M.D.  -- Date/Time: 8:11 AM 8/25/2018   -- Direct Patient Care (Face Time): 10  -- Additional History from Records or Taking Additional History: 5  -- Ordering, Reviewing, and Interpreting Diagnostic Studies: 5  -- Total Time in Documentation: 5  -- Consultation with Other Physicians: 5  -- Consultation with Family Related to Condition: 5  -- Total Critical Care Time: 35    Diagnosis  -- Complicated UTI (urinary tract infection)  -- Headache  -- Pneumonia of right lower lobe  -- History of quadriplegia     Disposition and Plan  -- Disposition: Admitted  -- Condition: stable    This note is dictated on Dragon Natural Speaking word recognition program.  There are word recognition mistakes that are occasionally missed on review.              Eliud Payan MD  08/25/18 0828
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
## 2018-08-25 NOTE — ED NOTES
Pt resting comfortably on ED stretcher. NADN. AAOx3. Respirations even and unlabored. Bed locked in lowest position. Call bell within reach. Antibiotics infusing without difficulty. Awaiting MD orders.

## 2018-08-26 PROBLEM — R05.9 COUGH: Status: ACTIVE | Noted: 2018-08-26

## 2018-08-26 LAB
ALBUMIN SERPL BCP-MCNC: 3.2 G/DL
ALP SERPL-CCNC: 125 U/L
ALT SERPL W/O P-5'-P-CCNC: 20 U/L
ANION GAP SERPL CALC-SCNC: 8 MMOL/L
AST SERPL-CCNC: 16 U/L
BASOPHILS # BLD AUTO: 0.01 K/UL
BASOPHILS NFR BLD: 0.2 %
BILIRUB SERPL-MCNC: 0.4 MG/DL
BUN SERPL-MCNC: 4 MG/DL
CALCIUM SERPL-MCNC: 8.2 MG/DL
CHLORIDE SERPL-SCNC: 109 MMOL/L
CO2 SERPL-SCNC: 22 MMOL/L
CREAT SERPL-MCNC: 0.5 MG/DL
DIFFERENTIAL METHOD: ABNORMAL
EOSINOPHIL # BLD AUTO: 0.1 K/UL
EOSINOPHIL NFR BLD: 2.5 %
ERYTHROCYTE [DISTWIDTH] IN BLOOD BY AUTOMATED COUNT: 20.7 %
EST. GFR  (AFRICAN AMERICAN): >60 ML/MIN/1.73 M^2
EST. GFR  (NON AFRICAN AMERICAN): >60 ML/MIN/1.73 M^2
GLUCOSE SERPL-MCNC: 86 MG/DL
HCT VFR BLD AUTO: 36.6 %
HGB BLD-MCNC: 11.5 G/DL
LYMPHOCYTES # BLD AUTO: 1.2 K/UL
LYMPHOCYTES NFR BLD: 22.6 %
MCH RBC QN AUTO: 27 PG
MCHC RBC AUTO-ENTMCNC: 31.4 G/DL
MCV RBC AUTO: 86 FL
MONOCYTES # BLD AUTO: 0.6 K/UL
MONOCYTES NFR BLD: 12.3 %
NEUTROPHILS # BLD AUTO: 3.2 K/UL
NEUTROPHILS NFR BLD: 62.4 %
PLATELET # BLD AUTO: 144 K/UL
PMV BLD AUTO: 11.3 FL
POTASSIUM SERPL-SCNC: 3.9 MMOL/L
PROT SERPL-MCNC: 6.3 G/DL
RBC # BLD AUTO: 4.26 M/UL
SODIUM SERPL-SCNC: 139 MMOL/L
TROPONIN I SERPL DL<=0.01 NG/ML-MCNC: 0.04 NG/ML
WBC # BLD AUTO: 5.13 K/UL

## 2018-08-26 PROCEDURE — 36415 COLL VENOUS BLD VENIPUNCTURE: CPT

## 2018-08-26 PROCEDURE — 80053 COMPREHEN METABOLIC PANEL: CPT

## 2018-08-26 PROCEDURE — 99900035 HC TECH TIME PER 15 MIN (STAT)

## 2018-08-26 PROCEDURE — 84484 ASSAY OF TROPONIN QUANT: CPT | Mod: 91

## 2018-08-26 PROCEDURE — 25000242 PHARM REV CODE 250 ALT 637 W/ HCPCS: Performed by: INTERNAL MEDICINE

## 2018-08-26 PROCEDURE — G0378 HOSPITAL OBSERVATION PER HR: HCPCS

## 2018-08-26 PROCEDURE — 63600175 PHARM REV CODE 636 W HCPCS: Performed by: SURGERY

## 2018-08-26 PROCEDURE — 85025 COMPLETE CBC W/AUTO DIFF WBC: CPT

## 2018-08-26 PROCEDURE — 99233 SBSQ HOSP IP/OBS HIGH 50: CPT | Mod: ,,, | Performed by: INTERNAL MEDICINE

## 2018-08-26 PROCEDURE — 25000003 PHARM REV CODE 250: Performed by: INTERNAL MEDICINE

## 2018-08-26 PROCEDURE — 27000646 HC AEROBIKA DEVICE

## 2018-08-26 PROCEDURE — 94664 DEMO&/EVAL PT USE INHALER: CPT

## 2018-08-26 PROCEDURE — 25000003 PHARM REV CODE 250: Performed by: SURGERY

## 2018-08-26 PROCEDURE — 84484 ASSAY OF TROPONIN QUANT: CPT

## 2018-08-26 PROCEDURE — 94640 AIRWAY INHALATION TREATMENT: CPT

## 2018-08-26 PROCEDURE — 11000001 HC ACUTE MED/SURG PRIVATE ROOM

## 2018-08-26 PROCEDURE — 94761 N-INVAS EAR/PLS OXIMETRY MLT: CPT

## 2018-08-26 RX ORDER — CEPHALEXIN 500 MG/1
500 CAPSULE ORAL EVERY 6 HOURS
Status: DISCONTINUED | OUTPATIENT
Start: 2018-08-26 | End: 2018-08-28 | Stop reason: HOSPADM

## 2018-08-26 RX ORDER — BENZONATATE 100 MG/1
100 CAPSULE ORAL 3 TIMES DAILY PRN
Status: DISCONTINUED | OUTPATIENT
Start: 2018-08-26 | End: 2018-08-28 | Stop reason: HOSPADM

## 2018-08-26 RX ORDER — GUAIFENESIN 600 MG/1
600 TABLET, EXTENDED RELEASE ORAL 2 TIMES DAILY
Status: DISCONTINUED | OUTPATIENT
Start: 2018-08-26 | End: 2018-08-28 | Stop reason: HOSPADM

## 2018-08-26 RX ORDER — ALBUTEROL SULFATE 2.5 MG/.5ML
2.5 SOLUTION RESPIRATORY (INHALATION)
Status: DISCONTINUED | OUTPATIENT
Start: 2018-08-26 | End: 2018-08-28 | Stop reason: HOSPADM

## 2018-08-26 RX ADMIN — DIAZEPAM 10 MG: 10 TABLET ORAL at 01:08

## 2018-08-26 RX ADMIN — OXYCODONE HYDROCHLORIDE AND ACETAMINOPHEN 1 TABLET: 10; 325 TABLET ORAL at 03:08

## 2018-08-26 RX ADMIN — OXYCODONE HYDROCHLORIDE AND ACETAMINOPHEN 1 TABLET: 10; 325 TABLET ORAL at 11:08

## 2018-08-26 RX ADMIN — ALBUTEROL SULFATE 2.5 MG: 2.5 SOLUTION RESPIRATORY (INHALATION) at 07:08

## 2018-08-26 RX ADMIN — THERA TABS 1 TABLET: TAB at 08:08

## 2018-08-26 RX ADMIN — CEPHALEXIN 500 MG: 500 CAPSULE ORAL at 11:08

## 2018-08-26 RX ADMIN — PANTOPRAZOLE SODIUM 40 MG: 40 TABLET, DELAYED RELEASE ORAL at 08:08

## 2018-08-26 RX ADMIN — HYDROCODONE BITARTRATE AND ACETAMINOPHEN 1 TABLET: 5; 325 TABLET ORAL at 03:08

## 2018-08-26 RX ADMIN — POTASSIUM CITRATE 10 MEQ: 10 TABLET ORAL at 08:08

## 2018-08-26 RX ADMIN — ASPIRIN 81 MG: 81 TABLET, COATED ORAL at 08:08

## 2018-08-26 RX ADMIN — GUAIFENESIN 600 MG: 600 TABLET, EXTENDED RELEASE ORAL at 09:08

## 2018-08-26 RX ADMIN — OXYCODONE HYDROCHLORIDE AND ACETAMINOPHEN 1 TABLET: 10; 325 TABLET ORAL at 05:08

## 2018-08-26 RX ADMIN — DIAZEPAM 10 MG: 10 TABLET ORAL at 03:08

## 2018-08-26 RX ADMIN — SODIUM CHLORIDE: 0.9 INJECTION, SOLUTION INTRAVENOUS at 01:08

## 2018-08-26 RX ADMIN — ALBUTEROL SULFATE 2.5 MG: 2.5 SOLUTION RESPIRATORY (INHALATION) at 04:08

## 2018-08-26 RX ADMIN — SODIUM CHLORIDE: 0.9 INJECTION, SOLUTION INTRAVENOUS at 07:08

## 2018-08-26 RX ADMIN — GUAIFENESIN 600 MG: 600 TABLET, EXTENDED RELEASE ORAL at 08:08

## 2018-08-26 RX ADMIN — HYDROCODONE BITARTRATE AND ACETAMINOPHEN 1 TABLET: 5; 325 TABLET ORAL at 06:08

## 2018-08-26 RX ADMIN — POTASSIUM CITRATE 10 MEQ: 10 TABLET ORAL at 03:08

## 2018-08-26 RX ADMIN — PREGABALIN 200 MG: 75 CAPSULE ORAL at 05:08

## 2018-08-26 RX ADMIN — PREGABALIN 200 MG: 75 CAPSULE ORAL at 06:08

## 2018-08-26 RX ADMIN — HYDROCODONE BITARTRATE AND ACETAMINOPHEN 1 TABLET: 5; 325 TABLET ORAL at 09:08

## 2018-08-26 RX ADMIN — CEPHALEXIN 500 MG: 500 CAPSULE ORAL at 06:08

## 2018-08-26 RX ADMIN — PREGABALIN 200 MG: 75 CAPSULE ORAL at 11:08

## 2018-08-26 RX ADMIN — BENZONATATE 100 MG: 100 CAPSULE ORAL at 11:08

## 2018-08-26 RX ADMIN — BENZONATATE 100 MG: 100 CAPSULE ORAL at 09:08

## 2018-08-26 RX ADMIN — ALBUTEROL SULFATE 2.5 MG: 2.5 SOLUTION RESPIRATORY (INHALATION) at 10:08

## 2018-08-26 RX ADMIN — DIAZEPAM 10 MG: 10 TABLET ORAL at 11:08

## 2018-08-26 RX ADMIN — ALBUTEROL SULFATE 2.5 MG: 2.5 SOLUTION RESPIRATORY (INHALATION) at 02:08

## 2018-08-26 RX ADMIN — LEVOFLOXACIN 500 MG: 5 INJECTION, SOLUTION INTRAVENOUS at 08:08

## 2018-08-26 NOTE — PLAN OF CARE
Problem: Patient Care Overview  Goal: Plan of Care Review  Outcome: Ongoing (interventions implemented as appropriate)  Patient admitted to floor, pt agreeable with pt and frequent reposition changes.
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
## 2018-08-26 NOTE — SUBJECTIVE & OBJECTIVE
I  Review of Systems   Constitutional: Positive for fatigue. Negative for fever.   HENT: Negative for congestion, postnasal drip, sneezing and sore throat.    Eyes: Negative for discharge, redness and itching.   Respiratory: Positive for cough, chest tightness and wheezing. Negative for shortness of breath.    Cardiovascular: Negative for chest pain, palpitations and leg swelling.   Gastrointestinal: Positive for abdominal pain. Negative for abdominal distention, blood in stool, constipation, diarrhea, nausea and vomiting.   Endocrine: Negative for polydipsia, polyphagia and polyuria.   Genitourinary: Positive for dysuria. Negative for difficulty urinating, flank pain, frequency, hematuria and urgency.   Musculoskeletal: Negative for arthralgias and myalgias.   Skin: Negative for pallor, rash and wound.   Neurological: Positive for headaches. Negative for dizziness, syncope, weakness, light-headedness and numbness.   Psychiatric/Behavioral: Negative for agitation and confusion. The patient is not nervous/anxious.      Objective:     Vital Signs (Most Recent):  Temp: 99.1 °F (37.3 °C) (08/26/18 0716)  Pulse: 77 (08/26/18 0802)  Resp: 16 (08/26/18 0716)  BP: (!) 116/58 (08/26/18 0716)  SpO2: 96 % (08/26/18 0739) Vital Signs (24h Range):  Temp:  [96.5 °F (35.8 °C)-99.6 °F (37.6 °C)] 99.1 °F (37.3 °C)  Pulse:  [64-98] 77  Resp:  [16-20] 16  SpO2:  [94 %-100 %] 96 %  BP: (113-125)/(56-65) 116/58     Weight: 77.1 kg (169 lb 15.6 oz)  Body mass index is 25.1 kg/m².    Intake/Output Summary (Last 24 hours) at 8/26/2018 0945  Last data filed at 8/26/2018 0400  Gross per 24 hour   Intake 2182.92 ml   Output 3300 ml   Net -1117.08 ml      Physical Exam   Constitutional: He is oriented to person, place, and time. He appears well-developed and well-nourished. No distress.   HENT:   Head: Normocephalic and atraumatic.   Mouth/Throat: Oropharynx is clear and moist. No oropharyngeal exudate.   Eyes: Conjunctivae and EOM are normal.  Pupils are equal, round, and reactive to light. No scleral icterus.   Neck: Normal range of motion. Neck supple. No JVD present. No thyromegaly present.   Cardiovascular: Normal rate and regular rhythm.   No murmur heard.  Pulmonary/Chest: Effort normal and breath sounds normal. No respiratory distress. He has no wheezes. He has no rales. He exhibits no tenderness.   Abdominal: Soft. Bowel sounds are normal. He exhibits no distension. There is no tenderness. There is no rebound and no guarding.   Colostomy in RLQ with liquid stool in bag.  Suprapubic tube in lower abdomen with dried ?blood on tube near insertion site.  No surrounding erythema.    Musculoskeletal: He exhibits no edema.   Neurological: He is alert and oriented to person, place, and time.   Paraplegia below chest though patient has use of bilateral arms. Abnormal tone in bilateral hands.    Skin: Skin is warm and dry. No rash noted. He is not diaphoretic.   Psychiatric: He has a normal mood and affect. Thought content normal.   Nursing note and vitals reviewed.      Significant Labs:   CBC:   Recent Labs   Lab  08/25/18   0451  08/26/18   0652   WBC  8.69  5.13   HGB  12.8*  11.5*   HCT  40.7  36.6*   PLT  175  144*     CMP:   Recent Labs   Lab  08/25/18   0449  08/26/18   0652   NA  139  139   K  4.2  3.9   CL  106  109   CO2  22*  22*   GLU  94  86   BUN  9  4*   CREATININE  0.6  0.5   CALCIUM  8.8  8.2*   PROT  7.2  6.3   ALBUMIN  3.7  3.2*   BILITOT  0.4  0.4   ALKPHOS  143*  125   AST  19  16   ALT  33  20   ANIONGAP  11  8   EGFRNONAA  >60  >60       Significant Imaging: CXR: I have reviewed all pertinent results/findings within the past 24 hours and my personal findings are:  no pneumonia

## 2018-08-26 NOTE — PROGRESS NOTES
Pt. Explained current tx. Plan agress with Q6 wa time.Educate patient/family/caregiver on proper use and utilization of acapella which combines the benefits of positive expiratory pressure and airway vibrations to mobilize pulmonary secretions, prevent or reverse atelectasis, and optimize delivery of bronchodilator in patients receiving bronchial hygiene therapy.   Educate patient/family/cargiver about administration of aerosolized medications via the inhalation route to aid in bronchial hygiene & deliver medications.

## 2018-08-26 NOTE — PLAN OF CARE
Problem: Patient Care Overview  Goal: Plan of Care Review  Outcome: Ongoing (interventions implemented as appropriate)  Patient with some complaints of pain in ribs and chest from coughing. Patient states he gets relief from oxycodone and not the norcos. Patient was pleasant through out shift. Sub pube cath patent and draining clear yellow urine. Patient does self care of LLQ colostomy. Lung sounds are coarse throughout and coughing up yellow/brown sputum. Patient received 2 PRN breathing treatments. VS stable, A&Ox4. Plan of care reviewed with patient.

## 2018-08-26 NOTE — ASSESSMENT & PLAN NOTE
Check urine culture   Started on levaquin ; last cultures reviewed ; last time placed on keflex with good result  Add kefelx  Follow cultures

## 2018-08-26 NOTE — PLAN OF CARE
Problem: Patient Care Overview  Goal: Plan of Care Review  Outcome: Ongoing (interventions implemented as appropriate)  Patient repositioned q 2-3 hours depending on patients preference with use of pillows and wedge. Belongings and call light within reach at all times. Wound care preformed on patient dressings on BL buttocks removed and replaced, cleansed as ordered. Pain medication given frequently for pain control. Pt agreeable with POC.

## 2018-08-27 PROCEDURE — 99900035 HC TECH TIME PER 15 MIN (STAT)

## 2018-08-27 PROCEDURE — 94664 DEMO&/EVAL PT USE INHALER: CPT

## 2018-08-27 PROCEDURE — 94640 AIRWAY INHALATION TREATMENT: CPT

## 2018-08-27 PROCEDURE — 97161 PT EVAL LOW COMPLEX 20 MIN: CPT

## 2018-08-27 PROCEDURE — 97110 THERAPEUTIC EXERCISES: CPT

## 2018-08-27 PROCEDURE — 25000003 PHARM REV CODE 250: Performed by: INTERNAL MEDICINE

## 2018-08-27 PROCEDURE — 11000001 HC ACUTE MED/SURG PRIVATE ROOM

## 2018-08-27 PROCEDURE — 63600175 PHARM REV CODE 636 W HCPCS: Performed by: SURGERY

## 2018-08-27 PROCEDURE — 25000003 PHARM REV CODE 250: Performed by: SURGERY

## 2018-08-27 PROCEDURE — 99232 SBSQ HOSP IP/OBS MODERATE 35: CPT | Mod: ,,, | Performed by: INTERNAL MEDICINE

## 2018-08-27 PROCEDURE — 94761 N-INVAS EAR/PLS OXIMETRY MLT: CPT

## 2018-08-27 PROCEDURE — G8981 BODY POS CURRENT STATUS: HCPCS | Mod: CL

## 2018-08-27 PROCEDURE — G0378 HOSPITAL OBSERVATION PER HR: HCPCS

## 2018-08-27 PROCEDURE — G8982 BODY POS GOAL STATUS: HCPCS | Mod: CK

## 2018-08-27 PROCEDURE — 25000242 PHARM REV CODE 250 ALT 637 W/ HCPCS: Performed by: INTERNAL MEDICINE

## 2018-08-27 RX ADMIN — ALBUTEROL SULFATE 2.5 MG: 2.5 SOLUTION RESPIRATORY (INHALATION) at 07:08

## 2018-08-27 RX ADMIN — POTASSIUM CITRATE 10 MEQ: 10 TABLET ORAL at 10:08

## 2018-08-27 RX ADMIN — THERA TABS 1 TABLET: TAB at 06:08

## 2018-08-27 RX ADMIN — PREGABALIN 200 MG: 75 CAPSULE ORAL at 05:08

## 2018-08-27 RX ADMIN — GUAIFENESIN 600 MG: 600 TABLET, EXTENDED RELEASE ORAL at 10:08

## 2018-08-27 RX ADMIN — SODIUM CHLORIDE: 0.9 INJECTION, SOLUTION INTRAVENOUS at 04:08

## 2018-08-27 RX ADMIN — LEVOFLOXACIN 500 MG: 5 INJECTION, SOLUTION INTRAVENOUS at 10:08

## 2018-08-27 RX ADMIN — POTASSIUM CITRATE 10 MEQ: 10 TABLET ORAL at 09:08

## 2018-08-27 RX ADMIN — POTASSIUM CITRATE 10 MEQ: 10 TABLET ORAL at 03:08

## 2018-08-27 RX ADMIN — OXYCODONE HYDROCHLORIDE AND ACETAMINOPHEN 1 TABLET: 10; 325 TABLET ORAL at 11:08

## 2018-08-27 RX ADMIN — PANTOPRAZOLE SODIUM 40 MG: 40 TABLET, DELAYED RELEASE ORAL at 10:08

## 2018-08-27 RX ADMIN — PREGABALIN 200 MG: 75 CAPSULE ORAL at 12:08

## 2018-08-27 RX ADMIN — CEPHALEXIN 500 MG: 500 CAPSULE ORAL at 11:08

## 2018-08-27 RX ADMIN — ALBUTEROL SULFATE 2.5 MG: 2.5 SOLUTION RESPIRATORY (INHALATION) at 01:08

## 2018-08-27 RX ADMIN — CEPHALEXIN 500 MG: 500 CAPSULE ORAL at 12:08

## 2018-08-27 RX ADMIN — DIAZEPAM 10 MG: 10 TABLET ORAL at 12:08

## 2018-08-27 RX ADMIN — PREGABALIN 200 MG: 75 CAPSULE ORAL at 06:08

## 2018-08-27 RX ADMIN — CEPHALEXIN 500 MG: 500 CAPSULE ORAL at 06:08

## 2018-08-27 RX ADMIN — HYDROCODONE BITARTRATE AND ACETAMINOPHEN 1 TABLET: 5; 325 TABLET ORAL at 05:08

## 2018-08-27 RX ADMIN — HYDROCODONE BITARTRATE AND ACETAMINOPHEN 1 TABLET: 5; 325 TABLET ORAL at 04:08

## 2018-08-27 RX ADMIN — GUAIFENESIN 600 MG: 600 TABLET, EXTENDED RELEASE ORAL at 09:08

## 2018-08-27 RX ADMIN — CEPHALEXIN 500 MG: 500 CAPSULE ORAL at 05:08

## 2018-08-27 RX ADMIN — ASPIRIN 81 MG: 81 TABLET, COATED ORAL at 10:08

## 2018-08-27 NOTE — ASSESSMENT & PLAN NOTE
Looks like bronchitis  Check CXR no pneumonia- repeat cxr as has more rhonchi on exam but still clear  On levaquin day 3  Stay on nebs  Add mucinex
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
## 2018-08-27 NOTE — PLAN OF CARE
Problem: Patient Care Overview  Goal: Plan of Care Review  Outcome: Ongoing (interventions implemented as appropriate)  Patient resting in bed wit5h some complaints of pain. Patient stated relief from PRN medicines. Lung sounds better but still producing a yellowish tint sputum. VS stable. A&Ox4. No acute changes noted. Plan of care reviewed with patient.

## 2018-08-27 NOTE — PLAN OF CARE
08/27/18 1502   Discharge Assessment   Assessment Type Discharge Planning Reassessment     Mr Edgar plans to return home with his grandfather at discharge he has 24/7 care givers and thus far has no post acute care needs.

## 2018-08-27 NOTE — PLAN OF CARE
Problem: Patient Care Overview  Goal: Plan of Care Review  Outcome: Ongoing (interventions implemented as appropriate)  Pt doing well. No question/concerns at this time. Agrees with poc. No pain/falls.  Awaiting culture to come back for discharge. Pt ordered today. Iv abx given.

## 2018-08-27 NOTE — PLAN OF CARE
08/27/18 1104   Discharge Assessment   Assessment Type Discharge Planning Assessment   Confirmed/corrected address and phone number on facesheet? Yes   Assessment information obtained from? Patient;Medical Record   Expected Length of Stay (days) 3   Communicated expected length of stay with patient/caregiver yes   Prior to hospitilization cognitive status: Alert/Oriented   Prior to hospitalization functional status: Partially Dependent   Current cognitive status: Alert/Oriented   Current Functional Status: Partially Dependent   Lives With significant other;other (see comments)  (PCA's)   Able to Return to Prior Arrangements yes   Is patient able to care for self after discharge? No   Who are your caregiver(s) and their phone number(s)? PCA's from [facility] and [facility]- [phone]   Patient's perception of discharge disposition home or selfcare   Readmission Within The Last 30 Days no previous admission in last 30 days   Patient currently being followed by outpatient case management? No   Patient currently receives any other outside agency services? No   Equipment Currently Used at Home wheelchair;power chair;shower chair;colostomy/ostomy supplies;lift device;medication pump;bedside commode;hospital bed   Do you have any problems affording any of your prescribed medications? No   Is the patient taking medications as prescribed? yes   Does the patient have transportation home? Yes   Transportation Available ambulance   Does the patient receive services at the [facility]? No   Discharge Plan A Home   Discharge Plan B Home   Patient/Family In Agreement With Plan yes       Pt is a 32 year old male admitted for Acute cystitis. Pt has all of the equipment he needs. Pt get PCA's from [facility]. No Social Work needs noted at this time. Will continue to follow and offer support as needed.    Davonte Lee LMSW

## 2018-08-27 NOTE — SUBJECTIVE & OBJECTIVE
Interval note: afebrile, urine cultures pending  Review of Systems   Constitutional: Negative for fatigue and fever.   HENT: Negative for congestion, postnasal drip, sneezing and sore throat.    Eyes: Negative for discharge, redness and itching.   Respiratory: Positive for cough. Negative for chest tightness, shortness of breath and wheezing.    Cardiovascular: Negative for chest pain, palpitations and leg swelling.   Gastrointestinal: Negative for abdominal distention, abdominal pain, constipation, diarrhea, nausea and vomiting.   Endocrine: Negative for polydipsia, polyphagia and polyuria.   Genitourinary: Negative for difficulty urinating, flank pain, frequency, hematuria and urgency.   Musculoskeletal: Negative for arthralgias and myalgias.   Skin: Negative for pallor, rash and wound.   Neurological: Negative for dizziness, syncope, light-headedness, numbness and headaches.   Psychiatric/Behavioral: Negative for agitation and confusion. The patient is not nervous/anxious.      Objective:     Vital Signs (Most Recent):  Temp: 98.5 °F (36.9 °C) (08/27/18 0441)  Pulse: 71 (08/27/18 0713)  Resp: 16 (08/27/18 0713)  BP: 118/64 (08/27/18 0441)  SpO2: 96 % (08/27/18 0713) Vital Signs (24h Range):  Temp:  [98 °F (36.7 °C)-99.8 °F (37.7 °C)] 98.5 °F (36.9 °C)  Pulse:  [61-97] 71  Resp:  [16-20] 16  SpO2:  [95 %-100 %] 96 %  BP: (101-128)/(55-70) 118/64     Weight: 77.1 kg (169 lb 15.6 oz)  Body mass index is 25.1 kg/m².    Intake/Output Summary (Last 24 hours) at 8/27/2018 0738  Last data filed at 8/26/2018 1804  Gross per 24 hour   Intake 1010 ml   Output 3000 ml   Net -1990 ml      Physical Exam   Constitutional: He is oriented to person, place, and time. He appears well-developed and well-nourished. No distress.   HENT:   Head: Normocephalic and atraumatic.   Right Ear: External ear normal.   Left Ear: External ear normal.   Eyes: Conjunctivae and EOM are normal. Pupils are equal, round, and reactive to light.   Neck:  Normal range of motion. Neck supple. No tracheal deviation present.   Cardiovascular: Normal rate and regular rhythm.   No murmur heard.  Pulmonary/Chest: Effort normal and breath sounds normal. No respiratory distress. He has no wheezes.   Rhonchi b/l   Abdominal: Soft. Bowel sounds are normal. He exhibits no distension. There is no tenderness.   Colostomy in RLQ with liquid stool in bag.  Suprapubic tube in lower abdomen with dried. No surrounding erythema.    Musculoskeletal: He exhibits no edema.   Neurological: He is alert and oriented to person, place, and time.   Paraplegia below chest though patient has use of bilateral arms. Abnormal tone in bilateral hands.    Skin: Skin is warm and dry. No rash noted. He is not diaphoretic.   Psychiatric: He has a normal mood and affect. Thought content normal.   Nursing note and vitals reviewed.  interval note:     Significant Labs:   CBC:   Recent Labs   Lab  08/26/18   0652   WBC  5.13   HGB  11.5*   HCT  36.6*   PLT  144*     CMP:   Recent Labs   Lab  08/26/18   0652   NA  139   K  3.9   CL  109   CO2  22*   GLU  86   BUN  4*   CREATININE  0.5   CALCIUM  8.2*   PROT  6.3   ALBUMIN  3.2*   BILITOT  0.4   ALKPHOS  125   AST  16   ALT  20   ANIONGAP  8   EGFRNONAA  >60     Recent Labs   Lab  08/25/18   0449   08/26/18   1838   CPK  97  97   --    --    CPKMB  1.4   --    --    TROPONINI  0.051*   < >  0.035*   MB  1.4   --    --     < > = values in this interval not displayed.     Lactic acid 1.4>>0.8    Lab Results   Component Value Date    INR 1.1 08/25/2018    INR 1.1 03/19/2018    INR 1.1 07/11/2017     BNP  Recent Labs   Lab  08/25/18   0449   BNP  <10     Lab Results   Component Value Date    TSH 0.502 08/25/2018     Mag 2.1    TSH 0.502    U/a +leuko and nitrite with >100 WBC    Blood cultures x 2 NGTD    Urine cultures    GRAM NEGATIVE ELISA, LACTOSE    >100,000 cfu/ml   Identification and susceptibility pending             Significant Imaging:     CXR No  acute process.  No significant change.    CT head   No acute intracranial process.  No significant change.    Repeat cxr No acute process.  No significant change.    8/25 ekg- repeat  Normal sinus rhythm  Early repolarization  Normal ECG  When compared with ECG of 25-AUG-2018 04:53,  No significant change was found  Confirmed by MAY SMALLS MD ([id number]) on 8/26/2018 1:55:56 PM    8/25 cxr Normal sinus rhythm  Normal ECG  When compared with ECG of 12-JUL-2018 00:13,  Vent. rate has decreased BY 37 BPM  Nonspecific T wave abnormality no longer evident in Inferior leads  Nonspecific T wave abnormality no longer evident in Lateral leads  Confirmed by MAY SMALLS MD ([id number]) on 8/26/2018 1:20:58 PM

## 2018-08-27 NOTE — PLAN OF CARE
Problem: Patient Care Overview  Goal: Plan of Care Review  Outcome: Ongoing (interventions implemented as appropriate)  Tolerating Q6 albuertol treatments and aerobika well. sp02 95% on room air.  No distress or increase work of breathing.

## 2018-08-27 NOTE — ASSESSMENT & PLAN NOTE
Check urine culture sensitivity pending  Started on levaquin day 3 ; last cultures reviewed ; last time placed on keflex with good result  Add kefelx day 2  Follow cultures should be ready today or tomorrow

## 2018-08-28 VITALS
WEIGHT: 170 LBS | TEMPERATURE: 97 F | HEIGHT: 69 IN | BODY MASS INDEX: 25.18 KG/M2 | DIASTOLIC BLOOD PRESSURE: 59 MMHG | OXYGEN SATURATION: 95 % | RESPIRATION RATE: 18 BRPM | SYSTOLIC BLOOD PRESSURE: 102 MMHG | HEART RATE: 56 BPM

## 2018-08-28 LAB
ANION GAP SERPL CALC-SCNC: 10 MMOL/L
BASOPHILS # BLD AUTO: 0.01 K/UL
BASOPHILS NFR BLD: 0.2 %
BUN SERPL-MCNC: 10 MG/DL
CALCIUM SERPL-MCNC: 9 MG/DL
CHLORIDE SERPL-SCNC: 106 MMOL/L
CO2 SERPL-SCNC: 23 MMOL/L
CREAT SERPL-MCNC: 0.6 MG/DL
DIFFERENTIAL METHOD: ABNORMAL
EOSINOPHIL # BLD AUTO: 0.1 K/UL
EOSINOPHIL NFR BLD: 2.8 %
ERYTHROCYTE [DISTWIDTH] IN BLOOD BY AUTOMATED COUNT: 20.4 %
EST. GFR  (AFRICAN AMERICAN): >60 ML/MIN/1.73 M^2
EST. GFR  (NON AFRICAN AMERICAN): >60 ML/MIN/1.73 M^2
GLUCOSE SERPL-MCNC: 105 MG/DL
HCT VFR BLD AUTO: 39.4 %
HGB BLD-MCNC: 12.4 G/DL
LYMPHOCYTES # BLD AUTO: 1.1 K/UL
LYMPHOCYTES NFR BLD: 21 %
MCH RBC QN AUTO: 27.2 PG
MCHC RBC AUTO-ENTMCNC: 31.5 G/DL
MCV RBC AUTO: 86 FL
MONOCYTES # BLD AUTO: 0.6 K/UL
MONOCYTES NFR BLD: 12.8 %
NEUTROPHILS # BLD AUTO: 3.2 K/UL
NEUTROPHILS NFR BLD: 63.2 %
PLATELET # BLD AUTO: 191 K/UL
PMV BLD AUTO: 11.5 FL
POTASSIUM SERPL-SCNC: 4.3 MMOL/L
RBC # BLD AUTO: 4.56 M/UL
SODIUM SERPL-SCNC: 139 MMOL/L
WBC # BLD AUTO: 5.01 K/UL

## 2018-08-28 PROCEDURE — 80048 BASIC METABOLIC PNL TOTAL CA: CPT

## 2018-08-28 PROCEDURE — 99900031 HC PATIENT EDUCATION (STAT)

## 2018-08-28 PROCEDURE — 99238 HOSP IP/OBS DSCHRG MGMT 30/<: CPT | Mod: ,,, | Performed by: NURSE PRACTITIONER

## 2018-08-28 PROCEDURE — 97110 THERAPEUTIC EXERCISES: CPT

## 2018-08-28 PROCEDURE — G8982 BODY POS GOAL STATUS: HCPCS | Mod: CK

## 2018-08-28 PROCEDURE — 94640 AIRWAY INHALATION TREATMENT: CPT

## 2018-08-28 PROCEDURE — 25000003 PHARM REV CODE 250: Performed by: INTERNAL MEDICINE

## 2018-08-28 PROCEDURE — G8983 BODY POS D/C STATUS: HCPCS | Mod: CL

## 2018-08-28 PROCEDURE — 85025 COMPLETE CBC W/AUTO DIFF WBC: CPT

## 2018-08-28 PROCEDURE — 99900035 HC TECH TIME PER 15 MIN (STAT)

## 2018-08-28 PROCEDURE — G0378 HOSPITAL OBSERVATION PER HR: HCPCS

## 2018-08-28 PROCEDURE — 25000003 PHARM REV CODE 250: Performed by: SURGERY

## 2018-08-28 PROCEDURE — 36415 COLL VENOUS BLD VENIPUNCTURE: CPT

## 2018-08-28 PROCEDURE — 94761 N-INVAS EAR/PLS OXIMETRY MLT: CPT

## 2018-08-28 PROCEDURE — 25000242 PHARM REV CODE 250 ALT 637 W/ HCPCS: Performed by: INTERNAL MEDICINE

## 2018-08-28 PROCEDURE — 63600175 PHARM REV CODE 636 W HCPCS: Performed by: SURGERY

## 2018-08-28 RX ORDER — NITROFURANTOIN (MACROCRYSTALS) 100 MG/1
100 CAPSULE ORAL DAILY
Qty: 10 CAPSULE | Refills: 0 | Status: SHIPPED | OUTPATIENT
Start: 2018-08-28 | End: 2018-09-07

## 2018-08-28 RX ORDER — GUAIFENESIN 600 MG/1
600 TABLET, EXTENDED RELEASE ORAL 2 TIMES DAILY
Qty: 20 TABLET | Refills: 0 | COMMUNITY
Start: 2018-08-28 | End: 2018-09-07

## 2018-08-28 RX ORDER — BENZONATATE 100 MG/1
100 CAPSULE ORAL 3 TIMES DAILY PRN
Qty: 30 CAPSULE | Refills: 0 | Status: SHIPPED | OUTPATIENT
Start: 2018-08-28 | End: 2018-09-07

## 2018-08-28 RX ADMIN — LEVOFLOXACIN 500 MG: 5 INJECTION, SOLUTION INTRAVENOUS at 09:08

## 2018-08-28 RX ADMIN — PREGABALIN 200 MG: 75 CAPSULE ORAL at 06:08

## 2018-08-28 RX ADMIN — HYDROCODONE BITARTRATE AND ACETAMINOPHEN 1 TABLET: 5; 325 TABLET ORAL at 11:08

## 2018-08-28 RX ADMIN — CEPHALEXIN 500 MG: 500 CAPSULE ORAL at 11:08

## 2018-08-28 RX ADMIN — ALBUTEROL SULFATE 2.5 MG: 2.5 SOLUTION RESPIRATORY (INHALATION) at 07:08

## 2018-08-28 RX ADMIN — DIAZEPAM 10 MG: 10 TABLET ORAL at 09:08

## 2018-08-28 RX ADMIN — POTASSIUM CITRATE 10 MEQ: 10 TABLET ORAL at 09:08

## 2018-08-28 RX ADMIN — ALBUTEROL SULFATE 2.5 MG: 2.5 SOLUTION RESPIRATORY (INHALATION) at 01:08

## 2018-08-28 RX ADMIN — PREGABALIN 200 MG: 75 CAPSULE ORAL at 11:08

## 2018-08-28 RX ADMIN — GUAIFENESIN 600 MG: 600 TABLET, EXTENDED RELEASE ORAL at 09:08

## 2018-08-28 RX ADMIN — CEPHALEXIN 500 MG: 500 CAPSULE ORAL at 06:08

## 2018-08-28 RX ADMIN — PANTOPRAZOLE SODIUM 40 MG: 40 TABLET, DELAYED RELEASE ORAL at 09:08

## 2018-08-28 RX ADMIN — ASPIRIN 81 MG: 81 TABLET, COATED ORAL at 09:08

## 2018-08-28 RX ADMIN — OXYCODONE HYDROCHLORIDE AND ACETAMINOPHEN 1 TABLET: 10; 325 TABLET ORAL at 09:08

## 2018-08-28 RX ADMIN — THERA TABS 1 TABLET: TAB at 06:08

## 2018-08-28 NOTE — ASSESSMENT & PLAN NOTE
Looks like bronchitis  Check CXR no pneumonia- repeat cxr as has more rhonchi on exam but still clear  On levaquin day 3  Stay on nebs  Add mucinex   8/28 Day 4 Levoquin
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
## 2018-08-28 NOTE — ASSESSMENT & PLAN NOTE
Check urine culture sensitivity pending  Started on levaquin day 4 ; last cultures reviewed ; last time placed on keflex with good result  Add kefelx day 3      8/28 + Ecomason UTI send home on  Tx with macrobid 100q d x 10d
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
## 2018-08-28 NOTE — NURSING
Discharge instructions went over with pt including f/u instructions and new Rx. PIV taken out pressure held.

## 2018-08-28 NOTE — PT/OT/SLP PROGRESS
"Physical Therapy Treatment    Patient Name:  Nghia Edgar Jr.   MRN:  [id number]    Recommendations:     Discharge Recommendations:  home   Discharge Equipment Recommendations: none   Barriers to discharge: None    Assessment:     Nghia Edgar Jr. is a 32 y.o. male admitted with a medical diagnosis of Complicated UTI (urinary tract infection).  He presents with the following impairments/functional limitations:  weakness, gait instability, impaired balance, impaired endurance, decreased lower extremity function, decreased upper extremity function, decreased safety awareness, impaired self care skills, impaired functional mobilty, abnormal tone . Pt. anticipates D/C home today and has achieved PT POC goals.     Rehab Prognosis:  Fair; patient would benefit from acute skilled PT services to address these deficits and reach maximum level of function.      Recent Surgery: * No surgery found *      Plan:     During this hospitalization, patient to be seen (Anticipated d/c home 8/28/2018) to address the above listed problems via therapeutic exercises  · Plan of Care Expires:  (upon d/c from facility)   Plan of Care Reviewed with: patient    Subjective     Communicated with patient prior to session.  Patient found supine in bed upon PT entry to room, agreeable to treatment.        Patient comments/goals: "I think my legs are getting stronger."  Pain/Comfort:  · Pain Rating 1: 0/10  · Pain Rating Post-Intervention 1: 0/10    Patients cultural, spiritual, Anabaptist conflicts given the current situation:      Objective:     Patient found with: telemetry     General Precautions: Standard, fall   Orthopedic Precautions:N/A   Braces: N/A       AM-PAC 6 CLICK MOBILITY  Turning over in bed (including adjusting bedclothes, sheets and blankets)?: 3  Sitting down on and standing up from a chair with arms (e.g., wheelchair, bedside commode, etc.): 1  Moving from lying on back to sitting on the side of the bed?: " 2  Moving to and from a bed to a chair (including a wheelchair)?: 2  Need to walk in hospital room?: 1  Climbing 3-5 steps with a railing?: 1  Basic Mobility Total Score: 10       Therapeutic Activities and Exercises:   Ther. Ex.:  PT rendered gentle ther. ex. of BLEs while pt. supine in bed in available planes of motion and at major joints, for N.M. tone and fluid dynamics.  1-on-1 BLE HC stretches performed with pt.    Patient left HOB elevated with all lines intact, call button in reach and RN notified..    GOALS:   Multidisciplinary Problems     Physical Therapy Goals     Not on file          Multidisciplinary Problems (Resolved)        Problem: Physical Therapy Goal    Goal Priority Disciplines Outcome Goal Variances Interventions   Physical Therapy Goal   (Resolved)     PT, PT/OT Outcome(s) achieved     Description:  Goals to be met by: upon D/C from facility     Patient will increase functional independence with mobility by performin. Pt will tolerate PROM to BLE at all joints in available planes of motion with rest breaks as needed to prevent contractures and skin breakdown.                      Time Tracking:     PT Received On: 18  PT Start Time: 1020     PT Stop Time: 1036  PT Total Time (min): 16 min     Billable Minutes: Therapeutic Exercise 16 minutes    Treatment Type: Treatment  PT/PTA: PT           Doyle Bliss, PT  2018

## 2018-08-28 NOTE — PROGRESS NOTES
Nursing Notes  Report received from Sarahi CARRION.  Walking rounds to room.  Patient stable.      Huddle Comments

## 2018-08-28 NOTE — SUBJECTIVE & OBJECTIVE
Interval note: afebrile, urine cultures reviewed     Review of Systems   Constitutional: Negative for fatigue and fever.   HENT: Negative for congestion, postnasal drip, sneezing and sore throat.    Eyes: Negative for discharge, redness and itching.   Respiratory: Positive for cough. Negative for chest tightness, shortness of breath and wheezing.    Cardiovascular: Negative for chest pain, palpitations and leg swelling.   Gastrointestinal: Negative for abdominal distention, abdominal pain, constipation, diarrhea, nausea and vomiting.   Endocrine: Negative for polydipsia, polyphagia and polyuria.   Genitourinary: Negative for difficulty urinating, flank pain, frequency, hematuria and urgency.   Musculoskeletal: Negative for arthralgias and myalgias.   Skin: Negative for pallor, rash and wound.   Neurological: Negative for dizziness, syncope, light-headedness, numbness and headaches.   Psychiatric/Behavioral: Negative for agitation and confusion. The patient is not nervous/anxious.      Objective:     Vital Signs (Most Recent):  Temp: 96.3 °F (35.7 °C) (08/28/18 0338)  Pulse: (!) 56 (08/28/18 0600)  Resp: 18 (08/28/18 0338)  BP: 108/65 (08/28/18 0338)  SpO2: 100 % (08/28/18 0338) Vital Signs (24h Range):  Temp:  [96.3 °F (35.7 °C)-99 °F (37.2 °C)] 96.3 °F (35.7 °C)  Pulse:  [55-86] 56  Resp:  [16-18] 18  SpO2:  [94 %-100 %] 100 %  BP: ()/(54-69) 108/65     Weight: 77.1 kg (169 lb 15.6 oz)  Body mass index is 25.1 kg/m².    Intake/Output Summary (Last 24 hours) at 8/28/2018 0643  Last data filed at 8/28/2018 0633  Gross per 24 hour   Intake 600 ml   Output 6300 ml   Net -5700 ml      Physical Exam   Constitutional: He is oriented to person, place, and time. He appears well-developed and well-nourished. No distress.   HENT:   Head: Normocephalic and atraumatic.   Right Ear: External ear normal.   Left Ear: External ear normal.   Eyes: Conjunctivae and EOM are normal. Pupils are equal, round, and reactive to light.    Neck: Normal range of motion. Neck supple. No tracheal deviation present.   Cardiovascular: Normal rate and regular rhythm.   No murmur heard.  Pulmonary/Chest: Effort normal and breath sounds normal. No respiratory distress. He has no wheezes.   Rhonchi b/l   Abdominal: Soft. Bowel sounds are normal. He exhibits no distension. There is no tenderness.   Colostomy in RLQ with liquid stool in bag.  Suprapubic tube in lower abdomen with dried. No surrounding erythema.    Musculoskeletal: He exhibits no edema.   Neurological: He is alert and oriented to person, place, and time.   Paraplegia below chest though patient has use of bilateral arms. Abnormal tone in bilateral hands.    Skin: Skin is warm and dry. No rash noted. He is not diaphoretic.   Psychiatric: He has a normal mood and affect. Thought content normal.   Nursing note and vitals reviewed.  interval note:     Significant Labs:   CBC:   Recent Labs   Lab  08/26/18   0652   WBC  5.13   HGB  11.5*   HCT  36.6*   PLT  144*     CMP:   Recent Labs   Lab  08/26/18   0652   NA  139   K  3.9   CL  109   CO2  22*   GLU  86   BUN  4*   CREATININE  0.5   CALCIUM  8.2*   PROT  6.3   ALBUMIN  3.2*   BILITOT  0.4   ALKPHOS  125   AST  16   ALT  20   ANIONGAP  8   EGFRNONAA  >60     Recent Labs   Lab  08/25/18   0449   08/26/18   1838   CPK  97  97   --    --    CPKMB  1.4   --    --    TROPONINI  0.051*   < >  0.035*   MB  1.4   --    --     < > = values in this interval not displayed.     Lactic acid 1.4>>0.8    Lab Results   Component Value Date    INR 1.1 08/25/2018    INR 1.1 03/19/2018    INR 1.1 07/11/2017     BNP  Recent Labs   Lab  08/25/18   0449   BNP  <10     Lab Results   Component Value Date    TSH 0.502 08/25/2018     Mag 2.1    TSH 0.502    U/a +leuko and nitrite with >100 WBC    Blood cultures x 2 NGTD    Urine cultures    GRAM NEGATIVE ELISA, LACTOSE    >100,000 cfu/ml   Identification and susceptibility pending         ESCHERICHIA COLI   >100,000  cfu/ml  P       Urine Culture, Routine GRAM NEGATIVE ELISA, NON-LACTOSE    > 100,000 cfu/ml   Identification and susceptibility pending  P      Resulting Agency [facility]   Susceptibility      Escherichia coli     CULTURE, URINE (Preliminary)     Amox/K Clav'ate >16/8  Resistant     Amp/Sulbactam >16/8  Resistant     Ampicillin >16  Resistant     Cefazolin >16  Resistant     Cefepime <=8  Sensitive     Ceftriaxone <=8  Sensitive     Ciprofloxacin >2  Resistant     Ertapenem <=2  Sensitive     Gentamicin <=4  Sensitive     Meropenem <=4  Sensitive     Nitrofurantoin <=32  Sensitive     Piperacillin/Tazo 64  Intermediate     Tetracycline >8  Resistant     Tobramycin <=4  Sensitive     Trimeth/Sulfa >2/38  Resistant             Significant Imaging:     CXR No acute process.  No significant change.    CT head   No acute intracranial process.  No significant change.    Repeat cxr No acute process.  No significant change.    8/25 ekg- repeat  Normal sinus rhythm  Early repolarization  Normal ECG  When compared with ECG of 25-AUG-2018 04:53,  No significant change was found  Confirmed by MAY SMALLS MD ([id number]) on 8/26/2018 1:55:56 PM    8/25 cxr Normal sinus rhythm  Normal ECG  When compared with ECG of 12-JUL-2018 00:13,  Vent. rate has decreased BY 37 BPM  Nonspecific T wave abnormality no longer evident in Inferior leads  Nonspecific T wave abnormality no longer evident in Lateral leads  Confirmed by MAY SMALLS MD ([id number]) on 8/26/2018 1:20:58 PM

## 2018-08-28 NOTE — PROGRESS NOTES
Nursing Notes  Report received from Steffi SHEPHERD.  Rounds to room with aSrahi CARRION.Patient stable    Huddle Comments

## 2018-08-28 NOTE — NURSING
Call made to [facility] ambulance for transport home. Ambulance to be here within the hour.

## 2018-08-28 NOTE — PT/OT/SLP DISCHARGE
Physical Therapy Discharge Summary    Name: Nghia Edgar Jr.  MRN: [id number]   Principal Problem: Complicated UTI (urinary tract infection)     Patient Discharged from acute Physical Therapy on 2018 following a.m. PT tx. session.  Please refer to prior PT noted date on 2018 for functional status.     Assessment:     Patient appropriate for care in another setting.    Objective:     GOALS:   Multidisciplinary Problems     Physical Therapy Goals     Not on file          Multidisciplinary Problems (Resolved)        Problem: Physical Therapy Goal    Goal Priority Disciplines Outcome Goal Variances Interventions   Physical Therapy Goal   (Resolved)     PT, PT/OT Outcome(s) achieved     Description:  Goals to be met by: upon D/C from facility     Patient will increase functional independence with mobility by performin. Pt will tolerate PROM to BLE at all joints in available planes of motion with rest breaks as needed to prevent contractures and skin breakdown.                      Reasons for Discontinuation of Therapy Services  Transfer to alternate level of care.      Plan:     Patient Discharged to: Home.    Doyle Bliss, PT  2018

## 2018-08-29 ENCOUNTER — TELEPHONE (OUTPATIENT)
Dept: INTERNAL MEDICINE | Facility: CLINIC | Age: 33
End: 2018-08-29

## 2018-08-29 ENCOUNTER — PATIENT OUTREACH (OUTPATIENT)
Dept: ADMINISTRATIVE | Facility: CLINIC | Age: 33
End: 2018-08-29

## 2018-08-29 NOTE — TELEPHONE ENCOUNTER
----- Message from Fior Benson RN sent at 8/29/2018  4:34 PM CDT -----  Patient reports continues to have cough with production of green sputum. Denies fever. Please contact and advise.    Respectfully,  Fior Benson, RICCON,RN,CCRN   Care Coordinator [place]  [phone]

## 2018-08-29 NOTE — PATIENT INSTRUCTIONS
Urinary Tract Infections in Men    Urinary tract infections (UTIs) are most often caused by bacteria that invade the urinary tract. The bacteria may come from outside the body. Or they may travel from the skin outside of rectum into the urethra. Pain in or around the urinary tract is a common symptom for most UTIs. But the only way to know for sure if you have a UTI is to have a urinalysis and urine culture.   Types of UTIs  · Cystitis: This is a bladder infection and is often linked to a blockage from an enlarged prostate. You may have an urgent or frequent need to urinate, and bloody urine. Treatment includes antibiotics and medicine to relax or shrink the prostate. Sometimes, surgery is needed.  · Urethritis: This is an infection of the urethra. You may have a discharge from the urethra or burning when you urinate. You may also have pain in the urethra or penis. It is treated with antibiotics.  · Prostatitis: This is an inflammation or infection of the prostate. You may have an urgent or frequent need to urinate, fever, or burning when you urinate. Or you may have a tender prostate, or a vague feeling of pressure. Prostatitis is treated with a range of medicines, depending on the cause.  · Pyelonephritis: This is a kidney infection. If not treated, it can be serious and damage your kidneys. In severe cases you may be hospitalized. You may have a fever and upper back pain.  Treating a UTI  · Medicine: Most UTIs are treated with antibiotics. These kill the bacteria. The length of time you need to take them depends on the type of infection. Take antibiotics exactly as directed until all of the medicine is gone. If you don't, the infection may not go away and may become harder to treat. For certain types of UTIs, you may be given other medicine to help treat your symptoms.  · Lifestyle changes: The lifestyle changes below will help get rid of your current infection. They may also help prevent future UTIs.  ? Drink  plenty of fluids such as water, juice, or other caffeine-free drinks. This helps flush bacteria out of your system.  ? Empty your bladder when you feel the urge to urinate and before going to sleep. Urine that stays in your bladder promotes infection.  ? Use condoms during sex. These help prevent UTIs caused by sexually transmitted bacteria.  ? Keep follow-up appointments with your healthcare provider. He or she can may do tests to make sure the infection has cleared. If needed, more treatment can be started.  · Other treatment: Most UTIs respond to medicine. But sometimes a procedure or surgery is needed. This can treat an enlarged prostate, or remove a kidney stone or other blockage. Surgery may also treat problems caused by scarring or long-term infections.  Date Last Reviewed: 1/1/2017 © 2000-2017 [employer]. [address] [place] [address]. All rights reserved. This information is not intended as a substitute for professional medical care. Always follow your healthcare professional's instructions.          Treating Pneumonia  Pneumonia is an infection of one or both of the lungs. Pneumonia:  · Is usually caused by either a virus or a bacteria  · Can be very serious, especially in infants, young children, and older adults. Its also serious for those with other long-term health problems or weakened immune systems.  · Is sometimes treated at home and sometimes in the hospital    Antibiotic medicines  Antibiotics may be prescribed for pneumonia caused by bacteria. They may be pills (oral medicines), or shots (injections). Or they may be given by IV (intravenously) into a vein. If you are taking oral medicines at home:  · Fill your prescription and start taking your medicine as soon as you can.  · You will likely start to feel better in a day or 2, but dont stop taking the antibiotic.  · Use a pill organizer to help you remember to take your medicine.  · Let your healthcare provider know  if you have side effects.  · Take your medicine exactly as directed on the label. Talk to your provider or pharmacist if you have any questions.  Antiviral medicines  Antiviral medicine may be prescribed for pneumonia caused by a virus. For example, antiviral medicine may be prescribed for pneumonia caused by the flu virus. Antibiotics do not work against viruses. If you are taking antiviral medicine at home:  · Fill your prescription and start taking your medicine as soon as you can.  · Talk with your provider or pharmacist about possible side effects.  · Take the medicine exactly as instructed.  To relieve symptoms  There are many medicines that can help relieve symptoms of pneumonia. Some are prescription and some are over-the-counter.  Your healthcare provider may recommend:  · Acetaminophen or ibuprofen to lower your fever and to lessen headache or other pain  · Cough medicine to loosen mucus or to reduce coughing  Make sure you check with your healthcare provider or pharmacist before taking any over-the-counter medicines.  Special treatments  If you are hospitalized for pneumonia, you may have other therapies, including:  · Inhaled medicines to help with breathing or chest congestion  · Supplemental oxygen to increase low oxygen levels  Drink fluids and eat healthy  You should eat healthy to help your body fight the infection. Drinking a lot of fluids helps to replace fluids lost from fever and to loosen mucus in your chest.  · Diet. Make healthy food choices, including fruits and vegetables, lean meats and other proteins, 100% whole grain and low- or no-fat dairy products.  · Fluids. Drink at least 6 to 8 tall glasses a day. Water and 100% fruit or vegetable juice are best.  Get plenty of rest and sleep  You may be more tired than usual for a while. It is important to get enough sleep at night. Its also important to rest during the day. Talk with your healthcare provider if coughing or other symptoms are  interfering with your sleep.  Preventing the spread of germs  The best thing you can do to prevent spreading germs is to wash your hands often. You should:  · Rub your hand with soap and water for 20 to 30 seconds.  · Clean in between your fingers, the backs of your hands, and around your nails.  · Dry your hands on a separate towel or use paper towels.  You should also:  · Keep alcohol-based hand  nearby.  · Make sure you also clean surfaces that you touch. Use a product that kills all types of germs.  · Stay away from others until you are feeling better.  When to call your healthcare provider  Call your healthcare provider if you have any of the following:  · Symptoms get worse  · Fever continues  · Shortness of breath gets worse  · Increased mucus or mucus that is darker in color  · Coughing gets worse  · Lips or fingers are bluish in color  · Side effects from your medicine   Date Last Reviewed: 12/1/2016  © 4920-3214 [employer]. [address] [place] [address], [place] [address]. All rights reserved. This information is not intended as a substitute for professional medical care. Always follow your healthcare professional's instructions.

## 2018-08-29 NOTE — PLAN OF CARE
08/29/18 0858   Final Note   Assessment Type Final Discharge Note   Discharge Disposition Home   What phone number can be called within the next 1-3 days to see how you are doing after discharge? [phone]   Hospital Follow Up  Appt(s) scheduled? Yes   Discharge plans and expectations educations in teach back method with documentation complete? Yes   Right Care Referral Info   Post Acute Recommendation Home-care  (Pt has round the clock PCA's and can't get HH)

## 2018-08-29 NOTE — TELEPHONE ENCOUNTER
Spoke with pt stated he has been having the cough since coming from the hospital. States no fever. Feels shortness of breath. Pt stated he wanted to be seen, was able to fit pt in for August 31st. Advised pt if he feels worse to go to the ER, verbalized understanding.

## 2018-08-29 NOTE — PROGRESS NOTES
"[facility] Script completed with patient. Patient reports continues to have cough, Tessalon not help. Message sent to Dr Hoover."  "

## 2018-08-30 LAB
BACTERIA BLD CULT: NORMAL
BACTERIA BLD CULT: NORMAL
BACTERIA UR CULT: NORMAL
BACTERIA UR CULT: NORMAL

## 2018-08-31 ENCOUNTER — PATIENT MESSAGE (OUTPATIENT)
Dept: PHYSICAL MEDICINE AND REHAB | Facility: CLINIC | Age: 33
End: 2018-08-31

## 2018-08-31 ENCOUNTER — OFFICE VISIT (OUTPATIENT)
Dept: INTERNAL MEDICINE | Facility: CLINIC | Age: 33
End: 2018-08-31
Payer: MEDICAID

## 2018-08-31 ENCOUNTER — OFFICE VISIT (OUTPATIENT)
Dept: UROLOGY | Facility: CLINIC | Age: 33
End: 2018-08-31
Payer: MEDICAID

## 2018-08-31 VITALS — HEART RATE: 67 BPM | DIASTOLIC BLOOD PRESSURE: 70 MMHG | TEMPERATURE: 98 F | SYSTOLIC BLOOD PRESSURE: 120 MMHG

## 2018-08-31 VITALS
DIASTOLIC BLOOD PRESSURE: 70 MMHG | HEART RATE: 67 BPM | HEIGHT: 69 IN | WEIGHT: 170 LBS | BODY MASS INDEX: 25.18 KG/M2 | SYSTOLIC BLOOD PRESSURE: 120 MMHG

## 2018-08-31 DIAGNOSIS — N31.9 NEUROGENIC BLADDER: Chronic | ICD-10-CM

## 2018-08-31 DIAGNOSIS — J40 BRONCHITIS: ICD-10-CM

## 2018-08-31 DIAGNOSIS — Z93.3 COLOSTOMY STATUS: Chronic | ICD-10-CM

## 2018-08-31 DIAGNOSIS — N31.9 NEUROGENIC BLADDER: Primary | ICD-10-CM

## 2018-08-31 DIAGNOSIS — K59.2 NEUROGENIC BOWEL: Chronic | ICD-10-CM

## 2018-08-31 DIAGNOSIS — G82.20 PARAPLEGIA: ICD-10-CM

## 2018-08-31 DIAGNOSIS — M79.2 NEUROPATHIC PAIN: Chronic | ICD-10-CM

## 2018-08-31 DIAGNOSIS — J45.909 ASTHMA, UNSPECIFIED ASTHMA SEVERITY, UNSPECIFIED WHETHER COMPLICATED, UNSPECIFIED WHETHER PERSISTENT: ICD-10-CM

## 2018-08-31 DIAGNOSIS — Z93.59 CHRONIC SUPRAPUBIC CATHETER: ICD-10-CM

## 2018-08-31 DIAGNOSIS — Z43.5 ENCOUNTER FOR CARE OR REPLACEMENT OF SUPRAPUBIC TUBE: ICD-10-CM

## 2018-08-31 DIAGNOSIS — G82.20 PARAPLEGIA FOLLOWING SPINAL CORD INJURY: Chronic | ICD-10-CM

## 2018-08-31 DIAGNOSIS — R05.9 COUGH: Primary | ICD-10-CM

## 2018-08-31 PROCEDURE — 99214 OFFICE O/P EST MOD 30 MIN: CPT | Mod: PBBFAC | Performed by: NURSE PRACTITIONER

## 2018-08-31 PROCEDURE — 99214 OFFICE O/P EST MOD 30 MIN: CPT | Mod: S$PBB,25,, | Performed by: NURSE PRACTITIONER

## 2018-08-31 PROCEDURE — 99999 PR PBB SHADOW E&M-EST. PATIENT-LVL IV: CPT | Mod: PBBFAC,,, | Performed by: NURSE PRACTITIONER

## 2018-08-31 PROCEDURE — 99213 OFFICE O/P EST LOW 20 MIN: CPT | Mod: PBBFAC,27,25 | Performed by: INTERNAL MEDICINE

## 2018-08-31 PROCEDURE — 99214 OFFICE O/P EST MOD 30 MIN: CPT | Mod: S$PBB,,, | Performed by: INTERNAL MEDICINE

## 2018-08-31 PROCEDURE — 51705 CHANGE OF BLADDER TUBE: CPT | Mod: S$PBB,,, | Performed by: NURSE PRACTITIONER

## 2018-08-31 PROCEDURE — 94640 AIRWAY INHALATION TREATMENT: CPT | Mod: PBBFAC

## 2018-08-31 PROCEDURE — 51705 CHANGE OF BLADDER TUBE: CPT | Mod: PBBFAC | Performed by: NURSE PRACTITIONER

## 2018-08-31 PROCEDURE — 99999 PR PBB SHADOW E&M-EST. PATIENT-LVL III: CPT | Mod: PBBFAC,,, | Performed by: INTERNAL MEDICINE

## 2018-08-31 RX ORDER — PROMETHAZINE HYDROCHLORIDE 6.25 MG/5ML
5 SYRUP ORAL NIGHTLY PRN
Qty: 240 ML | Refills: 1 | Status: SHIPPED | OUTPATIENT
Start: 2018-08-31 | End: 2018-11-27 | Stop reason: SDUPTHER

## 2018-08-31 RX ORDER — OXYCODONE AND ACETAMINOPHEN 10; 325 MG/1; MG/1
TABLET ORAL
Qty: 135 TABLET | Refills: 0 | Status: SHIPPED | OUTPATIENT
Start: 2018-08-31 | End: 2018-09-28 | Stop reason: SDUPTHER

## 2018-08-31 RX ORDER — IPRATROPIUM BROMIDE AND ALBUTEROL SULFATE 2.5; .5 MG/3ML; MG/3ML
3 SOLUTION RESPIRATORY (INHALATION)
Status: COMPLETED | OUTPATIENT
Start: 2018-08-31 | End: 2018-08-31

## 2018-08-31 RX ORDER — IPRATROPIUM BROMIDE AND ALBUTEROL SULFATE 2.5; .5 MG/3ML; MG/3ML
3 SOLUTION RESPIRATORY (INHALATION) EVERY 6 HOURS PRN
Qty: 1 BOX | Refills: 6 | Status: SHIPPED | OUTPATIENT
Start: 2018-08-31 | End: 2020-03-31 | Stop reason: SDUPTHER

## 2018-08-31 RX ORDER — OXYCODONE HCL 40 MG/1
40 TABLET, FILM COATED, EXTENDED RELEASE ORAL EVERY 12 HOURS
Qty: 60 TABLET | Refills: 0 | Status: SHIPPED | OUTPATIENT
Start: 2018-08-31 | End: 2018-10-15 | Stop reason: SDUPTHER

## 2018-08-31 RX ORDER — LORATADINE 10 MG/1
10 TABLET ORAL DAILY
Qty: 30 TABLET | Refills: 3 | Status: SHIPPED | OUTPATIENT
Start: 2018-08-31 | End: 2018-12-23 | Stop reason: SDUPTHER

## 2018-08-31 RX ADMIN — IPRATROPIUM BROMIDE AND ALBUTEROL SULFATE 3 ML: .5; 2.5 SOLUTION RESPIRATORY (INHALATION) at 12:08

## 2018-08-31 NOTE — PROGRESS NOTES
Subjective:       Patient ID: Nghia Edgar Jr. is a 32 y.o. male.    Chief Complaint: Follow-up   this is a 32-year-old who presents today for follow-up.  Patient reports that he had improvement in his decubitus after his surgery no further breakdown.  He has had trouble intermittently with urine infections following with Urology and has neurogenic bladder.  He also continues to follow with his Physical Medicine doctors for his chronic pain.   Patient has been having issues recently with upper respiratory infection and cough he has been hospitalized for UTI then had ER visit where he was prescribed medications he completed a course of Levaquin recently and feels that he has had continued cough no fevers chills and recent chest x-ray showed no pneumonia does have history of mild intermittent asthma he reports he does not have nebulizer at this time but this has worked for him when he is hospitalized he has difficulty getting up the mucus due to his condition.  He is needs cough syrup today he states  .  He has been using Mucinex and Tessalon Perles.  No worsening but his symptoms distal seem to be resolving    HPI  Review of Systems   Constitutional: Negative for chills, fever and unexpected weight change.   Respiratory: Positive for cough and wheezing. Negative for chest tightness and shortness of breath.    Cardiovascular: Negative for chest pain and palpitations.   Gastrointestinal: Negative for abdominal pain, nausea and vomiting.   Neurological: Negative for weakness.       Objective:     Blood pressure 120/70, pulse 67, temperature 97.8 °F (36.6 °C).    Physical Exam   Constitutional: No distress.   Lying on stretcher    HENT:   Head: Normocephalic.   Mouth/Throat: Oropharynx is clear and moist.   Eyes: No scleral icterus.   Neck: Neck supple.   Cardiovascular: Normal rate, regular rhythm and normal heart sounds. Exam reveals no gallop and no friction rub.   No murmur heard.  Pulmonary/Chest: Effort  normal and breath sounds normal. No respiratory distress.   Course rare wheeze clears with cough    Abdominal: Soft. Bowel sounds are normal. He exhibits no mass. There is no tenderness.   colosteomy bag in place  Catheter  Baclofen pump    Musculoskeletal: He exhibits no edema.   Healed    Neurological: He is alert.   Skin: No erythema.   Psychiatric: He has a normal mood and affect.   Vitals reviewed.      Assessment:       1. Cough    2. Asthma, unspecified asthma severity, unspecified whether complicated, unspecified whether persistent    3. Bronchitis    4. Paraplegia    5. Neurogenic bladder    6. Neurogenic bowel    7. Colostomy status    8. Paraplegia following spinal cord injury        Plan:       Nghia was seen today for follow-up.    Diagnoses and all orders for this visit:    Cough  -     albuterol-ipratropium 2.5 mg-0.5 mg/3 mL nebulizer solution 3 mL; Take 3 mLs by nebulization one time.  Patient giving a breathing treatment in the office with some improvement today and clearing of his symptoms   Feel he would benefit from having 1 at home to use he has a care attendendat at home   -     NEBULIZER FOR HOME USE    Asthma, unspecified asthma severity, unspecified whether complicated, unspecified whether persistent  -     NEBULIZER FOR HOME USE    Bronchitis  -     NEBULIZER FOR HOME USE    Paraplegia  -     NEBULIZER FOR HOME USE    Neurogenic bladder  Neurogenic bowel  Colostomy status  He continues to follow with specialist had and urology appointment earlier today    Paraplegia following spinal cord injury  He continues to follow with Physical Medicine    Post nasal drip and for cough discussed trial rx printed and proviedd   -     loratadine (CLARITIN) 10 mg tablet; Take 1 tablet (10 mg total) by mouth once daily.  -     promethazine (PHENERGAN) 6.25 mg/5 mL syrup; Take 5 mLs (6.25 mg total) by mouth nightly as needed (cough).  -     albuterol-ipratropium (DUO-NEB) 2.5 mg-0.5 mg/3 mL nebulizer  solution; Take 3 mLs by nebulization every 6 (six) hours as needed for Wheezing (cough). Rescue  Home nbulizer use    He will seek attention if no improvement or symtpoms progress consider pulmonary follow up discussed if no resoltuion   Head of bed elevation as able
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
## 2018-09-05 DIAGNOSIS — F41.9 ANXIETY: ICD-10-CM

## 2018-09-05 RX ORDER — DIAZEPAM 10 MG/1
10 TABLET ORAL 3 TIMES DAILY PRN
Qty: 90 TABLET | Refills: 5 | Status: SHIPPED | OUTPATIENT
Start: 2018-09-05 | End: 2019-01-24 | Stop reason: SDUPTHER

## 2018-09-07 ENCOUNTER — PATIENT MESSAGE (OUTPATIENT)
Dept: PHYSICAL MEDICINE AND REHAB | Facility: CLINIC | Age: 33
End: 2018-09-07

## 2018-09-10 ENCOUNTER — PATIENT MESSAGE (OUTPATIENT)
Dept: INTERNAL MEDICINE | Facility: CLINIC | Age: 33
End: 2018-09-10

## 2018-09-11 ENCOUNTER — PATIENT MESSAGE (OUTPATIENT)
Dept: PHYSICAL MEDICINE AND REHAB | Facility: CLINIC | Age: 33
End: 2018-09-11

## 2018-09-11 ENCOUNTER — PATIENT MESSAGE (OUTPATIENT)
Dept: INTERNAL MEDICINE | Facility: CLINIC | Age: 33
End: 2018-09-11

## 2018-09-12 ENCOUNTER — PATIENT MESSAGE (OUTPATIENT)
Dept: PHYSICAL MEDICINE AND REHAB | Facility: CLINIC | Age: 33
End: 2018-09-12

## 2018-09-15 ENCOUNTER — PATIENT MESSAGE (OUTPATIENT)
Dept: PHYSICAL MEDICINE AND REHAB | Facility: CLINIC | Age: 33
End: 2018-09-15

## 2018-09-17 ENCOUNTER — PATIENT MESSAGE (OUTPATIENT)
Dept: PHYSICAL MEDICINE AND REHAB | Facility: CLINIC | Age: 33
End: 2018-09-17

## 2018-09-17 ENCOUNTER — TELEPHONE (OUTPATIENT)
Dept: PHYSICAL MEDICINE AND REHAB | Facility: CLINIC | Age: 33
End: 2018-09-17

## 2018-09-17 NOTE — TELEPHONE ENCOUNTER
----- Message from Hardeep Marti sent at 9/14/2018  9:55 AM CDT -----  Needs Advice    Reason for call: [facility] is requesting an opiate and analgesic worksheet before they can complete a peer 2 peer. Pls fax to  and include ref# [id number].        Communication Preference: Selina boggs/ Donny Hunt @ [phone]      Additional Information:

## 2018-09-18 ENCOUNTER — PATIENT MESSAGE (OUTPATIENT)
Dept: PHYSICAL MEDICINE AND REHAB | Facility: CLINIC | Age: 33
End: 2018-09-18

## 2018-09-18 RX ORDER — ALBUTEROL SULFATE 90 UG/1
1-2 AEROSOL, METERED RESPIRATORY (INHALATION) EVERY 6 HOURS PRN
Qty: 18 G | Refills: 3 | Status: SHIPPED | OUTPATIENT
Start: 2018-09-18

## 2018-09-21 ENCOUNTER — PATIENT MESSAGE (OUTPATIENT)
Dept: UROLOGY | Facility: CLINIC | Age: 33
End: 2018-09-21

## 2018-09-25 ENCOUNTER — OFFICE VISIT (OUTPATIENT)
Dept: UROLOGY | Facility: CLINIC | Age: 33
End: 2018-09-25
Payer: MEDICAID

## 2018-09-25 VITALS
SYSTOLIC BLOOD PRESSURE: 103 MMHG | HEIGHT: 69 IN | DIASTOLIC BLOOD PRESSURE: 58 MMHG | HEART RATE: 60 BPM | BODY MASS INDEX: 25.03 KG/M2 | WEIGHT: 169 LBS

## 2018-09-25 DIAGNOSIS — N31.9 NEUROGENIC BLADDER: Primary | ICD-10-CM

## 2018-09-25 DIAGNOSIS — Z93.59 CHRONIC SUPRAPUBIC CATHETER: ICD-10-CM

## 2018-09-25 DIAGNOSIS — R33.9 URINARY RETENTION: ICD-10-CM

## 2018-09-25 DIAGNOSIS — Z43.5 ENCOUNTER FOR CARE OR REPLACEMENT OF SUPRAPUBIC TUBE: ICD-10-CM

## 2018-09-25 PROCEDURE — 99999 PR PBB SHADOW E&M-EST. PATIENT-LVL IV: CPT | Mod: PBBFAC,,, | Performed by: NURSE PRACTITIONER

## 2018-09-25 PROCEDURE — 51705 CHANGE OF BLADDER TUBE: CPT | Mod: S$PBB,,, | Performed by: NURSE PRACTITIONER

## 2018-09-25 PROCEDURE — 99214 OFFICE O/P EST MOD 30 MIN: CPT | Mod: PBBFAC,25 | Performed by: NURSE PRACTITIONER

## 2018-09-25 PROCEDURE — 99214 OFFICE O/P EST MOD 30 MIN: CPT | Mod: S$PBB,25,, | Performed by: NURSE PRACTITIONER

## 2018-09-25 PROCEDURE — 51705 CHANGE OF BLADDER TUBE: CPT | Mod: PBBFAC | Performed by: NURSE PRACTITIONER
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
## 2018-09-28 ENCOUNTER — TELEPHONE (OUTPATIENT)
Dept: PHYSICAL MEDICINE AND REHAB | Facility: CLINIC | Age: 33
End: 2018-09-28

## 2018-09-28 ENCOUNTER — PATIENT MESSAGE (OUTPATIENT)
Dept: PHYSICAL MEDICINE AND REHAB | Facility: CLINIC | Age: 33
End: 2018-09-28

## 2018-09-28 DIAGNOSIS — M79.2 NEUROPATHIC PAIN: Chronic | ICD-10-CM

## 2018-09-28 RX ORDER — OXYCODONE AND ACETAMINOPHEN 10; 325 MG/1; MG/1
TABLET ORAL
Qty: 135 TABLET | Refills: 0 | Status: SHIPPED | OUTPATIENT
Start: 2018-09-28 | End: 2018-10-29 | Stop reason: SDUPTHER

## 2018-10-09 NOTE — PLAN OF CARE
SHAE following for DC needs. SHAE in communication with CM.    SHAE sent additional LTAC referrals to [facility] and [facility] via [facility].     SHAE escalated this case to  supervisor, Frannie.     UPDATE 2:19 PM  SW received call from [facility]. Patient is declined by [facility] due to no clinitron bed.     Malu Quintanilla, KARIME  [facility]  [id number]       n/a

## 2018-10-15 ENCOUNTER — PATIENT MESSAGE (OUTPATIENT)
Dept: PHYSICAL MEDICINE AND REHAB | Facility: CLINIC | Age: 33
End: 2018-10-15

## 2018-10-15 ENCOUNTER — PATIENT MESSAGE (OUTPATIENT)
Dept: UROLOGY | Facility: CLINIC | Age: 33
End: 2018-10-15

## 2018-10-15 DIAGNOSIS — A49.9 BACTERIAL UTI: Primary | ICD-10-CM

## 2018-10-15 DIAGNOSIS — N39.0 BACTERIAL UTI: Primary | ICD-10-CM

## 2018-10-15 DIAGNOSIS — M79.2 NEUROPATHIC PAIN: Chronic | ICD-10-CM

## 2018-10-15 RX ORDER — NITROFURANTOIN 25; 75 MG/1; MG/1
100 CAPSULE ORAL 2 TIMES DAILY
Qty: 20 CAPSULE | Refills: 0 | Status: SHIPPED | OUTPATIENT
Start: 2018-10-15 | End: 2018-10-25

## 2018-10-15 RX ORDER — OXYCODONE HCL 40 MG/1
40 TABLET, FILM COATED, EXTENDED RELEASE ORAL EVERY 12 HOURS
Qty: 60 TABLET | Refills: 0 | Status: SHIPPED | OUTPATIENT
Start: 2018-10-15 | End: 2018-11-15 | Stop reason: SDUPTHER

## 2018-10-16 ENCOUNTER — OFFICE VISIT (OUTPATIENT)
Dept: UROLOGY | Facility: CLINIC | Age: 33
End: 2018-10-16
Payer: MEDICAID

## 2018-10-16 VITALS
DIASTOLIC BLOOD PRESSURE: 56 MMHG | SYSTOLIC BLOOD PRESSURE: 116 MMHG | BODY MASS INDEX: 25.03 KG/M2 | HEART RATE: 55 BPM | WEIGHT: 169 LBS | HEIGHT: 69 IN

## 2018-10-16 DIAGNOSIS — R82.90 ABNORMAL URINE ODOR: ICD-10-CM

## 2018-10-16 DIAGNOSIS — R33.9 URINARY RETENTION: ICD-10-CM

## 2018-10-16 DIAGNOSIS — Z43.5 ENCOUNTER FOR CARE OR REPLACEMENT OF SUPRAPUBIC TUBE: ICD-10-CM

## 2018-10-16 DIAGNOSIS — N31.9 NEUROGENIC BLADDER: Primary | ICD-10-CM

## 2018-10-16 DIAGNOSIS — Z93.59 CHRONIC SUPRAPUBIC CATHETER: ICD-10-CM

## 2018-10-16 PROCEDURE — 87088 URINE BACTERIA CULTURE: CPT

## 2018-10-16 PROCEDURE — 99214 OFFICE O/P EST MOD 30 MIN: CPT | Mod: S$PBB,25,, | Performed by: NURSE PRACTITIONER

## 2018-10-16 PROCEDURE — 51705 CHANGE OF BLADDER TUBE: CPT | Mod: S$PBB,,, | Performed by: NURSE PRACTITIONER

## 2018-10-16 PROCEDURE — 51705 CHANGE OF BLADDER TUBE: CPT | Mod: PBBFAC | Performed by: NURSE PRACTITIONER

## 2018-10-16 PROCEDURE — 87086 URINE CULTURE/COLONY COUNT: CPT

## 2018-10-16 PROCEDURE — 99214 OFFICE O/P EST MOD 30 MIN: CPT | Mod: PBBFAC | Performed by: NURSE PRACTITIONER

## 2018-10-16 PROCEDURE — 87186 SC STD MICRODIL/AGAR DIL: CPT

## 2018-10-16 PROCEDURE — 87077 CULTURE AEROBIC IDENTIFY: CPT

## 2018-10-16 PROCEDURE — 99999 PR PBB SHADOW E&M-EST. PATIENT-LVL IV: CPT | Mod: PBBFAC,,, | Performed by: NURSE PRACTITIONER
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
## 2018-10-19 LAB — BACTERIA UR CULT: NORMAL

## 2018-10-29 ENCOUNTER — PATIENT MESSAGE (OUTPATIENT)
Dept: PHYSICAL MEDICINE AND REHAB | Facility: CLINIC | Age: 33
End: 2018-10-29

## 2018-10-29 DIAGNOSIS — M79.2 NEUROPATHIC PAIN: Chronic | ICD-10-CM

## 2018-10-29 RX ORDER — OXYCODONE AND ACETAMINOPHEN 10; 325 MG/1; MG/1
TABLET ORAL
Qty: 135 TABLET | Refills: 0 | Status: SHIPPED | OUTPATIENT
Start: 2018-10-29 | End: 2018-11-30 | Stop reason: SDUPTHER

## 2018-11-06 ENCOUNTER — OFFICE VISIT (OUTPATIENT)
Dept: UROLOGY | Facility: CLINIC | Age: 33
End: 2018-11-06
Payer: MEDICAID

## 2018-11-06 VITALS
DIASTOLIC BLOOD PRESSURE: 55 MMHG | SYSTOLIC BLOOD PRESSURE: 109 MMHG | BODY MASS INDEX: 25.03 KG/M2 | HEIGHT: 69 IN | HEART RATE: 57 BPM | WEIGHT: 169 LBS

## 2018-11-06 DIAGNOSIS — N31.9 NEUROGENIC BLADDER: Primary | ICD-10-CM

## 2018-11-06 DIAGNOSIS — R33.9 URINARY RETENTION: ICD-10-CM

## 2018-11-06 DIAGNOSIS — Z93.59 CHRONIC SUPRAPUBIC CATHETER: ICD-10-CM

## 2018-11-06 DIAGNOSIS — G82.50 TETRAPLEGIA: ICD-10-CM

## 2018-11-06 DIAGNOSIS — Z43.5 ENCOUNTER FOR CARE OR REPLACEMENT OF SUPRAPUBIC TUBE: ICD-10-CM

## 2018-11-06 PROCEDURE — 99214 OFFICE O/P EST MOD 30 MIN: CPT | Mod: S$PBB,25,, | Performed by: NURSE PRACTITIONER

## 2018-11-06 PROCEDURE — 51705 CHANGE OF BLADDER TUBE: CPT | Mod: PBBFAC | Performed by: NURSE PRACTITIONER

## 2018-11-06 PROCEDURE — 51705 CHANGE OF BLADDER TUBE: CPT | Mod: S$PBB,,, | Performed by: NURSE PRACTITIONER

## 2018-11-06 PROCEDURE — 99215 OFFICE O/P EST HI 40 MIN: CPT | Mod: PBBFAC | Performed by: NURSE PRACTITIONER

## 2018-11-06 PROCEDURE — 99999 PR PBB SHADOW E&M-EST. PATIENT-LVL V: CPT | Mod: PBBFAC,,, | Performed by: NURSE PRACTITIONER
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
## 2018-11-08 ENCOUNTER — CLINICAL SUPPORT (OUTPATIENT)
Dept: REHABILITATION | Facility: HOSPITAL | Age: 33
End: 2018-11-08
Payer: MEDICAID

## 2018-11-08 DIAGNOSIS — R29.818 POOR TRUNK CONTROL: ICD-10-CM

## 2018-11-08 DIAGNOSIS — R29.818 DIFFICULTY BALANCING WHEN SITTING: ICD-10-CM

## 2018-11-08 PROCEDURE — 97162 PT EVAL MOD COMPLEX 30 MIN: CPT | Mod: PN
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
## 2018-11-14 ENCOUNTER — PATIENT MESSAGE (OUTPATIENT)
Dept: PLASTIC SURGERY | Facility: CLINIC | Age: 33
End: 2018-11-14

## 2018-11-15 ENCOUNTER — PATIENT MESSAGE (OUTPATIENT)
Dept: PHYSICAL MEDICINE AND REHAB | Facility: CLINIC | Age: 33
End: 2018-11-15

## 2018-11-15 DIAGNOSIS — M79.2 NEUROPATHIC PAIN: Chronic | ICD-10-CM

## 2018-11-15 RX ORDER — OXYCODONE HCL 40 MG/1
40 TABLET, FILM COATED, EXTENDED RELEASE ORAL EVERY 12 HOURS
Qty: 60 TABLET | Refills: 0 | Status: SHIPPED | OUTPATIENT
Start: 2018-11-15 | End: 2018-12-14 | Stop reason: SDUPTHER

## 2018-11-19 ENCOUNTER — PATIENT MESSAGE (OUTPATIENT)
Dept: UROLOGY | Facility: CLINIC | Age: 33
End: 2018-11-19

## 2018-11-19 DIAGNOSIS — N39.0 BACTERIAL UTI: Primary | ICD-10-CM

## 2018-11-19 DIAGNOSIS — A49.9 BACTERIAL UTI: Primary | ICD-10-CM

## 2018-11-19 DIAGNOSIS — R30.0 DYSURIA: ICD-10-CM

## 2018-11-19 RX ORDER — AMOXICILLIN AND CLAVULANATE POTASSIUM 500; 125 MG/1; MG/1
1 TABLET, FILM COATED ORAL
Qty: 30 TABLET | Refills: 0 | Status: SHIPPED | OUTPATIENT
Start: 2018-11-19 | End: 2018-11-29

## 2018-11-21 ENCOUNTER — OFFICE VISIT (OUTPATIENT)
Dept: PLASTIC SURGERY | Facility: CLINIC | Age: 33
End: 2018-11-21
Payer: MEDICAID

## 2018-11-21 VITALS
SYSTOLIC BLOOD PRESSURE: 100 MMHG | DIASTOLIC BLOOD PRESSURE: 65 MMHG | RESPIRATION RATE: 12 BRPM | HEIGHT: 69 IN | WEIGHT: 169 LBS | TEMPERATURE: 99 F | BODY MASS INDEX: 25.03 KG/M2 | HEART RATE: 59 BPM

## 2018-11-21 DIAGNOSIS — Z09 SURGERY FOLLOW-UP EXAMINATION: Primary | ICD-10-CM

## 2018-11-21 PROCEDURE — 99999 PR PBB SHADOW E&M-EST. PATIENT-LVL III: CPT | Mod: PBBFAC,,, | Performed by: SURGERY

## 2018-11-21 PROCEDURE — 99213 OFFICE O/P EST LOW 20 MIN: CPT | Mod: PBBFAC,PO | Performed by: SURGERY

## 2018-11-21 PROCEDURE — 99212 OFFICE O/P EST SF 10 MIN: CPT | Mod: S$PBB,,, | Performed by: SURGERY

## 2018-11-21 NOTE — PROGRESS NOTES
Doing well Flaps are healed.  But, now has some shear abrasions on the anterior aspect of each flap.  Told pt he needs to be out of his wheelchair for 4 weeks.

## 2018-11-26 ENCOUNTER — PATIENT MESSAGE (OUTPATIENT)
Dept: INTERNAL MEDICINE | Facility: CLINIC | Age: 33
End: 2018-11-26

## 2018-11-27 RX ORDER — PROMETHAZINE HYDROCHLORIDE 6.25 MG/5ML
5 SYRUP ORAL NIGHTLY PRN
Qty: 240 ML | Refills: 1 | Status: SHIPPED | OUTPATIENT
Start: 2018-11-27 | End: 2019-05-02 | Stop reason: SDUPTHER

## 2018-11-30 ENCOUNTER — PATIENT MESSAGE (OUTPATIENT)
Dept: PHYSICAL MEDICINE AND REHAB | Facility: CLINIC | Age: 33
End: 2018-11-30

## 2018-11-30 DIAGNOSIS — M79.2 NEUROPATHIC PAIN: Chronic | ICD-10-CM

## 2018-11-30 RX ORDER — OXYCODONE AND ACETAMINOPHEN 10; 325 MG/1; MG/1
TABLET ORAL
Qty: 135 TABLET | Refills: 0 | Status: SHIPPED | OUTPATIENT
Start: 2018-11-30 | End: 2018-12-31 | Stop reason: SDUPTHER

## 2018-12-04 ENCOUNTER — TELEPHONE (OUTPATIENT)
Dept: INTERNAL MEDICINE | Facility: CLINIC | Age: 33
End: 2018-12-04

## 2018-12-04 ENCOUNTER — OFFICE VISIT (OUTPATIENT)
Dept: UROLOGY | Facility: CLINIC | Age: 33
End: 2018-12-04
Payer: MEDICAID

## 2018-12-04 ENCOUNTER — HOSPITAL ENCOUNTER (OUTPATIENT)
Dept: RADIOLOGY | Facility: HOSPITAL | Age: 33
Discharge: HOME | End: 2018-12-04
Attending: INTERNAL MEDICINE
Payer: MEDICAID

## 2018-12-04 ENCOUNTER — OFFICE VISIT (OUTPATIENT)
Dept: INTERNAL MEDICINE | Facility: CLINIC | Age: 33
End: 2018-12-04
Payer: MEDICAID

## 2018-12-04 DIAGNOSIS — R05.9 COUGH: Primary | ICD-10-CM

## 2018-12-04 DIAGNOSIS — L89.90 PRESSURE INJURY OF SKIN, UNSPECIFIED INJURY STAGE, UNSPECIFIED LOCATION: ICD-10-CM

## 2018-12-04 DIAGNOSIS — M25.551 PAIN OF RIGHT HIP JOINT: ICD-10-CM

## 2018-12-04 DIAGNOSIS — N32.89 BLADDER SPASM: ICD-10-CM

## 2018-12-04 DIAGNOSIS — Z43.5 ENCOUNTER FOR CARE OR REPLACEMENT OF SUPRAPUBIC TUBE: ICD-10-CM

## 2018-12-04 DIAGNOSIS — Z93.3 COLOSTOMY STATUS: Chronic | ICD-10-CM

## 2018-12-04 DIAGNOSIS — R05.9 COUGH: ICD-10-CM

## 2018-12-04 DIAGNOSIS — N31.9 NEUROGENIC BLADDER: Primary | ICD-10-CM

## 2018-12-04 DIAGNOSIS — K59.2 NEUROGENIC BOWEL: Chronic | ICD-10-CM

## 2018-12-04 DIAGNOSIS — N31.9 NEUROGENIC BLADDER: Chronic | ICD-10-CM

## 2018-12-04 DIAGNOSIS — Z93.59 CHRONIC SUPRAPUBIC CATHETER: ICD-10-CM

## 2018-12-04 DIAGNOSIS — G82.20 PARAPLEGIA FOLLOWING SPINAL CORD INJURY: Chronic | ICD-10-CM

## 2018-12-04 PROCEDURE — 73502 X-RAY EXAM HIP UNI 2-3 VIEWS: CPT | Mod: TC,RT

## 2018-12-04 PROCEDURE — 99999 PR PBB SHADOW E&M-EST. PATIENT-LVL IV: CPT | Mod: PBBFAC,,, | Performed by: INTERNAL MEDICINE

## 2018-12-04 PROCEDURE — 99214 OFFICE O/P EST MOD 30 MIN: CPT | Mod: PBBFAC,25,27 | Performed by: INTERNAL MEDICINE

## 2018-12-04 PROCEDURE — 73502 X-RAY EXAM HIP UNI 2-3 VIEWS: CPT | Mod: 26,RT,, | Performed by: RADIOLOGY

## 2018-12-04 PROCEDURE — 99999 PR PBB SHADOW E&M-EST. PATIENT-LVL IV: CPT | Mod: PBBFAC,,, | Performed by: NURSE PRACTITIONER

## 2018-12-04 PROCEDURE — 71046 X-RAY EXAM CHEST 2 VIEWS: CPT | Mod: 26,,, | Performed by: RADIOLOGY

## 2018-12-04 PROCEDURE — 51705 CHANGE OF BLADDER TUBE: CPT | Mod: S$PBB,,, | Performed by: NURSE PRACTITIONER

## 2018-12-04 PROCEDURE — 99214 OFFICE O/P EST MOD 30 MIN: CPT | Mod: S$PBB,25,, | Performed by: NURSE PRACTITIONER

## 2018-12-04 PROCEDURE — 99214 OFFICE O/P EST MOD 30 MIN: CPT | Mod: PBBFAC,25 | Performed by: NURSE PRACTITIONER

## 2018-12-04 PROCEDURE — 71046 X-RAY EXAM CHEST 2 VIEWS: CPT | Mod: TC

## 2018-12-04 PROCEDURE — 51705 CHANGE OF BLADDER TUBE: CPT | Mod: PBBFAC | Performed by: NURSE PRACTITIONER

## 2018-12-04 PROCEDURE — 99214 OFFICE O/P EST MOD 30 MIN: CPT | Mod: S$PBB,,, | Performed by: INTERNAL MEDICINE

## 2018-12-04 RX ORDER — DRESSING,ODOR ABSORBENT,H-POLY 4" X 4"
BANDAGE MISCELLANEOUS
Qty: 30 EACH | Refills: 6 | Status: SHIPPED | OUTPATIENT
Start: 2018-12-04 | End: 2019-01-02
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
## 2018-12-04 NOTE — TELEPHONE ENCOUNTER
Pt requets   Fax of gauze bandage collooid dressing  [facility] [employer] [phone]    Reports fax number [phone]    Please fax as requested

## 2018-12-05 VITALS
HEIGHT: 69 IN | HEART RATE: 58 BPM | TEMPERATURE: 98 F | SYSTOLIC BLOOD PRESSURE: 116 MMHG | BODY MASS INDEX: 24.96 KG/M2 | DIASTOLIC BLOOD PRESSURE: 67 MMHG

## 2018-12-05 NOTE — PROGRESS NOTES
"Subjective:       Patient ID: Nghia Edgar Jr. is a 33 y.o. male.    Chief Complaint: Follow-up and Cough   This is a 33-year-old who presents today for his follow-up he reports he continues to have trouble on occasion with cough.  He has some sinus congestion as well he has been using his cough medication and haler is with some improvement he denies fevers or chills.  He does need a prescription for some of his supplies for wound care he has been following with Plastic surgery and wound care at home and needs a prescription sent to his [facility] care [facility] [phone] fax for supplies  Patient also has had trouble with decubitus on and off has recently seen plastic surgery and they have recommended he remain out of his wheelchair as his wounds continued to heal.  His he is also requesting a prescription for an alternate mattress he has a crate.  He does sleep in a regular bed he reports his but has a hospital bed at home as well    HPI  Review of Systems   Constitutional: Negative for activity change and unexpected weight change.   HENT: Positive for rhinorrhea. Negative for hearing loss and trouble swallowing.    Eyes: Negative for discharge and visual disturbance.   Respiratory: Negative for chest tightness and wheezing.    Cardiovascular: Negative for chest pain and palpitations.   Gastrointestinal: Negative for blood in stool, constipation, diarrhea and vomiting.   Endocrine: Negative for polydipsia and polyuria.   Genitourinary: Negative for difficulty urinating, hematuria and urgency.   Musculoskeletal: Positive for arthralgias, joint swelling and neck pain.   Neurological: Positive for weakness. Negative for headaches.   Psychiatric/Behavioral: Negative for confusion and dysphoric mood.       Objective:     Blood pressure 116/67, pulse (!) 58, height 5' 9" (1.753 m).    Physical Exam   Constitutional: No distress.   Laying on tretcher    HENT:   Head: Normocephalic.   Mouth/Throat: Oropharynx " "is clear and moist.   Rhinitis    Eyes: No scleral icterus.   Neck: Neck supple.   Cardiovascular: Normal rate, regular rhythm and normal heart sounds. Exam reveals no gallop and no friction rub.   No murmur heard.  Pulmonary/Chest: Effort normal and breath sounds normal. No respiratory distress.   Abdominal: Soft. Bowel sounds are normal. He exhibits no mass. There is no tenderness.   Colostomy   Catheter     Baclofen pump    Musculoskeletal: He exhibits no edema.   Cyst    Neurological: He is alert.   Skin: No erythema.   Psychiatric: He has a normal mood and affect.   Vitals reviewed.      Assessment:       1. Cough    2. Pressure injury of skin, unspecified injury stage, unspecified location    3. Pain of right hip joint    4. Neurogenic bladder    5. Neurogenic bowel    6. Paraplegia following spinal cord injury    7. Colostomy status        Plan:       Nghia was seen today for follow-up and cough.    Diagnoses and all orders for this visit:    Cough  Will check chest x-ray we discussed head of bed is elevation continue cough syrup as needed the nebulizer when he starts problematic coughing call if no improvement or increased concern signs of secondary infection  -     X-Ray Chest PA And Lateral; Future  -     Basic metabolic panel; Future  -     CBC auto differential; Future  -     Hepatic function panel; Future    Pressure injury of skin, unspecified injury stage, unspecified location  -     HME - OTHER    Pain of right hip   Bothers hiim at times feels a lump   -     X-Ray Hip 2 View Right; Future    Neurogenic bladder  Is he continues to follow with Urology    Neurogenic bowel  Has colostomy    Paraplegia following spinal cord injury    Decubitus  Pt shows picture left ischium 2.5 x 2.3 he reports from wound care meausres  He continues to follow with Plastic surgery he had previous flap and supplies were faxed as requested  -     hydrocolloid dressing 4 X 4 " Bndg; Apply medihoney gel to right ischial " wound, cover with hydrofiber and gauze and secure with a mepore island dressing. Change all dressings three times weekly  -     gauze bandage Bndg; Apply medihoney gel to left ischial wound, 2.5 x 2.3 cmcover with hydrofiber and gauze and secure with a mepore island dressing. Change all dressings three times weekly    Labs and review

## 2018-12-05 NOTE — PROGRESS NOTES
Answers for HPI/ROS submitted by the patient on 11/30/2018   activity change: No  unexpected weight change: No  neck pain: Yes  hearing loss: No  rhinorrhea: Yes  trouble swallowing: No  eye discharge: No  visual disturbance: No  chest tightness: No  wheezing: No  chest pain: No  palpitations: No  blood in stool: No  constipation: No  vomiting: No  diarrhea: No  polydipsia: No  polyuria: No  difficulty urinating: No  urgency: No  hematuria: No  joint swelling: Yes  arthralgias: Yes  headaches: No  weakness: Yes  confusion: No  dysphoric mood: No

## 2018-12-06 ENCOUNTER — TELEPHONE (OUTPATIENT)
Dept: INTERNAL MEDICINE | Facility: CLINIC | Age: 33
End: 2018-12-06

## 2018-12-06 ENCOUNTER — PATIENT MESSAGE (OUTPATIENT)
Dept: PLASTIC SURGERY | Facility: CLINIC | Age: 33
End: 2018-12-06

## 2018-12-06 ENCOUNTER — PATIENT MESSAGE (OUTPATIENT)
Dept: PHYSICAL MEDICINE AND REHAB | Facility: CLINIC | Age: 33
End: 2018-12-06

## 2018-12-06 NOTE — TELEPHONE ENCOUNTER
----- Message from Sera Loja sent at 12/6/2018 10:06 AM CST -----  Contact: Fide/[facility]/[phone]  Fide called in regards needing to talk with someone in Dr Hoover office about the wound care order. Please call and advise. Thank you

## 2018-12-10 ENCOUNTER — PATIENT MESSAGE (OUTPATIENT)
Dept: PHYSICAL MEDICINE AND REHAB | Facility: CLINIC | Age: 33
End: 2018-12-10

## 2018-12-10 ENCOUNTER — PATIENT MESSAGE (OUTPATIENT)
Dept: INTERNAL MEDICINE | Facility: CLINIC | Age: 33
End: 2018-12-10

## 2018-12-10 DIAGNOSIS — Z87.828 HISTORY OF SPINAL CORD INJURY: Primary | ICD-10-CM

## 2018-12-11 ENCOUNTER — PATIENT MESSAGE (OUTPATIENT)
Dept: UROLOGY | Facility: CLINIC | Age: 33
End: 2018-12-11

## 2018-12-12 ENCOUNTER — PATIENT MESSAGE (OUTPATIENT)
Dept: INTERNAL MEDICINE | Facility: CLINIC | Age: 33
End: 2018-12-12

## 2018-12-12 ENCOUNTER — PATIENT MESSAGE (OUTPATIENT)
Dept: PHYSICAL MEDICINE AND REHAB | Facility: CLINIC | Age: 33
End: 2018-12-12

## 2018-12-14 ENCOUNTER — TELEPHONE (OUTPATIENT)
Dept: INTERNAL MEDICINE | Facility: CLINIC | Age: 33
End: 2018-12-14

## 2018-12-14 ENCOUNTER — OFFICE VISIT (OUTPATIENT)
Dept: INTERNAL MEDICINE | Facility: CLINIC | Age: 33
End: 2018-12-14
Payer: MEDICAID

## 2018-12-14 DIAGNOSIS — L03.211 FACIAL CELLULITIS: Primary | ICD-10-CM

## 2018-12-14 DIAGNOSIS — M79.2 NEUROPATHIC PAIN: Chronic | ICD-10-CM

## 2018-12-14 DIAGNOSIS — H11.89 CONJUNCTIVAL IRRITATION: ICD-10-CM

## 2018-12-14 PROBLEM — R05.9 COUGH: Status: RESOLVED | Noted: 2018-08-26 | Resolved: 2018-12-14

## 2018-12-14 PROBLEM — Z43.5 ENCOUNTER FOR CARE OR REPLACEMENT OF SUPRAPUBIC TUBE: Status: RESOLVED | Noted: 2018-08-08 | Resolved: 2018-12-14

## 2018-12-14 PROCEDURE — 99999 PR PBB SHADOW E&M-EST. PATIENT-LVL II: CPT | Mod: PBBFAC,,, | Performed by: INTERNAL MEDICINE

## 2018-12-14 PROCEDURE — 99212 OFFICE O/P EST SF 10 MIN: CPT | Mod: PBBFAC | Performed by: INTERNAL MEDICINE

## 2018-12-14 PROCEDURE — 99214 OFFICE O/P EST MOD 30 MIN: CPT | Mod: S$PBB,,, | Performed by: INTERNAL MEDICINE

## 2018-12-14 RX ORDER — AMOXICILLIN AND CLAVULANATE POTASSIUM 875; 125 MG/1; MG/1
1 TABLET, FILM COATED ORAL 2 TIMES DAILY
Qty: 20 TABLET | Refills: 0 | Status: SHIPPED | OUTPATIENT
Start: 2018-12-14 | End: 2019-01-07

## 2018-12-14 RX ORDER — OXYCODONE HCL 40 MG/1
40 TABLET, FILM COATED, EXTENDED RELEASE ORAL EVERY 12 HOURS
Qty: 60 TABLET | Refills: 0 | Status: SHIPPED | OUTPATIENT
Start: 2018-12-14 | End: 2019-02-04 | Stop reason: SDUPTHER

## 2018-12-14 RX ORDER — AMOXICILLIN AND CLAVULANATE POTASSIUM 875; 125 MG/1; MG/1
1 TABLET, FILM COATED ORAL 2 TIMES DAILY
Qty: 20 TABLET | Refills: 0 | Status: SHIPPED | OUTPATIENT
Start: 2018-12-14 | End: 2018-12-14 | Stop reason: SDUPTHER

## 2018-12-14 RX ORDER — CIPROFLOXACIN HYDROCHLORIDE 3 MG/ML
1 SOLUTION/ DROPS OPHTHALMIC EVERY 4 HOURS
Qty: 10 ML | Refills: 0 | Status: SHIPPED | OUTPATIENT
Start: 2018-12-14 | End: 2019-02-27

## 2018-12-14 NOTE — PATIENT INSTRUCTIONS
Facial Cellulitis  Cellulitis is an infection of the deep layers of skin. A break in the skin, such as a cut or scratch, can let bacteria under the skin. It may also occur from an infected oil gland (pimple) or hair follicle. If the bacteria get to deep layers of the skin, it can be serious. If not treated, cellulitis can get into the bloodstream and lymph nodes. The infection can then spread throughout the body. This causes serious illness.  Cellulitis causes the affected skin to become red, swollen, warm, and sore. The reddened areas have a visible border. You may have a fever, chills, and pain.  Cellulitis is treated with antibiotics taken for 7 to 10 days. Symptoms should get better 1 to 2 days after treatment is started. Make sure to take all the antibiotics for the full number of days until they are gone. Keep taking the medication even if your symptoms go away.  Home care  Follow these tips:  · Take all of the antibiotic medicine exactly as directed until it is gone. Dont miss any doses, especially during the first 7 days. Dont stop taking it when your symptoms get better.  · Use a cool compress (face cloth soaked in cool water) on your face to help reduce swelling and pain.  · You may use acetaminophen or ibuprofen to reduce pain. Dont use these if you have chronic liver or kidney disease, or ever had a stomach ulcer or gastrointestinal bleeding. Talk with your healthcare provider first.  Follow-up care  Follow up with your healthcare provider, or as advised. If your infection does not go away on the first antibiotic, your healthcare provider will prescribe a different one.  When to seek medical advice  Call your healthcare provider right away if any of these occur:  · Fever higher of 100.4º F (38.0º C) or higher after 2 days on antibiotics  · Red areas that spread  · Swelling or pain that gets worse  · Fluid leaking from the skin (pus)  · An eyelid that swells shut or leaks fluid (pus)  · Headache or  neck pain that gets worse  · Unusual drowsiness or confusion  · Convulsions (seizure)  · Change in eyesight     Date Last Reviewed: 9/1/2016  © 6565-9432 [employer]. [address] [place] [address], [place] [address]. All rights reserved. This information is not intended as a substitute for professional medical care. Always follow your healthcare professional's instructions.

## 2018-12-14 NOTE — TELEPHONE ENCOUNTER
----- Message from Sera Loja sent at 12/14/2018  3:00 PM CST -----  Prescription Alternative Needed:     The pharmacy needs alternative on the following RX:    ciprofloxacin HCl (CILOXAN) 0.3 % ophthalmic solution    Reason: Product Backordered/Unavailable. Preferred alternative     Pharmacy: [facility]/pharmacy #[id number] - [place] - [address] [phone] (Phone) [phone] (Fax)        Please advise.    Thank You

## 2018-12-19 ENCOUNTER — PATIENT MESSAGE (OUTPATIENT)
Dept: PLASTIC SURGERY | Facility: CLINIC | Age: 33
End: 2018-12-19

## 2018-12-19 ENCOUNTER — OFFICE VISIT (OUTPATIENT)
Dept: PLASTIC SURGERY | Facility: CLINIC | Age: 33
End: 2018-12-19
Payer: MEDICAID

## 2018-12-19 VITALS — SYSTOLIC BLOOD PRESSURE: 127 MMHG | HEART RATE: 72 BPM | DIASTOLIC BLOOD PRESSURE: 80 MMHG | TEMPERATURE: 99 F

## 2018-12-19 DIAGNOSIS — Z09 SURGERY FOLLOW-UP EXAMINATION: Primary | ICD-10-CM

## 2018-12-19 PROCEDURE — 99213 OFFICE O/P EST LOW 20 MIN: CPT | Mod: PBBFAC,PO | Performed by: SURGERY

## 2018-12-19 PROCEDURE — 99999 PR PBB SHADOW E&M-EST. PATIENT-LVL III: CPT | Mod: PBBFAC,,, | Performed by: SURGERY

## 2018-12-19 PROCEDURE — 99212 OFFICE O/P EST SF 10 MIN: CPT | Mod: S$PBB,,, | Performed by: SURGERY

## 2018-12-23 RX ORDER — LORATADINE 10 MG/1
TABLET ORAL
Qty: 30 TABLET | Refills: 3 | Status: SHIPPED | OUTPATIENT
Start: 2018-12-23 | End: 2019-04-23 | Stop reason: SDUPTHER

## 2018-12-27 ENCOUNTER — TELEPHONE (OUTPATIENT)
Dept: INTERNAL MEDICINE | Facility: CLINIC | Age: 33
End: 2018-12-27

## 2018-12-27 NOTE — TELEPHONE ENCOUNTER
I tried to call the number that was sent a this phone message but is not the right number for [facility]

## 2018-12-27 NOTE — TELEPHONE ENCOUNTER
----- Message from Sera Loja sent at 12/27/2018  9:22 AM CST -----  Contact: Michelle/[facility]/[phone]  Michelle called in regards needing to find out if paper work for osteomy order has been fill. Michelle stated that the paper work was fax since 12/07/18, she will fax the paper work again. Please call and advise. Thank you

## 2018-12-31 DIAGNOSIS — M79.2 NEUROPATHIC PAIN: Chronic | ICD-10-CM

## 2018-12-31 RX ORDER — OXYCODONE AND ACETAMINOPHEN 10; 325 MG/1; MG/1
TABLET ORAL
Qty: 135 TABLET | Refills: 0 | Status: SHIPPED | OUTPATIENT
Start: 2018-12-31 | End: 2019-01-18 | Stop reason: SDUPTHER

## 2019-01-02 ENCOUNTER — PATIENT MESSAGE (OUTPATIENT)
Dept: UROLOGY | Facility: CLINIC | Age: 34
End: 2019-01-02

## 2019-01-02 ENCOUNTER — PATIENT MESSAGE (OUTPATIENT)
Dept: PHYSICAL MEDICINE AND REHAB | Facility: CLINIC | Age: 34
End: 2019-01-02

## 2019-01-02 ENCOUNTER — OFFICE VISIT (OUTPATIENT)
Dept: UROLOGY | Facility: CLINIC | Age: 34
End: 2019-01-02
Payer: MEDICAID

## 2019-01-02 VITALS — SYSTOLIC BLOOD PRESSURE: 96 MMHG | HEART RATE: 51 BPM | DIASTOLIC BLOOD PRESSURE: 68 MMHG

## 2019-01-02 DIAGNOSIS — Z43.5 ENCOUNTER FOR CARE OR REPLACEMENT OF SUPRAPUBIC TUBE: ICD-10-CM

## 2019-01-02 DIAGNOSIS — G82.50 TETRAPLEGIA: ICD-10-CM

## 2019-01-02 DIAGNOSIS — Z93.59 CHRONIC SUPRAPUBIC CATHETER: ICD-10-CM

## 2019-01-02 DIAGNOSIS — R82.90 ABNORMAL URINE ODOR: ICD-10-CM

## 2019-01-02 DIAGNOSIS — N31.9 NEUROGENIC BLADDER: Primary | ICD-10-CM

## 2019-01-02 DIAGNOSIS — R33.9 URINARY RETENTION: ICD-10-CM

## 2019-01-02 PROCEDURE — 99999 PR PBB SHADOW E&M-EST. PATIENT-LVL IV: CPT | Mod: PBBFAC,,, | Performed by: NURSE PRACTITIONER

## 2019-01-02 PROCEDURE — 99214 OFFICE O/P EST MOD 30 MIN: CPT | Mod: PBBFAC | Performed by: NURSE PRACTITIONER

## 2019-01-02 PROCEDURE — 99214 OFFICE O/P EST MOD 30 MIN: CPT | Mod: S$PBB,25,, | Performed by: NURSE PRACTITIONER

## 2019-01-02 PROCEDURE — 87077 CULTURE AEROBIC IDENTIFY: CPT

## 2019-01-02 PROCEDURE — 87186 SC STD MICRODIL/AGAR DIL: CPT

## 2019-01-02 PROCEDURE — 87086 URINE CULTURE/COLONY COUNT: CPT

## 2019-01-02 PROCEDURE — 99214 PR OFFICE/OUTPT VISIT, EST, LEVL IV, 30-39 MIN: ICD-10-PCS | Mod: S$PBB,25,, | Performed by: NURSE PRACTITIONER

## 2019-01-02 PROCEDURE — 51705 CHANGE OF BLADDER TUBE: CPT | Mod: PBBFAC | Performed by: NURSE PRACTITIONER

## 2019-01-02 PROCEDURE — 51705 CHANGE OF BLADDER TUBE: CPT | Mod: S$PBB,,, | Performed by: NURSE PRACTITIONER

## 2019-01-02 PROCEDURE — 99999 PR PBB SHADOW E&M-EST. PATIENT-LVL IV: ICD-10-PCS | Mod: PBBFAC,,, | Performed by: NURSE PRACTITIONER

## 2019-01-02 PROCEDURE — 51705 PR CHANGE OF BLADDER TUBE,SIMPLE: ICD-10-PCS | Mod: S$PBB,,, | Performed by: NURSE PRACTITIONER

## 2019-01-02 PROCEDURE — 87088 URINE BACTERIA CULTURE: CPT

## 2019-01-02 RX ORDER — OFLOXACIN 3 MG/ML
SOLUTION/ DROPS OPHTHALMIC
Refills: 0 | COMMUNITY
Start: 2018-12-14 | End: 2019-02-27
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
## 2019-01-04 ENCOUNTER — PATIENT MESSAGE (OUTPATIENT)
Dept: PHYSICAL MEDICINE AND REHAB | Facility: CLINIC | Age: 34
End: 2019-01-04

## 2019-01-04 ENCOUNTER — TELEPHONE (OUTPATIENT)
Dept: PHYSICAL MEDICINE AND REHAB | Facility: CLINIC | Age: 34
End: 2019-01-04

## 2019-01-04 NOTE — TELEPHONE ENCOUNTER
Spoke to pharmacist, PA needed.  Pharmacist asked if the MD was aware that the pt was on multiple benzos and multiple opioids, verified yes.  Verified it was for chronic pain.  Gave code M79.2.  Pharmacist stated they would handle it.    ----- Message from Sparkle Henriquez sent at 1/4/2019 10:56 AM CST -----  Contact: [facility]) @  1 [phone]  Calling to get an override for medications quantities on the oxyCODONE-acetaminophen (PERCOCET)  mg per tablet and oxyCODONE (OXYCONTIN) 40 mg 12 hr tablet. Please call.

## 2019-01-04 NOTE — TELEPHONE ENCOUNTER
[facility] called asking for information for quantity override.  Provided the information they asked for.    ----- Message from Edwar Pretty sent at 1/4/2019 11:13 AM CST -----  Contact: Patient @ [phone]  Patient calling to get an update on the medication refill request for ( oxyCODONE-acetaminophen (PERCOCET)  mg per tablet) pls contact his insurance provider to provide the correct information.

## 2019-01-05 LAB — BACTERIA UR CULT: NORMAL

## 2019-01-07 DIAGNOSIS — R82.71 BACTERIA IN URINE: Primary | ICD-10-CM

## 2019-01-07 RX ORDER — AMOXICILLIN 500 MG/1
500 TABLET, FILM COATED ORAL EVERY 8 HOURS
Qty: 30 TABLET | Refills: 0 | Status: SHIPPED | OUTPATIENT
Start: 2019-01-07 | End: 2019-01-17

## 2019-01-14 ENCOUNTER — PATIENT MESSAGE (OUTPATIENT)
Dept: UROLOGY | Facility: CLINIC | Age: 34
End: 2019-01-14

## 2019-01-15 ENCOUNTER — PATIENT MESSAGE (OUTPATIENT)
Dept: PHYSICAL MEDICINE AND REHAB | Facility: CLINIC | Age: 34
End: 2019-01-15

## 2019-01-15 DIAGNOSIS — M54.9 BACK PAIN, UNSPECIFIED BACK LOCATION, UNSPECIFIED BACK PAIN LATERALITY, UNSPECIFIED CHRONICITY: Primary | ICD-10-CM

## 2019-01-15 DIAGNOSIS — A49.9 BACTERIAL UTI: Primary | ICD-10-CM

## 2019-01-15 DIAGNOSIS — N39.0 BACTERIAL UTI: Primary | ICD-10-CM

## 2019-01-15 RX ORDER — NITROFURANTOIN 25; 75 MG/1; MG/1
100 CAPSULE ORAL 2 TIMES DAILY
Qty: 20 CAPSULE | Refills: 0 | Status: SHIPPED | OUTPATIENT
Start: 2019-01-15 | End: 2019-01-25

## 2019-01-18 ENCOUNTER — PATIENT MESSAGE (OUTPATIENT)
Dept: PHYSICAL MEDICINE AND REHAB | Facility: CLINIC | Age: 34
End: 2019-01-18

## 2019-01-18 DIAGNOSIS — M79.2 NEUROPATHIC PAIN: Chronic | ICD-10-CM

## 2019-01-18 RX ORDER — OXYCODONE AND ACETAMINOPHEN 10; 325 MG/1; MG/1
TABLET ORAL
Qty: 135 TABLET | Refills: 0 | Status: SHIPPED | OUTPATIENT
Start: 2019-01-30 | End: 2019-01-31 | Stop reason: SDUPTHER

## 2019-01-18 NOTE — TELEPHONE ENCOUNTER
Last visit: 09/20/2018  Next visit: 01/31/2019  Last refill Percocet: 12/31/2018 I put in the start date for 30d after the LR, please adjust as needed.  Thank you.

## 2019-01-24 ENCOUNTER — PATIENT MESSAGE (OUTPATIENT)
Dept: NEUROLOGY | Facility: CLINIC | Age: 34
End: 2019-01-24

## 2019-01-24 DIAGNOSIS — F41.9 ANXIETY: ICD-10-CM

## 2019-01-27 RX ORDER — DIAZEPAM 10 MG/1
10 TABLET ORAL 3 TIMES DAILY PRN
Qty: 90 TABLET | Refills: 5 | Status: SHIPPED | OUTPATIENT
Start: 2019-01-27 | End: 2019-08-07 | Stop reason: SDUPTHER

## 2019-01-29 ENCOUNTER — PATIENT MESSAGE (OUTPATIENT)
Dept: PHYSICAL MEDICINE AND REHAB | Facility: CLINIC | Age: 34
End: 2019-01-29

## 2019-01-30 ENCOUNTER — PATIENT MESSAGE (OUTPATIENT)
Dept: PHYSICAL MEDICINE AND REHAB | Facility: CLINIC | Age: 34
End: 2019-01-30

## 2019-01-30 ENCOUNTER — OFFICE VISIT (OUTPATIENT)
Dept: UROLOGY | Facility: CLINIC | Age: 34
End: 2019-01-30
Payer: MEDICAID

## 2019-01-30 VITALS — HEART RATE: 68 BPM | DIASTOLIC BLOOD PRESSURE: 76 MMHG | SYSTOLIC BLOOD PRESSURE: 130 MMHG | RESPIRATION RATE: 20 BRPM

## 2019-01-30 DIAGNOSIS — N31.9 NEUROGENIC BLADDER: Primary | ICD-10-CM

## 2019-01-30 DIAGNOSIS — Z43.5 ENCOUNTER FOR CARE OR REPLACEMENT OF SUPRAPUBIC TUBE: ICD-10-CM

## 2019-01-30 DIAGNOSIS — G82.50 TETRAPLEGIC: ICD-10-CM

## 2019-01-30 DIAGNOSIS — Z93.59 CHRONIC SUPRAPUBIC CATHETER: ICD-10-CM

## 2019-01-30 PROCEDURE — 51705 CHANGE OF BLADDER TUBE: CPT | Mod: PBBFAC | Performed by: NURSE PRACTITIONER

## 2019-01-30 PROCEDURE — 99999 PR PBB SHADOW E&M-EST. PATIENT-LVL IV: ICD-10-PCS | Mod: PBBFAC,,, | Performed by: NURSE PRACTITIONER

## 2019-01-30 PROCEDURE — 51705 CHANGE OF BLADDER TUBE: CPT | Mod: S$PBB,,, | Performed by: NURSE PRACTITIONER

## 2019-01-30 PROCEDURE — 99214 OFFICE O/P EST MOD 30 MIN: CPT | Mod: S$PBB,25,, | Performed by: NURSE PRACTITIONER

## 2019-01-30 PROCEDURE — 51705 PR CHANGE OF BLADDER TUBE,SIMPLE: ICD-10-PCS | Mod: S$PBB,,, | Performed by: NURSE PRACTITIONER

## 2019-01-30 PROCEDURE — 99214 OFFICE O/P EST MOD 30 MIN: CPT | Mod: PBBFAC,25 | Performed by: NURSE PRACTITIONER

## 2019-01-30 PROCEDURE — 99214 PR OFFICE/OUTPT VISIT, EST, LEVL IV, 30-39 MIN: ICD-10-PCS | Mod: S$PBB,25,, | Performed by: NURSE PRACTITIONER

## 2019-01-30 PROCEDURE — 99999 PR PBB SHADOW E&M-EST. PATIENT-LVL IV: CPT | Mod: PBBFAC,,, | Performed by: NURSE PRACTITIONER

## 2019-01-30 RX ORDER — L. ACIDOPHILUS/L.BULGARICUS 1MM CELL
1 TABLET ORAL DAILY
Refills: 11 | Status: ON HOLD | COMMUNITY
Start: 2019-01-15 | End: 2020-01-31

## 2019-01-30 RX ORDER — AMOXICILLIN 500 MG/1
CAPSULE ORAL
Refills: 0 | COMMUNITY
Start: 2019-01-07 | End: 2019-02-27
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
## 2019-01-31 ENCOUNTER — PATIENT MESSAGE (OUTPATIENT)
Dept: PHYSICAL MEDICINE AND REHAB | Facility: CLINIC | Age: 34
End: 2019-01-31

## 2019-01-31 DIAGNOSIS — M79.2 NEUROPATHIC PAIN: Chronic | ICD-10-CM

## 2019-01-31 NOTE — TELEPHONE ENCOUNTER
Patient would like to come in for his pump refill and have other issues to discuss with you.  He would like to come in for a morning appointment.  Will it be ok if I put the patient on for 02/15/2019.

## 2019-02-01 RX ORDER — OXYCODONE AND ACETAMINOPHEN 10; 325 MG/1; MG/1
TABLET ORAL
Qty: 135 TABLET | Refills: 0 | Status: SHIPPED | OUTPATIENT
Start: 2019-02-01 | End: 2019-03-01 | Stop reason: SDUPTHER

## 2019-02-04 ENCOUNTER — PATIENT MESSAGE (OUTPATIENT)
Dept: PHYSICAL MEDICINE AND REHAB | Facility: CLINIC | Age: 34
End: 2019-02-04

## 2019-02-04 ENCOUNTER — DOCUMENTATION ONLY (OUTPATIENT)
Dept: REHABILITATION | Facility: HOSPITAL | Age: 34
End: 2019-02-04

## 2019-02-04 DIAGNOSIS — M79.2 NEUROPATHIC PAIN: Chronic | ICD-10-CM

## 2019-02-04 RX ORDER — OXYCODONE HCL 40 MG/1
40 TABLET, FILM COATED, EXTENDED RELEASE ORAL EVERY 12 HOURS
Qty: 60 TABLET | Refills: 0 | Status: SHIPPED | OUTPATIENT
Start: 2019-02-04 | End: 2019-03-06 | Stop reason: SDUPTHER

## 2019-02-04 NOTE — PROGRESS NOTES
Pt presented for re-eval and PT and pt both agreed that he should try another therapy clinic what would agree to train him for RGO gait training.  PT recommends that pt should go to clinic with an overhead harness system.      PT did not charge eval for session and education pt on the type of facility he should seek to get the therapy interventions that he desires.       Allegra BAILEYT

## 2019-02-08 ENCOUNTER — TELEPHONE (OUTPATIENT)
Dept: PHYSICAL MEDICINE AND REHAB | Facility: CLINIC | Age: 34
End: 2019-02-08

## 2019-02-08 DIAGNOSIS — R25.2 SPASTICITY: Primary | ICD-10-CM

## 2019-02-15 ENCOUNTER — OFFICE VISIT (OUTPATIENT)
Dept: PHYSICAL MEDICINE AND REHAB | Facility: CLINIC | Age: 34
End: 2019-02-15
Payer: MEDICAID

## 2019-02-15 VITALS
HEART RATE: 45 BPM | WEIGHT: 168 LBS | HEIGHT: 69 IN | BODY MASS INDEX: 24.88 KG/M2 | SYSTOLIC BLOOD PRESSURE: 93 MMHG | DIASTOLIC BLOOD PRESSURE: 65 MMHG

## 2019-02-15 DIAGNOSIS — Z87.828 HISTORY OF SPINAL CORD INJURY: Primary | ICD-10-CM

## 2019-02-15 PROCEDURE — 99213 PR OFFICE/OUTPT VISIT, EST, LEVL III, 20-29 MIN: ICD-10-PCS | Mod: S$PBB,,, | Performed by: PHYSICAL MEDICINE & REHABILITATION

## 2019-02-15 PROCEDURE — 99999 PR PBB SHADOW E&M-EST. PATIENT-LVL III: CPT | Mod: PBBFAC,,, | Performed by: PHYSICAL MEDICINE & REHABILITATION

## 2019-02-15 PROCEDURE — 62368 ANALYZE SP INF PUMP W/REPROG: CPT | Mod: PBBFAC,PO | Performed by: PHYSICAL MEDICINE & REHABILITATION

## 2019-02-15 PROCEDURE — 62368 ANALYZE SP INF PUMP W/REPROG: CPT | Mod: S$PBB,,, | Performed by: PHYSICAL MEDICINE & REHABILITATION

## 2019-02-15 PROCEDURE — 99213 OFFICE O/P EST LOW 20 MIN: CPT | Mod: S$PBB,,, | Performed by: PHYSICAL MEDICINE & REHABILITATION

## 2019-02-15 PROCEDURE — 99213 OFFICE O/P EST LOW 20 MIN: CPT | Mod: PBBFAC,PO,25 | Performed by: PHYSICAL MEDICINE & REHABILITATION

## 2019-02-15 PROCEDURE — 62368 PR ANALYZE INFUSN PUMP+REPROGRAM: ICD-10-PCS | Mod: S$PBB,,, | Performed by: PHYSICAL MEDICINE & REHABILITATION

## 2019-02-15 PROCEDURE — 99999 PR PBB SHADOW E&M-EST. PATIENT-LVL III: ICD-10-PCS | Mod: PBBFAC,,, | Performed by: PHYSICAL MEDICINE & REHABILITATION

## 2019-02-18 ENCOUNTER — PATIENT MESSAGE (OUTPATIENT)
Dept: UROLOGY | Facility: CLINIC | Age: 34
End: 2019-02-18
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
## 2019-02-27 ENCOUNTER — OFFICE VISIT (OUTPATIENT)
Dept: UROLOGY | Facility: CLINIC | Age: 34
End: 2019-02-27
Payer: MEDICAID

## 2019-02-27 VITALS
SYSTOLIC BLOOD PRESSURE: 107 MMHG | WEIGHT: 168 LBS | HEART RATE: 66 BPM | RESPIRATION RATE: 15 BRPM | DIASTOLIC BLOOD PRESSURE: 68 MMHG | HEIGHT: 69 IN | BODY MASS INDEX: 24.88 KG/M2

## 2019-02-27 DIAGNOSIS — N31.9 NEUROGENIC BLADDER: ICD-10-CM

## 2019-02-27 DIAGNOSIS — G82.50 TETRAPLEGIC: ICD-10-CM

## 2019-02-27 DIAGNOSIS — Z43.5 ENCOUNTER FOR CARE OR REPLACEMENT OF SUPRAPUBIC TUBE: Primary | ICD-10-CM

## 2019-02-27 DIAGNOSIS — Z93.59 CHRONIC SUPRAPUBIC CATHETER: ICD-10-CM

## 2019-02-27 LAB
BACTERIA #/AREA URNS AUTO: ABNORMAL /HPF
BILIRUB UR QL STRIP: NEGATIVE
CLARITY UR REFRACT.AUTO: ABNORMAL
COLOR UR AUTO: ABNORMAL
GLUCOSE UR QL STRIP: NEGATIVE
HGB UR QL STRIP: ABNORMAL
HYALINE CASTS UR QL AUTO: 0 /LPF
KETONES UR QL STRIP: NEGATIVE
LEUKOCYTE ESTERASE UR QL STRIP: ABNORMAL
MICROSCOPIC COMMENT: ABNORMAL
NITRITE UR QL STRIP: POSITIVE
PH UR STRIP: 6 [PH] (ref 5–8)
PROT UR QL STRIP: ABNORMAL
RBC #/AREA URNS AUTO: 77 /HPF (ref 0–4)
SP GR UR STRIP: 1.01 (ref 1–1.03)
URN SPEC COLLECT METH UR: ABNORMAL
WBC #/AREA URNS AUTO: 4 /HPF (ref 0–5)
WBC CLUMPS UR QL AUTO: ABNORMAL

## 2019-02-27 PROCEDURE — 51705 PR CHANGE OF BLADDER TUBE,SIMPLE: ICD-10-PCS | Mod: S$PBB,,, | Performed by: NURSE PRACTITIONER

## 2019-02-27 PROCEDURE — 51705 CHANGE OF BLADDER TUBE: CPT | Mod: PBBFAC | Performed by: NURSE PRACTITIONER

## 2019-02-27 PROCEDURE — 81001 URINALYSIS AUTO W/SCOPE: CPT

## 2019-02-27 PROCEDURE — 99214 OFFICE O/P EST MOD 30 MIN: CPT | Mod: S$PBB,25,, | Performed by: NURSE PRACTITIONER

## 2019-02-27 PROCEDURE — 99214 PR OFFICE/OUTPT VISIT, EST, LEVL IV, 30-39 MIN: ICD-10-PCS | Mod: S$PBB,25,, | Performed by: NURSE PRACTITIONER

## 2019-02-27 PROCEDURE — 99214 OFFICE O/P EST MOD 30 MIN: CPT | Mod: PBBFAC | Performed by: NURSE PRACTITIONER

## 2019-02-27 PROCEDURE — 51705 CHANGE OF BLADDER TUBE: CPT | Mod: S$PBB,,, | Performed by: NURSE PRACTITIONER

## 2019-02-27 PROCEDURE — 99999 PR PBB SHADOW E&M-EST. PATIENT-LVL IV: CPT | Mod: PBBFAC,,, | Performed by: NURSE PRACTITIONER

## 2019-02-27 PROCEDURE — 99999 PR PBB SHADOW E&M-EST. PATIENT-LVL IV: ICD-10-PCS | Mod: PBBFAC,,, | Performed by: NURSE PRACTITIONER
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
## 2019-02-28 ENCOUNTER — PATIENT MESSAGE (OUTPATIENT)
Dept: UROLOGY | Facility: CLINIC | Age: 34
End: 2019-02-28

## 2019-03-01 ENCOUNTER — TELEPHONE (OUTPATIENT)
Dept: UROLOGY | Facility: CLINIC | Age: 34
End: 2019-03-01

## 2019-03-01 DIAGNOSIS — M79.2 NEUROPATHIC PAIN: Chronic | ICD-10-CM

## 2019-03-01 RX ORDER — OXYCODONE AND ACETAMINOPHEN 10; 325 MG/1; MG/1
TABLET ORAL
Qty: 135 TABLET | Refills: 0 | Status: SHIPPED | OUTPATIENT
Start: 2019-03-01 | End: 2019-04-01 | Stop reason: SDUPTHER

## 2019-03-01 NOTE — TELEPHONE ENCOUNTER
LVM for pt informed of urine results from most recent visit. Encouraged pt to contact clinic for f/c, abd pain, n/v.

## 2019-03-06 ENCOUNTER — PATIENT MESSAGE (OUTPATIENT)
Dept: PHYSICAL MEDICINE AND REHAB | Facility: CLINIC | Age: 34
End: 2019-03-06

## 2019-03-06 DIAGNOSIS — N39.0 RECURRENT UTI: ICD-10-CM

## 2019-03-06 DIAGNOSIS — R33.9 URINARY RETENTION: ICD-10-CM

## 2019-03-06 DIAGNOSIS — N31.9 NEUROGENIC BLADDER: Primary | ICD-10-CM

## 2019-03-06 DIAGNOSIS — M79.2 NEUROPATHIC PAIN: Chronic | ICD-10-CM

## 2019-03-06 RX ORDER — GENTAMICIN SULFATE 40 MG/ML
INJECTION, SOLUTION INTRAMUSCULAR; INTRAVENOUS
Qty: 6 ML | Refills: 1 | Status: SHIPPED | OUTPATIENT
Start: 2019-03-06 | End: 2019-03-13 | Stop reason: SDUPTHER

## 2019-03-06 RX ORDER — OXYCODONE HCL 40 MG/1
40 TABLET, FILM COATED, EXTENDED RELEASE ORAL EVERY 12 HOURS
Qty: 60 TABLET | Refills: 0 | Status: SHIPPED | OUTPATIENT
Start: 2019-03-06 | End: 2019-04-03 | Stop reason: SDUPTHER

## 2019-03-08 NOTE — PROGRESS NOTES
----- Message from Sandip Dixon MD sent at 3/7/2019  6:07 PM CST -----  Echocardiogram with ejection fraction of .56. Diastolic function not available because of atrial fibrillation. The aortic root is mildly enlarged. There is aortic and mitral valve sclerosis. The atria are also enlarged. Overall acceptable for this gentleman.    Message left at his home of results, recommendations and to return the call with questions.  We will also call him once the Holter is processed.     Pharmacy New Medication Education     Patient accepted medication education.     Pharmacy educated patient on the following medications, using the teach-back method.     Apap  Duoneb  Vit c  Diazepam  Lovenox  Feoso4  Merrem  Oxytuynin  Oxy  Percocet  Miralax  Pot citrate  lyrica    Learners of pharmacy medication education included:  patient     Patient +/- learner response:  verbalize understanding

## 2019-03-11 ENCOUNTER — PATIENT MESSAGE (OUTPATIENT)
Dept: UROLOGY | Facility: CLINIC | Age: 34
End: 2019-03-11

## 2019-03-11 DIAGNOSIS — N39.0 RECURRENT UTI: ICD-10-CM

## 2019-03-11 DIAGNOSIS — N31.9 NEUROGENIC BLADDER: ICD-10-CM

## 2019-03-11 DIAGNOSIS — R33.9 URINARY RETENTION: ICD-10-CM

## 2019-03-13 ENCOUNTER — PATIENT MESSAGE (OUTPATIENT)
Dept: UROLOGY | Facility: CLINIC | Age: 34
End: 2019-03-13

## 2019-03-13 RX ORDER — GENTAMICIN SULFATE 40 MG/ML
INJECTION, SOLUTION INTRAMUSCULAR; INTRAVENOUS
Qty: 6 ML | Refills: 1 | Status: SHIPPED | OUTPATIENT
Start: 2019-03-13 | End: 2019-03-27 | Stop reason: SDUPTHER

## 2019-03-13 RX ORDER — NITROFURANTOIN 25; 75 MG/1; MG/1
100 CAPSULE ORAL 2 TIMES DAILY
Qty: 20 CAPSULE | Refills: 1 | Status: SHIPPED | OUTPATIENT
Start: 2019-03-13 | End: 2019-03-23

## 2019-03-14 ENCOUNTER — PATIENT MESSAGE (OUTPATIENT)
Dept: UROLOGY | Facility: CLINIC | Age: 34
End: 2019-03-14

## 2019-03-16 PROBLEM — Z74.09 IMPAIRED FUNCTIONAL MOBILITY, BALANCE, AND ENDURANCE: Status: ACTIVE | Noted: 2019-03-16

## 2019-03-18 ENCOUNTER — PATIENT MESSAGE (OUTPATIENT)
Dept: PHYSICAL MEDICINE AND REHAB | Facility: CLINIC | Age: 34
End: 2019-03-18

## 2019-03-20 ENCOUNTER — PATIENT MESSAGE (OUTPATIENT)
Dept: PHYSICAL MEDICINE AND REHAB | Facility: CLINIC | Age: 34
End: 2019-03-20

## 2019-03-20 ENCOUNTER — DOCUMENTATION ONLY (OUTPATIENT)
Dept: REHABILITATION | Facility: HOSPITAL | Age: 34
End: 2019-03-20

## 2019-03-20 NOTE — PROGRESS NOTES
Pt was evaluated on 11/08/18 and was seen 2 times for PT. Pt has not attended PT since 2/4/19. PT recommended pt go to clinic with overhead harness system pt agreed.  PT will d/c pt from PT at [facility].    Allegra BAILEYT

## 2019-03-25 ENCOUNTER — PATIENT MESSAGE (OUTPATIENT)
Dept: INTERNAL MEDICINE | Facility: CLINIC | Age: 34
End: 2019-03-25

## 2019-03-25 DIAGNOSIS — M21.969 DEFORMITY OF FOOT, UNSPECIFIED LATERALITY: ICD-10-CM

## 2019-03-25 DIAGNOSIS — G82.20 PARAPLEGIA FOLLOWING SPINAL CORD INJURY: ICD-10-CM

## 2019-03-25 DIAGNOSIS — L60.8 TOENAIL DEFORMITY: Primary | ICD-10-CM

## 2019-03-27 ENCOUNTER — OFFICE VISIT (OUTPATIENT)
Dept: UROLOGY | Facility: CLINIC | Age: 34
End: 2019-03-27
Payer: MEDICAID

## 2019-03-27 VITALS
DIASTOLIC BLOOD PRESSURE: 58 MMHG | BODY MASS INDEX: 24.88 KG/M2 | HEIGHT: 69 IN | WEIGHT: 168 LBS | HEART RATE: 49 BPM | SYSTOLIC BLOOD PRESSURE: 120 MMHG

## 2019-03-27 DIAGNOSIS — N31.9 NEUROGENIC BLADDER: Primary | ICD-10-CM

## 2019-03-27 DIAGNOSIS — R82.71 BACTERIA IN URINE: ICD-10-CM

## 2019-03-27 DIAGNOSIS — Z43.5 ENCOUNTER FOR CARE OR REPLACEMENT OF SUPRAPUBIC TUBE: ICD-10-CM

## 2019-03-27 DIAGNOSIS — Z93.59 CHRONIC SUPRAPUBIC CATHETER: ICD-10-CM

## 2019-03-27 DIAGNOSIS — N39.0 RECURRENT UTI: ICD-10-CM

## 2019-03-27 PROCEDURE — 99999 PR PBB SHADOW E&M-EST. PATIENT-LVL V: ICD-10-PCS | Mod: PBBFAC,,, | Performed by: NURSE PRACTITIONER

## 2019-03-27 PROCEDURE — 51705 PR CHANGE OF BLADDER TUBE,SIMPLE: ICD-10-PCS | Mod: S$PBB,,, | Performed by: NURSE PRACTITIONER

## 2019-03-27 PROCEDURE — 99214 OFFICE O/P EST MOD 30 MIN: CPT | Mod: S$PBB,25,, | Performed by: NURSE PRACTITIONER

## 2019-03-27 PROCEDURE — 51705 CHANGE OF BLADDER TUBE: CPT | Mod: S$PBB,,, | Performed by: NURSE PRACTITIONER

## 2019-03-27 PROCEDURE — 99999 PR PBB SHADOW E&M-EST. PATIENT-LVL V: CPT | Mod: PBBFAC,,, | Performed by: NURSE PRACTITIONER

## 2019-03-27 PROCEDURE — 99214 PR OFFICE/OUTPT VISIT, EST, LEVL IV, 30-39 MIN: ICD-10-PCS | Mod: S$PBB,25,, | Performed by: NURSE PRACTITIONER

## 2019-03-27 PROCEDURE — 99215 OFFICE O/P EST HI 40 MIN: CPT | Mod: PBBFAC,25 | Performed by: NURSE PRACTITIONER

## 2019-03-27 PROCEDURE — 51705 CHANGE OF BLADDER TUBE: CPT | Mod: PBBFAC | Performed by: NURSE PRACTITIONER

## 2019-03-27 RX ORDER — GENTAMICIN SULFATE 40 MG/ML
240 INJECTION, SOLUTION INTRAMUSCULAR; INTRAVENOUS
Status: COMPLETED | OUTPATIENT
Start: 2019-03-27 | End: 2019-03-27

## 2019-03-27 RX ADMIN — GENTAMICIN SULFATE 240 MG: 40 INJECTION, SOLUTION INTRAMUSCULAR; INTRAVENOUS at 11:03
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
## 2019-03-27 NOTE — TELEPHONE ENCOUNTER
Orders in for scheudling labs and appt as requested   Fasting for lipids also  And podiatry referral in for scheduling

## 2019-03-28 ENCOUNTER — PATIENT MESSAGE (OUTPATIENT)
Dept: PHYSICAL MEDICINE AND REHAB | Facility: CLINIC | Age: 34
End: 2019-03-28

## 2019-03-29 ENCOUNTER — PATIENT MESSAGE (OUTPATIENT)
Dept: UROLOGY | Facility: CLINIC | Age: 34
End: 2019-03-29

## 2019-04-01 ENCOUNTER — PATIENT MESSAGE (OUTPATIENT)
Dept: PHYSICAL MEDICINE AND REHAB | Facility: CLINIC | Age: 34
End: 2019-04-01

## 2019-04-01 DIAGNOSIS — M79.2 NEUROPATHIC PAIN: Chronic | ICD-10-CM

## 2019-04-01 RX ORDER — OXYCODONE AND ACETAMINOPHEN 10; 325 MG/1; MG/1
TABLET ORAL
Qty: 135 TABLET | Refills: 0 | Status: SHIPPED | OUTPATIENT
Start: 2019-04-01 | End: 2019-05-01 | Stop reason: SDUPTHER

## 2019-04-01 NOTE — TELEPHONE ENCOUNTER
Called pharmacy for most current insurance information.  ID [id number]  BIN [id number]  PCN [id number]  GRP [id number]

## 2019-04-03 ENCOUNTER — PATIENT MESSAGE (OUTPATIENT)
Dept: PHYSICAL MEDICINE AND REHAB | Facility: CLINIC | Age: 34
End: 2019-04-03

## 2019-04-03 DIAGNOSIS — M79.2 NEUROPATHIC PAIN: Chronic | ICD-10-CM

## 2019-04-03 DIAGNOSIS — L89.90 PRESSURE INJURY OF SKIN, UNSPECIFIED INJURY STAGE, UNSPECIFIED LOCATION: Primary | ICD-10-CM

## 2019-04-03 RX ORDER — OXYCODONE HCL 40 MG/1
40 TABLET, FILM COATED, EXTENDED RELEASE ORAL EVERY 12 HOURS
Qty: 60 TABLET | Refills: 0 | Status: SHIPPED | OUTPATIENT
Start: 2019-04-03 | End: 2019-05-06 | Stop reason: SDUPTHER

## 2019-04-19 DIAGNOSIS — R52 PAIN: ICD-10-CM

## 2019-04-22 RX ORDER — PREGABALIN 200 MG/1
CAPSULE ORAL
Qty: 90 CAPSULE | Refills: 6 | Status: SHIPPED | OUTPATIENT
Start: 2019-04-22 | End: 2019-11-20 | Stop reason: SDUPTHER

## 2019-04-23 RX ORDER — LORATADINE 10 MG/1
TABLET ORAL
Qty: 30 TABLET | Refills: 3 | Status: SHIPPED | OUTPATIENT
Start: 2019-04-23 | End: 2019-04-30

## 2019-04-24 ENCOUNTER — PATIENT MESSAGE (OUTPATIENT)
Dept: UROLOGY | Facility: CLINIC | Age: 34
End: 2019-04-24

## 2019-04-24 ENCOUNTER — TELEPHONE (OUTPATIENT)
Dept: WOUND CARE | Facility: CLINIC | Age: 34
End: 2019-04-24

## 2019-04-24 ENCOUNTER — PATIENT MESSAGE (OUTPATIENT)
Dept: WOUND CARE | Facility: CLINIC | Age: 34
End: 2019-04-24

## 2019-04-24 ENCOUNTER — TELEPHONE (OUTPATIENT)
Dept: UROLOGY | Facility: CLINIC | Age: 34
End: 2019-04-24

## 2019-04-24 NOTE — TELEPHONE ENCOUNTER
Spoke with patient who states he is just pulling up to the parking garage.  Patient also states he not sure why he's coming to see Sara because the wounds are all healed up.  Patient states he believes his PCP wanted him to see wound care.  Advised patient that we will have to reschedule the visit since he is an established patient extended which is an hour visit.  Patient advised he does not need to see Sara because the wounds are healed and if he should need to reschedule he will call.

## 2019-04-24 NOTE — TELEPHONE ENCOUNTER
Spoke w/ pt and he stated that he will be at least an hour and 10 min late. Informed pt that he will need to reschedule because we won't be able to see him this morning and unsure if he can be fit in for the afternoon. Pt stated he has another appt for 11 a.m. And will still come to us after lunch and see if we can fit him in. Informed ABHI Holt.

## 2019-04-30 ENCOUNTER — HOSPITAL ENCOUNTER (EMERGENCY)
Facility: HOSPITAL | Age: 34
Discharge: HOME | End: 2019-04-30
Attending: SURGERY
Payer: MEDICAID

## 2019-04-30 VITALS
DIASTOLIC BLOOD PRESSURE: 57 MMHG | BODY MASS INDEX: 24.81 KG/M2 | SYSTOLIC BLOOD PRESSURE: 117 MMHG | TEMPERATURE: 99 F | RESPIRATION RATE: 13 BRPM | HEART RATE: 65 BPM | WEIGHT: 168 LBS | OXYGEN SATURATION: 96 %

## 2019-04-30 DIAGNOSIS — T83.511A URINARY TRACT INFECTION ASSOCIATED WITH CATHETERIZATION OF URINARY TRACT, UNSPECIFIED INDWELLING URINARY CATHETER TYPE, INITIAL ENCOUNTER: Primary | ICD-10-CM

## 2019-04-30 DIAGNOSIS — N39.0 URINARY TRACT INFECTION ASSOCIATED WITH CATHETERIZATION OF URINARY TRACT, UNSPECIFIED INDWELLING URINARY CATHETER TYPE, INITIAL ENCOUNTER: Primary | ICD-10-CM

## 2019-04-30 DIAGNOSIS — J06.9 UPPER RESPIRATORY TRACT INFECTION, UNSPECIFIED TYPE: ICD-10-CM

## 2019-04-30 LAB
ALBUMIN SERPL BCP-MCNC: 3.3 G/DL (ref 3.5–5.2)
ALP SERPL-CCNC: 99 U/L (ref 55–135)
ALT SERPL W/O P-5'-P-CCNC: 18 U/L (ref 10–44)
ANION GAP SERPL CALC-SCNC: 6 MMOL/L (ref 8–16)
AST SERPL-CCNC: 18 U/L (ref 10–40)
BACTERIA #/AREA URNS HPF: ABNORMAL /HPF
BASOPHILS # BLD AUTO: 0.01 K/UL (ref 0–0.2)
BASOPHILS NFR BLD: 0.2 % (ref 0–1.9)
BILIRUB SERPL-MCNC: 1.1 MG/DL (ref 0.1–1)
BILIRUB UR QL STRIP: NEGATIVE
BUN SERPL-MCNC: 8 MG/DL (ref 6–20)
CALCIUM SERPL-MCNC: 8.4 MG/DL (ref 8.7–10.5)
CHLORIDE SERPL-SCNC: 104 MMOL/L (ref 95–110)
CLARITY UR: ABNORMAL
CO2 SERPL-SCNC: 25 MMOL/L (ref 23–29)
COLOR UR: YELLOW
CREAT SERPL-MCNC: 0.6 MG/DL (ref 0.5–1.4)
DEPRECATED S PYO AG THROAT QL EIA: NEGATIVE
DIFFERENTIAL METHOD: ABNORMAL
EOSINOPHIL # BLD AUTO: 0 K/UL (ref 0–0.5)
EOSINOPHIL NFR BLD: 0.6 % (ref 0–8)
ERYTHROCYTE [DISTWIDTH] IN BLOOD BY AUTOMATED COUNT: 14.1 % (ref 11.5–14.5)
EST. GFR  (AFRICAN AMERICAN): >60 ML/MIN/1.73 M^2
EST. GFR  (NON AFRICAN AMERICAN): >60 ML/MIN/1.73 M^2
GLUCOSE SERPL-MCNC: 85 MG/DL (ref 70–110)
GLUCOSE UR QL STRIP: NEGATIVE
HCT VFR BLD AUTO: 37.2 % (ref 40–54)
HGB BLD-MCNC: 11.5 G/DL (ref 14–18)
HGB UR QL STRIP: ABNORMAL
HYALINE CASTS #/AREA URNS LPF: 0 /LPF
INFLUENZA A, MOLECULAR: NEGATIVE
INFLUENZA B, MOLECULAR: NEGATIVE
KETONES UR QL STRIP: ABNORMAL
LACTATE SERPL-SCNC: 0.8 MMOL/L (ref 0.5–2.2)
LEUKOCYTE ESTERASE UR QL STRIP: ABNORMAL
LYMPHOCYTES # BLD AUTO: 0.9 K/UL (ref 1–4.8)
LYMPHOCYTES NFR BLD: 13.8 % (ref 18–48)
MCH RBC QN AUTO: 30 PG (ref 27–31)
MCHC RBC AUTO-ENTMCNC: 30.9 G/DL (ref 32–36)
MCV RBC AUTO: 97 FL (ref 82–98)
MICROSCOPIC COMMENT: ABNORMAL
MONOCYTES # BLD AUTO: 0.8 K/UL (ref 0.3–1)
MONOCYTES NFR BLD: 12 % (ref 4–15)
NEUTROPHILS # BLD AUTO: 4.6 K/UL (ref 1.8–7.7)
NEUTROPHILS NFR BLD: 73.4 % (ref 38–73)
NITRITE UR QL STRIP: NEGATIVE
PH UR STRIP: 7 [PH] (ref 5–8)
PLATELET # BLD AUTO: 169 K/UL (ref 150–350)
PMV BLD AUTO: 12.1 FL (ref 9.2–12.9)
POTASSIUM SERPL-SCNC: 3.9 MMOL/L (ref 3.5–5.1)
PROT SERPL-MCNC: 6.6 G/DL (ref 6–8.4)
PROT UR QL STRIP: ABNORMAL
RBC # BLD AUTO: 3.83 M/UL (ref 4.6–6.2)
RBC #/AREA URNS HPF: 5 /HPF (ref 0–4)
SODIUM SERPL-SCNC: 135 MMOL/L (ref 136–145)
SP GR UR STRIP: 1.01 (ref 1–1.03)
SPECIMEN SOURCE: NORMAL
URN SPEC COLLECT METH UR: ABNORMAL
UROBILINOGEN UR STRIP-ACNC: >=8 EU/DL
WBC # BLD AUTO: 6.31 K/UL (ref 3.9–12.7)
WBC #/AREA URNS HPF: 15 /HPF (ref 0–5)

## 2019-04-30 PROCEDURE — 94761 N-INVAS EAR/PLS OXIMETRY MLT: CPT

## 2019-04-30 PROCEDURE — 94760 N-INVAS EAR/PLS OXIMETRY 1: CPT

## 2019-04-30 PROCEDURE — 87040 BLOOD CULTURE FOR BACTERIA: CPT | Mod: 59

## 2019-04-30 PROCEDURE — 87502 INFLUENZA DNA AMP PROBE: CPT

## 2019-04-30 PROCEDURE — 87880 STREP A ASSAY W/OPTIC: CPT

## 2019-04-30 PROCEDURE — 87086 URINE CULTURE/COLONY COUNT: CPT

## 2019-04-30 PROCEDURE — 87081 CULTURE SCREEN ONLY: CPT | Mod: 59

## 2019-04-30 PROCEDURE — 80053 COMPREHEN METABOLIC PANEL: CPT

## 2019-04-30 PROCEDURE — 85025 COMPLETE CBC W/AUTO DIFF WBC: CPT

## 2019-04-30 PROCEDURE — 81000 URINALYSIS NONAUTO W/SCOPE: CPT

## 2019-04-30 PROCEDURE — 25000003 PHARM REV CODE 250: Performed by: NURSE PRACTITIONER

## 2019-04-30 PROCEDURE — 83605 ASSAY OF LACTIC ACID: CPT

## 2019-04-30 PROCEDURE — 99284 EMERGENCY DEPT VISIT MOD MDM: CPT

## 2019-04-30 RX ORDER — LORATADINE 10 MG/1
10 TABLET ORAL DAILY PRN
Qty: 14 TABLET | Refills: 0 | Status: SHIPPED | OUTPATIENT
Start: 2019-04-30 | End: 2019-05-14

## 2019-04-30 RX ORDER — ACETAMINOPHEN 500 MG
1000 TABLET ORAL
Status: COMPLETED | OUTPATIENT
Start: 2019-04-30 | End: 2019-04-30

## 2019-04-30 RX ORDER — LEVOFLOXACIN 750 MG/1
750 TABLET ORAL DAILY
Qty: 7 TABLET | Refills: 0 | Status: SHIPPED | OUTPATIENT
Start: 2019-04-30 | End: 2019-05-07

## 2019-04-30 RX ADMIN — ACETAMINOPHEN 1000 MG: 500 TABLET, FILM COATED ORAL at 05:04

## 2019-04-30 RX ADMIN — LEVOFLOXACIN 750 MG: 500 TABLET, FILM COATED ORAL at 06:04

## 2019-04-30 NOTE — ED TRIAGE NOTES
33 y.o. male presents to ER ED 03 /ED 03A   Chief Complaint   Patient presents with    Dysuria   Pt reports dysuria and foul smelling urine since Thursday, pt has suprapubic catheter. Pt is paraplegic, wheel chair bound. Pt was sent antibiotics on Thursday for UTI symptoms/ No acute distress noted.

## 2019-04-30 NOTE — DISCHARGE INSTRUCTIONS
**Follow up with PCP and Urology in 24-48 hours. Return to ER with worsening of symptoms.     **Drink plenty fluids. Get plenty rest. Wash hands frequently.     **Our goal in the emergency department is to always give you outstanding care and exceptional service. You may receive a survey by mail or e-mail in the next week regarding your experience in our ED. We would greatly appreciate your completing and returning the survey. Your feedback provides us with a way to recognize our staff who give very good care and it helps us learn how to improve when your experience was below our aspiration of excellence.
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
## 2019-05-01 ENCOUNTER — PATIENT MESSAGE (OUTPATIENT)
Dept: PHYSICAL MEDICINE AND REHAB | Facility: CLINIC | Age: 34
End: 2019-05-01

## 2019-05-01 DIAGNOSIS — M79.2 NEUROPATHIC PAIN: Chronic | ICD-10-CM

## 2019-05-02 ENCOUNTER — PATIENT MESSAGE (OUTPATIENT)
Dept: PHYSICAL MEDICINE AND REHAB | Facility: CLINIC | Age: 34
End: 2019-05-02

## 2019-05-02 LAB — BACTERIA UR CULT: NORMAL

## 2019-05-02 RX ORDER — PROMETHAZINE HYDROCHLORIDE 6.25 MG/5ML
5 SYRUP ORAL NIGHTLY PRN
Qty: 240 ML | Refills: 1 | Status: SHIPPED | OUTPATIENT
Start: 2019-05-02 | End: 2019-07-11 | Stop reason: SDUPTHER

## 2019-05-02 NOTE — ASSESSMENT & PLAN NOTE
S/p suprapubic catheter      Silviano Lot# [id number] Exp: 6/2021 NDC# 7262-3864-31. Rt deltoid. No reaction. QAMAR

## 2019-05-03 PROBLEM — Z93.59 CHRONIC SUPRAPUBIC CATHETER: Status: ACTIVE | Noted: 2019-05-03

## 2019-05-03 LAB — BACTERIA THROAT CULT: NORMAL

## 2019-05-03 RX ORDER — OXYCODONE AND ACETAMINOPHEN 10; 325 MG/1; MG/1
TABLET ORAL
Qty: 135 TABLET | Refills: 0 | Status: SHIPPED | OUTPATIENT
Start: 2019-05-03 | End: 2019-06-03 | Stop reason: SDUPTHER
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
## 2019-05-06 ENCOUNTER — PATIENT MESSAGE (OUTPATIENT)
Dept: UROLOGY | Facility: CLINIC | Age: 34
End: 2019-05-06

## 2019-05-06 ENCOUNTER — OFFICE VISIT (OUTPATIENT)
Dept: UROLOGY | Facility: CLINIC | Age: 34
End: 2019-05-06
Payer: MEDICAID

## 2019-05-06 DIAGNOSIS — Z93.59 CHRONIC SUPRAPUBIC CATHETER: ICD-10-CM

## 2019-05-06 DIAGNOSIS — N31.9 NEUROGENIC BLADDER: Chronic | ICD-10-CM

## 2019-05-06 DIAGNOSIS — M79.2 NEUROPATHIC PAIN: Chronic | ICD-10-CM

## 2019-05-06 DIAGNOSIS — Z43.5 ENCOUNTER FOR CARE OR REPLACEMENT OF SUPRAPUBIC TUBE: ICD-10-CM

## 2019-05-06 LAB
BACTERIA BLD CULT: NORMAL
BACTERIA BLD CULT: NORMAL

## 2019-05-06 PROCEDURE — 99214 OFFICE O/P EST MOD 30 MIN: CPT | Mod: S$PBB,25,, | Performed by: NURSE PRACTITIONER

## 2019-05-06 PROCEDURE — 51705 PR CHANGE OF BLADDER TUBE,SIMPLE: ICD-10-PCS | Mod: S$PBB,,, | Performed by: NURSE PRACTITIONER

## 2019-05-06 PROCEDURE — 99999 PR PBB SHADOW E&M-EST. PATIENT-LVL I: ICD-10-PCS | Mod: PBBFAC,,, | Performed by: NURSE PRACTITIONER

## 2019-05-06 PROCEDURE — 51705 CHANGE OF BLADDER TUBE: CPT | Mod: S$PBB,,, | Performed by: NURSE PRACTITIONER

## 2019-05-06 PROCEDURE — 99214 PR OFFICE/OUTPT VISIT, EST, LEVL IV, 30-39 MIN: ICD-10-PCS | Mod: S$PBB,25,, | Performed by: NURSE PRACTITIONER

## 2019-05-06 PROCEDURE — 99999 PR PBB SHADOW E&M-EST. PATIENT-LVL I: CPT | Mod: PBBFAC,,, | Performed by: NURSE PRACTITIONER

## 2019-05-06 PROCEDURE — 51705 CHANGE OF BLADDER TUBE: CPT | Mod: PBBFAC | Performed by: NURSE PRACTITIONER

## 2019-05-06 PROCEDURE — 99211 OFF/OP EST MAY X REQ PHY/QHP: CPT | Mod: PBBFAC,25 | Performed by: NURSE PRACTITIONER

## 2019-05-08 ENCOUNTER — PATIENT MESSAGE (OUTPATIENT)
Dept: PHYSICAL MEDICINE AND REHAB | Facility: CLINIC | Age: 34
End: 2019-05-08

## 2019-05-08 RX ORDER — OXYCODONE HCL 40 MG/1
40 TABLET, FILM COATED, EXTENDED RELEASE ORAL EVERY 12 HOURS
Qty: 60 TABLET | Refills: 0 | Status: SHIPPED | OUTPATIENT
Start: 2019-05-08 | End: 2019-06-10 | Stop reason: SDUPTHER

## 2019-05-21 ENCOUNTER — TELEPHONE (OUTPATIENT)
Dept: PHYSICAL MEDICINE AND REHAB | Facility: CLINIC | Age: 34
End: 2019-05-21

## 2019-06-03 DIAGNOSIS — M79.2 NEUROPATHIC PAIN: Chronic | ICD-10-CM

## 2019-06-04 ENCOUNTER — PATIENT MESSAGE (OUTPATIENT)
Dept: PHYSICAL MEDICINE AND REHAB | Facility: CLINIC | Age: 34
End: 2019-06-04

## 2019-06-04 RX ORDER — OXYCODONE AND ACETAMINOPHEN 10; 325 MG/1; MG/1
TABLET ORAL
Qty: 135 TABLET | Refills: 0 | Status: SHIPPED | OUTPATIENT
Start: 2019-06-04 | End: 2019-07-05 | Stop reason: SDUPTHER

## 2019-06-05 ENCOUNTER — PATIENT MESSAGE (OUTPATIENT)
Dept: UROLOGY | Facility: CLINIC | Age: 34
End: 2019-06-05

## 2019-06-10 ENCOUNTER — PATIENT MESSAGE (OUTPATIENT)
Dept: PHYSICAL MEDICINE AND REHAB | Facility: CLINIC | Age: 34
End: 2019-06-10

## 2019-06-10 DIAGNOSIS — M79.2 NEUROPATHIC PAIN: Chronic | ICD-10-CM

## 2019-06-10 RX ORDER — OXYCODONE HCL 40 MG/1
40 TABLET, FILM COATED, EXTENDED RELEASE ORAL EVERY 12 HOURS
Qty: 60 TABLET | Refills: 0 | Status: CANCELLED | OUTPATIENT
Start: 2019-06-10 | End: 2019-07-10

## 2019-06-10 RX ORDER — OXYCODONE HCL 40 MG/1
40 TABLET, FILM COATED, EXTENDED RELEASE ORAL EVERY 12 HOURS
Qty: 60 TABLET | Refills: 0 | Status: SHIPPED | OUTPATIENT
Start: 2019-06-10 | End: 2019-07-11 | Stop reason: SDUPTHER

## 2019-06-20 ENCOUNTER — PATIENT MESSAGE (OUTPATIENT)
Dept: UROLOGY | Facility: CLINIC | Age: 34
End: 2019-06-20

## 2019-06-24 ENCOUNTER — OFFICE VISIT (OUTPATIENT)
Dept: UROLOGY | Facility: CLINIC | Age: 34
End: 2019-06-24
Payer: MEDICAID

## 2019-06-24 VITALS — SYSTOLIC BLOOD PRESSURE: 129 MMHG | HEART RATE: 43 BPM | DIASTOLIC BLOOD PRESSURE: 77 MMHG

## 2019-06-24 DIAGNOSIS — Z93.59 CHRONIC SUPRAPUBIC CATHETER: ICD-10-CM

## 2019-06-24 DIAGNOSIS — Z43.5 ENCOUNTER FOR CARE OR REPLACEMENT OF SUPRAPUBIC TUBE: ICD-10-CM

## 2019-06-24 DIAGNOSIS — N31.9 NEUROGENIC BLADDER: Primary | ICD-10-CM

## 2019-06-24 PROCEDURE — 51705 PR CHANGE OF BLADDER TUBE,SIMPLE: ICD-10-PCS | Mod: S$PBB,,, | Performed by: NURSE PRACTITIONER

## 2019-06-24 PROCEDURE — 99214 OFFICE O/P EST MOD 30 MIN: CPT | Mod: S$PBB,25,, | Performed by: NURSE PRACTITIONER

## 2019-06-24 PROCEDURE — 51705 CHANGE OF BLADDER TUBE: CPT | Mod: S$PBB,,, | Performed by: NURSE PRACTITIONER

## 2019-06-24 PROCEDURE — 51705 CHANGE OF BLADDER TUBE: CPT | Mod: PBBFAC | Performed by: NURSE PRACTITIONER

## 2019-06-24 PROCEDURE — 99999 PR PBB SHADOW E&M-EST. PATIENT-LVL III: ICD-10-PCS | Mod: PBBFAC,,, | Performed by: NURSE PRACTITIONER

## 2019-06-24 PROCEDURE — 99214 PR OFFICE/OUTPT VISIT, EST, LEVL IV, 30-39 MIN: ICD-10-PCS | Mod: S$PBB,25,, | Performed by: NURSE PRACTITIONER

## 2019-06-24 PROCEDURE — 99999 PR PBB SHADOW E&M-EST. PATIENT-LVL III: CPT | Mod: PBBFAC,,, | Performed by: NURSE PRACTITIONER

## 2019-06-24 PROCEDURE — 99213 OFFICE O/P EST LOW 20 MIN: CPT | Mod: PBBFAC,25 | Performed by: NURSE PRACTITIONER

## 2019-06-24 RX ORDER — LORATADINE 10 MG/1
10 TABLET ORAL DAILY
Refills: 3 | COMMUNITY
Start: 2019-05-20 | End: 2020-03-31
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
## 2019-06-25 ENCOUNTER — PATIENT MESSAGE (OUTPATIENT)
Dept: PHYSICAL MEDICINE AND REHAB | Facility: CLINIC | Age: 34
End: 2019-06-25

## 2019-06-26 ENCOUNTER — TELEPHONE (OUTPATIENT)
Dept: PHYSICAL MEDICINE AND REHAB | Facility: CLINIC | Age: 34
End: 2019-06-26

## 2019-07-02 ENCOUNTER — OFFICE VISIT (OUTPATIENT)
Dept: INTERNAL MEDICINE | Facility: CLINIC | Age: 34
End: 2019-07-02
Payer: MEDICAID

## 2019-07-02 ENCOUNTER — LAB VISIT (OUTPATIENT)
Dept: LAB | Facility: HOSPITAL | Age: 34
End: 2019-07-02
Attending: INTERNAL MEDICINE
Payer: MEDICAID

## 2019-07-02 VITALS
SYSTOLIC BLOOD PRESSURE: 110 MMHG | HEART RATE: 58 BPM | DIASTOLIC BLOOD PRESSURE: 68 MMHG | HEIGHT: 69 IN | BODY MASS INDEX: 24.81 KG/M2

## 2019-07-02 DIAGNOSIS — K59.2 NEUROGENIC BOWEL: ICD-10-CM

## 2019-07-02 DIAGNOSIS — G82.20 PARAPLEGIA FOLLOWING SPINAL CORD INJURY: Primary | ICD-10-CM

## 2019-07-02 DIAGNOSIS — G82.50 SPASTIC QUADRIPARESIS: ICD-10-CM

## 2019-07-02 DIAGNOSIS — N31.9 NEUROGENIC BLADDER: ICD-10-CM

## 2019-07-02 DIAGNOSIS — J45.20 MILD INTERMITTENT ASTHMA WITHOUT COMPLICATION: ICD-10-CM

## 2019-07-02 DIAGNOSIS — G82.20 PARAPLEGIA FOLLOWING SPINAL CORD INJURY: ICD-10-CM

## 2019-07-02 DIAGNOSIS — J31.0 RHINITIS, UNSPECIFIED TYPE: ICD-10-CM

## 2019-07-02 LAB
ALBUMIN SERPL BCP-MCNC: 3.8 G/DL (ref 3.5–5.2)
ALP SERPL-CCNC: 114 U/L (ref 55–135)
ALT SERPL W/O P-5'-P-CCNC: 14 U/L (ref 10–44)
ANION GAP SERPL CALC-SCNC: 8 MMOL/L (ref 8–16)
AST SERPL-CCNC: 14 U/L (ref 10–40)
BASOPHILS # BLD AUTO: 0.01 K/UL (ref 0–0.2)
BASOPHILS NFR BLD: 0.1 % (ref 0–1.9)
BILIRUB DIRECT SERPL-MCNC: 0.1 MG/DL (ref 0.1–0.3)
BILIRUB SERPL-MCNC: 0.3 MG/DL (ref 0.1–1)
BUN SERPL-MCNC: 12 MG/DL (ref 6–20)
CALCIUM SERPL-MCNC: 9 MG/DL (ref 8.7–10.5)
CHLORIDE SERPL-SCNC: 106 MMOL/L (ref 95–110)
CHOLEST SERPL-MCNC: 145 MG/DL (ref 120–199)
CHOLEST/HDLC SERPL: 3.3 {RATIO} (ref 2–5)
CO2 SERPL-SCNC: 25 MMOL/L (ref 23–29)
CREAT SERPL-MCNC: 0.6 MG/DL (ref 0.5–1.4)
DIFFERENTIAL METHOD: ABNORMAL
EOSINOPHIL # BLD AUTO: 0.1 K/UL (ref 0–0.5)
EOSINOPHIL NFR BLD: 1.6 % (ref 0–8)
ERYTHROCYTE [DISTWIDTH] IN BLOOD BY AUTOMATED COUNT: 13.7 % (ref 11.5–14.5)
EST. GFR  (AFRICAN AMERICAN): >60 ML/MIN/1.73 M^2
EST. GFR  (NON AFRICAN AMERICAN): >60 ML/MIN/1.73 M^2
GLUCOSE SERPL-MCNC: 111 MG/DL (ref 70–110)
HCT VFR BLD AUTO: 45.2 % (ref 40–54)
HDLC SERPL-MCNC: 44 MG/DL (ref 40–75)
HDLC SERPL: 30.3 % (ref 20–50)
HGB BLD-MCNC: 13.9 G/DL (ref 14–18)
LDLC SERPL CALC-MCNC: 92.6 MG/DL (ref 63–159)
LYMPHOCYTES # BLD AUTO: 1.2 K/UL (ref 1–4.8)
LYMPHOCYTES NFR BLD: 17.9 % (ref 18–48)
MCH RBC QN AUTO: 30 PG (ref 27–31)
MCHC RBC AUTO-ENTMCNC: 30.8 G/DL (ref 32–36)
MCV RBC AUTO: 97 FL (ref 82–98)
MONOCYTES # BLD AUTO: 0.4 K/UL (ref 0.3–1)
MONOCYTES NFR BLD: 5.7 % (ref 4–15)
NEUTROPHILS # BLD AUTO: 5.1 K/UL (ref 1.8–7.7)
NEUTROPHILS NFR BLD: 74.7 % (ref 38–73)
NONHDLC SERPL-MCNC: 101 MG/DL
PLATELET # BLD AUTO: 154 K/UL (ref 150–350)
PMV BLD AUTO: 11.9 FL (ref 9.2–12.9)
POTASSIUM SERPL-SCNC: 4.1 MMOL/L (ref 3.5–5.1)
PROT SERPL-MCNC: 6.9 G/DL (ref 6–8.4)
RBC # BLD AUTO: 4.64 M/UL (ref 4.6–6.2)
SODIUM SERPL-SCNC: 139 MMOL/L (ref 136–145)
TRIGL SERPL-MCNC: 42 MG/DL (ref 30–150)
TSH SERPL DL<=0.005 MIU/L-ACNC: 0.51 UIU/ML (ref 0.4–4)
WBC # BLD AUTO: 6.88 K/UL (ref 3.9–12.7)

## 2019-07-02 PROCEDURE — 80048 BASIC METABOLIC PNL TOTAL CA: CPT

## 2019-07-02 PROCEDURE — 99213 OFFICE O/P EST LOW 20 MIN: CPT | Mod: PBBFAC | Performed by: INTERNAL MEDICINE

## 2019-07-02 PROCEDURE — 80076 HEPATIC FUNCTION PANEL: CPT

## 2019-07-02 PROCEDURE — 84443 ASSAY THYROID STIM HORMONE: CPT

## 2019-07-02 PROCEDURE — 99214 OFFICE O/P EST MOD 30 MIN: CPT | Mod: S$PBB,,, | Performed by: INTERNAL MEDICINE

## 2019-07-02 PROCEDURE — 99214 PR OFFICE/OUTPT VISIT, EST, LEVL IV, 30-39 MIN: ICD-10-PCS | Mod: S$PBB,,, | Performed by: INTERNAL MEDICINE

## 2019-07-02 PROCEDURE — 99999 PR PBB SHADOW E&M-EST. PATIENT-LVL III: ICD-10-PCS | Mod: PBBFAC,,, | Performed by: INTERNAL MEDICINE

## 2019-07-02 PROCEDURE — 99999 PR PBB SHADOW E&M-EST. PATIENT-LVL III: CPT | Mod: PBBFAC,,, | Performed by: INTERNAL MEDICINE

## 2019-07-02 PROCEDURE — 85025 COMPLETE CBC W/AUTO DIFF WBC: CPT

## 2019-07-02 PROCEDURE — 36415 COLL VENOUS BLD VENIPUNCTURE: CPT

## 2019-07-02 PROCEDURE — 80061 LIPID PANEL: CPT

## 2019-07-02 NOTE — PROGRESS NOTES
"Subjective:       Patient ID: Nghia Edgar Jr. is a 33 y.o. male.    Chief Complaint: Follow-up   Is this is a 33-year-old who presents today for follow-up.  Since last visit he has been doing well reports his wound has healed and he continues to be cautious with movement to prevent further decubitus.  He is here in a stretcher today for transportation .  Patient reports he has been feeling fairly well recently he still gets occasional seasonal allergies congestion and will get a cough at times intermittently he does have his inhaler/nebulizer  and uses antihistamine as needed which helped last time he was sick.  He had labs prior to his appointment.  Continues to follow with Urology for neurogenic bladder and has colostomy in place.   Patient reports he has been following with his physical medicine doctor and undergoing some additional is therapy.  He has baclofen pump in place for which she sees Physical Medicine and he also has been undergoing some new gait training with some brace orthotics that have allowed him to walk under the assistance of physical therapy .  He feels this is been helping with his upper body strength as well and he is pleased that he can walk during those sessions.     HPI  Review of Systems   HENT:        Rhintis at times  Improved    Respiratory:        Breathing better overall    Genitourinary:        Neurogenic bladder  Sees uriolgoy    Colostomy in place        Objective:     Blood pressure 110/68, pulse (!) 58, height 5' 9" (1.753 m).    Physical Exam   Constitutional: No distress.   HENT:   Head: Normocephalic.   Mouth/Throat: Oropharynx is clear and moist.   Eyes: No scleral icterus.   Neck: Neck supple.   Cardiovascular: Normal rate, regular rhythm and normal heart sounds. Exam reveals no gallop and no friction rub.   No murmur heard.  Pulmonary/Chest: Effort normal and breath sounds normal. No respiratory distress.   Abdominal: Soft. Bowel sounds are normal. He exhibits no " mass. There is no tenderness.   Colostomy in polace    Right baclofen pump    Musculoskeletal: He exhibits no edema.   Neurological: He is alert.   Skin: No erythema.   Psychiatric: He has a normal mood and affect.   Vitals reviewed.      Assessment:       1. Paraplegia following spinal cord injury    2. Mild intermittent asthma without complication    3. Rhinitis, unspecified type    4. Neurogenic bladder    5. Neurogenic bowel    6. Spastic quadriparesis        Plan:       Nghia was seen today for follow-up.    Diagnoses and all orders for this visit:    Paraplegia following spinal cord injury  Spastic quadriparesis  With baclofen pump  Pt continues to follow with physical medicine   And physical therapy he is undergoing pt with rgo braces   Which he is pleased about he plans to follow up with physical medicine  As recommended     Mild intermittent asthma without complication  Rhinitis, unspecified type  Stable continue precuations antihistamines, nebs when needed  curretnly asymtpoamtic     Neurogenic bladder  He continues to follow with urology     Hx decubitus he continues wound precautions has had no recent breakdown     Neurogenic bowel  Colostomy in place

## 2019-07-05 ENCOUNTER — PATIENT MESSAGE (OUTPATIENT)
Dept: PHYSICAL MEDICINE AND REHAB | Facility: CLINIC | Age: 34
End: 2019-07-05

## 2019-07-05 DIAGNOSIS — M79.2 NEUROPATHIC PAIN: Chronic | ICD-10-CM

## 2019-07-08 ENCOUNTER — PATIENT MESSAGE (OUTPATIENT)
Dept: PHYSICAL MEDICINE AND REHAB | Facility: CLINIC | Age: 34
End: 2019-07-08

## 2019-07-09 ENCOUNTER — PATIENT MESSAGE (OUTPATIENT)
Dept: PHYSICAL MEDICINE AND REHAB | Facility: CLINIC | Age: 34
End: 2019-07-09

## 2019-07-09 DIAGNOSIS — M79.2 NEUROPATHIC PAIN: Chronic | ICD-10-CM

## 2019-07-09 RX ORDER — OXYCODONE AND ACETAMINOPHEN 10; 325 MG/1; MG/1
TABLET ORAL
Qty: 135 TABLET | Refills: 0 | Status: CANCELLED | OUTPATIENT
Start: 2019-07-09 | End: 2019-08-04

## 2019-07-09 RX ORDER — OXYCODONE AND ACETAMINOPHEN 10; 325 MG/1; MG/1
TABLET ORAL
Qty: 135 TABLET | Refills: 0 | Status: SHIPPED | OUTPATIENT
Start: 2019-07-09 | End: 2019-08-06 | Stop reason: SDUPTHER

## 2019-07-11 ENCOUNTER — PATIENT MESSAGE (OUTPATIENT)
Dept: PHYSICAL MEDICINE AND REHAB | Facility: CLINIC | Age: 34
End: 2019-07-11

## 2019-07-11 DIAGNOSIS — M79.2 NEUROPATHIC PAIN: Chronic | ICD-10-CM

## 2019-07-11 RX ORDER — OXYCODONE HCL 40 MG/1
40 TABLET, FILM COATED, EXTENDED RELEASE ORAL EVERY 12 HOURS
Qty: 60 TABLET | Refills: 0 | Status: SHIPPED | OUTPATIENT
Start: 2019-07-11 | End: 2019-08-12 | Stop reason: SDUPTHER

## 2019-07-11 RX ORDER — PROMETHAZINE HYDROCHLORIDE 6.25 MG/5ML
5 SYRUP ORAL NIGHTLY PRN
Qty: 240 ML | Refills: 1 | Status: SHIPPED | OUTPATIENT
Start: 2019-07-11 | End: 2019-08-30 | Stop reason: SDUPTHER

## 2019-07-15 ENCOUNTER — TELEPHONE (OUTPATIENT)
Dept: INTERNAL MEDICINE | Facility: CLINIC | Age: 34
End: 2019-07-15

## 2019-07-15 NOTE — TELEPHONE ENCOUNTER
----- Message from Radha Tamez sent at 7/15/2019 12:30 PM CDT -----  Contact: Evonne [phone]  Evonne will like to follow up on wound care orders.

## 2019-07-16 ENCOUNTER — TELEPHONE (OUTPATIENT)
Dept: INTERNAL MEDICINE | Facility: CLINIC | Age: 34
End: 2019-07-16

## 2019-07-16 ENCOUNTER — PATIENT MESSAGE (OUTPATIENT)
Dept: UROLOGY | Facility: CLINIC | Age: 34
End: 2019-07-16

## 2019-07-16 NOTE — TELEPHONE ENCOUNTER
Spoke w Jaylen; informed that MD documents states healed using protection. Jaylen verbalized understanding.

## 2019-07-16 NOTE — TELEPHONE ENCOUNTER
----- Message from Tosin Limon sent at 7/16/2019  1:37 PM CDT -----  [facility]len [phone]  Stated that they received orders from you but cannot make out what you wrote.    Please call and advise.    Thank You

## 2019-07-18 ENCOUNTER — PATIENT MESSAGE (OUTPATIENT)
Dept: UROLOGY | Facility: CLINIC | Age: 34
End: 2019-07-18

## 2019-07-18 ENCOUNTER — PATIENT MESSAGE (OUTPATIENT)
Dept: PHYSICAL MEDICINE AND REHAB | Facility: CLINIC | Age: 34
End: 2019-07-18

## 2019-07-19 ENCOUNTER — PATIENT MESSAGE (OUTPATIENT)
Dept: UROLOGY | Facility: CLINIC | Age: 34
End: 2019-07-19

## 2019-07-19 ENCOUNTER — PATIENT MESSAGE (OUTPATIENT)
Dept: INTERNAL MEDICINE | Facility: CLINIC | Age: 34
End: 2019-07-19
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
## 2019-07-22 ENCOUNTER — PATIENT MESSAGE (OUTPATIENT)
Dept: PHYSICAL MEDICINE AND REHAB | Facility: CLINIC | Age: 34
End: 2019-07-22

## 2019-07-22 ENCOUNTER — PATIENT MESSAGE (OUTPATIENT)
Dept: INTERNAL MEDICINE | Facility: CLINIC | Age: 34
End: 2019-07-22

## 2019-07-22 ENCOUNTER — OFFICE VISIT (OUTPATIENT)
Dept: UROLOGY | Facility: CLINIC | Age: 34
End: 2019-07-22
Payer: MEDICAID

## 2019-07-22 DIAGNOSIS — Z43.5 ENCOUNTER FOR CARE OR REPLACEMENT OF SUPRAPUBIC TUBE: ICD-10-CM

## 2019-07-22 DIAGNOSIS — G90.4 AUTONOMIC DYSREFLEXIA: Chronic | ICD-10-CM

## 2019-07-22 DIAGNOSIS — Z93.59 CHRONIC SUPRAPUBIC CATHETER: ICD-10-CM

## 2019-07-22 DIAGNOSIS — N31.9 NEUROGENIC BLADDER: Chronic | ICD-10-CM

## 2019-07-22 DIAGNOSIS — G82.20 PARAPLEGIA FOLLOWING SPINAL CORD INJURY: Chronic | ICD-10-CM

## 2019-07-22 DIAGNOSIS — R39.15 URINARY URGENCY: Primary | ICD-10-CM

## 2019-07-22 PROCEDURE — 99999 PR PBB SHADOW E&M-EST. PATIENT-LVL II: CPT | Mod: PBBFAC,,, | Performed by: NURSE PRACTITIONER

## 2019-07-22 PROCEDURE — 87086 URINE CULTURE/COLONY COUNT: CPT

## 2019-07-22 PROCEDURE — 99214 PR OFFICE/OUTPT VISIT, EST, LEVL IV, 30-39 MIN: ICD-10-PCS | Mod: S$PBB,25,, | Performed by: NURSE PRACTITIONER

## 2019-07-22 PROCEDURE — 99214 OFFICE O/P EST MOD 30 MIN: CPT | Mod: S$PBB,25,, | Performed by: NURSE PRACTITIONER

## 2019-07-22 PROCEDURE — 99212 OFFICE O/P EST SF 10 MIN: CPT | Mod: PBBFAC,25 | Performed by: NURSE PRACTITIONER

## 2019-07-22 PROCEDURE — 51705 CHANGE OF BLADDER TUBE: CPT | Mod: PBBFAC | Performed by: NURSE PRACTITIONER

## 2019-07-22 PROCEDURE — 99999 PR PBB SHADOW E&M-EST. PATIENT-LVL II: ICD-10-PCS | Mod: PBBFAC,,, | Performed by: NURSE PRACTITIONER

## 2019-07-22 PROCEDURE — 51705 CHANGE OF BLADDER TUBE: CPT | Mod: S$PBB,,, | Performed by: NURSE PRACTITIONER

## 2019-07-22 PROCEDURE — 51705 PR CHANGE OF BLADDER TUBE,SIMPLE: ICD-10-PCS | Mod: S$PBB,,, | Performed by: NURSE PRACTITIONER

## 2019-07-22 NOTE — PATIENT INSTRUCTIONS
"  Discharge Instructions: Caring for Your Suprapubic Catheter  You are going home with a suprapubic catheter in place. This tube is placed directly into the bladder through your abdomen to drain urine from your bladder. You were shown how to care for your catheter in the hospital. This sheet will help remind you of those steps and guidelines when you are at home.  Home care  · Shower as necessary.   · Change your dressing every day. Change the dressing more often if it falls off, becomes dirty, or has absorbed a lot of drainage.  Gather your supplies  · Tape  · Povidone-iodine ointment  · Cotton swabs  · Wastebasket and plastic bag  · Povidone-iodine swab sticks (or cotton balls and povidone-iodine solution)  · Dressing sponges (4" x 4") that are cut or split senior care into the middle  Remove the dressing and check for problems  · Wash your hands thoroughly before and after all catheter care.  · Gently remove the old dressing if you have one.  ¨ Dont pull on the tube.  ¨ Check the dressing for drainage. Notice whether anything looks unusual or smells bad.  ¨ Place your dressing in the plastic bag and throw it away in the wastebasket.  · Now look at the place where the catheter leaves your body (exit site).  ¨ Note any swelling, bleeding, irritation, unusual or smelly drainage.  ¨ Also check for any sores next to the exit site. Sores form around the exit site if there is too much pressure from the tube on the skin.  Clean the area  · Wash around the shield gently with soap and water.  · Use a povidone-iodine swab stick to clean under the shield.  ¨ Clean around the exit site of the catheter.  ¨ Start at the exit site and clean outward in a circular motion, about 3 to 4 inches from the site.Dont clean back toward the tube.  ¨ Throw away the used swab stick and repeat the cleaning procedure with a new one.  ¨ Let your skin dry completely.  · Place a split 4" x 4" sponge around the catheter. Tape it in place.  · Smear a " thin layer of povidone-iodine ointment around the catheter with a cotton swab.  Follow-up care  Make a follow-up appointment or as advised.  When to call your healthcare provider  Call your health care provider right away if you have any of the following:  · Catheter that falls out, or is clogged or feels clogged  · Stitches that fall out  · Urine leaking around catheter  · Urine that is cloudy, bloody, or smells bad  · No urine drainage  · Bladder that feels full or painful  · Rash, itching, redness, swelling, or drainage at the catheter site  · Fever above 100.4°F (38.°C) or shaking chills   Date Last Reviewed: 1/1/2017  © 1835-0078 [employer]. [address] [place] [address], [place] [address]. All rights reserved. This information is not intended as a substitute for professional medical care. Always follow your healthcare professional's instructions.

## 2019-07-23 DIAGNOSIS — Z93.3 STATUS POST COLOSTOMY: Primary | ICD-10-CM

## 2019-07-23 LAB
BACTERIA UR CULT: NORMAL
BACTERIA UR CULT: NORMAL

## 2019-07-24 ENCOUNTER — OFFICE VISIT (OUTPATIENT)
Dept: PHYSICAL MEDICINE AND REHAB | Facility: CLINIC | Age: 34
End: 2019-07-24
Payer: MEDICAID

## 2019-07-24 DIAGNOSIS — R25.2 SPASTICITY: Primary | ICD-10-CM

## 2019-07-24 PROCEDURE — 99499 NO LOS: ICD-10-PCS | Mod: S$PBB,,, | Performed by: PHYSICAL MEDICINE & REHABILITATION

## 2019-07-24 PROCEDURE — 62370 ANL SP INF PMP W/MDREPRG&FIL: CPT | Mod: S$PBB,,, | Performed by: PHYSICAL MEDICINE & REHABILITATION

## 2019-07-24 PROCEDURE — 99499 UNLISTED E&M SERVICE: CPT | Mod: S$PBB,,, | Performed by: PHYSICAL MEDICINE & REHABILITATION

## 2019-07-24 PROCEDURE — 62370 ANL SP INF PMP W/MDREPRG&FIL: CPT | Mod: PBBFAC,PO | Performed by: PHYSICAL MEDICINE & REHABILITATION

## 2019-07-24 PROCEDURE — 62370 PR ANL SP INF PMP W/MDREPRG&FIL: ICD-10-PCS | Mod: S$PBB,,, | Performed by: PHYSICAL MEDICINE & REHABILITATION

## 2019-07-24 PROCEDURE — 80307 DRUG TEST PRSMV CHEM ANLYZR: CPT

## 2019-07-25 RX ADMIN — BACLOFEN 25 MCG: 2 INJECTION INTRATHECAL at 08:07

## 2019-07-28 LAB
6MAM UR QL: NOT DETECTED
7AMINOCLONAZEPAM UR QL: NOT DETECTED
A-OH ALPRAZ UR QL: NOT DETECTED
ALPRAZ UR QL: NOT DETECTED
AMPHET UR QL SCN: NOT DETECTED
BARBITURATES UR QL: NOT DETECTED
BUPRENORPHINE UR QL: NOT DETECTED
BZE UR QL: NOT DETECTED
CARBOXYTHC UR QL: NOT DETECTED
CARISOPRODOL UR QL: NOT DETECTED
CLONAZEPAM UR QL: NOT DETECTED
CODEINE UR QL: NOT DETECTED
CREAT UR-MCNC: 114 MG/DL (ref 20–400)
DIAZEPAM UR QL: PRESENT
ETHYL GLUCURONIDE UR QL: NOT DETECTED
FENTANYL UR QL: NOT DETECTED
HYDROCODONE UR QL: NOT DETECTED
HYDROMORPHONE UR QL: NOT DETECTED
LORAZEPAM UR QL: NOT DETECTED
MDA UR QL: NOT DETECTED
MDEA UR QL: NOT DETECTED
MDMA UR QL: NOT DETECTED
ME-PHENIDATE UR QL: NOT DETECTED
MEPERIDINE UR QL: NOT DETECTED
METHADONE UR QL: NOT DETECTED
METHAMPHET UR QL: NOT DETECTED
MIDAZOLAM UR QL SCN: NOT DETECTED
MORPHINE UR QL: NOT DETECTED
NORBUPRENORPHINE UR QL CFM: NOT DETECTED
NORDIAZEPAM UR QL: NOT DETECTED
NORFENTANYL UR QL: NOT DETECTED
NORHYDROCODONE UR QL CFM: NOT DETECTED
NOROXYCODONE UR QL CFM: NOT DETECTED
NOROXYMORPHONE: NOT DETECTED
OXAZEPAM UR QL: NOT DETECTED
OXYCODONE UR QL: PRESENT
OXYMORPHONE UR QL: NOT DETECTED
PATHOLOGY STUDY: NORMAL
PCP UR QL: NOT DETECTED
PHENTERMINE UR QL: NOT DETECTED
PROPOXYPH UR QL: NOT DETECTED
SERVICE CMNT-IMP: NORMAL
TAPENTADOL UR QL SCN: NOT DETECTED
TAPENTADOL-O-SULF: NOT DETECTED
TEMAZEPAM UR QL: NOT DETECTED
TRAMADOL UR QL: NOT DETECTED
ZOLPIDEM UR QL: NOT DETECTED
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
## 2019-07-30 ENCOUNTER — PATIENT MESSAGE (OUTPATIENT)
Dept: INTERNAL MEDICINE | Facility: CLINIC | Age: 34
End: 2019-07-30

## 2019-07-31 ENCOUNTER — PATIENT MESSAGE (OUTPATIENT)
Dept: INTERNAL MEDICINE | Facility: CLINIC | Age: 34
End: 2019-07-31

## 2019-08-01 ENCOUNTER — PATIENT MESSAGE (OUTPATIENT)
Dept: INTERNAL MEDICINE | Facility: CLINIC | Age: 34
End: 2019-08-01

## 2019-08-02 ENCOUNTER — PATIENT MESSAGE (OUTPATIENT)
Dept: INTERNAL MEDICINE | Facility: CLINIC | Age: 34
End: 2019-08-02

## 2019-08-02 NOTE — TELEPHONE ENCOUNTER
Okay to fit in Tuesday 8/ 13 at 11:30 spot if able  When I return   Or assist in another provider if needed  While I am out

## 2019-08-05 ENCOUNTER — TELEPHONE (OUTPATIENT)
Dept: INTERNAL MEDICINE | Facility: CLINIC | Age: 34
End: 2019-08-05

## 2019-08-05 ENCOUNTER — TELEPHONE (OUTPATIENT)
Dept: UROLOGY | Facility: CLINIC | Age: 34
End: 2019-08-05

## 2019-08-05 DIAGNOSIS — R39.15 URINARY URGENCY: ICD-10-CM

## 2019-08-05 DIAGNOSIS — Z93.59 CHRONIC SUPRAPUBIC CATHETER: ICD-10-CM

## 2019-08-05 DIAGNOSIS — N31.9 NEUROGENIC BLADDER: Primary | ICD-10-CM

## 2019-08-06 ENCOUNTER — PATIENT MESSAGE (OUTPATIENT)
Dept: PHYSICAL MEDICINE AND REHAB | Facility: CLINIC | Age: 34
End: 2019-08-06

## 2019-08-06 ENCOUNTER — PATIENT MESSAGE (OUTPATIENT)
Dept: INTERNAL MEDICINE | Facility: CLINIC | Age: 34
End: 2019-08-06

## 2019-08-06 DIAGNOSIS — G82.20 PARAPLEGIA FOLLOWING SPINAL CORD INJURY: Primary | Chronic | ICD-10-CM

## 2019-08-06 DIAGNOSIS — R29.818 DIFFICULTY BALANCING WHEN SITTING: ICD-10-CM

## 2019-08-06 DIAGNOSIS — M79.2 NEUROPATHIC PAIN: Chronic | ICD-10-CM

## 2019-08-06 DIAGNOSIS — R29.818 POOR TRUNK CONTROL: ICD-10-CM

## 2019-08-06 RX ORDER — OXYCODONE AND ACETAMINOPHEN 10; 325 MG/1; MG/1
TABLET ORAL
Qty: 135 TABLET | Refills: 0 | Status: SHIPPED | OUTPATIENT
Start: 2019-08-06 | End: 2019-09-06 | Stop reason: SDUPTHER

## 2019-08-06 NOTE — TELEPHONE ENCOUNTER
Neurogenic bladder  -     Case Request Operating Room: CYSTOSCOPY, INJECTION, BOTULINUM TOXIN, TYPE A    Chronic suprapubic catheter  -     Case Request Operating Room: CYSTOSCOPY, INJECTION, BOTULINUM TOXIN, TYPE A    Urinary urgency  -     Case Request Operating Room: CYSTOSCOPY, INJECTION, BOTULINUM TOXIN, TYPE A

## 2019-08-07 ENCOUNTER — PATIENT MESSAGE (OUTPATIENT)
Dept: PHYSICAL MEDICINE AND REHAB | Facility: CLINIC | Age: 34
End: 2019-08-07

## 2019-08-07 ENCOUNTER — PATIENT MESSAGE (OUTPATIENT)
Dept: INTERNAL MEDICINE | Facility: CLINIC | Age: 34
End: 2019-08-07

## 2019-08-07 DIAGNOSIS — F41.9 ANXIETY: ICD-10-CM

## 2019-08-07 RX ORDER — DIAZEPAM 10 MG/1
10 TABLET ORAL 3 TIMES DAILY PRN
Qty: 90 TABLET | Refills: 5 | Status: SHIPPED | OUTPATIENT
Start: 2019-08-07 | End: 2020-02-11 | Stop reason: SDUPTHER

## 2019-08-08 ENCOUNTER — PATIENT MESSAGE (OUTPATIENT)
Dept: PHYSICAL MEDICINE AND REHAB | Facility: CLINIC | Age: 34
End: 2019-08-08

## 2019-08-09 ENCOUNTER — ANESTHESIA EVENT (OUTPATIENT)
Dept: SURGERY | Facility: HOSPITAL | Age: 34
End: 2019-08-09
Payer: MEDICAID

## 2019-08-12 ENCOUNTER — PATIENT MESSAGE (OUTPATIENT)
Dept: PHYSICAL MEDICINE AND REHAB | Facility: CLINIC | Age: 34
End: 2019-08-12

## 2019-08-12 DIAGNOSIS — M79.2 NEUROPATHIC PAIN: Chronic | ICD-10-CM

## 2019-08-13 ENCOUNTER — PATIENT MESSAGE (OUTPATIENT)
Dept: UROLOGY | Facility: CLINIC | Age: 34
End: 2019-08-13

## 2019-08-13 ENCOUNTER — PATIENT MESSAGE (OUTPATIENT)
Dept: INTERNAL MEDICINE | Facility: CLINIC | Age: 34
End: 2019-08-13

## 2019-08-13 ENCOUNTER — OFFICE VISIT (OUTPATIENT)
Dept: UROLOGY | Facility: CLINIC | Age: 34
End: 2019-08-13
Payer: MEDICAID

## 2019-08-13 VITALS — DIASTOLIC BLOOD PRESSURE: 64 MMHG | HEART RATE: 48 BPM | SYSTOLIC BLOOD PRESSURE: 98 MMHG

## 2019-08-13 DIAGNOSIS — Z93.59 CHRONIC SUPRAPUBIC CATHETER: ICD-10-CM

## 2019-08-13 DIAGNOSIS — N31.9 NEUROGENIC BLADDER: Primary | ICD-10-CM

## 2019-08-13 DIAGNOSIS — Z43.5 ENCOUNTER FOR CARE OR REPLACEMENT OF SUPRAPUBIC TUBE: ICD-10-CM

## 2019-08-13 DIAGNOSIS — R82.71 BACTERIA IN URINE: ICD-10-CM

## 2019-08-13 DIAGNOSIS — N32.89 BLADDER SPASMS: ICD-10-CM

## 2019-08-13 DIAGNOSIS — N39.0 RECURRENT UTI: ICD-10-CM

## 2019-08-13 PROCEDURE — 99214 PR OFFICE/OUTPT VISIT, EST, LEVL IV, 30-39 MIN: ICD-10-PCS | Mod: S$PBB,25,, | Performed by: NURSE PRACTITIONER

## 2019-08-13 PROCEDURE — 99999 PR PBB SHADOW E&M-EST. PATIENT-LVL IV: ICD-10-PCS | Mod: PBBFAC,,, | Performed by: NURSE PRACTITIONER

## 2019-08-13 PROCEDURE — 87086 URINE CULTURE/COLONY COUNT: CPT

## 2019-08-13 PROCEDURE — 99999 PR PBB SHADOW E&M-EST. PATIENT-LVL IV: CPT | Mod: PBBFAC,,, | Performed by: NURSE PRACTITIONER

## 2019-08-13 PROCEDURE — 99214 OFFICE O/P EST MOD 30 MIN: CPT | Mod: PBBFAC,25 | Performed by: NURSE PRACTITIONER

## 2019-08-13 PROCEDURE — 51705 CHANGE OF BLADDER TUBE: CPT | Mod: PBBFAC | Performed by: NURSE PRACTITIONER

## 2019-08-13 PROCEDURE — 51705 PR CHANGE OF BLADDER TUBE,SIMPLE: ICD-10-PCS | Mod: S$PBB,,, | Performed by: NURSE PRACTITIONER

## 2019-08-13 PROCEDURE — 99214 OFFICE O/P EST MOD 30 MIN: CPT | Mod: S$PBB,25,, | Performed by: NURSE PRACTITIONER

## 2019-08-13 PROCEDURE — 51705 CHANGE OF BLADDER TUBE: CPT | Mod: S$PBB,,, | Performed by: NURSE PRACTITIONER

## 2019-08-13 RX ORDER — OXYCODONE HCL 40 MG/1
40 TABLET, FILM COATED, EXTENDED RELEASE ORAL EVERY 12 HOURS
Qty: 60 TABLET | Refills: 0 | Status: SHIPPED | OUTPATIENT
Start: 2019-08-13 | End: 2019-09-16 | Stop reason: SDUPTHER
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
## 2019-08-13 NOTE — TELEPHONE ENCOUNTER
Okay to refax his ostomy supplies if needed   Prior order again if they need it faxed again/    see  recent urology ostomy supply form in your box to fax  And notify pt when done

## 2019-08-14 LAB
BACTERIA UR CULT: NORMAL
BACTERIA UR CULT: NORMAL
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
## 2019-08-21 DIAGNOSIS — R10.2 CHRONIC SUPRAPUBIC PAIN: ICD-10-CM

## 2019-08-21 DIAGNOSIS — N31.9 NEUROGENIC BLADDER: ICD-10-CM

## 2019-08-21 DIAGNOSIS — A49.9 UTI (URINARY TRACT INFECTION), BACTERIAL: Primary | ICD-10-CM

## 2019-08-21 DIAGNOSIS — N32.89 BLADDER SPASMS: ICD-10-CM

## 2019-08-21 DIAGNOSIS — N39.0 UTI (URINARY TRACT INFECTION), BACTERIAL: Primary | ICD-10-CM

## 2019-08-21 DIAGNOSIS — G89.29 CHRONIC SUPRAPUBIC PAIN: ICD-10-CM

## 2019-08-21 RX ORDER — CEFDINIR 300 MG/1
300 CAPSULE ORAL 2 TIMES DAILY
Qty: 14 CAPSULE | Refills: 0 | Status: SHIPPED | OUTPATIENT
Start: 2019-08-21 | End: 2019-08-28

## 2019-08-27 ENCOUNTER — TELEPHONE (OUTPATIENT)
Dept: UROLOGY | Facility: CLINIC | Age: 34
End: 2019-08-27

## 2019-08-28 ENCOUNTER — ANESTHESIA (OUTPATIENT)
Dept: SURGERY | Facility: HOSPITAL | Age: 34
End: 2019-08-28
Payer: MEDICAID

## 2019-08-28 ENCOUNTER — HOSPITAL ENCOUNTER (OUTPATIENT)
Facility: HOSPITAL | Age: 34
Discharge: HOME | End: 2019-08-28
Attending: UROLOGY | Admitting: UROLOGY
Payer: MEDICAID

## 2019-08-28 VITALS
BODY MASS INDEX: 25.18 KG/M2 | RESPIRATION RATE: 18 BRPM | DIASTOLIC BLOOD PRESSURE: 54 MMHG | HEART RATE: 45 BPM | WEIGHT: 170 LBS | OXYGEN SATURATION: 99 % | SYSTOLIC BLOOD PRESSURE: 96 MMHG | TEMPERATURE: 98 F | HEIGHT: 69 IN

## 2019-08-28 DIAGNOSIS — N31.9 NEUROGENIC BLADDER: ICD-10-CM

## 2019-08-28 PROCEDURE — D9220A PRA ANESTHESIA: Mod: CRNA,,, | Performed by: NURSE ANESTHETIST, CERTIFIED REGISTERED

## 2019-08-28 PROCEDURE — 51705 PR CHANGE OF BLADDER TUBE,SIMPLE: ICD-10-PCS | Mod: 51,,, | Performed by: UROLOGY

## 2019-08-28 PROCEDURE — 63600175 PHARM REV CODE 636 W HCPCS: Performed by: ANESTHESIOLOGY

## 2019-08-28 PROCEDURE — 36000707: Performed by: UROLOGY

## 2019-08-28 PROCEDURE — D9220A PRA ANESTHESIA: ICD-10-PCS | Mod: CRNA,,, | Performed by: NURSE ANESTHETIST, CERTIFIED REGISTERED

## 2019-08-28 PROCEDURE — 71000044 HC DOSC ROUTINE RECOVERY FIRST HOUR: Performed by: UROLOGY

## 2019-08-28 PROCEDURE — 37000008 HC ANESTHESIA 1ST 15 MINUTES: Performed by: UROLOGY

## 2019-08-28 PROCEDURE — 25000003 PHARM REV CODE 250: Performed by: UROLOGY

## 2019-08-28 PROCEDURE — 71000015 HC POSTOP RECOV 1ST HR: Performed by: UROLOGY

## 2019-08-28 PROCEDURE — D9220A PRA ANESTHESIA: Mod: ANES,,, | Performed by: ANESTHESIOLOGY

## 2019-08-28 PROCEDURE — 52287 CYSTOSCOPY CHEMODENERVATION: CPT | Mod: ,,, | Performed by: UROLOGY

## 2019-08-28 PROCEDURE — 51705 CHANGE OF BLADDER TUBE: CPT | Mod: 51,,, | Performed by: UROLOGY

## 2019-08-28 PROCEDURE — 00800 ANES PX LWR ANT ABDL WAL NOS: CPT | Performed by: UROLOGY

## 2019-08-28 PROCEDURE — 63600175 PHARM REV CODE 636 W HCPCS: Performed by: STUDENT IN AN ORGANIZED HEALTH CARE EDUCATION/TRAINING PROGRAM

## 2019-08-28 PROCEDURE — 63600175 PHARM REV CODE 636 W HCPCS: Performed by: NURSE ANESTHETIST, CERTIFIED REGISTERED

## 2019-08-28 PROCEDURE — 52287 PR CYSTOURETHROSCOPY WITH INJ FOR CHEMODENERVATION: ICD-10-PCS | Mod: ,,, | Performed by: UROLOGY

## 2019-08-28 PROCEDURE — 37000009 HC ANESTHESIA EA ADD 15 MINS: Performed by: UROLOGY

## 2019-08-28 PROCEDURE — D9220A PRA ANESTHESIA: ICD-10-PCS | Mod: ANES,,, | Performed by: ANESTHESIOLOGY

## 2019-08-28 PROCEDURE — 71000016 HC POSTOP RECOV ADDL HR: Performed by: UROLOGY

## 2019-08-28 PROCEDURE — 36000706: Performed by: UROLOGY

## 2019-08-28 PROCEDURE — 63600175 PHARM REV CODE 636 W HCPCS: Mod: JG | Performed by: UROLOGY

## 2019-08-28 PROCEDURE — 25000003 PHARM REV CODE 250: Performed by: STUDENT IN AN ORGANIZED HEALTH CARE EDUCATION/TRAINING PROGRAM

## 2019-08-28 PROCEDURE — 25000003 PHARM REV CODE 250: Performed by: NURSE ANESTHETIST, CERTIFIED REGISTERED

## 2019-08-28 RX ORDER — MIDAZOLAM HYDROCHLORIDE 1 MG/ML
INJECTION, SOLUTION INTRAMUSCULAR; INTRAVENOUS
Status: DISCONTINUED | OUTPATIENT
Start: 2019-08-28 | End: 2019-08-28

## 2019-08-28 RX ORDER — LIDOCAINE HCL/PF 100 MG/5ML
SYRINGE (ML) INTRAVENOUS
Status: DISCONTINUED | OUTPATIENT
Start: 2019-08-28 | End: 2019-08-28

## 2019-08-28 RX ORDER — FENTANYL CITRATE 50 UG/ML
25 INJECTION, SOLUTION INTRAMUSCULAR; INTRAVENOUS EVERY 5 MIN PRN
Status: COMPLETED | OUTPATIENT
Start: 2019-08-28 | End: 2019-08-28

## 2019-08-28 RX ORDER — PROPOFOL 10 MG/ML
VIAL (ML) INTRAVENOUS CONTINUOUS PRN
Status: DISCONTINUED | OUTPATIENT
Start: 2019-08-28 | End: 2019-08-28

## 2019-08-28 RX ORDER — PROPOFOL 10 MG/ML
VIAL (ML) INTRAVENOUS
Status: DISCONTINUED | OUTPATIENT
Start: 2019-08-28 | End: 2019-08-28

## 2019-08-28 RX ORDER — CEFTRIAXONE 1 G/1
1 INJECTION, POWDER, FOR SOLUTION INTRAMUSCULAR; INTRAVENOUS
Status: COMPLETED | OUTPATIENT
Start: 2019-08-28 | End: 2019-08-28

## 2019-08-28 RX ORDER — SODIUM CHLORIDE 0.9 % (FLUSH) 0.9 %
10 SYRINGE (ML) INJECTION
Status: DISCONTINUED | OUTPATIENT
Start: 2019-08-28 | End: 2019-08-28 | Stop reason: HOSPADM

## 2019-08-28 RX ORDER — METOPROLOL TARTRATE 1 MG/ML
INJECTION, SOLUTION INTRAVENOUS
Status: DISCONTINUED | OUTPATIENT
Start: 2019-08-28 | End: 2019-08-28

## 2019-08-28 RX ORDER — LIDOCAINE HYDROCHLORIDE 10 MG/ML
1 INJECTION, SOLUTION EPIDURAL; INFILTRATION; INTRACAUDAL; PERINEURAL ONCE
Status: DISCONTINUED | OUTPATIENT
Start: 2019-08-28 | End: 2019-08-28 | Stop reason: HOSPADM

## 2019-08-28 RX ORDER — CEFTRIAXONE 1 G/1
INJECTION, POWDER, FOR SOLUTION INTRAMUSCULAR; INTRAVENOUS
Status: COMPLETED
Start: 2019-08-28 | End: 2019-08-28

## 2019-08-28 RX ORDER — FENTANYL CITRATE 50 UG/ML
INJECTION, SOLUTION INTRAMUSCULAR; INTRAVENOUS
Status: DISCONTINUED | OUTPATIENT
Start: 2019-08-28 | End: 2019-08-28

## 2019-08-28 RX ORDER — SODIUM CHLORIDE 9 MG/ML
INJECTION, SOLUTION INTRAVENOUS CONTINUOUS
Status: DISCONTINUED | OUTPATIENT
Start: 2019-08-28 | End: 2019-08-28 | Stop reason: HOSPADM

## 2019-08-28 RX ORDER — FENTANYL CITRATE 50 UG/ML
INJECTION, SOLUTION INTRAMUSCULAR; INTRAVENOUS
Status: DISCONTINUED
Start: 2019-08-28 | End: 2019-08-28 | Stop reason: HOSPADM

## 2019-08-28 RX ORDER — OXYCODONE HYDROCHLORIDE 5 MG/1
TABLET ORAL
Status: DISCONTINUED
Start: 2019-08-28 | End: 2019-08-28 | Stop reason: HOSPADM

## 2019-08-28 RX ORDER — LIDOCAINE HYDROCHLORIDE 20 MG/ML
JELLY TOPICAL
Status: DISCONTINUED | OUTPATIENT
Start: 2019-08-28 | End: 2019-08-28 | Stop reason: HOSPADM

## 2019-08-28 RX ORDER — OXYCODONE HYDROCHLORIDE 5 MG/1
10 TABLET ORAL ONCE
Status: COMPLETED | OUTPATIENT
Start: 2019-08-28 | End: 2019-08-28

## 2019-08-28 RX ADMIN — LIDOCAINE HYDROCHLORIDE 50 MG: 20 INJECTION, SOLUTION INTRAVENOUS at 03:08

## 2019-08-28 RX ADMIN — FENTANYL CITRATE 25 MCG: 50 INJECTION INTRAMUSCULAR; INTRAVENOUS at 04:08

## 2019-08-28 RX ADMIN — FENTANYL CITRATE 50 MCG: 50 INJECTION, SOLUTION INTRAMUSCULAR; INTRAVENOUS at 03:08

## 2019-08-28 RX ADMIN — METOPROLOL TARTRATE 2 MG: 5 INJECTION, SOLUTION INTRAVENOUS at 03:08

## 2019-08-28 RX ADMIN — FENTANYL CITRATE 25 MCG: 50 INJECTION INTRAMUSCULAR; INTRAVENOUS at 03:08

## 2019-08-28 RX ADMIN — PROPOFOL 50 MG: 10 INJECTION, EMULSION INTRAVENOUS at 03:08

## 2019-08-28 RX ADMIN — PROPOFOL 30 MG: 10 INJECTION, EMULSION INTRAVENOUS at 03:08

## 2019-08-28 RX ADMIN — CEFTRIAXONE SODIUM 1 G: 1 INJECTION, POWDER, FOR SOLUTION INTRAMUSCULAR; INTRAVENOUS at 03:08

## 2019-08-28 RX ADMIN — OXYCODONE HYDROCHLORIDE 10 MG: 5 TABLET ORAL at 04:08

## 2019-08-28 RX ADMIN — PROPOFOL 100 MCG/KG/MIN: 10 INJECTION, EMULSION INTRAVENOUS at 03:08

## 2019-08-28 RX ADMIN — SODIUM CHLORIDE: 0.9 INJECTION, SOLUTION INTRAVENOUS at 02:08

## 2019-08-28 RX ADMIN — MIDAZOLAM HYDROCHLORIDE 2 MG: 1 INJECTION, SOLUTION INTRAMUSCULAR; INTRAVENOUS at 02:08

## 2019-08-28 NOTE — TRANSFER OF CARE
"Anesthesia Transfer of Care Note    Patient: Nghia Edgar Jr.    Procedure(s) Performed: Procedure(s) (LRB):  CYSTOSCOPY (N/A)  INJECTION, BOTULINUM TOXIN, TYPE A (N/A)    Patient location: PACU    Anesthesia Type: general    Transport from OR: Transported from OR on 100% O2 by closed face mask with adequate spontaneous ventilation    Post pain: adequate analgesia    Post assessment: no apparent anesthetic complications and tolerated procedure well    Post vital signs: stable    Level of consciousness: awake    Nausea/Vomiting: no nausea/vomiting    Complications: none    Transfer of care protocol was followed      Last vitals:   Visit Vitals  /63 (BP Location: Left arm, Patient Position: Lying)   Pulse (!) 54   Temp 36.7 °C (98.1 °F) (Oral)   Resp 16   Ht 5' 9" (1.753 m)   Wt 77.1 kg (170 lb)   SpO2 99%   BMI 25.10 kg/m²     "

## 2019-08-28 NOTE — INTERVAL H&P NOTE
The patient has been examined and the H&P has been reviewed:    I concur with the findings and no changes have occurred since H&P was written.    Anesthesia/Surgery risks, benefits and alternative options discussed and understood by patient/family.    Patient has not taken ASA 81 for the past week.      There are no hospital problems to display for this patient.

## 2019-08-28 NOTE — DISCHARGE INSTRUCTIONS
Post Cystoscopy Instructions  Do not strain to have a bowel movement  No strenuous exercise x 7 days  No driving while you are on narcotic pain medications or if your snow  catheter is in place    You can expect:  To pass stone fragments if you had a stone procedure  Have pain when you void from your stent if you have a stent in place  See blood in your urine if you have a stent in place    If you have a catheter, please return to the ER if your catheter stops draining or you are having abdominal pain.    Call the doctor if:   Temperature is greater than 101F   Persistent vomiting and inability to keep food down   Inability to void if you do not have a catheter

## 2019-08-28 NOTE — DISCHARGE SUMMARY
[facility]  Discharge Note  Short Stay    Admit Date: 8/28/2019    Discharge Date and Time: 08/28/2019 3:52 PM      Attending Physician: Dk Luna MD     Discharge Provider: Stanford Rivera    Diagnoses:  Active Hospital Problems    Diagnosis  POA    *Neurogenic bladder [N31.9]  Yes     Chronic      Resolved Hospital Problems   No resolved problems to display.       Discharged Condition: good    Hospital Course: Patient was admitted for cystoscopy with bladder botox injection and tolerated the procedure well with no complications. The patient was discharged home in good condition on the same day.       Final Diagnoses: Same as principal problem.    Disposition: Home or Self Care    Follow up/Patient Instructions:    Medications:  Reconciled Home Medications:   Current Discharge Medication List      CONTINUE these medications which have NOT CHANGED    Details   ascorbic acid, vitamin C, (VITAMIN C) 1000 MG tablet Take 1,000 mg by mouth every morning.       aspirin (ECOTRIN) 81 MG EC tablet Take 81 mg by mouth once daily.      cefdinir (OMNICEF) 300 MG capsule Take 1 capsule (300 mg total) by mouth 2 (two) times daily. for 7 days  Qty: 14 capsule, Refills: 0    Associated Diagnoses: UTI (urinary tract infection), bacterial; Chronic suprapubic pain; Neurogenic bladder; Bladder spasms      diazePAM (VALIUM) 10 MG Tab Take 1 tablet (10 mg total) by mouth 3 (three) times daily as needed.  Qty: 90 tablet, Refills: 5    Associated Diagnoses: Anxiety      FLORANEX 1 million cell Tab Take 1 tablet by mouth once daily.  Refills: 11      loratadine (CLARITIN) 10 mg tablet Take 10 mg by mouth once daily.  Refills: 3      LYRICA 200 mg Cap TAKE 1 CAPSULE 3 TIMES A DAY  Qty: 90 capsule, Refills: 6    Comments: This request is for a new prescription for a controlled substance as required by Federal/State law.  Associated Diagnoses: Pain      multivitamin capsule Take 1 capsule by mouth every morning.       oxybutynin (DITROPAN) 5 MG Tab TAKE ONE TABLET BY MOUTH THREE TIMES DAILY AS NEEDED FOR  BLADDER  SPASMS  Qty: 90 tablet, Refills: 3    Comments: Please consider 90 day supplies to promote better adherence  Associated Diagnoses: Urgency of urination; Bladder spasm; Neurogenic bladder      oxyCODONE (OXYCONTIN) 40 mg 12 hr tablet Take 1 tablet (40 mg total) by mouth every 12 (twelve) hours.  Qty: 60 tablet, Refills: 0    Associated Diagnoses: Neuropathic pain      oxyCODONE-acetaminophen (PERCOCET)  mg per tablet Take 1-1/2 tablets (15mg) TID PRN (pain) ICD-10: M79.2  Qty: 135 tablet, Refills: 0    Associated Diagnoses: Neuropathic pain      potassium citrate (UROCIT-K) 10 mEq (1,080 mg) TbSR Take 10 mEq by mouth 3 (three) times daily.       promethazine (PHENERGAN) 6.25 mg/5 mL syrup Take 5 mLs (6.25 mg total) by mouth nightly as needed (cough).  Qty: 240 mL, Refills: 1      albuterol (PROAIR HFA) 90 mcg/actuation inhaler Inhale 1-2 puffs into the lungs every 6 (six) hours as needed for Wheezing or Shortness of Breath.  Qty: 18 g, Refills: 3      albuterol-ipratropium (DUO-NEB) 2.5 mg-0.5 mg/3 mL nebulizer solution Take 3 mLs by nebulization every 6 (six) hours as needed for Wheezing (cough). Rescue  Qty: 1 Box, Refills: 6      colostomy bags Misc Change daily  Qty: 30 each, Refills: 11    Comments: Holister 1 piece pouch. Please also dispense paste and skin protector.  Associated Diagnoses: Colostomy in place           Discharge Procedure Orders   Diet Adult Regular     No driving until:   Order Comments: Off narcotics     Notify your health care provider if you experience any of the following:  temperature >100.4     Notify your health care provider if you experience any of the following:  persistent nausea and vomiting or diarrhea     Notify your health care provider if you experience any of the following:  severe uncontrolled pain     Notify your health care provider if you experience any of the  following:  redness, tenderness, or signs of infection (pain, swelling, redness, odor or green/yellow discharge around incision site)     Notify your health care provider if you experience any of the following:  difficulty breathing or increased cough     Notify your health care provider if you experience any of the following:  severe persistent headache     Notify your health care provider if you experience any of the following:  worsening rash     Notify your health care provider if you experience any of the following:  persistent dizziness, light-headedness, or visual disturbances     Notify your health care provider if you experience any of the following:  increased confusion or weakness     Activity as tolerated     Follow-up Information     Stanford Siu DNP On 9/18/2019.    Specialty:  Urology  Why:  Suprapubic catheter exchange.  Contact information:  [address]  [facility] [address]  [phone]                   Discharge Procedure Orders (must include Diet, Follow-up, Activity):   Discharge Procedure Orders (must include Diet, Follow-up, Activity)   Diet Adult Regular     No driving until:   Order Comments: Off narcotics     Notify your health care provider if you experience any of the following:  temperature >100.4     Notify your health care provider if you experience any of the following:  persistent nausea and vomiting or diarrhea     Notify your health care provider if you experience any of the following:  severe uncontrolled pain     Notify your health care provider if you experience any of the following:  redness, tenderness, or signs of infection (pain, swelling, redness, odor or green/yellow discharge around incision site)     Notify your health care provider if you experience any of the following:  difficulty breathing or increased cough     Notify your health care provider if you experience any of the following:  severe persistent headache     Notify your health care provider if you  experience any of the following:  worsening rash     Notify your health care provider if you experience any of the following:  persistent dizziness, light-headedness, or visual disturbances     Notify your health care provider if you experience any of the following:  increased confusion or weakness     Activity as tolerated

## 2019-08-28 NOTE — OP NOTE
[facility]  Operative Note    Date: 08/28/2019    Pre-Op Diagnosis: neurogenic bladder  Patient Active Problem List   Diagnosis    Neurogenic bladder    Neurogenic bowel    Colostomy status    Paraplegia following spinal cord injury    Bursitis of shoulder    Autonomic dysreflexia    Neuropathic pain    Abnormal EKG    Spina bifida    Abnormal albumin    Urinary urgency    Anemia    Therapeutic opioid induced constipation    Complicated UTI (urinary tract infection)    Elevated troponin    Decubitus ulcer of right ischium, stage 4    Mild intermittent asthma without complication    Quadriplegia following spinal cord injury    Difficulty balancing when sitting    Poor trunk control    Impaired functional mobility, balance, and endurance    Chronic suprapubic catheter       Post-Op Diagnosis: same    Procedure(s) Performed:   1.  Cystoscopy with bladder botox injection  2.  Suprapubic catheter exchange    Specimen(s): none    Staff Surgeon:  Dk Luna MD    Assistant Surgeon: Stanford Rivera MD    Anesthesia: Monitored Local Anesthesia with Sedation    Indications: Nghia Edgar Jr. is a 33 y.o. male with neurogenic bladder who benefits from bladder botox injections.     Findings:   300u in 21cc injected into bladder detrusor  18fr suprapubic catheter exchanged    Estimated Blood Loss: min    Drains: none    Procedure in Detail:  After informed consent was obtained the patient was brought to the cystoscopy suite and placed in the supine position.  SCDs were applied and working.  Anesthesia was administered.  When the patient was adequately sedated she was placed in the dorsal lithotomy position and prepped and draped in the usual sterile fashion.      Before the start of the procedure we removed the patient's indwelling suprapubic catheter. We then prepped the site with betadine and placed a new 18 fr snow cathter through the suprapubic site and placed 10cc in the  snow balloon.     A rigid cystoscope in a 22 Fr sheath was introduced into the patients's bladder via the urethra.  This passed easily.  Formal cystoscopy was performed which revealed the ureteral orifices in their normal anatomic position bilaterally.  No bladder masses, trabeculations, stones or diverticuli were seen.    300 units of botox in 21 cc was injected into the detrusor muscle throughout the bladder.  Good wheals were raised.  The patient's bladder was drained and the cystoscope was removed.     The patient tolerated the procedure well and was transferred to the recovery room in stable condition.      Disposition:  The patient will follow up with an MICHAEL for SP tube exchange.    Stanford Rivera MD

## 2019-08-28 NOTE — PLAN OF CARE
Patient c/o pain 5/10 to suprapubic area that he states is tolerable, vss, denies nausea, tolerates clear liquids, discharge instructions given with verbal understanding received, consents in chart, patient be brought home via ambulance transport arranged by family, nad noted, pt ready for discharge.

## 2019-08-28 NOTE — PROGRESS NOTES
Patient c/o pain 9/10 and requests oxycodone 10 mg po which is the medication he takes at home, Dr. Stanford Rivera called, he gave a verbal order for 10 mg oxycodone po for 1 dose, order read back and verified.

## 2019-08-29 NOTE — ANESTHESIA POSTPROCEDURE EVALUATION
Anesthesia Post Evaluation    Patient: Nghia Edgar Jr.    Procedure(s) Performed: Procedure(s) (LRB):  CYSTOSCOPY (N/A)  INJECTION, BOTULINUM TOXIN, TYPE A (N/A)    Final Anesthesia Type: general  Patient location during evaluation: PACU  Patient participation: Yes- Able to Participate  Level of consciousness: awake and alert and oriented  Pain management: adequate  Airway patency: patent  PONV status at discharge: No PONV  Anesthetic complications: no      Cardiovascular status: blood pressure returned to baseline and hemodynamically stable  Respiratory status: unassisted  Hydration status: euvolemic  Follow-up not needed.          Vitals Value Taken Time   /58 8/28/2019  5:20 PM   Temp 36.7 °C (98 °F) 8/28/2019  3:45 PM   Pulse 42 8/28/2019  5:19 PM   Resp 18 8/28/2019  4:15 PM   SpO2 99 % 8/28/2019  5:19 PM   Vitals shown include unvalidated device data.      No case tracking events are documented in the log.      Pain/Carmina Score: Pain Rating Prior to Med Admin: 9 (8/28/2019  4:39 PM)  Pain Rating Post Med Admin: 6 (8/28/2019  5:26 PM)  Carmina Score: 10 (8/28/2019  5:26 PM)

## 2019-08-30 ENCOUNTER — PATIENT MESSAGE (OUTPATIENT)
Dept: INTERNAL MEDICINE | Facility: CLINIC | Age: 34
End: 2019-08-30

## 2019-08-30 RX ORDER — PROMETHAZINE HYDROCHLORIDE 6.25 MG/5ML
5 SYRUP ORAL NIGHTLY PRN
Qty: 240 ML | Refills: 1 | Status: SHIPPED | OUTPATIENT
Start: 2019-08-30 | End: 2020-03-01 | Stop reason: SDUPTHER

## 2019-09-03 ENCOUNTER — PATIENT MESSAGE (OUTPATIENT)
Dept: UROLOGY | Facility: CLINIC | Age: 34
End: 2019-09-03

## 2019-09-06 ENCOUNTER — PATIENT MESSAGE (OUTPATIENT)
Dept: PHYSICAL MEDICINE AND REHAB | Facility: CLINIC | Age: 34
End: 2019-09-06

## 2019-09-06 DIAGNOSIS — M79.2 NEUROPATHIC PAIN: Chronic | ICD-10-CM

## 2019-09-09 ENCOUNTER — PATIENT MESSAGE (OUTPATIENT)
Dept: PHYSICAL MEDICINE AND REHAB | Facility: CLINIC | Age: 34
End: 2019-09-09

## 2019-09-09 DIAGNOSIS — G82.20 PARAPLEGIA FOLLOWING SPINAL CORD INJURY: Primary | Chronic | ICD-10-CM

## 2019-09-09 RX ORDER — OXYCODONE AND ACETAMINOPHEN 10; 325 MG/1; MG/1
TABLET ORAL
Qty: 135 TABLET | Refills: 0 | Status: SHIPPED | OUTPATIENT
Start: 2019-09-09 | End: 2019-10-08 | Stop reason: SDUPTHER

## 2019-09-10 ENCOUNTER — TELEPHONE (OUTPATIENT)
Dept: UROLOGY | Facility: CLINIC | Age: 34
End: 2019-09-10

## 2019-09-10 NOTE — TELEPHONE ENCOUNTER
"Pt states he's unable to get transportation here, so will go to [facility] for a specimen, and "hoprfully some meds. I know I'm infected".  "

## 2019-09-16 DIAGNOSIS — M79.2 NEUROPATHIC PAIN: Chronic | ICD-10-CM

## 2019-09-17 ENCOUNTER — PATIENT MESSAGE (OUTPATIENT)
Dept: PHYSICAL MEDICINE AND REHAB | Facility: CLINIC | Age: 34
End: 2019-09-17

## 2019-09-17 RX ORDER — OXYCODONE HCL 40 MG/1
40 TABLET, FILM COATED, EXTENDED RELEASE ORAL EVERY 12 HOURS
Qty: 60 TABLET | Refills: 0 | Status: SHIPPED | OUTPATIENT
Start: 2019-09-17 | End: 2019-10-14 | Stop reason: SDUPTHER

## 2019-09-18 ENCOUNTER — OFFICE VISIT (OUTPATIENT)
Dept: UROLOGY | Facility: CLINIC | Age: 34
End: 2019-09-18
Payer: MEDICAID

## 2019-09-18 VITALS — SYSTOLIC BLOOD PRESSURE: 105 MMHG | HEART RATE: 61 BPM | DIASTOLIC BLOOD PRESSURE: 63 MMHG

## 2019-09-18 DIAGNOSIS — N31.9 NEUROGENIC BLADDER: Chronic | ICD-10-CM

## 2019-09-18 DIAGNOSIS — Z93.59 CHRONIC SUPRAPUBIC CATHETER: ICD-10-CM

## 2019-09-18 DIAGNOSIS — Z43.5 ENCOUNTER FOR SUPRAPUBIC CATHETER CARE: Primary | ICD-10-CM

## 2019-09-18 PROCEDURE — 99214 OFFICE O/P EST MOD 30 MIN: CPT | Mod: S$PBB,25,, | Performed by: NURSE PRACTITIONER

## 2019-09-18 PROCEDURE — 99213 OFFICE O/P EST LOW 20 MIN: CPT | Mod: PBBFAC,25 | Performed by: NURSE PRACTITIONER

## 2019-09-18 PROCEDURE — 51705 CHANGE OF BLADDER TUBE: CPT | Mod: PBBFAC | Performed by: NURSE PRACTITIONER

## 2019-09-18 PROCEDURE — 99999 PR PBB SHADOW E&M-EST. PATIENT-LVL III: ICD-10-PCS | Mod: PBBFAC,,, | Performed by: NURSE PRACTITIONER

## 2019-09-18 PROCEDURE — 51705 PR CHANGE OF BLADDER TUBE,SIMPLE: ICD-10-PCS | Mod: S$PBB,,, | Performed by: NURSE PRACTITIONER

## 2019-09-18 PROCEDURE — 51705 CHANGE OF BLADDER TUBE: CPT | Mod: S$PBB,,, | Performed by: NURSE PRACTITIONER

## 2019-09-18 PROCEDURE — 99999 PR PBB SHADOW E&M-EST. PATIENT-LVL III: CPT | Mod: PBBFAC,,, | Performed by: NURSE PRACTITIONER

## 2019-09-18 PROCEDURE — 99214 PR OFFICE/OUTPT VISIT, EST, LEVL IV, 30-39 MIN: ICD-10-PCS | Mod: S$PBB,25,, | Performed by: NURSE PRACTITIONER
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
## 2019-09-18 NOTE — PATIENT INSTRUCTIONS
"  Discharge Instructions: Caring for Your Suprapubic Catheter  You are going home with a suprapubic catheter in place. This tube is placed directly into the bladder through your abdomen to drain urine from your bladder. You were shown how to care for your catheter in the hospital. This sheet will help remind you of those steps and guidelines when you are at home.  Home care  · Shower as necessary.   · Change your dressing every day. Change the dressing more often if it falls off, becomes dirty, or has absorbed a lot of drainage.  Gather your supplies  · Tape  · Povidone-iodine ointment  · Cotton swabs  · Wastebasket and plastic bag  · Povidone-iodine swab sticks (or cotton balls and povidone-iodine solution)  · Dressing sponges (4" x 4") that are cut or split longterm into the middle  Remove the dressing and check for problems  · Wash your hands thoroughly before and after all catheter care.  · Gently remove the old dressing if you have one.  ¨ Dont pull on the tube.  ¨ Check the dressing for drainage. Notice whether anything looks unusual or smells bad.  ¨ Place your dressing in the plastic bag and throw it away in the wastebasket.  · Now look at the place where the catheter leaves your body (exit site).  ¨ Note any swelling, bleeding, irritation, unusual or smelly drainage.  ¨ Also check for any sores next to the exit site. Sores form around the exit site if there is too much pressure from the tube on the skin.  Clean the area  · Wash around the shield gently with soap and water.  · Use a povidone-iodine swab stick to clean under the shield.  ¨ Clean around the exit site of the catheter.  ¨ Start at the exit site and clean outward in a circular motion, about 3 to 4 inches from the site.Dont clean back toward the tube.  ¨ Throw away the used swab stick and repeat the cleaning procedure with a new one.  ¨ Let your skin dry completely.  · Place a split 4" x 4" sponge around the catheter. Tape it in place.  · Smear a " thin layer of povidone-iodine ointment around the catheter with a cotton swab.  Follow-up care  Make a follow-up appointment or as advised.  When to call your healthcare provider  Call your health care provider right away if you have any of the following:  · Catheter that falls out, or is clogged or feels clogged  · Stitches that fall out  · Urine leaking around catheter  · Urine that is cloudy, bloody, or smells bad  · No urine drainage  · Bladder that feels full or painful  · Rash, itching, redness, swelling, or drainage at the catheter site  · Fever above 100.4°F (38.°C) or shaking chills   Date Last Reviewed: 1/1/2017  © 9942-7779 [employer]. [address] [place] [address], [place] [address]. All rights reserved. This information is not intended as a substitute for professional medical care. Always follow your healthcare professional's instructions.

## 2019-10-08 ENCOUNTER — PATIENT MESSAGE (OUTPATIENT)
Dept: PHYSICAL MEDICINE AND REHAB | Facility: CLINIC | Age: 34
End: 2019-10-08

## 2019-10-08 DIAGNOSIS — M79.2 NEUROPATHIC PAIN: Chronic | ICD-10-CM

## 2019-10-09 ENCOUNTER — PATIENT MESSAGE (OUTPATIENT)
Dept: PHYSICAL MEDICINE AND REHAB | Facility: CLINIC | Age: 34
End: 2019-10-09

## 2019-10-09 RX ORDER — OXYCODONE AND ACETAMINOPHEN 10; 325 MG/1; MG/1
TABLET ORAL
Qty: 135 TABLET | Refills: 0 | Status: SHIPPED | OUTPATIENT
Start: 2019-10-09 | End: 2019-11-11 | Stop reason: SDUPTHER

## 2019-10-11 ENCOUNTER — PATIENT MESSAGE (OUTPATIENT)
Dept: UROLOGY | Facility: CLINIC | Age: 34
End: 2019-10-11

## 2019-10-14 ENCOUNTER — PATIENT OUTREACH (OUTPATIENT)
Dept: ADMINISTRATIVE | Facility: OTHER | Age: 34
End: 2019-10-14

## 2019-10-14 DIAGNOSIS — M79.2 NEUROPATHIC PAIN: Chronic | ICD-10-CM

## 2019-10-14 RX ORDER — OXYCODONE HCL 40 MG/1
40 TABLET, FILM COATED, EXTENDED RELEASE ORAL EVERY 12 HOURS
Qty: 60 TABLET | Refills: 0 | Status: SHIPPED | OUTPATIENT
Start: 2019-10-17 | End: 2019-11-18 | Stop reason: SDUPTHER

## 2019-10-16 ENCOUNTER — OFFICE VISIT (OUTPATIENT)
Dept: UROLOGY | Facility: CLINIC | Age: 34
End: 2019-10-16
Payer: MEDICAID

## 2019-10-16 VITALS
HEART RATE: 76 BPM | HEIGHT: 69 IN | BODY MASS INDEX: 25.18 KG/M2 | WEIGHT: 170 LBS | SYSTOLIC BLOOD PRESSURE: 120 MMHG | DIASTOLIC BLOOD PRESSURE: 76 MMHG

## 2019-10-16 DIAGNOSIS — N31.9 NEUROGENIC BLADDER: Primary | ICD-10-CM

## 2019-10-16 DIAGNOSIS — Z43.5 ENCOUNTER FOR CARE OR REPLACEMENT OF SUPRAPUBIC TUBE: ICD-10-CM

## 2019-10-16 DIAGNOSIS — G82.50 TETRAPLEGIA: ICD-10-CM

## 2019-10-16 DIAGNOSIS — Z93.59 CHRONIC SUPRAPUBIC CATHETER: ICD-10-CM

## 2019-10-16 LAB
BILIRUB SERPL-MCNC: ABNORMAL MG/DL
BLOOD URINE, POC: ABNORMAL
COLOR, POC UA: YELLOW
GLUCOSE UR QL STRIP: ABNORMAL
KETONES UR QL STRIP: ABNORMAL
LEUKOCYTE ESTERASE URINE, POC: ABNORMAL
NITRITE, POC UA: ABNORMAL
PH, POC UA: 7
PROTEIN, POC: ABNORMAL
SPECIFIC GRAVITY, POC UA: 1.01
UROBILINOGEN, POC UA: ABNORMAL

## 2019-10-16 PROCEDURE — 51702 INSERT TEMP BLADDER CATH: CPT | Mod: PBBFAC | Performed by: NURSE PRACTITIONER

## 2019-10-16 PROCEDURE — 51702 INSERT TEMP BLADDER CATH: CPT | Mod: S$PBB,,, | Performed by: NURSE PRACTITIONER

## 2019-10-16 PROCEDURE — 99999 PR PBB SHADOW E&M-EST. PATIENT-LVL IV: CPT | Mod: PBBFAC,,, | Performed by: NURSE PRACTITIONER

## 2019-10-16 PROCEDURE — 81002 URINALYSIS NONAUTO W/O SCOPE: CPT | Mod: PBBFAC | Performed by: NURSE PRACTITIONER

## 2019-10-16 PROCEDURE — 99214 OFFICE O/P EST MOD 30 MIN: CPT | Mod: PBBFAC | Performed by: NURSE PRACTITIONER

## 2019-10-16 PROCEDURE — 99214 PR OFFICE/OUTPT VISIT, EST, LEVL IV, 30-39 MIN: ICD-10-PCS | Mod: S$PBB,25,, | Performed by: NURSE PRACTITIONER

## 2019-10-16 PROCEDURE — 99214 OFFICE O/P EST MOD 30 MIN: CPT | Mod: S$PBB,25,, | Performed by: NURSE PRACTITIONER

## 2019-10-16 PROCEDURE — 99999 PR PBB SHADOW E&M-EST. PATIENT-LVL IV: ICD-10-PCS | Mod: PBBFAC,,, | Performed by: NURSE PRACTITIONER

## 2019-10-16 PROCEDURE — 51702 PR INSERTION OF TEMPORARY INDWELLING BLADDER CATHETER, SIMPLE: ICD-10-PCS | Mod: S$PBB,,, | Performed by: NURSE PRACTITIONER
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
## 2019-10-17 ENCOUNTER — PATIENT MESSAGE (OUTPATIENT)
Dept: INTERNAL MEDICINE | Facility: CLINIC | Age: 34
End: 2019-10-17

## 2019-10-17 NOTE — TELEPHONE ENCOUNTER
Spoke with physical therapy  They are in process of assessing his needs  And also plan to discusse with his physical medicine  Options for hime

## 2019-10-18 ENCOUNTER — PATIENT MESSAGE (OUTPATIENT)
Dept: INTERNAL MEDICINE | Facility: CLINIC | Age: 34
End: 2019-10-18

## 2019-10-21 ENCOUNTER — PATIENT MESSAGE (OUTPATIENT)
Dept: PHYSICAL MEDICINE AND REHAB | Facility: CLINIC | Age: 34
End: 2019-10-21

## 2019-11-01 ENCOUNTER — PATIENT OUTREACH (OUTPATIENT)
Dept: ADMINISTRATIVE | Facility: OTHER | Age: 34
End: 2019-11-01

## 2019-11-03 NOTE — TELEPHONE ENCOUNTER
This went to the wrong pharmacy.   UROLOGY CONSULT    HPI: patient is 65y Female with multiple co-morbidities well known to me. She is s/p left ureteral stent placement on 19 secondary to urosepsis with obstructing left ureteral stone with hydronephrosis. At that time, she was being managed acutely for active GI bleeding and thus no intervention was indicated. Recently, had recurrent GI bleeding and transferred to [facility] for further care. Currently remains hemodynamically stable. Denies flank/abdominal pain. No hematuria. No f/c/n/v.     Last urine culture - negative    PMH/PSH  Gastrointestinal hemorrhage  No pertinent family history in first degree relatives  Family history of Alzheimer's disease  No pertinent family history in first degree relatives  Melena  Gastrointestinal hemorrhage  Dieulafoy lesion of stomach or duodenum  Subdural hematoma, nontraumatic  Dorsalgia of lumbar region  Self-catheterizes urinary bladder  Anemia  Uveitis  Osteoporosis  PAD (peripheral artery disease)  Hematoma  Paraplegia  Aortic dissection, abdominal  Aortic dissection, thoracic  Blindness of left eye  Chronic constipation  Subdural hematoma  UTI (urinary tract infection)  TIA (transient ischemic attack)  Aortic Dissection, Abdominal  HTN (Hypertension)  History of corneal transplant  Disorder of spine  Presence of IVC filter  S/P aortic dissection repair  H/O Spinal surgery  Aneurysm Repair, Abdominal Aortic      Family History:  No pertinent family history in first degree relatives: pt does not recall family history of medical problems in mother or father, both     Allergies  No Known Allergies    Medications  amLODIPine   Tablet 5 milliGRAM(s) Oral daily  atorvastatin 40 milliGRAM(s) Oral at bedtime  baclofen 20 milliGRAM(s) Oral two times a day  chlorhexidine 4% Liquid 1 Application(s) Topical <User Schedule>  DULoxetine 20 milliGRAM(s) Oral two times a day  gabapentin 600 milliGRAM(s) Oral three times a day  meropenem  IVPB 1000 milliGRAM(s) IV Intermittent every 8 hours  oxyCODONE    IR 10 milliGRAM(s) Oral three times a day PRN  pantoprazole  Injectable 40 milliGRAM(s) IV Push two times a day  prednisoLONE acetate 1% Suspension 1 Drop(s) Both EYES four times a day  sodium chloride 2% Ophthalmic Solution 1 Drop(s) Both EYES daily  valACYclovir 1000 milliGRAM(s) Oral three times a day  zolpidem 5 milliGRAM(s) Oral at bedtime PRN      ROS  General: No fevers, chills, night sweats, fatigue, malaise  Skin: no new skin lesions, hair changes, prutitus  Ophthalmologic: No eye pain,  swelling,   ENMT: No hearing changes,  ear pain,  nasal congestion, sinus pain  Respiratory and Thorax: No cough, sputum, dyspnea, wheezing  Cardiovascular: No chest pain, SOB, PND, dyspnea on exertion, orthopnea  Gastrointestinal:	+recent GI bleed  Genitourinary: see above  Neurological: No syncope, loss of consciousness, headache, dizziness  Psychiatric:  No SI/HI/AH/VH.  Hematology/Lymphatics:	No bruising, lymphadenopathy  Endocrine: No temperature intolerance    Vital Signs Last 24 Hrs  T(C): 36.8 (2019 12:00), Max: 36.9 (2019 00:00)  T(F): 98.3 (2019 12:00), Max: 98.5 (2019 00:00)  HR: 74 (2019 14:00) (65 - 81)  BP: 170/85 (2019 14:00) (138/66 - 175/82)  BP(mean): 120 (2019 14:00) (93 - 120)  RR: 18 (2019 14:00) (10 - 18)  SpO2: 99% (2019 14:00) (93% - 100%)    PHYSICAL EXAM:  Constitutional: alert and cooperative, NAD, lying in bed comfortably   Eyes: EOMI  Neck: neck supple  Back: no CVA tenderness bilaterally  Respiratory: no labored breathing  Cardiovascular: no peripheral edema, cyanosis, or pallor. Extremities are warm and well perfused.   Gastrointestinal: soft, non-tender, non-distended, no guarding, no rebound tenderness.   Genitourinary: bladder non-palpable  Extremities: contracted lower extremities.   Neurological:  A&O x3  Skin: normal skin color, texture and turgor   Musculoskeletal: moves all 4 extremities  Psychiatric: normal mood and affect        I/O    19 @ 08:01  -  19 @ 07:00  --------------------------------------------------------  IN: 0 mL / OUT: 2300 mL / NET: -2300 mL        Labs:  CBC Full  -  ( 2019 09:33 )  WBC Count : 7.55 K/uL  Hemoglobin : 10.5 g/dL  Hematocrit : 31.7 %  Platelet Count - Automated : 280 K/uL  Mean Cell Volume : 86.4 fl  Mean Cell Hemoglobin : 28.6 pg  Mean Cell Hemoglobin Concentration : 33.1 gm/dL          136  |  105  |  7   ----------------------------<  85  4.2   |  25  |  0.35<L>    Ca    8.1<L>      2019 01:47  Phos  2.4       Mg     1.7         TPro  4.6<L>  /  Alb  2.7<L>  /  TBili  0.6  /  DBili  x   /  AST  15  /  ALT  11  /  AlkPhos  62        Radiology:  CTAP from 10/28/19 personally reviewed

## 2019-11-04 ENCOUNTER — PATIENT MESSAGE (OUTPATIENT)
Dept: PHYSICAL MEDICINE AND REHAB | Facility: CLINIC | Age: 34
End: 2019-11-04

## 2019-11-06 ENCOUNTER — OFFICE VISIT (OUTPATIENT)
Dept: PHYSICAL MEDICINE AND REHAB | Facility: CLINIC | Age: 34
End: 2019-11-06
Payer: MEDICAID

## 2019-11-06 DIAGNOSIS — K59.03 THERAPEUTIC OPIOID INDUCED CONSTIPATION: ICD-10-CM

## 2019-11-06 DIAGNOSIS — G82.20 PARAPLEGIA FOLLOWING SPINAL CORD INJURY: Chronic | ICD-10-CM

## 2019-11-06 DIAGNOSIS — T40.2X5A THERAPEUTIC OPIOID INDUCED CONSTIPATION: ICD-10-CM

## 2019-11-06 PROCEDURE — 62370 ANL SP INF PMP W/MDREPRG&FIL: CPT | Mod: PBBFAC | Performed by: PHYSICAL MEDICINE & REHABILITATION

## 2019-11-06 PROCEDURE — 99212 OFFICE O/P EST SF 10 MIN: CPT | Mod: PBBFAC,25 | Performed by: PHYSICAL MEDICINE & REHABILITATION

## 2019-11-06 PROCEDURE — 99215 PR OFFICE/OUTPT VISIT, EST, LEVL V, 40-54 MIN: ICD-10-PCS | Mod: S$PBB,,, | Performed by: PHYSICAL MEDICINE & REHABILITATION

## 2019-11-06 PROCEDURE — 62370 ANL SP INF PMP W/MDREPRG&FIL: CPT | Mod: S$PBB,,, | Performed by: PHYSICAL MEDICINE & REHABILITATION

## 2019-11-06 PROCEDURE — 62370 PR ANL SP INF PMP W/MDREPRG&FIL: ICD-10-PCS | Mod: S$PBB,,, | Performed by: PHYSICAL MEDICINE & REHABILITATION

## 2019-11-06 PROCEDURE — 99215 OFFICE O/P EST HI 40 MIN: CPT | Mod: S$PBB,,, | Performed by: PHYSICAL MEDICINE & REHABILITATION

## 2019-11-06 PROCEDURE — 99999 PR PBB SHADOW E&M-EST. PATIENT-LVL II: ICD-10-PCS | Mod: PBBFAC,,, | Performed by: PHYSICAL MEDICINE & REHABILITATION

## 2019-11-06 PROCEDURE — 99999 PR PBB SHADOW E&M-EST. PATIENT-LVL II: CPT | Mod: PBBFAC,,, | Performed by: PHYSICAL MEDICINE & REHABILITATION
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
## 2019-11-07 ENCOUNTER — PATIENT MESSAGE (OUTPATIENT)
Dept: PHYSICAL MEDICINE AND REHAB | Facility: CLINIC | Age: 34
End: 2019-11-07

## 2019-11-07 DIAGNOSIS — G82.20 PARAPLEGIA FOLLOWING SPINAL CORD INJURY: Primary | Chronic | ICD-10-CM

## 2019-11-08 ENCOUNTER — PATIENT MESSAGE (OUTPATIENT)
Dept: INTERNAL MEDICINE | Facility: CLINIC | Age: 34
End: 2019-11-08

## 2019-11-08 ENCOUNTER — PATIENT MESSAGE (OUTPATIENT)
Dept: UROLOGY | Facility: CLINIC | Age: 34
End: 2019-11-08

## 2019-11-09 RX ADMIN — BACLOFEN 25 MCG: 2 INJECTION INTRATHECAL at 10:11

## 2019-11-11 DIAGNOSIS — Z93.3 COLOSTOMY STATUS: ICD-10-CM

## 2019-11-11 DIAGNOSIS — K59.2 NEUROGENIC BOWEL: Primary | ICD-10-CM

## 2019-11-11 DIAGNOSIS — M79.2 NEUROPATHIC PAIN: Chronic | ICD-10-CM

## 2019-11-12 ENCOUNTER — PATIENT OUTREACH (OUTPATIENT)
Dept: ADMINISTRATIVE | Facility: OTHER | Age: 34
End: 2019-11-12

## 2019-11-12 ENCOUNTER — PATIENT MESSAGE (OUTPATIENT)
Dept: PHYSICAL MEDICINE AND REHAB | Facility: CLINIC | Age: 34
End: 2019-11-12

## 2019-11-12 RX ORDER — OXYCODONE AND ACETAMINOPHEN 10; 325 MG/1; MG/1
TABLET ORAL
Qty: 135 TABLET | Refills: 0 | Status: SHIPPED | OUTPATIENT
Start: 2019-11-12 | End: 2019-12-16 | Stop reason: SDUPTHER

## 2019-11-13 ENCOUNTER — PATIENT MESSAGE (OUTPATIENT)
Dept: PHYSICAL MEDICINE AND REHAB | Facility: CLINIC | Age: 34
End: 2019-11-13

## 2019-11-15 ENCOUNTER — OFFICE VISIT (OUTPATIENT)
Dept: UROLOGY | Facility: CLINIC | Age: 34
End: 2019-11-15
Payer: MEDICAID

## 2019-11-15 VITALS — HEART RATE: 57 BPM | DIASTOLIC BLOOD PRESSURE: 61 MMHG | SYSTOLIC BLOOD PRESSURE: 100 MMHG

## 2019-11-15 DIAGNOSIS — N31.9 NEUROGENIC BLADDER: Primary | Chronic | ICD-10-CM

## 2019-11-15 DIAGNOSIS — Z43.5 ENCOUNTER FOR CARE OR REPLACEMENT OF SUPRAPUBIC TUBE: ICD-10-CM

## 2019-11-15 DIAGNOSIS — Z93.59 CHRONIC SUPRAPUBIC CATHETER: ICD-10-CM

## 2019-11-15 PROCEDURE — 99213 OFFICE O/P EST LOW 20 MIN: CPT | Mod: PBBFAC,25 | Performed by: NURSE PRACTITIONER

## 2019-11-15 PROCEDURE — 99214 OFFICE O/P EST MOD 30 MIN: CPT | Mod: S$PBB,25,, | Performed by: NURSE PRACTITIONER

## 2019-11-15 PROCEDURE — 51700 IRRIGATION OF BLADDER: CPT | Mod: PBBFAC | Performed by: NURSE PRACTITIONER

## 2019-11-15 PROCEDURE — 51705 CHANGE OF BLADDER TUBE: CPT | Mod: PBBFAC | Performed by: NURSE PRACTITIONER

## 2019-11-15 PROCEDURE — 51705 CHANGE OF BLADDER TUBE: CPT | Mod: S$PBB,,, | Performed by: NURSE PRACTITIONER

## 2019-11-15 PROCEDURE — 99999 PR PBB SHADOW E&M-EST. PATIENT-LVL III: CPT | Mod: PBBFAC,,, | Performed by: NURSE PRACTITIONER

## 2019-11-15 PROCEDURE — 51705 PR CHANGE OF BLADDER TUBE,SIMPLE: ICD-10-PCS | Mod: S$PBB,,, | Performed by: NURSE PRACTITIONER

## 2019-11-15 PROCEDURE — 99214 PR OFFICE/OUTPT VISIT, EST, LEVL IV, 30-39 MIN: ICD-10-PCS | Mod: S$PBB,25,, | Performed by: NURSE PRACTITIONER

## 2019-11-15 PROCEDURE — 99999 PR PBB SHADOW E&M-EST. PATIENT-LVL III: ICD-10-PCS | Mod: PBBFAC,,, | Performed by: NURSE PRACTITIONER
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
## 2019-11-18 ENCOUNTER — PATIENT MESSAGE (OUTPATIENT)
Dept: PHYSICAL MEDICINE AND REHAB | Facility: CLINIC | Age: 34
End: 2019-11-18

## 2019-11-18 DIAGNOSIS — M79.2 NEUROPATHIC PAIN: Chronic | ICD-10-CM

## 2019-11-18 RX ORDER — OXYCODONE HCL 40 MG/1
40 TABLET, FILM COATED, EXTENDED RELEASE ORAL EVERY 12 HOURS
Qty: 60 TABLET | Refills: 0 | Status: SHIPPED | OUTPATIENT
Start: 2019-11-18 | End: 2019-12-27 | Stop reason: SDUPTHER

## 2019-11-20 ENCOUNTER — PATIENT MESSAGE (OUTPATIENT)
Dept: PHYSICAL MEDICINE AND REHAB | Facility: CLINIC | Age: 34
End: 2019-11-20

## 2019-11-20 DIAGNOSIS — R52 PAIN: ICD-10-CM

## 2019-11-20 RX ORDER — PREGABALIN 200 MG/1
CAPSULE ORAL
Qty: 90 CAPSULE | Refills: 6 | Status: SHIPPED | OUTPATIENT
Start: 2019-11-20 | End: 2020-06-15 | Stop reason: SDUPTHER

## 2019-12-02 RX ORDER — PREGABALIN 200 MG/1
CAPSULE ORAL
Qty: 90 CAPSULE | OUTPATIENT
Start: 2019-12-02

## 2019-12-04 ENCOUNTER — PATIENT MESSAGE (OUTPATIENT)
Dept: INTERNAL MEDICINE | Facility: CLINIC | Age: 34
End: 2019-12-04

## 2019-12-04 ENCOUNTER — PATIENT MESSAGE (OUTPATIENT)
Dept: PHYSICAL MEDICINE AND REHAB | Facility: CLINIC | Age: 34
End: 2019-12-04

## 2019-12-11 ENCOUNTER — PATIENT MESSAGE (OUTPATIENT)
Dept: INTERNAL MEDICINE | Facility: CLINIC | Age: 34
End: 2019-12-11

## 2019-12-12 ENCOUNTER — PATIENT MESSAGE (OUTPATIENT)
Dept: INTERNAL MEDICINE | Facility: CLINIC | Age: 34
End: 2019-12-12

## 2019-12-16 ENCOUNTER — OFFICE VISIT (OUTPATIENT)
Dept: INTERNAL MEDICINE | Facility: CLINIC | Age: 34
End: 2019-12-16
Payer: MEDICAID

## 2019-12-16 ENCOUNTER — PATIENT MESSAGE (OUTPATIENT)
Dept: PHYSICAL MEDICINE AND REHAB | Facility: CLINIC | Age: 34
End: 2019-12-16

## 2019-12-16 VITALS — DIASTOLIC BLOOD PRESSURE: 63 MMHG | HEART RATE: 60 BPM | SYSTOLIC BLOOD PRESSURE: 108 MMHG

## 2019-12-16 DIAGNOSIS — M79.2 NEUROPATHIC PAIN: Chronic | ICD-10-CM

## 2019-12-16 DIAGNOSIS — L72.9 SUBCUTANEOUS CYST: Primary | ICD-10-CM

## 2019-12-16 PROCEDURE — 99214 OFFICE O/P EST MOD 30 MIN: CPT | Mod: PBBFAC | Performed by: STUDENT IN AN ORGANIZED HEALTH CARE EDUCATION/TRAINING PROGRAM

## 2019-12-16 PROCEDURE — 99999 PR PBB SHADOW E&M-EST. PATIENT-LVL IV: CPT | Mod: PBBFAC,,, | Performed by: STUDENT IN AN ORGANIZED HEALTH CARE EDUCATION/TRAINING PROGRAM

## 2019-12-16 PROCEDURE — 99213 OFFICE O/P EST LOW 20 MIN: CPT | Mod: S$PBB,,, | Performed by: STUDENT IN AN ORGANIZED HEALTH CARE EDUCATION/TRAINING PROGRAM

## 2019-12-16 PROCEDURE — 99999 PR PBB SHADOW E&M-EST. PATIENT-LVL IV: ICD-10-PCS | Mod: PBBFAC,,, | Performed by: STUDENT IN AN ORGANIZED HEALTH CARE EDUCATION/TRAINING PROGRAM

## 2019-12-16 PROCEDURE — 99213 PR OFFICE/OUTPT VISIT, EST, LEVL III, 20-29 MIN: ICD-10-PCS | Mod: S$PBB,,, | Performed by: STUDENT IN AN ORGANIZED HEALTH CARE EDUCATION/TRAINING PROGRAM

## 2019-12-16 NOTE — PROGRESS NOTES
Subjective:       Patient ID: Nghia Edgar Jr. is a 34 y.o. male.    Chief Complaint: Hip Pain (knot on right hip)    Mr. Edgar is a 34 year-old male with paraplegia, neurogenic bladder, and asthma who is presenting as an urgent care visit for evaluation of a skin lesion. He noticed a small lesion in his lateral R thigh area several months ago that bothered him minimally but it progressively worsened in terms of the size of the lesion and the frequency and intensity of the pain. Now it bothers him constantly and is a 10/10 on a pain scale when most painful. The lesion is most painful when he lies on top of it. He has not found anything that relieves the pain; he takes prescription opioids that do not relieve the pain. Denies trauma to the area, injections on the site, or previous similar episodes.     Review of Systems   Constitutional: Negative for chills, diaphoresis, fever and unexpected weight change.   HENT: Negative for congestion and sore throat.    Respiratory: Negative for cough and shortness of breath.    Cardiovascular: Negative for chest pain, palpitations and leg swelling.   Gastrointestinal: Negative for abdominal pain, constipation, diarrhea, nausea and vomiting.   Genitourinary: Negative for flank pain.   Musculoskeletal: Negative for arthralgias, back pain and joint swelling.   Skin: Negative for color change and rash.   Neurological: Negative for light-headedness and headaches.       Objective:      Physical Exam   Constitutional: He is oriented to person, place, and time. He appears well-developed and well-nourished. No distress.   HENT:   Head: Normocephalic and atraumatic.   Eyes: Conjunctivae are normal. No scleral icterus.   Cardiovascular: Normal rate and regular rhythm.   Pulmonary/Chest: Effort normal and breath sounds normal.   Abdominal: Soft. Bowel sounds are normal.   Musculoskeletal: He exhibits no edema or tenderness.        Legs:  Neurological: He is alert and oriented to  person, place, and time.   Skin: Skin is warm. He is not diaphoretic. No erythema.       Assessment:       1. Subcutaneous cyst        Plan:       Subcutaneous cyst  Mr. Edgar is presenting with a painful subcutaneous lesion that has progressively worsened in terms of both size and pain intensity for the past months. Exam findings are more consistent with a subcutaneous cyst that would optimally be assessed by general surgery for possible drainage. Explained to the patient the findings and how it warrants gen surgery evaluation for further management.     Plan:  -Ambulatory general surgery referral      Patient seen and discussed with Dr. Gustavo Celestin.    Orly Rucker MD   Internal Medicine PGY-1

## 2019-12-17 RX ORDER — OXYCODONE AND ACETAMINOPHEN 10; 325 MG/1; MG/1
TABLET ORAL
Qty: 135 TABLET | Refills: 0 | Status: SHIPPED | OUTPATIENT
Start: 2019-12-17 | End: 2020-01-14 | Stop reason: SDUPTHER

## 2019-12-18 ENCOUNTER — OFFICE VISIT (OUTPATIENT)
Dept: SURGERY | Facility: CLINIC | Age: 34
End: 2019-12-18
Payer: MEDICAID

## 2019-12-18 VITALS
SYSTOLIC BLOOD PRESSURE: 100 MMHG | HEIGHT: 69 IN | DIASTOLIC BLOOD PRESSURE: 64 MMHG | WEIGHT: 172 LBS | HEART RATE: 52 BPM | BODY MASS INDEX: 25.48 KG/M2

## 2019-12-18 DIAGNOSIS — L72.9 SKIN CYST: Primary | ICD-10-CM

## 2019-12-18 PROCEDURE — 99203 PR OFFICE/OUTPT VISIT, NEW, LEVL III, 30-44 MIN: ICD-10-PCS | Mod: S$PBB,,, | Performed by: PHYSICIAN ASSISTANT

## 2019-12-18 PROCEDURE — 99203 OFFICE O/P NEW LOW 30 MIN: CPT | Mod: S$PBB,,, | Performed by: PHYSICIAN ASSISTANT

## 2019-12-18 PROCEDURE — 99999 PR PBB SHADOW E&M-EST. PATIENT-LVL III: CPT | Mod: PBBFAC,,, | Performed by: PHYSICIAN ASSISTANT

## 2019-12-18 PROCEDURE — 99999 PR PBB SHADOW E&M-EST. PATIENT-LVL III: ICD-10-PCS | Mod: PBBFAC,,, | Performed by: PHYSICIAN ASSISTANT

## 2019-12-18 PROCEDURE — 99213 OFFICE O/P EST LOW 20 MIN: CPT | Mod: PBBFAC | Performed by: PHYSICIAN ASSISTANT

## 2019-12-18 NOTE — LETTER
December 18, 2019      Orly Rucker MD  [address] [facility] [address]           [facility] - General Surgery  [address]  [facility] [address]  Phone: [phone]          Patient: Nghia Edgar Jr.   MR Number: [id number]   YOB: 1985   Date of Visit: 12/18/2019       Dear Dr. Orly Rucker:    Thank you for referring Nghia Edgar to me for evaluation. Attached you will find relevant portions of my assessment and plan of care.    If you have questions, please do not hesitate to call me. I look forward to following Nghia Edgar along with you.    Sincerely,    Idalmis Martínez PA-C    Enclosure  CC:  No Recipients    If you would like to receive this communication electronically, please contact [email or web address] or [phone] to request more information on [employer] Link access.    For providers and/or their staff who would like to refer a patient to [facility], please contact us through our one-stop-shop provider referral line, [facility], at [phone].    If you feel you have received this communication in error or would no longer like to receive these types of communications, please e-mail [email or web address]
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
## 2019-12-24 NOTE — PROGRESS NOTES
I have personally taken the history and examined this patient and agree with the resident's note as stated above with the following thoughts:  /63 (BP Location: Right arm, Patient Position: Lying, BP Method: Large (Automatic))   Pulse 60

## 2019-12-27 DIAGNOSIS — M79.2 NEUROPATHIC PAIN: Chronic | ICD-10-CM

## 2019-12-30 RX ORDER — OXYCODONE HCL 40 MG/1
40 TABLET, FILM COATED, EXTENDED RELEASE ORAL EVERY 12 HOURS
Qty: 60 TABLET | Refills: 0 | Status: ON HOLD | OUTPATIENT
Start: 2019-12-30 | End: 2020-01-31 | Stop reason: SDUPTHER

## 2019-12-31 ENCOUNTER — TELEPHONE (OUTPATIENT)
Dept: SURGERY | Facility: CLINIC | Age: 34
End: 2019-12-31

## 2019-12-31 NOTE — TELEPHONE ENCOUNTER
Left messages x2 on patient's voicemail for him to call and reschedule his Removal appt for 1/8/2020.  The clinic schedule has changed and he needs to reschedule to another day.  Direct phone number given for him to call back and reschedule the removal appt.

## 2020-01-02 ENCOUNTER — TELEPHONE (OUTPATIENT)
Dept: SURGERY | Facility: CLINIC | Age: 35
End: 2020-01-02

## 2020-01-14 ENCOUNTER — PATIENT MESSAGE (OUTPATIENT)
Dept: PHYSICAL MEDICINE AND REHAB | Facility: CLINIC | Age: 35
End: 2020-01-14

## 2020-01-14 DIAGNOSIS — M79.2 NEUROPATHIC PAIN: Chronic | ICD-10-CM

## 2020-01-14 RX ORDER — OXYCODONE AND ACETAMINOPHEN 10; 325 MG/1; MG/1
TABLET ORAL
Qty: 135 TABLET | Refills: 0 | Status: SHIPPED | OUTPATIENT
Start: 2020-01-14 | End: 2020-02-17 | Stop reason: SDUPTHER

## 2020-01-15 ENCOUNTER — PROCEDURE VISIT (OUTPATIENT)
Dept: SURGERY | Facility: CLINIC | Age: 35
End: 2020-01-15
Payer: MEDICAID

## 2020-01-15 VITALS
WEIGHT: 172 LBS | BODY MASS INDEX: 25.48 KG/M2 | HEART RATE: 59 BPM | DIASTOLIC BLOOD PRESSURE: 70 MMHG | HEIGHT: 69 IN | TEMPERATURE: 98 F | SYSTOLIC BLOOD PRESSURE: 114 MMHG

## 2020-01-15 DIAGNOSIS — L72.9 CYST OF SKIN: Primary | ICD-10-CM

## 2020-01-15 PROCEDURE — 12031 INTMD RPR S/A/T/EXT 2.5 CM/<: CPT | Mod: S$PBB,,, | Performed by: SURGERY

## 2020-01-15 PROCEDURE — 12031 PR LAYR CLOS WND TRUNK,ARM,LEG <2.5 CM: ICD-10-PCS | Mod: S$PBB,,, | Performed by: SURGERY

## 2020-01-15 PROCEDURE — 88342 IMHCHEM/IMCYTCHM 1ST ANTB: CPT | Performed by: PATHOLOGY

## 2020-01-15 PROCEDURE — 88304 PR  SURG PATH,LEVEL III: ICD-10-PCS | Mod: 26,,, | Performed by: PATHOLOGY

## 2020-01-15 PROCEDURE — 88341 IMHCHEM/IMCYTCHM EA ADD ANTB: CPT | Mod: 26,,, | Performed by: PATHOLOGY

## 2020-01-15 PROCEDURE — 11402 PR EXC SKIN BENIG 1.1-2 CM TRUNK,ARM,LEG: ICD-10-PCS | Mod: 51,S$PBB,, | Performed by: SURGERY

## 2020-01-15 PROCEDURE — 88341 PR IHC OR ICC EACH ADD'L SINGLE ANTIBODY  STAINPR: ICD-10-PCS | Mod: 26,,, | Performed by: PATHOLOGY

## 2020-01-15 PROCEDURE — 11402 EXC TR-EXT B9+MARG 1.1-2 CM: CPT | Mod: 51,S$PBB,, | Performed by: SURGERY

## 2020-01-15 PROCEDURE — 88342 CHG IMMUNOCYTOCHEMISTRY: ICD-10-PCS | Mod: 26,,, | Performed by: PATHOLOGY

## 2020-01-15 PROCEDURE — 88304 TISSUE EXAM BY PATHOLOGIST: CPT | Mod: 26,,, | Performed by: PATHOLOGY

## 2020-01-15 PROCEDURE — 88341 IMHCHEM/IMCYTCHM EA ADD ANTB: CPT | Performed by: PATHOLOGY

## 2020-01-15 PROCEDURE — 88342 IMHCHEM/IMCYTCHM 1ST ANTB: CPT | Mod: 26,,, | Performed by: PATHOLOGY

## 2020-01-15 PROCEDURE — 11402 EXC TR-EXT B9+MARG 1.1-2 CM: CPT | Mod: PBBFAC | Performed by: SURGERY

## 2020-01-15 PROCEDURE — 12031 INTMD RPR S/A/T/EXT 2.5 CM/<: CPT | Mod: PBBFAC | Performed by: SURGERY

## 2020-01-15 PROCEDURE — 88304 TISSUE EXAM BY PATHOLOGIST: CPT | Performed by: PATHOLOGY

## 2020-01-15 PROCEDURE — 12041 INTMD RPR N-HF/GENIT 2.5CM/<: CPT | Mod: PBBFAC | Performed by: SURGERY

## 2020-01-15 NOTE — PROCEDURES
"Nghia Edgar Jr. is a 34 y.o. male patient.    Temp: 97.5 °F (36.4 °C) (01/15/20 0948)  Pulse: (!) 59 (01/15/20 0948)  BP: 114/70 (01/15/20 0948)  Weight: 78 kg (172 lb) (01/15/20 0948)  Height: 5' 9" (175.3 cm) (01/15/20 0948)       Procedures  Right hip cyst removal  Consent obtained  10 ml of 1% lidocaine injected locally  15 blade used to make elliptical incision (about 2 cm)  Sharply dissected down past cyst on each side then amputated  Cyst sent to pathology  Subcutaneous tissue closed with 3-0 Vicryl  Skin closed with 4-0 Monocryl  Tolerated well        Stuart Bar  1/15/2020  "

## 2020-01-16 ENCOUNTER — PATIENT MESSAGE (OUTPATIENT)
Dept: PHYSICAL MEDICINE AND REHAB | Facility: CLINIC | Age: 35
End: 2020-01-16

## 2020-01-17 NOTE — PROCEDURES
Procedures     I agree with the documentation regarding the operative procedure as described by Dr. Bar.  I was present for and supervised the procedure.

## 2020-01-21 ENCOUNTER — PATIENT MESSAGE (OUTPATIENT)
Dept: UROLOGY | Facility: CLINIC | Age: 35
End: 2020-01-21

## 2020-01-23 ENCOUNTER — OFFICE VISIT (OUTPATIENT)
Dept: UROLOGY | Facility: CLINIC | Age: 35
End: 2020-01-23
Payer: MEDICAID

## 2020-01-23 VITALS — DIASTOLIC BLOOD PRESSURE: 51 MMHG | HEART RATE: 55 BPM | SYSTOLIC BLOOD PRESSURE: 95 MMHG

## 2020-01-23 DIAGNOSIS — Z43.5 ENCOUNTER FOR CARE OR REPLACEMENT OF SUPRAPUBIC TUBE: Primary | ICD-10-CM

## 2020-01-23 DIAGNOSIS — R82.71 BACTERIA IN URINE: ICD-10-CM

## 2020-01-23 DIAGNOSIS — Z93.59 CHRONIC SUPRAPUBIC CATHETER: ICD-10-CM

## 2020-01-23 DIAGNOSIS — N39.0 COMPLICATED UTI (URINARY TRACT INFECTION): ICD-10-CM

## 2020-01-23 DIAGNOSIS — R39.9 UTI SYMPTOMS: ICD-10-CM

## 2020-01-23 DIAGNOSIS — N31.9 NEUROGENIC BLADDER: ICD-10-CM

## 2020-01-23 DIAGNOSIS — N32.89 BLADDER SPASMS: ICD-10-CM

## 2020-01-23 PROCEDURE — 87077 CULTURE AEROBIC IDENTIFY: CPT

## 2020-01-23 PROCEDURE — 99214 OFFICE O/P EST MOD 30 MIN: CPT | Mod: PBBFAC | Performed by: NURSE PRACTITIONER

## 2020-01-23 PROCEDURE — 99214 OFFICE O/P EST MOD 30 MIN: CPT | Mod: S$PBB,,, | Performed by: NURSE PRACTITIONER

## 2020-01-23 PROCEDURE — 87186 SC STD MICRODIL/AGAR DIL: CPT

## 2020-01-23 PROCEDURE — 99999 PR PBB SHADOW E&M-EST. PATIENT-LVL IV: ICD-10-PCS | Mod: PBBFAC,,, | Performed by: NURSE PRACTITIONER

## 2020-01-23 PROCEDURE — 87088 URINE BACTERIA CULTURE: CPT

## 2020-01-23 PROCEDURE — 99214 PR OFFICE/OUTPT VISIT, EST, LEVL IV, 30-39 MIN: ICD-10-PCS | Mod: S$PBB,,, | Performed by: NURSE PRACTITIONER

## 2020-01-23 PROCEDURE — 87086 URINE CULTURE/COLONY COUNT: CPT

## 2020-01-23 PROCEDURE — 99999 PR PBB SHADOW E&M-EST. PATIENT-LVL IV: CPT | Mod: PBBFAC,,, | Performed by: NURSE PRACTITIONER

## 2020-01-23 RX ORDER — GENTAMICIN SULFATE 40 MG/ML
160 INJECTION, SOLUTION INTRAMUSCULAR; INTRAVENOUS
Status: COMPLETED | OUTPATIENT
Start: 2020-01-23 | End: 2020-01-23

## 2020-01-23 RX ADMIN — GENTAMICIN SULFATE 160 MG: 40 INJECTION, SOLUTION INTRAMUSCULAR; INTRAVENOUS at 11:01
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
## 2020-01-24 LAB
FINAL PATHOLOGIC DIAGNOSIS: NORMAL
GROSS: NORMAL

## 2020-01-25 LAB
BACTERIA UR CULT: ABNORMAL
BACTERIA UR CULT: ABNORMAL

## 2020-01-29 ENCOUNTER — TELEPHONE (OUTPATIENT)
Dept: SURGERY | Facility: CLINIC | Age: 35
End: 2020-01-29

## 2020-01-29 NOTE — TELEPHONE ENCOUNTER
L/M on patient's home# regarding appointment today with Dr. Gotti, [facility].  Mobile# was busy.      Beth Salinas

## 2020-01-30 ENCOUNTER — PATIENT MESSAGE (OUTPATIENT)
Dept: UROLOGY | Facility: CLINIC | Age: 35
End: 2020-01-30

## 2020-01-30 ENCOUNTER — TELEPHONE (OUTPATIENT)
Dept: SURGERY | Facility: CLINIC | Age: 35
End: 2020-01-30

## 2020-01-30 NOTE — TELEPHONE ENCOUNTER
"----- Message from Justina Robin sent at 1/30/2020  1:23 PM CST -----  Contact: Ray  Scheduling Request    Patient Status: established   Scheduling Appt : post op  Time/Date Preference: open   [employer] Active User?: No   Relationship to Patient?: self   Contact Preference?: [phone]  Treating Provider: Shen WHITLEY MD  Do you feel you need to be seen today? No     Additional Notes:  "Thank you for all that you do for our patients'"      "

## 2020-01-30 NOTE — TELEPHONE ENCOUNTER
Left message on patient's voicemail that call was returned.  Phone number given for him to call back.

## 2020-01-31 ENCOUNTER — TELEPHONE (OUTPATIENT)
Dept: SURGERY | Facility: CLINIC | Age: 35
End: 2020-01-31

## 2020-01-31 ENCOUNTER — PATIENT MESSAGE (OUTPATIENT)
Dept: PHYSICAL MEDICINE AND REHAB | Facility: CLINIC | Age: 35
End: 2020-01-31

## 2020-01-31 ENCOUNTER — HOSPITAL ENCOUNTER (INPATIENT)
Facility: HOSPITAL | Age: 35
LOS: 4 days | Discharge: HOME | DRG: 699 | End: 2020-02-04
Attending: SURGERY | Admitting: FAMILY MEDICINE
Payer: MEDICAID

## 2020-01-31 DIAGNOSIS — N39.0 COMPLICATED UTI (URINARY TRACT INFECTION): Primary | ICD-10-CM

## 2020-01-31 DIAGNOSIS — R53.83 FATIGUE: ICD-10-CM

## 2020-01-31 DIAGNOSIS — M79.2 NEUROPATHIC PAIN: Chronic | ICD-10-CM

## 2020-01-31 LAB
ALBUMIN SERPL BCP-MCNC: 4 G/DL (ref 3.5–5.2)
ALP SERPL-CCNC: 109 U/L (ref 55–135)
ALT SERPL W/O P-5'-P-CCNC: 28 U/L (ref 10–44)
ANION GAP SERPL CALC-SCNC: 8 MMOL/L (ref 8–16)
APTT BLDCRRT: 33.3 SEC (ref 21–32)
AST SERPL-CCNC: 20 U/L (ref 10–40)
BACTERIA #/AREA URNS HPF: ABNORMAL /HPF
BASOPHILS # BLD AUTO: 0.02 K/UL (ref 0–0.2)
BASOPHILS NFR BLD: 0.3 % (ref 0–1.9)
BILIRUB SERPL-MCNC: 0.3 MG/DL (ref 0.1–1)
BILIRUB UR QL STRIP: ABNORMAL
BNP SERPL-MCNC: 22 PG/ML (ref 0–99)
BUN SERPL-MCNC: 8 MG/DL (ref 6–20)
CALCIUM SERPL-MCNC: 9 MG/DL (ref 8.7–10.5)
CHLORIDE SERPL-SCNC: 105 MMOL/L (ref 95–110)
CK MB SERPL-MCNC: 3.4 NG/ML (ref 0.1–6.5)
CK MB SERPL-RTO: 1.3 % (ref 0–5)
CK SERPL-CCNC: 252 U/L (ref 20–200)
CK SERPL-CCNC: 252 U/L (ref 20–200)
CLARITY UR: ABNORMAL
CO2 SERPL-SCNC: 28 MMOL/L (ref 23–29)
COLOR UR: YELLOW
CREAT SERPL-MCNC: 0.6 MG/DL (ref 0.5–1.4)
DIFFERENTIAL METHOD: ABNORMAL
EOSINOPHIL # BLD AUTO: 0.1 K/UL (ref 0–0.5)
EOSINOPHIL NFR BLD: 1.5 % (ref 0–8)
ERYTHROCYTE [DISTWIDTH] IN BLOOD BY AUTOMATED COUNT: 12.1 % (ref 11.5–14.5)
EST. GFR  (AFRICAN AMERICAN): >60 ML/MIN/1.73 M^2
EST. GFR  (NON AFRICAN AMERICAN): >60 ML/MIN/1.73 M^2
GLUCOSE SERPL-MCNC: 91 MG/DL (ref 70–110)
GLUCOSE UR QL STRIP: NEGATIVE
HCT VFR BLD AUTO: 45.4 % (ref 40–54)
HGB BLD-MCNC: 14.2 G/DL (ref 14–18)
HGB UR QL STRIP: ABNORMAL
HYALINE CASTS #/AREA URNS LPF: 0 /LPF
IMM GRANULOCYTES # BLD AUTO: 0.02 K/UL (ref 0–0.04)
IMM GRANULOCYTES NFR BLD AUTO: 0.3 % (ref 0–0.5)
INR PPP: 1 (ref 0.8–1.2)
KETONES UR QL STRIP: ABNORMAL
LACTATE SERPL-SCNC: 0.6 MMOL/L (ref 0.5–2.2)
LACTATE SERPL-SCNC: 0.7 MMOL/L (ref 0.5–2.2)
LACTATE SERPL-SCNC: 0.8 MMOL/L (ref 0.5–2.2)
LEUKOCYTE ESTERASE UR QL STRIP: ABNORMAL
LYMPHOCYTES # BLD AUTO: 1.6 K/UL (ref 1–4.8)
LYMPHOCYTES NFR BLD: 23.5 % (ref 18–48)
MCH RBC QN AUTO: 30.8 PG (ref 27–31)
MCHC RBC AUTO-ENTMCNC: 31.3 G/DL (ref 32–36)
MCV RBC AUTO: 99 FL (ref 82–98)
MICROSCOPIC COMMENT: ABNORMAL
MONOCYTES # BLD AUTO: 0.5 K/UL (ref 0.3–1)
MONOCYTES NFR BLD: 7.4 % (ref 4–15)
NEUTROPHILS # BLD AUTO: 4.5 K/UL (ref 1.8–7.7)
NEUTROPHILS NFR BLD: 67 % (ref 38–73)
NITRITE UR QL STRIP: POSITIVE
NRBC BLD-RTO: 0 /100 WBC
PH UR STRIP: 7 [PH] (ref 5–8)
PLATELET # BLD AUTO: 179 K/UL (ref 150–350)
PMV BLD AUTO: 11.6 FL (ref 9.2–12.9)
POTASSIUM SERPL-SCNC: 4.1 MMOL/L (ref 3.5–5.1)
PROT SERPL-MCNC: 7.1 G/DL (ref 6–8.4)
PROT UR QL STRIP: ABNORMAL
PROTHROMBIN TIME: 10.9 SEC (ref 9–12.5)
RBC # BLD AUTO: 4.61 M/UL (ref 4.6–6.2)
RBC #/AREA URNS HPF: >100 /HPF (ref 0–4)
SODIUM SERPL-SCNC: 141 MMOL/L (ref 136–145)
SP GR UR STRIP: 1.02 (ref 1–1.03)
SQUAMOUS #/AREA URNS HPF: 0 /HPF
TROPONIN I SERPL DL<=0.01 NG/ML-MCNC: 0.06 NG/ML (ref 0–0.03)
URN SPEC COLLECT METH UR: ABNORMAL
UROBILINOGEN UR STRIP-ACNC: ABNORMAL EU/DL
WBC # BLD AUTO: 6.64 K/UL (ref 3.9–12.7)
WBC #/AREA URNS HPF: 5 /HPF (ref 0–5)

## 2020-01-31 PROCEDURE — 63600175 PHARM REV CODE 636 W HCPCS: Performed by: SURGERY

## 2020-01-31 PROCEDURE — 99222 PR INITIAL HOSPITAL CARE,LEVL II: ICD-10-PCS | Mod: AI,,, | Performed by: FAMILY MEDICINE

## 2020-01-31 PROCEDURE — 82550 ASSAY OF CK (CPK): CPT

## 2020-01-31 PROCEDURE — 25000003 PHARM REV CODE 250: Performed by: FAMILY MEDICINE

## 2020-01-31 PROCEDURE — 84484 ASSAY OF TROPONIN QUANT: CPT

## 2020-01-31 PROCEDURE — 99222 1ST HOSP IP/OBS MODERATE 55: CPT | Mod: AI,,, | Performed by: FAMILY MEDICINE

## 2020-01-31 PROCEDURE — 99285 EMERGENCY DEPT VISIT HI MDM: CPT | Mod: 25

## 2020-01-31 PROCEDURE — 83605 ASSAY OF LACTIC ACID: CPT | Mod: 91

## 2020-01-31 PROCEDURE — 80053 COMPREHEN METABOLIC PANEL: CPT

## 2020-01-31 PROCEDURE — 93005 ELECTROCARDIOGRAM TRACING: CPT

## 2020-01-31 PROCEDURE — 85025 COMPLETE CBC W/AUTO DIFF WBC: CPT

## 2020-01-31 PROCEDURE — 85610 PROTHROMBIN TIME: CPT

## 2020-01-31 PROCEDURE — 94760 N-INVAS EAR/PLS OXIMETRY 1: CPT

## 2020-01-31 PROCEDURE — P9612 CATHETERIZE FOR URINE SPEC: HCPCS

## 2020-01-31 PROCEDURE — 93010 ELECTROCARDIOGRAM REPORT: CPT | Mod: ,,, | Performed by: INTERNAL MEDICINE

## 2020-01-31 PROCEDURE — 85730 THROMBOPLASTIN TIME PARTIAL: CPT

## 2020-01-31 PROCEDURE — 83880 ASSAY OF NATRIURETIC PEPTIDE: CPT

## 2020-01-31 PROCEDURE — 81000 URINALYSIS NONAUTO W/SCOPE: CPT

## 2020-01-31 PROCEDURE — 36415 COLL VENOUS BLD VENIPUNCTURE: CPT

## 2020-01-31 PROCEDURE — 11000001 HC ACUTE MED/SURG PRIVATE ROOM

## 2020-01-31 PROCEDURE — 93010 EKG 12-LEAD: ICD-10-PCS | Mod: ,,, | Performed by: INTERNAL MEDICINE

## 2020-01-31 PROCEDURE — 99900035 HC TECH TIME PER 15 MIN (STAT)

## 2020-01-31 PROCEDURE — 82553 CREATINE MB FRACTION: CPT

## 2020-01-31 PROCEDURE — 87040 BLOOD CULTURE FOR BACTERIA: CPT

## 2020-01-31 RX ORDER — POTASSIUM CITRATE 10 MEQ/1
10 TABLET, EXTENDED RELEASE ORAL 3 TIMES DAILY
Status: DISCONTINUED | OUTPATIENT
Start: 2020-01-31 | End: 2020-02-04 | Stop reason: HOSPADM

## 2020-01-31 RX ORDER — SODIUM CHLORIDE 0.9 % (FLUSH) 0.9 %
10 SYRINGE (ML) INJECTION
Status: DISCONTINUED | OUTPATIENT
Start: 2020-01-31 | End: 2020-02-04 | Stop reason: HOSPADM

## 2020-01-31 RX ORDER — DIAZEPAM 10 MG/1
10 TABLET ORAL 3 TIMES DAILY PRN
Status: DISCONTINUED | OUTPATIENT
Start: 2020-01-31 | End: 2020-02-04 | Stop reason: HOSPADM

## 2020-01-31 RX ORDER — ACETAMINOPHEN 325 MG/1
650 TABLET ORAL EVERY 8 HOURS PRN
Status: DISCONTINUED | OUTPATIENT
Start: 2020-01-31 | End: 2020-02-04 | Stop reason: HOSPADM

## 2020-01-31 RX ORDER — IPRATROPIUM BROMIDE AND ALBUTEROL SULFATE 2.5; .5 MG/3ML; MG/3ML
3 SOLUTION RESPIRATORY (INHALATION) EVERY 6 HOURS PRN
Status: DISCONTINUED | OUTPATIENT
Start: 2020-01-31 | End: 2020-02-04 | Stop reason: HOSPADM

## 2020-01-31 RX ORDER — ONDANSETRON 2 MG/ML
4 INJECTION INTRAMUSCULAR; INTRAVENOUS EVERY 8 HOURS PRN
Status: DISCONTINUED | OUTPATIENT
Start: 2020-01-31 | End: 2020-02-04 | Stop reason: HOSPADM

## 2020-01-31 RX ORDER — SODIUM CHLORIDE 9 MG/ML
INJECTION, SOLUTION INTRAVENOUS CONTINUOUS
Status: DISCONTINUED | OUTPATIENT
Start: 2020-01-31 | End: 2020-02-04 | Stop reason: HOSPADM

## 2020-01-31 RX ORDER — OXYCODONE HCL 20 MG/1
40 TABLET, FILM COATED, EXTENDED RELEASE ORAL EVERY 12 HOURS
Status: DISCONTINUED | OUTPATIENT
Start: 2020-01-31 | End: 2020-02-04 | Stop reason: HOSPADM

## 2020-01-31 RX ORDER — PROMETHAZINE HYDROCHLORIDE 6.25 MG/5ML
6.25 SYRUP ORAL NIGHTLY PRN
Status: DISCONTINUED | OUTPATIENT
Start: 2020-01-31 | End: 2020-02-04 | Stop reason: HOSPADM

## 2020-01-31 RX ORDER — HYDROCODONE BITARTRATE AND ACETAMINOPHEN 5; 325 MG/1; MG/1
1 TABLET ORAL EVERY 4 HOURS PRN
Status: DISCONTINUED | OUTPATIENT
Start: 2020-01-31 | End: 2020-01-31

## 2020-01-31 RX ORDER — OXYCODONE AND ACETAMINOPHEN 10; 325 MG/1; MG/1
1 TABLET ORAL EVERY 6 HOURS PRN
Status: DISCONTINUED | OUTPATIENT
Start: 2020-01-31 | End: 2020-02-04 | Stop reason: HOSPADM

## 2020-01-31 RX ORDER — ASPIRIN 81 MG/1
81 TABLET ORAL DAILY
Status: DISCONTINUED | OUTPATIENT
Start: 2020-02-01 | End: 2020-02-04 | Stop reason: HOSPADM

## 2020-01-31 RX ORDER — PANTOPRAZOLE SODIUM 40 MG/1
40 TABLET, DELAYED RELEASE ORAL DAILY
Status: DISCONTINUED | OUTPATIENT
Start: 2020-02-01 | End: 2020-02-04 | Stop reason: HOSPADM

## 2020-01-31 RX ADMIN — PREGABALIN 200 MG: 75 CAPSULE ORAL at 09:01

## 2020-01-31 RX ADMIN — SODIUM CHLORIDE: 0.9 INJECTION, SOLUTION INTRAVENOUS at 05:01

## 2020-01-31 RX ADMIN — POTASSIUM CITRATE 10 MEQ: 10 TABLET, EXTENDED RELEASE ORAL at 09:01

## 2020-01-31 RX ADMIN — OXYCODONE HYDROCHLORIDE AND ACETAMINOPHEN 1 TABLET: 10; 325 TABLET ORAL at 09:01

## 2020-01-31 RX ADMIN — OXYCODONE HYDROCHLORIDE 40 MG: 20 TABLET, FILM COATED, EXTENDED RELEASE ORAL at 09:01

## 2020-01-31 RX ADMIN — ERTAPENEM 1 G: 1 INJECTION INTRAMUSCULAR; INTRAVENOUS at 03:01

## 2020-01-31 RX ADMIN — DIAZEPAM 10 MG: 10 TABLET ORAL at 09:01

## 2020-01-31 NOTE — TELEPHONE ENCOUNTER
----- Message from Fabi Cheney sent at 1/31/2020 11:59 AM CST -----  Contact: self  Pt is returning a miss call and would like for someone to give him a call back
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
## 2020-01-31 NOTE — ASSESSMENT & PLAN NOTE
Continue home pain management  Oxycodone 40 mg q.12 hours  Percocet 10 mg every 6 hr p.r.n. pain  Valium 10 mg p.o. t.i.d. p.r.n. muscle spasm
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
## 2020-01-31 NOTE — TELEPHONE ENCOUNTER
PO appt for wound check rescheduled with patient for 2/5/2020 at 10:30am.  He is aware of appt date and time.

## 2020-01-31 NOTE — PROGRESS NOTES
Staff Handoff  Bedside handoff report received from ANGELITA Mustafa RN from ED. Patient aaox4 without any c/o pain or distress. Significant other at the bedside. POC discussed. Care assumed. Patient is known paraplegic with weakness noted to BUE but able to assist. Placed on an air mattress per patient's request. Admit assessment initiated. Colostomy noted to LLQ and suprapubic catheter with avis colored urine intact. Will monitor.         Resident Handoff
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
## 2020-02-01 LAB
ALBUMIN SERPL BCP-MCNC: 3.6 G/DL (ref 3.5–5.2)
ALP SERPL-CCNC: 98 U/L (ref 55–135)
ALT SERPL W/O P-5'-P-CCNC: 23 U/L (ref 10–44)
ANION GAP SERPL CALC-SCNC: 7 MMOL/L (ref 8–16)
AST SERPL-CCNC: 21 U/L (ref 10–40)
BASOPHILS # BLD AUTO: 0.03 K/UL (ref 0–0.2)
BASOPHILS NFR BLD: 0.6 % (ref 0–1.9)
BILIRUB SERPL-MCNC: 0.4 MG/DL (ref 0.1–1)
BUN SERPL-MCNC: 10 MG/DL (ref 6–20)
CALCIUM SERPL-MCNC: 8.7 MG/DL (ref 8.7–10.5)
CHLORIDE SERPL-SCNC: 108 MMOL/L (ref 95–110)
CO2 SERPL-SCNC: 28 MMOL/L (ref 23–29)
CREAT SERPL-MCNC: 0.6 MG/DL (ref 0.5–1.4)
DIFFERENTIAL METHOD: ABNORMAL
EOSINOPHIL # BLD AUTO: 0.1 K/UL (ref 0–0.5)
EOSINOPHIL NFR BLD: 2.6 % (ref 0–8)
ERYTHROCYTE [DISTWIDTH] IN BLOOD BY AUTOMATED COUNT: 12.1 % (ref 11.5–14.5)
EST. GFR  (AFRICAN AMERICAN): >60 ML/MIN/1.73 M^2
EST. GFR  (NON AFRICAN AMERICAN): >60 ML/MIN/1.73 M^2
GLUCOSE SERPL-MCNC: 94 MG/DL (ref 70–110)
HCT VFR BLD AUTO: 44.6 % (ref 40–54)
HGB BLD-MCNC: 13.9 G/DL (ref 14–18)
IMM GRANULOCYTES # BLD AUTO: 0.03 K/UL (ref 0–0.04)
IMM GRANULOCYTES NFR BLD AUTO: 0.6 % (ref 0–0.5)
LYMPHOCYTES # BLD AUTO: 1.8 K/UL (ref 1–4.8)
LYMPHOCYTES NFR BLD: 38.5 % (ref 18–48)
MCH RBC QN AUTO: 31 PG (ref 27–31)
MCHC RBC AUTO-ENTMCNC: 31.2 G/DL (ref 32–36)
MCV RBC AUTO: 99 FL (ref 82–98)
MONOCYTES # BLD AUTO: 0.4 K/UL (ref 0.3–1)
MONOCYTES NFR BLD: 9 % (ref 4–15)
NEUTROPHILS # BLD AUTO: 2.3 K/UL (ref 1.8–7.7)
NEUTROPHILS NFR BLD: 48.7 % (ref 38–73)
NRBC BLD-RTO: 0 /100 WBC
PLATELET # BLD AUTO: 117 K/UL (ref 150–350)
PMV BLD AUTO: 12.4 FL (ref 9.2–12.9)
POTASSIUM SERPL-SCNC: 4.6 MMOL/L (ref 3.5–5.1)
PROT SERPL-MCNC: 6.4 G/DL (ref 6–8.4)
RBC # BLD AUTO: 4.49 M/UL (ref 4.6–6.2)
SODIUM SERPL-SCNC: 143 MMOL/L (ref 136–145)
WBC # BLD AUTO: 4.67 K/UL (ref 3.9–12.7)

## 2020-02-01 PROCEDURE — 63600175 PHARM REV CODE 636 W HCPCS: Performed by: SURGERY

## 2020-02-01 PROCEDURE — 94761 N-INVAS EAR/PLS OXIMETRY MLT: CPT

## 2020-02-01 PROCEDURE — 99900035 HC TECH TIME PER 15 MIN (STAT)

## 2020-02-01 PROCEDURE — 99232 PR SUBSEQUENT HOSPITAL CARE,LEVL II: ICD-10-PCS | Mod: ,,, | Performed by: FAMILY MEDICINE

## 2020-02-01 PROCEDURE — 80053 COMPREHEN METABOLIC PANEL: CPT

## 2020-02-01 PROCEDURE — 85025 COMPLETE CBC W/AUTO DIFF WBC: CPT

## 2020-02-01 PROCEDURE — 25000003 PHARM REV CODE 250: Performed by: SURGERY

## 2020-02-01 PROCEDURE — 25000003 PHARM REV CODE 250: Performed by: FAMILY MEDICINE

## 2020-02-01 PROCEDURE — 11000001 HC ACUTE MED/SURG PRIVATE ROOM

## 2020-02-01 PROCEDURE — 94760 N-INVAS EAR/PLS OXIMETRY 1: CPT

## 2020-02-01 PROCEDURE — 36415 COLL VENOUS BLD VENIPUNCTURE: CPT

## 2020-02-01 PROCEDURE — 99232 SBSQ HOSP IP/OBS MODERATE 35: CPT | Mod: ,,, | Performed by: FAMILY MEDICINE

## 2020-02-01 RX ORDER — OXYCODONE HCL 40 MG/1
40 TABLET, FILM COATED, EXTENDED RELEASE ORAL EVERY 12 HOURS
Qty: 60 TABLET | Refills: 0 | Status: SHIPPED | OUTPATIENT
Start: 2020-02-01 | End: 2020-03-16 | Stop reason: SDUPTHER

## 2020-02-01 RX ADMIN — POTASSIUM CITRATE 10 MEQ: 10 TABLET, EXTENDED RELEASE ORAL at 09:02

## 2020-02-01 RX ADMIN — OXYCODONE HYDROCHLORIDE AND ACETAMINOPHEN 1 TABLET: 10; 325 TABLET ORAL at 01:02

## 2020-02-01 RX ADMIN — ERTAPENEM 1 G: 1 INJECTION INTRAMUSCULAR; INTRAVENOUS at 03:02

## 2020-02-01 RX ADMIN — ASPIRIN 81 MG: 81 TABLET, COATED ORAL at 08:02

## 2020-02-01 RX ADMIN — DIAZEPAM 10 MG: 10 TABLET ORAL at 09:02

## 2020-02-01 RX ADMIN — PREGABALIN 200 MG: 75 CAPSULE ORAL at 03:02

## 2020-02-01 RX ADMIN — PANTOPRAZOLE SODIUM 40 MG: 40 TABLET, DELAYED RELEASE ORAL at 08:02

## 2020-02-01 RX ADMIN — DIAZEPAM 10 MG: 10 TABLET ORAL at 04:02

## 2020-02-01 RX ADMIN — OXYCODONE HYDROCHLORIDE 40 MG: 20 TABLET, FILM COATED, EXTENDED RELEASE ORAL at 09:02

## 2020-02-01 RX ADMIN — POTASSIUM CITRATE 10 MEQ: 10 TABLET, EXTENDED RELEASE ORAL at 08:02

## 2020-02-01 RX ADMIN — SODIUM CHLORIDE: 0.9 INJECTION, SOLUTION INTRAVENOUS at 08:02

## 2020-02-01 RX ADMIN — SODIUM CHLORIDE: 0.9 INJECTION, SOLUTION INTRAVENOUS at 05:02

## 2020-02-01 RX ADMIN — OXYCODONE HYDROCHLORIDE 40 MG: 20 TABLET, FILM COATED, EXTENDED RELEASE ORAL at 08:02

## 2020-02-01 RX ADMIN — PREGABALIN 200 MG: 75 CAPSULE ORAL at 09:02

## 2020-02-01 RX ADMIN — POTASSIUM CITRATE 10 MEQ: 10 TABLET, EXTENDED RELEASE ORAL at 03:02

## 2020-02-01 RX ADMIN — PREGABALIN 200 MG: 75 CAPSULE ORAL at 08:02

## 2020-02-01 RX ADMIN — OXYCODONE HYDROCHLORIDE AND ACETAMINOPHEN 1 TABLET: 10; 325 TABLET ORAL at 06:02
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
## 2020-02-01 NOTE — NURSING
Spoke with Dr. Crews to clarfy Oxycodone orders. States it's ok to given both Oxycodone 5/325 and Oxycodone 40mg together.

## 2020-02-01 NOTE — PROGRESS NOTES
Staff Handoff  Bedside handoff report received from LOLITA Brito RN. POC discussed. Care assumed. Will monitor.         Resident Handoff

## 2020-02-02 ENCOUNTER — PATIENT MESSAGE (OUTPATIENT)
Dept: INTERNAL MEDICINE | Facility: CLINIC | Age: 35
End: 2020-02-02

## 2020-02-02 ENCOUNTER — PATIENT MESSAGE (OUTPATIENT)
Dept: PHYSICAL MEDICINE AND REHAB | Facility: CLINIC | Age: 35
End: 2020-02-02

## 2020-02-02 LAB
BILIRUB UR QL STRIP: NEGATIVE
CLARITY UR: CLEAR
COLOR UR: YELLOW
GLUCOSE UR QL STRIP: NEGATIVE
HGB UR QL STRIP: NEGATIVE
KETONES UR QL STRIP: NEGATIVE
LEUKOCYTE ESTERASE UR QL STRIP: NEGATIVE
NITRITE UR QL STRIP: NEGATIVE
PH UR STRIP: 5 [PH] (ref 5–8)
PROT UR QL STRIP: NEGATIVE
SP GR UR STRIP: 1.02 (ref 1–1.03)
URN SPEC COLLECT METH UR: NORMAL
UROBILINOGEN UR STRIP-ACNC: NEGATIVE EU/DL

## 2020-02-02 PROCEDURE — 63600175 PHARM REV CODE 636 W HCPCS: Performed by: SURGERY

## 2020-02-02 PROCEDURE — 81003 URINALYSIS AUTO W/O SCOPE: CPT

## 2020-02-02 PROCEDURE — 25000003 PHARM REV CODE 250: Performed by: SURGERY

## 2020-02-02 PROCEDURE — 99900035 HC TECH TIME PER 15 MIN (STAT)

## 2020-02-02 PROCEDURE — 99232 SBSQ HOSP IP/OBS MODERATE 35: CPT | Mod: ,,, | Performed by: FAMILY MEDICINE

## 2020-02-02 PROCEDURE — 25000003 PHARM REV CODE 250: Performed by: FAMILY MEDICINE

## 2020-02-02 PROCEDURE — 11000001 HC ACUTE MED/SURG PRIVATE ROOM

## 2020-02-02 PROCEDURE — 94761 N-INVAS EAR/PLS OXIMETRY MLT: CPT

## 2020-02-02 PROCEDURE — 99232 PR SUBSEQUENT HOSPITAL CARE,LEVL II: ICD-10-PCS | Mod: ,,, | Performed by: FAMILY MEDICINE

## 2020-02-02 RX ADMIN — DIAZEPAM 10 MG: 10 TABLET ORAL at 12:02

## 2020-02-02 RX ADMIN — OXYCODONE HYDROCHLORIDE AND ACETAMINOPHEN 1 TABLET: 10; 325 TABLET ORAL at 01:02

## 2020-02-02 RX ADMIN — PREGABALIN 200 MG: 75 CAPSULE ORAL at 08:02

## 2020-02-02 RX ADMIN — POTASSIUM CITRATE 10 MEQ: 10 TABLET, EXTENDED RELEASE ORAL at 03:02

## 2020-02-02 RX ADMIN — OXYCODONE HYDROCHLORIDE AND ACETAMINOPHEN 1 TABLET: 10; 325 TABLET ORAL at 08:02

## 2020-02-02 RX ADMIN — PANTOPRAZOLE SODIUM 40 MG: 40 TABLET, DELAYED RELEASE ORAL at 08:02

## 2020-02-02 RX ADMIN — SODIUM CHLORIDE: 0.9 INJECTION, SOLUTION INTRAVENOUS at 09:02

## 2020-02-02 RX ADMIN — ERTAPENEM 1 G: 1 INJECTION INTRAMUSCULAR; INTRAVENOUS at 03:02

## 2020-02-02 RX ADMIN — POTASSIUM CITRATE 10 MEQ: 10 TABLET, EXTENDED RELEASE ORAL at 08:02

## 2020-02-02 RX ADMIN — OXYCODONE HYDROCHLORIDE AND ACETAMINOPHEN 1 TABLET: 10; 325 TABLET ORAL at 09:02

## 2020-02-02 RX ADMIN — OXYCODONE HYDROCHLORIDE 40 MG: 20 TABLET, FILM COATED, EXTENDED RELEASE ORAL at 08:02

## 2020-02-02 RX ADMIN — PREGABALIN 200 MG: 75 CAPSULE ORAL at 09:02

## 2020-02-02 RX ADMIN — OXYCODONE HYDROCHLORIDE 40 MG: 20 TABLET, FILM COATED, EXTENDED RELEASE ORAL at 09:02

## 2020-02-02 RX ADMIN — DIAZEPAM 10 MG: 10 TABLET ORAL at 09:02

## 2020-02-02 RX ADMIN — POTASSIUM CITRATE 10 MEQ: 10 TABLET, EXTENDED RELEASE ORAL at 09:02

## 2020-02-02 RX ADMIN — OXYCODONE HYDROCHLORIDE AND ACETAMINOPHEN 1 TABLET: 10; 325 TABLET ORAL at 03:02

## 2020-02-02 RX ADMIN — ASPIRIN 81 MG: 81 TABLET, COATED ORAL at 08:02

## 2020-02-02 RX ADMIN — PREGABALIN 200 MG: 75 CAPSULE ORAL at 03:02
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
## 2020-02-02 NOTE — PROGRESS NOTES
Staff Handoff  Bedside handoff report received from MURALI Pierre. Patient AAOx4. Some complaints of hip pain. AM nurse will give pain medication. POC discussed. Care assumed. Will monitor.         Resident Handoff

## 2020-02-02 NOTE — PROGRESS NOTES
Staff Handoff  Bedside handoff report received from DAVIS Lauren RN. POC discussed. Care assumed. Will monitor.              Resident Handoff

## 2020-02-02 NOTE — PLAN OF CARE
Problem: Wound  Goal: Optimal Wound Healing  Outcome: Ongoing, Progressing     Problem: Adult Inpatient Plan of Care  Goal: Plan of Care Review  Outcome: Ongoing, Progressing  Goal: Patient-Specific Goal (Individualization)  Outcome: Ongoing, Progressing  Goal: Absence of Hospital-Acquired Illness or Injury  Outcome: Ongoing, Progressing  Goal: Optimal Comfort and Wellbeing  Outcome: Ongoing, Progressing  Goal: Readiness for Transition of Care  Outcome: Ongoing, Progressing  Goal: Rounds/Family Conference  Outcome: Ongoing, Progressing     Problem: Fall Injury Risk  Goal: Absence of Fall and Fall-Related Injury  Outcome: Ongoing, Progressing     Problem: Skin Injury Risk Increased  Goal: Skin Health and Integrity  Outcome: Ongoing, Progressing     Problem: Infection  Goal: Infection Symptom Resolution  Outcome: Ongoing, Progressing    Obtained a repeat urine sample. Patient received oxycodone and valium as PRN meds. Patient complained of Rt. Hip pain throughout the night. Contact precautions maintained.

## 2020-02-02 NOTE — PLAN OF CARE
Problem: Wound  Goal: Optimal Wound Healing  Outcome: Ongoing, Progressing     Problem: Adult Inpatient Plan of Care  Goal: Plan of Care Review  Outcome: Ongoing, Progressing  Goal: Patient-Specific Goal (Individualization)  Outcome: Ongoing, Progressing  Goal: Absence of Hospital-Acquired Illness or Injury  Outcome: Ongoing, Progressing  Goal: Optimal Comfort and Wellbeing  Outcome: Ongoing, Progressing  Goal: Readiness for Transition of Care  Outcome: Ongoing, Progressing  Goal: Rounds/Family Conference  Outcome: Ongoing, Progressing     Problem: Fall Injury Risk  Goal: Absence of Fall and Fall-Related Injury  Outcome: Ongoing, Progressing     Problem: Skin Injury Risk Increased  Goal: Skin Health and Integrity  Outcome: Ongoing, Progressing     Problem: Infection  Goal: Infection Symptom Resolution  Outcome: Ongoing, Progressing
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
## 2020-02-02 NOTE — ASSESSMENT & PLAN NOTE
Continue Invanz 1 g IV piggyback Q 24 hr  Normal saline at 75 cc/hour  Check UA in am   Check BMP and CBC in the morning

## 2020-02-03 ENCOUNTER — TELEPHONE (OUTPATIENT)
Dept: PHYSICAL MEDICINE AND REHAB | Facility: CLINIC | Age: 35
End: 2020-02-03

## 2020-02-03 LAB
AMORPH CRY URNS QL MICRO: ABNORMAL
ANION GAP SERPL CALC-SCNC: 6 MMOL/L (ref 8–16)
BACTERIA #/AREA URNS HPF: ABNORMAL /HPF
BASOPHILS # BLD AUTO: 0.02 K/UL (ref 0–0.2)
BASOPHILS NFR BLD: 0.5 % (ref 0–1.9)
BILIRUB UR QL STRIP: NEGATIVE
BUN SERPL-MCNC: 10 MG/DL (ref 6–20)
CALCIUM SERPL-MCNC: 8.5 MG/DL (ref 8.7–10.5)
CHLORIDE SERPL-SCNC: 104 MMOL/L (ref 95–110)
CLARITY UR: CLEAR
CO2 SERPL-SCNC: 29 MMOL/L (ref 23–29)
COLOR UR: YELLOW
CREAT SERPL-MCNC: 0.5 MG/DL (ref 0.5–1.4)
DIFFERENTIAL METHOD: ABNORMAL
EOSINOPHIL # BLD AUTO: 0.2 K/UL (ref 0–0.5)
EOSINOPHIL NFR BLD: 3.5 % (ref 0–8)
ERYTHROCYTE [DISTWIDTH] IN BLOOD BY AUTOMATED COUNT: 11.9 % (ref 11.5–14.5)
EST. GFR  (AFRICAN AMERICAN): >60 ML/MIN/1.73 M^2
EST. GFR  (NON AFRICAN AMERICAN): >60 ML/MIN/1.73 M^2
GLUCOSE SERPL-MCNC: 97 MG/DL (ref 70–110)
GLUCOSE UR QL STRIP: NEGATIVE
HCT VFR BLD AUTO: 41.8 % (ref 40–54)
HGB BLD-MCNC: 13 G/DL (ref 14–18)
HGB UR QL STRIP: ABNORMAL
IMM GRANULOCYTES # BLD AUTO: 0.01 K/UL (ref 0–0.04)
IMM GRANULOCYTES NFR BLD AUTO: 0.2 % (ref 0–0.5)
KETONES UR QL STRIP: NEGATIVE
LEUKOCYTE ESTERASE UR QL STRIP: ABNORMAL
LYMPHOCYTES # BLD AUTO: 1.3 K/UL (ref 1–4.8)
LYMPHOCYTES NFR BLD: 30.2 % (ref 18–48)
MCH RBC QN AUTO: 30.9 PG (ref 27–31)
MCHC RBC AUTO-ENTMCNC: 31.1 G/DL (ref 32–36)
MCV RBC AUTO: 99 FL (ref 82–98)
MICROSCOPIC COMMENT: ABNORMAL
MONOCYTES # BLD AUTO: 0.4 K/UL (ref 0.3–1)
MONOCYTES NFR BLD: 8.8 % (ref 4–15)
NEUTROPHILS # BLD AUTO: 2.5 K/UL (ref 1.8–7.7)
NEUTROPHILS NFR BLD: 56.8 % (ref 38–73)
NITRITE UR QL STRIP: NEGATIVE
NRBC BLD-RTO: 0 /100 WBC
PH UR STRIP: 7 [PH] (ref 5–8)
PLATELET # BLD AUTO: 150 K/UL (ref 150–350)
PMV BLD AUTO: 11.8 FL (ref 9.2–12.9)
POTASSIUM SERPL-SCNC: 4.5 MMOL/L (ref 3.5–5.1)
PROT UR QL STRIP: NEGATIVE
RBC # BLD AUTO: 4.21 M/UL (ref 4.6–6.2)
RBC #/AREA URNS HPF: 18 /HPF (ref 0–4)
SODIUM SERPL-SCNC: 139 MMOL/L (ref 136–145)
SP GR UR STRIP: 1.02 (ref 1–1.03)
URN SPEC COLLECT METH UR: ABNORMAL
UROBILINOGEN UR STRIP-ACNC: NEGATIVE EU/DL
WBC # BLD AUTO: 4.31 K/UL (ref 3.9–12.7)
WBC #/AREA URNS HPF: 2 /HPF (ref 0–5)

## 2020-02-03 PROCEDURE — 99233 PR SUBSEQUENT HOSPITAL CARE,LEVL III: ICD-10-PCS | Mod: ,,, | Performed by: INTERNAL MEDICINE

## 2020-02-03 PROCEDURE — 94761 N-INVAS EAR/PLS OXIMETRY MLT: CPT

## 2020-02-03 PROCEDURE — 25000003 PHARM REV CODE 250: Performed by: FAMILY MEDICINE

## 2020-02-03 PROCEDURE — 99233 SBSQ HOSP IP/OBS HIGH 50: CPT | Mod: ,,, | Performed by: INTERNAL MEDICINE

## 2020-02-03 PROCEDURE — 36415 COLL VENOUS BLD VENIPUNCTURE: CPT

## 2020-02-03 PROCEDURE — 63600175 PHARM REV CODE 636 W HCPCS: Performed by: SURGERY

## 2020-02-03 PROCEDURE — 80048 BASIC METABOLIC PNL TOTAL CA: CPT

## 2020-02-03 PROCEDURE — 11000001 HC ACUTE MED/SURG PRIVATE ROOM

## 2020-02-03 PROCEDURE — 81000 URINALYSIS NONAUTO W/SCOPE: CPT

## 2020-02-03 PROCEDURE — 85025 COMPLETE CBC W/AUTO DIFF WBC: CPT

## 2020-02-03 PROCEDURE — 25000003 PHARM REV CODE 250: Performed by: SURGERY

## 2020-02-03 RX ADMIN — OXYCODONE HYDROCHLORIDE 40 MG: 20 TABLET, FILM COATED, EXTENDED RELEASE ORAL at 09:02

## 2020-02-03 RX ADMIN — OXYCODONE HYDROCHLORIDE AND ACETAMINOPHEN 1 TABLET: 10; 325 TABLET ORAL at 10:02

## 2020-02-03 RX ADMIN — ERTAPENEM 1 G: 1 INJECTION INTRAMUSCULAR; INTRAVENOUS at 03:02

## 2020-02-03 RX ADMIN — PREGABALIN 200 MG: 75 CAPSULE ORAL at 09:02

## 2020-02-03 RX ADMIN — POTASSIUM CITRATE 10 MEQ: 10 TABLET, EXTENDED RELEASE ORAL at 09:02

## 2020-02-03 RX ADMIN — SODIUM CHLORIDE: 0.9 INJECTION, SOLUTION INTRAVENOUS at 01:02

## 2020-02-03 RX ADMIN — POTASSIUM CITRATE 10 MEQ: 10 TABLET, EXTENDED RELEASE ORAL at 03:02

## 2020-02-03 RX ADMIN — PREGABALIN 200 MG: 75 CAPSULE ORAL at 03:02

## 2020-02-03 RX ADMIN — ASPIRIN 81 MG: 81 TABLET, COATED ORAL at 09:02

## 2020-02-03 RX ADMIN — OXYCODONE HYDROCHLORIDE AND ACETAMINOPHEN 1 TABLET: 10; 325 TABLET ORAL at 04:02

## 2020-02-03 RX ADMIN — DIAZEPAM 10 MG: 10 TABLET ORAL at 10:02

## 2020-02-03 RX ADMIN — PANTOPRAZOLE SODIUM 40 MG: 40 TABLET, DELAYED RELEASE ORAL at 09:02
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
## 2020-02-03 NOTE — PLAN OF CARE
Problem: Wound  Goal: Optimal Wound Healing  2/2/2020 1838 by Ignacio Thomas RN  Outcome: Ongoing, Progressing  2/2/2020 1144 by Ignacio Thomas RN  Outcome: Ongoing, Progressing     Problem: Adult Inpatient Plan of Care  Goal: Plan of Care Review  2/2/2020 1838 by Ignacio Thomas RN  Outcome: Ongoing, Progressing  2/2/2020 1144 by Ignacio Thomas RN  Outcome: Ongoing, Progressing  Goal: Patient-Specific Goal (Individualization)  2/2/2020 1838 by Ignacio Thomas RN  Outcome: Ongoing, Progressing  2/2/2020 1144 by Ignacio Thomas RN  Outcome: Ongoing, Progressing  Goal: Absence of Hospital-Acquired Illness or Injury  2/2/2020 1838 by Ignacio Thomas RN  Outcome: Ongoing, Progressing  2/2/2020 1144 by Ignacio Thomas RN  Outcome: Ongoing, Progressing  Goal: Optimal Comfort and Wellbeing  2/2/2020 1838 by Ignacio Thomas RN  Outcome: Ongoing, Progressing  2/2/2020 1144 by Ignacio Thomas RN  Outcome: Ongoing, Progressing  Goal: Readiness for Transition of Care  2/2/2020 1838 by Ignacio Thomas RN  Outcome: Ongoing, Progressing  2/2/2020 1144 by Ignacio Thomas RN  Outcome: Ongoing, Progressing  Goal: Rounds/Family Conference  2/2/2020 1838 by Ignacio Thomas RN  Outcome: Ongoing, Progressing  2/2/2020 1144 by Ignacio Thomas RN  Outcome: Ongoing, Progressing     Problem: Fall Injury Risk  Goal: Absence of Fall and Fall-Related Injury  2/2/2020 1838 by Ignacio Thomas RN  Outcome: Ongoing, Progressing  2/2/2020 1144 by Ignacio Thomas RN  Outcome: Ongoing, Progressing     Problem: Skin Injury Risk Increased  Goal: Skin Health and Integrity  2/2/2020 1838 by Ignacio Thomas RN  Outcome: Ongoing, Progressing  2/2/2020 1144 by Ignacio Thomas RN  Outcome: Ongoing, Progressing     Problem: Infection  Goal: Infection Symptom Resolution  2/2/2020 1838 by Ignacio Thomas RN  Outcome: Ongoing, Progressing  2/2/2020 1144 by Ignacio Thomas RN  Outcome: Ongoing, Progressing
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
## 2020-02-03 NOTE — ASSESSMENT & PLAN NOTE
Continue home pain management  Oxycodone 40 mg q.12 hours  Percocet 10 mg every 6 hr p.r.n. pain  Valium 10 mg p.o. t.i.d. p.r.n. muscle spasm.

## 2020-02-03 NOTE — NURSING
Reported HR 48 to Dr. Sims. Reported that patient is asymptomatic and no cardiac monitoring ordered.reviewed medications and plan of care.  Discussed notifying MD if patient becomes symptomatic.

## 2020-02-03 NOTE — PLAN OF CARE
Mr Edgar will likely discharge home tomorrow following completion of his IV antibiotics. He states he has all the equipment he needs at home and has help from a care giver and his girlfriend. Discharge planning brochure given and reviewed with patient. CM contact information given. Education on signs and symptoms to look for after discharge given and patient voices understanding.

## 2020-02-03 NOTE — PLAN OF CARE
Contact isolation maintained. Patient educated on daily chlorhexidine baths to decrease infection. Afebrile. HR 47-48 ; asymptomatic(discussed with Dr. Sims). Will pass on for oncoming shift to call MD if patient becomes symptomatic. Suprapubic catheter with yellow urine noted. IV Invanz. Plan for discharge tomorrow. Patient cleaned around supra pubic tubing site and applied gauze.  Patient denies c/o redness and problems with cath. Patient performed daily care to colostomy site. Patient is well managed with pain : scheduled oxycodone Q 12 hr,  oxycodone prn pain and diazepam tid prn anxiety. Patient with good urine out put. Bath performed by S/O /Layne helped changed lines. Mepilex to Right posterior thigh; suture noted. SCD's to BLE. Patient free of falls and incidence this shift. Bed exit alarm set. Call bell within reach of patient.

## 2020-02-03 NOTE — PLAN OF CARE
02/03/20 1112   Discharge Assessment   Assessment Type Discharge Planning Assessment   Confirmed/corrected address and phone number on facesheet? Yes   Assessment information obtained from? Patient   Prior to hospitilization cognitive status: Alert/Oriented   Prior to hospitalization functional status: Partially Dependent   Current cognitive status: Alert/Oriented   Current Functional Status: Partially Dependent   Facility Arrived From: Home   Lives With significant other;child(zurdo), dependent   Able to Return to Prior Arrangements yes   Is patient able to care for self after discharge? Yes   Who are your caregiver(s) and their phone number(s)? Diana Ric (Significant Other) [phone]   Patient's perception of discharge disposition home or selfcare   Readmission Within the Last 30 Days no previous admission in last 30 days   Patient currently being followed by outpatient case management? No   Patient currently receives any other outside agency services? Yes   Name and contact number of agency or person providing outside services [facility] (Medicaid Waiver Program)    Is it the patient/care giver preference to resume care with the current outside agency? Yes   Equipment Currently Used at Home power chair;wheelchair;shower chair;nebulizer;other (see comments)  (alternating pressure mattress)   Do you have any problems affording any of your prescribed medications? No   Does the patient have transportation home? Yes   Transportation Anticipated other (see comments)  (Patient states he will use [facility]. )   Discharge Plan A Home   Discharge Plan B Home with family   DME Needed Upon Discharge  none   Patient/Family in Agreement with Plan yes       No post-acute care needs identified at this time. SW to continue to monitor needs throughout hospital stay.     Sue Hamilton LMSW

## 2020-02-03 NOTE — ASSESSMENT & PLAN NOTE
Continue Invanz 1 g IV piggyback Q 24 hr  Normal saline at 75 cc/hour  Check UA in am   Check BMP and CBC in the morning  He report that he is improving. No abd pain and clear urine. Today is day 4 repeat U/a pending     2/3  Will treat with IV INVANZ for 5 days .

## 2020-02-03 NOTE — PROGRESS NOTES
Staff Handoff  Bedside handoff report received from Ignacio CARRION. POC discussed. Care assumed. Will monitor.              Resident Handoff

## 2020-02-03 NOTE — PLAN OF CARE
Problem: Wound  Goal: Optimal Wound Healing  Outcome: Ongoing, Progressing     Problem: Adult Inpatient Plan of Care  Goal: Plan of Care Review  Outcome: Ongoing, Progressing  Goal: Patient-Specific Goal (Individualization)  Outcome: Ongoing, Progressing  Goal: Absence of Hospital-Acquired Illness or Injury  Outcome: Ongoing, Progressing  Goal: Optimal Comfort and Wellbeing  Outcome: Ongoing, Progressing  Goal: Readiness for Transition of Care  Outcome: Ongoing, Progressing  Goal: Rounds/Family Conference  Outcome: Ongoing, Progressing     Problem: Fall Injury Risk  Goal: Absence of Fall and Fall-Related Injury  Outcome: Ongoing, Progressing     Problem: Skin Injury Risk Increased  Goal: Skin Health and Integrity  Outcome: Ongoing, Progressing     Problem: Infection  Goal: Infection Symptom Resolution  Outcome: Ongoing, Progressing     Patient AAOx4. Some complaints of pain, controlled by hydrocodone 10mg and scheduled oxycotin. Suprapubic catheter in place, secured with leg band, to gravity. Another UA to be collected this morning. Free from falls and injury.

## 2020-02-04 ENCOUNTER — TELEPHONE (OUTPATIENT)
Dept: PHYSICAL MEDICINE AND REHAB | Facility: CLINIC | Age: 35
End: 2020-02-04

## 2020-02-04 VITALS
WEIGHT: 183 LBS | RESPIRATION RATE: 18 BRPM | HEIGHT: 69 IN | TEMPERATURE: 97 F | HEART RATE: 54 BPM | OXYGEN SATURATION: 97 % | DIASTOLIC BLOOD PRESSURE: 57 MMHG | BODY MASS INDEX: 27.11 KG/M2 | SYSTOLIC BLOOD PRESSURE: 101 MMHG

## 2020-02-04 PROCEDURE — 25000003 PHARM REV CODE 250: Performed by: SURGERY

## 2020-02-04 PROCEDURE — 94760 N-INVAS EAR/PLS OXIMETRY 1: CPT

## 2020-02-04 PROCEDURE — 63600175 PHARM REV CODE 636 W HCPCS: Performed by: SURGERY

## 2020-02-04 PROCEDURE — 99239 PR HOSPITAL DISCHARGE DAY,>30 MIN: ICD-10-PCS | Mod: ,,, | Performed by: INTERNAL MEDICINE

## 2020-02-04 PROCEDURE — 25000003 PHARM REV CODE 250: Performed by: FAMILY MEDICINE

## 2020-02-04 PROCEDURE — 99239 HOSP IP/OBS DSCHRG MGMT >30: CPT | Mod: ,,, | Performed by: INTERNAL MEDICINE

## 2020-02-04 RX ORDER — PHENYLEPHRINE HCL IN 0.9% NACL 1 MG/10 ML
SYRINGE (ML) INTRAVENOUS ONCE
Status: CANCELLED | OUTPATIENT
Start: 2020-02-04 | End: 2020-02-04

## 2020-02-04 RX ADMIN — PREGABALIN 200 MG: 75 CAPSULE ORAL at 02:02

## 2020-02-04 RX ADMIN — OXYCODONE HYDROCHLORIDE AND ACETAMINOPHEN 1 TABLET: 10; 325 TABLET ORAL at 04:02

## 2020-02-04 RX ADMIN — SODIUM CHLORIDE: 0.9 INJECTION, SOLUTION INTRAVENOUS at 02:02

## 2020-02-04 RX ADMIN — POTASSIUM CITRATE 10 MEQ: 10 TABLET, EXTENDED RELEASE ORAL at 08:02

## 2020-02-04 RX ADMIN — ERTAPENEM 1 G: 1 INJECTION INTRAMUSCULAR; INTRAVENOUS at 02:02

## 2020-02-04 RX ADMIN — ASPIRIN 81 MG: 81 TABLET, COATED ORAL at 08:02

## 2020-02-04 RX ADMIN — DIAZEPAM 10 MG: 10 TABLET ORAL at 04:02

## 2020-02-04 RX ADMIN — POTASSIUM CITRATE 10 MEQ: 10 TABLET, EXTENDED RELEASE ORAL at 02:02

## 2020-02-04 RX ADMIN — PANTOPRAZOLE SODIUM 40 MG: 40 TABLET, DELAYED RELEASE ORAL at 08:02

## 2020-02-04 RX ADMIN — OXYCODONE HYDROCHLORIDE AND ACETAMINOPHEN 1 TABLET: 10; 325 TABLET ORAL at 08:02

## 2020-02-04 RX ADMIN — DIAZEPAM 10 MG: 10 TABLET ORAL at 08:02

## 2020-02-04 RX ADMIN — PREGABALIN 200 MG: 75 CAPSULE ORAL at 08:02

## 2020-02-04 RX ADMIN — OXYCODONE HYDROCHLORIDE 40 MG: 20 TABLET, FILM COATED, EXTENDED RELEASE ORAL at 08:02

## 2020-02-04 NOTE — PLAN OF CARE
Assessment complete per flow sheet, AAOX4, RR even unlabored BBS clear, on RA.    Abdomen soft, non distended, with active bowel sounds x 4 quads. Colostomy intact, maintained by patient, suprapubic cath draining clear yellow urine to gravity. Tolerating diet well. PIV infusing without difficulty, IV antibiotics administered as ordered,  Dsg C/D/I.  C/O pain 8/10 to RT hip, dsg C/D/I,  Medications administered per MAR, tolerated well. Air mattress in use, safety and contact precautions maintained, free from falls/ injury, patient turns himself in bed, call bell in reach, instructed to call for needs, voiced understanding,  Patient agrees and compliant with Plan Of Care, will monitor.

## 2020-02-04 NOTE — PLAN OF CARE
02/04/20 1137   Discharge Assessment   Assessment Type Discharge Planning Reassessment     Mr Edgar will discharge home today. He voices no post acute care needs at this time.

## 2020-02-04 NOTE — PLAN OF CARE
"Patient agrees and compliant with Plan Of Care:  Maintain Pain Regimen; Patient received Oxycodone 12 hr tablet tonight, as well as oxycodone 10mg for c/o pain in right hip. With relief of pain.  Monitor Vital Signs; Vital signs stable this shift.  Administer IV antibiotics as ordered; Patient on Invanz Q 24 hrs. Received on the evening shift.  Maintain I/O's Patient with suprapubic cath draining clear yellow urine.  Administer IV fluids as ordered; Normal Saline at 75cc/hr maintained.  Monitor Colostomy; Soft brown stool from colostomy per patient.  Monitor wound : Patient has a Meplex dressing to right hip D/I.  Monitor skin integrity; No pressure areas noted; Air mattress in use. Patient refused the nurse to reposition him. Patient states "I do as much as possible for myself.  Maintain Isolation as ordered; Contact Isolation maintained.  Fall Precautions; Maintain Patient Safety; No falls or injury noted this shift.    "

## 2020-02-04 NOTE — ASSESSMENT & PLAN NOTE
Continue Invanz 1 g IV piggyback Q 24 hr  Normal saline at 75 cc/hour  Check UA in am   Check BMP and CBC in the morning  He report that he is improving. No abd pain and clear urine. Today is day 4 repeat U/a pending     2/3  Will treat with IV INVANZ for 5 days .  2/4 completed 5 days this afternoon. S/s resolved, d/c with urology f/u next week for repeat U/a and cath change(has appt 2/13).

## 2020-02-04 NOTE — SUBJECTIVE & OBJECTIVE
Review of Systems   Constitutional: Negative for activity change, chills, fatigue, fever and unexpected weight change.   HENT: Negative for sore throat and trouble swallowing.    Respiratory: Negative for cough, chest tightness and shortness of breath.    Cardiovascular: Negative for chest pain and leg swelling.   Gastrointestinal: Negative for abdominal pain.        Colostomy in place   Endocrine: Negative for cold intolerance and heat intolerance.   Genitourinary: Negative for difficulty urinating, dysuria and penile pain.        Suprapubic tenderness better   Musculoskeletal: Negative for back pain and joint swelling.   Skin: Negative for rash.   Neurological: Positive for weakness. Negative for numbness.        Paraplegic.  Spastic paralysis of the upper extremities.   Hematological: Negative for adenopathy.   Psychiatric/Behavioral: Negative for decreased concentration.     Objective:     Vital Signs (Most Recent):  Temp: 98.3 °F (36.8 °C) (02/04/20 0823)  Pulse: 63 (02/04/20 0823)  Resp: 16 (02/04/20 0823)  BP: (!) 107/57 (02/04/20 0823)  SpO2: 96 % (02/04/20 0823) Vital Signs (24h Range):  Temp:  [96 °F (35.6 °C)-98.6 °F (37 °C)] 98.3 °F (36.8 °C)  Pulse:  [47-64] 63  Resp:  [16-19] 16  SpO2:  [95 %-99 %] 96 %  BP: ()/(57-83) 107/57     Weight: 83 kg (182 lb 15.7 oz)  Body mass index is 27.02 kg/m².    Physical Exam   Constitutional: He is oriented to person, place, and time. He appears well-developed and well-nourished.   HENT:   Head: Normocephalic and atraumatic.   Eyes: Conjunctivae are normal.   Neck: Normal range of motion. Neck supple.   Cardiovascular: Normal rate, regular rhythm and normal heart sounds. Exam reveals no gallop.   No murmur heard.  Pulmonary/Chest: Effort normal and breath sounds normal.   Abdominal: Soft. There is tenderness.   Musculoskeletal: Normal range of motion.   Neurological: He is alert and oriented to person, place, and time.   Skin: Skin is warm and dry.    Psychiatric: He has a normal mood and affect. His behavior is normal.           Significant Labs:   CBC:   Recent Labs   Lab 02/03/20  0557   WBC 4.31   HGB 13.0*   HCT 41.8        CMP:   Recent Labs   Lab 02/03/20  0557      K 4.5      CO2 29   GLU 97   BUN 10   CREATININE 0.5   CALCIUM 8.5*   ANIONGAP 6*   EGFRNONAA >60     Recent Labs   Lab 01/31/20  1325   *  252*   CPKMB 3.4   TROPONINI 0.055*   MB 1.3     BNP  Recent Labs   Lab 01/31/20  1325   BNP 22     Lab Results   Component Value Date    INR 1.0 01/31/2020    INR 1.1 08/25/2018    INR 1.1 03/19/2018     Lactic acid 0.6>0.8>0.7    Urine Studies:   Recent Labs   Lab 02/03/20  0435   COLORU Yellow   APPEARANCEUA Clear   PHUR 7.0   SPECGRAV 1.025   PROTEINUA Negative   GLUCUA Negative   KETONESU Negative   BILIRUBINUA Negative   OCCULTUA 1+*   NITRITE Negative   UROBILINOGEN Negative   LEUKOCYTESUR Trace*   RBCUA 18*   WBCUA 2   BACTERIA Rare   Susceptibility      Escherichia coli Klebsiella pneumoniae esbl     CULTURE, URINE CULTURE, URINE     Amikacin   <=16 mcg/mL Sensitive     Amox/K Clav'ate 16/8 mcg/mL Intermediate 16/8 mcg/mL Intermediate     Amp/Sulbactam >16/8 mcg/mL Resistant >16/8 mcg/mL Resistant     Ampicillin >16 mcg/mL Resistant >16 mcg/mL Resistant     Cefazolin >16 mcg/mL Resistant >16 mcg/mL Resistant     Cefepime <=2 mcg/mL Sensitive >16 mcg/mL Resistant     Ceftriaxone <=1 mcg/mL Sensitive >32 mcg/mL Resistant     Ciprofloxacin >2 mcg/mL Resistant >2 mcg/mL Resistant     Ertapenem <=0.5 mcg/mL Sensitive <=0.5 mcg/mL Sensitive     Gentamicin <=4 mcg/mL Sensitive >8 mcg/mL Resistant     Levofloxacin >4 mcg/mL Resistant >4 mcg/mL Resistant     Meropenem <=1 mcg/mL Sensitive <=1 mcg/mL Sensitive     Nitrofurantoin <=32 mcg/mL Sensitive >64 mcg/mL Resistant     Piperacillin/Tazo >64 mcg/mL Resistant 64 mcg/mL Intermediate     Tetracycline >8 mcg/mL Resistant >8 mcg/mL Resistant     Tobramycin <=4 mcg/mL Sensitive >8  mcg/mL Resistant     Trimeth/Sulfa >2/38 mcg/mL Resistant >2/38 mcg/mL Resistant      Blood culture x 2 NGTD     Significant Imaging:     CXR The lungs are clear, with normal appearance of pulmonary vasculature and no pleural effusion or pneumothorax.    The cardiac silhouette is normal in size. The hilar and mediastinal contours are unremarkable.    Bones are intact    EKG Marked sinus bradycardia  ST segment elevation of early repolarization - normal variant  When compared with ECG of 25-AUG-2018 11:14,  Vent. rate has decreased BY 25 BPM  T wave amplitude has increased in Inferior leads  Confirmed by ALEJANDRO GONZALEZ MD ([id number]) on 1/31/2020 3:39:05 PM

## 2020-02-04 NOTE — ASSESSMENT & PLAN NOTE
Continue home pain management  Oxycodone 40 mg q.12 hours  Percocet 10 mg every 6 hr p.r.n. pain  Valium 10 mg p.o. t.i.d. p.r.n. muscle spasm.  Will resume home dosing at d/c. Has home supply no new rx needed according to .

## 2020-02-04 NOTE — PLAN OF CARE
Problem: Wound  Goal: Optimal Wound Healing  Outcome: Met     Problem: Adult Inpatient Plan of Care  Goal: Plan of Care Review  Outcome: Met  Goal: Patient-Specific Goal (Individualization)  Outcome: Met  Goal: Absence of Hospital-Acquired Illness or Injury  Outcome: Met  Goal: Optimal Comfort and Wellbeing  Outcome: Met  Goal: Readiness for Transition of Care  Outcome: Met  Goal: Rounds/Family Conference  Outcome: Met     Problem: Fall Injury Risk  Goal: Absence of Fall and Fall-Related Injury  Outcome: Met     Problem: Skin Injury Risk Increased  Goal: Skin Health and Integrity  Outcome: Met     Problem: Infection  Goal: Infection Symptom Resolution  Outcome: Met

## 2020-02-04 NOTE — ASSESSMENT & PLAN NOTE
Continue Invanz 1 g IV piggyback Q 24 hr  Normal saline at 75 cc/hour  Check UA in am   Check BMP and CBC in the morning  He report that he is improving. No abd pain and clear urine. Today is day 4 repeat U/a pending     2/3  Will treat with IV INVANZ for 5 days .  2/4 completed 5 days this afternoon. S/s resolved, d/c with urology f/u next week (has appt 2/13).

## 2020-02-04 NOTE — PLAN OF CARE
02/04/20 1051   Final Note   Assessment Type Final Discharge Note   Anticipated Discharge Disposition Home   What phone number can be called within the next 1-3 days to see how you are doing after discharge? [phone]   Hospital Follow Up  Appt(s) scheduled? Yes   Discharge plans and expectations educations in teach back method with documentation complete? Yes   Post-Acute Status   Post-Acute Authorization Other   Discharge Delays None known at this time       Patient requesting ambulance transport home at discharge. Patient's nurse informed and will contact [facility] for patient transport home. No other post acute care needs identified.     Sue Hamilton LMSW
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
## 2020-02-05 ENCOUNTER — OFFICE VISIT (OUTPATIENT)
Dept: SURGERY | Facility: CLINIC | Age: 35
End: 2020-02-05
Payer: MEDICAID

## 2020-02-05 ENCOUNTER — PATIENT MESSAGE (OUTPATIENT)
Dept: PHYSICAL MEDICINE AND REHAB | Facility: CLINIC | Age: 35
End: 2020-02-05

## 2020-02-05 VITALS
DIASTOLIC BLOOD PRESSURE: 66 MMHG | HEIGHT: 69 IN | SYSTOLIC BLOOD PRESSURE: 123 MMHG | BODY MASS INDEX: 26.96 KG/M2 | TEMPERATURE: 98 F | HEART RATE: 57 BPM | WEIGHT: 182 LBS

## 2020-02-05 DIAGNOSIS — D21.9 LEIOMYOMA: Primary | ICD-10-CM

## 2020-02-05 PROCEDURE — 99999 PR PBB SHADOW E&M-EST. PATIENT-LVL III: CPT | Mod: PBBFAC,,, | Performed by: SURGERY

## 2020-02-05 PROCEDURE — 99213 OFFICE O/P EST LOW 20 MIN: CPT | Mod: PBBFAC | Performed by: SURGERY

## 2020-02-05 PROCEDURE — 99999 PR PBB SHADOW E&M-EST. PATIENT-LVL III: ICD-10-PCS | Mod: PBBFAC,,, | Performed by: SURGERY

## 2020-02-05 PROCEDURE — 99024 POSTOP FOLLOW-UP VISIT: CPT | Mod: ,,, | Performed by: SURGERY

## 2020-02-05 PROCEDURE — 99024 PR POST-OP FOLLOW-UP VISIT: ICD-10-PCS | Mod: ,,, | Performed by: SURGERY

## 2020-02-05 NOTE — PROGRESS NOTES
Nghia Edgar Jr.  34 y.o.    No diagnosis found.    SUBJECTIVE:  34 y.o.male presents s/p Excision of leiomyoma.  Patient reports that area is healing well.  Denies drainage, fever or chills. States he still has pain in the area and it is difficult for him to roll over on to his right side.    OBJECTIVE:  Right hip - incision healing well, no erythema, no drainage, no tenderness with palpation, expected swelling noted     Pathology- reviewed  Final Pathologic Diagnosis Right hip cyst, excision:   - Leiomyoma.   - Immunostain for SMA (+), S100 (-), Mart1 (-), Factor XIIIa (highlights   fibrohistiocytes), KI-67 (low) and positive controls examined.      ASSESSMENT:  33 yo male s/p leiomyoma removal    PLAN:  Patient doing well after removal  Recommend heating pad PRN to reduce swelling  Return to clinic prn.

## 2020-02-05 NOTE — PHYSICIAN QUERY
PT Name: Nghia Edgar Jr.  MR #: [id number]     Physician Query Form - Documentation Clarification      CDS/: Magi Molina RN, CDS               Contact information: [email or web address]                                                                                                                        [phone]    This form is a permanent document in the medical record.     Query Date: February 5, 2020    By submitting this query, we are merely seeking further clarification of documentation. Please utilize your independent clinical judgment when addressing the question(s) below.    The Medical record reflects the following:    Supporting Clinical Findings Location in Medical Record   Patient has a chronic suprapubic Anguiano    ED Provider Note 1/31   Patient was sent over here for admission for IV antibiotics for multi resistant UTI   ED Provider Note 1/31   He has a urostomy tube in place.  He has developed a severe bladder infection from E coli and Klebsiella.  He is having some suprapubic abdominal pain.    H&P 1/31                                                                            Doctor, please further specify the location of the UTI     Provider Use Only     [x   ] Cystitis   [ x  ] Urinary tract infection, site unspecified    [   ] Other, please specify _____________                                                                                                             [  ] Clinically Undetermined

## 2020-02-05 NOTE — PHYSICIAN QUERY
PT Name: Nghia Edgar Jr.  MR #: [id number]    Physician Query Form - Cause and Effect Relationship Clarification      CDS/: Magi Molina RN, CDS               Contact information: [email or web address]                                                                                                                         [phone]    This form is a permanent document in the medical record.     Query Date: February 5, 2020    By submitting this query, we are merely seeking further clarification of documentation. Please utilize your independent clinical judgment when addressing the question(s) below.    The Medical record contains the following:  Supporting Clinical Findings   Location in record    presents to the emergency room with hematuria today  Patient has a chronic suprapubic Anguiano                                                                                                                                     ED Provider Note 1/31    cultures came back today which showed a multi resistant Klebsiella/E coli now                                                                                                                                                                          ED Provider Note 1/31   Patient was sent over here for admission for IV antibiotics for multi resistant UTI    ED Provider Note 1/31   d/c with urology f/u next week for repeat U/a and cath change   DC Summary 2/4         Provider, please clarify if there is any correlation between __UTI__ and __Foley___.           Are the conditions:      [ x ] Due to or associated with each other   [  ] Unrelated to each other   [  ] Other (Please Specify): _________________________   [  ] Clinically Undetermined

## 2020-02-06 LAB
BACTERIA BLD CULT: NORMAL
BACTERIA BLD CULT: NORMAL

## 2020-02-07 ENCOUNTER — PATIENT MESSAGE (OUTPATIENT)
Dept: PHYSICAL MEDICINE AND REHAB | Facility: CLINIC | Age: 35
End: 2020-02-07

## 2020-02-10 ENCOUNTER — PATIENT MESSAGE (OUTPATIENT)
Dept: INTERNAL MEDICINE | Facility: CLINIC | Age: 35
End: 2020-02-10

## 2020-02-11 ENCOUNTER — PATIENT MESSAGE (OUTPATIENT)
Dept: PHYSICAL MEDICINE AND REHAB | Facility: CLINIC | Age: 35
End: 2020-02-11

## 2020-02-11 ENCOUNTER — PATIENT MESSAGE (OUTPATIENT)
Dept: INTERNAL MEDICINE | Facility: CLINIC | Age: 35
End: 2020-02-11

## 2020-02-11 DIAGNOSIS — F41.9 ANXIETY: ICD-10-CM

## 2020-02-11 RX ORDER — DIAZEPAM 10 MG/1
10 TABLET ORAL 3 TIMES DAILY PRN
Qty: 90 TABLET | Refills: 5 | Status: SHIPPED | OUTPATIENT
Start: 2020-02-11 | End: 2020-09-18

## 2020-02-12 ENCOUNTER — PATIENT MESSAGE (OUTPATIENT)
Dept: INTERNAL MEDICINE | Facility: CLINIC | Age: 35
End: 2020-02-12

## 2020-02-14 ENCOUNTER — PATIENT MESSAGE (OUTPATIENT)
Dept: UROLOGY | Facility: CLINIC | Age: 35
End: 2020-02-14

## 2020-02-14 ENCOUNTER — PATIENT MESSAGE (OUTPATIENT)
Dept: PHYSICAL MEDICINE AND REHAB | Facility: CLINIC | Age: 35
End: 2020-02-14

## 2020-02-17 DIAGNOSIS — M79.2 NEUROPATHIC PAIN: Chronic | ICD-10-CM

## 2020-02-17 RX ORDER — OXYCODONE AND ACETAMINOPHEN 10; 325 MG/1; MG/1
TABLET ORAL
Qty: 135 TABLET | Refills: 0 | Status: SHIPPED | OUTPATIENT
Start: 2020-02-17 | End: 2020-03-16 | Stop reason: SDUPTHER

## 2020-02-19 ENCOUNTER — TELEPHONE (OUTPATIENT)
Dept: UROLOGY | Facility: CLINIC | Age: 35
End: 2020-02-19

## 2020-02-19 ENCOUNTER — OFFICE VISIT (OUTPATIENT)
Dept: UROLOGY | Facility: CLINIC | Age: 35
End: 2020-02-19
Payer: MEDICAID

## 2020-02-19 VITALS — SYSTOLIC BLOOD PRESSURE: 121 MMHG | HEART RATE: 98 BPM | DIASTOLIC BLOOD PRESSURE: 89 MMHG

## 2020-02-19 DIAGNOSIS — N39.0 RECURRENT UTI: ICD-10-CM

## 2020-02-19 DIAGNOSIS — Z43.5 ENCOUNTER FOR CARE OR REPLACEMENT OF SUPRAPUBIC TUBE: ICD-10-CM

## 2020-02-19 DIAGNOSIS — N32.89 BLADDER SPASMS: Primary | ICD-10-CM

## 2020-02-19 DIAGNOSIS — Z93.59 CHRONIC SUPRAPUBIC CATHETER: ICD-10-CM

## 2020-02-19 DIAGNOSIS — N31.9 NEUROGENIC BLADDER: ICD-10-CM

## 2020-02-19 PROCEDURE — 99213 OFFICE O/P EST LOW 20 MIN: CPT | Mod: PBBFAC | Performed by: NURSE PRACTITIONER

## 2020-02-19 PROCEDURE — 51705 PR CHANGE OF BLADDER TUBE,SIMPLE: ICD-10-PCS | Mod: S$PBB,,, | Performed by: NURSE PRACTITIONER

## 2020-02-19 PROCEDURE — 51705 CHANGE OF BLADDER TUBE: CPT | Mod: S$PBB,,, | Performed by: NURSE PRACTITIONER

## 2020-02-19 PROCEDURE — 99999 PR PBB SHADOW E&M-EST. PATIENT-LVL III: CPT | Mod: PBBFAC,,, | Performed by: NURSE PRACTITIONER

## 2020-02-19 PROCEDURE — 87077 CULTURE AEROBIC IDENTIFY: CPT | Mod: 59

## 2020-02-19 PROCEDURE — 99214 OFFICE O/P EST MOD 30 MIN: CPT | Mod: S$PBB,25,, | Performed by: NURSE PRACTITIONER

## 2020-02-19 PROCEDURE — 51705 CHANGE OF BLADDER TUBE: CPT | Mod: PBBFAC | Performed by: NURSE PRACTITIONER

## 2020-02-19 PROCEDURE — 87086 URINE CULTURE/COLONY COUNT: CPT

## 2020-02-19 PROCEDURE — 87088 URINE BACTERIA CULTURE: CPT

## 2020-02-19 PROCEDURE — 99214 PR OFFICE/OUTPT VISIT, EST, LEVL IV, 30-39 MIN: ICD-10-PCS | Mod: S$PBB,25,, | Performed by: NURSE PRACTITIONER

## 2020-02-19 PROCEDURE — 87186 SC STD MICRODIL/AGAR DIL: CPT | Mod: 59

## 2020-02-19 PROCEDURE — 99999 PR PBB SHADOW E&M-EST. PATIENT-LVL III: ICD-10-PCS | Mod: PBBFAC,,, | Performed by: NURSE PRACTITIONER
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
## 2020-02-22 LAB
BACTERIA UR CULT: ABNORMAL
BACTERIA UR CULT: ABNORMAL

## 2020-02-25 ENCOUNTER — PATIENT MESSAGE (OUTPATIENT)
Dept: INTERNAL MEDICINE | Facility: CLINIC | Age: 35
End: 2020-02-25

## 2020-02-27 ENCOUNTER — PATIENT MESSAGE (OUTPATIENT)
Dept: UROLOGY | Facility: CLINIC | Age: 35
End: 2020-02-27

## 2020-02-27 ENCOUNTER — PATIENT MESSAGE (OUTPATIENT)
Dept: INTERNAL MEDICINE | Facility: CLINIC | Age: 35
End: 2020-02-27

## 2020-02-27 ENCOUNTER — TELEPHONE (OUTPATIENT)
Dept: INTERNAL MEDICINE | Facility: CLINIC | Age: 35
End: 2020-02-27

## 2020-02-27 DIAGNOSIS — A49.9 BACTERIAL UTI: Primary | ICD-10-CM

## 2020-02-27 DIAGNOSIS — N39.0 RECURRENT UTI: ICD-10-CM

## 2020-02-27 DIAGNOSIS — N32.89 BLADDER SPASMS: ICD-10-CM

## 2020-02-27 DIAGNOSIS — Z93.3 COLOSTOMY IN PLACE: ICD-10-CM

## 2020-02-27 DIAGNOSIS — N39.0 BACTERIAL UTI: Primary | ICD-10-CM

## 2020-02-28 RX ORDER — SULFAMETHOXAZOLE AND TRIMETHOPRIM 800; 160 MG/1; MG/1
1 TABLET ORAL 2 TIMES DAILY
Qty: 24 TABLET | Refills: 0 | Status: SHIPPED | OUTPATIENT
Start: 2020-02-28 | End: 2020-03-11

## 2020-02-28 RX ORDER — AMPICILLIN 500 MG/1
500 CAPSULE ORAL 3 TIMES DAILY
Qty: 21 CAPSULE | Refills: 0 | Status: SHIPPED | OUTPATIENT
Start: 2020-02-28 | End: 2020-03-06

## 2020-02-28 NOTE — TELEPHONE ENCOUNTER
Scheduled for cysto and botox on 3/11/2020 with Dr. Luna.  Antibiotics and probiotic sent to pharmacy to cover for upcoming procedure.

## 2020-03-01 RX ORDER — PROMETHAZINE HYDROCHLORIDE 6.25 MG/5ML
SYRUP ORAL
Qty: 150 ML | Refills: 3 | Status: ON HOLD | OUTPATIENT
Start: 2020-03-01 | End: 2020-07-20 | Stop reason: SDUPTHER

## 2020-03-02 ENCOUNTER — PATIENT MESSAGE (OUTPATIENT)
Dept: INTERNAL MEDICINE | Facility: CLINIC | Age: 35
End: 2020-03-02

## 2020-03-02 ENCOUNTER — PATIENT MESSAGE (OUTPATIENT)
Dept: PHYSICAL MEDICINE AND REHAB | Facility: CLINIC | Age: 35
End: 2020-03-02

## 2020-03-02 NOTE — TELEPHONE ENCOUNTER
Fax was sent again today after being sent multiple times. Will call [facility] later today to make sure they have received it

## 2020-03-03 ENCOUNTER — TELEPHONE (OUTPATIENT)
Dept: PHYSICAL MEDICINE AND REHAB | Facility: CLINIC | Age: 35
End: 2020-03-03

## 2020-03-03 ENCOUNTER — PATIENT MESSAGE (OUTPATIENT)
Dept: PHYSICAL MEDICINE AND REHAB | Facility: CLINIC | Age: 35
End: 2020-03-03

## 2020-03-03 NOTE — TELEPHONE ENCOUNTER
----- Message from Jovita Lyon MA sent at 3/3/2020  9:43 AM CST -----  Dr. Hardy is not seeing any new patients. She is going out on maternity leave. Please ask Dr. Davis if it can be with Dr. Cheryl Encarnacion.    Thanks      ----- Message -----  From: Rich Garcia  Sent: 3/3/2020   9:31 AM CST  To: Jovita Lyon MA    Good morning, Jovita this patient will need an appointment with Dr. Hardy.  The patient will need to have his pump replaced.   should be sending the dr an e-mail as well.  Thanks

## 2020-03-03 NOTE — PLAN OF CARE
Problem: Physical Therapy Goal  Goal: Physical Therapy Goal  Outcome: Outcome(s) achieved Date Met: 07/13/18  Physical therapy screen completed. No further need for acute PT services.    Malu Cintron, WILIAM  7/13/2018         done

## 2020-03-04 ENCOUNTER — PATIENT MESSAGE (OUTPATIENT)
Dept: PHYSICAL MEDICINE AND REHAB | Facility: CLINIC | Age: 35
End: 2020-03-04

## 2020-03-06 ENCOUNTER — ANESTHESIA EVENT (OUTPATIENT)
Dept: SURGERY | Facility: HOSPITAL | Age: 35
End: 2020-03-06
Payer: MEDICAID

## 2020-03-10 ENCOUNTER — TELEPHONE (OUTPATIENT)
Dept: PEDIATRIC UROLOGY | Facility: CLINIC | Age: 35
End: 2020-03-10

## 2020-03-10 NOTE — TELEPHONE ENCOUNTER
Called pt to confirm arrival time of 9:45a for procedure on 3/11/20. Gave pt NPO instructions and gave pt opportunity to ask questions. Pt verbalized understanding.

## 2020-03-11 ENCOUNTER — ANESTHESIA (OUTPATIENT)
Dept: SURGERY | Facility: HOSPITAL | Age: 35
End: 2020-03-11
Payer: MEDICAID

## 2020-03-11 ENCOUNTER — TELEPHONE (OUTPATIENT)
Dept: UROLOGY | Facility: CLINIC | Age: 35
End: 2020-03-11

## 2020-03-11 ENCOUNTER — PATIENT MESSAGE (OUTPATIENT)
Dept: SURGERY | Facility: HOSPITAL | Age: 35
End: 2020-03-11

## 2020-03-11 NOTE — TELEPHONE ENCOUNTER
Tried to contact patient to reschedule his procedure with Dr Luna, no answer left vm giving the next available date, which will be 4-15. Ask the pt to return the call.

## 2020-03-16 ENCOUNTER — PATIENT MESSAGE (OUTPATIENT)
Dept: PHYSICAL MEDICINE AND REHAB | Facility: CLINIC | Age: 35
End: 2020-03-16

## 2020-03-16 DIAGNOSIS — M79.2 NEUROPATHIC PAIN: Chronic | ICD-10-CM

## 2020-03-16 RX ORDER — OXYCODONE AND ACETAMINOPHEN 10; 325 MG/1; MG/1
TABLET ORAL
Qty: 135 TABLET | Refills: 0 | Status: SHIPPED | OUTPATIENT
Start: 2020-03-16 | End: 2020-04-20 | Stop reason: SDUPTHER

## 2020-03-16 RX ORDER — OXYCODONE HCL 40 MG/1
40 TABLET, FILM COATED, EXTENDED RELEASE ORAL EVERY 12 HOURS
Qty: 60 TABLET | Refills: 0 | Status: SHIPPED | OUTPATIENT
Start: 2020-03-16 | End: 2020-04-16 | Stop reason: SDUPTHER

## 2020-03-18 ENCOUNTER — PATIENT MESSAGE (OUTPATIENT)
Dept: UROLOGY | Facility: CLINIC | Age: 35
End: 2020-03-18

## 2020-03-18 ENCOUNTER — TELEPHONE (OUTPATIENT)
Dept: UROLOGY | Facility: CLINIC | Age: 35
End: 2020-03-18

## 2020-03-18 DIAGNOSIS — G82.50 TETRAPLEGIA: Primary | ICD-10-CM

## 2020-03-19 NOTE — TELEPHONE ENCOUNTER
Tetraplegia  -     Case Request Operating Room: CYSTOSCOPY,WITH BOTULINUM TOXIN INJECTION 300 units

## 2020-03-24 ENCOUNTER — TELEPHONE (OUTPATIENT)
Dept: UROLOGY | Facility: CLINIC | Age: 35
End: 2020-03-24

## 2020-03-24 ENCOUNTER — PATIENT MESSAGE (OUTPATIENT)
Dept: UROLOGY | Facility: CLINIC | Age: 35
End: 2020-03-24

## 2020-03-25 ENCOUNTER — PATIENT MESSAGE (OUTPATIENT)
Dept: UROLOGY | Facility: CLINIC | Age: 35
End: 2020-03-25

## 2020-03-25 ENCOUNTER — PATIENT MESSAGE (OUTPATIENT)
Dept: INTERNAL MEDICINE | Facility: CLINIC | Age: 35
End: 2020-03-25

## 2020-03-26 ENCOUNTER — PATIENT MESSAGE (OUTPATIENT)
Dept: UROLOGY | Facility: CLINIC | Age: 35
End: 2020-03-26

## 2020-03-26 ENCOUNTER — TELEPHONE (OUTPATIENT)
Dept: INTERNAL MEDICINE | Facility: CLINIC | Age: 35
End: 2020-03-26

## 2020-03-26 DIAGNOSIS — N31.9 NEUROGENIC BLADDER: Primary | ICD-10-CM

## 2020-03-26 DIAGNOSIS — J45.20 MILD INTERMITTENT ASTHMA WITHOUT COMPLICATION: ICD-10-CM

## 2020-03-26 DIAGNOSIS — N39.0 RECURRENT UTI: Primary | ICD-10-CM

## 2020-03-26 RX ORDER — AMOXICILLIN 500 MG/1
500 TABLET, FILM COATED ORAL 2 TIMES DAILY
Qty: 14 TABLET | Refills: 0 | Status: SHIPPED | OUTPATIENT
Start: 2020-03-26 | End: 2020-05-01 | Stop reason: CLARIF

## 2020-03-26 RX ORDER — NITROFURANTOIN 25; 75 MG/1; MG/1
100 CAPSULE ORAL 2 TIMES DAILY
Qty: 20 CAPSULE | Refills: 0 | Status: SHIPPED | OUTPATIENT
Start: 2020-03-26 | End: 2020-04-05

## 2020-03-26 NOTE — TELEPHONE ENCOUNTER
Spoke with pt he has another appt  Wants to keep labs  Lingering uri with post nasal drip allergies  No fever or fllu symtpoms   rx amoxicillin sent for sinusiti  He would like xray when he comes to his appt to see neurosurgery   Order in as requested for scheudling chest xray as well

## 2020-03-26 NOTE — TELEPHONE ENCOUNTER
Cristiane, I put labs in. I think the visit is booked as Video. I wonder if there should be at least a day of separation so Dr. Hoover will have the lab back.: Does he have home health that can draw lab?

## 2020-03-26 NOTE — TELEPHONE ENCOUNTER
----- Message from Cristiane Bocanegra MA sent at 3/26/2020  9:36 AM CDT -----  Contact: self   [phone]   Patient is requesting annual labs     ----- Message -----  From: Gela Cheema  Sent: 3/26/2020   9:28 AM CDT  To: Kiko CANNON ([facility] Staff    Doctor appointment and lab have been scheduled.  Please link lab orders to the lab appointment.  Date of doctor appointment:  3/31  Date of lab appointment:  3/31-Non Fasting   Physical or EP: Virtual Visit  Comments:

## 2020-03-31 ENCOUNTER — PATIENT MESSAGE (OUTPATIENT)
Dept: INTERNAL MEDICINE | Facility: CLINIC | Age: 35
End: 2020-03-31

## 2020-03-31 ENCOUNTER — OFFICE VISIT (OUTPATIENT)
Dept: INTERNAL MEDICINE | Facility: CLINIC | Age: 35
End: 2020-03-31
Payer: MEDICAID

## 2020-03-31 ENCOUNTER — TELEPHONE (OUTPATIENT)
Dept: INTERNAL MEDICINE | Facility: CLINIC | Age: 35
End: 2020-03-31

## 2020-03-31 DIAGNOSIS — G82.20 PARAPLEGIA FOLLOWING SPINAL CORD INJURY: Chronic | ICD-10-CM

## 2020-03-31 DIAGNOSIS — R05.9 COUGH: ICD-10-CM

## 2020-03-31 DIAGNOSIS — J45.20 MILD INTERMITTENT ASTHMA WITHOUT COMPLICATION: Primary | ICD-10-CM

## 2020-03-31 DIAGNOSIS — Z93.3 COLOSTOMY STATUS: Chronic | ICD-10-CM

## 2020-03-31 DIAGNOSIS — G82.50 SPASTIC QUADRIPARESIS: ICD-10-CM

## 2020-03-31 DIAGNOSIS — N31.9 NEUROGENIC BLADDER: Chronic | ICD-10-CM

## 2020-03-31 DIAGNOSIS — J31.0 RHINITIS, UNSPECIFIED TYPE: ICD-10-CM

## 2020-03-31 DIAGNOSIS — K59.2 NEUROGENIC BOWEL: Chronic | ICD-10-CM

## 2020-03-31 PROCEDURE — 99213 OFFICE O/P EST LOW 20 MIN: CPT | Mod: 95,,, | Performed by: INTERNAL MEDICINE

## 2020-03-31 PROCEDURE — 99213 PR OFFICE/OUTPT VISIT, EST, LEVL III, 20-29 MIN: ICD-10-PCS | Mod: 95,,, | Performed by: INTERNAL MEDICINE

## 2020-03-31 RX ORDER — LORATADINE 10 MG/1
TABLET ORAL
Qty: 30 TABLET | Refills: 3 | Status: SHIPPED | OUTPATIENT
Start: 2020-03-31 | End: 2020-09-02

## 2020-03-31 RX ORDER — IPRATROPIUM BROMIDE AND ALBUTEROL SULFATE 2.5; .5 MG/3ML; MG/3ML
3 SOLUTION RESPIRATORY (INHALATION) EVERY 6 HOURS PRN
Qty: 1 BOX | Refills: 6 | Status: SHIPPED | OUTPATIENT
Start: 2020-03-31 | End: 2020-11-07

## 2020-03-31 NOTE — PROGRESS NOTES
Subjective:       Patient ID: Nghia Edgar Jr. is a 34 y.o. male.    Chief Complaint: Follow-up  The patient location is: home   The chief complaint leading to consultation is: cough/follow up   Visit type: Virtual visit with synchronous audio and video technical diffuiculty with hearing switched to audio   Total time spent with patient: 20 minutes   Each patient to whom he or she provides medical services by telemedicine is:  (1) informed of the relationship between the physician and patient and the respective role of any other health care provider with respect to management of the patient; and (2) notified that he or she may decline to receive medical services by telemedicine and may withdraw from such care at any time.  This is a 34-year-old who presents today with a few concerns he reports that he has continued to follow with urologist has had history of neurogenic bladder and has had infections intermittently.  He has issues with seasonal allergies postnasal drip and a cough that has been lingering he denies any fevers or flu-like symptoms.   He has had no associated gi symptoms and tends to get sinus issues this time a year.  He was started on antibiotic as requested since symptoms were not resoolving.  Is He uses his inhalers and tries to keep his head of bed elevated since he does have some difficulty with his lung function bring everything of at times.  He denies shortness of breath .  His is he had an appointment with neurosurgery for his baclofen pump evaluation but reports that was canceled due to the covid restrictions as well  So he will do his labs another time.      HPI  Review of Systems   Constitutional: Negative for fever.   HENT: Positive for congestion and postnasal drip.    Respiratory: Positive for cough and wheezing. Negative for shortness of breath.        Objective:    .pt seen on video briefly   Appears comfortable   Technical difficulties switched to phone   Physical Exam     Assessment:       1. Mild intermittent asthma without complication    2. Cough    3. Rhinitis, unspecified type    4. Colostomy status    5. Neurogenic bladder    6. Paraplegia following spinal cord injury    7. Neurogenic bowel        Plan:       Nghia was seen today for follow-up.    Diagnoses and all orders for this visit:    Mild intermittent asthma without complication  Is history of with some increased wheezing at times we discussed continue his inhalers are albuterol and if covered trial of he will continue antihistamine he has been taking Claritin is  -     budesonide 180mcg (PULMICORT 180MCG) 180 mcg/actuation AePB; Inhale 1 puff into the lungs 2 (two) times daily. Controller    Cough  Rhinitis, unspecified type  Sinusitis  He will complete his course of antibiotics as prescribed seek attention if increased shortness of breath or concerns    Colostomy status  .  Neurogenic bladder  He continues to follow with urology     Paraplegia following spinal cord injury  Is

## 2020-04-03 ENCOUNTER — TELEPHONE (OUTPATIENT)
Dept: INTERNAL MEDICINE | Facility: CLINIC | Age: 35
End: 2020-04-03

## 2020-04-03 NOTE — TELEPHONE ENCOUNTER
"----- Message from Malou Gibbons sent at 4/3/2020  8:01 AM CDT -----  Prior Authorization Needed    Rx: budesonide 180mcg (PULMICORT 180MCG) 180 mcg/actuation AePB    To submit the PA:    1: Go to " [email or web address] " and click "Enter a Key"    2. Enter the patient's last name and date of birth and the key.      KEY: [id number]    3. Complete the forms and click "send to Plan"    Note chart when prior authorization has been submitted.    Please notify pharmacy when prior authorization has been approved.    Thank You    "

## 2020-04-06 DIAGNOSIS — G82.50 TETRAPLEGIA: ICD-10-CM

## 2020-04-06 DIAGNOSIS — Z93.59 CHRONIC SUPRAPUBIC CATHETER: ICD-10-CM

## 2020-04-06 DIAGNOSIS — N31.9 NEUROGENIC BLADDER: Primary | ICD-10-CM

## 2020-04-06 DIAGNOSIS — Z43.5 ENCOUNTER FOR CARE OR REPLACEMENT OF SUPRAPUBIC TUBE: ICD-10-CM
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
## 2020-04-16 ENCOUNTER — PATIENT MESSAGE (OUTPATIENT)
Dept: PHYSICAL MEDICINE AND REHAB | Facility: CLINIC | Age: 35
End: 2020-04-16

## 2020-04-16 DIAGNOSIS — M79.2 NEUROPATHIC PAIN: Chronic | ICD-10-CM

## 2020-04-16 RX ORDER — OXYCODONE HCL 40 MG/1
40 TABLET, FILM COATED, EXTENDED RELEASE ORAL EVERY 12 HOURS
Qty: 60 TABLET | Refills: 0 | Status: SHIPPED | OUTPATIENT
Start: 2020-04-16 | End: 2020-05-18 | Stop reason: SDUPTHER

## 2020-04-18 ENCOUNTER — PATIENT MESSAGE (OUTPATIENT)
Dept: UROLOGY | Facility: CLINIC | Age: 35
End: 2020-04-18

## 2020-04-20 ENCOUNTER — PATIENT MESSAGE (OUTPATIENT)
Dept: PHYSICAL MEDICINE AND REHAB | Facility: CLINIC | Age: 35
End: 2020-04-20

## 2020-04-20 DIAGNOSIS — M79.2 NEUROPATHIC PAIN: Chronic | ICD-10-CM

## 2020-04-20 NOTE — SUBJECTIVE & OBJECTIVE
Interval History: Pt c/o irritation and pressure at suprapubic catheter site where it was just changed. Denies CP, SOB, N/V.      Review of Systems   Constitutional: Negative for chills and fatigue.   Respiratory: Negative for cough and shortness of breath.    Cardiovascular: Negative for chest pain and palpitations.   Gastrointestinal: Positive for abdominal pain.     Objective:     Vital Signs (Most Recent):  Temp: 98.2 °F (36.8 °C) (07/12/17 0800)  Pulse: 72 (07/12/17 0800)  Resp: 18 (07/12/17 0800)  BP: (!) 94/53 (nurse alva was notify) (07/12/17 0800)  SpO2: 96 % (07/12/17 0850) Vital Signs (24h Range):  Temp:  [97.8 °F (36.6 °C)-98.2 °F (36.8 °C)] 98.2 °F (36.8 °C)  Pulse:  [45-72] 72  Resp:  [18-20] 18  SpO2:  [96 %-99 %] 96 %  BP: ()/(49-64) 94/53     Weight: 81.6 kg (180 lb)  Body mass index is 27.37 kg/m².    Intake/Output Summary (Last 24 hours) at 07/12/17 0953  Last data filed at 07/12/17 0600   Gross per 24 hour   Intake              200 ml   Output              750 ml   Net             -550 ml      Physical Exam   Constitutional: He is oriented to person, place, and time. No distress.   Cardiovascular: Regular rhythm.    Bradycardia    Pulmonary/Chest: Effort normal and breath sounds normal. No respiratory distress. He has no wheezes.   Abdominal: Soft. Bowel sounds are normal. He exhibits no distension.   Musculoskeletal: He exhibits no edema.   Neurological: He is alert and oriented to person, place, and time.   Psychiatric: He has a normal mood and affect. Thought content normal.   Nursing note and vitals reviewed.      Significant Labs:   BMP:   Recent Labs  Lab 07/12/17  0549   GLU 88      K 3.6      CO2 25   BUN 4*   CREATININE 0.5   CALCIUM 8.6*   MG 1.9     CBC:   Recent Labs  Lab 07/11/17  0613 07/12/17  0549   WBC 11.35 6.82   HGB 13.0* 11.9*   HCT 41.0 37.2*    301     Troponin:   Recent Labs  Lab 07/11/17  0613 07/12/17  0549   TROPONINI 0.090* 0.844*  LOV 01/29/2020    carvedilol (COREG) 6.25 MG tablet Take 1 tablet by mouth 2 times daily      APPROVED #180 /0 REFILLS.    LABS PENDING- WILL NOTIFY PATIENT.          Significant Imaging: no new

## 2020-04-21 ENCOUNTER — PATIENT MESSAGE (OUTPATIENT)
Dept: PHYSICAL MEDICINE AND REHAB | Facility: CLINIC | Age: 35
End: 2020-04-21

## 2020-04-21 RX ORDER — OXYCODONE AND ACETAMINOPHEN 10; 325 MG/1; MG/1
TABLET ORAL
Qty: 135 TABLET | Refills: 0 | Status: SHIPPED | OUTPATIENT
Start: 2020-04-21 | End: 2020-05-28 | Stop reason: SDUPTHER

## 2020-04-23 ENCOUNTER — OFFICE VISIT (OUTPATIENT)
Dept: NEUROSURGERY | Facility: CLINIC | Age: 35
End: 2020-04-23
Payer: MEDICAID

## 2020-04-23 DIAGNOSIS — Z93.59 CHRONIC SUPRAPUBIC CATHETER: ICD-10-CM

## 2020-04-23 DIAGNOSIS — G82.20 PARAPLEGIA FOLLOWING SPINAL CORD INJURY: Primary | Chronic | ICD-10-CM

## 2020-04-23 DIAGNOSIS — Z97.8 PRESENCE OF INTRATHECAL BACLOFEN PUMP: ICD-10-CM

## 2020-04-23 PROCEDURE — 99204 PR OFFICE/OUTPT VISIT, NEW, LEVL IV, 45-59 MIN: ICD-10-PCS | Mod: 95,,, | Performed by: NEUROLOGICAL SURGERY

## 2020-04-23 PROCEDURE — 99204 OFFICE O/P NEW MOD 45 MIN: CPT | Mod: 95,,, | Performed by: NEUROLOGICAL SURGERY
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
## 2020-04-27 PROBLEM — Z97.8 PRESENCE OF INTRATHECAL BACLOFEN PUMP: Status: ACTIVE | Noted: 2020-04-27

## 2020-04-27 NOTE — PATIENT INSTRUCTIONS
Please use the Bactroban ointment twice daily in your nose for 5 days leading up to surgery. (You may use a Q-tip to apply a little bit of ointment inside each nostril.)    Please use the Hibiclens soap every other day in the shower for the 5 days before your surgery. For example, if surgery is on Monday, use the Hibiclens when you shower on Thursday, Saturday, and Monday morning.     We are asking you to do this to help reduce the chance of having an infection associated with surgery. Thank you!

## 2020-04-29 ENCOUNTER — PATIENT MESSAGE (OUTPATIENT)
Dept: UROLOGY | Facility: CLINIC | Age: 35
End: 2020-04-29
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
## 2020-05-06 ENCOUNTER — OFFICE VISIT (OUTPATIENT)
Dept: UROLOGY | Facility: CLINIC | Age: 35
End: 2020-05-06
Payer: MEDICAID

## 2020-05-06 VITALS — HEART RATE: 50 BPM | SYSTOLIC BLOOD PRESSURE: 110 MMHG | DIASTOLIC BLOOD PRESSURE: 76 MMHG

## 2020-05-06 DIAGNOSIS — Z43.5 ENCOUNTER FOR CARE OR REPLACEMENT OF SUPRAPUBIC TUBE: ICD-10-CM

## 2020-05-06 DIAGNOSIS — N32.89 BLADDER SPASMS: ICD-10-CM

## 2020-05-06 DIAGNOSIS — G82.20 PARAPLEGIA FOLLOWING SPINAL CORD INJURY: Chronic | ICD-10-CM

## 2020-05-06 DIAGNOSIS — K59.2 NEUROGENIC BOWEL: Chronic | ICD-10-CM

## 2020-05-06 DIAGNOSIS — N31.9 NEUROGENIC BLADDER: ICD-10-CM

## 2020-05-06 DIAGNOSIS — Z93.59 CHRONIC SUPRAPUBIC CATHETER: ICD-10-CM

## 2020-05-06 DIAGNOSIS — R82.71 BACTERIA IN URINE: Primary | ICD-10-CM

## 2020-05-06 PROCEDURE — 51705 PR CHANGE OF BLADDER TUBE,SIMPLE: ICD-10-PCS | Mod: S$PBB,,, | Performed by: NURSE PRACTITIONER

## 2020-05-06 PROCEDURE — 87186 SC STD MICRODIL/AGAR DIL: CPT

## 2020-05-06 PROCEDURE — 99999 PR PBB SHADOW E&M-EST. PATIENT-LVL III: CPT | Mod: PBBFAC,,, | Performed by: NURSE PRACTITIONER

## 2020-05-06 PROCEDURE — 87086 URINE CULTURE/COLONY COUNT: CPT

## 2020-05-06 PROCEDURE — 87077 CULTURE AEROBIC IDENTIFY: CPT | Mod: 59

## 2020-05-06 PROCEDURE — 99214 OFFICE O/P EST MOD 30 MIN: CPT | Mod: S$PBB,25,, | Performed by: NURSE PRACTITIONER

## 2020-05-06 PROCEDURE — 87088 URINE BACTERIA CULTURE: CPT

## 2020-05-06 PROCEDURE — 51705 CHANGE OF BLADDER TUBE: CPT | Mod: S$PBB,,, | Performed by: NURSE PRACTITIONER

## 2020-05-06 PROCEDURE — 99214 PR OFFICE/OUTPT VISIT, EST, LEVL IV, 30-39 MIN: ICD-10-PCS | Mod: S$PBB,25,, | Performed by: NURSE PRACTITIONER

## 2020-05-06 PROCEDURE — 99999 PR PBB SHADOW E&M-EST. PATIENT-LVL III: ICD-10-PCS | Mod: PBBFAC,,, | Performed by: NURSE PRACTITIONER

## 2020-05-06 PROCEDURE — 99213 OFFICE O/P EST LOW 20 MIN: CPT | Mod: PBBFAC,25 | Performed by: NURSE PRACTITIONER

## 2020-05-06 PROCEDURE — 51705 CHANGE OF BLADDER TUBE: CPT | Mod: PBBFAC | Performed by: NURSE PRACTITIONER
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
## 2020-05-08 LAB
BACTERIA UR CULT: ABNORMAL
BACTERIA UR CULT: ABNORMAL

## 2020-05-09 ENCOUNTER — PATIENT MESSAGE (OUTPATIENT)
Dept: UROLOGY | Facility: CLINIC | Age: 35
End: 2020-05-09

## 2020-05-11 ENCOUNTER — PATIENT MESSAGE (OUTPATIENT)
Dept: UROLOGY | Facility: CLINIC | Age: 35
End: 2020-05-11

## 2020-05-11 DIAGNOSIS — R82.71 BACTERIA IN URINE: Primary | ICD-10-CM

## 2020-05-11 DIAGNOSIS — N31.9 NEUROGENIC BLADDER: ICD-10-CM

## 2020-05-11 DIAGNOSIS — Z93.59 CHRONIC SUPRAPUBIC CATHETER: ICD-10-CM

## 2020-05-11 DIAGNOSIS — Z01.818 PRE-OPERATIVE EXAM: ICD-10-CM

## 2020-05-11 RX ORDER — CEFDINIR 300 MG/1
300 CAPSULE ORAL 2 TIMES DAILY
Qty: 20 CAPSULE | Refills: 0 | Status: SHIPPED | OUTPATIENT
Start: 2020-05-11 | End: 2020-05-21

## 2020-05-11 NOTE — TELEPHONE ENCOUNTER
Patient scheduled for cysto/botox with Dr. Luna 05/27/2020; can start 10 days prior.   Recent Urine showing 2 bacteria;   omincef will work for both bacteria.  Sent now to make sure it is allowed.

## 2020-05-15 ENCOUNTER — TELEPHONE (OUTPATIENT)
Dept: UROLOGY | Facility: CLINIC | Age: 35
End: 2020-05-15

## 2020-05-15 ENCOUNTER — PATIENT MESSAGE (OUTPATIENT)
Dept: PHYSICAL MEDICINE AND REHAB | Facility: CLINIC | Age: 35
End: 2020-05-15

## 2020-05-15 DIAGNOSIS — Z13.9 SCREENING FOR UNSPECIFIED CONDITION: Primary | ICD-10-CM

## 2020-05-18 ENCOUNTER — PATIENT MESSAGE (OUTPATIENT)
Dept: PHYSICAL MEDICINE AND REHAB | Facility: CLINIC | Age: 35
End: 2020-05-18

## 2020-05-18 DIAGNOSIS — M79.2 NEUROPATHIC PAIN: Chronic | ICD-10-CM

## 2020-05-18 RX ORDER — OXYCODONE HCL 40 MG/1
40 TABLET, FILM COATED, EXTENDED RELEASE ORAL EVERY 12 HOURS
Qty: 60 TABLET | Refills: 0 | Status: SHIPPED | OUTPATIENT
Start: 2020-05-21 | End: 2020-05-18 | Stop reason: SDUPTHER

## 2020-05-18 RX ORDER — OXYCODONE HCL 40 MG/1
40 TABLET, FILM COATED, EXTENDED RELEASE ORAL EVERY 12 HOURS
Qty: 60 TABLET | Refills: 0 | Status: SHIPPED | OUTPATIENT
Start: 2020-05-18 | End: 2020-06-17 | Stop reason: SDUPTHER

## 2020-05-19 ENCOUNTER — TELEPHONE (OUTPATIENT)
Dept: UROLOGY | Facility: CLINIC | Age: 35
End: 2020-05-19

## 2020-05-19 ENCOUNTER — PATIENT MESSAGE (OUTPATIENT)
Dept: UROLOGY | Facility: CLINIC | Age: 35
End: 2020-05-19

## 2020-05-19 NOTE — TELEPHONE ENCOUNTER
Left  and also sent msg to the pt my [facility] pt portal, to advise he has to take a covid screening test prior to surgery that's scheduled on 5-27 with Dr Luna. I asked the patient to return the call to get it set up.

## 2020-05-21 ENCOUNTER — TELEPHONE (OUTPATIENT)
Dept: UROLOGY | Facility: CLINIC | Age: 35
End: 2020-05-21

## 2020-05-21 NOTE — TELEPHONE ENCOUNTER
Spoke with mr mg to confirm the covid screening on 5-25, also he will be coming earlier than scheduled, he agreed to come to [facility]

## 2020-05-25 ENCOUNTER — LAB VISIT (OUTPATIENT)
Dept: INTERNAL MEDICINE | Facility: CLINIC | Age: 35
End: 2020-05-25
Payer: MEDICAID

## 2020-05-25 ENCOUNTER — LAB VISIT (OUTPATIENT)
Dept: LAB | Facility: HOSPITAL | Age: 35
End: 2020-05-25
Attending: ANESTHESIOLOGY
Payer: MEDICAID

## 2020-05-25 DIAGNOSIS — Z01.818 PREOP TESTING: ICD-10-CM

## 2020-05-25 DIAGNOSIS — Z13.9 SCREENING FOR UNSPECIFIED CONDITION: ICD-10-CM

## 2020-05-25 LAB
ANION GAP SERPL CALC-SCNC: 8 MMOL/L (ref 8–16)
BUN SERPL-MCNC: 8 MG/DL (ref 6–20)
CALCIUM SERPL-MCNC: 9.1 MG/DL (ref 8.7–10.5)
CHLORIDE SERPL-SCNC: 107 MMOL/L (ref 95–110)
CO2 SERPL-SCNC: 25 MMOL/L (ref 23–29)
CREAT SERPL-MCNC: 0.5 MG/DL (ref 0.5–1.4)
ERYTHROCYTE [DISTWIDTH] IN BLOOD BY AUTOMATED COUNT: 11.9 % (ref 11.5–14.5)
EST. GFR  (AFRICAN AMERICAN): >60 ML/MIN/1.73 M^2
EST. GFR  (NON AFRICAN AMERICAN): >60 ML/MIN/1.73 M^2
GLUCOSE SERPL-MCNC: 85 MG/DL (ref 70–110)
HCT VFR BLD AUTO: 45.6 % (ref 40–54)
HGB BLD-MCNC: 14 G/DL (ref 14–18)
MCH RBC QN AUTO: 31 PG (ref 27–31)
MCHC RBC AUTO-ENTMCNC: 30.7 G/DL (ref 32–36)
MCV RBC AUTO: 101 FL (ref 82–98)
PLATELET # BLD AUTO: 182 K/UL (ref 150–350)
PMV BLD AUTO: 12 FL (ref 9.2–12.9)
POTASSIUM SERPL-SCNC: 3.8 MMOL/L (ref 3.5–5.1)
RBC # BLD AUTO: 4.52 M/UL (ref 4.6–6.2)
SODIUM SERPL-SCNC: 140 MMOL/L (ref 136–145)
WBC # BLD AUTO: 7.11 K/UL (ref 3.9–12.7)

## 2020-05-25 PROCEDURE — U0003 INFECTIOUS AGENT DETECTION BY NUCLEIC ACID (DNA OR RNA); SEVERE ACUTE RESPIRATORY SYNDROME CORONAVIRUS 2 (SARS-COV-2) (CORONAVIRUS DISEASE [COVID-19]), AMPLIFIED PROBE TECHNIQUE, MAKING USE OF HIGH THROUGHPUT TECHNOLOGIES AS DESCRIBED BY CMS-2020-01-R: HCPCS

## 2020-05-25 PROCEDURE — 80048 BASIC METABOLIC PNL TOTAL CA: CPT

## 2020-05-25 PROCEDURE — 36415 COLL VENOUS BLD VENIPUNCTURE: CPT

## 2020-05-25 PROCEDURE — 85027 COMPLETE CBC AUTOMATED: CPT

## 2020-05-26 ENCOUNTER — TELEPHONE (OUTPATIENT)
Dept: UROLOGY | Facility: CLINIC | Age: 35
End: 2020-05-26

## 2020-05-26 ENCOUNTER — PATIENT MESSAGE (OUTPATIENT)
Dept: PHYSICAL MEDICINE AND REHAB | Facility: CLINIC | Age: 35
End: 2020-05-26

## 2020-05-26 DIAGNOSIS — M79.2 NEUROPATHIC PAIN: Chronic | ICD-10-CM

## 2020-05-26 LAB — SARS-COV-2 RNA RESP QL NAA+PROBE: NOT DETECTED

## 2020-05-26 NOTE — TELEPHONE ENCOUNTER
Called pt to confirm arrival time of 1115am for procedure on 5-27-20. Gave pt NPO instructions and gave pt opportunity to ask questions. Pt verbalized understanding.

## 2020-05-27 ENCOUNTER — PATIENT MESSAGE (OUTPATIENT)
Dept: PHYSICAL MEDICINE AND REHAB | Facility: CLINIC | Age: 35
End: 2020-05-27

## 2020-05-27 ENCOUNTER — HOSPITAL ENCOUNTER (OUTPATIENT)
Facility: HOSPITAL | Age: 35
Discharge: HOME | End: 2020-05-27
Attending: UROLOGY | Admitting: UROLOGY
Payer: MEDICAID

## 2020-05-27 VITALS
HEIGHT: 69 IN | SYSTOLIC BLOOD PRESSURE: 133 MMHG | HEART RATE: 86 BPM | OXYGEN SATURATION: 96 % | BODY MASS INDEX: 26.66 KG/M2 | RESPIRATION RATE: 20 BRPM | WEIGHT: 180 LBS | TEMPERATURE: 98 F | DIASTOLIC BLOOD PRESSURE: 63 MMHG

## 2020-05-27 DIAGNOSIS — N31.9 NEUROGENIC BLADDER: Primary | ICD-10-CM

## 2020-05-27 PROCEDURE — 52287 CYSTOSCOPY CHEMODENERVATION: CPT | Mod: ,,, | Performed by: UROLOGY

## 2020-05-27 PROCEDURE — 71000015 HC POSTOP RECOV 1ST HR: Performed by: UROLOGY

## 2020-05-27 PROCEDURE — 71000044 HC DOSC ROUTINE RECOVERY FIRST HOUR: Performed by: UROLOGY

## 2020-05-27 PROCEDURE — D9220A PRA ANESTHESIA: Mod: CRNA,,, | Performed by: NURSE ANESTHETIST, CERTIFIED REGISTERED

## 2020-05-27 PROCEDURE — D9220A PRA ANESTHESIA: Mod: ANES,,, | Performed by: ANESTHESIOLOGY

## 2020-05-27 PROCEDURE — 51705 CHANGE OF BLADDER TUBE: CPT | Mod: 51,,, | Performed by: UROLOGY

## 2020-05-27 PROCEDURE — 63600175 PHARM REV CODE 636 W HCPCS: Mod: JG | Performed by: UROLOGY

## 2020-05-27 PROCEDURE — 63600175 PHARM REV CODE 636 W HCPCS: Performed by: NURSE ANESTHETIST, CERTIFIED REGISTERED

## 2020-05-27 PROCEDURE — 52287 PR CYSTOURETHROSCOPY WITH INJ FOR CHEMODENERVATION: ICD-10-PCS | Mod: ,,, | Performed by: UROLOGY

## 2020-05-27 PROCEDURE — C1729 CATH, DRAINAGE: HCPCS | Performed by: UROLOGY

## 2020-05-27 PROCEDURE — 37000008 HC ANESTHESIA 1ST 15 MINUTES: Performed by: UROLOGY

## 2020-05-27 PROCEDURE — D9220A PRA ANESTHESIA: ICD-10-PCS | Mod: CRNA,,, | Performed by: NURSE ANESTHETIST, CERTIFIED REGISTERED

## 2020-05-27 PROCEDURE — 00800 ANES PX LWR ANT ABDL WAL NOS: CPT | Performed by: UROLOGY

## 2020-05-27 PROCEDURE — 25000003 PHARM REV CODE 250: Performed by: NURSE ANESTHETIST, CERTIFIED REGISTERED

## 2020-05-27 PROCEDURE — 51705 PR CHANGE OF BLADDER TUBE,SIMPLE: ICD-10-PCS | Mod: 51,,, | Performed by: UROLOGY

## 2020-05-27 PROCEDURE — 63600175 PHARM REV CODE 636 W HCPCS: Performed by: STUDENT IN AN ORGANIZED HEALTH CARE EDUCATION/TRAINING PROGRAM

## 2020-05-27 PROCEDURE — D9220A PRA ANESTHESIA: ICD-10-PCS | Mod: ANES,,, | Performed by: ANESTHESIOLOGY

## 2020-05-27 PROCEDURE — 37000009 HC ANESTHESIA EA ADD 15 MINS: Performed by: UROLOGY

## 2020-05-27 PROCEDURE — 25000003 PHARM REV CODE 250: Performed by: STUDENT IN AN ORGANIZED HEALTH CARE EDUCATION/TRAINING PROGRAM

## 2020-05-27 PROCEDURE — 36000706: Performed by: UROLOGY

## 2020-05-27 PROCEDURE — 36000707: Performed by: UROLOGY

## 2020-05-27 RX ORDER — SODIUM CHLORIDE 0.9 % (FLUSH) 0.9 %
10 SYRINGE (ML) INJECTION
Status: DISCONTINUED | OUTPATIENT
Start: 2020-05-27 | End: 2020-05-27 | Stop reason: HOSPADM

## 2020-05-27 RX ORDER — PROPOFOL 10 MG/ML
VIAL (ML) INTRAVENOUS
Status: DISCONTINUED | OUTPATIENT
Start: 2020-05-27 | End: 2020-05-27

## 2020-05-27 RX ORDER — CEFDINIR 300 MG/1
300 CAPSULE ORAL 2 TIMES DAILY
Qty: 6 CAPSULE | Refills: 0 | Status: SHIPPED | OUTPATIENT
Start: 2020-05-27 | End: 2020-05-30

## 2020-05-27 RX ORDER — ROCURONIUM BROMIDE 10 MG/ML
INJECTION, SOLUTION INTRAVENOUS
Status: DISCONTINUED | OUTPATIENT
Start: 2020-05-27 | End: 2020-05-27

## 2020-05-27 RX ORDER — VANCOMYCIN HYDROCHLORIDE 500 MG/10ML
INJECTION, POWDER, LYOPHILIZED, FOR SOLUTION INTRAVENOUS
Status: DISCONTINUED
Start: 2020-05-27 | End: 2020-05-27 | Stop reason: HOSPADM

## 2020-05-27 RX ORDER — SODIUM CHLORIDE 9 MG/ML
INJECTION, SOLUTION INTRAVENOUS CONTINUOUS
Status: DISCONTINUED | OUTPATIENT
Start: 2020-05-27 | End: 2020-05-27 | Stop reason: HOSPADM

## 2020-05-27 RX ORDER — CEFTRIAXONE 1 G/1
1 INJECTION, POWDER, FOR SOLUTION INTRAMUSCULAR; INTRAVENOUS
Status: COMPLETED | OUTPATIENT
Start: 2020-05-27 | End: 2020-05-27

## 2020-05-27 RX ORDER — LIDOCAINE HCL/PF 100 MG/5ML
SYRINGE (ML) INTRAVENOUS
Status: DISCONTINUED | OUTPATIENT
Start: 2020-05-27 | End: 2020-05-27

## 2020-05-27 RX ORDER — VANCOMYCIN HCL IN 5 % DEXTROSE 1G/250ML
PLASTIC BAG, INJECTION (ML) INTRAVENOUS
Status: DISCONTINUED
Start: 2020-05-27 | End: 2020-05-27 | Stop reason: HOSPADM

## 2020-05-27 RX ORDER — GLYCOPYRROLATE 0.2 MG/ML
INJECTION INTRAMUSCULAR; INTRAVENOUS
Status: DISCONTINUED | OUTPATIENT
Start: 2020-05-27 | End: 2020-05-27

## 2020-05-27 RX ORDER — MIDAZOLAM HYDROCHLORIDE 5 MG/ML
INJECTION INTRAMUSCULAR; INTRAVENOUS
Status: DISCONTINUED | OUTPATIENT
Start: 2020-05-27 | End: 2020-05-27

## 2020-05-27 RX ORDER — LABETALOL HYDROCHLORIDE 5 MG/ML
INJECTION, SOLUTION INTRAVENOUS
Status: DISCONTINUED | OUTPATIENT
Start: 2020-05-27 | End: 2020-05-27

## 2020-05-27 RX ADMIN — VANCOMYCIN HYDROCHLORIDE 1500 MG: 1 INJECTION, POWDER, LYOPHILIZED, FOR SOLUTION INTRAVENOUS at 01:05

## 2020-05-27 RX ADMIN — CEFTRIAXONE SODIUM 1 G: 1 INJECTION, POWDER, FOR SOLUTION INTRAMUSCULAR; INTRAVENOUS at 01:05

## 2020-05-27 RX ADMIN — LIDOCAINE HYDROCHLORIDE 80 MG: 20 INJECTION, SOLUTION INTRAVENOUS at 12:05

## 2020-05-27 RX ADMIN — MIDAZOLAM 2 MG: 5 INJECTION INTRAMUSCULAR; INTRAVENOUS at 12:05

## 2020-05-27 RX ADMIN — SODIUM CHLORIDE: 0.9 INJECTION, SOLUTION INTRAVENOUS at 12:05

## 2020-05-27 RX ADMIN — GLYCOPYRROLATE 0.2 MG: 0.2 INJECTION, SOLUTION INTRAMUSCULAR; INTRAVENOUS at 12:05

## 2020-05-27 RX ADMIN — SUGAMMADEX 200 MG: 100 INJECTION, SOLUTION INTRAVENOUS at 01:05

## 2020-05-27 RX ADMIN — LABETALOL HYDROCHLORIDE 10 MG: 5 INJECTION INTRAVENOUS at 01:05

## 2020-05-27 RX ADMIN — ROCURONIUM BROMIDE 25 MG: 10 INJECTION, SOLUTION INTRAVENOUS at 12:05

## 2020-05-27 RX ADMIN — PROPOFOL 200 MG: 10 INJECTION, EMULSION INTRAVENOUS at 12:05

## 2020-05-27 NOTE — INTERVAL H&P NOTE
The patient has been examined and the H&P has been reviewed:    I concur with the findings and changes have been noted since the H&P was written:   - Urine culture from 5/6 grew E Coli and Enterobacter. S/p course of Cefdinir 5/11-5/22.    Anesthesia/Surgery risks, benefits and alternative options discussed and understood by patient/family.          Active Hospital Problems    Diagnosis  POA    Neurogenic bladder [N31.9]  Yes     Chronic      Resolved Hospital Problems   No resolved problems to display.

## 2020-05-27 NOTE — ANESTHESIA POSTPROCEDURE EVALUATION
Anesthesia Post Evaluation    Patient: Nghia Edgar Jr.    Procedure(s) Performed: Procedure(s) (LRB):  CYSTOSCOPY,WITH BOTULINUM TOXIN INJECTION 300 units (N/A)    Final Anesthesia Type: general    Patient location during evaluation: PACU  Patient participation: Yes- Able to Participate  Level of consciousness: awake and alert  Post-procedure vital signs: reviewed and stable  Pain management: adequate  Airway patency: patent    PONV status at discharge: No PONV  Anesthetic complications: no      Cardiovascular status: blood pressure returned to baseline  Respiratory status: unassisted  Hydration status: euvolemic  Follow-up not needed.          Vitals Value Taken Time   /63 5/27/2020  2:03 PM   Temp 36.6 °C (97.9 °F) 5/27/2020  2:05 PM   Pulse 77 5/27/2020  2:08 PM   Resp 19 5/27/2020  2:08 PM   SpO2 96 % 5/27/2020  2:08 PM   Vitals shown include unvalidated device data.      No case tracking events are documented in the log.      Pain/Carmina Score: Carmina Score: 9 (5/27/2020  2:05 PM)

## 2020-05-27 NOTE — TRANSFER OF CARE
"Anesthesia Transfer of Care Note    Patient: Nghia Edgar Jr.    Procedure(s) Performed: Procedure(s) (LRB):  CYSTOSCOPY,WITH BOTULINUM TOXIN INJECTION 300 units (N/A)    Patient location: PACU    Anesthesia Type: general    Transport from OR: Transported from OR on 6-10 L/min O2 by face mask with adequate spontaneous ventilation    Post pain: adequate analgesia    Post assessment: no apparent anesthetic complications and tolerated procedure well    Post vital signs: stable    Level of consciousness: sedated    Nausea/Vomiting: no nausea/vomiting    Complications: none    Transfer of care protocol was followed      Last vitals:   Visit Vitals  /61 (BP Location: Left arm, Patient Position: Lying)   Pulse (!) 57   Temp 37 °C (98.6 °F) (Oral)   Resp 18   Ht 5' 9" (1.753 m)   Wt 81.6 kg (180 lb)   SpO2 97%   BMI 26.58 kg/m²     "

## 2020-05-27 NOTE — OP NOTE
[facility]  Operative Note    Date: 05/27/2020    Pre-Op Diagnosis: Neurogenic bladder    Patient Active Problem List   Diagnosis    Neurogenic bladder    Neurogenic bowel    Colostomy status    Paraplegia following spinal cord injury    Bursitis of shoulder    Autonomic dysreflexia    Neuropathic pain    Abnormal EKG    Spina bifida    Abnormal albumin    Urinary urgency    Anemia    Therapeutic opioid induced constipation    Complicated UTI (urinary tract infection)    Elevated troponin    Decubitus ulcer of right ischium, stage 4    Mild intermittent asthma without complication    Quadriplegia following spinal cord injury    Difficulty balancing when sitting    Poor trunk control    Impaired functional mobility, balance, and endurance    Chronic suprapubic catheter    Subcutaneous cyst    Presence of intrathecal baclofen pump       Post-Op Diagnosis: same    Procedure(s) Performed:   1.  Cystoscopy with bladder botox injection  2.  Suprapubic catheter exchange    Specimen(s): none    Staff Surgeon:  Dk Luna MD    Assistant Surgeon: Stanford Rivera MD; Clement Varma MD    Anesthesia: Monitored Local Anesthesia with Sedation    Indications: Nghia Edgar Jr. is a 34 y.o. male with neurogenic bladder who benefits from bladder botox injections.     Findings:   300u in 21cc injected into bladder detrusor  18fr suprapubic catheter exchanged    Estimated Blood Loss: min    Drains: none    Procedure in Detail:  After informed consent was obtained the patient was brought to the cystoscopy suite and placed in the supine position.  SCDs were applied and working.  Anesthesia was administered.  When the patient was adequately sedated she was placed in the dorsal lithotomy position and prepped and draped in the usual sterile fashion.      A rigid cystoscope in a 22 Fr sheath was introduced into the patients's bladder via the urethra.  This passed easily.  Formal cystoscopy  was performed which revealed the ureteral orifices in their normal anatomic position bilaterally.  No bladder masses, trabeculations, stones or diverticuli were seen.    300 units of botox in 21 cc was injected into the detrusor muscle throughout the bladder.  Good wheals were raised.    We removed the patient's indwelling suprapubic catheter. We then prepped the site with betadine and placed a new 18 fr snow cathter through the suprapubic site and placed 10cc in the snow balloon. This was done under direct vision in the bladder. The cystoscope was removed.    The patient tolerated the procedure well and was transferred to the recovery room in stable condition.      Disposition:  The patient will follow up with Dr. Luna in 6 months to discuss repeat Botox.    Stanford Rivera MD

## 2020-05-27 NOTE — DISCHARGE SUMMARY
[facility]  Discharge Note  Short Stay    Admit Date: 5/27/2020    Discharge Date and Time: 05/27/2020 1:26 PM      Attending Physician: Dk Luna MD     Discharge Provider: Stanford Rivera MD    Diagnoses:  Active Hospital Problems    Diagnosis  POA    Neurogenic bladder [N31.9]  Yes     Chronic      Resolved Hospital Problems   No resolved problems to display.       Discharged Condition: good    Hospital Course: Patient was admitted for cystoscopy with bladder botox injections and suprapubic tube exchange and tolerated the procedure well with no complications. The patient was discharged home in good condition on the same day.       Final Diagnoses: Same as principal problem.    Disposition: Home or Self Care    Follow up/Patient Instructions:    Medications:  Reconciled Home Medications:   Current Discharge Medication List      START taking these medications    Details   cefdinir (OMNICEF) 300 MG capsule Take 1 capsule (300 mg total) by mouth 2 (two) times daily. for 3 days  Qty: 6 capsule, Refills: 0         CONTINUE these medications which have NOT CHANGED    Details   albuterol (PROAIR HFA) 90 mcg/actuation inhaler Inhale 1-2 puffs into the lungs every 6 (six) hours as needed for Wheezing or Shortness of Breath.  Qty: 18 g, Refills: 3      albuterol-ipratropium (DUO-NEB) 2.5 mg-0.5 mg/3 mL nebulizer solution Take 3 mLs by nebulization every 6 (six) hours as needed for Wheezing (cough). Rescue  Qty: 1 Box, Refills: 6      budesonide 180mcg (PULMICORT 180MCG) 180 mcg/actuation AePB Inhale 1 puff into the lungs 2 (two) times daily. Controller  Qty: 1 each, Refills: 3    Associated Diagnoses: Mild intermittent asthma without complication      diazePAM (VALIUM) 10 MG Tab Take 1 tablet (10 mg total) by mouth 3 (three) times daily as needed.  Qty: 90 tablet, Refills: 5    Associated Diagnoses: Anxiety      Lactobac 40-Bifido 3-S.thermop (PROBIOTIC) 100 billion cell Cap Take 1 capsule every  morning.  Qty: 30 capsule, Refills: 10    Comments: Ok to substitute with what you have available.  Associated Diagnoses: Bacterial UTI; Recurrent UTI; Bladder spasms      loratadine (CLARITIN) 10 mg tablet TAKE 1 TABLET BY MOUTH EVERY DAY  Qty: 30 tablet, Refills: 3      oxyCODONE (OXYCONTIN) 40 mg 12 hr tablet Take 1 tablet (40 mg total) by mouth every 12 (twelve) hours.  Qty: 60 tablet, Refills: 0    Comments: Medically necessary for > 7 days due to chronic pain.  Dr. Luna for Dr. Davis.  Associated Diagnoses: Neuropathic pain      oxyCODONE-acetaminophen (PERCOCET)  mg per tablet Take 1-1/2 tablets (15mg) TID PRN (pain) ICD-10: M79.2  Qty: 135 tablet, Refills: 0    Comments: Quantity prescribed more than 7 day supply? Yes, quantity medically necessary  Associated Diagnoses: Neuropathic pain      potassium citrate (UROCIT-K) 10 mEq (1,080 mg) TbSR Take 10 mEq by mouth 3 (three) times daily.       pregabalin (LYRICA) 200 MG Cap TAKE 1 CAPSULE 3 TIMES A DAY  Qty: 90 capsule, Refills: 6    Comments: This request is for a new prescription for a controlled substance as required by Federal/State law.  More than 7 day supply and Medically Necessary.  Associated Diagnoses: Pain      promethazine (PHENERGAN) 6.25 mg/5 mL syrup TAKE 1 TEASPOONFUL AT BEDTIME AS NEEDED FOR COUGH  Qty: 150 mL, Refills: 3      ascorbic acid, vitamin C, (VITAMIN C) 1000 MG tablet Take 1,000 mg by mouth every morning.       aspirin (ECOTRIN) 81 MG EC tablet Take 81 mg by mouth once daily.       colostomy bags Misc Change up to three times daily as needed  Qty: 90 each, Refills: 11    Comments: Holister 1 piece pouch. Please also dispense paste and skin protector.  Associated Diagnoses: Colostomy in place      multivitamin capsule Take 1 capsule by mouth every morning.           Discharge Procedure Orders   Diet Adult Regular     Notify your health care provider if you experience any of the following:  temperature >100.4     Notify  your health care provider if you experience any of the following:  persistent nausea and vomiting or diarrhea     Notify your health care provider if you experience any of the following:  severe uncontrolled pain     Notify your health care provider if you experience any of the following:  difficulty breathing or increased cough     Notify your health care provider if you experience any of the following:  severe persistent headache     Notify your health care provider if you experience any of the following:  worsening rash     Notify your health care provider if you experience any of the following:  persistent dizziness, light-headedness, or visual disturbances     Notify your health care provider if you experience any of the following:  increased confusion or weakness     Activity as tolerated     Follow-up Information     Dk Luna MD In 6 months.    Specialty:  Urology  Why:  Follow-up to discuss repeat Botox.  Contact information:  Mary[address]en WANG  [facility] [address] [phone]
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
## 2020-05-28 RX ORDER — OXYCODONE AND ACETAMINOPHEN 10; 325 MG/1; MG/1
TABLET ORAL
Qty: 135 TABLET | Refills: 0 | Status: SHIPPED | OUTPATIENT
Start: 2020-05-28 | End: 2020-06-26 | Stop reason: SDUPTHER

## 2020-05-28 RX ORDER — OXYCODONE AND ACETAMINOPHEN 10; 325 MG/1; MG/1
TABLET ORAL
Qty: 135 TABLET | Refills: 0 | Status: CANCELLED | OUTPATIENT
Start: 2020-05-28 | End: 2020-06-11

## 2020-06-09 ENCOUNTER — NURSE TRIAGE (OUTPATIENT)
Dept: ADMINISTRATIVE | Facility: CLINIC | Age: 35
End: 2020-06-09

## 2020-06-09 NOTE — TELEPHONE ENCOUNTER
Patient's mother contacted through Post Procedural Symptom Tracking. Denies any cough, fever, or difficulty breathing since procedure.  Patient's mother instructed to call OOC or contact provider with any changes of condition or concerns.         Reason for Disposition   Health Information question, no triage required and triager able to answer question    Additional Information   Negative: [1] Caller is not with the adult (patient) AND [2] reporting urgent symptoms   Negative: Lab result questions   Negative: Medication questions   Negative: Caller can't be reached by phone   Negative: Caller has already spoken to PCP or another triager   Negative: RN needs further essential information from caller in order to complete triage   Negative: Requesting regular office appointment   Negative: [1] Caller requesting NON-URGENT health information AND [2] PCP's office is the best resource    Protocols used: INFORMATION ONLY CALL-A-

## 2020-06-15 DIAGNOSIS — R52 PAIN: ICD-10-CM

## 2020-06-16 RX ORDER — PREGABALIN 200 MG/1
CAPSULE ORAL
Qty: 90 CAPSULE | Refills: 6 | Status: SHIPPED | OUTPATIENT
Start: 2020-06-16 | End: 2021-10-08 | Stop reason: SDUPTHER

## 2020-06-17 DIAGNOSIS — M79.2 NEUROPATHIC PAIN: Chronic | ICD-10-CM

## 2020-06-17 RX ORDER — OXYCODONE HCL 40 MG/1
40 TABLET, FILM COATED, EXTENDED RELEASE ORAL EVERY 12 HOURS
Qty: 60 TABLET | Refills: 0 | Status: ON HOLD | OUTPATIENT
Start: 2020-06-17 | End: 2020-07-20 | Stop reason: SDUPTHER

## 2020-06-26 DIAGNOSIS — M79.2 NEUROPATHIC PAIN: Chronic | ICD-10-CM

## 2020-06-29 RX ORDER — OXYCODONE AND ACETAMINOPHEN 10; 325 MG/1; MG/1
TABLET ORAL
Qty: 135 TABLET | Refills: 0 | Status: ON HOLD | OUTPATIENT
Start: 2020-06-29 | End: 2020-07-31 | Stop reason: SDUPTHER

## 2020-06-30 ENCOUNTER — TELEPHONE (OUTPATIENT)
Dept: NEUROSURGERY | Facility: CLINIC | Age: 35
End: 2020-06-30

## 2020-06-30 DIAGNOSIS — R25.2 SPASTICITY: Primary | ICD-10-CM

## 2020-07-01 ENCOUNTER — TELEPHONE (OUTPATIENT)
Dept: NEUROSURGERY | Facility: CLINIC | Age: 35
End: 2020-07-01

## 2020-07-01 DIAGNOSIS — Z01.818 PRE-OP TESTING: Primary | ICD-10-CM

## 2020-07-01 DIAGNOSIS — Z78.9 KNOWN HEALTH PROBLEMS: NONE: ICD-10-CM

## 2020-07-01 NOTE — TELEPHONE ENCOUNTER
----- Message from Michelle Cunha RN sent at 7/1/2020  8:52 AM CDT -----  Patient was added too late to surgery schedule to triage.  Thanks!

## 2020-07-03 ENCOUNTER — PATIENT MESSAGE (OUTPATIENT)
Dept: UROLOGY | Facility: CLINIC | Age: 35
End: 2020-07-03

## 2020-07-03 DIAGNOSIS — N39.0 BACTERIAL UTI: Primary | ICD-10-CM

## 2020-07-03 DIAGNOSIS — A49.9 BACTERIAL UTI: Primary | ICD-10-CM

## 2020-07-06 ENCOUNTER — LAB VISIT (OUTPATIENT)
Dept: SURGERY | Facility: CLINIC | Age: 35
End: 2020-07-06
Payer: MEDICAID

## 2020-07-06 DIAGNOSIS — Z78.9 KNOWN HEALTH PROBLEMS: NONE: ICD-10-CM

## 2020-07-06 DIAGNOSIS — Z01.818 PRE-OP TESTING: ICD-10-CM

## 2020-07-06 LAB — SARS-COV-2 RNA RESP QL NAA+PROBE: NOT DETECTED

## 2020-07-06 PROCEDURE — U0003 INFECTIOUS AGENT DETECTION BY NUCLEIC ACID (DNA OR RNA); SEVERE ACUTE RESPIRATORY SYNDROME CORONAVIRUS 2 (SARS-COV-2) (CORONAVIRUS DISEASE [COVID-19]), AMPLIFIED PROBE TECHNIQUE, MAKING USE OF HIGH THROUGHPUT TECHNOLOGIES AS DESCRIBED BY CMS-2020-01-R: HCPCS

## 2020-07-06 RX ORDER — CEFDINIR 300 MG/1
300 CAPSULE ORAL 2 TIMES DAILY
Qty: 14 CAPSULE | Refills: 0 | Status: SHIPPED | OUTPATIENT
Start: 2020-07-06 | End: 2020-10-20

## 2020-07-07 ENCOUNTER — ANESTHESIA EVENT (OUTPATIENT)
Dept: SURGERY | Facility: HOSPITAL | Age: 35
End: 2020-07-07
Payer: MEDICAID

## 2020-07-07 ENCOUNTER — TELEPHONE (OUTPATIENT)
Dept: NEUROSURGERY | Facility: CLINIC | Age: 35
End: 2020-07-07

## 2020-07-07 NOTE — TELEPHONE ENCOUNTER
I attempted to contact . Unfortunately the cell number listed was busy. I was unable to LM I then contacted the home number listed and Left a detailed message stating to arrive for 11am  2nd floor dosc with pre op instructions.

## 2020-07-08 ENCOUNTER — ANESTHESIA (OUTPATIENT)
Dept: SURGERY | Facility: HOSPITAL | Age: 35
End: 2020-07-08
Payer: COMMERCIAL

## 2020-07-08 ENCOUNTER — HOSPITAL ENCOUNTER (OUTPATIENT)
Facility: HOSPITAL | Age: 35
Discharge: HOME | End: 2020-07-08
Attending: NEUROLOGICAL SURGERY | Admitting: NEUROLOGICAL SURGERY
Payer: MEDICAID

## 2020-07-08 VITALS
DIASTOLIC BLOOD PRESSURE: 76 MMHG | TEMPERATURE: 98 F | SYSTOLIC BLOOD PRESSURE: 132 MMHG | BODY MASS INDEX: 26.66 KG/M2 | HEART RATE: 53 BPM | HEIGHT: 69 IN | WEIGHT: 180 LBS | OXYGEN SATURATION: 97 % | RESPIRATION RATE: 15 BRPM

## 2020-07-08 DIAGNOSIS — Z98.890 S/P INSERTION OF INTRATHECAL PUMP: Primary | ICD-10-CM

## 2020-07-08 LAB
ABO + RH BLD: NORMAL
ANION GAP SERPL CALC-SCNC: 9 MMOL/L (ref 8–16)
APTT BLDCRRT: 28.6 SEC (ref 21–32)
BACTERIA #/AREA URNS AUTO: NORMAL /HPF
BILIRUB UR QL STRIP: NEGATIVE
BLD GP AB SCN CELLS X3 SERPL QL: NORMAL
BUN SERPL-MCNC: 8 MG/DL (ref 6–20)
CALCIUM SERPL-MCNC: 8.8 MG/DL (ref 8.7–10.5)
CHLORIDE SERPL-SCNC: 105 MMOL/L (ref 95–110)
CLARITY UR REFRACT.AUTO: CLEAR
CO2 SERPL-SCNC: 25 MMOL/L (ref 23–29)
COLOR UR AUTO: YELLOW
CREAT SERPL-MCNC: 0.5 MG/DL (ref 0.5–1.4)
EST. GFR  (AFRICAN AMERICAN): >60 ML/MIN/1.73 M^2
EST. GFR  (NON AFRICAN AMERICAN): >60 ML/MIN/1.73 M^2
GLUCOSE SERPL-MCNC: 81 MG/DL (ref 70–110)
GLUCOSE UR QL STRIP: NEGATIVE
HGB UR QL STRIP: NEGATIVE
INR PPP: 1 (ref 0.8–1.2)
KETONES UR QL STRIP: NEGATIVE
LEUKOCYTE ESTERASE UR QL STRIP: ABNORMAL
MICROSCOPIC COMMENT: NORMAL
NITRITE UR QL STRIP: NEGATIVE
PH UR STRIP: 7 [PH] (ref 5–8)
POTASSIUM SERPL-SCNC: 4.1 MMOL/L (ref 3.5–5.1)
PROT UR QL STRIP: NEGATIVE
PROTHROMBIN TIME: 10.7 SEC (ref 9–12.5)
RBC #/AREA URNS AUTO: 1 /HPF (ref 0–4)
SODIUM SERPL-SCNC: 139 MMOL/L (ref 136–145)
SP GR UR STRIP: 1.01 (ref 1–1.03)
URN SPEC COLLECT METH UR: ABNORMAL
WBC #/AREA URNS AUTO: 4 /HPF (ref 0–5)

## 2020-07-08 PROCEDURE — C1772 INFUSION PUMP, PROGRAMMABLE: HCPCS | Performed by: NEUROLOGICAL SURGERY

## 2020-07-08 PROCEDURE — 81001 URINALYSIS AUTO W/SCOPE: CPT

## 2020-07-08 PROCEDURE — 25000003 PHARM REV CODE 250: Performed by: NEUROLOGICAL SURGERY

## 2020-07-08 PROCEDURE — 63600175 PHARM REV CODE 636 W HCPCS: Performed by: ANESTHESIOLOGY

## 2020-07-08 PROCEDURE — 63600175 PHARM REV CODE 636 W HCPCS: Mod: JG | Performed by: NEUROLOGICAL SURGERY

## 2020-07-08 PROCEDURE — 36000706: Performed by: NEUROLOGICAL SURGERY

## 2020-07-08 PROCEDURE — 62362 PR INSERT/ REPLACE INFUSN PUMP,PROGRAMMABLE: ICD-10-PCS | Mod: ,,, | Performed by: NEUROLOGICAL SURGERY

## 2020-07-08 PROCEDURE — 25000003 PHARM REV CODE 250: Performed by: STUDENT IN AN ORGANIZED HEALTH CARE EDUCATION/TRAINING PROGRAM

## 2020-07-08 PROCEDURE — 00300 ANES ALL PX INTEG H/N/PTRUNK: CPT | Performed by: NEUROLOGICAL SURGERY

## 2020-07-08 PROCEDURE — S0028 INJECTION, FAMOTIDINE, 20 MG: HCPCS | Performed by: NURSE ANESTHETIST, CERTIFIED REGISTERED

## 2020-07-08 PROCEDURE — D9220A PRA ANESTHESIA: ICD-10-PCS | Mod: ,,, | Performed by: ANESTHESIOLOGY

## 2020-07-08 PROCEDURE — 36000707: Performed by: NEUROLOGICAL SURGERY

## 2020-07-08 PROCEDURE — 80048 BASIC METABOLIC PNL TOTAL CA: CPT

## 2020-07-08 PROCEDURE — 85730 THROMBOPLASTIN TIME PARTIAL: CPT

## 2020-07-08 PROCEDURE — 71000016 HC POSTOP RECOV ADDL HR: Performed by: NEUROLOGICAL SURGERY

## 2020-07-08 PROCEDURE — 63600175 PHARM REV CODE 636 W HCPCS: Performed by: NURSE ANESTHETIST, CERTIFIED REGISTERED

## 2020-07-08 PROCEDURE — 85610 PROTHROMBIN TIME: CPT

## 2020-07-08 PROCEDURE — 25000003 PHARM REV CODE 250: Performed by: NURSE ANESTHETIST, CERTIFIED REGISTERED

## 2020-07-08 PROCEDURE — 71000033 HC RECOVERY, INTIAL HOUR: Performed by: NEUROLOGICAL SURGERY

## 2020-07-08 PROCEDURE — 94761 N-INVAS EAR/PLS OXIMETRY MLT: CPT | Mod: 59

## 2020-07-08 PROCEDURE — 63600175 PHARM REV CODE 636 W HCPCS: Performed by: STUDENT IN AN ORGANIZED HEALTH CARE EDUCATION/TRAINING PROGRAM

## 2020-07-08 PROCEDURE — 37000009 HC ANESTHESIA EA ADD 15 MINS: Performed by: NEUROLOGICAL SURGERY

## 2020-07-08 PROCEDURE — 62362 IMPLANT SPINE INFUSION PUMP: CPT | Mod: ,,, | Performed by: NEUROLOGICAL SURGERY

## 2020-07-08 PROCEDURE — 71000015 HC POSTOP RECOV 1ST HR: Performed by: NEUROLOGICAL SURGERY

## 2020-07-08 PROCEDURE — 37000008 HC ANESTHESIA 1ST 15 MINUTES: Performed by: NEUROLOGICAL SURGERY

## 2020-07-08 PROCEDURE — 86901 BLOOD TYPING SEROLOGIC RH(D): CPT

## 2020-07-08 PROCEDURE — D9220A PRA ANESTHESIA: Mod: ,,, | Performed by: ANESTHESIOLOGY

## 2020-07-08 DEVICE — PUMP PAIN CNTRL INF 40ML: Type: IMPLANTABLE DEVICE | Site: ABDOMEN | Status: FUNCTIONAL

## 2020-07-08 RX ORDER — ONDANSETRON 2 MG/ML
INJECTION INTRAMUSCULAR; INTRAVENOUS
Status: DISCONTINUED | OUTPATIENT
Start: 2020-07-08 | End: 2020-07-08

## 2020-07-08 RX ORDER — CEPHALEXIN 500 MG/1
500 CAPSULE ORAL EVERY 6 HOURS
Qty: 20 CAPSULE | Refills: 0 | Status: SHIPPED | OUTPATIENT
Start: 2020-07-08 | End: 2020-09-21 | Stop reason: ALTCHOICE

## 2020-07-08 RX ORDER — BACITRACIN 50000 [IU]/1
INJECTION, POWDER, FOR SOLUTION INTRAMUSCULAR
Status: DISCONTINUED | OUTPATIENT
Start: 2020-07-08 | End: 2020-07-08 | Stop reason: HOSPADM

## 2020-07-08 RX ORDER — HYDROMORPHONE HYDROCHLORIDE 1 MG/ML
0.2 INJECTION, SOLUTION INTRAMUSCULAR; INTRAVENOUS; SUBCUTANEOUS EVERY 5 MIN PRN
Status: COMPLETED | OUTPATIENT
Start: 2020-07-08 | End: 2020-07-08

## 2020-07-08 RX ORDER — LIDOCAINE HYDROCHLORIDE 20 MG/ML
INJECTION INTRAVENOUS
Status: DISCONTINUED | OUTPATIENT
Start: 2020-07-08 | End: 2020-07-08

## 2020-07-08 RX ORDER — FAMOTIDINE 10 MG/ML
INJECTION INTRAVENOUS
Status: DISCONTINUED | OUTPATIENT
Start: 2020-07-08 | End: 2020-07-08

## 2020-07-08 RX ORDER — HYDROCODONE BITARTRATE AND ACETAMINOPHEN 5; 325 MG/1; MG/1
1 TABLET ORAL EVERY 6 HOURS PRN
Qty: 40 TABLET | Refills: 0 | Status: SHIPPED | OUTPATIENT
Start: 2020-07-08 | End: 2020-11-20

## 2020-07-08 RX ORDER — PROPOFOL 10 MG/ML
VIAL (ML) INTRAVENOUS
Status: DISCONTINUED | OUTPATIENT
Start: 2020-07-08 | End: 2020-07-08

## 2020-07-08 RX ORDER — SODIUM CHLORIDE 0.9 % (FLUSH) 0.9 %
3 SYRINGE (ML) INJECTION
Status: DISCONTINUED | OUTPATIENT
Start: 2020-07-08 | End: 2020-08-03 | Stop reason: HOSPADM

## 2020-07-08 RX ORDER — MUPIROCIN 20 MG/G
1 OINTMENT TOPICAL 2 TIMES DAILY
Status: DISCONTINUED | OUTPATIENT
Start: 2020-07-08 | End: 2020-07-09 | Stop reason: HOSPADM

## 2020-07-08 RX ORDER — MIDAZOLAM HYDROCHLORIDE 1 MG/ML
INJECTION, SOLUTION INTRAMUSCULAR; INTRAVENOUS
Status: DISCONTINUED | OUTPATIENT
Start: 2020-07-08 | End: 2020-07-08

## 2020-07-08 RX ORDER — LIDOCAINE HYDROCHLORIDE AND EPINEPHRINE 15; 5 MG/ML; UG/ML
INJECTION, SOLUTION EPIDURAL
Status: DISCONTINUED | OUTPATIENT
Start: 2020-07-08 | End: 2020-07-08 | Stop reason: HOSPADM

## 2020-07-08 RX ORDER — GLYCOPYRROLATE 0.2 MG/ML
INJECTION INTRAMUSCULAR; INTRAVENOUS
Status: DISCONTINUED | OUTPATIENT
Start: 2020-07-08 | End: 2020-07-08

## 2020-07-08 RX ORDER — CEFAZOLIN SODIUM 1 G/3ML
2 INJECTION, POWDER, FOR SOLUTION INTRAMUSCULAR; INTRAVENOUS
Status: COMPLETED | OUTPATIENT
Start: 2020-07-08 | End: 2020-07-08

## 2020-07-08 RX ORDER — HYDROCODONE BITARTRATE AND ACETAMINOPHEN 5; 325 MG/1; MG/1
1 TABLET ORAL EVERY 4 HOURS PRN
Status: DISCONTINUED | OUTPATIENT
Start: 2020-07-08 | End: 2020-08-03 | Stop reason: HOSPADM

## 2020-07-08 RX ORDER — MUPIROCIN 20 MG/G
OINTMENT TOPICAL
Status: DISCONTINUED | OUTPATIENT
Start: 2020-07-08 | End: 2020-08-03 | Stop reason: HOSPADM

## 2020-07-08 RX ORDER — BACITRACIN ZINC 500 UNIT/G
OINTMENT (GRAM) TOPICAL
Status: DISCONTINUED | OUTPATIENT
Start: 2020-07-08 | End: 2020-07-08 | Stop reason: HOSPADM

## 2020-07-08 RX ORDER — EPHEDRINE SULFATE 50 MG/ML
INJECTION, SOLUTION INTRAVENOUS
Status: DISCONTINUED | OUTPATIENT
Start: 2020-07-08 | End: 2020-07-08

## 2020-07-08 RX ORDER — SODIUM CHLORIDE 9 MG/ML
INJECTION, SOLUTION INTRAVENOUS CONTINUOUS
Status: DISCONTINUED | OUTPATIENT
Start: 2020-07-08 | End: 2020-08-03 | Stop reason: HOSPADM

## 2020-07-08 RX ORDER — FENTANYL CITRATE 50 UG/ML
INJECTION, SOLUTION INTRAMUSCULAR; INTRAVENOUS
Status: DISCONTINUED | OUTPATIENT
Start: 2020-07-08 | End: 2020-07-08

## 2020-07-08 RX ORDER — DEXAMETHASONE SODIUM PHOSPHATE 4 MG/ML
INJECTION, SOLUTION INTRA-ARTICULAR; INTRALESIONAL; INTRAMUSCULAR; INTRAVENOUS; SOFT TISSUE
Status: DISCONTINUED | OUTPATIENT
Start: 2020-07-08 | End: 2020-07-08

## 2020-07-08 RX ADMIN — FENTANYL CITRATE 50 MCG: 50 INJECTION, SOLUTION INTRAMUSCULAR; INTRAVENOUS at 04:07

## 2020-07-08 RX ADMIN — CEFAZOLIN 2 G: 330 INJECTION, POWDER, FOR SOLUTION INTRAMUSCULAR; INTRAVENOUS at 04:07

## 2020-07-08 RX ADMIN — HYDROMORPHONE HYDROCHLORIDE 0.2 MG: 1 INJECTION, SOLUTION INTRAMUSCULAR; INTRAVENOUS; SUBCUTANEOUS at 06:07

## 2020-07-08 RX ADMIN — ONDANSETRON 4 MG: 2 INJECTION, SOLUTION INTRAMUSCULAR; INTRAVENOUS at 04:07

## 2020-07-08 RX ADMIN — HYDROMORPHONE HYDROCHLORIDE 0.2 MG: 1 INJECTION, SOLUTION INTRAMUSCULAR; INTRAVENOUS; SUBCUTANEOUS at 07:07

## 2020-07-08 RX ADMIN — EPHEDRINE SULFATE 10 MG: 50 INJECTION INTRAVENOUS at 04:07

## 2020-07-08 RX ADMIN — FENTANYL CITRATE 25 MCG: 50 INJECTION, SOLUTION INTRAMUSCULAR; INTRAVENOUS at 04:07

## 2020-07-08 RX ADMIN — FAMOTIDINE 20 MG: 10 INJECTION, SOLUTION INTRAVENOUS at 03:07

## 2020-07-08 RX ADMIN — MIDAZOLAM HYDROCHLORIDE 2 MG: 1 INJECTION, SOLUTION INTRAMUSCULAR; INTRAVENOUS at 03:07

## 2020-07-08 RX ADMIN — DEXAMETHASONE SODIUM PHOSPHATE 4 MG: 4 INJECTION, SOLUTION INTRAMUSCULAR; INTRAVENOUS at 04:07

## 2020-07-08 RX ADMIN — GLYCOPYRROLATE 0.2 MG: 0.2 INJECTION, SOLUTION INTRAMUSCULAR; INTRAVENOUS at 03:07

## 2020-07-08 RX ADMIN — BACLOFEN 80 MG: 2 INJECTION INTRATHECAL at 04:07

## 2020-07-08 RX ADMIN — SODIUM CHLORIDE: 0.9 INJECTION, SOLUTION INTRAVENOUS at 03:07

## 2020-07-08 RX ADMIN — LIDOCAINE HYDROCHLORIDE 100 MG: 20 INJECTION, SOLUTION INTRAVENOUS at 04:07

## 2020-07-08 RX ADMIN — GLYCOPYRROLATE 0.2 MG: 0.2 INJECTION, SOLUTION INTRAMUSCULAR; INTRAVENOUS at 04:07

## 2020-07-08 RX ADMIN — PROPOFOL 200 MG: 10 INJECTION, EMULSION INTRAVENOUS at 04:07

## 2020-07-08 RX ADMIN — HYDROCODONE BITARTRATE AND ACETAMINOPHEN 1 TABLET: 5; 325 TABLET ORAL at 06:07
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
## 2020-07-08 NOTE — TRANSFER OF CARE
"Anesthesia Transfer of Care Note    Patient: Nghia Edgar Jr.    Procedure(s) Performed: Procedure(s) (LRB):  REPLACEMENT, BACLOFEN PUMP RIGHT (Right)    Patient location: PACU    Anesthesia Type: general    Transport from OR: Transported from OR on 6-10 L/min O2 by face mask with adequate spontaneous ventilation    Post pain: adequate analgesia    Post assessment: no apparent anesthetic complications and tolerated procedure well    Post vital signs: stable    Level of consciousness: awake    Nausea/Vomiting: no nausea/vomiting    Complications: none    Transfer of care protocol was followed      Last vitals:   Visit Vitals  /69 (BP Location: Left arm, Patient Position: Lying)   Pulse (!) 53   Temp 36.7 °C (98 °F) (Oral)   Resp 16   Ht 5' 9" (1.753 m)   Wt 81.6 kg (180 lb)   SpO2 98%   BMI 26.58 kg/m²     "

## 2020-07-08 NOTE — BRIEF OP NOTE
[facility]  Brief Operative Note  Neurosurgery    SUMMARY     Surgery Date: 7/8/2020     Surgeon(s) and Role:     * Cheryl Encarnacion MD - Primary     * Eliud Navarro MD - Resident - Assisting        Pre-op Diagnosis:  Spasticity [R25.2]    Post-op Diagnosis: Post-Op Diagnosis Codes:     * Spasticity [R25.2]    Procedure Performed:   Baclofen Pump replacement    Procedure(s) (LRB):  REPLACEMENT, BACLOFEN PUMP RIGHT (Right)    Technical Procedures Used:   Right abdominal incision, re-opening of prior pocket, removal of prior baclofen pump, noted CSF egress from the new pump once connected, internal closure.     Description of the findings of the procedure: see full op note    Estimated Blood Loss: 25cc         Specimens:   Specimen (12h ago, onward)    None

## 2020-07-08 NOTE — CARE UPDATE
Neurosurgery postop check note:      The patient was seen at bedside following the procedure. The patient was still waking up from anesthesia but grossly at his neurological baseline, pain was reasonably controlled in the post-operative period, and all questions and/or complaints were directly addressed at bedside by the NSGY resident. All post-operative management orders have been placed and the patient will be going home shortly.    MD MICA BarnettGY

## 2020-07-08 NOTE — ANESTHESIA POSTPROCEDURE EVALUATION
Anesthesia Post Evaluation    Patient: Nghia Edgar Jr.    Procedure(s) Performed: Procedure(s) (LRB):  REPLACEMENT, BACLOFEN PUMP RIGHT (Right)    Final Anesthesia Type: general    Patient location during evaluation: PACU  Patient participation: Yes- Able to Participate  Level of consciousness: awake and alert  Post-procedure vital signs: reviewed and stable  Pain management: adequate  Airway patency: patent    PONV status at discharge: No PONV  Anesthetic complications: no      Cardiovascular status: stable  Respiratory status: spontaneous ventilation and room air  Hydration status: euvolemic  Follow-up not needed.          Vitals Value Taken Time   /68 07/08/20 1834   Temp 36.3 °C (97.4 °F) 07/08/20 1730   Pulse 50 07/08/20 1851   Resp 9 07/08/20 1851   SpO2 97 % 07/08/20 1851   Vitals shown include unvalidated device data.      No case tracking events are documented in the log.      Pain/Carmina Score: Pain Rating Prior to Med Admin: 7 (7/8/2020  6:02 PM)  Carmina Score: 9 (7/8/2020  6:00 PM)

## 2020-07-08 NOTE — PROGRESS NOTES
Report received on patient, care assumed, patient in stretcher, family at bedside, c/o being cold, warm blanket provided, pt denies any other needs at this time, nad noted, will continue to monitor.
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
## 2020-07-09 NOTE — DISCHARGE INSTRUCTIONS
Wound care:  Keep incisions dry. Do not soak under water (bathtub, swimming pool, etc.). Please shower with baby shampoo, but do not take a bath. If the incision becomes wet, gently pat it dry with a clean towel; do not rub.      Please apply bacitracin ointment to incisions twice daily. You have dissolvable suture in place. It does not need to be removed.    Other post-op instructions:    No lifting anything heavier than a gallon of milk until cleared in post-operative visit.    No driving until cleared in your post-operative appointment. No driving while on narcotics.    Maintain abdominal binder in place for 2 weeks, it is OK to take off while showering.       You will be discharged with 5 days of Oral Antibiotics: Keflex 500, 4 times daily. You now have an implanted device in place. It is imperative that any infection (such as a urinary tract infection) be treated immediately so that it cannot get into your bloodstream. If an infection ends up in your blood, it may infect the device, thus requiring us to remove it.

## 2020-07-09 NOTE — NURSING TRANSFER
Nursing Transfer Note      7/8/2020     Transfer To: [facility]    Transfer via wheelchair    Transfer with pt belongings, paper prescription, filled antibiotic prescription, dc education papers    Transported by pct    Notified: girlfriend c pt    Patient reassessed at: 7/8/20 @ 2000
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
## 2020-07-20 DIAGNOSIS — M79.2 NEUROPATHIC PAIN: Chronic | ICD-10-CM

## 2020-07-20 RX ORDER — OXYCODONE HCL 40 MG/1
40 TABLET, FILM COATED, EXTENDED RELEASE ORAL EVERY 12 HOURS
Qty: 60 TABLET | Refills: 0 | Status: SHIPPED | OUTPATIENT
Start: 2020-07-20 | End: 2020-07-20 | Stop reason: SDUPTHER

## 2020-07-20 RX ORDER — OXYCODONE HCL 40 MG/1
40 TABLET, FILM COATED, EXTENDED RELEASE ORAL EVERY 12 HOURS
Qty: 60 TABLET | Refills: 0 | Status: SHIPPED | OUTPATIENT
Start: 2020-07-20 | End: 2020-08-18 | Stop reason: SDUPTHER

## 2020-07-21 ENCOUNTER — CLINICAL SUPPORT (OUTPATIENT)
Dept: NEUROSURGERY | Facility: CLINIC | Age: 35
End: 2020-07-21
Payer: MEDICAID

## 2020-07-21 NOTE — PROGRESS NOTES
2 week PO   Wound Check Virtual Visit     Mr Edgar is a pleasant 35 y/o male who underwent a Baclofen Pump Replacment on 7/8/20 by Dr Encarnacion(For complete diagnosis and procedure, see OP note.) Attending virtual visit today for 2 week post-op wound check.     Patient is wheelchair bound due to paralysis     Patient is alert and oriented x3, speech normal in context and clarity, memory intact grossly, thought content appropriate, denies fevers, chills, night sweats, N/V, constipation, bladder issues, numbness, tingling.       Patient rates current pain 6/10 at surgical incision site    Abdominal surgical incision assessed via video, C/D/I. Edges well approximated. No swelling, fluctuance, redness, drainage or other s/s infection observed. Patient instructed incision care, s/s of infection.        Patient instructed infection prevention use of incentive spirometer, adequate diet and fluid intake, Perform hand hygiene frequently, wash hands with soap and water for at least 20 sec and/or use alcohol-based hand , covering all surfaces of your hands and rub them together until they feel dry, avoid touching your eyes, ears, nose and mouth with unwashed hands, avoid crowds, practice social distancing, wear mask when outside of home, avoid close contact with sick people and stay home if you are sick,except to get medical care      Patient instructed and given written copy of the following information:                 ACTIVITY RESTRICTIONS:   No lifting greater than 10 pounds     Avoid bending and twisting the area of your surgery more than 45 degrees from neutral position in any direction.      No driving or operating machinery: Until cleared by your surgeon and/or while taking narcotic pain medications or muscle relaxants      Benefits of physical activity at this stage of recovery. Increase activity as tolerated     No sexual activity for 2-3 weeks.      MEDICATION/FOLLOW UP:   For constipation take Docusate (Colace  100 mg), one capsule, daily as needed.  If Colace is ineffective, take MiraLAX as instructed on the directions label. Both available over the counter.     Begin weaning narcotic pain medicine use as soon as possible after surgery. Appropriate use of pain medications. Surgeons will not refill narcotics after 2 months post-op.  Physicians need 3 days notice for pain medication to be refilled. Pain medication will only be refilled between 8AM and 5PM, Monday through Friday, due to FDA regulation of documentation.        No use of tobacco products, or any products containing nicotine.       WOUND CARE:      You may stop using Bacitracin ointment. Keep your incision open to air.       You may continue to shower. Have the force of the water hit you opposite of the incision. Do not scrub incision. Pat incision dry after showering.       You cannot submerge incision in water (no baths, swimming, or hot tubs) until 8 weeks after surgery, or until skin edges have completely healed.      Importance and type of nutrition necessary for proper healing.     Call your doctor or go to the Emergency Room for any signs of infection, including increased redness, drainage, pain, fever (temperature ? 101.5 for 24 hours), night sweats, chills, any localized neurological changes, problems with speech, vision, numbness, tingling, weakness, severe headaches, or for other concerns.       Follow-up appts instructed and printed reminder mailed to patient    All questions answered. Patient verbalized understanding of all instructions. Patient encouraged to call office with any additional concerns.      Disha Perea RN  Neurosurgery

## 2020-07-22 NOTE — TELEPHONE ENCOUNTER
i reprinted his board order and found the ostomy supplies , they have been refaxed   Detail Level: Detailed

## 2020-07-29 DIAGNOSIS — M79.2 NEUROPATHIC PAIN: Chronic | ICD-10-CM

## 2020-07-31 ENCOUNTER — OFFICE VISIT (OUTPATIENT)
Dept: UROLOGY | Facility: CLINIC | Age: 35
End: 2020-07-31
Payer: MEDICAID

## 2020-07-31 ENCOUNTER — PATIENT MESSAGE (OUTPATIENT)
Dept: PHYSICAL MEDICINE AND REHAB | Facility: CLINIC | Age: 35
End: 2020-07-31

## 2020-07-31 VITALS — HEART RATE: 55 BPM | SYSTOLIC BLOOD PRESSURE: 112 MMHG | DIASTOLIC BLOOD PRESSURE: 71 MMHG

## 2020-07-31 DIAGNOSIS — M79.2 NEUROPATHIC PAIN: Chronic | ICD-10-CM

## 2020-07-31 DIAGNOSIS — N31.9 NEUROGENIC BLADDER: Primary | ICD-10-CM

## 2020-07-31 DIAGNOSIS — N39.0 RECURRENT UTI: ICD-10-CM

## 2020-07-31 DIAGNOSIS — Z93.59 CHRONIC SUPRAPUBIC CATHETER: ICD-10-CM

## 2020-07-31 DIAGNOSIS — Z43.5 ENCOUNTER FOR CARE OR REPLACEMENT OF SUPRAPUBIC TUBE: ICD-10-CM

## 2020-07-31 DIAGNOSIS — N32.89 BLADDER SPASMS: ICD-10-CM

## 2020-07-31 PROCEDURE — 99499 UNLISTED E&M SERVICE: CPT | Mod: S$PBB,,, | Performed by: NURSE PRACTITIONER

## 2020-07-31 PROCEDURE — 87088 URINE BACTERIA CULTURE: CPT

## 2020-07-31 PROCEDURE — 99499 NO LOS: ICD-10-PCS | Mod: S$PBB,,, | Performed by: NURSE PRACTITIONER

## 2020-07-31 PROCEDURE — 99999 PR PBB SHADOW E&M-EST. PATIENT-LVL IV: ICD-10-PCS | Mod: PBBFAC,,, | Performed by: NURSE PRACTITIONER

## 2020-07-31 PROCEDURE — 51705 CHANGE OF BLADDER TUBE: CPT | Mod: S$PBB,,, | Performed by: NURSE PRACTITIONER

## 2020-07-31 PROCEDURE — 87186 SC STD MICRODIL/AGAR DIL: CPT

## 2020-07-31 PROCEDURE — 51705 PR CHANGE OF BLADDER TUBE,SIMPLE: ICD-10-PCS | Mod: S$PBB,,, | Performed by: NURSE PRACTITIONER

## 2020-07-31 PROCEDURE — 51705 CHANGE OF BLADDER TUBE: CPT | Mod: PBBFAC | Performed by: NURSE PRACTITIONER

## 2020-07-31 PROCEDURE — 99214 OFFICE O/P EST MOD 30 MIN: CPT | Mod: PBBFAC | Performed by: NURSE PRACTITIONER

## 2020-07-31 PROCEDURE — 87086 URINE CULTURE/COLONY COUNT: CPT

## 2020-07-31 PROCEDURE — 99999 PR PBB SHADOW E&M-EST. PATIENT-LVL IV: CPT | Mod: PBBFAC,,, | Performed by: NURSE PRACTITIONER

## 2020-07-31 PROCEDURE — 87077 CULTURE AEROBIC IDENTIFY: CPT

## 2020-07-31 RX ORDER — OXYCODONE AND ACETAMINOPHEN 10; 325 MG/1; MG/1
TABLET ORAL
Qty: 135 TABLET | Refills: 0 | OUTPATIENT
Start: 2020-08-01 | End: 2020-08-31

## 2020-07-31 RX ORDER — GENTAMICIN SULFATE 40 MG/ML
160 INJECTION, SOLUTION INTRAMUSCULAR; INTRAVENOUS
Status: COMPLETED | OUTPATIENT
Start: 2020-07-31 | End: 2020-07-31

## 2020-07-31 RX ORDER — OXYCODONE AND ACETAMINOPHEN 10; 325 MG/1; MG/1
TABLET ORAL
Qty: 120 TABLET | Refills: 0 | Status: SHIPPED | OUTPATIENT
Start: 2020-07-31 | End: 2020-09-03 | Stop reason: SDUPTHER

## 2020-07-31 RX ADMIN — GENTAMICIN SULFATE 160 MG: 40 INJECTION, SOLUTION INTRAMUSCULAR; INTRAVENOUS at 04:07
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
## 2020-08-03 LAB — BACTERIA UR CULT: ABNORMAL

## 2020-08-18 ENCOUNTER — OFFICE VISIT (OUTPATIENT)
Dept: NEUROSURGERY | Facility: CLINIC | Age: 35
End: 2020-08-18
Payer: MEDICAID

## 2020-08-18 DIAGNOSIS — G82.20 PARAPLEGIA FOLLOWING SPINAL CORD INJURY: Primary | Chronic | ICD-10-CM

## 2020-08-18 DIAGNOSIS — Z97.8 PRESENCE OF INTRATHECAL BACLOFEN PUMP: ICD-10-CM

## 2020-08-18 DIAGNOSIS — M79.2 NEUROPATHIC PAIN: Chronic | ICD-10-CM

## 2020-08-18 PROCEDURE — 99024 PR POST-OP FOLLOW-UP VISIT: ICD-10-PCS | Mod: 95,,, | Performed by: NEUROLOGICAL SURGERY

## 2020-08-18 PROCEDURE — 99024 POSTOP FOLLOW-UP VISIT: CPT | Mod: 95,,, | Performed by: NEUROLOGICAL SURGERY
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
## 2020-08-20 RX ORDER — OXYCODONE HYDROCHLORIDE 30 MG/1
30 TABLET, FILM COATED, EXTENDED RELEASE ORAL EVERY 12 HOURS
Qty: 60 TABLET | Refills: 0 | Status: SHIPPED | OUTPATIENT
Start: 2020-08-20 | End: 2020-09-21 | Stop reason: SDUPTHER

## 2020-09-03 ENCOUNTER — PATIENT OUTREACH (OUTPATIENT)
Dept: ADMINISTRATIVE | Facility: OTHER | Age: 35
End: 2020-09-03

## 2020-09-03 DIAGNOSIS — M79.2 NEUROPATHIC PAIN: Chronic | ICD-10-CM

## 2020-09-03 RX ORDER — OXYCODONE AND ACETAMINOPHEN 10; 325 MG/1; MG/1
TABLET ORAL
Qty: 120 TABLET | Refills: 0 | Status: SHIPPED | OUTPATIENT
Start: 2020-09-03 | End: 2020-10-01 | Stop reason: SDUPTHER

## 2020-09-08 ENCOUNTER — OFFICE VISIT (OUTPATIENT)
Dept: PHYSICAL MEDICINE AND REHAB | Facility: CLINIC | Age: 35
End: 2020-09-08
Payer: MEDICAID

## 2020-09-08 VITALS — HEIGHT: 69 IN | WEIGHT: 181 LBS | BODY MASS INDEX: 26.81 KG/M2

## 2020-09-08 DIAGNOSIS — R25.2 SPASTICITY: ICD-10-CM

## 2020-09-08 DIAGNOSIS — Z74.09 IMPAIRED FUNCTIONAL MOBILITY, BALANCE, AND ENDURANCE: ICD-10-CM

## 2020-09-08 DIAGNOSIS — M79.2 NEUROPATHIC PAIN: ICD-10-CM

## 2020-09-08 DIAGNOSIS — G82.20 PARAPLEGIA FOLLOWING SPINAL CORD INJURY: Primary | Chronic | ICD-10-CM

## 2020-09-08 PROCEDURE — 62368 PR ANALYZE INFUSN PUMP+REPROGRAM: ICD-10-PCS | Mod: S$PBB,,, | Performed by: PHYSICAL MEDICINE & REHABILITATION

## 2020-09-08 PROCEDURE — 62368 ANALYZE SP INF PUMP W/REPROG: CPT | Mod: S$PBB,,, | Performed by: PHYSICAL MEDICINE & REHABILITATION

## 2020-09-08 PROCEDURE — 99999 PR PBB SHADOW E&M-EST. PATIENT-LVL IV: CPT | Mod: PBBFAC,,, | Performed by: PHYSICAL MEDICINE & REHABILITATION

## 2020-09-08 PROCEDURE — 99215 PR OFFICE/OUTPT VISIT, EST, LEVL V, 40-54 MIN: ICD-10-PCS | Mod: S$PBB,,, | Performed by: PHYSICAL MEDICINE & REHABILITATION

## 2020-09-08 PROCEDURE — 99215 OFFICE O/P EST HI 40 MIN: CPT | Mod: S$PBB,,, | Performed by: PHYSICAL MEDICINE & REHABILITATION

## 2020-09-08 PROCEDURE — 99999 PR PBB SHADOW E&M-EST. PATIENT-LVL IV: ICD-10-PCS | Mod: PBBFAC,,, | Performed by: PHYSICAL MEDICINE & REHABILITATION

## 2020-09-08 PROCEDURE — 62368 ANALYZE SP INF PUMP W/REPROG: CPT | Mod: PBBFAC | Performed by: PHYSICAL MEDICINE & REHABILITATION

## 2020-09-08 PROCEDURE — 99214 OFFICE O/P EST MOD 30 MIN: CPT | Mod: PBBFAC | Performed by: PHYSICAL MEDICINE & REHABILITATION
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
## 2020-09-16 ENCOUNTER — OFFICE VISIT (OUTPATIENT)
Dept: UROLOGY | Facility: CLINIC | Age: 35
End: 2020-09-16
Payer: MEDICAID

## 2020-09-16 VITALS — SYSTOLIC BLOOD PRESSURE: 124 MMHG | DIASTOLIC BLOOD PRESSURE: 85 MMHG | HEART RATE: 47 BPM

## 2020-09-16 DIAGNOSIS — Z43.5 ENCOUNTER FOR CARE OR REPLACEMENT OF SUPRAPUBIC TUBE: ICD-10-CM

## 2020-09-16 DIAGNOSIS — Z93.59 CHRONIC SUPRAPUBIC CATHETER: ICD-10-CM

## 2020-09-16 DIAGNOSIS — G82.20 PARAPLEGIA FOLLOWING SPINAL CORD INJURY: Chronic | ICD-10-CM

## 2020-09-16 DIAGNOSIS — N31.9 NEUROGENIC BLADDER: Chronic | ICD-10-CM

## 2020-09-16 DIAGNOSIS — R68.83 CHILLS: ICD-10-CM

## 2020-09-16 DIAGNOSIS — N32.89 BLADDER SPASMS: Primary | ICD-10-CM

## 2020-09-16 PROCEDURE — 99214 OFFICE O/P EST MOD 30 MIN: CPT | Mod: PBBFAC,25 | Performed by: NURSE PRACTITIONER

## 2020-09-16 PROCEDURE — 99999 PR PBB SHADOW E&M-EST. PATIENT-LVL IV: ICD-10-PCS | Mod: PBBFAC,,, | Performed by: NURSE PRACTITIONER

## 2020-09-16 PROCEDURE — 99999 PR PBB SHADOW E&M-EST. PATIENT-LVL IV: CPT | Mod: PBBFAC,,, | Performed by: NURSE PRACTITIONER

## 2020-09-16 PROCEDURE — 51705 CHANGE OF BLADDER TUBE: CPT | Mod: S$PBB,,, | Performed by: NURSE PRACTITIONER

## 2020-09-16 PROCEDURE — 87086 URINE CULTURE/COLONY COUNT: CPT

## 2020-09-16 PROCEDURE — 99499 UNLISTED E&M SERVICE: CPT | Mod: S$PBB,,, | Performed by: NURSE PRACTITIONER

## 2020-09-16 PROCEDURE — 87186 SC STD MICRODIL/AGAR DIL: CPT

## 2020-09-16 PROCEDURE — 51705 CHANGE OF BLADDER TUBE: CPT | Mod: PBBFAC | Performed by: NURSE PRACTITIONER

## 2020-09-16 PROCEDURE — 87077 CULTURE AEROBIC IDENTIFY: CPT

## 2020-09-16 PROCEDURE — 51705 PR CHANGE OF BLADDER TUBE,SIMPLE: ICD-10-PCS | Mod: S$PBB,,, | Performed by: NURSE PRACTITIONER

## 2020-09-16 PROCEDURE — 87088 URINE BACTERIA CULTURE: CPT

## 2020-09-16 PROCEDURE — 99499 NO LOS: ICD-10-PCS | Mod: S$PBB,,, | Performed by: NURSE PRACTITIONER
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
## 2020-09-17 ENCOUNTER — PATIENT MESSAGE (OUTPATIENT)
Dept: PHYSICAL MEDICINE AND REHAB | Facility: CLINIC | Age: 35
End: 2020-09-17

## 2020-09-18 ENCOUNTER — PATIENT MESSAGE (OUTPATIENT)
Dept: UROLOGY | Facility: CLINIC | Age: 35
End: 2020-09-18

## 2020-09-20 LAB — BACTERIA UR CULT: ABNORMAL

## 2020-09-21 ENCOUNTER — PATIENT MESSAGE (OUTPATIENT)
Dept: UROLOGY | Facility: CLINIC | Age: 35
End: 2020-09-21

## 2020-09-21 DIAGNOSIS — R39.9 UTI SYMPTOMS: ICD-10-CM

## 2020-09-21 DIAGNOSIS — N39.0 BACTERIAL UTI: Primary | ICD-10-CM

## 2020-09-21 DIAGNOSIS — A49.9 BACTERIAL UTI: Primary | ICD-10-CM

## 2020-09-21 RX ORDER — AMPICILLIN 500 MG/1
500 CAPSULE ORAL 3 TIMES DAILY
Qty: 30 CAPSULE | Refills: 0 | Status: SHIPPED | OUTPATIENT
Start: 2020-09-21 | End: 2020-10-20

## 2020-09-22 ENCOUNTER — PATIENT MESSAGE (OUTPATIENT)
Dept: PHYSICAL MEDICINE AND REHAB | Facility: CLINIC | Age: 35
End: 2020-09-22

## 2020-09-23 ENCOUNTER — PATIENT MESSAGE (OUTPATIENT)
Dept: PHYSICAL MEDICINE AND REHAB | Facility: CLINIC | Age: 35
End: 2020-09-23

## 2020-10-01 DIAGNOSIS — M79.2 NEUROPATHIC PAIN: Chronic | ICD-10-CM

## 2020-10-02 ENCOUNTER — PATIENT MESSAGE (OUTPATIENT)
Dept: INTERNAL MEDICINE | Facility: CLINIC | Age: 35
End: 2020-10-02

## 2020-10-02 ENCOUNTER — PATIENT MESSAGE (OUTPATIENT)
Dept: PHYSICAL MEDICINE AND REHAB | Facility: CLINIC | Age: 35
End: 2020-10-02

## 2020-10-02 RX ORDER — OXYCODONE AND ACETAMINOPHEN 10; 325 MG/1; MG/1
TABLET ORAL
Qty: 120 TABLET | Refills: 0 | Status: SHIPPED | OUTPATIENT
Start: 2020-10-06 | End: 2020-11-06 | Stop reason: SDUPTHER

## 2020-10-02 RX ORDER — PROMETHAZINE HYDROCHLORIDE 6.25 MG/5ML
SYRUP ORAL
Qty: 150 ML | Refills: 2 | Status: SHIPPED | OUTPATIENT
Start: 2020-10-02 | End: 2021-02-10 | Stop reason: SDUPTHER

## 2020-10-05 ENCOUNTER — PATIENT OUTREACH (OUTPATIENT)
Dept: ADMINISTRATIVE | Facility: OTHER | Age: 35
End: 2020-10-05

## 2020-10-06 ENCOUNTER — OFFICE VISIT (OUTPATIENT)
Dept: NEUROSURGERY | Facility: CLINIC | Age: 35
End: 2020-10-06
Payer: MEDICAID

## 2020-10-06 VITALS — HEIGHT: 69 IN | BODY MASS INDEX: 26.73 KG/M2 | TEMPERATURE: 97 F

## 2020-10-06 DIAGNOSIS — Z98.890 S/P INSERTION OF INTRATHECAL PUMP: ICD-10-CM

## 2020-10-06 DIAGNOSIS — G82.20 PARAPLEGIA FOLLOWING SPINAL CORD INJURY: Primary | Chronic | ICD-10-CM

## 2020-10-06 PROCEDURE — 99999 PR PBB SHADOW E&M-EST. PATIENT-LVL III: CPT | Mod: PBBFAC,,, | Performed by: NEUROLOGICAL SURGERY

## 2020-10-06 PROCEDURE — 99024 PR POST-OP FOLLOW-UP VISIT: ICD-10-PCS | Mod: ,,, | Performed by: NEUROLOGICAL SURGERY

## 2020-10-06 PROCEDURE — 99024 POSTOP FOLLOW-UP VISIT: CPT | Mod: ,,, | Performed by: NEUROLOGICAL SURGERY

## 2020-10-06 PROCEDURE — 99213 OFFICE O/P EST LOW 20 MIN: CPT | Mod: PBBFAC | Performed by: NEUROLOGICAL SURGERY

## 2020-10-06 PROCEDURE — 99999 PR PBB SHADOW E&M-EST. PATIENT-LVL III: ICD-10-PCS | Mod: PBBFAC,,, | Performed by: NEUROLOGICAL SURGERY

## 2020-10-20 ENCOUNTER — HOSPITAL ENCOUNTER (EMERGENCY)
Facility: HOSPITAL | Age: 35
Discharge: HOME | End: 2020-10-20
Attending: SURGERY
Payer: MEDICAID

## 2020-10-20 VITALS
BODY MASS INDEX: 26.73 KG/M2 | TEMPERATURE: 98 F | RESPIRATION RATE: 16 BRPM | DIASTOLIC BLOOD PRESSURE: 88 MMHG | SYSTOLIC BLOOD PRESSURE: 159 MMHG | WEIGHT: 181 LBS | HEART RATE: 54 BPM

## 2020-10-20 DIAGNOSIS — T83.510A URINARY TRACT INFECTION ASSOCIATED WITH CYSTOSTOMY CATHETER, INITIAL ENCOUNTER: Primary | ICD-10-CM

## 2020-10-20 DIAGNOSIS — N39.0 URINARY TRACT INFECTION ASSOCIATED WITH CYSTOSTOMY CATHETER, INITIAL ENCOUNTER: Primary | ICD-10-CM

## 2020-10-20 LAB
BACTERIA #/AREA URNS HPF: ABNORMAL /HPF
BILIRUB UR QL STRIP: NEGATIVE
CLARITY UR: ABNORMAL
COLOR UR: YELLOW
GLUCOSE UR QL STRIP: NEGATIVE
HGB UR QL STRIP: ABNORMAL
HYALINE CASTS #/AREA URNS LPF: 0 /LPF
KETONES UR QL STRIP: NEGATIVE
LEUKOCYTE ESTERASE UR QL STRIP: ABNORMAL
MICROSCOPIC COMMENT: ABNORMAL
NITRITE UR QL STRIP: NEGATIVE
PH UR STRIP: 6 [PH] (ref 5–8)
PROT UR QL STRIP: ABNORMAL
RBC #/AREA URNS HPF: >100 /HPF (ref 0–4)
SP GR UR STRIP: 1.02 (ref 1–1.03)
URN SPEC COLLECT METH UR: ABNORMAL
UROBILINOGEN UR STRIP-ACNC: NEGATIVE EU/DL
WBC #/AREA URNS HPF: 50 /HPF (ref 0–5)

## 2020-10-20 PROCEDURE — 87186 SC STD MICRODIL/AGAR DIL: CPT

## 2020-10-20 PROCEDURE — 87077 CULTURE AEROBIC IDENTIFY: CPT

## 2020-10-20 PROCEDURE — 87086 URINE CULTURE/COLONY COUNT: CPT

## 2020-10-20 PROCEDURE — 99283 EMERGENCY DEPT VISIT LOW MDM: CPT

## 2020-10-20 PROCEDURE — 81000 URINALYSIS NONAUTO W/SCOPE: CPT

## 2020-10-20 PROCEDURE — 87088 URINE BACTERIA CULTURE: CPT

## 2020-10-20 PROCEDURE — 25000003 PHARM REV CODE 250: Performed by: SURGERY

## 2020-10-20 RX ORDER — SULFAMETHOXAZOLE AND TRIMETHOPRIM 800; 160 MG/1; MG/1
1 TABLET ORAL
Status: COMPLETED | OUTPATIENT
Start: 2020-10-20 | End: 2020-10-20

## 2020-10-20 RX ORDER — SULFAMETHOXAZOLE AND TRIMETHOPRIM 800; 160 MG/1; MG/1
1 TABLET ORAL 2 TIMES DAILY
Qty: 14 TABLET | Refills: 0 | Status: SHIPPED | OUTPATIENT
Start: 2020-10-20 | End: 2020-10-27

## 2020-10-20 RX ADMIN — SULFAMETHOXAZOLE AND TRIMETHOPRIM 1 TABLET: 800; 160 TABLET ORAL at 09:10
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
## 2020-10-21 ENCOUNTER — PATIENT MESSAGE (OUTPATIENT)
Dept: UROLOGY | Facility: CLINIC | Age: 35
End: 2020-10-21

## 2020-10-22 LAB — BACTERIA UR CULT: ABNORMAL

## 2020-10-26 DIAGNOSIS — M79.2 NEUROPATHIC PAIN: Chronic | ICD-10-CM

## 2020-10-27 RX ORDER — OXYCODONE HYDROCHLORIDE 30 MG/1
30 TABLET, FILM COATED, EXTENDED RELEASE ORAL EVERY 12 HOURS
Qty: 60 TABLET | Refills: 0 | Status: SHIPPED | OUTPATIENT
Start: 2020-10-27 | End: 2020-11-17 | Stop reason: SDUPTHER

## 2020-10-29 ENCOUNTER — PATIENT MESSAGE (OUTPATIENT)
Dept: UROLOGY | Facility: CLINIC | Age: 35
End: 2020-10-29

## 2020-11-06 DIAGNOSIS — M79.2 NEUROPATHIC PAIN: Chronic | ICD-10-CM

## 2020-11-07 ENCOUNTER — PATIENT MESSAGE (OUTPATIENT)
Dept: PHYSICAL MEDICINE AND REHAB | Facility: CLINIC | Age: 35
End: 2020-11-07

## 2020-11-09 ENCOUNTER — PATIENT MESSAGE (OUTPATIENT)
Dept: PHYSICAL MEDICINE AND REHAB | Facility: CLINIC | Age: 35
End: 2020-11-09

## 2020-11-09 RX ORDER — OXYCODONE AND ACETAMINOPHEN 10; 325 MG/1; MG/1
TABLET ORAL
Qty: 120 TABLET | Refills: 0 | Status: SHIPPED | OUTPATIENT
Start: 2020-11-09 | End: 2020-12-11 | Stop reason: SDUPTHER

## 2020-11-17 ENCOUNTER — PATIENT MESSAGE (OUTPATIENT)
Dept: PHYSICAL MEDICINE AND REHAB | Facility: CLINIC | Age: 35
End: 2020-11-17

## 2020-11-17 DIAGNOSIS — M79.2 NEUROPATHIC PAIN: Chronic | ICD-10-CM

## 2020-11-17 RX ORDER — OXYCODONE HYDROCHLORIDE 30 MG/1
30 TABLET, FILM COATED, EXTENDED RELEASE ORAL EVERY 12 HOURS
Qty: 60 TABLET | Refills: 0 | Status: SHIPPED | OUTPATIENT
Start: 2020-11-26 | End: 2020-12-28 | Stop reason: SDUPTHER

## 2020-11-18 ENCOUNTER — PATIENT MESSAGE (OUTPATIENT)
Dept: UROLOGY | Facility: CLINIC | Age: 35
End: 2020-11-18

## 2020-11-18 ENCOUNTER — PATIENT MESSAGE (OUTPATIENT)
Dept: INTERNAL MEDICINE | Facility: CLINIC | Age: 35
End: 2020-11-18

## 2020-11-19 ENCOUNTER — PATIENT OUTREACH (OUTPATIENT)
Dept: ADMINISTRATIVE | Facility: OTHER | Age: 35
End: 2020-11-19

## 2020-11-19 NOTE — PROGRESS NOTES
LINKS immunization registry not responding  Care Everywhere updated  Health Maintenance updated  Chart reviewed for overdue Proactive [facility] Encounters (SOFI[facility]) health maintenance testing (CRS, Breast Ca, Diabetic Eye Exam)   Orders entered:N/A

## 2020-11-20 ENCOUNTER — TELEPHONE (OUTPATIENT)
Dept: PHYSICAL MEDICINE AND REHAB | Facility: CLINIC | Age: 35
End: 2020-11-20

## 2020-11-20 ENCOUNTER — PATIENT MESSAGE (OUTPATIENT)
Dept: PHYSICAL MEDICINE AND REHAB | Facility: CLINIC | Age: 35
End: 2020-11-20

## 2020-11-20 ENCOUNTER — OFFICE VISIT (OUTPATIENT)
Dept: UROLOGY | Facility: CLINIC | Age: 35
End: 2020-11-20
Payer: MEDICAID

## 2020-11-20 ENCOUNTER — OFFICE VISIT (OUTPATIENT)
Dept: INTERNAL MEDICINE | Facility: CLINIC | Age: 35
End: 2020-11-20
Payer: MEDICAID

## 2020-11-20 VITALS
SYSTOLIC BLOOD PRESSURE: 108 MMHG | OXYGEN SATURATION: 96 % | DIASTOLIC BLOOD PRESSURE: 58 MMHG | HEIGHT: 69 IN | BODY MASS INDEX: 26.73 KG/M2 | HEART RATE: 63 BPM

## 2020-11-20 VITALS — DIASTOLIC BLOOD PRESSURE: 58 MMHG | HEART RATE: 50 BPM | SYSTOLIC BLOOD PRESSURE: 94 MMHG

## 2020-11-20 DIAGNOSIS — J45.20 MILD INTERMITTENT ASTHMA WITHOUT COMPLICATION: ICD-10-CM

## 2020-11-20 DIAGNOSIS — G82.50 SPASTIC QUADRIPARESIS: ICD-10-CM

## 2020-11-20 DIAGNOSIS — G82.20 PARAPLEGIA FOLLOWING SPINAL CORD INJURY: ICD-10-CM

## 2020-11-20 DIAGNOSIS — Z93.59 CHRONIC SUPRAPUBIC CATHETER: ICD-10-CM

## 2020-11-20 DIAGNOSIS — Z43.5 ENCOUNTER FOR CARE OR REPLACEMENT OF SUPRAPUBIC TUBE: ICD-10-CM

## 2020-11-20 DIAGNOSIS — G82.20 PARAPLEGIA FOLLOWING SPINAL CORD INJURY: Chronic | ICD-10-CM

## 2020-11-20 DIAGNOSIS — Z93.3 COLOSTOMY STATUS: ICD-10-CM

## 2020-11-20 DIAGNOSIS — K59.2 NEUROGENIC BOWEL: ICD-10-CM

## 2020-11-20 DIAGNOSIS — J31.0 RHINITIS, UNSPECIFIED TYPE: ICD-10-CM

## 2020-11-20 DIAGNOSIS — N31.9 NEUROGENIC BLADDER: ICD-10-CM

## 2020-11-20 DIAGNOSIS — J32.9 SINUSITIS, UNSPECIFIED CHRONICITY, UNSPECIFIED LOCATION: ICD-10-CM

## 2020-11-20 DIAGNOSIS — Z00.00 ANNUAL PHYSICAL EXAM: Primary | ICD-10-CM

## 2020-11-20 DIAGNOSIS — Z97.8 PRESENCE OF INTRATHECAL BACLOFEN PUMP: ICD-10-CM

## 2020-11-20 DIAGNOSIS — N31.9 NEUROGENIC BLADDER: Chronic | ICD-10-CM

## 2020-11-20 PROCEDURE — 99395 PR PREVENTIVE VISIT,EST,18-39: ICD-10-PCS | Mod: S$PBB,,, | Performed by: INTERNAL MEDICINE

## 2020-11-20 PROCEDURE — 99499 NO LOS: ICD-10-PCS | Mod: S$PBB,,, | Performed by: NURSE PRACTITIONER

## 2020-11-20 PROCEDURE — 99214 OFFICE O/P EST MOD 30 MIN: CPT | Mod: PBBFAC,27,25 | Performed by: INTERNAL MEDICINE

## 2020-11-20 PROCEDURE — 99999 PR PBB SHADOW E&M-EST. PATIENT-LVL III: ICD-10-PCS | Mod: PBBFAC,,, | Performed by: NURSE PRACTITIONER

## 2020-11-20 PROCEDURE — 99213 OFFICE O/P EST LOW 20 MIN: CPT | Mod: PBBFAC | Performed by: NURSE PRACTITIONER

## 2020-11-20 PROCEDURE — 99999 PR PBB SHADOW E&M-EST. PATIENT-LVL IV: CPT | Mod: PBBFAC,,, | Performed by: INTERNAL MEDICINE

## 2020-11-20 PROCEDURE — 99999 PR PBB SHADOW E&M-EST. PATIENT-LVL III: CPT | Mod: PBBFAC,,, | Performed by: NURSE PRACTITIONER

## 2020-11-20 PROCEDURE — 51705 PR CHANGE OF BLADDER TUBE,SIMPLE: ICD-10-PCS | Mod: S$PBB,,, | Performed by: NURSE PRACTITIONER

## 2020-11-20 PROCEDURE — 99395 PREV VISIT EST AGE 18-39: CPT | Mod: S$PBB,,, | Performed by: INTERNAL MEDICINE

## 2020-11-20 PROCEDURE — 51705 CHANGE OF BLADDER TUBE: CPT | Mod: PBBFAC | Performed by: NURSE PRACTITIONER

## 2020-11-20 PROCEDURE — 51705 CHANGE OF BLADDER TUBE: CPT | Mod: S$PBB,,, | Performed by: NURSE PRACTITIONER

## 2020-11-20 PROCEDURE — 99999 PR PBB SHADOW E&M-EST. PATIENT-LVL IV: ICD-10-PCS | Mod: PBBFAC,,, | Performed by: INTERNAL MEDICINE

## 2020-11-20 PROCEDURE — 99499 UNLISTED E&M SERVICE: CPT | Mod: S$PBB,,, | Performed by: NURSE PRACTITIONER

## 2020-11-20 RX ORDER — AMOXICILLIN AND CLAVULANATE POTASSIUM 875; 125 MG/1; MG/1
1 TABLET, FILM COATED ORAL EVERY 12 HOURS
Qty: 14 TABLET | Refills: 0 | Status: SHIPPED | OUTPATIENT
Start: 2020-11-20 | End: 2021-04-01 | Stop reason: ALTCHOICE

## 2020-11-20 RX ORDER — BACLOFEN 20 MG/1
20 TABLET ORAL 3 TIMES DAILY
Qty: 90 TABLET | Refills: 0 | Status: SHIPPED | OUTPATIENT
Start: 2020-11-20 | End: 2021-10-07 | Stop reason: SDUPTHER

## 2020-11-20 NOTE — PROGRESS NOTES
"Subjective:       Patient ID: Nghia Edgar Jr. is a 35 y.o. male.    Chief Complaint: Follow-up   This is a 35-year-old who presents today for follow-up he reports that he has been having some trouble with increased nasal congestion more recently tends to get his allergies this time a year and he has had facial pressure and swelling more congested than usual despite the use of Flonase and saline spray.  He continues to follow with physical medicine for pain management and reports that he is in the process of getting an updated wheelchair as well.  He denies fever and does uses nebulizer or inhaler as needed for his wheezing.  He continues to follow with urology intermittent bladder infections for neurogenic bladder and also has colostomy all from his previous spinal cord injury.    HPI  Review of Systems   HENT: Positive for congestion and postnasal drip.    Respiratory: Negative for shortness of breath.         Wheezing at times  Uses nebulizer    Cardiovascular: Negative for chest pain.   Neurological:        Weakness  Arms legs  Wheelchair        Objective:     Blood pressure (!) 108/58, pulse 63, height 5' 9" (1.753 m), SpO2 96 %.    Physical Exam  Constitutional:       General: He is not in acute distress.     Comments: Seated in wheelchair    HENT:      Head: Normocephalic.   Eyes:      General: No scleral icterus.  Neck:      Musculoskeletal: Neck supple.   Cardiovascular:      Rate and Rhythm: Normal rate and regular rhythm.      Heart sounds: Normal heart sounds. No murmur. No friction rub. No gallop.    Pulmonary:      Effort: Pulmonary effort is normal. No respiratory distress.      Breath sounds: Normal breath sounds.   Abdominal:      General: Bowel sounds are normal.      Palpations: Abdomen is soft. There is no mass.      Tenderness: There is no abdominal tenderness.      Comments: Colostomy   Baclofen pump    Musculoskeletal:      Comments: Paraplegic  Suprapubic catheter   Skin:     " Findings: No erythema.   Neurological:      Mental Status: He is alert.         Assessment:       1. Rhinitis, unspecified type    2. Sinusitis, unspecified chronicity, unspecified location    3. Spastic quadriparesis    4. Neurogenic bladder    5. Presence of intrathecal baclofen pump    6. Colostomy status    7. Paraplegia following spinal cord injury    8. Neurogenic bowel    9. Mild intermittent asthma without complication        Plan:       Nghia was seen today for follow-up.    Diagnoses and all orders for this visit:    Rhinitis, unspecified type  Sinusitis, unspecified chronicity, unspecified location  Continue saline Flonase for his congestion will treat for sinusitis as symptoms have been persisting for over 2 weeks now    Spastic quadriparesis  -     Basic Metabolic Panel; Future  -     CBC Auto Differential; Future  -     Hepatic Function Panel; Future  -     Lipid Panel; Future    Neurogenic bladder  Intermittent issues with infection he continues to follow with urology  -     Basic Metabolic Panel; Future  -     CBC Auto Differential; Future  -     Hepatic Function Panel; Future  -     Lipid Panel; Future    Presence of intrathecal baclofen pump  Paraplegia following spinal cord injury  His history of he reports he had his baclofen pump replaced he continues to follow with physical medicine    Neurogenic bowel  History of his colostomy    Other orders  -     amoxicillin-clavulanate 875-125mg (AUGMENTIN) 875-125 mg per tablet; Take 1 tablet by mouth every 12 (twelve) hours.    Update blood work and review
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
## 2020-11-21 NOTE — TELEPHONE ENCOUNTER
Contacted patient by phone and correctly identified by  and home address.   Stated that I received call that pump was beeping and reviewed refill date of 2020.  Stated that we will send home nurse this weekend  To refill the pump. However, patient stated that he has spasms and more difficulty sleeping at night for the last two nights.  Otherwise, I reviewed signs and symptoms of baclofen withdrawal and advised him to start taking oral baclofen ASAP until home nurse can come refill.  He stated that he does not have prescription for oral baclofen at this time. Advised him to always have prescription on hand for emergency situation.  I sent patient a prescription for baclofen 20 mg tablets for three times daily, 90 fills to [facility] in [facility], discussed with him the risk of withdrawals and advised him to start taking scheduled medication at night.  He expressed understanding.

## 2020-11-26 ENCOUNTER — PATIENT MESSAGE (OUTPATIENT)
Dept: PHYSICAL MEDICINE AND REHAB | Facility: CLINIC | Age: 35
End: 2020-11-26

## 2020-11-27 ENCOUNTER — TELEPHONE (OUTPATIENT)
Dept: PHYSICAL MEDICINE AND REHAB | Facility: CLINIC | Age: 35
End: 2020-11-27

## 2020-12-11 ENCOUNTER — PATIENT MESSAGE (OUTPATIENT)
Dept: PHYSICAL MEDICINE AND REHAB | Facility: CLINIC | Age: 35
End: 2020-12-11

## 2020-12-11 DIAGNOSIS — M79.2 NEUROPATHIC PAIN: Chronic | ICD-10-CM

## 2020-12-11 RX ORDER — OXYCODONE AND ACETAMINOPHEN 10; 325 MG/1; MG/1
TABLET ORAL
Qty: 120 TABLET | Refills: 0 | Status: SHIPPED | OUTPATIENT
Start: 2020-12-11 | End: 2021-01-23 | Stop reason: SDUPTHER

## 2020-12-16 ENCOUNTER — PATIENT MESSAGE (OUTPATIENT)
Dept: UROLOGY | Facility: CLINIC | Age: 35
End: 2020-12-16

## 2020-12-16 DIAGNOSIS — Z93.59 CHRONIC SUPRAPUBIC CATHETER: ICD-10-CM

## 2020-12-16 DIAGNOSIS — N32.89 BLADDER SPASMS: Primary | ICD-10-CM

## 2020-12-16 RX ORDER — TOLTERODINE 2 MG/1
2 CAPSULE, EXTENDED RELEASE ORAL DAILY
Qty: 30 CAPSULE | Refills: 11 | Status: SHIPPED | OUTPATIENT
Start: 2020-12-16 | End: 2021-04-01

## 2020-12-18 ENCOUNTER — PATIENT MESSAGE (OUTPATIENT)
Dept: INTERNAL MEDICINE | Facility: CLINIC | Age: 35
End: 2020-12-18

## 2020-12-18 DIAGNOSIS — G82.20 PARAPLEGIA FOLLOWING SPINAL CORD INJURY: ICD-10-CM

## 2020-12-18 DIAGNOSIS — Z93.3 COLOSTOMY STATUS: Primary | ICD-10-CM

## 2020-12-18 DIAGNOSIS — G82.50 SPASTIC QUADRIPARESIS: ICD-10-CM

## 2020-12-19 ENCOUNTER — PATIENT MESSAGE (OUTPATIENT)
Dept: UROLOGY | Facility: CLINIC | Age: 35
End: 2020-12-19

## 2020-12-20 DIAGNOSIS — G82.20 PARAPLEGIA FOLLOWING SPINAL CORD INJURY: ICD-10-CM

## 2020-12-20 DIAGNOSIS — Z93.3 COLOSTOMY STATUS: Primary | ICD-10-CM

## 2020-12-20 DIAGNOSIS — Z97.8 PRESENCE OF INTRATHECAL BACLOFEN PUMP: ICD-10-CM

## 2020-12-20 NOTE — TELEPHONE ENCOUNTER
Think this was may have been faxed previously  But  see order okay to print and fax as requsted to [facility]

## 2020-12-22 ENCOUNTER — TELEPHONE (OUTPATIENT)
Dept: INTERNAL MEDICINE | Facility: CLINIC | Age: 35
End: 2020-12-22

## 2020-12-22 NOTE — TELEPHONE ENCOUNTER
----- Message from Sera Loja sent at 12/22/2020  8:21 AM CST -----  Contact: Teddy/[facility] [employer]/[phone]  Teddy is calling in regards for that it was sent to Dr Hoover for ostomy supplies, if order has been  receive, please fax it before January 21. Please call and advise. Thank you.

## 2020-12-28 ENCOUNTER — PATIENT MESSAGE (OUTPATIENT)
Dept: PHYSICAL MEDICINE AND REHAB | Facility: CLINIC | Age: 35
End: 2020-12-28

## 2020-12-28 DIAGNOSIS — M79.2 NEUROPATHIC PAIN: Chronic | ICD-10-CM

## 2020-12-28 RX ORDER — OXYCODONE HYDROCHLORIDE 30 MG/1
30 TABLET, FILM COATED, EXTENDED RELEASE ORAL EVERY 12 HOURS
Qty: 60 TABLET | Refills: 0 | Status: SHIPPED | OUTPATIENT
Start: 2020-12-28 | End: 2021-01-25 | Stop reason: SDUPTHER

## 2020-12-29 ENCOUNTER — PATIENT MESSAGE (OUTPATIENT)
Dept: INTERNAL MEDICINE | Facility: CLINIC | Age: 35
End: 2020-12-29

## 2020-12-30 ENCOUNTER — PATIENT MESSAGE (OUTPATIENT)
Dept: INTERNAL MEDICINE | Facility: CLINIC | Age: 35
End: 2020-12-30

## 2021-01-23 DIAGNOSIS — M79.2 NEUROPATHIC PAIN: Chronic | ICD-10-CM

## 2021-01-25 ENCOUNTER — TELEPHONE (OUTPATIENT)
Dept: PHYSICAL MEDICINE AND REHAB | Facility: CLINIC | Age: 36
End: 2021-01-25

## 2021-01-25 ENCOUNTER — PATIENT MESSAGE (OUTPATIENT)
Dept: UROLOGY | Facility: CLINIC | Age: 36
End: 2021-01-25

## 2021-01-25 ENCOUNTER — PATIENT MESSAGE (OUTPATIENT)
Dept: PHYSICAL MEDICINE AND REHAB | Facility: CLINIC | Age: 36
End: 2021-01-25

## 2021-01-26 RX ORDER — OXYCODONE AND ACETAMINOPHEN 10; 325 MG/1; MG/1
TABLET ORAL
Qty: 120 TABLET | Refills: 0 | Status: SHIPPED | OUTPATIENT
Start: 2021-01-26 | End: 2021-03-01 | Stop reason: SDUPTHER

## 2021-01-26 RX ORDER — OXYCODONE HCL 20 MG/1
20 TABLET, FILM COATED, EXTENDED RELEASE ORAL EVERY 12 HOURS
Qty: 60 TABLET | Refills: 0 | Status: SHIPPED | OUTPATIENT
Start: 2021-01-26 | End: 2021-02-26 | Stop reason: SDUPTHER

## 2021-01-29 ENCOUNTER — OFFICE VISIT (OUTPATIENT)
Dept: UROLOGY | Facility: CLINIC | Age: 36
End: 2021-01-29
Payer: MEDICAID

## 2021-01-29 VITALS — HEART RATE: 60 BPM | DIASTOLIC BLOOD PRESSURE: 58 MMHG | SYSTOLIC BLOOD PRESSURE: 102 MMHG

## 2021-01-29 DIAGNOSIS — Z93.59 CHRONIC SUPRAPUBIC CATHETER: ICD-10-CM

## 2021-01-29 DIAGNOSIS — N31.9 NEUROGENIC BLADDER: Primary | Chronic | ICD-10-CM

## 2021-01-29 PROCEDURE — 99213 OFFICE O/P EST LOW 20 MIN: CPT | Mod: S$PBB,,, | Performed by: NURSE PRACTITIONER

## 2021-01-29 PROCEDURE — 99213 OFFICE O/P EST LOW 20 MIN: CPT | Mod: PBBFAC | Performed by: NURSE PRACTITIONER

## 2021-01-29 PROCEDURE — 87086 URINE CULTURE/COLONY COUNT: CPT

## 2021-01-29 PROCEDURE — 99213 PR OFFICE/OUTPT VISIT, EST, LEVL III, 20-29 MIN: ICD-10-PCS | Mod: S$PBB,,, | Performed by: NURSE PRACTITIONER

## 2021-01-29 PROCEDURE — 99999 PR PBB SHADOW E&M-EST. PATIENT-LVL III: ICD-10-PCS | Mod: PBBFAC,,, | Performed by: NURSE PRACTITIONER

## 2021-01-29 PROCEDURE — 99999 PR PBB SHADOW E&M-EST. PATIENT-LVL III: CPT | Mod: PBBFAC,,, | Performed by: NURSE PRACTITIONER

## 2021-01-30 LAB — BACTERIA UR CULT: NO GROWTH

## 2021-02-10 ENCOUNTER — PATIENT MESSAGE (OUTPATIENT)
Dept: INTERNAL MEDICINE | Facility: CLINIC | Age: 36
End: 2021-02-10

## 2021-02-10 RX ORDER — PROMETHAZINE HYDROCHLORIDE 6.25 MG/5ML
SYRUP ORAL
Qty: 150 ML | Refills: 2 | Status: SHIPPED | OUTPATIENT
Start: 2021-02-10 | End: 2021-07-06 | Stop reason: SDUPTHER

## 2021-02-10 RX ORDER — PROMETHAZINE HYDROCHLORIDE 6.25 MG/5ML
SYRUP ORAL
Qty: 150 ML | Refills: 2 | Status: SHIPPED | OUTPATIENT
Start: 2021-02-10 | End: 2021-02-10 | Stop reason: SDUPTHER

## 2021-02-15 ENCOUNTER — PATIENT MESSAGE (OUTPATIENT)
Dept: PHYSICAL MEDICINE AND REHAB | Facility: CLINIC | Age: 36
End: 2021-02-15

## 2021-02-16 ENCOUNTER — PATIENT MESSAGE (OUTPATIENT)
Dept: PHYSICAL MEDICINE AND REHAB | Facility: CLINIC | Age: 36
End: 2021-02-16

## 2021-02-17 ENCOUNTER — TELEPHONE (OUTPATIENT)
Dept: PHYSICAL MEDICINE AND REHAB | Facility: CLINIC | Age: 36
End: 2021-02-17

## 2021-02-17 DIAGNOSIS — R25.2 SPASTICITY: Primary | ICD-10-CM

## 2021-02-26 ENCOUNTER — OFFICE VISIT (OUTPATIENT)
Dept: UROLOGY | Facility: CLINIC | Age: 36
End: 2021-02-26
Payer: MEDICAID

## 2021-02-26 ENCOUNTER — PATIENT MESSAGE (OUTPATIENT)
Dept: UROLOGY | Facility: CLINIC | Age: 36
End: 2021-02-26

## 2021-02-26 ENCOUNTER — PATIENT MESSAGE (OUTPATIENT)
Dept: PHYSICAL MEDICINE AND REHAB | Facility: CLINIC | Age: 36
End: 2021-02-26

## 2021-02-26 VITALS
SYSTOLIC BLOOD PRESSURE: 102 MMHG | DIASTOLIC BLOOD PRESSURE: 68 MMHG | WEIGHT: 181 LBS | HEART RATE: 45 BPM | HEIGHT: 69 IN | BODY MASS INDEX: 26.81 KG/M2

## 2021-02-26 DIAGNOSIS — R39.15 URINARY URGENCY: ICD-10-CM

## 2021-02-26 DIAGNOSIS — Z93.59 CHRONIC SUPRAPUBIC CATHETER: ICD-10-CM

## 2021-02-26 DIAGNOSIS — Z43.5 ENCOUNTER FOR CARE OR REPLACEMENT OF SUPRAPUBIC TUBE: ICD-10-CM

## 2021-02-26 DIAGNOSIS — M79.2 NEUROPATHIC PAIN: Chronic | ICD-10-CM

## 2021-02-26 DIAGNOSIS — N31.9 NEUROGENIC BLADDER: Chronic | ICD-10-CM

## 2021-02-26 PROCEDURE — 99999 PR PBB SHADOW E&M-EST. PATIENT-LVL IV: ICD-10-PCS | Mod: PBBFAC,,, | Performed by: NURSE PRACTITIONER

## 2021-02-26 PROCEDURE — 51705 CHANGE OF BLADDER TUBE: CPT | Mod: S$PBB,,, | Performed by: NURSE PRACTITIONER

## 2021-02-26 PROCEDURE — 99499 UNLISTED E&M SERVICE: CPT | Mod: S$PBB,,, | Performed by: NURSE PRACTITIONER

## 2021-02-26 PROCEDURE — 51705 CHANGE OF BLADDER TUBE: CPT | Mod: PBBFAC | Performed by: NURSE PRACTITIONER

## 2021-02-26 PROCEDURE — 99999 PR PBB SHADOW E&M-EST. PATIENT-LVL IV: CPT | Mod: PBBFAC,,, | Performed by: NURSE PRACTITIONER

## 2021-02-26 PROCEDURE — 99214 OFFICE O/P EST MOD 30 MIN: CPT | Mod: PBBFAC | Performed by: NURSE PRACTITIONER

## 2021-02-26 PROCEDURE — 51705 PR CHANGE OF BLADDER TUBE,SIMPLE: ICD-10-PCS | Mod: S$PBB,,, | Performed by: NURSE PRACTITIONER

## 2021-02-26 PROCEDURE — 99499 NO LOS: ICD-10-PCS | Mod: S$PBB,,, | Performed by: NURSE PRACTITIONER

## 2021-03-01 DIAGNOSIS — G82.20 PARAPLEGIA FOLLOWING SPINAL CORD INJURY: Primary | ICD-10-CM

## 2021-03-01 DIAGNOSIS — M79.2 NEUROPATHIC PAIN: Chronic | ICD-10-CM

## 2021-03-01 RX ORDER — OXYCODONE AND ACETAMINOPHEN 10; 325 MG/1; MG/1
TABLET ORAL
Qty: 120 TABLET | Refills: 0 | Status: SHIPPED | OUTPATIENT
Start: 2021-03-01 | End: 2021-04-06 | Stop reason: SDUPTHER

## 2021-03-01 RX ORDER — OXYCODONE AND ACETAMINOPHEN 10; 325 MG/1; MG/1
TABLET ORAL
Qty: 120 TABLET | Refills: 0 | Status: SHIPPED | OUTPATIENT
Start: 2021-03-01 | End: 2021-03-01 | Stop reason: SDUPTHER

## 2021-03-01 RX ORDER — OXYCODONE HCL 20 MG/1
20 TABLET, FILM COATED, EXTENDED RELEASE ORAL EVERY 12 HOURS
Qty: 60 TABLET | Refills: 0 | Status: SHIPPED | OUTPATIENT
Start: 2021-03-01 | End: 2021-04-06 | Stop reason: SDUPTHER

## 2021-03-02 ENCOUNTER — TELEPHONE (OUTPATIENT)
Dept: PHYSICAL MEDICINE AND REHAB | Facility: CLINIC | Age: 36
End: 2021-03-02
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
## 2021-03-12 ENCOUNTER — PATIENT MESSAGE (OUTPATIENT)
Dept: INTERNAL MEDICINE | Facility: CLINIC | Age: 36
End: 2021-03-12

## 2021-03-12 DIAGNOSIS — R09.81 NASAL CONGESTION: ICD-10-CM

## 2021-03-12 DIAGNOSIS — J31.0 RHINITIS, UNSPECIFIED TYPE: Primary | ICD-10-CM

## 2021-03-12 DIAGNOSIS — J32.9 SINUSITIS, UNSPECIFIED CHRONICITY, UNSPECIFIED LOCATION: ICD-10-CM

## 2021-03-16 ENCOUNTER — OFFICE VISIT (OUTPATIENT)
Dept: OTOLARYNGOLOGY | Facility: CLINIC | Age: 36
End: 2021-03-16
Payer: MEDICAID

## 2021-03-16 DIAGNOSIS — J31.0 RHINITIS MEDICAMENTOSA: ICD-10-CM

## 2021-03-16 DIAGNOSIS — T48.5X5A RHINITIS MEDICAMENTOSA: ICD-10-CM

## 2021-03-16 DIAGNOSIS — J34.89 SINUS PRESSURE: ICD-10-CM

## 2021-03-16 DIAGNOSIS — R09.81 NASAL CONGESTION: ICD-10-CM

## 2021-03-16 PROCEDURE — 99999 PR PBB SHADOW E&M-EST. PATIENT-LVL IV: ICD-10-PCS | Mod: PBBFAC,,, | Performed by: NURSE PRACTITIONER

## 2021-03-16 PROCEDURE — 99999 PR PBB SHADOW E&M-EST. PATIENT-LVL IV: CPT | Mod: PBBFAC,,, | Performed by: NURSE PRACTITIONER

## 2021-03-16 PROCEDURE — 99214 OFFICE O/P EST MOD 30 MIN: CPT | Mod: PBBFAC | Performed by: NURSE PRACTITIONER

## 2021-03-16 PROCEDURE — 99214 OFFICE O/P EST MOD 30 MIN: CPT | Mod: S$PBB,,, | Performed by: NURSE PRACTITIONER

## 2021-03-16 PROCEDURE — 99214 PR OFFICE/OUTPT VISIT, EST, LEVL IV, 30-39 MIN: ICD-10-PCS | Mod: S$PBB,,, | Performed by: NURSE PRACTITIONER

## 2021-03-16 RX ORDER — FLUTICASONE PROPIONATE 50 MCG
2 SPRAY, SUSPENSION (ML) NASAL DAILY
Qty: 16 G | Refills: 2 | Status: SHIPPED | OUTPATIENT
Start: 2021-03-16 | End: 2021-06-07

## 2021-03-17 ENCOUNTER — OFFICE VISIT (OUTPATIENT)
Dept: PHYSICAL MEDICINE AND REHAB | Facility: CLINIC | Age: 36
End: 2021-03-17
Payer: MEDICAID

## 2021-03-17 ENCOUNTER — LAB VISIT (OUTPATIENT)
Dept: LAB | Facility: HOSPITAL | Age: 36
End: 2021-03-17
Attending: PHYSICAL MEDICINE & REHABILITATION
Payer: MEDICAID

## 2021-03-17 VITALS
BODY MASS INDEX: 26.81 KG/M2 | HEIGHT: 69 IN | DIASTOLIC BLOOD PRESSURE: 64 MMHG | SYSTOLIC BLOOD PRESSURE: 102 MMHG | WEIGHT: 181 LBS

## 2021-03-17 DIAGNOSIS — R25.2 SPASTICITY: ICD-10-CM

## 2021-03-17 DIAGNOSIS — G82.20 PARAPLEGIA FOLLOWING SPINAL CORD INJURY: ICD-10-CM

## 2021-03-17 DIAGNOSIS — G82.20 PARAPLEGIA FOLLOWING SPINAL CORD INJURY: Primary | ICD-10-CM

## 2021-03-17 PROCEDURE — 62370 ANL SP INF PMP W/MDREPRG&FIL: CPT | Mod: S$PBB,,, | Performed by: PHYSICAL MEDICINE & REHABILITATION

## 2021-03-17 PROCEDURE — 99999 PR PBB SHADOW E&M-EST. PATIENT-LVL III: CPT | Mod: PBBFAC,,, | Performed by: PHYSICAL MEDICINE & REHABILITATION

## 2021-03-17 PROCEDURE — 99213 OFFICE O/P EST LOW 20 MIN: CPT | Mod: PBBFAC,25 | Performed by: PHYSICAL MEDICINE & REHABILITATION

## 2021-03-17 PROCEDURE — 99499 UNLISTED E&M SERVICE: CPT | Mod: S$PBB,,, | Performed by: PHYSICAL MEDICINE & REHABILITATION

## 2021-03-17 PROCEDURE — 80307 DRUG TEST PRSMV CHEM ANLYZR: CPT | Performed by: PHYSICAL MEDICINE & REHABILITATION

## 2021-03-17 PROCEDURE — 62370 PR ANL SP INF PMP W/MDREPRG&FIL: ICD-10-PCS | Mod: S$PBB,,, | Performed by: PHYSICAL MEDICINE & REHABILITATION

## 2021-03-17 PROCEDURE — 62370 ANL SP INF PMP W/MDREPRG&FIL: CPT | Mod: PBBFAC | Performed by: PHYSICAL MEDICINE & REHABILITATION

## 2021-03-17 PROCEDURE — 99999 PR PBB SHADOW E&M-EST. PATIENT-LVL III: ICD-10-PCS | Mod: PBBFAC,,, | Performed by: PHYSICAL MEDICINE & REHABILITATION

## 2021-03-17 PROCEDURE — 99499 NO LOS: ICD-10-PCS | Mod: S$PBB,,, | Performed by: PHYSICAL MEDICINE & REHABILITATION

## 2021-03-17 RX ADMIN — BACLOFEN: 0.5 INJECTION INTRATHECAL at 05:03

## 2021-03-25 LAB
6MAM UR QL: NOT DETECTED
7AMINOCLONAZEPAM UR QL: NOT DETECTED
A-OH ALPRAZ UR QL: NOT DETECTED
ALPHA-OH-MIDAZOLAM: NOT DETECTED
ALPRAZ UR QL: NOT DETECTED
AMPHET UR QL SCN: NOT DETECTED
ANNOTATION COMMENT IMP: NORMAL
ANNOTATION COMMENT IMP: NORMAL
BARBITURATES UR QL: NOT DETECTED
BUPRENORPHINE UR QL: NOT DETECTED
BZE UR QL: NOT DETECTED
CARBOXYTHC UR QL: NOT DETECTED
CARISOPRODOL UR QL: NOT DETECTED
CLONAZEPAM UR QL: NOT DETECTED
CODEINE UR QL: NOT DETECTED
CREAT UR-MCNC: 177.3 MG/DL (ref 20–400)
DIAZEPAM UR QL: NOT DETECTED
ETHYL GLUCURONIDE UR QL: NOT DETECTED
FENTANYL UR QL: NOT DETECTED
GABAPENTIN: NOT DETECTED
HYDROCODONE UR QL: NOT DETECTED
HYDROMORPHONE UR QL: NOT DETECTED
LORAZEPAM UR QL: NOT DETECTED
MDA UR QL: NOT DETECTED
MDEA UR QL: NOT DETECTED
MDMA UR QL: NOT DETECTED
ME-PHENIDATE UR QL: NOT DETECTED
MEPERIDINE UR QL: NOT DETECTED
METHADONE UR QL: NOT DETECTED
METHAMPHET UR QL: NOT DETECTED
MIDAZOLAM UR QL SCN: NOT DETECTED
MORPHINE UR QL: NOT DETECTED
NALOXONE: NOT DETECTED
NORBUPRENORPHINE UR QL CFM: NOT DETECTED
NORDIAZEPAM UR QL: NOT DETECTED
NORFENTANYL UR QL: NOT DETECTED
NORHYDROCODONE UR QL CFM: NOT DETECTED
NOROXYCODONE UR QL CFM: NOT DETECTED
NOROXYMORPHONE: NOT DETECTED
OXAZEPAM UR QL: NOT DETECTED
OXYCODONE UR QL: PRESENT
OXYMORPHONE UR QL: NOT DETECTED
PATHOLOGY STUDY: NORMAL
PCP UR QL: NOT DETECTED
PHENTERMINE UR QL: NOT DETECTED
PREGABALIN: NOT DETECTED
PROPOXYPH UR QL: NORMAL
SERVICE CMNT-IMP: NORMAL
TAPENTADOL UR QL SCN: NOT DETECTED
TAPENTADOL-O-SULF: NOT DETECTED
TEMAZEPAM UR QL: NOT DETECTED
TRAMADOL UR QL: NOT DETECTED
ZOLPIDEM METABOLITE: NOT DETECTED
ZOLPIDEM UR QL: NOT DETECTED

## 2021-03-26 ENCOUNTER — OFFICE VISIT (OUTPATIENT)
Dept: UROLOGY | Facility: CLINIC | Age: 36
End: 2021-03-26
Payer: MEDICAID

## 2021-03-26 VITALS — SYSTOLIC BLOOD PRESSURE: 97 MMHG | DIASTOLIC BLOOD PRESSURE: 57 MMHG | HEART RATE: 67 BPM

## 2021-03-26 DIAGNOSIS — R82.71 BACTERIA IN URINE: Primary | ICD-10-CM

## 2021-03-26 DIAGNOSIS — Z43.5 ENCOUNTER FOR CARE OR REPLACEMENT OF SUPRAPUBIC TUBE: ICD-10-CM

## 2021-03-26 DIAGNOSIS — G82.20 PARAPLEGIA FOLLOWING SPINAL CORD INJURY: Chronic | ICD-10-CM

## 2021-03-26 DIAGNOSIS — N39.0 COMPLICATED UTI (URINARY TRACT INFECTION): ICD-10-CM

## 2021-03-26 DIAGNOSIS — N31.9 NEUROGENIC BLADDER: Chronic | ICD-10-CM

## 2021-03-26 DIAGNOSIS — N32.89 BLADDER SPASMS: ICD-10-CM

## 2021-03-26 DIAGNOSIS — Z93.59 CHRONIC SUPRAPUBIC CATHETER: ICD-10-CM

## 2021-03-26 PROCEDURE — 99214 OFFICE O/P EST MOD 30 MIN: CPT | Mod: PBBFAC,25 | Performed by: NURSE PRACTITIONER

## 2021-03-26 PROCEDURE — 51705 PR CHANGE OF BLADDER TUBE,SIMPLE: ICD-10-PCS | Mod: S$PBB,,, | Performed by: NURSE PRACTITIONER

## 2021-03-26 PROCEDURE — 87086 URINE CULTURE/COLONY COUNT: CPT | Performed by: NURSE PRACTITIONER

## 2021-03-26 PROCEDURE — 99214 PR OFFICE/OUTPT VISIT, EST, LEVL IV, 30-39 MIN: ICD-10-PCS | Mod: S$PBB,25,, | Performed by: NURSE PRACTITIONER

## 2021-03-26 PROCEDURE — 99999 PR PBB SHADOW E&M-EST. PATIENT-LVL IV: ICD-10-PCS | Mod: PBBFAC,,, | Performed by: NURSE PRACTITIONER

## 2021-03-26 PROCEDURE — 87077 CULTURE AEROBIC IDENTIFY: CPT | Performed by: NURSE PRACTITIONER

## 2021-03-26 PROCEDURE — 87088 URINE BACTERIA CULTURE: CPT | Performed by: NURSE PRACTITIONER

## 2021-03-26 PROCEDURE — 87186 SC STD MICRODIL/AGAR DIL: CPT | Performed by: NURSE PRACTITIONER

## 2021-03-26 PROCEDURE — 99214 OFFICE O/P EST MOD 30 MIN: CPT | Mod: S$PBB,25,, | Performed by: NURSE PRACTITIONER

## 2021-03-26 PROCEDURE — 99999 PR PBB SHADOW E&M-EST. PATIENT-LVL IV: CPT | Mod: PBBFAC,,, | Performed by: NURSE PRACTITIONER

## 2021-03-26 PROCEDURE — 51705 CHANGE OF BLADDER TUBE: CPT | Mod: PBBFAC | Performed by: NURSE PRACTITIONER

## 2021-03-26 PROCEDURE — 51705 CHANGE OF BLADDER TUBE: CPT | Mod: S$PBB,,, | Performed by: NURSE PRACTITIONER

## 2021-03-29 LAB — BACTERIA UR CULT: ABNORMAL

## 2021-03-30 ENCOUNTER — TELEPHONE (OUTPATIENT)
Dept: UROLOGY | Facility: CLINIC | Age: 36
End: 2021-03-30

## 2021-03-30 DIAGNOSIS — N32.89 BLADDER SPASMS: ICD-10-CM

## 2021-03-30 DIAGNOSIS — A49.9 BACTERIAL UTI: Primary | ICD-10-CM

## 2021-03-30 DIAGNOSIS — Z93.59 CHRONIC SUPRAPUBIC CATHETER: ICD-10-CM

## 2021-03-30 DIAGNOSIS — N39.0 BACTERIAL UTI: Primary | ICD-10-CM

## 2021-03-30 DIAGNOSIS — N31.9 NEUROGENIC BLADDER: Primary | ICD-10-CM

## 2021-03-30 RX ORDER — SULFAMETHOXAZOLE AND TRIMETHOPRIM 800; 160 MG/1; MG/1
1 TABLET ORAL 2 TIMES DAILY
Qty: 20 TABLET | Refills: 0 | Status: SHIPPED | OUTPATIENT
Start: 2021-03-30 | End: 2021-04-09

## 2021-04-01 DIAGNOSIS — M79.2 NEUROPATHIC PAIN: Chronic | ICD-10-CM

## 2021-04-05 ENCOUNTER — TELEPHONE (OUTPATIENT)
Dept: PREADMISSION TESTING | Facility: HOSPITAL | Age: 36
End: 2021-04-05

## 2021-04-05 ENCOUNTER — HOSPITAL ENCOUNTER (OUTPATIENT)
Dept: PREADMISSION TESTING | Facility: HOSPITAL | Age: 36
Discharge: HOME | End: 2021-04-05
Attending: UROLOGY
Payer: MEDICAID

## 2021-04-05 ENCOUNTER — PATIENT MESSAGE (OUTPATIENT)
Dept: PHYSICAL MEDICINE AND REHAB | Facility: CLINIC | Age: 36
End: 2021-04-05

## 2021-04-05 ENCOUNTER — TELEPHONE (OUTPATIENT)
Dept: INTERNAL MEDICINE | Facility: CLINIC | Age: 36
End: 2021-04-05

## 2021-04-05 DIAGNOSIS — Z93.59 CHRONIC SUPRAPUBIC CATHETER: ICD-10-CM

## 2021-04-05 DIAGNOSIS — N32.89 BLADDER SPASMS: ICD-10-CM

## 2021-04-05 DIAGNOSIS — N31.9 NEUROGENIC BLADDER: ICD-10-CM

## 2021-04-05 PROCEDURE — U0005 INFEC AGEN DETEC AMPLI PROBE: HCPCS | Performed by: UROLOGY

## 2021-04-05 PROCEDURE — U0003 INFECTIOUS AGENT DETECTION BY NUCLEIC ACID (DNA OR RNA); SEVERE ACUTE RESPIRATORY SYNDROME CORONAVIRUS 2 (SARS-COV-2) (CORONAVIRUS DISEASE [COVID-19]), AMPLIFIED PROBE TECHNIQUE, MAKING USE OF HIGH THROUGHPUT TECHNOLOGIES AS DESCRIBED BY CMS-2020-01-R: HCPCS | Performed by: UROLOGY

## 2021-04-06 ENCOUNTER — OFFICE VISIT (OUTPATIENT)
Dept: INTERNAL MEDICINE | Facility: CLINIC | Age: 36
End: 2021-04-06
Payer: MEDICAID

## 2021-04-06 ENCOUNTER — PATIENT MESSAGE (OUTPATIENT)
Dept: UROLOGY | Facility: CLINIC | Age: 36
End: 2021-04-06

## 2021-04-06 ENCOUNTER — TELEPHONE (OUTPATIENT)
Dept: PREADMISSION TESTING | Facility: HOSPITAL | Age: 36
End: 2021-04-06

## 2021-04-06 ENCOUNTER — PATIENT MESSAGE (OUTPATIENT)
Dept: PHYSICAL MEDICINE AND REHAB | Facility: CLINIC | Age: 36
End: 2021-04-06

## 2021-04-06 ENCOUNTER — TELEPHONE (OUTPATIENT)
Dept: INTERNAL MEDICINE | Facility: CLINIC | Age: 36
End: 2021-04-06

## 2021-04-06 ENCOUNTER — LAB VISIT (OUTPATIENT)
Dept: LAB | Facility: HOSPITAL | Age: 36
End: 2021-04-06
Attending: ANESTHESIOLOGY
Payer: MEDICAID

## 2021-04-06 DIAGNOSIS — M79.2 NEUROPATHIC PAIN: Chronic | ICD-10-CM

## 2021-04-06 DIAGNOSIS — N39.0 COMPLICATED UTI (URINARY TRACT INFECTION): ICD-10-CM

## 2021-04-06 DIAGNOSIS — R00.1 BRADYCARDIA: Primary | ICD-10-CM

## 2021-04-06 DIAGNOSIS — J45.20 MILD INTERMITTENT ASTHMA WITHOUT COMPLICATION: ICD-10-CM

## 2021-04-06 DIAGNOSIS — Z01.818 PREOP EXAMINATION: Primary | ICD-10-CM

## 2021-04-06 DIAGNOSIS — Z93.59 CHRONIC SUPRAPUBIC CATHETER: ICD-10-CM

## 2021-04-06 DIAGNOSIS — R00.1 BRADYCARDIA: ICD-10-CM

## 2021-04-06 DIAGNOSIS — Z01.818 PREOPERATIVE TESTING: ICD-10-CM

## 2021-04-06 PROBLEM — L89.314 DECUBITUS ULCER OF RIGHT ISCHIUM, STAGE 4: Status: RESOLVED | Noted: 2017-12-08 | Resolved: 2021-04-06

## 2021-04-06 LAB
ANION GAP SERPL CALC-SCNC: 7 MMOL/L (ref 8–16)
BILIRUB UR QL STRIP: NEGATIVE
BUN SERPL-MCNC: 10 MG/DL (ref 6–20)
CALCIUM SERPL-MCNC: 8.8 MG/DL (ref 8.7–10.5)
CAOX CRY URNS QL MICRO: NORMAL
CHLORIDE SERPL-SCNC: 103 MMOL/L (ref 95–110)
CLARITY UR: CLEAR
CO2 SERPL-SCNC: 27 MMOL/L (ref 23–29)
COLOR UR: YELLOW
CREAT SERPL-MCNC: 0.6 MG/DL (ref 0.5–1.4)
ERYTHROCYTE [DISTWIDTH] IN BLOOD BY AUTOMATED COUNT: 12 % (ref 11.5–14.5)
EST. GFR  (AFRICAN AMERICAN): >60 ML/MIN/1.73 M^2
EST. GFR  (NON AFRICAN AMERICAN): >60 ML/MIN/1.73 M^2
GLUCOSE SERPL-MCNC: 86 MG/DL (ref 70–110)
GLUCOSE UR QL STRIP: ABNORMAL
HCT VFR BLD AUTO: 44.7 % (ref 40–54)
HGB BLD-MCNC: 14 G/DL (ref 14–18)
HGB UR QL STRIP: NEGATIVE
KETONES UR QL STRIP: ABNORMAL
LEUKOCYTE ESTERASE UR QL STRIP: ABNORMAL
MCH RBC QN AUTO: 31 PG (ref 27–31)
MCHC RBC AUTO-ENTMCNC: 31.3 G/DL (ref 32–36)
MCV RBC AUTO: 99 FL (ref 82–98)
MICROSCOPIC COMMENT: NORMAL
NITRITE UR QL STRIP: NEGATIVE
PH UR STRIP: 8 [PH] (ref 5–8)
PLATELET # BLD AUTO: 184 K/UL (ref 150–450)
PMV BLD AUTO: 10.3 FL (ref 9.2–12.9)
POTASSIUM SERPL-SCNC: 3.9 MMOL/L (ref 3.5–5.1)
PROT UR QL STRIP: NEGATIVE
RBC # BLD AUTO: 4.51 M/UL (ref 4.6–6.2)
SARS-COV-2 RNA RESP QL NAA+PROBE: NOT DETECTED
SODIUM SERPL-SCNC: 137 MMOL/L (ref 136–145)
SP GR UR STRIP: 1.02 (ref 1–1.03)
SQUAMOUS #/AREA URNS HPF: 0 /HPF
URN SPEC COLLECT METH UR: ABNORMAL
UROBILINOGEN UR STRIP-ACNC: >=8 EU/DL
WBC # BLD AUTO: 5.53 K/UL (ref 3.9–12.7)
WBC #/AREA URNS HPF: 3 /HPF (ref 0–5)

## 2021-04-06 PROCEDURE — 36415 COLL VENOUS BLD VENIPUNCTURE: CPT | Performed by: ANESTHESIOLOGY

## 2021-04-06 PROCEDURE — 99214 PR OFFICE/OUTPT VISIT, EST, LEVL IV, 30-39 MIN: ICD-10-PCS | Mod: 95,,, | Performed by: INTERNAL MEDICINE

## 2021-04-06 PROCEDURE — 99214 OFFICE O/P EST MOD 30 MIN: CPT | Mod: 95,,, | Performed by: INTERNAL MEDICINE

## 2021-04-06 PROCEDURE — 85027 COMPLETE CBC AUTOMATED: CPT | Performed by: ANESTHESIOLOGY

## 2021-04-06 PROCEDURE — 80048 BASIC METABOLIC PNL TOTAL CA: CPT | Performed by: ANESTHESIOLOGY

## 2021-04-06 PROCEDURE — 81000 URINALYSIS NONAUTO W/SCOPE: CPT | Performed by: ANESTHESIOLOGY

## 2021-04-06 RX ORDER — OXYCODONE HCL 20 MG/1
20 TABLET, FILM COATED, EXTENDED RELEASE ORAL EVERY 12 HOURS
Qty: 60 TABLET | Refills: 0 | Status: SHIPPED | OUTPATIENT
Start: 2021-04-06 | End: 2021-05-06 | Stop reason: SDUPTHER

## 2021-04-06 RX ORDER — OXYCODONE HCL 20 MG/1
20 TABLET, FILM COATED, EXTENDED RELEASE ORAL EVERY 12 HOURS
Qty: 60 TABLET | Refills: 0 | OUTPATIENT
Start: 2021-04-06 | End: 2021-05-06

## 2021-04-06 RX ORDER — OXYCODONE AND ACETAMINOPHEN 10; 325 MG/1; MG/1
TABLET ORAL
Qty: 120 TABLET | Refills: 0 | Status: SHIPPED | OUTPATIENT
Start: 2021-04-06 | End: 2021-05-06 | Stop reason: SDUPTHER

## 2021-04-06 RX ORDER — OXYCODONE AND ACETAMINOPHEN 10; 325 MG/1; MG/1
TABLET ORAL
Qty: 120 TABLET | Refills: 0 | OUTPATIENT
Start: 2021-04-06 | End: 2021-05-02

## 2021-04-07 ENCOUNTER — ANESTHESIA EVENT (OUTPATIENT)
Dept: SURGERY | Facility: HOSPITAL | Age: 36
End: 2021-04-07
Payer: MEDICAID

## 2021-04-07 ENCOUNTER — HOSPITAL ENCOUNTER (OUTPATIENT)
Facility: HOSPITAL | Age: 36
Discharge: HOME | End: 2021-04-07
Attending: UROLOGY | Admitting: UROLOGY
Payer: MEDICAID

## 2021-04-07 ENCOUNTER — ANESTHESIA (OUTPATIENT)
Dept: SURGERY | Facility: HOSPITAL | Age: 36
End: 2021-04-07
Payer: MEDICAID

## 2021-04-07 VITALS
WEIGHT: 185 LBS | TEMPERATURE: 98 F | RESPIRATION RATE: 16 BRPM | BODY MASS INDEX: 27.4 KG/M2 | SYSTOLIC BLOOD PRESSURE: 99 MMHG | HEART RATE: 62 BPM | HEIGHT: 69 IN | OXYGEN SATURATION: 98 % | DIASTOLIC BLOOD PRESSURE: 62 MMHG

## 2021-04-07 DIAGNOSIS — N31.9 NEUROGENIC BLADDER: Primary | ICD-10-CM

## 2021-04-07 DIAGNOSIS — Z01.818 PREOPERATIVE TESTING: ICD-10-CM

## 2021-04-07 PROCEDURE — 00800 ANES PX LWR ANT ABDL WAL NOS: CPT | Performed by: UROLOGY

## 2021-04-07 PROCEDURE — 25000003 PHARM REV CODE 250: Performed by: STUDENT IN AN ORGANIZED HEALTH CARE EDUCATION/TRAINING PROGRAM

## 2021-04-07 PROCEDURE — D9220A PRA ANESTHESIA: ICD-10-PCS | Mod: ANES,,, | Performed by: ANESTHESIOLOGY

## 2021-04-07 PROCEDURE — 52287 CYSTOSCOPY CHEMODENERVATION: CPT | Mod: ,,, | Performed by: UROLOGY

## 2021-04-07 PROCEDURE — 36000707: Performed by: UROLOGY

## 2021-04-07 PROCEDURE — 71000044 HC DOSC ROUTINE RECOVERY FIRST HOUR: Performed by: UROLOGY

## 2021-04-07 PROCEDURE — 63600175 PHARM REV CODE 636 W HCPCS: Mod: JG | Performed by: UROLOGY

## 2021-04-07 PROCEDURE — D9220A PRA ANESTHESIA: Mod: CRNA,,, | Performed by: NURSE ANESTHETIST, CERTIFIED REGISTERED

## 2021-04-07 PROCEDURE — 63600175 PHARM REV CODE 636 W HCPCS: Performed by: ANESTHESIOLOGY

## 2021-04-07 PROCEDURE — 36000706: Performed by: UROLOGY

## 2021-04-07 PROCEDURE — 71000015 HC POSTOP RECOV 1ST HR: Performed by: UROLOGY

## 2021-04-07 PROCEDURE — 51705 PR CHANGE OF BLADDER TUBE,SIMPLE: ICD-10-PCS | Mod: 51,,, | Performed by: UROLOGY

## 2021-04-07 PROCEDURE — 52287 PR CYSTOURETHROSCOPY WITH INJ FOR CHEMODENERVATION: ICD-10-PCS | Mod: ,,, | Performed by: UROLOGY

## 2021-04-07 PROCEDURE — 51705 CHANGE OF BLADDER TUBE: CPT | Mod: 51,,, | Performed by: UROLOGY

## 2021-04-07 PROCEDURE — D9220A PRA ANESTHESIA: ICD-10-PCS | Mod: CRNA,,, | Performed by: NURSE ANESTHETIST, CERTIFIED REGISTERED

## 2021-04-07 PROCEDURE — C1894 INTRO/SHEATH, NON-LASER: HCPCS | Performed by: UROLOGY

## 2021-04-07 PROCEDURE — D9220A PRA ANESTHESIA: Mod: ANES,,, | Performed by: ANESTHESIOLOGY

## 2021-04-07 PROCEDURE — 37000009 HC ANESTHESIA EA ADD 15 MINS: Performed by: UROLOGY

## 2021-04-07 PROCEDURE — 63600175 PHARM REV CODE 636 W HCPCS: Performed by: NURSE ANESTHETIST, CERTIFIED REGISTERED

## 2021-04-07 PROCEDURE — 25000003 PHARM REV CODE 250: Performed by: NURSE ANESTHETIST, CERTIFIED REGISTERED

## 2021-04-07 PROCEDURE — 37000008 HC ANESTHESIA 1ST 15 MINUTES: Performed by: UROLOGY

## 2021-04-07 PROCEDURE — 71000016 HC POSTOP RECOV ADDL HR: Performed by: UROLOGY

## 2021-04-07 PROCEDURE — 63600175 PHARM REV CODE 636 W HCPCS: Performed by: STUDENT IN AN ORGANIZED HEALTH CARE EDUCATION/TRAINING PROGRAM

## 2021-04-07 RX ORDER — SODIUM CHLORIDE 0.9 % (FLUSH) 0.9 %
10 SYRINGE (ML) INJECTION
Status: DISCONTINUED | OUTPATIENT
Start: 2021-04-07 | End: 2021-04-07 | Stop reason: HOSPADM

## 2021-04-07 RX ORDER — FENTANYL CITRATE 50 UG/ML
INJECTION, SOLUTION INTRAMUSCULAR; INTRAVENOUS
Status: DISCONTINUED | OUTPATIENT
Start: 2021-04-07 | End: 2021-04-07

## 2021-04-07 RX ORDER — HYDROMORPHONE HYDROCHLORIDE 1 MG/ML
0.2 INJECTION, SOLUTION INTRAMUSCULAR; INTRAVENOUS; SUBCUTANEOUS EVERY 5 MIN PRN
Status: DISCONTINUED | OUTPATIENT
Start: 2021-04-07 | End: 2021-04-07 | Stop reason: HOSPADM

## 2021-04-07 RX ORDER — CEFAZOLIN SODIUM 1 G/3ML
2 INJECTION, POWDER, FOR SOLUTION INTRAMUSCULAR; INTRAVENOUS
Status: COMPLETED | OUTPATIENT
Start: 2021-04-07 | End: 2021-04-07

## 2021-04-07 RX ORDER — KETAMINE HCL IN 0.9 % NACL 50 MG/5 ML
SYRINGE (ML) INTRAVENOUS
Status: DISCONTINUED | OUTPATIENT
Start: 2021-04-07 | End: 2021-04-07

## 2021-04-07 RX ORDER — PROPOFOL 10 MG/ML
VIAL (ML) INTRAVENOUS
Status: DISCONTINUED | OUTPATIENT
Start: 2021-04-07 | End: 2021-04-07

## 2021-04-07 RX ORDER — LIDOCAINE HYDROCHLORIDE 20 MG/ML
INJECTION, SOLUTION EPIDURAL; INFILTRATION; INTRACAUDAL; PERINEURAL
Status: DISCONTINUED | OUTPATIENT
Start: 2021-04-07 | End: 2021-04-07

## 2021-04-07 RX ORDER — PROPOFOL 10 MG/ML
VIAL (ML) INTRAVENOUS CONTINUOUS PRN
Status: DISCONTINUED | OUTPATIENT
Start: 2021-04-07 | End: 2021-04-07

## 2021-04-07 RX ORDER — ONDANSETRON 2 MG/ML
INJECTION INTRAMUSCULAR; INTRAVENOUS
Status: DISCONTINUED | OUTPATIENT
Start: 2021-04-07 | End: 2021-04-07

## 2021-04-07 RX ORDER — OXYCODONE AND ACETAMINOPHEN 10; 325 MG/1; MG/1
1 TABLET ORAL ONCE
Status: COMPLETED | OUTPATIENT
Start: 2021-04-07 | End: 2021-04-07

## 2021-04-07 RX ORDER — MIDAZOLAM HYDROCHLORIDE 1 MG/ML
INJECTION, SOLUTION INTRAMUSCULAR; INTRAVENOUS
Status: DISCONTINUED | OUTPATIENT
Start: 2021-04-07 | End: 2021-04-07

## 2021-04-07 RX ADMIN — GLYCOPYRROLATE 0.2 MCG: 0.2 INJECTION, SOLUTION INTRAMUSCULAR; INTRAVITREAL at 12:04

## 2021-04-07 RX ADMIN — PROPOFOL 150 MCG/KG/MIN: 10 INJECTION, EMULSION INTRAVENOUS at 12:04

## 2021-04-07 RX ADMIN — FENTANYL CITRATE 25 MCG: 50 INJECTION INTRAMUSCULAR; INTRAVENOUS at 12:04

## 2021-04-07 RX ADMIN — SODIUM CHLORIDE: 0.9 INJECTION, SOLUTION INTRAVENOUS at 12:04

## 2021-04-07 RX ADMIN — MIDAZOLAM 2 MG: 1 INJECTION INTRAMUSCULAR; INTRAVENOUS at 12:04

## 2021-04-07 RX ADMIN — HYDROMORPHONE HYDROCHLORIDE 0.2 MG: 1 INJECTION, SOLUTION INTRAMUSCULAR; INTRAVENOUS; SUBCUTANEOUS at 01:04

## 2021-04-07 RX ADMIN — ONDANSETRON 4 MG: 2 INJECTION INTRAMUSCULAR; INTRAVENOUS at 12:04

## 2021-04-07 RX ADMIN — PROPOFOL 50 MG: 10 INJECTION, EMULSION INTRAVENOUS at 12:04

## 2021-04-07 RX ADMIN — CEFAZOLIN 2 G: 330 INJECTION, POWDER, FOR SOLUTION INTRAMUSCULAR; INTRAVENOUS at 12:04

## 2021-04-07 RX ADMIN — GENTAMICIN SULFATE 320 MG: 40 INJECTION, SOLUTION INTRAMUSCULAR; INTRAVENOUS at 12:04

## 2021-04-07 RX ADMIN — Medication 20 MG: at 12:04

## 2021-04-07 RX ADMIN — OXYCODONE AND ACETAMINOPHEN 1 TABLET: 10; 325 TABLET ORAL at 03:04

## 2021-04-07 RX ADMIN — LIDOCAINE HYDROCHLORIDE 50 MG: 20 INJECTION, SOLUTION EPIDURAL; INFILTRATION; INTRACAUDAL at 12:04

## 2021-04-23 ENCOUNTER — OFFICE VISIT (OUTPATIENT)
Dept: UROLOGY | Facility: CLINIC | Age: 36
End: 2021-04-23
Payer: MEDICAID

## 2021-04-23 VITALS
HEART RATE: 58 BPM | BODY MASS INDEX: 27.39 KG/M2 | DIASTOLIC BLOOD PRESSURE: 66 MMHG | WEIGHT: 184.94 LBS | SYSTOLIC BLOOD PRESSURE: 103 MMHG | HEIGHT: 69 IN

## 2021-04-23 DIAGNOSIS — N31.9 NEUROGENIC BLADDER: Primary | Chronic | ICD-10-CM

## 2021-04-23 DIAGNOSIS — Z93.59 CHRONIC SUPRAPUBIC CATHETER: ICD-10-CM

## 2021-04-23 DIAGNOSIS — Z43.5 ENCOUNTER FOR CARE OR REPLACEMENT OF SUPRAPUBIC TUBE: ICD-10-CM

## 2021-04-23 DIAGNOSIS — R33.9 URINARY RETENTION: ICD-10-CM

## 2021-04-23 DIAGNOSIS — Z74.09 IMPAIRED FUNCTIONAL MOBILITY, BALANCE, AND ENDURANCE: ICD-10-CM

## 2021-04-23 PROCEDURE — 99499 UNLISTED E&M SERVICE: CPT | Mod: S$PBB,,, | Performed by: NURSE PRACTITIONER

## 2021-04-23 PROCEDURE — 51705 CHANGE OF BLADDER TUBE: CPT | Mod: S$PBB,,, | Performed by: NURSE PRACTITIONER

## 2021-04-23 PROCEDURE — 99999 PR PBB SHADOW E&M-EST. PATIENT-LVL III: CPT | Mod: PBBFAC,,, | Performed by: NURSE PRACTITIONER

## 2021-04-23 PROCEDURE — 99999 PR PBB SHADOW E&M-EST. PATIENT-LVL III: ICD-10-PCS | Mod: PBBFAC,,, | Performed by: NURSE PRACTITIONER

## 2021-04-23 PROCEDURE — 99213 OFFICE O/P EST LOW 20 MIN: CPT | Mod: PBBFAC | Performed by: NURSE PRACTITIONER

## 2021-04-23 PROCEDURE — 51705 CHANGE OF BLADDER TUBE: CPT | Mod: PBBFAC | Performed by: NURSE PRACTITIONER

## 2021-04-23 PROCEDURE — 51705 PR CHANGE OF BLADDER TUBE,SIMPLE: ICD-10-PCS | Mod: S$PBB,,, | Performed by: NURSE PRACTITIONER

## 2021-04-23 PROCEDURE — 99499 NO LOS: ICD-10-PCS | Mod: S$PBB,,, | Performed by: NURSE PRACTITIONER

## 2021-05-03 ENCOUNTER — PATIENT MESSAGE (OUTPATIENT)
Dept: INTERNAL MEDICINE | Facility: CLINIC | Age: 36
End: 2021-05-03

## 2021-05-04 ENCOUNTER — TELEPHONE (OUTPATIENT)
Dept: INTERNAL MEDICINE | Facility: CLINIC | Age: 36
End: 2021-05-04

## 2021-05-04 ENCOUNTER — PATIENT MESSAGE (OUTPATIENT)
Dept: INTERNAL MEDICINE | Facility: CLINIC | Age: 36
End: 2021-05-04

## 2021-05-06 ENCOUNTER — TELEPHONE (OUTPATIENT)
Dept: INTERNAL MEDICINE | Facility: CLINIC | Age: 36
End: 2021-05-06

## 2021-05-06 ENCOUNTER — PATIENT MESSAGE (OUTPATIENT)
Dept: INTERNAL MEDICINE | Facility: CLINIC | Age: 36
End: 2021-05-06

## 2021-05-06 ENCOUNTER — PATIENT MESSAGE (OUTPATIENT)
Dept: PHYSICAL MEDICINE AND REHAB | Facility: CLINIC | Age: 36
End: 2021-05-06

## 2021-05-06 DIAGNOSIS — M79.2 NEUROPATHIC PAIN: Chronic | ICD-10-CM

## 2021-05-06 RX ORDER — OXYCODONE HCL 10 MG/1
10 TABLET, FILM COATED, EXTENDED RELEASE ORAL EVERY 12 HOURS
Qty: 60 TABLET | Refills: 0 | Status: SHIPPED | OUTPATIENT
Start: 2021-05-06 | End: 2021-06-04 | Stop reason: SDUPTHER

## 2021-05-06 RX ORDER — OXYCODONE AND ACETAMINOPHEN 10; 325 MG/1; MG/1
TABLET ORAL
Qty: 120 TABLET | Refills: 0 | Status: SHIPPED | OUTPATIENT
Start: 2021-05-06 | End: 2021-06-04 | Stop reason: SDUPTHER

## 2021-05-10 ENCOUNTER — TELEPHONE (OUTPATIENT)
Dept: INTERNAL MEDICINE | Facility: CLINIC | Age: 36
End: 2021-05-10

## 2021-05-12 ENCOUNTER — PATIENT MESSAGE (OUTPATIENT)
Dept: PHYSICAL MEDICINE AND REHAB | Facility: CLINIC | Age: 36
End: 2021-05-12

## 2021-05-18 ENCOUNTER — HOSPITAL ENCOUNTER (EMERGENCY)
Facility: HOSPITAL | Age: 36
Discharge: HOME | End: 2021-05-18
Attending: EMERGENCY MEDICINE
Payer: MEDICAID

## 2021-05-18 ENCOUNTER — TELEPHONE (OUTPATIENT)
Dept: INTERNAL MEDICINE | Facility: CLINIC | Age: 36
End: 2021-05-18

## 2021-05-18 ENCOUNTER — PATIENT MESSAGE (OUTPATIENT)
Dept: UROLOGY | Facility: CLINIC | Age: 36
End: 2021-05-18

## 2021-05-18 VITALS
DIASTOLIC BLOOD PRESSURE: 67 MMHG | SYSTOLIC BLOOD PRESSURE: 132 MMHG | OXYGEN SATURATION: 96 % | TEMPERATURE: 101 F | HEART RATE: 88 BPM | RESPIRATION RATE: 20 BRPM

## 2021-05-18 DIAGNOSIS — N39.0 URINARY TRACT INFECTION WITHOUT HEMATURIA, SITE UNSPECIFIED: Primary | ICD-10-CM

## 2021-05-18 DIAGNOSIS — N39.0 COMPLICATED URINARY TRACT INFECTION: ICD-10-CM

## 2021-05-18 DIAGNOSIS — G82.20 PARAPLEGIA: ICD-10-CM

## 2021-05-18 DIAGNOSIS — N31.9 NEUROGENIC BLADDER: ICD-10-CM

## 2021-05-18 LAB
BACTERIA #/AREA URNS HPF: ABNORMAL /HPF
BILIRUB UR QL STRIP: ABNORMAL
CLARITY UR: ABNORMAL
COLOR UR: YELLOW
GLUCOSE UR QL STRIP: NEGATIVE
HGB UR QL STRIP: ABNORMAL
HYALINE CASTS #/AREA URNS LPF: 0 /LPF
KETONES UR QL STRIP: NEGATIVE
LEUKOCYTE ESTERASE UR QL STRIP: ABNORMAL
MICROSCOPIC COMMENT: ABNORMAL
NITRITE UR QL STRIP: POSITIVE
PH UR STRIP: 6 [PH] (ref 5–8)
PROT UR QL STRIP: ABNORMAL
RBC #/AREA URNS HPF: 6 /HPF (ref 0–4)
SP GR UR STRIP: >=1.03 (ref 1–1.03)
URN SPEC COLLECT METH UR: ABNORMAL
UROBILINOGEN UR STRIP-ACNC: 1 EU/DL
WBC #/AREA URNS HPF: >100 /HPF (ref 0–5)

## 2021-05-18 PROCEDURE — 96372 THER/PROPH/DIAG INJ SC/IM: CPT

## 2021-05-18 PROCEDURE — 25000003 PHARM REV CODE 250: Performed by: EMERGENCY MEDICINE

## 2021-05-18 PROCEDURE — 99284 EMERGENCY DEPT VISIT MOD MDM: CPT | Mod: 25

## 2021-05-18 PROCEDURE — 63600175 PHARM REV CODE 636 W HCPCS: Performed by: EMERGENCY MEDICINE

## 2021-05-18 PROCEDURE — 81000 URINALYSIS NONAUTO W/SCOPE: CPT | Performed by: EMERGENCY MEDICINE

## 2021-05-18 RX ORDER — SULFAMETHOXAZOLE AND TRIMETHOPRIM 800; 160 MG/1; MG/1
1 TABLET ORAL 2 TIMES DAILY
Qty: 14 TABLET | Refills: 0 | Status: SHIPPED | OUTPATIENT
Start: 2021-05-18 | End: 2021-06-02 | Stop reason: ALTCHOICE

## 2021-05-18 RX ORDER — SULFAMETHOXAZOLE AND TRIMETHOPRIM 800; 160 MG/1; MG/1
1 TABLET ORAL 2 TIMES DAILY
Qty: 14 TABLET | Refills: 0 | Status: SHIPPED | OUTPATIENT
Start: 2021-05-18 | End: 2021-05-18 | Stop reason: SDUPTHER

## 2021-05-18 RX ORDER — SULFAMETHOXAZOLE AND TRIMETHOPRIM 800; 160 MG/1; MG/1
1 TABLET ORAL
Status: COMPLETED | OUTPATIENT
Start: 2021-05-18 | End: 2021-05-18

## 2021-05-18 RX ORDER — CEFTRIAXONE 1 G/1
1 INJECTION, POWDER, FOR SOLUTION INTRAMUSCULAR; INTRAVENOUS
Status: COMPLETED | OUTPATIENT
Start: 2021-05-18 | End: 2021-05-18

## 2021-05-18 RX ADMIN — SULFAMETHOXAZOLE AND TRIMETHOPRIM 1 TABLET: 800; 160 TABLET ORAL at 06:05

## 2021-05-18 RX ADMIN — CEFTRIAXONE SODIUM 1 G: 1 INJECTION, POWDER, FOR SOLUTION INTRAMUSCULAR; INTRAVENOUS at 07:05

## 2021-05-19 ENCOUNTER — TELEPHONE (OUTPATIENT)
Dept: INTERNAL MEDICINE | Facility: CLINIC | Age: 36
End: 2021-05-19

## 2021-05-19 ENCOUNTER — OFFICE VISIT (OUTPATIENT)
Dept: UROLOGY | Facility: CLINIC | Age: 36
End: 2021-05-19
Payer: MEDICAID

## 2021-05-19 VITALS — SYSTOLIC BLOOD PRESSURE: 119 MMHG | HEART RATE: 69 BPM | DIASTOLIC BLOOD PRESSURE: 74 MMHG

## 2021-05-19 DIAGNOSIS — N31.9 NEUROGENIC BLADDER: Primary | Chronic | ICD-10-CM

## 2021-05-19 DIAGNOSIS — Z93.59 CHRONIC SUPRAPUBIC CATHETER: ICD-10-CM

## 2021-05-19 DIAGNOSIS — N39.0 COMPLICATED UTI (URINARY TRACT INFECTION): ICD-10-CM

## 2021-05-19 PROCEDURE — 99999 PR PBB SHADOW E&M-EST. PATIENT-LVL III: ICD-10-PCS | Mod: PBBFAC,,, | Performed by: NURSE PRACTITIONER

## 2021-05-19 PROCEDURE — 99999 PR PBB SHADOW E&M-EST. PATIENT-LVL III: CPT | Mod: PBBFAC,,, | Performed by: NURSE PRACTITIONER

## 2021-05-19 PROCEDURE — 51705 PR CHANGE OF BLADDER TUBE,SIMPLE: ICD-10-PCS | Mod: S$PBB,,, | Performed by: NURSE PRACTITIONER

## 2021-05-19 PROCEDURE — 51705 CHANGE OF BLADDER TUBE: CPT | Mod: PBBFAC | Performed by: NURSE PRACTITIONER

## 2021-05-19 PROCEDURE — 99212 OFFICE O/P EST SF 10 MIN: CPT | Mod: S$PBB,25,, | Performed by: NURSE PRACTITIONER

## 2021-05-19 PROCEDURE — 51705 CHANGE OF BLADDER TUBE: CPT | Mod: S$PBB,,, | Performed by: NURSE PRACTITIONER

## 2021-05-19 PROCEDURE — 99212 PR OFFICE/OUTPT VISIT, EST, LEVL II, 10-19 MIN: ICD-10-PCS | Mod: S$PBB,25,, | Performed by: NURSE PRACTITIONER

## 2021-05-19 PROCEDURE — 99213 OFFICE O/P EST LOW 20 MIN: CPT | Mod: PBBFAC | Performed by: NURSE PRACTITIONER

## 2021-05-20 ENCOUNTER — HOSPITAL ENCOUNTER (EMERGENCY)
Facility: HOSPITAL | Age: 36
Discharge: HOME | End: 2021-05-20
Attending: FAMILY MEDICINE
Payer: MEDICAID

## 2021-05-20 VITALS
HEART RATE: 69 BPM | DIASTOLIC BLOOD PRESSURE: 70 MMHG | RESPIRATION RATE: 18 BRPM | SYSTOLIC BLOOD PRESSURE: 107 MMHG | TEMPERATURE: 100 F | OXYGEN SATURATION: 95 %

## 2021-05-20 DIAGNOSIS — N39.0 URINARY TRACT INFECTION WITHOUT HEMATURIA, SITE UNSPECIFIED: ICD-10-CM

## 2021-05-20 DIAGNOSIS — R74.8 ELEVATED LIPASE: Primary | ICD-10-CM

## 2021-05-20 LAB
ALBUMIN SERPL BCP-MCNC: 3.6 G/DL (ref 3.5–5.2)
ALP SERPL-CCNC: 95 U/L (ref 55–135)
ALT SERPL W/O P-5'-P-CCNC: 24 U/L (ref 10–44)
ANION GAP SERPL CALC-SCNC: 10 MMOL/L (ref 8–16)
AST SERPL-CCNC: 19 U/L (ref 10–40)
BASOPHILS # BLD AUTO: 0.01 K/UL (ref 0–0.2)
BASOPHILS NFR BLD: 0.3 % (ref 0–1.9)
BILIRUB SERPL-MCNC: 0.3 MG/DL (ref 0.1–1)
BILIRUB UR QL STRIP: NEGATIVE
BUN SERPL-MCNC: 10 MG/DL (ref 6–20)
CALCIUM SERPL-MCNC: 8.5 MG/DL (ref 8.7–10.5)
CHLORIDE SERPL-SCNC: 104 MMOL/L (ref 95–110)
CLARITY UR: CLEAR
CO2 SERPL-SCNC: 23 MMOL/L (ref 23–29)
COLOR UR: YELLOW
CREAT SERPL-MCNC: 0.6 MG/DL (ref 0.5–1.4)
DIFFERENTIAL METHOD: ABNORMAL
EOSINOPHIL # BLD AUTO: 0.1 K/UL (ref 0–0.5)
EOSINOPHIL NFR BLD: 1.5 % (ref 0–8)
ERYTHROCYTE [DISTWIDTH] IN BLOOD BY AUTOMATED COUNT: 12.1 % (ref 11.5–14.5)
EST. GFR  (AFRICAN AMERICAN): >60 ML/MIN/1.73 M^2
EST. GFR  (NON AFRICAN AMERICAN): >60 ML/MIN/1.73 M^2
GLUCOSE SERPL-MCNC: 79 MG/DL (ref 70–110)
GLUCOSE UR QL STRIP: NEGATIVE
HCT VFR BLD AUTO: 41.2 % (ref 40–54)
HGB BLD-MCNC: 13.2 G/DL (ref 14–18)
HGB UR QL STRIP: ABNORMAL
IMM GRANULOCYTES # BLD AUTO: 0.03 K/UL (ref 0–0.04)
IMM GRANULOCYTES NFR BLD AUTO: 0.8 % (ref 0–0.5)
KETONES UR QL STRIP: ABNORMAL
LEUKOCYTE ESTERASE UR QL STRIP: NEGATIVE
LIPASE SERPL-CCNC: 106 U/L (ref 4–60)
LYMPHOCYTES # BLD AUTO: 1 K/UL (ref 1–4.8)
LYMPHOCYTES NFR BLD: 25 % (ref 18–48)
MCH RBC QN AUTO: 31 PG (ref 27–31)
MCHC RBC AUTO-ENTMCNC: 32 G/DL (ref 32–36)
MCV RBC AUTO: 97 FL (ref 82–98)
MONOCYTES # BLD AUTO: 0.5 K/UL (ref 0.3–1)
MONOCYTES NFR BLD: 12 % (ref 4–15)
NEUTROPHILS # BLD AUTO: 2.4 K/UL (ref 1.8–7.7)
NEUTROPHILS NFR BLD: 60.4 % (ref 38–73)
NITRITE UR QL STRIP: NEGATIVE
NRBC BLD-RTO: 0 /100 WBC
PH UR STRIP: 6 [PH] (ref 5–8)
PLATELET # BLD AUTO: 187 K/UL (ref 150–450)
PMV BLD AUTO: 10.7 FL (ref 9.2–12.9)
POTASSIUM SERPL-SCNC: 3.7 MMOL/L (ref 3.5–5.1)
PROT SERPL-MCNC: 7 G/DL (ref 6–8.4)
PROT UR QL STRIP: NEGATIVE
RBC # BLD AUTO: 4.26 M/UL (ref 4.6–6.2)
SODIUM SERPL-SCNC: 137 MMOL/L (ref 136–145)
SP GR UR STRIP: 1.01 (ref 1–1.03)
URN SPEC COLLECT METH UR: ABNORMAL
UROBILINOGEN UR STRIP-ACNC: ABNORMAL EU/DL
WBC # BLD AUTO: 3.92 K/UL (ref 3.9–12.7)

## 2021-05-20 PROCEDURE — 83690 ASSAY OF LIPASE: CPT | Performed by: FAMILY MEDICINE

## 2021-05-20 PROCEDURE — 36415 COLL VENOUS BLD VENIPUNCTURE: CPT | Performed by: FAMILY MEDICINE

## 2021-05-20 PROCEDURE — 96372 THER/PROPH/DIAG INJ SC/IM: CPT

## 2021-05-20 PROCEDURE — 80053 COMPREHEN METABOLIC PANEL: CPT | Performed by: FAMILY MEDICINE

## 2021-05-20 PROCEDURE — 63600175 PHARM REV CODE 636 W HCPCS: Performed by: FAMILY MEDICINE

## 2021-05-20 PROCEDURE — 99284 EMERGENCY DEPT VISIT MOD MDM: CPT | Mod: 25

## 2021-05-20 PROCEDURE — 85025 COMPLETE CBC W/AUTO DIFF WBC: CPT | Performed by: FAMILY MEDICINE

## 2021-05-20 PROCEDURE — 81003 URINALYSIS AUTO W/O SCOPE: CPT | Performed by: FAMILY MEDICINE

## 2021-05-20 RX ORDER — KETOROLAC TROMETHAMINE 30 MG/ML
30 INJECTION, SOLUTION INTRAMUSCULAR; INTRAVENOUS
Status: DISCONTINUED | OUTPATIENT
Start: 2021-05-20 | End: 2021-05-20

## 2021-05-20 RX ORDER — HYDROCODONE BITARTRATE AND ACETAMINOPHEN 5; 325 MG/1; MG/1
1 TABLET ORAL EVERY 8 HOURS PRN
Qty: 15 TABLET | Refills: 0 | Status: SHIPPED | OUTPATIENT
Start: 2021-05-20 | End: 2021-06-02

## 2021-05-20 RX ORDER — MORPHINE SULFATE 4 MG/ML
4 INJECTION, SOLUTION INTRAMUSCULAR; INTRAVENOUS
Status: COMPLETED | OUTPATIENT
Start: 2021-05-20 | End: 2021-05-20

## 2021-05-20 RX ADMIN — MORPHINE SULFATE 4 MG: 4 INJECTION INTRAVENOUS at 11:05

## 2021-05-21 ENCOUNTER — NURSE TRIAGE (OUTPATIENT)
Dept: ADMINISTRATIVE | Facility: CLINIC | Age: 36
End: 2021-05-21

## 2021-05-21 ENCOUNTER — PATIENT MESSAGE (OUTPATIENT)
Dept: INTERNAL MEDICINE | Facility: CLINIC | Age: 36
End: 2021-05-21

## 2021-05-24 ENCOUNTER — PATIENT MESSAGE (OUTPATIENT)
Dept: INTERNAL MEDICINE | Facility: CLINIC | Age: 36
End: 2021-05-24

## 2021-05-24 DIAGNOSIS — Z93.3 COLOSTOMY STATUS: Primary | ICD-10-CM

## 2021-05-25 ENCOUNTER — PATIENT MESSAGE (OUTPATIENT)
Dept: PHYSICAL MEDICINE AND REHAB | Facility: CLINIC | Age: 36
End: 2021-05-25

## 2021-05-25 DIAGNOSIS — F41.9 ANXIETY: ICD-10-CM

## 2021-05-25 RX ORDER — DIAZEPAM 10 MG/1
10 TABLET ORAL 3 TIMES DAILY PRN
Qty: 90 TABLET | Refills: 0 | Status: SHIPPED | OUTPATIENT
Start: 2021-05-25 | End: 2021-08-07

## 2021-06-02 ENCOUNTER — OFFICE VISIT (OUTPATIENT)
Dept: INTERNAL MEDICINE | Facility: CLINIC | Age: 36
End: 2021-06-02
Payer: MEDICAID

## 2021-06-02 VITALS
DIASTOLIC BLOOD PRESSURE: 82 MMHG | OXYGEN SATURATION: 95 % | BODY MASS INDEX: 27.31 KG/M2 | HEIGHT: 69 IN | SYSTOLIC BLOOD PRESSURE: 124 MMHG | HEART RATE: 50 BPM

## 2021-06-02 DIAGNOSIS — Z97.8 PRESENCE OF INTRATHECAL BACLOFEN PUMP: ICD-10-CM

## 2021-06-02 DIAGNOSIS — K59.2 NEUROGENIC BOWEL: ICD-10-CM

## 2021-06-02 DIAGNOSIS — Z93.3 COLOSTOMY STATUS: ICD-10-CM

## 2021-06-02 DIAGNOSIS — J45.20 MILD INTERMITTENT ASTHMA WITHOUT COMPLICATION: ICD-10-CM

## 2021-06-02 DIAGNOSIS — N31.9 NEUROGENIC BLADDER: ICD-10-CM

## 2021-06-02 DIAGNOSIS — M21.969 DEFORMITY OF FOOT, UNSPECIFIED LATERALITY: ICD-10-CM

## 2021-06-02 DIAGNOSIS — R05.9 COUGH: ICD-10-CM

## 2021-06-02 DIAGNOSIS — R19.4 BOWEL HABIT CHANGES: ICD-10-CM

## 2021-06-02 DIAGNOSIS — R10.9 ABDOMINAL PAIN, UNSPECIFIED ABDOMINAL LOCATION: Primary | ICD-10-CM

## 2021-06-02 DIAGNOSIS — M79.676 PAIN OF TOE, UNSPECIFIED LATERALITY: ICD-10-CM

## 2021-06-02 PROCEDURE — 99999 PR PBB SHADOW E&M-EST. PATIENT-LVL V: CPT | Mod: PBBFAC,,, | Performed by: INTERNAL MEDICINE

## 2021-06-02 PROCEDURE — 99999 PR PBB SHADOW E&M-EST. PATIENT-LVL V: ICD-10-PCS | Mod: PBBFAC,,, | Performed by: INTERNAL MEDICINE

## 2021-06-02 PROCEDURE — 99215 OFFICE O/P EST HI 40 MIN: CPT | Mod: PBBFAC | Performed by: INTERNAL MEDICINE

## 2021-06-02 PROCEDURE — 99214 PR OFFICE/OUTPT VISIT, EST, LEVL IV, 30-39 MIN: ICD-10-PCS | Mod: S$PBB,,, | Performed by: INTERNAL MEDICINE

## 2021-06-02 PROCEDURE — 99214 OFFICE O/P EST MOD 30 MIN: CPT | Mod: S$PBB,,, | Performed by: INTERNAL MEDICINE

## 2021-06-02 RX ORDER — POLYETHYLENE GLYCOL 3350 17 G/17G
17 POWDER, FOR SOLUTION ORAL DAILY PRN
Qty: 30 EACH | Refills: 6 | Status: SHIPPED | OUTPATIENT
Start: 2021-06-02 | End: 2021-06-03 | Stop reason: SDUPTHER

## 2021-06-04 ENCOUNTER — TELEPHONE (OUTPATIENT)
Dept: INTERNAL MEDICINE | Facility: CLINIC | Age: 36
End: 2021-06-04

## 2021-06-04 ENCOUNTER — PATIENT MESSAGE (OUTPATIENT)
Dept: PHYSICAL MEDICINE AND REHAB | Facility: CLINIC | Age: 36
End: 2021-06-04

## 2021-06-04 DIAGNOSIS — Z93.3 COLOSTOMY IN PLACE: ICD-10-CM

## 2021-06-04 DIAGNOSIS — G82.20 PARAPLEGIA FOLLOWING SPINAL CORD INJURY: Primary | ICD-10-CM

## 2021-06-04 DIAGNOSIS — M79.2 NEUROPATHIC PAIN: Chronic | ICD-10-CM

## 2021-06-04 DIAGNOSIS — K59.2 NEUROGENIC BOWEL: ICD-10-CM

## 2021-06-04 RX ORDER — OXYCODONE HCL 10 MG/1
10 TABLET, FILM COATED, EXTENDED RELEASE ORAL EVERY 12 HOURS
Qty: 60 TABLET | Refills: 0 | Status: SHIPPED | OUTPATIENT
Start: 2021-06-04 | End: 2021-07-07 | Stop reason: SDUPTHER

## 2021-06-04 RX ORDER — OXYCODONE AND ACETAMINOPHEN 10; 325 MG/1; MG/1
TABLET ORAL
Qty: 120 TABLET | Refills: 0 | Status: SHIPPED | OUTPATIENT
Start: 2021-06-04 | End: 2021-07-07 | Stop reason: SDUPTHER

## 2021-06-10 ENCOUNTER — LAB VISIT (OUTPATIENT)
Dept: LAB | Facility: HOSPITAL | Age: 36
End: 2021-06-10
Attending: INTERNAL MEDICINE
Payer: MEDICAID

## 2021-06-10 DIAGNOSIS — Z93.3 COLOSTOMY STATUS: ICD-10-CM

## 2021-06-10 DIAGNOSIS — K59.2 NEUROGENIC BOWEL: ICD-10-CM

## 2021-06-10 LAB
ALBUMIN SERPL BCP-MCNC: 3.6 G/DL (ref 3.5–5.2)
ALP SERPL-CCNC: 97 U/L (ref 55–135)
ALT SERPL W/O P-5'-P-CCNC: 19 U/L (ref 10–44)
ANION GAP SERPL CALC-SCNC: 7 MMOL/L (ref 8–16)
AST SERPL-CCNC: 15 U/L (ref 10–40)
BASOPHILS # BLD AUTO: 0.03 K/UL (ref 0–0.2)
BASOPHILS NFR BLD: 0.4 % (ref 0–1.9)
BILIRUB SERPL-MCNC: 0.5 MG/DL (ref 0.1–1)
BUN SERPL-MCNC: 9 MG/DL (ref 6–20)
CALCIUM SERPL-MCNC: 8.4 MG/DL (ref 8.7–10.5)
CHLORIDE SERPL-SCNC: 106 MMOL/L (ref 95–110)
CO2 SERPL-SCNC: 24 MMOL/L (ref 23–29)
CREAT SERPL-MCNC: 0.5 MG/DL (ref 0.5–1.4)
DIFFERENTIAL METHOD: ABNORMAL
EOSINOPHIL # BLD AUTO: 0.1 K/UL (ref 0–0.5)
EOSINOPHIL NFR BLD: 1.2 % (ref 0–8)
ERYTHROCYTE [DISTWIDTH] IN BLOOD BY AUTOMATED COUNT: 12.5 % (ref 11.5–14.5)
EST. GFR  (AFRICAN AMERICAN): >60 ML/MIN/1.73 M^2
EST. GFR  (NON AFRICAN AMERICAN): >60 ML/MIN/1.73 M^2
GLUCOSE SERPL-MCNC: 96 MG/DL (ref 70–110)
HCT VFR BLD AUTO: 43.3 % (ref 40–54)
HGB BLD-MCNC: 13.5 G/DL (ref 14–18)
IMM GRANULOCYTES # BLD AUTO: 0.03 K/UL (ref 0–0.04)
IMM GRANULOCYTES NFR BLD AUTO: 0.4 % (ref 0–0.5)
LIPASE SERPL-CCNC: 53 U/L (ref 4–60)
LYMPHOCYTES # BLD AUTO: 1.4 K/UL (ref 1–4.8)
LYMPHOCYTES NFR BLD: 20.7 % (ref 18–48)
MCH RBC QN AUTO: 30.8 PG (ref 27–31)
MCHC RBC AUTO-ENTMCNC: 31.2 G/DL (ref 32–36)
MCV RBC AUTO: 99 FL (ref 82–98)
MONOCYTES # BLD AUTO: 0.6 K/UL (ref 0.3–1)
MONOCYTES NFR BLD: 8.8 % (ref 4–15)
NEUTROPHILS # BLD AUTO: 4.7 K/UL (ref 1.8–7.7)
NEUTROPHILS NFR BLD: 68.5 % (ref 38–73)
NRBC BLD-RTO: 0 /100 WBC
PLATELET # BLD AUTO: 203 K/UL (ref 150–450)
PMV BLD AUTO: 11.4 FL (ref 9.2–12.9)
POTASSIUM SERPL-SCNC: 4.1 MMOL/L (ref 3.5–5.1)
PROT SERPL-MCNC: 6.7 G/DL (ref 6–8.4)
RBC # BLD AUTO: 4.38 M/UL (ref 4.6–6.2)
SODIUM SERPL-SCNC: 137 MMOL/L (ref 136–145)
TSH SERPL DL<=0.005 MIU/L-ACNC: 1.26 UIU/ML (ref 0.4–4)
WBC # BLD AUTO: 6.85 K/UL (ref 3.9–12.7)

## 2021-06-10 PROCEDURE — 85025 COMPLETE CBC W/AUTO DIFF WBC: CPT | Performed by: INTERNAL MEDICINE

## 2021-06-10 PROCEDURE — 80053 COMPREHEN METABOLIC PANEL: CPT | Performed by: INTERNAL MEDICINE

## 2021-06-10 PROCEDURE — 84443 ASSAY THYROID STIM HORMONE: CPT | Performed by: INTERNAL MEDICINE

## 2021-06-10 PROCEDURE — 83690 ASSAY OF LIPASE: CPT | Performed by: INTERNAL MEDICINE

## 2021-06-10 PROCEDURE — 36415 COLL VENOUS BLD VENIPUNCTURE: CPT | Performed by: INTERNAL MEDICINE

## 2021-06-14 ENCOUNTER — TELEPHONE (OUTPATIENT)
Dept: PHYSICAL MEDICINE AND REHAB | Facility: CLINIC | Age: 36
End: 2021-06-14

## 2021-06-17 ENCOUNTER — PATIENT MESSAGE (OUTPATIENT)
Dept: UROLOGY | Facility: CLINIC | Age: 36
End: 2021-06-17

## 2021-06-17 ENCOUNTER — OFFICE VISIT (OUTPATIENT)
Dept: UROLOGY | Facility: CLINIC | Age: 36
End: 2021-06-17
Payer: MEDICAID

## 2021-06-17 VITALS
SYSTOLIC BLOOD PRESSURE: 101 MMHG | WEIGHT: 180 LBS | BODY MASS INDEX: 26.58 KG/M2 | DIASTOLIC BLOOD PRESSURE: 65 MMHG | HEART RATE: 44 BPM

## 2021-06-17 DIAGNOSIS — Z43.5 ENCOUNTER FOR CARE OR REPLACEMENT OF SUPRAPUBIC TUBE: ICD-10-CM

## 2021-06-17 DIAGNOSIS — N31.9 NEUROGENIC BLADDER: Chronic | ICD-10-CM

## 2021-06-17 DIAGNOSIS — G82.20 PARAPLEGIA FOLLOWING SPINAL CORD INJURY: Chronic | ICD-10-CM

## 2021-06-17 PROCEDURE — 99499 UNLISTED E&M SERVICE: CPT | Mod: S$PBB,,, | Performed by: NURSE PRACTITIONER

## 2021-06-17 PROCEDURE — 99999 PR PBB SHADOW E&M-EST. PATIENT-LVL IV: CPT | Mod: PBBFAC,,, | Performed by: NURSE PRACTITIONER

## 2021-06-17 PROCEDURE — 99999 PR PBB SHADOW E&M-EST. PATIENT-LVL IV: ICD-10-PCS | Mod: PBBFAC,,, | Performed by: NURSE PRACTITIONER

## 2021-06-17 PROCEDURE — 51705 CHANGE OF BLADDER TUBE: CPT | Mod: PBBFAC | Performed by: NURSE PRACTITIONER

## 2021-06-17 PROCEDURE — 99214 OFFICE O/P EST MOD 30 MIN: CPT | Mod: PBBFAC | Performed by: NURSE PRACTITIONER

## 2021-06-17 PROCEDURE — 99499 NO LOS: ICD-10-PCS | Mod: S$PBB,,, | Performed by: NURSE PRACTITIONER

## 2021-06-17 PROCEDURE — 51705 PR CHANGE OF BLADDER TUBE,SIMPLE: ICD-10-PCS | Mod: S$PBB,,, | Performed by: NURSE PRACTITIONER

## 2021-06-17 PROCEDURE — 51705 CHANGE OF BLADDER TUBE: CPT | Mod: S$PBB,,, | Performed by: NURSE PRACTITIONER

## 2021-06-30 ENCOUNTER — TELEPHONE (OUTPATIENT)
Dept: PHYSICAL MEDICINE AND REHAB | Facility: CLINIC | Age: 36
End: 2021-06-30

## 2021-06-30 DIAGNOSIS — R25.2 SPASTICITY: Primary | ICD-10-CM

## 2021-07-01 ENCOUNTER — PATIENT MESSAGE (OUTPATIENT)
Dept: PHYSICAL MEDICINE AND REHAB | Facility: CLINIC | Age: 36
End: 2021-07-01

## 2021-07-06 ENCOUNTER — HOSPITAL ENCOUNTER (EMERGENCY)
Facility: HOSPITAL | Age: 36
Discharge: HOME | End: 2021-07-06
Attending: SURGERY
Payer: MEDICAID

## 2021-07-06 VITALS
HEART RATE: 43 BPM | OXYGEN SATURATION: 99 % | RESPIRATION RATE: 18 BRPM | TEMPERATURE: 96 F | DIASTOLIC BLOOD PRESSURE: 61 MMHG | BODY MASS INDEX: 25.84 KG/M2 | SYSTOLIC BLOOD PRESSURE: 97 MMHG | WEIGHT: 175 LBS

## 2021-07-06 DIAGNOSIS — N30.00 ACUTE CYSTITIS WITHOUT HEMATURIA: Primary | ICD-10-CM

## 2021-07-06 DIAGNOSIS — K59.00 CONSTIPATION: ICD-10-CM

## 2021-07-06 DIAGNOSIS — Z93.59 SUPRAPUBIC CATHETER: ICD-10-CM

## 2021-07-06 LAB
ALBUMIN SERPL BCP-MCNC: 3.7 G/DL (ref 3.5–5.2)
ALP SERPL-CCNC: 102 U/L (ref 55–135)
ALT SERPL W/O P-5'-P-CCNC: 20 U/L (ref 10–44)
ANION GAP SERPL CALC-SCNC: 10 MMOL/L (ref 8–16)
AST SERPL-CCNC: 11 U/L (ref 10–40)
BACTERIA #/AREA URNS HPF: ABNORMAL /HPF
BASOPHILS # BLD AUTO: 0.02 K/UL (ref 0–0.2)
BASOPHILS NFR BLD: 0.2 % (ref 0–1.9)
BILIRUB SERPL-MCNC: 0.2 MG/DL (ref 0.1–1)
BILIRUB UR QL STRIP: NEGATIVE
BUN SERPL-MCNC: 8 MG/DL (ref 6–20)
CALCIUM SERPL-MCNC: 8.6 MG/DL (ref 8.7–10.5)
CHLORIDE SERPL-SCNC: 105 MMOL/L (ref 95–110)
CLARITY UR: ABNORMAL
CO2 SERPL-SCNC: 25 MMOL/L (ref 23–29)
COLOR UR: YELLOW
CREAT SERPL-MCNC: 0.6 MG/DL (ref 0.5–1.4)
DIFFERENTIAL METHOD: ABNORMAL
EOSINOPHIL # BLD AUTO: 0.1 K/UL (ref 0–0.5)
EOSINOPHIL NFR BLD: 1.4 % (ref 0–8)
ERYTHROCYTE [DISTWIDTH] IN BLOOD BY AUTOMATED COUNT: 12.6 % (ref 11.5–14.5)
EST. GFR  (AFRICAN AMERICAN): >60 ML/MIN/1.73 M^2
EST. GFR  (NON AFRICAN AMERICAN): >60 ML/MIN/1.73 M^2
GLUCOSE SERPL-MCNC: 101 MG/DL (ref 70–110)
GLUCOSE UR QL STRIP: NEGATIVE
HCT VFR BLD AUTO: 42.7 % (ref 40–54)
HGB BLD-MCNC: 13.2 G/DL (ref 14–18)
HGB UR QL STRIP: ABNORMAL
IMM GRANULOCYTES # BLD AUTO: 0.02 K/UL (ref 0–0.04)
IMM GRANULOCYTES NFR BLD AUTO: 0.2 % (ref 0–0.5)
KETONES UR QL STRIP: NEGATIVE
LEUKOCYTE ESTERASE UR QL STRIP: ABNORMAL
LIPASE SERPL-CCNC: 78 U/L (ref 4–60)
LYMPHOCYTES # BLD AUTO: 1.4 K/UL (ref 1–4.8)
LYMPHOCYTES NFR BLD: 16.8 % (ref 18–48)
MCH RBC QN AUTO: 30.7 PG (ref 27–31)
MCHC RBC AUTO-ENTMCNC: 30.9 G/DL (ref 32–36)
MCV RBC AUTO: 99 FL (ref 82–98)
MICROSCOPIC COMMENT: ABNORMAL
MONOCYTES # BLD AUTO: 0.6 K/UL (ref 0.3–1)
MONOCYTES NFR BLD: 6.6 % (ref 4–15)
NEUTROPHILS # BLD AUTO: 6.2 K/UL (ref 1.8–7.7)
NEUTROPHILS NFR BLD: 74.8 % (ref 38–73)
NITRITE UR QL STRIP: POSITIVE
NRBC BLD-RTO: 0 /100 WBC
PH UR STRIP: 7 [PH] (ref 5–8)
PLATELET # BLD AUTO: 189 K/UL (ref 150–450)
PMV BLD AUTO: 11.3 FL (ref 9.2–12.9)
POTASSIUM SERPL-SCNC: 3.8 MMOL/L (ref 3.5–5.1)
PROT SERPL-MCNC: 6.8 G/DL (ref 6–8.4)
PROT UR QL STRIP: ABNORMAL
RBC # BLD AUTO: 4.3 M/UL (ref 4.6–6.2)
RBC #/AREA URNS HPF: 8 /HPF (ref 0–4)
SARS-COV-2 RDRP RESP QL NAA+PROBE: NEGATIVE
SODIUM SERPL-SCNC: 140 MMOL/L (ref 136–145)
SP GR UR STRIP: 1.02 (ref 1–1.03)
URN SPEC COLLECT METH UR: ABNORMAL
UROBILINOGEN UR STRIP-ACNC: >=8 EU/DL
WBC # BLD AUTO: 8.32 K/UL (ref 3.9–12.7)
WBC #/AREA URNS HPF: 11 /HPF (ref 0–5)

## 2021-07-06 PROCEDURE — 83690 ASSAY OF LIPASE: CPT | Performed by: SURGERY

## 2021-07-06 PROCEDURE — 87077 CULTURE AEROBIC IDENTIFY: CPT | Performed by: SURGERY

## 2021-07-06 PROCEDURE — U0002 COVID-19 LAB TEST NON-CDC: HCPCS | Performed by: SURGERY

## 2021-07-06 PROCEDURE — 63600175 PHARM REV CODE 636 W HCPCS: Performed by: NURSE PRACTITIONER

## 2021-07-06 PROCEDURE — 99284 EMERGENCY DEPT VISIT MOD MDM: CPT | Mod: 25

## 2021-07-06 PROCEDURE — 96372 THER/PROPH/DIAG INJ SC/IM: CPT

## 2021-07-06 PROCEDURE — 81000 URINALYSIS NONAUTO W/SCOPE: CPT | Performed by: SURGERY

## 2021-07-06 PROCEDURE — 36415 COLL VENOUS BLD VENIPUNCTURE: CPT | Performed by: SURGERY

## 2021-07-06 PROCEDURE — 87086 URINE CULTURE/COLONY COUNT: CPT | Performed by: SURGERY

## 2021-07-06 PROCEDURE — 80053 COMPREHEN METABOLIC PANEL: CPT | Performed by: SURGERY

## 2021-07-06 PROCEDURE — 85025 COMPLETE CBC W/AUTO DIFF WBC: CPT | Performed by: SURGERY

## 2021-07-06 PROCEDURE — 87088 URINE BACTERIA CULTURE: CPT | Performed by: SURGERY

## 2021-07-06 PROCEDURE — 87186 SC STD MICRODIL/AGAR DIL: CPT | Performed by: SURGERY

## 2021-07-06 RX ORDER — POLYETHYLENE GLYCOL 3350 17 G/17G
17 POWDER, FOR SOLUTION ORAL DAILY PRN
Qty: 119 G | Refills: 0 | Status: SHIPPED | OUTPATIENT
Start: 2021-07-06 | End: 2021-07-11

## 2021-07-06 RX ORDER — SULFAMETHOXAZOLE AND TRIMETHOPRIM 800; 160 MG/1; MG/1
1 TABLET ORAL 2 TIMES DAILY
Qty: 14 TABLET | Refills: 0 | Status: SHIPPED | OUTPATIENT
Start: 2021-07-06 | End: 2021-07-13

## 2021-07-06 RX ORDER — CEFTRIAXONE 1 G/1
1 INJECTION, POWDER, FOR SOLUTION INTRAMUSCULAR; INTRAVENOUS
Status: COMPLETED | OUTPATIENT
Start: 2021-07-06 | End: 2021-07-06

## 2021-07-06 RX ADMIN — CEFTRIAXONE SODIUM 1 G: 1 INJECTION, POWDER, FOR SOLUTION INTRAMUSCULAR; INTRAVENOUS at 06:07

## 2021-07-07 ENCOUNTER — PATIENT MESSAGE (OUTPATIENT)
Dept: PHYSICAL MEDICINE AND REHAB | Facility: CLINIC | Age: 36
End: 2021-07-07

## 2021-07-07 DIAGNOSIS — M79.2 NEUROPATHIC PAIN: Chronic | ICD-10-CM

## 2021-07-07 RX ORDER — OXYCODONE AND ACETAMINOPHEN 10; 325 MG/1; MG/1
TABLET ORAL
Qty: 120 TABLET | Refills: 0 | Status: SHIPPED | OUTPATIENT
Start: 2021-07-07 | End: 2021-08-06 | Stop reason: SDUPTHER

## 2021-07-07 RX ORDER — OXYCODONE HCL 10 MG/1
10 TABLET, FILM COATED, EXTENDED RELEASE ORAL EVERY 12 HOURS
Qty: 60 TABLET | Refills: 0 | Status: SHIPPED | OUTPATIENT
Start: 2021-07-07 | End: 2021-08-16 | Stop reason: SDUPTHER

## 2021-07-09 ENCOUNTER — HOSPITAL ENCOUNTER (EMERGENCY)
Facility: HOSPITAL | Age: 36
Discharge: HOME | End: 2021-07-09
Attending: SURGERY
Payer: MEDICAID

## 2021-07-09 DIAGNOSIS — Z87.440 HISTORY OF UTI: ICD-10-CM

## 2021-07-09 DIAGNOSIS — K59.00 CONSTIPATION, UNSPECIFIED CONSTIPATION TYPE: Primary | ICD-10-CM

## 2021-07-09 DIAGNOSIS — R10.9 ABDOMINAL PAIN: ICD-10-CM

## 2021-07-09 LAB
ALBUMIN SERPL BCP-MCNC: 3.7 G/DL (ref 3.5–5.2)
ALP SERPL-CCNC: 107 U/L (ref 55–135)
ALT SERPL W/O P-5'-P-CCNC: 18 U/L (ref 10–44)
ANION GAP SERPL CALC-SCNC: 9 MMOL/L (ref 8–16)
AST SERPL-CCNC: 13 U/L (ref 10–40)
BACTERIA UR CULT: ABNORMAL
BACTERIA UR CULT: ABNORMAL
BASOPHILS # BLD AUTO: 0.02 K/UL (ref 0–0.2)
BASOPHILS NFR BLD: 0.3 % (ref 0–1.9)
BILIRUB SERPL-MCNC: 0.4 MG/DL (ref 0.1–1)
BUN SERPL-MCNC: 5 MG/DL (ref 6–20)
CALCIUM SERPL-MCNC: 9 MG/DL (ref 8.7–10.5)
CHLORIDE SERPL-SCNC: 105 MMOL/L (ref 95–110)
CK MB SERPL-MCNC: 1.9 NG/ML (ref 0.1–6.5)
CK MB SERPL-RTO: 1.5 % (ref 0–5)
CK SERPL-CCNC: 128 U/L (ref 20–200)
CK SERPL-CCNC: 128 U/L (ref 20–200)
CO2 SERPL-SCNC: 23 MMOL/L (ref 23–29)
CREAT SERPL-MCNC: 0.6 MG/DL (ref 0.5–1.4)
DIFFERENTIAL METHOD: ABNORMAL
EOSINOPHIL # BLD AUTO: 0.1 K/UL (ref 0–0.5)
EOSINOPHIL NFR BLD: 1.6 % (ref 0–8)
ERYTHROCYTE [DISTWIDTH] IN BLOOD BY AUTOMATED COUNT: 12.4 % (ref 11.5–14.5)
EST. GFR  (AFRICAN AMERICAN): >60 ML/MIN/1.73 M^2
EST. GFR  (NON AFRICAN AMERICAN): >60 ML/MIN/1.73 M^2
GLUCOSE SERPL-MCNC: 128 MG/DL (ref 70–110)
HCT VFR BLD AUTO: 42.8 % (ref 40–54)
HGB BLD-MCNC: 13.3 G/DL (ref 14–18)
IMM GRANULOCYTES # BLD AUTO: 0.03 K/UL (ref 0–0.04)
IMM GRANULOCYTES NFR BLD AUTO: 0.5 % (ref 0–0.5)
LIPASE SERPL-CCNC: 29 U/L (ref 4–60)
LYMPHOCYTES # BLD AUTO: 1.4 K/UL (ref 1–4.8)
LYMPHOCYTES NFR BLD: 22.5 % (ref 18–48)
MCH RBC QN AUTO: 30.7 PG (ref 27–31)
MCHC RBC AUTO-ENTMCNC: 31.1 G/DL (ref 32–36)
MCV RBC AUTO: 99 FL (ref 82–98)
MONOCYTES # BLD AUTO: 0.5 K/UL (ref 0.3–1)
MONOCYTES NFR BLD: 8.3 % (ref 4–15)
NEUTROPHILS # BLD AUTO: 4.3 K/UL (ref 1.8–7.7)
NEUTROPHILS NFR BLD: 66.8 % (ref 38–73)
NRBC BLD-RTO: 0 /100 WBC
PLATELET # BLD AUTO: 175 K/UL (ref 150–450)
PMV BLD AUTO: 11.1 FL (ref 9.2–12.9)
POTASSIUM SERPL-SCNC: 4 MMOL/L (ref 3.5–5.1)
PROT SERPL-MCNC: 6.6 G/DL (ref 6–8.4)
RBC # BLD AUTO: 4.33 M/UL (ref 4.6–6.2)
SODIUM SERPL-SCNC: 137 MMOL/L (ref 136–145)
TROPONIN I SERPL DL<=0.01 NG/ML-MCNC: 0.05 NG/ML (ref 0–0.03)
WBC # BLD AUTO: 6.4 K/UL (ref 3.9–12.7)

## 2021-07-09 PROCEDURE — 25000003 PHARM REV CODE 250: Performed by: SURGERY

## 2021-07-09 PROCEDURE — 93010 ELECTROCARDIOGRAM REPORT: CPT | Mod: ,,, | Performed by: INTERNAL MEDICINE

## 2021-07-09 PROCEDURE — 93010 EKG 12-LEAD: ICD-10-PCS | Mod: ,,, | Performed by: INTERNAL MEDICINE

## 2021-07-09 PROCEDURE — 82550 ASSAY OF CK (CPK): CPT | Performed by: SURGERY

## 2021-07-09 PROCEDURE — 93005 ELECTROCARDIOGRAM TRACING: CPT

## 2021-07-09 PROCEDURE — 80053 COMPREHEN METABOLIC PANEL: CPT | Performed by: SURGERY

## 2021-07-09 PROCEDURE — 84484 ASSAY OF TROPONIN QUANT: CPT | Performed by: SURGERY

## 2021-07-09 PROCEDURE — 25500020 PHARM REV CODE 255: Performed by: SURGERY

## 2021-07-09 PROCEDURE — 36415 COLL VENOUS BLD VENIPUNCTURE: CPT | Performed by: SURGERY

## 2021-07-09 PROCEDURE — 83690 ASSAY OF LIPASE: CPT | Performed by: SURGERY

## 2021-07-09 PROCEDURE — 99285 EMERGENCY DEPT VISIT HI MDM: CPT | Mod: 25

## 2021-07-09 PROCEDURE — 85025 COMPLETE CBC W/AUTO DIFF WBC: CPT | Performed by: SURGERY

## 2021-07-09 RX ORDER — LACTULOSE 10 G/15ML
15 SOLUTION ORAL 3 TIMES DAILY
Qty: 483 ML | Refills: 0 | Status: SHIPPED | OUTPATIENT
Start: 2021-07-09 | End: 2021-07-16

## 2021-07-09 RX ORDER — SYRING-NEEDL,DISP,INSUL,0.3 ML 29 G X1/2"
296 SYRINGE, EMPTY DISPOSABLE MISCELLANEOUS
Status: COMPLETED | OUTPATIENT
Start: 2021-07-09 | End: 2021-07-09

## 2021-07-09 RX ADMIN — IOHEXOL 30 ML: 350 INJECTION, SOLUTION INTRAVENOUS at 04:07

## 2021-07-09 RX ADMIN — IOHEXOL 75 ML: 350 INJECTION, SOLUTION INTRAVENOUS at 04:07

## 2021-07-09 RX ADMIN — ACETAMINOPHEN,PHENIRAMINE MALEATE, PHENYLEPHRINE HYDROCHLORIDE 296 ML: 650; 20; 10 POWDER, FOR SUSPENSION ORAL at 05:07

## 2021-07-12 ENCOUNTER — PATIENT MESSAGE (OUTPATIENT)
Dept: INTERNAL MEDICINE | Facility: CLINIC | Age: 36
End: 2021-07-12

## 2021-07-12 ENCOUNTER — HOSPITAL ENCOUNTER (EMERGENCY)
Facility: HOSPITAL | Age: 36
Discharge: HOME | End: 2021-07-12
Attending: EMERGENCY MEDICINE
Payer: MEDICAID

## 2021-07-12 VITALS
OXYGEN SATURATION: 97 % | DIASTOLIC BLOOD PRESSURE: 53 MMHG | HEART RATE: 51 BPM | SYSTOLIC BLOOD PRESSURE: 90 MMHG | WEIGHT: 180 LBS | TEMPERATURE: 97 F | RESPIRATION RATE: 16 BRPM | BODY MASS INDEX: 26.58 KG/M2

## 2021-07-12 VITALS
TEMPERATURE: 98 F | OXYGEN SATURATION: 97 % | DIASTOLIC BLOOD PRESSURE: 62 MMHG | SYSTOLIC BLOOD PRESSURE: 110 MMHG | RESPIRATION RATE: 20 BRPM | WEIGHT: 176 LBS | HEART RATE: 56 BPM | BODY MASS INDEX: 25.99 KG/M2

## 2021-07-12 DIAGNOSIS — R10.9 ABDOMINAL PAIN, UNSPECIFIED ABDOMINAL LOCATION: Primary | ICD-10-CM

## 2021-07-12 DIAGNOSIS — N39.0 URINARY TRACT INFECTION WITH HEMATURIA, SITE UNSPECIFIED: ICD-10-CM

## 2021-07-12 DIAGNOSIS — K59.00 CONSTIPATION, UNSPECIFIED CONSTIPATION TYPE: ICD-10-CM

## 2021-07-12 DIAGNOSIS — B37.9 YEAST INFECTION: ICD-10-CM

## 2021-07-12 DIAGNOSIS — R31.9 URINARY TRACT INFECTION WITH HEMATURIA, SITE UNSPECIFIED: ICD-10-CM

## 2021-07-12 LAB
ALBUMIN SERPL BCP-MCNC: 3.9 G/DL (ref 3.5–5.2)
ALP SERPL-CCNC: 110 U/L (ref 55–135)
ALT SERPL W/O P-5'-P-CCNC: 22 U/L (ref 10–44)
ANION GAP SERPL CALC-SCNC: 8 MMOL/L (ref 8–16)
AST SERPL-CCNC: 17 U/L (ref 10–40)
BACTERIA #/AREA URNS HPF: ABNORMAL /HPF
BASOPHILS # BLD AUTO: 0.04 K/UL (ref 0–0.2)
BASOPHILS NFR BLD: 0.6 % (ref 0–1.9)
BILIRUB SERPL-MCNC: 0.4 MG/DL (ref 0.1–1)
BILIRUB UR QL STRIP: NEGATIVE
BUN SERPL-MCNC: 5 MG/DL (ref 6–20)
CALCIUM SERPL-MCNC: 9.1 MG/DL (ref 8.7–10.5)
CHLORIDE SERPL-SCNC: 103 MMOL/L (ref 95–110)
CLARITY UR: CLEAR
CO2 SERPL-SCNC: 25 MMOL/L (ref 23–29)
COLOR UR: YELLOW
CREAT SERPL-MCNC: 0.6 MG/DL (ref 0.5–1.4)
DIFFERENTIAL METHOD: ABNORMAL
EOSINOPHIL # BLD AUTO: 0.1 K/UL (ref 0–0.5)
EOSINOPHIL NFR BLD: 2.2 % (ref 0–8)
ERYTHROCYTE [DISTWIDTH] IN BLOOD BY AUTOMATED COUNT: 12.4 % (ref 11.5–14.5)
EST. GFR  (AFRICAN AMERICAN): >60 ML/MIN/1.73 M^2
EST. GFR  (NON AFRICAN AMERICAN): >60 ML/MIN/1.73 M^2
GLUCOSE SERPL-MCNC: 91 MG/DL (ref 70–110)
GLUCOSE UR QL STRIP: NEGATIVE
HCT VFR BLD AUTO: 44.1 % (ref 40–54)
HGB BLD-MCNC: 13.9 G/DL (ref 14–18)
HGB UR QL STRIP: ABNORMAL
IMM GRANULOCYTES # BLD AUTO: 0.01 K/UL (ref 0–0.04)
IMM GRANULOCYTES NFR BLD AUTO: 0.2 % (ref 0–0.5)
KETONES UR QL STRIP: NEGATIVE
LEUKOCYTE ESTERASE UR QL STRIP: ABNORMAL
LIPASE SERPL-CCNC: 27 U/L (ref 4–60)
LYMPHOCYTES # BLD AUTO: 1.3 K/UL (ref 1–4.8)
LYMPHOCYTES NFR BLD: 20.8 % (ref 18–48)
MCH RBC QN AUTO: 30.8 PG (ref 27–31)
MCHC RBC AUTO-ENTMCNC: 31.5 G/DL (ref 32–36)
MCV RBC AUTO: 98 FL (ref 82–98)
MICROSCOPIC COMMENT: ABNORMAL
MONOCYTES # BLD AUTO: 0.6 K/UL (ref 0.3–1)
MONOCYTES NFR BLD: 8.7 % (ref 4–15)
NEUTROPHILS # BLD AUTO: 4.3 K/UL (ref 1.8–7.7)
NEUTROPHILS NFR BLD: 67.5 % (ref 38–73)
NITRITE UR QL STRIP: NEGATIVE
NRBC BLD-RTO: 0 /100 WBC
PH UR STRIP: 8 [PH] (ref 5–8)
PLATELET # BLD AUTO: 188 K/UL (ref 150–450)
PMV BLD AUTO: 11.2 FL (ref 9.2–12.9)
POTASSIUM SERPL-SCNC: 4.5 MMOL/L (ref 3.5–5.1)
PROT SERPL-MCNC: 6.9 G/DL (ref 6–8.4)
PROT UR QL STRIP: NEGATIVE
RBC # BLD AUTO: 4.51 M/UL (ref 4.6–6.2)
RBC #/AREA URNS HPF: 20 /HPF (ref 0–4)
SODIUM SERPL-SCNC: 136 MMOL/L (ref 136–145)
SP GR UR STRIP: 1.02 (ref 1–1.03)
SQUAMOUS #/AREA URNS HPF: 2 /HPF
URN SPEC COLLECT METH UR: ABNORMAL
UROBILINOGEN UR STRIP-ACNC: NEGATIVE EU/DL
WBC # BLD AUTO: 6.34 K/UL (ref 3.9–12.7)
WBC #/AREA URNS HPF: 3 /HPF (ref 0–5)
YEAST URNS QL MICRO: ABNORMAL

## 2021-07-12 PROCEDURE — 99285 EMERGENCY DEPT VISIT HI MDM: CPT | Mod: 25

## 2021-07-12 PROCEDURE — 25500020 PHARM REV CODE 255: Performed by: EMERGENCY MEDICINE

## 2021-07-12 PROCEDURE — 85025 COMPLETE CBC W/AUTO DIFF WBC: CPT | Performed by: EMERGENCY MEDICINE

## 2021-07-12 PROCEDURE — 36415 COLL VENOUS BLD VENIPUNCTURE: CPT | Performed by: EMERGENCY MEDICINE

## 2021-07-12 PROCEDURE — 25000003 PHARM REV CODE 250: Performed by: NURSE PRACTITIONER

## 2021-07-12 PROCEDURE — 83690 ASSAY OF LIPASE: CPT | Performed by: EMERGENCY MEDICINE

## 2021-07-12 PROCEDURE — 80053 COMPREHEN METABOLIC PANEL: CPT | Performed by: EMERGENCY MEDICINE

## 2021-07-12 PROCEDURE — 81000 URINALYSIS NONAUTO W/SCOPE: CPT | Performed by: EMERGENCY MEDICINE

## 2021-07-12 RX ORDER — FLUCONAZOLE 150 MG/1
150 TABLET ORAL
Status: COMPLETED | OUTPATIENT
Start: 2021-07-12 | End: 2021-07-12

## 2021-07-12 RX ADMIN — IOHEXOL 30 ML: 350 INJECTION, SOLUTION INTRAVENOUS at 02:07

## 2021-07-12 RX ADMIN — FLUCONAZOLE 150 MG: 150 TABLET ORAL at 03:07

## 2021-07-12 RX ADMIN — IOHEXOL 100 ML: 350 INJECTION, SOLUTION INTRAVENOUS at 02:07

## 2021-07-13 ENCOUNTER — PATIENT MESSAGE (OUTPATIENT)
Dept: OTOLARYNGOLOGY | Facility: CLINIC | Age: 36
End: 2021-07-13

## 2021-07-13 DIAGNOSIS — R10.9 ABDOMINAL PAIN, UNSPECIFIED ABDOMINAL LOCATION: ICD-10-CM

## 2021-07-13 DIAGNOSIS — K59.00 CONSTIPATION, UNSPECIFIED CONSTIPATION TYPE: ICD-10-CM

## 2021-07-13 DIAGNOSIS — Z93.3 COLOSTOMY STATUS: Primary | ICD-10-CM

## 2021-07-13 DIAGNOSIS — R19.4 BOWEL HABIT CHANGES: ICD-10-CM

## 2021-07-14 ENCOUNTER — PATIENT MESSAGE (OUTPATIENT)
Dept: UROLOGY | Facility: CLINIC | Age: 36
End: 2021-07-14

## 2021-07-15 ENCOUNTER — HOSPITAL ENCOUNTER (EMERGENCY)
Facility: HOSPITAL | Age: 36
Discharge: HOME | End: 2021-07-15
Attending: EMERGENCY MEDICINE
Payer: MEDICAID

## 2021-07-15 VITALS
OXYGEN SATURATION: 98 % | BODY MASS INDEX: 25.92 KG/M2 | WEIGHT: 175 LBS | DIASTOLIC BLOOD PRESSURE: 60 MMHG | HEART RATE: 80 BPM | TEMPERATURE: 99 F | RESPIRATION RATE: 16 BRPM | SYSTOLIC BLOOD PRESSURE: 120 MMHG | HEIGHT: 69 IN

## 2021-07-15 DIAGNOSIS — K59.00 CONSTIPATION, UNSPECIFIED CONSTIPATION TYPE: Primary | ICD-10-CM

## 2021-07-15 DIAGNOSIS — K59.00 CONSTIPATION: ICD-10-CM

## 2021-07-15 LAB
ALBUMIN SERPL BCP-MCNC: 3.9 G/DL (ref 3.5–5.2)
ALP SERPL-CCNC: 111 U/L (ref 55–135)
ALT SERPL W/O P-5'-P-CCNC: 21 U/L (ref 10–44)
ANION GAP SERPL CALC-SCNC: 8 MMOL/L (ref 8–16)
AST SERPL-CCNC: 19 U/L (ref 10–40)
BASOPHILS # BLD AUTO: 0.03 K/UL (ref 0–0.2)
BASOPHILS NFR BLD: 0.4 % (ref 0–1.9)
BILIRUB SERPL-MCNC: 0.4 MG/DL (ref 0.1–1)
BILIRUB UR QL STRIP: NEGATIVE
BUN SERPL-MCNC: 7 MG/DL (ref 6–20)
CALCIUM SERPL-MCNC: 9 MG/DL (ref 8.7–10.5)
CHLORIDE SERPL-SCNC: 100 MMOL/L (ref 95–110)
CLARITY UR REFRACT.AUTO: CLEAR
CO2 SERPL-SCNC: 27 MMOL/L (ref 23–29)
COLOR UR AUTO: YELLOW
CREAT SERPL-MCNC: 0.6 MG/DL (ref 0.5–1.4)
DIFFERENTIAL METHOD: ABNORMAL
EOSINOPHIL # BLD AUTO: 0.2 K/UL (ref 0–0.5)
EOSINOPHIL NFR BLD: 1.9 % (ref 0–8)
ERYTHROCYTE [DISTWIDTH] IN BLOOD BY AUTOMATED COUNT: 12.5 % (ref 11.5–14.5)
EST. GFR  (AFRICAN AMERICAN): >60 ML/MIN/1.73 M^2
EST. GFR  (NON AFRICAN AMERICAN): >60 ML/MIN/1.73 M^2
GLUCOSE SERPL-MCNC: 85 MG/DL (ref 70–110)
GLUCOSE UR QL STRIP: NEGATIVE
HCT VFR BLD AUTO: 44 % (ref 40–54)
HGB BLD-MCNC: 13.7 G/DL (ref 14–18)
HGB UR QL STRIP: ABNORMAL
IMM GRANULOCYTES # BLD AUTO: 0.02 K/UL (ref 0–0.04)
IMM GRANULOCYTES NFR BLD AUTO: 0.2 % (ref 0–0.5)
KETONES UR QL STRIP: NEGATIVE
LEUKOCYTE ESTERASE UR QL STRIP: ABNORMAL
LYMPHOCYTES # BLD AUTO: 1.7 K/UL (ref 1–4.8)
LYMPHOCYTES NFR BLD: 20.8 % (ref 18–48)
MCH RBC QN AUTO: 30.8 PG (ref 27–31)
MCHC RBC AUTO-ENTMCNC: 31.1 G/DL (ref 32–36)
MCV RBC AUTO: 99 FL (ref 82–98)
MICROSCOPIC COMMENT: ABNORMAL
MONOCYTES # BLD AUTO: 0.5 K/UL (ref 0.3–1)
MONOCYTES NFR BLD: 6.2 % (ref 4–15)
NEUTROPHILS # BLD AUTO: 5.7 K/UL (ref 1.8–7.7)
NEUTROPHILS NFR BLD: 70.5 % (ref 38–73)
NITRITE UR QL STRIP: NEGATIVE
NRBC BLD-RTO: 0 /100 WBC
PH UR STRIP: 7 [PH] (ref 5–8)
PLATELET # BLD AUTO: 188 K/UL (ref 150–450)
PMV BLD AUTO: 11 FL (ref 9.2–12.9)
POTASSIUM SERPL-SCNC: 3.9 MMOL/L (ref 3.5–5.1)
PROT SERPL-MCNC: 6.9 G/DL (ref 6–8.4)
PROT UR QL STRIP: NEGATIVE
RBC # BLD AUTO: 4.45 M/UL (ref 4.6–6.2)
RBC #/AREA URNS AUTO: 7 /HPF (ref 0–4)
SODIUM SERPL-SCNC: 135 MMOL/L (ref 136–145)
SP GR UR STRIP: 1.02 (ref 1–1.03)
URN SPEC COLLECT METH UR: ABNORMAL
WBC # BLD AUTO: 8.04 K/UL (ref 3.9–12.7)
WBC #/AREA URNS AUTO: 4 /HPF (ref 0–5)

## 2021-07-15 PROCEDURE — 99284 PR EMERGENCY DEPT VISIT,LEVEL IV: ICD-10-PCS | Mod: ,,, | Performed by: EMERGENCY MEDICINE

## 2021-07-15 PROCEDURE — 81001 URINALYSIS AUTO W/SCOPE: CPT | Performed by: EMERGENCY MEDICINE

## 2021-07-15 PROCEDURE — 85025 COMPLETE CBC W/AUTO DIFF WBC: CPT | Performed by: EMERGENCY MEDICINE

## 2021-07-15 PROCEDURE — 80053 COMPREHEN METABOLIC PANEL: CPT | Performed by: EMERGENCY MEDICINE

## 2021-07-15 PROCEDURE — 99284 EMERGENCY DEPT VISIT MOD MDM: CPT

## 2021-07-15 PROCEDURE — 99284 EMERGENCY DEPT VISIT MOD MDM: CPT | Mod: ,,, | Performed by: EMERGENCY MEDICINE

## 2021-07-24 ENCOUNTER — HOSPITAL ENCOUNTER (EMERGENCY)
Facility: HOSPITAL | Age: 36
Discharge: HOME | End: 2021-07-24
Attending: SURGERY
Payer: MEDICAID

## 2021-07-24 VITALS
OXYGEN SATURATION: 97 % | HEART RATE: 86 BPM | TEMPERATURE: 100 F | DIASTOLIC BLOOD PRESSURE: 56 MMHG | SYSTOLIC BLOOD PRESSURE: 107 MMHG | RESPIRATION RATE: 20 BRPM

## 2021-07-24 DIAGNOSIS — U07.1 COVID-19: Primary | ICD-10-CM

## 2021-07-24 LAB — SARS-COV-2 RDRP RESP QL NAA+PROBE: POSITIVE

## 2021-07-24 PROCEDURE — U0002 COVID-19 LAB TEST NON-CDC: HCPCS | Performed by: SURGERY

## 2021-07-24 PROCEDURE — 99284 EMERGENCY DEPT VISIT MOD MDM: CPT

## 2021-07-24 PROCEDURE — 25000003 PHARM REV CODE 250: Performed by: SURGERY

## 2021-07-24 RX ORDER — AZITHROMYCIN 250 MG/1
250 TABLET, FILM COATED ORAL DAILY
Qty: 6 TABLET | Refills: 0 | Status: SHIPPED | OUTPATIENT
Start: 2021-07-24 | End: 2022-07-07

## 2021-07-24 RX ORDER — METHYLPREDNISOLONE 4 MG/1
TABLET ORAL
Qty: 1 PACKAGE | Refills: 0 | Status: SHIPPED | OUTPATIENT
Start: 2021-07-24 | End: 2022-07-12

## 2021-07-24 RX ORDER — ACETAMINOPHEN 500 MG
1000 TABLET ORAL
Status: COMPLETED | OUTPATIENT
Start: 2021-07-24 | End: 2021-07-24

## 2021-07-24 RX ADMIN — ACETAMINOPHEN 1000 MG: 500 TABLET ORAL at 05:07

## 2021-07-26 ENCOUNTER — NURSE TRIAGE (OUTPATIENT)
Dept: ADMINISTRATIVE | Facility: CLINIC | Age: 36
End: 2021-07-26

## 2021-07-27 ENCOUNTER — TELEPHONE (OUTPATIENT)
Dept: GASTROENTEROLOGY | Facility: CLINIC | Age: 36
End: 2021-07-27

## 2021-07-29 ENCOUNTER — INFUSION (OUTPATIENT)
Dept: INFECTIOUS DISEASES | Facility: HOSPITAL | Age: 36
End: 2021-07-29
Attending: FAMILY MEDICINE
Payer: MEDICAID

## 2021-07-29 VITALS
TEMPERATURE: 98 F | SYSTOLIC BLOOD PRESSURE: 93 MMHG | RESPIRATION RATE: 18 BRPM | OXYGEN SATURATION: 98 % | DIASTOLIC BLOOD PRESSURE: 59 MMHG | HEART RATE: 51 BPM

## 2021-07-29 DIAGNOSIS — U07.1 COVID-19: Primary | ICD-10-CM

## 2021-07-29 PROCEDURE — 63600175 PHARM REV CODE 636 W HCPCS: Performed by: FAMILY MEDICINE

## 2021-07-29 PROCEDURE — M0243 CASIRIVI AND IMDEVI INFUSION: HCPCS | Performed by: FAMILY MEDICINE

## 2021-07-29 PROCEDURE — 25000003 PHARM REV CODE 250: Performed by: FAMILY MEDICINE

## 2021-07-29 RX ORDER — SODIUM CHLORIDE 0.9 % (FLUSH) 0.9 %
10 SYRINGE (ML) INJECTION
Status: DISCONTINUED | OUTPATIENT
Start: 2021-07-29 | End: 2022-09-21

## 2021-07-29 RX ORDER — EPINEPHRINE 0.3 MG/.3ML
0.3 INJECTION SUBCUTANEOUS
Status: DISCONTINUED | OUTPATIENT
Start: 2021-07-29 | End: 2022-09-21

## 2021-07-29 RX ORDER — DIPHENHYDRAMINE HYDROCHLORIDE 50 MG/ML
25 INJECTION INTRAMUSCULAR; INTRAVENOUS ONCE AS NEEDED
Status: DISCONTINUED | OUTPATIENT
Start: 2021-07-29 | End: 2022-09-21

## 2021-07-29 RX ORDER — ONDANSETRON 4 MG/1
4 TABLET, ORALLY DISINTEGRATING ORAL ONCE AS NEEDED
Status: DISCONTINUED | OUTPATIENT
Start: 2021-07-29 | End: 2022-01-06

## 2021-07-29 RX ORDER — ACETAMINOPHEN 325 MG/1
650 TABLET ORAL ONCE AS NEEDED
Status: DISCONTINUED | OUTPATIENT
Start: 2021-07-29 | End: 2022-09-21

## 2021-07-29 RX ADMIN — CASIRIVIMAB AND IMDEVIMAB 600 MG: 600; 600 INJECTION, SOLUTION, CONCENTRATE INTRAVENOUS at 08:07

## 2021-08-06 ENCOUNTER — PATIENT MESSAGE (OUTPATIENT)
Dept: PHYSICAL MEDICINE AND REHAB | Facility: CLINIC | Age: 36
End: 2021-08-06

## 2021-08-06 DIAGNOSIS — M79.2 NEUROPATHIC PAIN: Chronic | ICD-10-CM

## 2021-08-06 RX ORDER — OXYCODONE AND ACETAMINOPHEN 10; 325 MG/1; MG/1
TABLET ORAL
Qty: 120 TABLET | Refills: 0 | Status: SHIPPED | OUTPATIENT
Start: 2021-08-06 | End: 2021-09-08 | Stop reason: SDUPTHER

## 2021-08-13 ENCOUNTER — PATIENT MESSAGE (OUTPATIENT)
Dept: UROLOGY | Facility: CLINIC | Age: 36
End: 2021-08-13

## 2021-08-16 ENCOUNTER — OFFICE VISIT (OUTPATIENT)
Dept: UROLOGY | Facility: CLINIC | Age: 36
End: 2021-08-16
Payer: MEDICAID

## 2021-08-16 ENCOUNTER — PATIENT MESSAGE (OUTPATIENT)
Dept: PHYSICAL MEDICINE AND REHAB | Facility: CLINIC | Age: 36
End: 2021-08-16

## 2021-08-16 VITALS
HEIGHT: 69 IN | HEART RATE: 51 BPM | WEIGHT: 175 LBS | DIASTOLIC BLOOD PRESSURE: 61 MMHG | SYSTOLIC BLOOD PRESSURE: 99 MMHG | BODY MASS INDEX: 25.92 KG/M2

## 2021-08-16 DIAGNOSIS — N31.9 NEUROGENIC BLADDER: ICD-10-CM

## 2021-08-16 DIAGNOSIS — Z43.5 ENCOUNTER FOR CARE OR REPLACEMENT OF SUPRAPUBIC TUBE: ICD-10-CM

## 2021-08-16 DIAGNOSIS — G82.20 PARAPLEGIA FOLLOWING SPINAL CORD INJURY: Chronic | ICD-10-CM

## 2021-08-16 DIAGNOSIS — R39.9 UTI SYMPTOMS: Primary | ICD-10-CM

## 2021-08-16 DIAGNOSIS — Z93.59 CHRONIC SUPRAPUBIC CATHETER: ICD-10-CM

## 2021-08-16 DIAGNOSIS — M79.2 NEUROPATHIC PAIN: Chronic | ICD-10-CM

## 2021-08-16 DIAGNOSIS — Z74.09 IMPAIRED FUNCTIONAL MOBILITY, BALANCE, AND ENDURANCE: ICD-10-CM

## 2021-08-16 PROCEDURE — 87086 URINE CULTURE/COLONY COUNT: CPT | Performed by: NURSE PRACTITIONER

## 2021-08-16 PROCEDURE — 99999 PR PBB SHADOW E&M-EST. PATIENT-LVL IV: ICD-10-PCS | Mod: PBBFAC,,, | Performed by: NURSE PRACTITIONER

## 2021-08-16 PROCEDURE — 87186 SC STD MICRODIL/AGAR DIL: CPT | Performed by: NURSE PRACTITIONER

## 2021-08-16 PROCEDURE — 51705 CHANGE OF BLADDER TUBE: CPT | Mod: S$PBB,,, | Performed by: NURSE PRACTITIONER

## 2021-08-16 PROCEDURE — 99499 NO LOS: ICD-10-PCS | Mod: S$PBB,,, | Performed by: NURSE PRACTITIONER

## 2021-08-16 PROCEDURE — 99999 PR PBB SHADOW E&M-EST. PATIENT-LVL IV: CPT | Mod: PBBFAC,,, | Performed by: NURSE PRACTITIONER

## 2021-08-16 PROCEDURE — 87077 CULTURE AEROBIC IDENTIFY: CPT | Performed by: NURSE PRACTITIONER

## 2021-08-16 PROCEDURE — 99499 UNLISTED E&M SERVICE: CPT | Mod: S$PBB,,, | Performed by: NURSE PRACTITIONER

## 2021-08-16 PROCEDURE — 51705 CHANGE OF BLADDER TUBE: CPT | Mod: PBBFAC | Performed by: NURSE PRACTITIONER

## 2021-08-16 PROCEDURE — 99214 OFFICE O/P EST MOD 30 MIN: CPT | Mod: PBBFAC,25 | Performed by: NURSE PRACTITIONER

## 2021-08-16 PROCEDURE — 87088 URINE BACTERIA CULTURE: CPT | Performed by: NURSE PRACTITIONER

## 2021-08-16 PROCEDURE — 51705 PR CHANGE OF BLADDER TUBE,SIMPLE: ICD-10-PCS | Mod: S$PBB,,, | Performed by: NURSE PRACTITIONER

## 2021-08-16 RX ORDER — OXYCODONE HCL 10 MG/1
10 TABLET, FILM COATED, EXTENDED RELEASE ORAL EVERY 12 HOURS
Qty: 60 TABLET | Refills: 0 | Status: SHIPPED | OUTPATIENT
Start: 2021-08-16 | End: 2021-09-20 | Stop reason: SDUPTHER

## 2021-08-19 ENCOUNTER — PATIENT MESSAGE (OUTPATIENT)
Dept: UROLOGY | Facility: CLINIC | Age: 36
End: 2021-08-19

## 2021-08-19 DIAGNOSIS — R39.9 UTI SYMPTOMS: ICD-10-CM

## 2021-08-19 DIAGNOSIS — R82.71 BACTERIA IN URINE: Primary | ICD-10-CM

## 2021-08-19 LAB — BACTERIA UR CULT: ABNORMAL

## 2021-08-19 RX ORDER — SULFAMETHOXAZOLE AND TRIMETHOPRIM 800; 160 MG/1; MG/1
1 TABLET ORAL 2 TIMES DAILY
Qty: 20 TABLET | Refills: 0 | Status: SHIPPED | OUTPATIENT
Start: 2021-08-19 | End: 2021-08-29

## 2021-09-03 ENCOUNTER — PATIENT MESSAGE (OUTPATIENT)
Dept: INTERNAL MEDICINE | Facility: CLINIC | Age: 36
End: 2021-09-03

## 2021-09-07 ENCOUNTER — PATIENT MESSAGE (OUTPATIENT)
Dept: PHYSICAL MEDICINE AND REHAB | Facility: CLINIC | Age: 36
End: 2021-09-07

## 2021-09-07 ENCOUNTER — PATIENT MESSAGE (OUTPATIENT)
Dept: UROLOGY | Facility: CLINIC | Age: 36
End: 2021-09-07

## 2021-09-08 ENCOUNTER — PATIENT MESSAGE (OUTPATIENT)
Dept: PHYSICAL MEDICINE AND REHAB | Facility: CLINIC | Age: 36
End: 2021-09-08

## 2021-09-08 DIAGNOSIS — M79.2 NEUROPATHIC PAIN: Chronic | ICD-10-CM

## 2021-09-08 RX ORDER — OXYCODONE AND ACETAMINOPHEN 10; 325 MG/1; MG/1
TABLET ORAL
Qty: 120 TABLET | Refills: 0 | Status: SHIPPED | OUTPATIENT
Start: 2021-09-08 | End: 2021-10-08 | Stop reason: SDUPTHER

## 2021-09-08 RX ORDER — NALOXONE HYDROCHLORIDE 4 MG/.1ML
SPRAY NASAL
Qty: 1 EACH | Refills: 11 | Status: SHIPPED | OUTPATIENT
Start: 2021-09-08

## 2021-09-14 ENCOUNTER — OFFICE VISIT (OUTPATIENT)
Dept: UROLOGY | Facility: CLINIC | Age: 36
End: 2021-09-14
Payer: MEDICAID

## 2021-09-14 VITALS — BODY MASS INDEX: 25.92 KG/M2 | WEIGHT: 175 LBS | HEIGHT: 69 IN

## 2021-09-14 DIAGNOSIS — Z93.59 CHRONIC SUPRAPUBIC CATHETER: ICD-10-CM

## 2021-09-14 DIAGNOSIS — Z43.5 ENCOUNTER FOR CARE OR REPLACEMENT OF SUPRAPUBIC TUBE: ICD-10-CM

## 2021-09-14 DIAGNOSIS — N31.9 NEUROGENIC BLADDER: Primary | Chronic | ICD-10-CM

## 2021-09-14 DIAGNOSIS — G82.50 TETRAPLEGIC: ICD-10-CM

## 2021-09-14 PROCEDURE — 99499 NO LOS: ICD-10-PCS | Mod: S$PBB,,, | Performed by: NURSE PRACTITIONER

## 2021-09-14 PROCEDURE — 51705 CHANGE OF BLADDER TUBE: CPT | Mod: S$PBB,,, | Performed by: NURSE PRACTITIONER

## 2021-09-14 PROCEDURE — 99213 OFFICE O/P EST LOW 20 MIN: CPT | Mod: PBBFAC | Performed by: NURSE PRACTITIONER

## 2021-09-14 PROCEDURE — 99999 PR PBB SHADOW E&M-EST. PATIENT-LVL III: ICD-10-PCS | Mod: PBBFAC,,, | Performed by: NURSE PRACTITIONER

## 2021-09-14 PROCEDURE — 51705 CHANGE OF BLADDER TUBE: CPT | Mod: PBBFAC | Performed by: NURSE PRACTITIONER

## 2021-09-14 PROCEDURE — 99999 PR PBB SHADOW E&M-EST. PATIENT-LVL III: CPT | Mod: PBBFAC,,, | Performed by: NURSE PRACTITIONER

## 2021-09-14 PROCEDURE — 99499 UNLISTED E&M SERVICE: CPT | Mod: S$PBB,,, | Performed by: NURSE PRACTITIONER

## 2021-09-14 PROCEDURE — 51705 PR CHANGE OF BLADDER TUBE,SIMPLE: ICD-10-PCS | Mod: S$PBB,,, | Performed by: NURSE PRACTITIONER

## 2021-09-14 RX ORDER — LACTULOSE 10 G/15ML
SOLUTION ORAL; RECTAL
COMMUNITY
Start: 2021-07-09 | End: 2023-05-23 | Stop reason: ALTCHOICE

## 2021-09-17 ENCOUNTER — PATIENT MESSAGE (OUTPATIENT)
Dept: PHYSICAL MEDICINE AND REHAB | Facility: CLINIC | Age: 36
End: 2021-09-17

## 2021-09-20 ENCOUNTER — PATIENT MESSAGE (OUTPATIENT)
Dept: PHYSICAL MEDICINE AND REHAB | Facility: CLINIC | Age: 36
End: 2021-09-20

## 2021-09-20 DIAGNOSIS — M79.2 NEUROPATHIC PAIN: Chronic | ICD-10-CM

## 2021-09-21 ENCOUNTER — PATIENT MESSAGE (OUTPATIENT)
Dept: UROLOGY | Facility: CLINIC | Age: 36
End: 2021-09-21

## 2021-09-25 RX ORDER — OXYCODONE HCL 10 MG/1
10 TABLET, FILM COATED, EXTENDED RELEASE ORAL EVERY 12 HOURS
Qty: 60 TABLET | Refills: 0 | Status: SHIPPED | OUTPATIENT
Start: 2021-09-25 | End: 2021-11-01 | Stop reason: SDUPTHER

## 2021-10-05 ENCOUNTER — TELEPHONE (OUTPATIENT)
Dept: PHYSICAL MEDICINE AND REHAB | Facility: CLINIC | Age: 36
End: 2021-10-05

## 2021-10-06 ENCOUNTER — PATIENT MESSAGE (OUTPATIENT)
Dept: PHYSICAL MEDICINE AND REHAB | Facility: CLINIC | Age: 36
End: 2021-10-06

## 2021-10-07 ENCOUNTER — PATIENT MESSAGE (OUTPATIENT)
Dept: PHYSICAL MEDICINE AND REHAB | Facility: CLINIC | Age: 36
End: 2021-10-07

## 2021-10-07 RX ORDER — BACLOFEN 20 MG/1
20 TABLET ORAL 3 TIMES DAILY
Qty: 90 TABLET | Refills: 0 | Status: SHIPPED | OUTPATIENT
Start: 2021-10-07 | End: 2021-11-06

## 2021-10-08 ENCOUNTER — PATIENT MESSAGE (OUTPATIENT)
Dept: PHYSICAL MEDICINE AND REHAB | Facility: CLINIC | Age: 36
End: 2021-10-08

## 2021-10-08 ENCOUNTER — TELEPHONE (OUTPATIENT)
Dept: INTERNAL MEDICINE | Facility: CLINIC | Age: 36
End: 2021-10-08

## 2021-10-08 ENCOUNTER — TELEPHONE (OUTPATIENT)
Dept: PHYSICAL MEDICINE AND REHAB | Facility: CLINIC | Age: 36
End: 2021-10-08

## 2021-10-08 DIAGNOSIS — R52 PAIN: ICD-10-CM

## 2021-10-08 DIAGNOSIS — M79.2 NEUROPATHIC PAIN: Chronic | ICD-10-CM

## 2021-10-08 DIAGNOSIS — F41.9 ANXIETY: ICD-10-CM

## 2021-10-08 RX ORDER — PREGABALIN 200 MG/1
CAPSULE ORAL
Qty: 90 CAPSULE | Refills: 6 | Status: SHIPPED | OUTPATIENT
Start: 2021-10-08

## 2021-10-08 RX ORDER — OXYCODONE AND ACETAMINOPHEN 10; 325 MG/1; MG/1
TABLET ORAL
Qty: 120 TABLET | Refills: 0 | Status: SHIPPED | OUTPATIENT
Start: 2021-10-08 | End: 2021-11-09 | Stop reason: SDUPTHER

## 2021-10-08 RX ORDER — DIAZEPAM 10 MG/1
10 TABLET ORAL 3 TIMES DAILY PRN
Qty: 90 TABLET | Refills: 0 | Status: SHIPPED | OUTPATIENT
Start: 2021-10-08 | End: 2022-04-06 | Stop reason: SDUPTHER

## 2021-10-11 ENCOUNTER — PATIENT MESSAGE (OUTPATIENT)
Dept: INTERNAL MEDICINE | Facility: CLINIC | Age: 36
End: 2021-10-11

## 2021-10-12 ENCOUNTER — OFFICE VISIT (OUTPATIENT)
Dept: UROLOGY | Facility: CLINIC | Age: 36
End: 2021-10-12
Payer: MEDICAID

## 2021-10-12 ENCOUNTER — PATIENT MESSAGE (OUTPATIENT)
Dept: INTERNAL MEDICINE | Facility: CLINIC | Age: 36
End: 2021-10-12

## 2021-10-12 VITALS
SYSTOLIC BLOOD PRESSURE: 107 MMHG | DIASTOLIC BLOOD PRESSURE: 69 MMHG | HEART RATE: 55 BPM | WEIGHT: 160 LBS | BODY MASS INDEX: 23.63 KG/M2

## 2021-10-12 DIAGNOSIS — N31.9 NEUROGENIC BLADDER: Primary | ICD-10-CM

## 2021-10-12 DIAGNOSIS — Z93.59 CHRONIC SUPRAPUBIC CATHETER: ICD-10-CM

## 2021-10-12 DIAGNOSIS — R33.9 URINARY RETENTION: ICD-10-CM

## 2021-10-12 DIAGNOSIS — Z43.5 ENCOUNTER FOR CARE OR REPLACEMENT OF SUPRAPUBIC TUBE: ICD-10-CM

## 2021-10-12 PROCEDURE — 99214 OFFICE O/P EST MOD 30 MIN: CPT | Mod: S$PBB,,, | Performed by: NURSE PRACTITIONER

## 2021-10-12 PROCEDURE — 99214 PR OFFICE/OUTPT VISIT, EST, LEVL IV, 30-39 MIN: ICD-10-PCS | Mod: S$PBB,,, | Performed by: NURSE PRACTITIONER

## 2021-10-12 PROCEDURE — 99213 OFFICE O/P EST LOW 20 MIN: CPT | Mod: PBBFAC | Performed by: NURSE PRACTITIONER

## 2021-10-12 PROCEDURE — 99999 PR PBB SHADOW E&M-EST. PATIENT-LVL III: ICD-10-PCS | Mod: PBBFAC,,, | Performed by: NURSE PRACTITIONER

## 2021-10-12 PROCEDURE — 99999 PR PBB SHADOW E&M-EST. PATIENT-LVL III: CPT | Mod: PBBFAC,,, | Performed by: NURSE PRACTITIONER

## 2021-10-13 ENCOUNTER — PATIENT MESSAGE (OUTPATIENT)
Dept: INTERNAL MEDICINE | Facility: CLINIC | Age: 36
End: 2021-10-13
Payer: MEDICAID

## 2021-10-17 ENCOUNTER — HOSPITAL ENCOUNTER (EMERGENCY)
Facility: HOSPITAL | Age: 36
Discharge: HOME | End: 2021-10-17
Attending: STUDENT IN AN ORGANIZED HEALTH CARE EDUCATION/TRAINING PROGRAM
Payer: MEDICAID

## 2021-10-17 VITALS
SYSTOLIC BLOOD PRESSURE: 107 MMHG | OXYGEN SATURATION: 98 % | RESPIRATION RATE: 20 BRPM | TEMPERATURE: 98 F | HEART RATE: 67 BPM | DIASTOLIC BLOOD PRESSURE: 59 MMHG

## 2021-10-17 DIAGNOSIS — R09.81 NASAL CONGESTION: Primary | ICD-10-CM

## 2021-10-17 LAB — SARS-COV-2 RDRP RESP QL NAA+PROBE: NEGATIVE

## 2021-10-17 PROCEDURE — U0002 COVID-19 LAB TEST NON-CDC: HCPCS | Performed by: STUDENT IN AN ORGANIZED HEALTH CARE EDUCATION/TRAINING PROGRAM

## 2021-10-17 PROCEDURE — 99283 EMERGENCY DEPT VISIT LOW MDM: CPT

## 2021-10-17 RX ORDER — FLUTICASONE PROPIONATE 50 MCG
1 SPRAY, SUSPENSION (ML) NASAL DAILY PRN
Qty: 15 G | Refills: 0 | Status: SHIPPED | OUTPATIENT
Start: 2021-10-17 | End: 2021-10-24

## 2021-10-20 ENCOUNTER — TELEPHONE (OUTPATIENT)
Dept: INTERNAL MEDICINE | Facility: CLINIC | Age: 36
End: 2021-10-20

## 2021-10-20 DIAGNOSIS — G82.20 PARAPLEGIA FOLLOWING SPINAL CORD INJURY: Primary | ICD-10-CM

## 2021-10-20 DIAGNOSIS — Z93.3 COLOSTOMY STATUS: ICD-10-CM

## 2021-10-21 ENCOUNTER — PATIENT OUTREACH (OUTPATIENT)
Dept: EMERGENCY MEDICINE | Facility: HOSPITAL | Age: 36
End: 2021-10-21

## 2021-10-25 DIAGNOSIS — Z93.3 COLOSTOMY STATUS: ICD-10-CM

## 2021-10-25 DIAGNOSIS — G82.20 PARAPLEGIA FOLLOWING SPINAL CORD INJURY: Primary | ICD-10-CM

## 2021-10-27 ENCOUNTER — PATIENT MESSAGE (OUTPATIENT)
Dept: PHYSICAL MEDICINE AND REHAB | Facility: CLINIC | Age: 36
End: 2021-10-27
Payer: MEDICAID

## 2021-10-28 ENCOUNTER — PATIENT OUTREACH (OUTPATIENT)
Dept: EMERGENCY MEDICINE | Facility: HOSPITAL | Age: 36
End: 2021-10-28
Payer: MEDICAID

## 2021-10-29 ENCOUNTER — PATIENT MESSAGE (OUTPATIENT)
Dept: PHYSICAL MEDICINE AND REHAB | Facility: CLINIC | Age: 36
End: 2021-10-29
Payer: MEDICAID

## 2021-11-01 DIAGNOSIS — M79.2 NEUROPATHIC PAIN: Chronic | ICD-10-CM

## 2021-11-01 RX ORDER — OXYCODONE HCL 10 MG/1
10 TABLET, FILM COATED, EXTENDED RELEASE ORAL EVERY 12 HOURS
Qty: 60 TABLET | Refills: 0 | Status: SHIPPED | OUTPATIENT
Start: 2021-11-01 | End: 2021-11-24 | Stop reason: SDUPTHER

## 2021-11-04 ENCOUNTER — TELEPHONE (OUTPATIENT)
Dept: INTERNAL MEDICINE | Facility: CLINIC | Age: 36
End: 2021-11-04
Payer: MEDICAID

## 2021-11-09 ENCOUNTER — PATIENT MESSAGE (OUTPATIENT)
Dept: INTERNAL MEDICINE | Facility: CLINIC | Age: 36
End: 2021-11-09
Payer: MEDICAID

## 2021-11-09 ENCOUNTER — TELEPHONE (OUTPATIENT)
Dept: INTERNAL MEDICINE | Facility: CLINIC | Age: 36
End: 2021-11-09
Payer: MEDICAID

## 2021-11-09 ENCOUNTER — PATIENT MESSAGE (OUTPATIENT)
Dept: PHYSICAL MEDICINE AND REHAB | Facility: CLINIC | Age: 36
End: 2021-11-09
Payer: MEDICAID

## 2021-11-09 DIAGNOSIS — M79.2 NEUROPATHIC PAIN: Chronic | ICD-10-CM

## 2021-11-09 RX ORDER — OXYCODONE AND ACETAMINOPHEN 10; 325 MG/1; MG/1
TABLET ORAL
Qty: 120 TABLET | Refills: 0 | Status: SHIPPED | OUTPATIENT
Start: 2021-11-09 | End: 2021-12-09 | Stop reason: SDUPTHER

## 2021-11-11 ENCOUNTER — PATIENT OUTREACH (OUTPATIENT)
Dept: EMERGENCY MEDICINE | Facility: HOSPITAL | Age: 36
End: 2021-11-11
Payer: MEDICAID

## 2021-11-16 ENCOUNTER — PATIENT MESSAGE (OUTPATIENT)
Dept: UROLOGY | Facility: CLINIC | Age: 36
End: 2021-11-16
Payer: MEDICAID

## 2021-11-19 ENCOUNTER — PATIENT OUTREACH (OUTPATIENT)
Dept: ADMINISTRATIVE | Facility: OTHER | Age: 36
End: 2021-11-19
Payer: MEDICAID

## 2021-11-19 ENCOUNTER — TELEPHONE (OUTPATIENT)
Dept: INTERNAL MEDICINE | Facility: CLINIC | Age: 36
End: 2021-11-19
Payer: MEDICAID

## 2021-11-19 ENCOUNTER — PATIENT MESSAGE (OUTPATIENT)
Dept: INTERNAL MEDICINE | Facility: CLINIC | Age: 36
End: 2021-11-19
Payer: MEDICAID

## 2021-11-23 ENCOUNTER — OFFICE VISIT (OUTPATIENT)
Dept: UROLOGY | Facility: CLINIC | Age: 36
End: 2021-11-23
Payer: MEDICAID

## 2021-11-23 ENCOUNTER — PATIENT MESSAGE (OUTPATIENT)
Dept: UROLOGY | Facility: CLINIC | Age: 36
End: 2021-11-23

## 2021-11-23 VITALS
WEIGHT: 169.75 LBS | BODY MASS INDEX: 25.14 KG/M2 | SYSTOLIC BLOOD PRESSURE: 119 MMHG | HEIGHT: 69 IN | HEART RATE: 54 BPM | DIASTOLIC BLOOD PRESSURE: 67 MMHG

## 2021-11-23 DIAGNOSIS — R39.9 UTI SYMPTOMS: ICD-10-CM

## 2021-11-23 DIAGNOSIS — N31.9 NEUROGENIC BLADDER: ICD-10-CM

## 2021-11-23 DIAGNOSIS — Z93.59 CHRONIC SUPRAPUBIC CATHETER: ICD-10-CM

## 2021-11-23 DIAGNOSIS — N32.89 BLADDER SPASMS: ICD-10-CM

## 2021-11-23 DIAGNOSIS — Z43.5 ENCOUNTER FOR CARE OR REPLACEMENT OF SUPRAPUBIC TUBE: ICD-10-CM

## 2021-11-23 DIAGNOSIS — R33.9 URINARY RETENTION: Primary | ICD-10-CM

## 2021-11-23 PROCEDURE — 99214 PR OFFICE/OUTPT VISIT, EST, LEVL IV, 30-39 MIN: ICD-10-PCS | Mod: S$PBB,25,, | Performed by: NURSE PRACTITIONER

## 2021-11-23 PROCEDURE — 51705 PR CHANGE OF BLADDER TUBE,SIMPLE: ICD-10-PCS | Mod: S$PBB,,, | Performed by: NURSE PRACTITIONER

## 2021-11-23 PROCEDURE — 99214 OFFICE O/P EST MOD 30 MIN: CPT | Mod: S$PBB,25,, | Performed by: NURSE PRACTITIONER

## 2021-11-23 PROCEDURE — 99999 PR PBB SHADOW E&M-EST. PATIENT-LVL III: CPT | Mod: PBBFAC,,, | Performed by: NURSE PRACTITIONER

## 2021-11-23 PROCEDURE — 99999 PR PBB SHADOW E&M-EST. PATIENT-LVL III: ICD-10-PCS | Mod: PBBFAC,,, | Performed by: NURSE PRACTITIONER

## 2021-11-23 PROCEDURE — 51705 CHANGE OF BLADDER TUBE: CPT | Mod: S$PBB,,, | Performed by: NURSE PRACTITIONER

## 2021-11-23 PROCEDURE — 51705 CHANGE OF BLADDER TUBE: CPT | Mod: PBBFAC | Performed by: NURSE PRACTITIONER

## 2021-11-23 PROCEDURE — 99213 OFFICE O/P EST LOW 20 MIN: CPT | Mod: PBBFAC,25 | Performed by: NURSE PRACTITIONER

## 2021-11-24 ENCOUNTER — PATIENT MESSAGE (OUTPATIENT)
Dept: PHYSICAL MEDICINE AND REHAB | Facility: CLINIC | Age: 36
End: 2021-11-24
Payer: MEDICAID

## 2021-11-24 DIAGNOSIS — M79.2 NEUROPATHIC PAIN: Chronic | ICD-10-CM

## 2021-11-24 RX ORDER — OXYCODONE HCL 10 MG/1
10 TABLET, FILM COATED, EXTENDED RELEASE ORAL EVERY 12 HOURS
Qty: 60 TABLET | Refills: 0 | Status: SHIPPED | OUTPATIENT
Start: 2021-12-01 | End: 2022-01-28 | Stop reason: SDUPTHER

## 2021-11-29 ENCOUNTER — PATIENT MESSAGE (OUTPATIENT)
Dept: UROLOGY | Facility: CLINIC | Age: 36
End: 2021-11-29
Payer: MEDICAID

## 2021-11-30 ENCOUNTER — PATIENT OUTREACH (OUTPATIENT)
Dept: EMERGENCY MEDICINE | Facility: HOSPITAL | Age: 36
End: 2021-11-30
Payer: MEDICAID

## 2021-12-06 ENCOUNTER — PATIENT MESSAGE (OUTPATIENT)
Dept: PHYSICAL MEDICINE AND REHAB | Facility: CLINIC | Age: 36
End: 2021-12-06
Payer: MEDICAID

## 2021-12-07 ENCOUNTER — PATIENT MESSAGE (OUTPATIENT)
Dept: PHYSICAL MEDICINE AND REHAB | Facility: CLINIC | Age: 36
End: 2021-12-07
Payer: MEDICAID

## 2021-12-09 ENCOUNTER — PATIENT MESSAGE (OUTPATIENT)
Dept: PHYSICAL MEDICINE AND REHAB | Facility: CLINIC | Age: 36
End: 2021-12-09
Payer: MEDICAID

## 2021-12-09 DIAGNOSIS — M79.2 NEUROPATHIC PAIN: Chronic | ICD-10-CM

## 2021-12-09 RX ORDER — OXYCODONE AND ACETAMINOPHEN 10; 325 MG/1; MG/1
1 TABLET ORAL EVERY 6 HOURS PRN
Qty: 120 TABLET | Refills: 0 | Status: SHIPPED | OUTPATIENT
Start: 2021-12-09 | End: 2022-01-11 | Stop reason: SDUPTHER

## 2021-12-09 RX ORDER — OXYCODONE AND ACETAMINOPHEN 10; 325 MG/1; MG/1
TABLET ORAL
Qty: 120 TABLET | Refills: 0 | Status: CANCELLED | OUTPATIENT
Start: 2021-12-09 | End: 2022-01-09

## 2021-12-17 ENCOUNTER — PATIENT OUTREACH (OUTPATIENT)
Dept: EMERGENCY MEDICINE | Facility: HOSPITAL | Age: 36
End: 2021-12-17
Payer: MEDICAID

## 2021-12-21 ENCOUNTER — OFFICE VISIT (OUTPATIENT)
Dept: UROLOGY | Facility: CLINIC | Age: 36
End: 2021-12-21
Payer: MEDICAID

## 2021-12-21 VITALS
WEIGHT: 135 LBS | HEART RATE: 60 BPM | SYSTOLIC BLOOD PRESSURE: 99 MMHG | BODY MASS INDEX: 19.94 KG/M2 | DIASTOLIC BLOOD PRESSURE: 59 MMHG

## 2021-12-21 DIAGNOSIS — Z93.59 CHRONIC SUPRAPUBIC CATHETER: ICD-10-CM

## 2021-12-21 DIAGNOSIS — N31.9 NEUROGENIC BLADDER: Chronic | ICD-10-CM

## 2021-12-21 DIAGNOSIS — Z43.5 ENCOUNTER FOR CARE OR REPLACEMENT OF SUPRAPUBIC TUBE: ICD-10-CM

## 2021-12-21 DIAGNOSIS — R33.9 URINARY RETENTION: ICD-10-CM

## 2021-12-21 PROCEDURE — 99214 OFFICE O/P EST MOD 30 MIN: CPT | Mod: PBBFAC,25 | Performed by: NURSE PRACTITIONER

## 2021-12-21 PROCEDURE — 51705 CHANGE OF BLADDER TUBE: CPT | Mod: PBBFAC | Performed by: NURSE PRACTITIONER

## 2021-12-21 PROCEDURE — 51705 CHANGE OF BLADDER TUBE: CPT | Mod: S$PBB,,, | Performed by: NURSE PRACTITIONER

## 2021-12-21 PROCEDURE — 99999 PR PBB SHADOW E&M-EST. PATIENT-LVL IV: CPT | Mod: PBBFAC,,, | Performed by: NURSE PRACTITIONER

## 2021-12-21 PROCEDURE — 99214 OFFICE O/P EST MOD 30 MIN: CPT | Mod: S$PBB,25,, | Performed by: NURSE PRACTITIONER

## 2021-12-21 PROCEDURE — 51705 PR CHANGE OF BLADDER TUBE,SIMPLE: ICD-10-PCS | Mod: S$PBB,,, | Performed by: NURSE PRACTITIONER

## 2021-12-21 PROCEDURE — 99999 PR PBB SHADOW E&M-EST. PATIENT-LVL IV: ICD-10-PCS | Mod: PBBFAC,,, | Performed by: NURSE PRACTITIONER

## 2021-12-21 PROCEDURE — 99214 PR OFFICE/OUTPT VISIT, EST, LEVL IV, 30-39 MIN: ICD-10-PCS | Mod: S$PBB,25,, | Performed by: NURSE PRACTITIONER

## 2021-12-27 ENCOUNTER — PATIENT MESSAGE (OUTPATIENT)
Dept: UROLOGY | Facility: CLINIC | Age: 36
End: 2021-12-27
Payer: MEDICAID

## 2022-01-03 ENCOUNTER — PATIENT MESSAGE (OUTPATIENT)
Dept: PHYSICAL MEDICINE AND REHAB | Facility: CLINIC | Age: 37
End: 2022-01-03
Payer: MEDICAID

## 2022-01-04 ENCOUNTER — PATIENT OUTREACH (OUTPATIENT)
Dept: ADMINISTRATIVE | Facility: OTHER | Age: 37
End: 2022-01-04
Payer: MEDICAID

## 2022-01-04 ENCOUNTER — PATIENT MESSAGE (OUTPATIENT)
Dept: PHYSICAL MEDICINE AND REHAB | Facility: CLINIC | Age: 37
End: 2022-01-04
Payer: MEDICAID

## 2022-01-06 ENCOUNTER — PATIENT MESSAGE (OUTPATIENT)
Dept: INTERNAL MEDICINE | Facility: CLINIC | Age: 37
End: 2022-01-06
Payer: MEDICAID

## 2022-01-06 RX ORDER — PROMETHAZINE HYDROCHLORIDE 6.25 MG/5ML
6.25 SYRUP ORAL NIGHTLY PRN
Qty: 150 ML | Refills: 2 | Status: SHIPPED | OUTPATIENT
Start: 2022-01-06 | End: 2022-09-19

## 2022-01-11 ENCOUNTER — PATIENT MESSAGE (OUTPATIENT)
Dept: PHYSICAL MEDICINE AND REHAB | Facility: CLINIC | Age: 37
End: 2022-01-11
Payer: MEDICAID

## 2022-01-11 DIAGNOSIS — M79.2 NEUROPATHIC PAIN: Chronic | ICD-10-CM

## 2022-01-11 RX ORDER — OXYCODONE AND ACETAMINOPHEN 10; 325 MG/1; MG/1
1 TABLET ORAL EVERY 6 HOURS PRN
Qty: 120 TABLET | Refills: 0 | Status: SHIPPED | OUTPATIENT
Start: 2022-01-11 | End: 2022-02-11 | Stop reason: SDUPTHER

## 2022-01-12 ENCOUNTER — LAB VISIT (OUTPATIENT)
Dept: LAB | Facility: HOSPITAL | Age: 37
End: 2022-01-12
Attending: PHYSICAL MEDICINE & REHABILITATION
Payer: MEDICAID

## 2022-01-12 ENCOUNTER — OFFICE VISIT (OUTPATIENT)
Dept: PHYSICAL MEDICINE AND REHAB | Facility: CLINIC | Age: 37
End: 2022-01-12
Payer: MEDICAID

## 2022-01-12 VITALS
BODY MASS INDEX: 25.76 KG/M2 | SYSTOLIC BLOOD PRESSURE: 99 MMHG | DIASTOLIC BLOOD PRESSURE: 61 MMHG | HEIGHT: 68 IN | WEIGHT: 170 LBS | HEART RATE: 53 BPM

## 2022-01-12 DIAGNOSIS — G82.20 PARAPLEGIA FOLLOWING SPINAL CORD INJURY: ICD-10-CM

## 2022-01-12 DIAGNOSIS — G82.20 PARAPLEGIA FOLLOWING SPINAL CORD INJURY: Primary | ICD-10-CM

## 2022-01-12 DIAGNOSIS — M79.2 NEUROPATHIC PAIN: ICD-10-CM

## 2022-01-12 PROCEDURE — 3078F DIAST BP <80 MM HG: CPT | Mod: CPTII,,, | Performed by: PHYSICAL MEDICINE & REHABILITATION

## 2022-01-12 PROCEDURE — 3074F SYST BP LT 130 MM HG: CPT | Mod: CPTII,,, | Performed by: PHYSICAL MEDICINE & REHABILITATION

## 2022-01-12 PROCEDURE — 3008F BODY MASS INDEX DOCD: CPT | Mod: CPTII,,, | Performed by: PHYSICAL MEDICINE & REHABILITATION

## 2022-01-12 PROCEDURE — 62368 ANALYZE SP INF PUMP W/REPROG: CPT | Mod: PBBFAC | Performed by: PHYSICAL MEDICINE & REHABILITATION

## 2022-01-12 PROCEDURE — 80307 DRUG TEST PRSMV CHEM ANLYZR: CPT | Performed by: PHYSICAL MEDICINE & REHABILITATION

## 2022-01-12 PROCEDURE — 99213 OFFICE O/P EST LOW 20 MIN: CPT | Mod: S$PBB,,, | Performed by: PHYSICAL MEDICINE & REHABILITATION

## 2022-01-12 PROCEDURE — 3074F PR MOST RECENT SYSTOLIC BLOOD PRESSURE < 130 MM HG: ICD-10-PCS | Mod: CPTII,,, | Performed by: PHYSICAL MEDICINE & REHABILITATION

## 2022-01-12 PROCEDURE — 99999 PR PBB SHADOW E&M-EST. PATIENT-LVL IV: CPT | Mod: PBBFAC,,, | Performed by: PHYSICAL MEDICINE & REHABILITATION

## 2022-01-12 PROCEDURE — 1159F PR MEDICATION LIST DOCUMENTED IN MEDICAL RECORD: ICD-10-PCS | Mod: CPTII,,, | Performed by: PHYSICAL MEDICINE & REHABILITATION

## 2022-01-12 PROCEDURE — 3078F PR MOST RECENT DIASTOLIC BLOOD PRESSURE < 80 MM HG: ICD-10-PCS | Mod: CPTII,,, | Performed by: PHYSICAL MEDICINE & REHABILITATION

## 2022-01-12 PROCEDURE — 99214 OFFICE O/P EST MOD 30 MIN: CPT | Mod: PBBFAC,25 | Performed by: PHYSICAL MEDICINE & REHABILITATION

## 2022-01-12 PROCEDURE — 62368 PR ANALYZE INFUSN PUMP+REPROGRAM: ICD-10-PCS | Mod: S$PBB,,, | Performed by: PHYSICAL MEDICINE & REHABILITATION

## 2022-01-12 PROCEDURE — 3008F PR BODY MASS INDEX (BMI) DOCUMENTED: ICD-10-PCS | Mod: CPTII,,, | Performed by: PHYSICAL MEDICINE & REHABILITATION

## 2022-01-12 PROCEDURE — 99213 PR OFFICE/OUTPT VISIT, EST, LEVL III, 20-29 MIN: ICD-10-PCS | Mod: S$PBB,,, | Performed by: PHYSICAL MEDICINE & REHABILITATION

## 2022-01-12 PROCEDURE — 62368 ANALYZE SP INF PUMP W/REPROG: CPT | Mod: S$PBB,,, | Performed by: PHYSICAL MEDICINE & REHABILITATION

## 2022-01-12 PROCEDURE — 99999 PR PBB SHADOW E&M-EST. PATIENT-LVL IV: ICD-10-PCS | Mod: PBBFAC,,, | Performed by: PHYSICAL MEDICINE & REHABILITATION

## 2022-01-12 PROCEDURE — 1159F MED LIST DOCD IN RCRD: CPT | Mod: CPTII,,, | Performed by: PHYSICAL MEDICINE & REHABILITATION

## 2022-01-12 RX ADMIN — BACLOFEN 80 MG: 40 INJECTION INTRATHECAL at 05:01
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
## 2022-01-17 LAB
6MAM UR QL: NOT DETECTED
7AMINOCLONAZEPAM UR QL: NOT DETECTED
A-OH ALPRAZ UR QL: NOT DETECTED
ALPHA-OH-MIDAZOLAM: NOT DETECTED
ALPRAZ UR QL: NOT DETECTED
AMPHET UR QL SCN: NOT DETECTED
ANNOTATION COMMENT IMP: NORMAL
ANNOTATION COMMENT IMP: NORMAL
BARBITURATES UR QL: NOT DETECTED
BUPRENORPHINE UR QL: NOT DETECTED
BZE UR QL: NOT DETECTED
CARBOXYTHC UR QL: NOT DETECTED
CARISOPRODOL UR QL: NOT DETECTED
CLONAZEPAM UR QL: NOT DETECTED
CODEINE UR QL: NOT DETECTED
CREAT UR-MCNC: 219.5 MG/DL (ref 20–400)
DIAZEPAM UR QL: PRESENT
ETHYL GLUCURONIDE UR QL: NOT DETECTED
FENTANYL UR QL: NOT DETECTED
GABAPENTIN: NOT DETECTED
HYDROCODONE UR QL: NOT DETECTED
HYDROMORPHONE UR QL: NOT DETECTED
LORAZEPAM UR QL: NOT DETECTED
MDA UR QL: NOT DETECTED
MDEA UR QL: NOT DETECTED
MDMA UR QL: NOT DETECTED
ME-PHENIDATE UR QL: NOT DETECTED
METHADONE UR QL: NOT DETECTED
METHAMPHET UR QL: NOT DETECTED
MIDAZOLAM UR QL SCN: NOT DETECTED
MORPHINE UR QL: NOT DETECTED
NALOXONE: NOT DETECTED
NORBUPRENORPHINE UR QL CFM: NOT DETECTED
NORDIAZEPAM UR QL: NOT DETECTED
NORFENTANYL UR QL: NOT DETECTED
NORHYDROCODONE UR QL CFM: NOT DETECTED
NORMEPERIDINE UR QL CFM: NOT DETECTED
NOROXYCODONE UR QL CFM: NOT DETECTED
NOROXYMORPHONE UR QL SCN: NOT DETECTED
OXAZEPAM UR QL: NOT DETECTED
OXYCODONE UR QL: PRESENT
OXYMORPHONE UR QL: NOT DETECTED
PATHOLOGY STUDY: NORMAL
PCP UR QL: NOT DETECTED
PHENTERMINE UR QL: NOT DETECTED
PREGABALIN: NOT DETECTED
SERVICE CMNT-IMP: NORMAL
TAPENTADOL UR QL SCN: NOT DETECTED
TAPENTADOL UR QL SCN: NOT DETECTED
TEMAZEPAM UR QL: NOT DETECTED
TRAMADOL UR QL: NOT DETECTED
ZOLPIDEM METABOLITE: NOT DETECTED
ZOLPIDEM UR QL: NOT DETECTED

## 2022-01-18 ENCOUNTER — PATIENT MESSAGE (OUTPATIENT)
Dept: UROLOGY | Facility: CLINIC | Age: 37
End: 2022-01-18

## 2022-01-18 ENCOUNTER — OFFICE VISIT (OUTPATIENT)
Dept: UROLOGY | Facility: CLINIC | Age: 37
End: 2022-01-18
Payer: MEDICAID

## 2022-01-18 VITALS
WEIGHT: 170 LBS | SYSTOLIC BLOOD PRESSURE: 110 MMHG | HEART RATE: 60 BPM | BODY MASS INDEX: 25.76 KG/M2 | HEIGHT: 68 IN | DIASTOLIC BLOOD PRESSURE: 64 MMHG

## 2022-01-18 DIAGNOSIS — Z93.59 CHRONIC SUPRAPUBIC CATHETER: ICD-10-CM

## 2022-01-18 DIAGNOSIS — R39.9 UTI SYMPTOMS: ICD-10-CM

## 2022-01-18 DIAGNOSIS — G82.20 PARAPLEGIA FOLLOWING SPINAL CORD INJURY: Chronic | ICD-10-CM

## 2022-01-18 DIAGNOSIS — N31.9 NEUROGENIC BLADDER: Primary | ICD-10-CM

## 2022-01-18 DIAGNOSIS — Z43.5 ENCOUNTER FOR CARE OR REPLACEMENT OF SUPRAPUBIC TUBE: ICD-10-CM

## 2022-01-18 LAB
BILIRUB SERPL-MCNC: ABNORMAL MG/DL
BLOOD URINE, POC: 250
CLARITY, POC UA: CLEAR
COLOR, POC UA: YELLOW
GLUCOSE UR QL STRIP: ABNORMAL
KETONES UR QL STRIP: ABNORMAL
LEUKOCYTE ESTERASE URINE, POC: ABNORMAL
NITRITE, POC UA: NEGATIVE
PH, POC UA: 6
PROTEIN, POC: ABNORMAL
SPECIFIC GRAVITY, POC UA: 1
UROBILINOGEN, POC UA: NORMAL

## 2022-01-18 PROCEDURE — 51705 CHANGE OF BLADDER TUBE: CPT | Mod: PBBFAC | Performed by: NURSE PRACTITIONER

## 2022-01-18 PROCEDURE — 1159F PR MEDICATION LIST DOCUMENTED IN MEDICAL RECORD: ICD-10-PCS | Mod: CPTII,,, | Performed by: NURSE PRACTITIONER

## 2022-01-18 PROCEDURE — 99999 PR PBB SHADOW E&M-EST. PATIENT-LVL IV: ICD-10-PCS | Mod: PBBFAC,,, | Performed by: NURSE PRACTITIONER

## 2022-01-18 PROCEDURE — 3074F SYST BP LT 130 MM HG: CPT | Mod: CPTII,,, | Performed by: NURSE PRACTITIONER

## 2022-01-18 PROCEDURE — 99214 OFFICE O/P EST MOD 30 MIN: CPT | Mod: PBBFAC | Performed by: NURSE PRACTITIONER

## 2022-01-18 PROCEDURE — 81002 URINALYSIS NONAUTO W/O SCOPE: CPT | Mod: PBBFAC | Performed by: NURSE PRACTITIONER

## 2022-01-18 PROCEDURE — 1160F RVW MEDS BY RX/DR IN RCRD: CPT | Mod: CPTII,,, | Performed by: NURSE PRACTITIONER

## 2022-01-18 PROCEDURE — 51705 PR CHANGE OF BLADDER TUBE,SIMPLE: ICD-10-PCS | Mod: S$PBB,,, | Performed by: NURSE PRACTITIONER

## 2022-01-18 PROCEDURE — 51705 CHANGE OF BLADDER TUBE: CPT | Mod: S$PBB,,, | Performed by: NURSE PRACTITIONER

## 2022-01-18 PROCEDURE — 99499 UNLISTED E&M SERVICE: CPT | Mod: S$PBB,,, | Performed by: NURSE PRACTITIONER

## 2022-01-18 PROCEDURE — 1159F MED LIST DOCD IN RCRD: CPT | Mod: CPTII,,, | Performed by: NURSE PRACTITIONER

## 2022-01-18 PROCEDURE — 3078F PR MOST RECENT DIASTOLIC BLOOD PRESSURE < 80 MM HG: ICD-10-PCS | Mod: CPTII,,, | Performed by: NURSE PRACTITIONER

## 2022-01-18 PROCEDURE — 99999 PR PBB SHADOW E&M-EST. PATIENT-LVL IV: CPT | Mod: PBBFAC,,, | Performed by: NURSE PRACTITIONER

## 2022-01-18 PROCEDURE — 1160F PR REVIEW ALL MEDS BY PRESCRIBER/CLIN PHARMACIST DOCUMENTED: ICD-10-PCS | Mod: CPTII,,, | Performed by: NURSE PRACTITIONER

## 2022-01-18 PROCEDURE — 3008F PR BODY MASS INDEX (BMI) DOCUMENTED: ICD-10-PCS | Mod: CPTII,,, | Performed by: NURSE PRACTITIONER

## 2022-01-18 PROCEDURE — 3074F PR MOST RECENT SYSTOLIC BLOOD PRESSURE < 130 MM HG: ICD-10-PCS | Mod: CPTII,,, | Performed by: NURSE PRACTITIONER

## 2022-01-18 PROCEDURE — 3008F BODY MASS INDEX DOCD: CPT | Mod: CPTII,,, | Performed by: NURSE PRACTITIONER

## 2022-01-18 PROCEDURE — 99499 NO LOS: ICD-10-PCS | Mod: S$PBB,,, | Performed by: NURSE PRACTITIONER

## 2022-01-18 PROCEDURE — 3078F DIAST BP <80 MM HG: CPT | Mod: CPTII,,, | Performed by: NURSE PRACTITIONER
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
## 2022-01-24 ENCOUNTER — HOSPITAL ENCOUNTER (EMERGENCY)
Facility: HOSPITAL | Age: 37
Discharge: HOME | End: 2022-01-24
Attending: STUDENT IN AN ORGANIZED HEALTH CARE EDUCATION/TRAINING PROGRAM
Payer: MEDICAID

## 2022-01-24 VITALS
RESPIRATION RATE: 14 BRPM | SYSTOLIC BLOOD PRESSURE: 131 MMHG | TEMPERATURE: 98 F | DIASTOLIC BLOOD PRESSURE: 58 MMHG | OXYGEN SATURATION: 97 % | HEART RATE: 64 BPM | WEIGHT: 170 LBS | BODY MASS INDEX: 25.85 KG/M2

## 2022-01-24 DIAGNOSIS — M79.675 TOE PAIN, LEFT: Primary | ICD-10-CM

## 2022-01-24 PROCEDURE — 99283 EMERGENCY DEPT VISIT LOW MDM: CPT

## 2022-01-24 RX ORDER — IBUPROFEN 800 MG/1
800 TABLET ORAL EVERY 6 HOURS PRN
Qty: 20 TABLET | Refills: 0 | OUTPATIENT
Start: 2022-01-24 | End: 2022-08-15

## 2022-01-24 RX ORDER — MUPIROCIN 20 MG/G
OINTMENT TOPICAL 3 TIMES DAILY
Qty: 15 G | Refills: 0 | Status: SHIPPED | OUTPATIENT
Start: 2022-01-24 | End: 2022-07-07

## 2022-01-24 NOTE — DISCHARGE INSTRUCTIONS
Please take medications as prescribed  Please follow-up with Podiatry  Please apply antibiotic cream to blister to prevent infection
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
## 2022-01-28 ENCOUNTER — PATIENT MESSAGE (OUTPATIENT)
Dept: PHYSICAL MEDICINE AND REHAB | Facility: CLINIC | Age: 37
End: 2022-01-28
Payer: MEDICAID

## 2022-01-28 DIAGNOSIS — M79.2 NEUROPATHIC PAIN: Chronic | ICD-10-CM

## 2022-01-28 RX ORDER — OXYCODONE HCL 10 MG/1
10 TABLET, FILM COATED, EXTENDED RELEASE ORAL EVERY 12 HOURS
Qty: 60 TABLET | Refills: 0 | Status: SHIPPED | OUTPATIENT
Start: 2022-01-28 | End: 2022-02-28 | Stop reason: SDUPTHER

## 2022-02-11 ENCOUNTER — PATIENT MESSAGE (OUTPATIENT)
Dept: PHYSICAL MEDICINE AND REHAB | Facility: CLINIC | Age: 37
End: 2022-02-11
Payer: MEDICAID

## 2022-02-11 DIAGNOSIS — M79.2 NEUROPATHIC PAIN: Chronic | ICD-10-CM

## 2022-02-14 ENCOUNTER — PATIENT MESSAGE (OUTPATIENT)
Dept: PHYSICAL MEDICINE AND REHAB | Facility: CLINIC | Age: 37
End: 2022-02-14
Payer: MEDICAID

## 2022-02-14 RX ORDER — OXYCODONE AND ACETAMINOPHEN 10; 325 MG/1; MG/1
1 TABLET ORAL EVERY 6 HOURS PRN
Qty: 120 TABLET | Refills: 0 | Status: SHIPPED | OUTPATIENT
Start: 2022-02-14 | End: 2022-03-03 | Stop reason: SDUPTHER

## 2022-02-28 ENCOUNTER — PATIENT OUTREACH (OUTPATIENT)
Dept: ADMINISTRATIVE | Facility: OTHER | Age: 37
End: 2022-02-28
Payer: MEDICAID

## 2022-02-28 ENCOUNTER — PATIENT MESSAGE (OUTPATIENT)
Dept: UROLOGY | Facility: CLINIC | Age: 37
End: 2022-02-28
Payer: MEDICAID

## 2022-02-28 ENCOUNTER — PATIENT MESSAGE (OUTPATIENT)
Dept: INTERNAL MEDICINE | Facility: CLINIC | Age: 37
End: 2022-02-28
Payer: MEDICAID

## 2022-02-28 ENCOUNTER — PATIENT MESSAGE (OUTPATIENT)
Dept: PHYSICAL MEDICINE AND REHAB | Facility: CLINIC | Age: 37
End: 2022-02-28
Payer: MEDICAID

## 2022-02-28 DIAGNOSIS — M79.2 NEUROPATHIC PAIN: Chronic | ICD-10-CM

## 2022-02-28 NOTE — PROGRESS NOTES
LINKS immunization registry updated  Care Everywhere updated  Health Maintenance updated  Chart reviewed for overdue Proactive [facility] ([facility]) health maintenance testing (CRS, Breast Ca, Diabetic Eye Exam)   Orders entered:N/A

## 2022-03-02 RX ORDER — OXYCODONE HCL 10 MG/1
10 TABLET, FILM COATED, EXTENDED RELEASE ORAL EVERY 12 HOURS
Qty: 60 TABLET | Refills: 0 | Status: SHIPPED | OUTPATIENT
Start: 2022-03-02 | End: 2022-04-05 | Stop reason: SDUPTHER

## 2022-03-03 RX ORDER — OXYCODONE AND ACETAMINOPHEN 10; 325 MG/1; MG/1
1 TABLET ORAL EVERY 6 HOURS PRN
Qty: 120 TABLET | Refills: 0 | Status: SHIPPED | OUTPATIENT
Start: 2022-03-14 | End: 2022-04-05 | Stop reason: SDUPTHER

## 2022-03-03 RX ORDER — OXYCODONE HCL 10 MG/1
10 TABLET, FILM COATED, EXTENDED RELEASE ORAL EVERY 12 HOURS
Qty: 60 TABLET | Refills: 0 | OUTPATIENT
Start: 2022-03-03 | End: 2022-04-02

## 2022-03-07 ENCOUNTER — OFFICE VISIT (OUTPATIENT)
Dept: UROLOGY | Facility: CLINIC | Age: 37
End: 2022-03-07
Payer: MEDICAID

## 2022-03-07 ENCOUNTER — PATIENT MESSAGE (OUTPATIENT)
Dept: UROLOGY | Facility: CLINIC | Age: 37
End: 2022-03-07

## 2022-03-07 VITALS
BODY MASS INDEX: 25.85 KG/M2 | DIASTOLIC BLOOD PRESSURE: 61 MMHG | HEART RATE: 52 BPM | SYSTOLIC BLOOD PRESSURE: 88 MMHG | HEIGHT: 68 IN

## 2022-03-07 DIAGNOSIS — Z93.59 CHRONIC SUPRAPUBIC CATHETER: ICD-10-CM

## 2022-03-07 DIAGNOSIS — Z46.6 ENCOUNTER FOR FOLEY CATHETER REPLACEMENT: ICD-10-CM

## 2022-03-07 DIAGNOSIS — Z74.09 IMPAIRED FUNCTIONAL MOBILITY, BALANCE, AND ENDURANCE: ICD-10-CM

## 2022-03-07 DIAGNOSIS — G82.20 PARAPLEGIA FOLLOWING SPINAL CORD INJURY: Chronic | ICD-10-CM

## 2022-03-07 DIAGNOSIS — R33.9 URINARY RETENTION: ICD-10-CM

## 2022-03-07 DIAGNOSIS — N31.9 NEUROGENIC BLADDER: Chronic | ICD-10-CM

## 2022-03-07 PROCEDURE — 3008F PR BODY MASS INDEX (BMI) DOCUMENTED: ICD-10-PCS | Mod: CPTII,,, | Performed by: NURSE PRACTITIONER

## 2022-03-07 PROCEDURE — 99999 PR PBB SHADOW E&M-EST. PATIENT-LVL II: CPT | Mod: PBBFAC,,, | Performed by: NURSE PRACTITIONER

## 2022-03-07 PROCEDURE — 99499 NO LOS: ICD-10-PCS | Mod: S$PBB,,, | Performed by: NURSE PRACTITIONER

## 2022-03-07 PROCEDURE — 51705 CHANGE OF BLADDER TUBE: CPT | Mod: S$PBB,,, | Performed by: NURSE PRACTITIONER

## 2022-03-07 PROCEDURE — 3078F PR MOST RECENT DIASTOLIC BLOOD PRESSURE < 80 MM HG: ICD-10-PCS | Mod: CPTII,,, | Performed by: NURSE PRACTITIONER

## 2022-03-07 PROCEDURE — 1159F PR MEDICATION LIST DOCUMENTED IN MEDICAL RECORD: ICD-10-PCS | Mod: CPTII,,, | Performed by: NURSE PRACTITIONER

## 2022-03-07 PROCEDURE — 3008F BODY MASS INDEX DOCD: CPT | Mod: CPTII,,, | Performed by: NURSE PRACTITIONER

## 2022-03-07 PROCEDURE — 99212 OFFICE O/P EST SF 10 MIN: CPT | Mod: PBBFAC,25 | Performed by: NURSE PRACTITIONER

## 2022-03-07 PROCEDURE — 51705 PR CHANGE OF BLADDER TUBE,SIMPLE: ICD-10-PCS | Mod: S$PBB,,, | Performed by: NURSE PRACTITIONER

## 2022-03-07 PROCEDURE — 3074F PR MOST RECENT SYSTOLIC BLOOD PRESSURE < 130 MM HG: ICD-10-PCS | Mod: CPTII,,, | Performed by: NURSE PRACTITIONER

## 2022-03-07 PROCEDURE — 1160F RVW MEDS BY RX/DR IN RCRD: CPT | Mod: CPTII,,, | Performed by: NURSE PRACTITIONER

## 2022-03-07 PROCEDURE — 1159F MED LIST DOCD IN RCRD: CPT | Mod: CPTII,,, | Performed by: NURSE PRACTITIONER

## 2022-03-07 PROCEDURE — 51705 CHANGE OF BLADDER TUBE: CPT | Mod: PBBFAC | Performed by: NURSE PRACTITIONER

## 2022-03-07 PROCEDURE — 99999 PR PBB SHADOW E&M-EST. PATIENT-LVL II: ICD-10-PCS | Mod: PBBFAC,,, | Performed by: NURSE PRACTITIONER

## 2022-03-07 PROCEDURE — 99499 UNLISTED E&M SERVICE: CPT | Mod: S$PBB,,, | Performed by: NURSE PRACTITIONER

## 2022-03-07 PROCEDURE — 3074F SYST BP LT 130 MM HG: CPT | Mod: CPTII,,, | Performed by: NURSE PRACTITIONER

## 2022-03-07 PROCEDURE — 1160F PR REVIEW ALL MEDS BY PRESCRIBER/CLIN PHARMACIST DOCUMENTED: ICD-10-PCS | Mod: CPTII,,, | Performed by: NURSE PRACTITIONER

## 2022-03-07 PROCEDURE — 3078F DIAST BP <80 MM HG: CPT | Mod: CPTII,,, | Performed by: NURSE PRACTITIONER
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
## 2022-03-18 ENCOUNTER — PATIENT MESSAGE (OUTPATIENT)
Dept: UROLOGY | Facility: CLINIC | Age: 37
End: 2022-03-18
Payer: MEDICAID

## 2022-04-04 ENCOUNTER — PATIENT MESSAGE (OUTPATIENT)
Dept: PHYSICAL MEDICINE AND REHAB | Facility: CLINIC | Age: 37
End: 2022-04-04
Payer: MEDICAID

## 2022-04-04 DIAGNOSIS — M79.2 NEUROPATHIC PAIN: Chronic | ICD-10-CM

## 2022-04-05 ENCOUNTER — PATIENT OUTREACH (OUTPATIENT)
Dept: ADMINISTRATIVE | Facility: OTHER | Age: 37
End: 2022-04-05
Payer: MEDICAID

## 2022-04-05 DIAGNOSIS — M79.2 NEUROPATHIC PAIN: Chronic | ICD-10-CM

## 2022-04-05 RX ORDER — OXYCODONE HCL 10 MG/1
10 TABLET, FILM COATED, EXTENDED RELEASE ORAL EVERY 12 HOURS
Qty: 60 TABLET | Refills: 0 | Status: SHIPPED | OUTPATIENT
Start: 2022-04-05 | End: 2022-05-05 | Stop reason: SDUPTHER

## 2022-04-05 RX ORDER — OXYCODONE AND ACETAMINOPHEN 10; 325 MG/1; MG/1
1 TABLET ORAL EVERY 6 HOURS PRN
Qty: 120 TABLET | Refills: 0 | Status: CANCELLED | OUTPATIENT
Start: 2022-04-05

## 2022-04-05 RX ORDER — OXYCODONE AND ACETAMINOPHEN 10; 325 MG/1; MG/1
1 TABLET ORAL EVERY 6 HOURS PRN
Qty: 120 TABLET | Refills: 0 | Status: SHIPPED | OUTPATIENT
Start: 2022-04-16 | End: 2022-05-05 | Stop reason: SDUPTHER

## 2022-04-06 ENCOUNTER — PATIENT MESSAGE (OUTPATIENT)
Dept: PHYSICAL MEDICINE AND REHAB | Facility: CLINIC | Age: 37
End: 2022-04-06

## 2022-04-06 ENCOUNTER — OFFICE VISIT (OUTPATIENT)
Dept: PHYSICAL MEDICINE AND REHAB | Facility: CLINIC | Age: 37
End: 2022-04-06
Payer: MEDICAID

## 2022-04-06 VITALS
HEIGHT: 68 IN | DIASTOLIC BLOOD PRESSURE: 57 MMHG | SYSTOLIC BLOOD PRESSURE: 108 MMHG | WEIGHT: 170 LBS | BODY MASS INDEX: 25.76 KG/M2 | HEART RATE: 69 BPM

## 2022-04-06 DIAGNOSIS — F41.9 ANXIETY: ICD-10-CM

## 2022-04-06 DIAGNOSIS — R25.2 SPASTICITY: ICD-10-CM

## 2022-04-06 DIAGNOSIS — G82.20 PARAPLEGIA FOLLOWING SPINAL CORD INJURY: Primary | ICD-10-CM

## 2022-04-06 DIAGNOSIS — M79.2 NEUROPATHIC PAIN: ICD-10-CM

## 2022-04-06 PROCEDURE — 3008F PR BODY MASS INDEX (BMI) DOCUMENTED: ICD-10-PCS | Mod: CPTII,,, | Performed by: PHYSICAL MEDICINE & REHABILITATION

## 2022-04-06 PROCEDURE — 99215 OFFICE O/P EST HI 40 MIN: CPT | Mod: PBBFAC | Performed by: PHYSICAL MEDICINE & REHABILITATION

## 2022-04-06 PROCEDURE — 99999 PR PBB SHADOW E&M-EST. PATIENT-LVL V: CPT | Mod: PBBFAC,,, | Performed by: PHYSICAL MEDICINE & REHABILITATION

## 2022-04-06 PROCEDURE — 99215 OFFICE O/P EST HI 40 MIN: CPT | Mod: S$PBB,,, | Performed by: PHYSICAL MEDICINE & REHABILITATION

## 2022-04-06 PROCEDURE — 3078F PR MOST RECENT DIASTOLIC BLOOD PRESSURE < 80 MM HG: ICD-10-PCS | Mod: CPTII,,, | Performed by: PHYSICAL MEDICINE & REHABILITATION

## 2022-04-06 PROCEDURE — 3008F BODY MASS INDEX DOCD: CPT | Mod: CPTII,,, | Performed by: PHYSICAL MEDICINE & REHABILITATION

## 2022-04-06 PROCEDURE — 3078F DIAST BP <80 MM HG: CPT | Mod: CPTII,,, | Performed by: PHYSICAL MEDICINE & REHABILITATION

## 2022-04-06 PROCEDURE — 1159F PR MEDICATION LIST DOCUMENTED IN MEDICAL RECORD: ICD-10-PCS | Mod: CPTII,,, | Performed by: PHYSICAL MEDICINE & REHABILITATION

## 2022-04-06 PROCEDURE — 99215 PR OFFICE/OUTPT VISIT, EST, LEVL V, 40-54 MIN: ICD-10-PCS | Mod: S$PBB,,, | Performed by: PHYSICAL MEDICINE & REHABILITATION

## 2022-04-06 PROCEDURE — 62370 ANL SP INF PMP W/MDREPRG&FIL: CPT | Mod: PBBFAC | Performed by: PHYSICAL MEDICINE & REHABILITATION

## 2022-04-06 PROCEDURE — 3074F SYST BP LT 130 MM HG: CPT | Mod: CPTII,,, | Performed by: PHYSICAL MEDICINE & REHABILITATION

## 2022-04-06 PROCEDURE — 62370 ANL SP INF PMP W/MDREPRG&FIL: CPT | Mod: S$PBB,,, | Performed by: PHYSICAL MEDICINE & REHABILITATION

## 2022-04-06 PROCEDURE — 1159F MED LIST DOCD IN RCRD: CPT | Mod: CPTII,,, | Performed by: PHYSICAL MEDICINE & REHABILITATION

## 2022-04-06 PROCEDURE — 99999 PR PBB SHADOW E&M-EST. PATIENT-LVL V: ICD-10-PCS | Mod: PBBFAC,,, | Performed by: PHYSICAL MEDICINE & REHABILITATION

## 2022-04-06 PROCEDURE — 62370 PR ANL SP INF PMP W/MDREPRG&FIL: ICD-10-PCS | Mod: S$PBB,,, | Performed by: PHYSICAL MEDICINE & REHABILITATION

## 2022-04-06 PROCEDURE — 3074F PR MOST RECENT SYSTOLIC BLOOD PRESSURE < 130 MM HG: ICD-10-PCS | Mod: CPTII,,, | Performed by: PHYSICAL MEDICINE & REHABILITATION

## 2022-04-06 RX ORDER — DIAZEPAM 10 MG/1
10 TABLET ORAL 3 TIMES DAILY PRN
Qty: 90 TABLET | Refills: 0 | Status: SHIPPED | OUTPATIENT
Start: 2022-04-06 | End: 2022-05-17 | Stop reason: SDUPTHER

## 2022-04-06 RX ADMIN — BACLOFEN 80 MG: 40 INJECTION INTRATHECAL at 06:04
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
## 2022-04-18 ENCOUNTER — PATIENT MESSAGE (OUTPATIENT)
Dept: UROLOGY | Facility: CLINIC | Age: 37
End: 2022-04-18
Payer: MEDICAID

## 2022-04-19 ENCOUNTER — OFFICE VISIT (OUTPATIENT)
Dept: UROLOGY | Facility: CLINIC | Age: 37
End: 2022-04-19
Payer: MEDICAID

## 2022-04-19 VITALS
WEIGHT: 170 LBS | BODY MASS INDEX: 25.76 KG/M2 | SYSTOLIC BLOOD PRESSURE: 109 MMHG | HEART RATE: 56 BPM | HEIGHT: 68 IN | DIASTOLIC BLOOD PRESSURE: 70 MMHG

## 2022-04-19 DIAGNOSIS — G82.20 PARAPLEGIA FOLLOWING SPINAL CORD INJURY: Chronic | ICD-10-CM

## 2022-04-19 DIAGNOSIS — Z93.59 CHRONIC SUPRAPUBIC CATHETER: Primary | ICD-10-CM

## 2022-04-19 DIAGNOSIS — R39.9 UTI SYMPTOMS: ICD-10-CM

## 2022-04-19 DIAGNOSIS — N31.9 NEUROGENIC BLADDER: Chronic | ICD-10-CM

## 2022-04-19 PROCEDURE — 3008F PR BODY MASS INDEX (BMI) DOCUMENTED: ICD-10-PCS | Mod: CPTII,,, | Performed by: NURSE PRACTITIONER

## 2022-04-19 PROCEDURE — 51705 CHANGE OF BLADDER TUBE: CPT | Mod: S$PBB,,,

## 2022-04-19 PROCEDURE — 51705 PR CHANGE OF BLADDER TUBE,SIMPLE: ICD-10-PCS | Mod: S$PBB,,,

## 2022-04-19 PROCEDURE — 99213 OFFICE O/P EST LOW 20 MIN: CPT | Mod: PBBFAC,25 | Performed by: NURSE PRACTITIONER

## 2022-04-19 PROCEDURE — 1159F MED LIST DOCD IN RCRD: CPT | Mod: CPTII,,, | Performed by: NURSE PRACTITIONER

## 2022-04-19 PROCEDURE — 99999 PR PBB SHADOW E&M-EST. PATIENT-LVL III: CPT | Mod: PBBFAC,,, | Performed by: NURSE PRACTITIONER

## 2022-04-19 PROCEDURE — 3074F SYST BP LT 130 MM HG: CPT | Mod: CPTII,,, | Performed by: NURSE PRACTITIONER

## 2022-04-19 PROCEDURE — 3008F BODY MASS INDEX DOCD: CPT | Mod: CPTII,,, | Performed by: NURSE PRACTITIONER

## 2022-04-19 PROCEDURE — 87086 URINE CULTURE/COLONY COUNT: CPT

## 2022-04-19 PROCEDURE — 99499 UNLISTED E&M SERVICE: CPT | Mod: S$PBB,,, | Performed by: NURSE PRACTITIONER

## 2022-04-19 PROCEDURE — 1159F PR MEDICATION LIST DOCUMENTED IN MEDICAL RECORD: ICD-10-PCS | Mod: CPTII,,, | Performed by: NURSE PRACTITIONER

## 2022-04-19 PROCEDURE — 99499 NO LOS: ICD-10-PCS | Mod: S$PBB,,, | Performed by: NURSE PRACTITIONER

## 2022-04-19 PROCEDURE — 1160F RVW MEDS BY RX/DR IN RCRD: CPT | Mod: CPTII,,, | Performed by: NURSE PRACTITIONER

## 2022-04-19 PROCEDURE — 99999 PR PBB SHADOW E&M-EST. PATIENT-LVL III: ICD-10-PCS | Mod: PBBFAC,,, | Performed by: NURSE PRACTITIONER

## 2022-04-19 PROCEDURE — 3074F PR MOST RECENT SYSTOLIC BLOOD PRESSURE < 130 MM HG: ICD-10-PCS | Mod: CPTII,,, | Performed by: NURSE PRACTITIONER

## 2022-04-19 PROCEDURE — 51705 CHANGE OF BLADDER TUBE: CPT | Mod: PBBFAC

## 2022-04-19 PROCEDURE — 1160F PR REVIEW ALL MEDS BY PRESCRIBER/CLIN PHARMACIST DOCUMENTED: ICD-10-PCS | Mod: CPTII,,, | Performed by: NURSE PRACTITIONER

## 2022-04-19 PROCEDURE — 3078F DIAST BP <80 MM HG: CPT | Mod: CPTII,,, | Performed by: NURSE PRACTITIONER

## 2022-04-19 PROCEDURE — 3078F PR MOST RECENT DIASTOLIC BLOOD PRESSURE < 80 MM HG: ICD-10-PCS | Mod: CPTII,,, | Performed by: NURSE PRACTITIONER

## 2022-04-19 NOTE — LETTER
April 19, 2022      [facility] [address] [facility]  [address]  [facility] [address]  Phone: [phone]       Patient: Nghia Edgar   YOB: 1985  Date of Visit: 04/19/2022    To Whom It May Concern:    Will Edgar  was at [facility] on 04/19/2022. The patients son was present to assist and may return to work 4/19/2022 If you have any questions or concerns, or if I can be of further assistance, please do not hesitate to contact me.    Sincerely,      JENN Victor MA
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
## 2022-04-20 LAB
BACTERIA UR CULT: NORMAL
BACTERIA UR CULT: NORMAL

## 2022-04-21 ENCOUNTER — PATIENT MESSAGE (OUTPATIENT)
Dept: UROLOGY | Facility: CLINIC | Age: 37
End: 2022-04-21
Payer: MEDICAID

## 2022-05-05 ENCOUNTER — PATIENT MESSAGE (OUTPATIENT)
Dept: PHYSICAL MEDICINE AND REHAB | Facility: CLINIC | Age: 37
End: 2022-05-05
Payer: MEDICAID

## 2022-05-05 DIAGNOSIS — M79.2 NEUROPATHIC PAIN: Chronic | ICD-10-CM

## 2022-05-05 RX ORDER — OXYCODONE HCL 10 MG/1
10 TABLET, FILM COATED, EXTENDED RELEASE ORAL EVERY 12 HOURS
Qty: 60 TABLET | Refills: 0 | Status: SHIPPED | OUTPATIENT
Start: 2022-05-05 | End: 2022-06-09 | Stop reason: SDUPTHER

## 2022-05-05 RX ORDER — OXYCODONE AND ACETAMINOPHEN 10; 325 MG/1; MG/1
1 TABLET ORAL EVERY 6 HOURS PRN
Qty: 120 TABLET | Refills: 0 | Status: SHIPPED | OUTPATIENT
Start: 2022-05-16 | End: 2022-06-16 | Stop reason: SDUPTHER

## 2022-05-05 RX ORDER — OXYCODONE HCL 10 MG/1
10 TABLET, FILM COATED, EXTENDED RELEASE ORAL EVERY 12 HOURS
Qty: 60 TABLET | Refills: 0 | Status: CANCELLED | OUTPATIENT
Start: 2022-05-05 | End: 2022-06-04

## 2022-05-26 ENCOUNTER — PATIENT MESSAGE (OUTPATIENT)
Dept: UROLOGY | Facility: CLINIC | Age: 37
End: 2022-05-26
Payer: MEDICAID

## 2022-06-02 ENCOUNTER — PATIENT MESSAGE (OUTPATIENT)
Dept: UROLOGY | Facility: CLINIC | Age: 37
End: 2022-06-02
Payer: MEDICAID

## 2022-06-02 ENCOUNTER — PATIENT MESSAGE (OUTPATIENT)
Dept: INTERNAL MEDICINE | Facility: CLINIC | Age: 37
End: 2022-06-02
Payer: MEDICAID

## 2022-06-02 DIAGNOSIS — Z00.00 ANNUAL PHYSICAL EXAM: Primary | ICD-10-CM

## 2022-06-08 ENCOUNTER — PATIENT MESSAGE (OUTPATIENT)
Dept: PHYSICAL MEDICINE AND REHAB | Facility: CLINIC | Age: 37
End: 2022-06-08
Payer: MEDICAID

## 2022-06-08 ENCOUNTER — LAB VISIT (OUTPATIENT)
Dept: LAB | Facility: HOSPITAL | Age: 37
End: 2022-06-08
Payer: MEDICAID

## 2022-06-08 DIAGNOSIS — M79.2 NEUROPATHIC PAIN: Chronic | ICD-10-CM

## 2022-06-08 DIAGNOSIS — Z00.00 ANNUAL PHYSICAL EXAM: ICD-10-CM

## 2022-06-08 LAB
ALBUMIN SERPL BCP-MCNC: 3.7 G/DL (ref 3.5–5.2)
ALP SERPL-CCNC: 101 U/L (ref 55–135)
ALT SERPL W/O P-5'-P-CCNC: 13 U/L (ref 10–44)
ANION GAP SERPL CALC-SCNC: 8 MMOL/L (ref 8–16)
AST SERPL-CCNC: 14 U/L (ref 10–40)
BASOPHILS # BLD AUTO: 0.04 K/UL (ref 0–0.2)
BASOPHILS NFR BLD: 0.6 % (ref 0–1.9)
BILIRUB SERPL-MCNC: 0.4 MG/DL (ref 0.1–1)
BUN SERPL-MCNC: 11 MG/DL (ref 6–20)
CALCIUM SERPL-MCNC: 8.9 MG/DL (ref 8.7–10.5)
CHLORIDE SERPL-SCNC: 107 MMOL/L (ref 95–110)
CHOLEST SERPL-MCNC: 143 MG/DL (ref 120–199)
CHOLEST/HDLC SERPL: 3 {RATIO} (ref 2–5)
CO2 SERPL-SCNC: 25 MMOL/L (ref 23–29)
CREAT SERPL-MCNC: 0.5 MG/DL (ref 0.5–1.4)
DIFFERENTIAL METHOD: ABNORMAL
EOSINOPHIL # BLD AUTO: 0.1 K/UL (ref 0–0.5)
EOSINOPHIL NFR BLD: 1.5 % (ref 0–8)
ERYTHROCYTE [DISTWIDTH] IN BLOOD BY AUTOMATED COUNT: 15.8 % (ref 11.5–14.5)
EST. GFR  (AFRICAN AMERICAN): >60 ML/MIN/1.73 M^2
EST. GFR  (NON AFRICAN AMERICAN): >60 ML/MIN/1.73 M^2
ESTIMATED AVG GLUCOSE: 91 MG/DL (ref 68–131)
GLUCOSE SERPL-MCNC: 93 MG/DL (ref 70–110)
HBA1C MFR BLD: 4.8 % (ref 4–5.6)
HCT VFR BLD AUTO: 41.4 % (ref 40–54)
HDLC SERPL-MCNC: 47 MG/DL (ref 40–75)
HDLC SERPL: 32.9 % (ref 20–50)
HGB BLD-MCNC: 12.1 G/DL (ref 14–18)
IMM GRANULOCYTES # BLD AUTO: 0.02 K/UL (ref 0–0.04)
IMM GRANULOCYTES NFR BLD AUTO: 0.3 % (ref 0–0.5)
LDLC SERPL CALC-MCNC: 87.6 MG/DL (ref 63–159)
LYMPHOCYTES # BLD AUTO: 1.3 K/UL (ref 1–4.8)
LYMPHOCYTES NFR BLD: 19.5 % (ref 18–48)
MCH RBC QN AUTO: 27.9 PG (ref 27–31)
MCHC RBC AUTO-ENTMCNC: 29.2 G/DL (ref 32–36)
MCV RBC AUTO: 95 FL (ref 82–98)
MONOCYTES # BLD AUTO: 0.6 K/UL (ref 0.3–1)
MONOCYTES NFR BLD: 8.5 % (ref 4–15)
NEUTROPHILS # BLD AUTO: 4.6 K/UL (ref 1.8–7.7)
NEUTROPHILS NFR BLD: 69.6 % (ref 38–73)
NONHDLC SERPL-MCNC: 96 MG/DL
NRBC BLD-RTO: 0 /100 WBC
PLATELET # BLD AUTO: 185 K/UL (ref 150–450)
PMV BLD AUTO: 12.8 FL (ref 9.2–12.9)
POTASSIUM SERPL-SCNC: 3.8 MMOL/L (ref 3.5–5.1)
PROT SERPL-MCNC: 6.8 G/DL (ref 6–8.4)
RBC # BLD AUTO: 4.34 M/UL (ref 4.6–6.2)
SODIUM SERPL-SCNC: 140 MMOL/L (ref 136–145)
TRIGL SERPL-MCNC: 42 MG/DL (ref 30–150)
TSH SERPL DL<=0.005 MIU/L-ACNC: 0.86 UIU/ML (ref 0.4–4)
WBC # BLD AUTO: 6.6 K/UL (ref 3.9–12.7)

## 2022-06-08 PROCEDURE — 84443 ASSAY THYROID STIM HORMONE: CPT | Performed by: PHYSICIAN ASSISTANT

## 2022-06-08 PROCEDURE — 80061 LIPID PANEL: CPT | Performed by: PHYSICIAN ASSISTANT

## 2022-06-08 PROCEDURE — 80053 COMPREHEN METABOLIC PANEL: CPT | Performed by: PHYSICIAN ASSISTANT

## 2022-06-08 PROCEDURE — 85025 COMPLETE CBC W/AUTO DIFF WBC: CPT | Performed by: PHYSICIAN ASSISTANT

## 2022-06-08 PROCEDURE — 36415 COLL VENOUS BLD VENIPUNCTURE: CPT | Performed by: PHYSICIAN ASSISTANT

## 2022-06-08 PROCEDURE — 83036 HEMOGLOBIN GLYCOSYLATED A1C: CPT | Performed by: PHYSICIAN ASSISTANT

## 2022-06-10 ENCOUNTER — PATIENT MESSAGE (OUTPATIENT)
Dept: PHYSICAL MEDICINE AND REHAB | Facility: CLINIC | Age: 37
End: 2022-06-10
Payer: MEDICAID

## 2022-06-14 ENCOUNTER — OFFICE VISIT (OUTPATIENT)
Dept: UROLOGY | Facility: CLINIC | Age: 37
End: 2022-06-14
Payer: MEDICAID

## 2022-06-14 VITALS
DIASTOLIC BLOOD PRESSURE: 72 MMHG | HEART RATE: 49 BPM | BODY MASS INDEX: 25.76 KG/M2 | HEIGHT: 68 IN | SYSTOLIC BLOOD PRESSURE: 117 MMHG | WEIGHT: 170 LBS

## 2022-06-14 DIAGNOSIS — N31.9 NEUROGENIC BLADDER: Primary | ICD-10-CM

## 2022-06-14 DIAGNOSIS — N32.89 BLADDER SPASMS: ICD-10-CM

## 2022-06-14 DIAGNOSIS — G82.20 PARAPLEGIA FOLLOWING SPINAL CORD INJURY: ICD-10-CM

## 2022-06-14 DIAGNOSIS — Z93.59 CHRONIC SUPRAPUBIC CATHETER: ICD-10-CM

## 2022-06-14 DIAGNOSIS — Z43.5 ENCOUNTER FOR CARE OR REPLACEMENT OF SUPRAPUBIC TUBE: ICD-10-CM

## 2022-06-14 PROCEDURE — 3008F PR BODY MASS INDEX (BMI) DOCUMENTED: ICD-10-PCS | Mod: CPTII,,, | Performed by: NURSE PRACTITIONER

## 2022-06-14 PROCEDURE — 1160F RVW MEDS BY RX/DR IN RCRD: CPT | Mod: CPTII,,, | Performed by: NURSE PRACTITIONER

## 2022-06-14 PROCEDURE — 3044F PR MOST RECENT HEMOGLOBIN A1C LEVEL <7.0%: ICD-10-PCS | Mod: CPTII,,, | Performed by: NURSE PRACTITIONER

## 2022-06-14 PROCEDURE — 99999 PR PBB SHADOW E&M-EST. PATIENT-LVL III: ICD-10-PCS | Mod: PBBFAC,,, | Performed by: NURSE PRACTITIONER

## 2022-06-14 PROCEDURE — 51705 CHANGE OF BLADDER TUBE: CPT | Mod: PBBFAC | Performed by: NURSE PRACTITIONER

## 2022-06-14 PROCEDURE — 87086 URINE CULTURE/COLONY COUNT: CPT | Performed by: NURSE PRACTITIONER

## 2022-06-14 PROCEDURE — 3078F PR MOST RECENT DIASTOLIC BLOOD PRESSURE < 80 MM HG: ICD-10-PCS | Mod: CPTII,,, | Performed by: NURSE PRACTITIONER

## 2022-06-14 PROCEDURE — 99499 UNLISTED E&M SERVICE: CPT | Mod: S$PBB,,, | Performed by: NURSE PRACTITIONER

## 2022-06-14 PROCEDURE — 1160F PR REVIEW ALL MEDS BY PRESCRIBER/CLIN PHARMACIST DOCUMENTED: ICD-10-PCS | Mod: CPTII,,, | Performed by: NURSE PRACTITIONER

## 2022-06-14 PROCEDURE — 99213 OFFICE O/P EST LOW 20 MIN: CPT | Mod: PBBFAC | Performed by: NURSE PRACTITIONER

## 2022-06-14 PROCEDURE — 99999 PR PBB SHADOW E&M-EST. PATIENT-LVL III: CPT | Mod: PBBFAC,,, | Performed by: NURSE PRACTITIONER

## 2022-06-14 PROCEDURE — 51705 CHANGE OF BLADDER TUBE: CPT | Mod: S$PBB,,, | Performed by: NURSE PRACTITIONER

## 2022-06-14 PROCEDURE — 3008F BODY MASS INDEX DOCD: CPT | Mod: CPTII,,, | Performed by: NURSE PRACTITIONER

## 2022-06-14 PROCEDURE — 1159F PR MEDICATION LIST DOCUMENTED IN MEDICAL RECORD: ICD-10-PCS | Mod: CPTII,,, | Performed by: NURSE PRACTITIONER

## 2022-06-14 PROCEDURE — 3074F PR MOST RECENT SYSTOLIC BLOOD PRESSURE < 130 MM HG: ICD-10-PCS | Mod: CPTII,,, | Performed by: NURSE PRACTITIONER

## 2022-06-14 PROCEDURE — 3074F SYST BP LT 130 MM HG: CPT | Mod: CPTII,,, | Performed by: NURSE PRACTITIONER

## 2022-06-14 PROCEDURE — 3078F DIAST BP <80 MM HG: CPT | Mod: CPTII,,, | Performed by: NURSE PRACTITIONER

## 2022-06-14 PROCEDURE — 1159F MED LIST DOCD IN RCRD: CPT | Mod: CPTII,,, | Performed by: NURSE PRACTITIONER

## 2022-06-14 PROCEDURE — 3044F HG A1C LEVEL LT 7.0%: CPT | Mod: CPTII,,, | Performed by: NURSE PRACTITIONER

## 2022-06-14 PROCEDURE — 99499 NO LOS: ICD-10-PCS | Mod: S$PBB,,, | Performed by: NURSE PRACTITIONER

## 2022-06-14 PROCEDURE — 51705 PR CHANGE OF BLADDER TUBE,SIMPLE: ICD-10-PCS | Mod: S$PBB,,, | Performed by: NURSE PRACTITIONER
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
## 2022-06-16 DIAGNOSIS — M79.2 NEUROPATHIC PAIN: Chronic | ICD-10-CM

## 2022-06-16 DIAGNOSIS — R25.2 SPASTICITY: ICD-10-CM

## 2022-06-16 LAB
BACTERIA UR CULT: NORMAL
BACTERIA UR CULT: NORMAL

## 2022-06-16 RX ORDER — OXYCODONE HCL 10 MG/1
10 TABLET, FILM COATED, EXTENDED RELEASE ORAL EVERY 12 HOURS
Qty: 60 TABLET | Refills: 0 | OUTPATIENT
Start: 2022-06-16 | End: 2022-07-16

## 2022-06-17 ENCOUNTER — PATIENT MESSAGE (OUTPATIENT)
Dept: PHYSICAL MEDICINE AND REHAB | Facility: CLINIC | Age: 37
End: 2022-06-17
Payer: MEDICAID

## 2022-06-17 RX ORDER — DIAZEPAM 10 MG/1
10 TABLET ORAL 3 TIMES DAILY PRN
Qty: 90 TABLET | Refills: 0 | Status: SHIPPED | OUTPATIENT
Start: 2022-06-17 | End: 2022-11-14

## 2022-06-17 RX ORDER — OXYCODONE AND ACETAMINOPHEN 10; 325 MG/1; MG/1
1 TABLET ORAL EVERY 6 HOURS PRN
Qty: 120 TABLET | Refills: 0 | Status: SHIPPED | OUTPATIENT
Start: 2022-06-17 | End: 2022-07-18 | Stop reason: SDUPTHER

## 2022-06-29 DIAGNOSIS — R25.2 SPASTICITY: Primary | ICD-10-CM

## 2022-06-29 DIAGNOSIS — G82.20 PARAPLEGIA FOLLOWING SPINAL CORD INJURY: ICD-10-CM

## 2022-07-05 ENCOUNTER — PATIENT MESSAGE (OUTPATIENT)
Dept: INTERNAL MEDICINE | Facility: CLINIC | Age: 37
End: 2022-07-05

## 2022-07-06 ENCOUNTER — APPOINTMENT (OUTPATIENT)
Dept: LAB | Facility: HOSPITAL | Age: 37
End: 2022-07-06
Attending: PHYSICAL MEDICINE & REHABILITATION
Payer: MEDICAID

## 2022-07-06 ENCOUNTER — PROCEDURE VISIT (OUTPATIENT)
Dept: PHYSICAL MEDICINE AND REHAB | Facility: CLINIC | Age: 37
End: 2022-07-06
Payer: MEDICAID

## 2022-07-06 ENCOUNTER — PATIENT MESSAGE (OUTPATIENT)
Dept: PHYSICAL MEDICINE AND REHAB | Facility: CLINIC | Age: 37
End: 2022-07-06

## 2022-07-06 VITALS
WEIGHT: 175 LBS | HEART RATE: 63 BPM | BODY MASS INDEX: 25.92 KG/M2 | DIASTOLIC BLOOD PRESSURE: 76 MMHG | SYSTOLIC BLOOD PRESSURE: 116 MMHG | HEIGHT: 69 IN

## 2022-07-06 DIAGNOSIS — G82.20 PARAPLEGIA FOLLOWING SPINAL CORD INJURY: Primary | ICD-10-CM

## 2022-07-06 PROCEDURE — 62370 ANL SP INF PMP W/MDREPRG&FIL: CPT | Mod: S$PBB,,, | Performed by: PHYSICAL MEDICINE & REHABILITATION

## 2022-07-06 PROCEDURE — 62370 ANL SP INF PMP W/MDREPRG&FIL: CPT | Mod: PBBFAC | Performed by: PHYSICAL MEDICINE & REHABILITATION

## 2022-07-06 PROCEDURE — 62370 PR ANL SP INF PMP W/MDREPRG&FIL: ICD-10-PCS | Mod: S$PBB,,, | Performed by: PHYSICAL MEDICINE & REHABILITATION

## 2022-07-07 RX ADMIN — BACLOFEN 80 MG: 40 INJECTION INTRATHECAL at 12:07
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
## 2022-07-12 ENCOUNTER — OFFICE VISIT (OUTPATIENT)
Dept: UROLOGY | Facility: CLINIC | Age: 37
End: 2022-07-12
Payer: MEDICAID

## 2022-07-12 VITALS
RESPIRATION RATE: 15 BRPM | HEIGHT: 69 IN | SYSTOLIC BLOOD PRESSURE: 104 MMHG | HEART RATE: 62 BPM | BODY MASS INDEX: 25.92 KG/M2 | WEIGHT: 175 LBS | DIASTOLIC BLOOD PRESSURE: 61 MMHG

## 2022-07-12 DIAGNOSIS — Z93.59 CHRONIC SUPRAPUBIC CATHETER: ICD-10-CM

## 2022-07-12 DIAGNOSIS — R39.9 UTI SYMPTOMS: ICD-10-CM

## 2022-07-12 DIAGNOSIS — Z43.5 ENCOUNTER FOR CARE OR REPLACEMENT OF SUPRAPUBIC TUBE: ICD-10-CM

## 2022-07-12 DIAGNOSIS — G82.20 PARAPLEGIA FOLLOWING SPINAL CORD INJURY: ICD-10-CM

## 2022-07-12 DIAGNOSIS — N31.9 NEUROGENIC BLADDER: Primary | ICD-10-CM

## 2022-07-12 DIAGNOSIS — N32.89 BLADDER SPASMS: ICD-10-CM

## 2022-07-12 PROCEDURE — 51705 CHANGE OF BLADDER TUBE: CPT | Mod: S$PBB,,, | Performed by: NURSE PRACTITIONER

## 2022-07-12 PROCEDURE — 87086 URINE CULTURE/COLONY COUNT: CPT | Performed by: NURSE PRACTITIONER

## 2022-07-12 PROCEDURE — 1160F RVW MEDS BY RX/DR IN RCRD: CPT | Mod: CPTII,,, | Performed by: NURSE PRACTITIONER

## 2022-07-12 PROCEDURE — 51705 PR CHANGE OF BLADDER TUBE,SIMPLE: ICD-10-PCS | Mod: S$PBB,,, | Performed by: NURSE PRACTITIONER

## 2022-07-12 PROCEDURE — 3078F PR MOST RECENT DIASTOLIC BLOOD PRESSURE < 80 MM HG: ICD-10-PCS | Mod: CPTII,,, | Performed by: NURSE PRACTITIONER

## 2022-07-12 PROCEDURE — 3074F PR MOST RECENT SYSTOLIC BLOOD PRESSURE < 130 MM HG: ICD-10-PCS | Mod: CPTII,,, | Performed by: NURSE PRACTITIONER

## 2022-07-12 PROCEDURE — 87088 URINE BACTERIA CULTURE: CPT | Performed by: NURSE PRACTITIONER

## 2022-07-12 PROCEDURE — 1159F MED LIST DOCD IN RCRD: CPT | Mod: CPTII,,, | Performed by: NURSE PRACTITIONER

## 2022-07-12 PROCEDURE — 3008F PR BODY MASS INDEX (BMI) DOCUMENTED: ICD-10-PCS | Mod: CPTII,,, | Performed by: NURSE PRACTITIONER

## 2022-07-12 PROCEDURE — 99999 PR PBB SHADOW E&M-EST. PATIENT-LVL IV: ICD-10-PCS | Mod: PBBFAC,,, | Performed by: NURSE PRACTITIONER

## 2022-07-12 PROCEDURE — 99999 PR PBB SHADOW E&M-EST. PATIENT-LVL IV: CPT | Mod: PBBFAC,,, | Performed by: NURSE PRACTITIONER

## 2022-07-12 PROCEDURE — 1160F PR REVIEW ALL MEDS BY PRESCRIBER/CLIN PHARMACIST DOCUMENTED: ICD-10-PCS | Mod: CPTII,,, | Performed by: NURSE PRACTITIONER

## 2022-07-12 PROCEDURE — 1159F PR MEDICATION LIST DOCUMENTED IN MEDICAL RECORD: ICD-10-PCS | Mod: CPTII,,, | Performed by: NURSE PRACTITIONER

## 2022-07-12 PROCEDURE — 87186 SC STD MICRODIL/AGAR DIL: CPT | Mod: 59 | Performed by: NURSE PRACTITIONER

## 2022-07-12 PROCEDURE — 87077 CULTURE AEROBIC IDENTIFY: CPT | Mod: 59 | Performed by: NURSE PRACTITIONER

## 2022-07-12 PROCEDURE — 3008F BODY MASS INDEX DOCD: CPT | Mod: CPTII,,, | Performed by: NURSE PRACTITIONER

## 2022-07-12 PROCEDURE — 99214 OFFICE O/P EST MOD 30 MIN: CPT | Mod: PBBFAC,25 | Performed by: NURSE PRACTITIONER

## 2022-07-12 PROCEDURE — 51705 CHANGE OF BLADDER TUBE: CPT | Mod: PBBFAC | Performed by: NURSE PRACTITIONER

## 2022-07-12 PROCEDURE — 99215 OFFICE O/P EST HI 40 MIN: CPT | Mod: S$PBB,25,, | Performed by: NURSE PRACTITIONER

## 2022-07-12 PROCEDURE — 3074F SYST BP LT 130 MM HG: CPT | Mod: CPTII,,, | Performed by: NURSE PRACTITIONER

## 2022-07-12 PROCEDURE — 3044F PR MOST RECENT HEMOGLOBIN A1C LEVEL <7.0%: ICD-10-PCS | Mod: CPTII,,, | Performed by: NURSE PRACTITIONER

## 2022-07-12 PROCEDURE — 3044F HG A1C LEVEL LT 7.0%: CPT | Mod: CPTII,,, | Performed by: NURSE PRACTITIONER

## 2022-07-12 PROCEDURE — 99215 PR OFFICE/OUTPT VISIT, EST, LEVL V, 40-54 MIN: ICD-10-PCS | Mod: S$PBB,25,, | Performed by: NURSE PRACTITIONER

## 2022-07-12 PROCEDURE — 3078F DIAST BP <80 MM HG: CPT | Mod: CPTII,,, | Performed by: NURSE PRACTITIONER
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
## 2022-07-13 ENCOUNTER — PATIENT MESSAGE (OUTPATIENT)
Dept: PHYSICAL MEDICINE AND REHAB | Facility: CLINIC | Age: 37
End: 2022-07-13
Payer: MEDICAID

## 2022-07-13 DIAGNOSIS — M79.2 NEUROPATHIC PAIN: Chronic | ICD-10-CM

## 2022-07-13 RX ORDER — OXYCODONE HCL 10 MG/1
10 TABLET, FILM COATED, EXTENDED RELEASE ORAL EVERY 12 HOURS
Qty: 60 TABLET | Refills: 0 | Status: SHIPPED | OUTPATIENT
Start: 2022-07-13 | End: 2022-07-29 | Stop reason: SDUPTHER

## 2022-07-14 ENCOUNTER — PATIENT MESSAGE (OUTPATIENT)
Dept: PHYSICAL MEDICINE AND REHAB | Facility: CLINIC | Age: 37
End: 2022-07-14
Payer: MEDICAID

## 2022-07-14 LAB
BACTERIA UR CULT: ABNORMAL
BACTERIA UR CULT: ABNORMAL

## 2022-07-15 ENCOUNTER — PATIENT MESSAGE (OUTPATIENT)
Dept: UROLOGY | Facility: CLINIC | Age: 37
End: 2022-07-15
Payer: MEDICAID

## 2022-07-15 DIAGNOSIS — R82.71 BACTERIA IN URINE: ICD-10-CM

## 2022-07-15 DIAGNOSIS — R39.9 UTI SYMPTOMS: Primary | ICD-10-CM

## 2022-07-18 ENCOUNTER — PATIENT MESSAGE (OUTPATIENT)
Dept: PHYSICAL MEDICINE AND REHAB | Facility: CLINIC | Age: 37
End: 2022-07-18
Payer: MEDICAID

## 2022-07-18 DIAGNOSIS — M79.2 NEUROPATHIC PAIN: Chronic | ICD-10-CM

## 2022-07-18 RX ORDER — OXYCODONE AND ACETAMINOPHEN 10; 325 MG/1; MG/1
1 TABLET ORAL EVERY 6 HOURS PRN
Qty: 120 TABLET | Refills: 0 | Status: SHIPPED | OUTPATIENT
Start: 2022-07-18 | End: 2022-08-19 | Stop reason: SDUPTHER

## 2022-07-20 RX ORDER — NITROFURANTOIN 25; 75 MG/1; MG/1
100 CAPSULE ORAL 2 TIMES DAILY
Qty: 20 CAPSULE | Refills: 0 | Status: SHIPPED | OUTPATIENT
Start: 2022-07-20 | End: 2022-07-30

## 2022-07-22 ENCOUNTER — TELEPHONE (OUTPATIENT)
Dept: UROLOGY | Facility: CLINIC | Age: 37
End: 2022-07-22
Payer: MEDICAID

## 2022-07-22 DIAGNOSIS — N32.89 BLADDER SPASMS: Primary | ICD-10-CM

## 2022-07-25 NOTE — TELEPHONE ENCOUNTER
Bladder spasms  -     Case Request Operating Room: CYSTOSCOPY,WITH BOTULINUM TOXIN INJECTION 300

## 2022-07-28 ENCOUNTER — PATIENT MESSAGE (OUTPATIENT)
Dept: UROLOGY | Facility: CLINIC | Age: 37
End: 2022-07-28
Payer: MEDICAID

## 2022-07-28 DIAGNOSIS — R82.71 BACTERIA IN URINE: ICD-10-CM

## 2022-07-28 DIAGNOSIS — N31.9 NEUROGENIC BLADDER: ICD-10-CM

## 2022-07-28 DIAGNOSIS — N32.89 BLADDER SPASMS: Primary | ICD-10-CM

## 2022-07-28 RX ORDER — SULFAMETHOXAZOLE AND TRIMETHOPRIM 800; 160 MG/1; MG/1
1 TABLET ORAL 2 TIMES DAILY
Qty: 14 TABLET | Refills: 0 | Status: SHIPPED | OUTPATIENT
Start: 2022-07-28 | End: 2022-08-04

## 2022-07-28 NOTE — PROGRESS NOTES
Scheduled for Bladder Spasms with Dr. Luna on 8/3/2022.   Currently on Macrobid based off last urine cx.   But will end before procedure.  Rx for bactrim sent to complete full coverage through the Botox.

## 2022-08-02 ENCOUNTER — TELEPHONE (OUTPATIENT)
Dept: UROLOGY | Facility: CLINIC | Age: 37
End: 2022-08-02
Payer: MEDICAID

## 2022-08-09 ENCOUNTER — PATIENT MESSAGE (OUTPATIENT)
Dept: INTERNAL MEDICINE | Facility: CLINIC | Age: 37
End: 2022-08-09
Payer: MEDICAID

## 2022-08-09 DIAGNOSIS — Z93.3 COLOSTOMY IN PLACE: ICD-10-CM

## 2022-08-15 ENCOUNTER — HOSPITAL ENCOUNTER (EMERGENCY)
Facility: HOSPITAL | Age: 37
Discharge: HOME | End: 2022-08-15
Attending: SURGERY
Payer: MEDICAID

## 2022-08-15 VITALS
HEART RATE: 60 BPM | RESPIRATION RATE: 20 BRPM | WEIGHT: 170 LBS | SYSTOLIC BLOOD PRESSURE: 131 MMHG | BODY MASS INDEX: 25.18 KG/M2 | DIASTOLIC BLOOD PRESSURE: 64 MMHG | TEMPERATURE: 99 F | OXYGEN SATURATION: 97 % | HEIGHT: 69 IN

## 2022-08-15 DIAGNOSIS — L02.91 ABSCESS: Primary | ICD-10-CM

## 2022-08-15 PROCEDURE — 63600175 PHARM REV CODE 636 W HCPCS: Performed by: SURGERY

## 2022-08-15 PROCEDURE — 87077 CULTURE AEROBIC IDENTIFY: CPT | Mod: 59 | Performed by: SURGERY

## 2022-08-15 PROCEDURE — 99284 EMERGENCY DEPT VISIT MOD MDM: CPT | Mod: 25

## 2022-08-15 PROCEDURE — 90715 TDAP VACCINE 7 YRS/> IM: CPT | Performed by: SURGERY

## 2022-08-15 PROCEDURE — 90471 IMMUNIZATION ADMIN: CPT | Performed by: SURGERY

## 2022-08-15 PROCEDURE — 10061 I&D ABSCESS COMP/MULTIPLE: CPT

## 2022-08-15 PROCEDURE — 25000003 PHARM REV CODE 250: Performed by: SURGERY

## 2022-08-15 PROCEDURE — 87186 SC STD MICRODIL/AGAR DIL: CPT | Mod: 59 | Performed by: SURGERY

## 2022-08-15 PROCEDURE — 87070 CULTURE OTHR SPECIMN AEROBIC: CPT | Performed by: SURGERY

## 2022-08-15 PROCEDURE — 10060 I&D ABSCESS SIMPLE/SINGLE: CPT

## 2022-08-15 RX ORDER — MUPIROCIN 20 MG/G
OINTMENT TOPICAL 3 TIMES DAILY
Qty: 15 G | Refills: 0 | Status: SHIPPED | OUTPATIENT
Start: 2022-08-15 | End: 2022-08-25

## 2022-08-15 RX ORDER — MUPIROCIN 20 MG/G
1 OINTMENT TOPICAL
Status: COMPLETED | OUTPATIENT
Start: 2022-08-15 | End: 2022-08-15

## 2022-08-15 RX ORDER — SULFAMETHOXAZOLE AND TRIMETHOPRIM 800; 160 MG/1; MG/1
1 TABLET ORAL 2 TIMES DAILY
Qty: 14 TABLET | Refills: 0 | Status: SHIPPED | OUTPATIENT
Start: 2022-08-15 | End: 2022-08-21 | Stop reason: CLARIF

## 2022-08-15 RX ORDER — LIDOCAINE HYDROCHLORIDE 10 MG/ML
5 INJECTION, SOLUTION EPIDURAL; INFILTRATION; INTRACAUDAL; PERINEURAL
Status: COMPLETED | OUTPATIENT
Start: 2022-08-15 | End: 2022-08-15

## 2022-08-15 RX ORDER — IBUPROFEN 800 MG/1
800 TABLET ORAL EVERY 6 HOURS PRN
Qty: 20 TABLET | Refills: 0 | Status: SHIPPED | OUTPATIENT
Start: 2022-08-15 | End: 2023-05-23 | Stop reason: ALTCHOICE

## 2022-08-15 RX ADMIN — TETANUS TOXOID, REDUCED DIPHTHERIA TOXOID AND ACELLULAR PERTUSSIS VACCINE, ADSORBED 0.5 ML: 5; 2.5; 8; 8; 2.5 SUSPENSION INTRAMUSCULAR at 08:08

## 2022-08-15 RX ADMIN — LIDOCAINE HYDROCHLORIDE 50 MG: 10 INJECTION, SOLUTION EPIDURAL; INFILTRATION; INTRACAUDAL; PERINEURAL at 08:08

## 2022-08-15 RX ADMIN — MUPIROCIN 22 G: 20 OINTMENT TOPICAL at 08:08

## 2022-08-16 NOTE — ED TRIAGE NOTES
Pt arrived to Ed c/o abscess near right thigh/pubic area. Pt denies any drainage at this time. Pt denies fever at home. Pt reports he was recently on antibiotics (bactrim) for a UTI

## 2022-08-16 NOTE — ED NOTES
Wound cleansed with normal saline. Pat dry. Wound dressed with gauze and silk tape by MD. Pt instructed on wound care by MD/RN
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
## 2022-08-18 ENCOUNTER — PATIENT MESSAGE (OUTPATIENT)
Dept: PHYSICAL MEDICINE AND REHAB | Facility: CLINIC | Age: 37
End: 2022-08-18
Payer: MEDICAID

## 2022-08-18 DIAGNOSIS — M79.2 NEUROPATHIC PAIN: Chronic | ICD-10-CM

## 2022-08-18 LAB
BACTERIA SPEC AEROBE CULT: ABNORMAL
BACTERIA SPEC AEROBE CULT: ABNORMAL

## 2022-08-19 ENCOUNTER — PATIENT MESSAGE (OUTPATIENT)
Dept: INTERNAL MEDICINE | Facility: CLINIC | Age: 37
End: 2022-08-19
Payer: MEDICAID

## 2022-08-19 DIAGNOSIS — M79.2 NEUROPATHIC PAIN: Chronic | ICD-10-CM

## 2022-08-19 RX ORDER — OXYCODONE AND ACETAMINOPHEN 10; 325 MG/1; MG/1
1 TABLET ORAL EVERY 6 HOURS PRN
Qty: 120 TABLET | Refills: 0 | Status: SHIPPED | OUTPATIENT
Start: 2022-09-19 | End: 2022-09-19 | Stop reason: SDUPTHER

## 2022-08-19 RX ORDER — OXYCODONE HCL 10 MG/1
10 TABLET, FILM COATED, EXTENDED RELEASE ORAL EVERY 12 HOURS
Qty: 60 TABLET | Refills: 0 | Status: SHIPPED | OUTPATIENT
Start: 2022-08-19 | End: 2022-09-18

## 2022-08-19 RX ORDER — OXYCODONE HCL 10 MG/1
10 TABLET, FILM COATED, EXTENDED RELEASE ORAL EVERY 12 HOURS PRN
Qty: 60 TABLET | Refills: 0 | Status: SHIPPED | OUTPATIENT
Start: 2022-09-19 | End: 2022-10-21 | Stop reason: SDUPTHER

## 2022-08-19 RX ORDER — OXYCODONE AND ACETAMINOPHEN 10; 325 MG/1; MG/1
1 TABLET ORAL EVERY 6 HOURS PRN
Qty: 120 TABLET | Refills: 0 | Status: CANCELLED | OUTPATIENT
Start: 2022-08-19

## 2022-08-19 RX ORDER — OXYCODONE AND ACETAMINOPHEN 10; 325 MG/1; MG/1
1 TABLET ORAL EVERY 6 HOURS PRN
Qty: 120 TABLET | Refills: 0 | Status: SHIPPED | OUTPATIENT
Start: 2022-08-19 | End: 2022-09-19 | Stop reason: SDUPTHER

## 2022-08-21 ENCOUNTER — HOSPITAL ENCOUNTER (EMERGENCY)
Facility: HOSPITAL | Age: 37
Discharge: HOME | End: 2022-08-21
Attending: EMERGENCY MEDICINE
Payer: MEDICAID

## 2022-08-21 VITALS
SYSTOLIC BLOOD PRESSURE: 119 MMHG | HEART RATE: 65 BPM | DIASTOLIC BLOOD PRESSURE: 61 MMHG | TEMPERATURE: 99 F | HEIGHT: 69 IN | WEIGHT: 175 LBS | OXYGEN SATURATION: 96 % | RESPIRATION RATE: 20 BRPM | BODY MASS INDEX: 25.92 KG/M2

## 2022-08-21 DIAGNOSIS — I95.9 HYPOTENSION: ICD-10-CM

## 2022-08-21 DIAGNOSIS — U07.1 COVID-19: ICD-10-CM

## 2022-08-21 DIAGNOSIS — T83.511A URINARY TRACT INFECTION ASSOCIATED WITH INDWELLING URETHRAL CATHETER, INITIAL ENCOUNTER: Primary | ICD-10-CM

## 2022-08-21 DIAGNOSIS — U07.1 COVID-19 VIRUS DETECTED: ICD-10-CM

## 2022-08-21 DIAGNOSIS — N39.0 URINARY TRACT INFECTION ASSOCIATED WITH INDWELLING URETHRAL CATHETER, INITIAL ENCOUNTER: Primary | ICD-10-CM

## 2022-08-21 LAB
ALBUMIN SERPL BCP-MCNC: 3.8 G/DL (ref 3.5–5.2)
ALP SERPL-CCNC: 103 U/L (ref 55–135)
ALT SERPL W/O P-5'-P-CCNC: 18 U/L (ref 10–44)
ANION GAP SERPL CALC-SCNC: 11 MMOL/L (ref 8–16)
AST SERPL-CCNC: 19 U/L (ref 10–40)
BACTERIA #/AREA URNS HPF: ABNORMAL /HPF
BASOPHILS # BLD AUTO: 0.01 K/UL (ref 0–0.2)
BASOPHILS NFR BLD: 0.3 % (ref 0–1.9)
BILIRUB SERPL-MCNC: 0.3 MG/DL (ref 0.1–1)
BILIRUB UR QL STRIP: NEGATIVE
BUN SERPL-MCNC: 10 MG/DL (ref 6–20)
CALCIUM SERPL-MCNC: 8.8 MG/DL (ref 8.7–10.5)
CHLORIDE SERPL-SCNC: 102 MMOL/L (ref 95–110)
CLARITY UR: ABNORMAL
CO2 SERPL-SCNC: 22 MMOL/L (ref 23–29)
COLOR UR: YELLOW
CREAT SERPL-MCNC: 0.6 MG/DL (ref 0.5–1.4)
DIFFERENTIAL METHOD: ABNORMAL
EOSINOPHIL # BLD AUTO: 0 K/UL (ref 0–0.5)
EOSINOPHIL NFR BLD: 0.3 % (ref 0–8)
ERYTHROCYTE [DISTWIDTH] IN BLOOD BY AUTOMATED COUNT: 14.5 % (ref 11.5–14.5)
EST. GFR  (NO RACE VARIABLE): >60 ML/MIN/1.73 M^2
GLUCOSE SERPL-MCNC: 92 MG/DL (ref 70–110)
GLUCOSE UR QL STRIP: NEGATIVE
HCT VFR BLD AUTO: 36 % (ref 40–54)
HGB BLD-MCNC: 11.1 G/DL (ref 14–18)
HGB UR QL STRIP: ABNORMAL
HYALINE CASTS #/AREA URNS LPF: 0 /LPF
IMM GRANULOCYTES # BLD AUTO: 0.01 K/UL (ref 0–0.04)
IMM GRANULOCYTES NFR BLD AUTO: 0.3 % (ref 0–0.5)
INFLUENZA A, MOLECULAR: NEGATIVE
INFLUENZA B, MOLECULAR: NEGATIVE
KETONES UR QL STRIP: NEGATIVE
LACTATE SERPL-SCNC: 0.9 MMOL/L (ref 0.5–2.2)
LEUKOCYTE ESTERASE UR QL STRIP: ABNORMAL
LYMPHOCYTES # BLD AUTO: 0.4 K/UL (ref 1–4.8)
LYMPHOCYTES NFR BLD: 9.7 % (ref 18–48)
MCH RBC QN AUTO: 27.4 PG (ref 27–31)
MCHC RBC AUTO-ENTMCNC: 30.8 G/DL (ref 32–36)
MCV RBC AUTO: 89 FL (ref 82–98)
MICROSCOPIC COMMENT: ABNORMAL
MONOCYTES # BLD AUTO: 0.7 K/UL (ref 0.3–1)
MONOCYTES NFR BLD: 18.4 % (ref 4–15)
NEUTROPHILS # BLD AUTO: 2.7 K/UL (ref 1.8–7.7)
NEUTROPHILS NFR BLD: 71 % (ref 38–73)
NITRITE UR QL STRIP: NEGATIVE
NRBC BLD-RTO: 0 /100 WBC
PH UR STRIP: 6 [PH] (ref 5–8)
PLATELET # BLD AUTO: 154 K/UL (ref 150–450)
PMV BLD AUTO: 10.7 FL (ref 9.2–12.9)
POTASSIUM SERPL-SCNC: 3.8 MMOL/L (ref 3.5–5.1)
PROCALCITONIN SERPL IA-MCNC: 0.02 NG/ML
PROT SERPL-MCNC: 6.9 G/DL (ref 6–8.4)
PROT UR QL STRIP: ABNORMAL
RBC # BLD AUTO: 4.05 M/UL (ref 4.6–6.2)
RBC #/AREA URNS HPF: 10 /HPF (ref 0–4)
SARS-COV-2 RDRP RESP QL NAA+PROBE: POSITIVE
SODIUM SERPL-SCNC: 135 MMOL/L (ref 136–145)
SP GR UR STRIP: 1.01 (ref 1–1.03)
SPECIMEN SOURCE: NORMAL
URN SPEC COLLECT METH UR: ABNORMAL
UROBILINOGEN UR STRIP-ACNC: 1 EU/DL
WBC # BLD AUTO: 3.81 K/UL (ref 3.9–12.7)
WBC #/AREA URNS HPF: >100 /HPF (ref 0–5)

## 2022-08-21 PROCEDURE — 93005 ELECTROCARDIOGRAM TRACING: CPT

## 2022-08-21 PROCEDURE — 93010 ELECTROCARDIOGRAM REPORT: CPT | Mod: ,,, | Performed by: INTERNAL MEDICINE

## 2022-08-21 PROCEDURE — 87502 INFLUENZA DNA AMP PROBE: CPT

## 2022-08-21 PROCEDURE — 93010 EKG 12-LEAD: ICD-10-PCS | Mod: ,,, | Performed by: INTERNAL MEDICINE

## 2022-08-21 PROCEDURE — 63600175 PHARM REV CODE 636 W HCPCS

## 2022-08-21 PROCEDURE — U0002 COVID-19 LAB TEST NON-CDC: HCPCS

## 2022-08-21 PROCEDURE — 99285 EMERGENCY DEPT VISIT HI MDM: CPT | Mod: 25

## 2022-08-21 PROCEDURE — 36415 COLL VENOUS BLD VENIPUNCTURE: CPT

## 2022-08-21 PROCEDURE — 96365 THER/PROPH/DIAG IV INF INIT: CPT

## 2022-08-21 PROCEDURE — 87186 SC STD MICRODIL/AGAR DIL: CPT

## 2022-08-21 PROCEDURE — 85025 COMPLETE CBC W/AUTO DIFF WBC: CPT

## 2022-08-21 PROCEDURE — 25000003 PHARM REV CODE 250

## 2022-08-21 PROCEDURE — 83605 ASSAY OF LACTIC ACID: CPT

## 2022-08-21 PROCEDURE — 81000 URINALYSIS NONAUTO W/SCOPE: CPT

## 2022-08-21 PROCEDURE — 87077 CULTURE AEROBIC IDENTIFY: CPT

## 2022-08-21 PROCEDURE — 84145 PROCALCITONIN (PCT): CPT

## 2022-08-21 PROCEDURE — 87088 URINE BACTERIA CULTURE: CPT

## 2022-08-21 PROCEDURE — 87040 BLOOD CULTURE FOR BACTERIA: CPT

## 2022-08-21 PROCEDURE — 80053 COMPREHEN METABOLIC PANEL: CPT

## 2022-08-21 PROCEDURE — 87086 URINE CULTURE/COLONY COUNT: CPT

## 2022-08-21 PROCEDURE — 96361 HYDRATE IV INFUSION ADD-ON: CPT

## 2022-08-21 RX ORDER — ACETAMINOPHEN 500 MG
1000 TABLET ORAL
Status: COMPLETED | OUTPATIENT
Start: 2022-08-21 | End: 2022-08-21

## 2022-08-21 RX ORDER — CIPROFLOXACIN 500 MG/1
500 TABLET ORAL EVERY 12 HOURS
Qty: 20 TABLET | Refills: 0 | Status: SHIPPED | OUTPATIENT
Start: 2022-08-21 | End: 2022-08-31

## 2022-08-21 RX ADMIN — ACETAMINOPHEN 1000 MG: 500 TABLET ORAL at 07:08

## 2022-08-21 RX ADMIN — SODIUM CHLORIDE 2382 ML: 0.9 INJECTION, SOLUTION INTRAVENOUS at 07:08

## 2022-08-21 RX ADMIN — PIPERACILLIN AND TAZOBACTAM 4.5 G: 4; .5 INJECTION, POWDER, LYOPHILIZED, FOR SOLUTION INTRAVENOUS; PARENTERAL at 07:08
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
## 2022-08-24 ENCOUNTER — PATIENT MESSAGE (OUTPATIENT)
Dept: UROLOGY | Facility: CLINIC | Age: 37
End: 2022-08-24
Payer: MEDICAID

## 2022-08-24 ENCOUNTER — PATIENT MESSAGE (OUTPATIENT)
Dept: PHYSICAL MEDICINE AND REHAB | Facility: CLINIC | Age: 37
End: 2022-08-24
Payer: MEDICAID

## 2022-08-24 ENCOUNTER — PATIENT MESSAGE (OUTPATIENT)
Dept: INTERNAL MEDICINE | Facility: CLINIC | Age: 37
End: 2022-08-24
Payer: MEDICAID

## 2022-08-24 DIAGNOSIS — G82.20 PARAPLEGIA FOLLOWING SPINAL CORD INJURY: Primary | ICD-10-CM

## 2022-08-24 LAB — BACTERIA UR CULT: ABNORMAL

## 2022-08-25 LAB
BACTERIA BLD CULT: ABNORMAL

## 2022-08-27 ENCOUNTER — PATIENT MESSAGE (OUTPATIENT)
Dept: INTERNAL MEDICINE | Facility: CLINIC | Age: 37
End: 2022-08-27
Payer: MEDICAID

## 2022-08-27 LAB — BACTERIA BLD CULT: NORMAL

## 2022-08-29 ENCOUNTER — TELEPHONE (OUTPATIENT)
Dept: PHYSICAL MEDICINE AND REHAB | Facility: CLINIC | Age: 37
End: 2022-08-29
Payer: MEDICAID

## 2022-08-29 NOTE — TELEPHONE ENCOUNTER
----- Message from Aleksandar Davis MD sent at 8/24/2022  7:24 PM CDT -----  Regarding: ROHO Cushion  Amanda CONKLIN placed the order for the Roho Cushion to be sent to [facility] [employer].

## 2022-08-29 NOTE — TELEPHONE ENCOUNTER
Spoke with pt pt states he is hurting from UTI pt states he has not stop his meds cipro he has 3 days left of medication. Pt was informed if any problems persist to follow up with urologist per Dr Al. Pt verbally understands

## 2022-09-02 ENCOUNTER — TELEPHONE (OUTPATIENT)
Dept: INTERNAL MEDICINE | Facility: CLINIC | Age: 37
End: 2022-09-02
Payer: MEDICAID

## 2022-09-08 ENCOUNTER — PATIENT MESSAGE (OUTPATIENT)
Dept: PHYSICAL MEDICINE AND REHAB | Facility: CLINIC | Age: 37
End: 2022-09-08
Payer: MEDICAID

## 2022-09-13 DIAGNOSIS — G82.20 PARAPLEGIA FOLLOWING SPINAL CORD INJURY: Primary | ICD-10-CM

## 2022-09-14 ENCOUNTER — PROCEDURE VISIT (OUTPATIENT)
Dept: PHYSICAL MEDICINE AND REHAB | Facility: CLINIC | Age: 37
End: 2022-09-14
Payer: MEDICAID

## 2022-09-14 ENCOUNTER — PATIENT MESSAGE (OUTPATIENT)
Dept: PHYSICAL MEDICINE AND REHAB | Facility: CLINIC | Age: 37
End: 2022-09-14

## 2022-09-14 VITALS
SYSTOLIC BLOOD PRESSURE: 103 MMHG | BODY MASS INDEX: 26.66 KG/M2 | HEART RATE: 59 BPM | DIASTOLIC BLOOD PRESSURE: 64 MMHG | WEIGHT: 180 LBS | HEIGHT: 69 IN

## 2022-09-14 DIAGNOSIS — G83.9 SPASTIC PARALYSIS: ICD-10-CM

## 2022-09-14 DIAGNOSIS — G82.20 PARAPLEGIA FOLLOWING SPINAL CORD INJURY: Primary | ICD-10-CM

## 2022-09-14 PROCEDURE — 62370 PR ANL SP INF PMP W/MDREPRG&FIL: ICD-10-PCS | Mod: S$PBB,,, | Performed by: PHYSICAL MEDICINE & REHABILITATION

## 2022-09-14 PROCEDURE — 62370 ANL SP INF PMP W/MDREPRG&FIL: CPT | Mod: PBBFAC | Performed by: PHYSICAL MEDICINE & REHABILITATION

## 2022-09-14 PROCEDURE — 62370 ANL SP INF PMP W/MDREPRG&FIL: CPT | Mod: S$PBB,,, | Performed by: PHYSICAL MEDICINE & REHABILITATION

## 2022-09-16 RX ADMIN — BACLOFEN 80 MG: 40 INJECTION INTRATHECAL at 08:09
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
## 2022-09-19 ENCOUNTER — PATIENT MESSAGE (OUTPATIENT)
Dept: PHYSICAL MEDICINE AND REHAB | Facility: CLINIC | Age: 37
End: 2022-09-19
Payer: MEDICAID

## 2022-09-20 ENCOUNTER — PATIENT MESSAGE (OUTPATIENT)
Dept: PHYSICAL MEDICINE AND REHAB | Facility: CLINIC | Age: 37
End: 2022-09-20
Payer: MEDICAID

## 2022-09-21 ENCOUNTER — OFFICE VISIT (OUTPATIENT)
Dept: INTERNAL MEDICINE | Facility: CLINIC | Age: 37
End: 2022-09-21
Payer: MEDICAID

## 2022-09-21 ENCOUNTER — LAB VISIT (OUTPATIENT)
Dept: LAB | Facility: HOSPITAL | Age: 37
End: 2022-09-21
Attending: INTERNAL MEDICINE
Payer: MEDICAID

## 2022-09-21 VITALS
DIASTOLIC BLOOD PRESSURE: 70 MMHG | HEIGHT: 69 IN | BODY MASS INDEX: 26.64 KG/M2 | OXYGEN SATURATION: 97 % | SYSTOLIC BLOOD PRESSURE: 116 MMHG | HEART RATE: 58 BPM | WEIGHT: 179.88 LBS

## 2022-09-21 DIAGNOSIS — N31.9 NEUROGENIC BLADDER: Chronic | ICD-10-CM

## 2022-09-21 DIAGNOSIS — Z00.00 ANNUAL PHYSICAL EXAM: ICD-10-CM

## 2022-09-21 DIAGNOSIS — K59.2 NEUROGENIC BOWEL: Chronic | ICD-10-CM

## 2022-09-21 DIAGNOSIS — Z43.3 COLOSTOMY CARE: ICD-10-CM

## 2022-09-21 DIAGNOSIS — Z93.59 CHRONIC SUPRAPUBIC CATHETER: ICD-10-CM

## 2022-09-21 DIAGNOSIS — Z00.00 ANNUAL PHYSICAL EXAM: Primary | ICD-10-CM

## 2022-09-21 DIAGNOSIS — J45.20 MILD INTERMITTENT ASTHMA WITHOUT COMPLICATION: ICD-10-CM

## 2022-09-21 DIAGNOSIS — L72.0 EPIDERMAL CYST OF FACE: ICD-10-CM

## 2022-09-21 DIAGNOSIS — Z93.3 COLOSTOMY STATUS: Chronic | ICD-10-CM

## 2022-09-21 DIAGNOSIS — G82.20 PARAPLEGIA FOLLOWING SPINAL CORD INJURY: Chronic | ICD-10-CM

## 2022-09-21 DIAGNOSIS — Z97.8 PRESENCE OF INTRATHECAL BACLOFEN PUMP: ICD-10-CM

## 2022-09-21 LAB
ALBUMIN SERPL BCP-MCNC: 3.6 G/DL (ref 3.5–5.2)
ALP SERPL-CCNC: 97 U/L (ref 55–135)
ALT SERPL W/O P-5'-P-CCNC: 20 U/L (ref 10–44)
ANION GAP SERPL CALC-SCNC: 5 MMOL/L (ref 8–16)
AST SERPL-CCNC: 16 U/L (ref 10–40)
BASOPHILS # BLD AUTO: 0.03 K/UL (ref 0–0.2)
BASOPHILS NFR BLD: 0.4 % (ref 0–1.9)
BILIRUB SERPL-MCNC: 0.3 MG/DL (ref 0.1–1)
BUN SERPL-MCNC: 7 MG/DL (ref 6–20)
CALCIUM SERPL-MCNC: 8.7 MG/DL (ref 8.7–10.5)
CHLORIDE SERPL-SCNC: 108 MMOL/L (ref 95–110)
CO2 SERPL-SCNC: 25 MMOL/L (ref 23–29)
CREAT SERPL-MCNC: 0.5 MG/DL (ref 0.5–1.4)
DIFFERENTIAL METHOD: ABNORMAL
EOSINOPHIL # BLD AUTO: 0.1 K/UL (ref 0–0.5)
EOSINOPHIL NFR BLD: 1 % (ref 0–8)
ERYTHROCYTE [DISTWIDTH] IN BLOOD BY AUTOMATED COUNT: 15.8 % (ref 11.5–14.5)
EST. GFR  (NO RACE VARIABLE): >60 ML/MIN/1.73 M^2
ESTIMATED AVG GLUCOSE: 88 MG/DL (ref 68–131)
GLUCOSE SERPL-MCNC: 88 MG/DL (ref 70–110)
HBA1C MFR BLD: 4.7 % (ref 4–5.6)
HCT VFR BLD AUTO: 34.3 % (ref 40–54)
HGB BLD-MCNC: 10.2 G/DL (ref 14–18)
IMM GRANULOCYTES # BLD AUTO: 0.03 K/UL (ref 0–0.04)
IMM GRANULOCYTES NFR BLD AUTO: 0.4 % (ref 0–0.5)
LYMPHOCYTES # BLD AUTO: 1.2 K/UL (ref 1–4.8)
LYMPHOCYTES NFR BLD: 18.2 % (ref 18–48)
MCH RBC QN AUTO: 25.2 PG (ref 27–31)
MCHC RBC AUTO-ENTMCNC: 29.7 G/DL (ref 32–36)
MCV RBC AUTO: 85 FL (ref 82–98)
MONOCYTES # BLD AUTO: 0.6 K/UL (ref 0.3–1)
MONOCYTES NFR BLD: 8.7 % (ref 4–15)
NEUTROPHILS # BLD AUTO: 4.8 K/UL (ref 1.8–7.7)
NEUTROPHILS NFR BLD: 71.3 % (ref 38–73)
NRBC BLD-RTO: 0 /100 WBC
PLATELET # BLD AUTO: 221 K/UL (ref 150–450)
PMV BLD AUTO: 12.1 FL (ref 9.2–12.9)
POTASSIUM SERPL-SCNC: 4 MMOL/L (ref 3.5–5.1)
PROT SERPL-MCNC: 6.5 G/DL (ref 6–8.4)
RBC # BLD AUTO: 4.05 M/UL (ref 4.6–6.2)
SODIUM SERPL-SCNC: 138 MMOL/L (ref 136–145)
WBC # BLD AUTO: 6.76 K/UL (ref 3.9–12.7)

## 2022-09-21 PROCEDURE — 3074F PR MOST RECENT SYSTOLIC BLOOD PRESSURE < 130 MM HG: ICD-10-PCS | Mod: CPTII,,, | Performed by: INTERNAL MEDICINE

## 2022-09-21 PROCEDURE — 3078F DIAST BP <80 MM HG: CPT | Mod: CPTII,,, | Performed by: INTERNAL MEDICINE

## 2022-09-21 PROCEDURE — 3044F PR MOST RECENT HEMOGLOBIN A1C LEVEL <7.0%: ICD-10-PCS | Mod: CPTII,,, | Performed by: INTERNAL MEDICINE

## 2022-09-21 PROCEDURE — 99395 PREV VISIT EST AGE 18-39: CPT | Mod: S$PBB,,, | Performed by: INTERNAL MEDICINE

## 2022-09-21 PROCEDURE — 1160F PR REVIEW ALL MEDS BY PRESCRIBER/CLIN PHARMACIST DOCUMENTED: ICD-10-PCS | Mod: CPTII,,, | Performed by: INTERNAL MEDICINE

## 2022-09-21 PROCEDURE — 3044F HG A1C LEVEL LT 7.0%: CPT | Mod: CPTII,,, | Performed by: INTERNAL MEDICINE

## 2022-09-21 PROCEDURE — 99215 OFFICE O/P EST HI 40 MIN: CPT | Mod: PBBFAC | Performed by: INTERNAL MEDICINE

## 2022-09-21 PROCEDURE — 85025 COMPLETE CBC W/AUTO DIFF WBC: CPT | Performed by: INTERNAL MEDICINE

## 2022-09-21 PROCEDURE — 36415 COLL VENOUS BLD VENIPUNCTURE: CPT | Performed by: INTERNAL MEDICINE

## 2022-09-21 PROCEDURE — 3078F PR MOST RECENT DIASTOLIC BLOOD PRESSURE < 80 MM HG: ICD-10-PCS | Mod: CPTII,,, | Performed by: INTERNAL MEDICINE

## 2022-09-21 PROCEDURE — 99999 PR PBB SHADOW E&M-EST. PATIENT-LVL V: CPT | Mod: PBBFAC,,, | Performed by: INTERNAL MEDICINE

## 2022-09-21 PROCEDURE — 99999 PR PBB SHADOW E&M-EST. PATIENT-LVL V: ICD-10-PCS | Mod: PBBFAC,,, | Performed by: INTERNAL MEDICINE

## 2022-09-21 PROCEDURE — 80053 COMPREHEN METABOLIC PANEL: CPT | Performed by: INTERNAL MEDICINE

## 2022-09-21 PROCEDURE — 3074F SYST BP LT 130 MM HG: CPT | Mod: CPTII,,, | Performed by: INTERNAL MEDICINE

## 2022-09-21 PROCEDURE — 1159F PR MEDICATION LIST DOCUMENTED IN MEDICAL RECORD: ICD-10-PCS | Mod: CPTII,,, | Performed by: INTERNAL MEDICINE

## 2022-09-21 PROCEDURE — 1159F MED LIST DOCD IN RCRD: CPT | Mod: CPTII,,, | Performed by: INTERNAL MEDICINE

## 2022-09-21 PROCEDURE — 3008F PR BODY MASS INDEX (BMI) DOCUMENTED: ICD-10-PCS | Mod: CPTII,,, | Performed by: INTERNAL MEDICINE

## 2022-09-21 PROCEDURE — 1160F RVW MEDS BY RX/DR IN RCRD: CPT | Mod: CPTII,,, | Performed by: INTERNAL MEDICINE

## 2022-09-21 PROCEDURE — 83036 HEMOGLOBIN GLYCOSYLATED A1C: CPT | Performed by: INTERNAL MEDICINE

## 2022-09-21 PROCEDURE — 99395 PR PREVENTIVE VISIT,EST,18-39: ICD-10-PCS | Mod: S$PBB,,, | Performed by: INTERNAL MEDICINE

## 2022-09-21 PROCEDURE — 3008F BODY MASS INDEX DOCD: CPT | Mod: CPTII,,, | Performed by: INTERNAL MEDICINE

## 2022-09-21 NOTE — PROGRESS NOTES
"Subjective:       Patient ID: Nghia Edgar Jr. is a 37 y.o. male.    Chief Complaint: Annual Exam  This is a 37-year-old who presents today for checkup he reports that he has been doing fairly well in general he had COVID and seems to be recovering .  He reports his wife passed away last year from COVID.  His daughter brings him in today.  Patient reports that he would like referral to the colon doctor since he has colostomy and has not seen them in a while he does have history of neurogenic bowel and bladder he does follow with his urologist regularly.  Patient reports that he has had issue with some staph infections recently he had 1 on his leg that was lanced and has had no further drainage..  He has a cyst area near his right eye her cheek that has been bothering him and he would like to go see someone about getting it removed if possible.  He has no active drainage but sometimes his eye gets more swollen when he wakes up in the morning.  He does continue to see physical medicine for his mobility and his pain management and has baclofen pump.  He was wondering about some splints for his hands he will discusse with physical medicine recommendations    HPI  Review of Systems   Respiratory:          Cough improved  Had covid    Skin:         Cyst right side face  Wants to get removed      Had staph infection leg was drainged      Objective:    Blood pressure 116/70, pulse (!) 58, height 5' 9" (1.753 m), weight 81.6 kg (179 lb 14.3 oz), SpO2 97 %.   Physical Exam  Constitutional:       General: He is not in acute distress.     Comments: Palpable cyst right side face     Seated in wheelchair    HENT:      Head: Normocephalic.      Mouth/Throat:      Pharynx: Oropharynx is clear.   Eyes:      General: No scleral icterus.  Cardiovascular:      Rate and Rhythm: Normal rate and regular rhythm.      Heart sounds: Normal heart sounds. No murmur heard.    No friction rub. No gallop.   Pulmonary:      Effort: " Pulmonary effort is normal. No respiratory distress.      Breath sounds: Normal breath sounds.   Abdominal:      General: Bowel sounds are normal.      Palpations: Abdomen is soft. There is no mass.      Tenderness: There is no abdominal tenderness.      Comments: Baclofen pump abdominal wall    Genitourinary:     Comments: Colostomy in place   Catheter in place   Musculoskeletal:      Cervical back: Neck supple.   Skin:     Findings: No erythema.   Neurological:      Mental Status: He is alert.   Psychiatric:         Mood and Affect: Mood normal.       Assessment:       1. Annual physical exam    2. Colostomy care    3. Neurogenic bowel    4. Epidermal cyst of face    5. Colostomy status    6. Paraplegia following spinal cord injury    7. Neurogenic bladder    8. Presence of intrathecal baclofen pump    9. Chronic suprapubic catheter    10. Mild intermittent asthma without complication          Plan:       Nghia was seen today for annual exam.    Diagnoses and all orders for this visit:    Annual physical exam  -     CBC Auto Differential; Future  -     Comprehensive Metabolic Panel; Future  -     Hemoglobin A1C; Future    Epidermal cyst of face  No inflammation referral placed as requested   -     Ambulatory referral/consult to Plastic Surgery; Future    Colostomy status  Colostomy care   Neurogenic bowel   History of: Rectal referral for colostomy care as requested he has been using MiraLax with improvement for his bowels    Paraplegia following spinal cord injury  Presence of intrathecal baclofen pump   History of remains wheelchair-bound continues to follow with physical medicine  Has baclofen pump in place and following with physical medicine for chronic pain management   And will discuss his request for possible splints with physical medicine     Neurogenic bladder  Continues to follow with Urology    Recent covid  Discussed covid vaccine/flu shots he declines

## 2022-09-22 ENCOUNTER — TELEPHONE (OUTPATIENT)
Dept: INTERNAL MEDICINE | Facility: CLINIC | Age: 37
End: 2022-09-22
Payer: MEDICAID

## 2022-09-22 DIAGNOSIS — D64.9 ANEMIA, UNSPECIFIED TYPE: Primary | ICD-10-CM

## 2022-09-22 NOTE — TELEPHONE ENCOUNTER
Called and spoke to patient about recent labs. Went over Drs recommendations. Scheduled next lab appointment to be done in 1 month.

## 2022-09-30 ENCOUNTER — PATIENT MESSAGE (OUTPATIENT)
Dept: UROLOGY | Facility: CLINIC | Age: 37
End: 2022-09-30
Payer: MEDICAID

## 2022-10-18 ENCOUNTER — PATIENT MESSAGE (OUTPATIENT)
Dept: UROLOGY | Facility: CLINIC | Age: 37
End: 2022-10-18
Payer: MEDICAID

## 2022-10-18 ENCOUNTER — PATIENT MESSAGE (OUTPATIENT)
Dept: PHYSICAL MEDICINE AND REHAB | Facility: CLINIC | Age: 37
End: 2022-10-18
Payer: MEDICAID

## 2022-10-18 DIAGNOSIS — G82.20 PARAPLEGIA FOLLOWING SPINAL CORD INJURY: Primary | Chronic | ICD-10-CM

## 2022-10-18 DIAGNOSIS — M79.2 NEUROPATHIC PAIN: Chronic | ICD-10-CM

## 2022-10-19 NOTE — TELEPHONE ENCOUNTER
LV--11/06/2019  LR--06/17/2020   Aklief counseling:  Patient advised to apply a pea-sized amount only at bedtime and wait 30 minutes after washing their face before applying.  If too drying, patient may add a non-comedogenic moisturizer.  The most commonly reported side effects including irritation, redness, scaling, dryness, stinging, burning, itching, and increased risk of sunburn.  The patient verbalized understanding of the proper use and possible adverse effects of retinoids.  All of the patient's questions and concerns were addressed.

## 2022-10-21 RX ORDER — OXYCODONE HCL 10 MG/1
10 TABLET, FILM COATED, EXTENDED RELEASE ORAL EVERY 12 HOURS PRN
Qty: 60 TABLET | Refills: 0 | Status: SHIPPED | OUTPATIENT
Start: 2022-10-21 | End: 2022-12-27 | Stop reason: SDUPTHER

## 2022-10-21 RX ORDER — OXYCODONE AND ACETAMINOPHEN 10; 325 MG/1; MG/1
1 TABLET ORAL EVERY 6 HOURS PRN
Qty: 120 TABLET | Refills: 0 | Status: SHIPPED | OUTPATIENT
Start: 2022-10-21 | End: 2022-11-22 | Stop reason: SDUPTHER

## 2022-10-21 NOTE — TELEPHONE ENCOUNTER
Last Rx refill-----08/19/22-Oxycontin                            09/19/22-Oxycodone      Pharmacy--------- [facility]

## 2022-11-10 ENCOUNTER — PATIENT MESSAGE (OUTPATIENT)
Dept: UROLOGY | Facility: CLINIC | Age: 37
End: 2022-11-10
Payer: MEDICAID

## 2022-11-21 ENCOUNTER — HOSPITAL ENCOUNTER (EMERGENCY)
Facility: HOSPITAL | Age: 37
Discharge: HOME | End: 2022-11-21
Attending: SURGERY
Payer: MEDICAID

## 2022-11-21 VITALS
DIASTOLIC BLOOD PRESSURE: 59 MMHG | RESPIRATION RATE: 16 BRPM | HEIGHT: 70 IN | HEART RATE: 58 BPM | TEMPERATURE: 97 F | BODY MASS INDEX: 25.05 KG/M2 | OXYGEN SATURATION: 97 % | WEIGHT: 175 LBS | SYSTOLIC BLOOD PRESSURE: 113 MMHG

## 2022-11-21 DIAGNOSIS — N30.00 ACUTE CYSTITIS WITHOUT HEMATURIA: Primary | ICD-10-CM

## 2022-11-21 LAB
ALBUMIN SERPL BCP-MCNC: 3.8 G/DL (ref 3.5–5.2)
ALP SERPL-CCNC: 98 U/L (ref 55–135)
ALT SERPL W/O P-5'-P-CCNC: 18 U/L (ref 10–44)
AMORPH CRY URNS QL MICRO: ABNORMAL
ANION GAP SERPL CALC-SCNC: 10 MMOL/L (ref 8–16)
AST SERPL-CCNC: 16 U/L (ref 10–40)
BACTERIA #/AREA URNS HPF: ABNORMAL /HPF
BASOPHILS # BLD AUTO: 0.03 K/UL (ref 0–0.2)
BASOPHILS NFR BLD: 0.6 % (ref 0–1.9)
BILIRUB SERPL-MCNC: 0.4 MG/DL (ref 0.1–1)
BILIRUB UR QL STRIP: NEGATIVE
BUN SERPL-MCNC: 10 MG/DL (ref 6–20)
CALCIUM SERPL-MCNC: 9 MG/DL (ref 8.7–10.5)
CHLORIDE SERPL-SCNC: 106 MMOL/L (ref 95–110)
CLARITY UR: CLEAR
CO2 SERPL-SCNC: 24 MMOL/L (ref 23–29)
COLOR UR: YELLOW
CREAT SERPL-MCNC: 0.6 MG/DL (ref 0.5–1.4)
DIFFERENTIAL METHOD: ABNORMAL
EOSINOPHIL # BLD AUTO: 0.1 K/UL (ref 0–0.5)
EOSINOPHIL NFR BLD: 1.5 % (ref 0–8)
ERYTHROCYTE [DISTWIDTH] IN BLOOD BY AUTOMATED COUNT: 17.1 % (ref 11.5–14.5)
EST. GFR  (NO RACE VARIABLE): >60 ML/MIN/1.73 M^2
GLUCOSE SERPL-MCNC: 84 MG/DL (ref 70–110)
GLUCOSE UR QL STRIP: NEGATIVE
HCT VFR BLD AUTO: 34.2 % (ref 40–54)
HGB BLD-MCNC: 9.7 G/DL (ref 14–18)
HGB UR QL STRIP: NEGATIVE
IMM GRANULOCYTES # BLD AUTO: 0.01 K/UL (ref 0–0.04)
IMM GRANULOCYTES NFR BLD AUTO: 0.2 % (ref 0–0.5)
KETONES UR QL STRIP: NEGATIVE
LACTATE SERPL-SCNC: 0.9 MMOL/L (ref 0.5–2.2)
LEUKOCYTE ESTERASE UR QL STRIP: ABNORMAL
LIPASE SERPL-CCNC: 24 U/L (ref 4–60)
LYMPHOCYTES # BLD AUTO: 1.4 K/UL (ref 1–4.8)
LYMPHOCYTES NFR BLD: 26.1 % (ref 18–48)
MCH RBC QN AUTO: 21.2 PG (ref 27–31)
MCHC RBC AUTO-ENTMCNC: 28.4 G/DL (ref 32–36)
MCV RBC AUTO: 75 FL (ref 82–98)
MICROSCOPIC COMMENT: ABNORMAL
MONOCYTES # BLD AUTO: 0.6 K/UL (ref 0.3–1)
MONOCYTES NFR BLD: 10.7 % (ref 4–15)
NEUTROPHILS # BLD AUTO: 3.3 K/UL (ref 1.8–7.7)
NEUTROPHILS NFR BLD: 60.9 % (ref 38–73)
NITRITE UR QL STRIP: POSITIVE
NRBC BLD-RTO: 0 /100 WBC
PH UR STRIP: 7 [PH] (ref 5–8)
PLATELET # BLD AUTO: 217 K/UL (ref 150–450)
PMV BLD AUTO: 11.1 FL (ref 9.2–12.9)
POTASSIUM SERPL-SCNC: 4 MMOL/L (ref 3.5–5.1)
PROCALCITONIN SERPL IA-MCNC: <0.02 NG/ML
PROT SERPL-MCNC: 7 G/DL (ref 6–8.4)
PROT UR QL STRIP: NEGATIVE
RBC # BLD AUTO: 4.57 M/UL (ref 4.6–6.2)
RBC #/AREA URNS HPF: 1 /HPF (ref 0–4)
SODIUM SERPL-SCNC: 140 MMOL/L (ref 136–145)
SP GR UR STRIP: >=1.03 (ref 1–1.03)
SQUAMOUS #/AREA URNS HPF: 1 /HPF
URN SPEC COLLECT METH UR: ABNORMAL
UROBILINOGEN UR STRIP-ACNC: 1 EU/DL
WBC # BLD AUTO: 5.44 K/UL (ref 3.9–12.7)
WBC #/AREA URNS HPF: 20 /HPF (ref 0–5)

## 2022-11-21 PROCEDURE — 81000 URINALYSIS NONAUTO W/SCOPE: CPT | Performed by: NURSE PRACTITIONER

## 2022-11-21 PROCEDURE — 87040 BLOOD CULTURE FOR BACTERIA: CPT | Mod: 59 | Performed by: NURSE PRACTITIONER

## 2022-11-21 PROCEDURE — 87086 URINE CULTURE/COLONY COUNT: CPT | Performed by: NURSE PRACTITIONER

## 2022-11-21 PROCEDURE — 87088 URINE BACTERIA CULTURE: CPT | Performed by: NURSE PRACTITIONER

## 2022-11-21 PROCEDURE — 87186 SC STD MICRODIL/AGAR DIL: CPT | Performed by: NURSE PRACTITIONER

## 2022-11-21 PROCEDURE — 99283 EMERGENCY DEPT VISIT LOW MDM: CPT | Mod: 25

## 2022-11-21 PROCEDURE — 80053 COMPREHEN METABOLIC PANEL: CPT | Performed by: NURSE PRACTITIONER

## 2022-11-21 PROCEDURE — 36000 PLACE NEEDLE IN VEIN: CPT

## 2022-11-21 PROCEDURE — 83605 ASSAY OF LACTIC ACID: CPT | Performed by: NURSE PRACTITIONER

## 2022-11-21 PROCEDURE — 85025 COMPLETE CBC W/AUTO DIFF WBC: CPT | Performed by: NURSE PRACTITIONER

## 2022-11-21 PROCEDURE — 25000003 PHARM REV CODE 250: Performed by: SURGERY

## 2022-11-21 PROCEDURE — 87077 CULTURE AEROBIC IDENTIFY: CPT | Performed by: NURSE PRACTITIONER

## 2022-11-21 PROCEDURE — 83690 ASSAY OF LIPASE: CPT | Performed by: NURSE PRACTITIONER

## 2022-11-21 PROCEDURE — 84145 PROCALCITONIN (PCT): CPT | Performed by: NURSE PRACTITIONER

## 2022-11-21 PROCEDURE — 36415 COLL VENOUS BLD VENIPUNCTURE: CPT | Performed by: NURSE PRACTITIONER

## 2022-11-21 RX ORDER — LEVOFLOXACIN 500 MG/1
500 TABLET, FILM COATED ORAL DAILY
Qty: 7 TABLET | Refills: 0 | Status: SHIPPED | OUTPATIENT
Start: 2022-11-21 | End: 2022-11-28

## 2022-11-21 RX ORDER — LEVOFLOXACIN 500 MG/1
500 TABLET, FILM COATED ORAL
Status: COMPLETED | OUTPATIENT
Start: 2022-11-21 | End: 2022-11-21

## 2022-11-21 RX ORDER — LEVOFLOXACIN 5 MG/ML
750 INJECTION, SOLUTION INTRAVENOUS
Status: DISCONTINUED | OUTPATIENT
Start: 2022-11-21 | End: 2022-11-21

## 2022-11-21 RX ADMIN — LEVOFLOXACIN 500 MG: 500 TABLET, FILM COATED ORAL at 04:11
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
## 2022-11-21 NOTE — ED TRIAGE NOTES
C/o chills, foul smelling urine, sediment in urine. Patient reports has a supra pubic catheter that was changed 3-4 days ago.

## 2022-11-22 ENCOUNTER — PATIENT MESSAGE (OUTPATIENT)
Dept: PHYSICAL MEDICINE AND REHAB | Facility: CLINIC | Age: 37
End: 2022-11-22
Payer: MEDICAID

## 2022-11-22 DIAGNOSIS — G82.20 PARAPLEGIA FOLLOWING SPINAL CORD INJURY: Chronic | ICD-10-CM

## 2022-11-22 DIAGNOSIS — M79.2 NEUROPATHIC PAIN: Chronic | ICD-10-CM

## 2022-11-22 RX ORDER — OXYCODONE AND ACETAMINOPHEN 10; 325 MG/1; MG/1
1 TABLET ORAL EVERY 6 HOURS PRN
Qty: 120 TABLET | Refills: 0 | Status: SHIPPED | OUTPATIENT
Start: 2022-11-22 | End: 2022-12-27 | Stop reason: SDUPTHER

## 2022-11-25 LAB — BACTERIA UR CULT: ABNORMAL

## 2022-11-26 ENCOUNTER — HOSPITAL ENCOUNTER (EMERGENCY)
Facility: HOSPITAL | Age: 37
Discharge: HOME | End: 2022-11-26
Attending: STUDENT IN AN ORGANIZED HEALTH CARE EDUCATION/TRAINING PROGRAM
Payer: MEDICAID

## 2022-11-26 VITALS
RESPIRATION RATE: 18 BRPM | HEART RATE: 60 BPM | SYSTOLIC BLOOD PRESSURE: 133 MMHG | OXYGEN SATURATION: 97 % | DIASTOLIC BLOOD PRESSURE: 61 MMHG | TEMPERATURE: 98 F

## 2022-11-26 DIAGNOSIS — N39.0 COMPLICATED UTI (URINARY TRACT INFECTION): Primary | ICD-10-CM

## 2022-11-26 LAB
ALBUMIN SERPL BCP-MCNC: 3.5 G/DL (ref 3.5–5.2)
ALP SERPL-CCNC: 101 U/L (ref 55–135)
ALT SERPL W/O P-5'-P-CCNC: 57 U/L (ref 10–44)
ANION GAP SERPL CALC-SCNC: 11 MMOL/L (ref 8–16)
AST SERPL-CCNC: 47 U/L (ref 10–40)
BACTERIA #/AREA URNS HPF: ABNORMAL /HPF
BACTERIA BLD CULT: NORMAL
BACTERIA BLD CULT: NORMAL
BASOPHILS # BLD AUTO: 0.03 K/UL (ref 0–0.2)
BASOPHILS NFR BLD: 0.4 % (ref 0–1.9)
BILIRUB SERPL-MCNC: 0.2 MG/DL (ref 0.1–1)
BILIRUB UR QL STRIP: NEGATIVE
BUN SERPL-MCNC: 13 MG/DL (ref 6–20)
CALCIUM SERPL-MCNC: 8.6 MG/DL (ref 8.7–10.5)
CHLORIDE SERPL-SCNC: 106 MMOL/L (ref 95–110)
CLARITY UR: ABNORMAL
CO2 SERPL-SCNC: 21 MMOL/L (ref 23–29)
COLOR UR: ABNORMAL
CREAT SERPL-MCNC: 0.6 MG/DL (ref 0.5–1.4)
DIFFERENTIAL METHOD: ABNORMAL
EOSINOPHIL # BLD AUTO: 0.1 K/UL (ref 0–0.5)
EOSINOPHIL NFR BLD: 1.2 % (ref 0–8)
ERYTHROCYTE [DISTWIDTH] IN BLOOD BY AUTOMATED COUNT: 17.2 % (ref 11.5–14.5)
EST. GFR  (NO RACE VARIABLE): >60 ML/MIN/1.73 M^2
GLUCOSE SERPL-MCNC: 124 MG/DL (ref 70–110)
GLUCOSE UR QL STRIP: NEGATIVE
HCT VFR BLD AUTO: 33.5 % (ref 40–54)
HGB BLD-MCNC: 9.7 G/DL (ref 14–18)
HGB UR QL STRIP: ABNORMAL
HYALINE CASTS #/AREA URNS LPF: 0 /LPF
IMM GRANULOCYTES # BLD AUTO: 0.04 K/UL (ref 0–0.04)
IMM GRANULOCYTES NFR BLD AUTO: 0.5 % (ref 0–0.5)
KETONES UR QL STRIP: NEGATIVE
LEUKOCYTE ESTERASE UR QL STRIP: ABNORMAL
LIPASE SERPL-CCNC: 34 U/L (ref 4–60)
LYMPHOCYTES # BLD AUTO: 1.1 K/UL (ref 1–4.8)
LYMPHOCYTES NFR BLD: 15.4 % (ref 18–48)
MCH RBC QN AUTO: 21.5 PG (ref 27–31)
MCHC RBC AUTO-ENTMCNC: 29 G/DL (ref 32–36)
MCV RBC AUTO: 74 FL (ref 82–98)
MICROSCOPIC COMMENT: ABNORMAL
MONOCYTES # BLD AUTO: 0.6 K/UL (ref 0.3–1)
MONOCYTES NFR BLD: 7.7 % (ref 4–15)
NEUTROPHILS # BLD AUTO: 5.5 K/UL (ref 1.8–7.7)
NEUTROPHILS NFR BLD: 74.8 % (ref 38–73)
NITRITE UR QL STRIP: NEGATIVE
NRBC BLD-RTO: 0 /100 WBC
PH UR STRIP: 6 [PH] (ref 5–8)
PLATELET # BLD AUTO: 223 K/UL (ref 150–450)
PMV BLD AUTO: 11.4 FL (ref 9.2–12.9)
POTASSIUM SERPL-SCNC: 3.5 MMOL/L (ref 3.5–5.1)
PROT SERPL-MCNC: 6.2 G/DL (ref 6–8.4)
PROT UR QL STRIP: ABNORMAL
RBC # BLD AUTO: 4.51 M/UL (ref 4.6–6.2)
RBC #/AREA URNS HPF: >100 /HPF (ref 0–4)
SODIUM SERPL-SCNC: 138 MMOL/L (ref 136–145)
SP GR UR STRIP: >=1.03 (ref 1–1.03)
URN SPEC COLLECT METH UR: ABNORMAL
UROBILINOGEN UR STRIP-ACNC: NEGATIVE EU/DL
WBC # BLD AUTO: 7.4 K/UL (ref 3.9–12.7)
WBC #/AREA URNS HPF: 25 /HPF (ref 0–5)

## 2022-11-26 PROCEDURE — 83690 ASSAY OF LIPASE: CPT | Performed by: STUDENT IN AN ORGANIZED HEALTH CARE EDUCATION/TRAINING PROGRAM

## 2022-11-26 PROCEDURE — 63600175 PHARM REV CODE 636 W HCPCS: Performed by: STUDENT IN AN ORGANIZED HEALTH CARE EDUCATION/TRAINING PROGRAM

## 2022-11-26 PROCEDURE — 80053 COMPREHEN METABOLIC PANEL: CPT | Performed by: STUDENT IN AN ORGANIZED HEALTH CARE EDUCATION/TRAINING PROGRAM

## 2022-11-26 PROCEDURE — 51705 CHANGE OF BLADDER TUBE: CPT

## 2022-11-26 PROCEDURE — 99284 EMERGENCY DEPT VISIT MOD MDM: CPT | Mod: 25

## 2022-11-26 PROCEDURE — 96372 THER/PROPH/DIAG INJ SC/IM: CPT | Mod: 59 | Performed by: STUDENT IN AN ORGANIZED HEALTH CARE EDUCATION/TRAINING PROGRAM

## 2022-11-26 PROCEDURE — 87086 URINE CULTURE/COLONY COUNT: CPT | Performed by: STUDENT IN AN ORGANIZED HEALTH CARE EDUCATION/TRAINING PROGRAM

## 2022-11-26 PROCEDURE — 25000003 PHARM REV CODE 250: Performed by: STUDENT IN AN ORGANIZED HEALTH CARE EDUCATION/TRAINING PROGRAM

## 2022-11-26 PROCEDURE — 85025 COMPLETE CBC W/AUTO DIFF WBC: CPT | Performed by: STUDENT IN AN ORGANIZED HEALTH CARE EDUCATION/TRAINING PROGRAM

## 2022-11-26 PROCEDURE — 36415 COLL VENOUS BLD VENIPUNCTURE: CPT | Performed by: STUDENT IN AN ORGANIZED HEALTH CARE EDUCATION/TRAINING PROGRAM

## 2022-11-26 PROCEDURE — 81000 URINALYSIS NONAUTO W/SCOPE: CPT | Performed by: STUDENT IN AN ORGANIZED HEALTH CARE EDUCATION/TRAINING PROGRAM

## 2022-11-26 RX ORDER — CEFTRIAXONE 1 G/1
1 INJECTION, POWDER, FOR SOLUTION INTRAMUSCULAR; INTRAVENOUS
Status: COMPLETED | OUTPATIENT
Start: 2022-11-26 | End: 2022-11-26

## 2022-11-26 RX ORDER — OXYCODONE AND ACETAMINOPHEN 10; 325 MG/1; MG/1
1 TABLET ORAL
Status: COMPLETED | OUTPATIENT
Start: 2022-11-26 | End: 2022-11-26

## 2022-11-26 RX ORDER — CEFUROXIME AXETIL 500 MG/1
500 TABLET ORAL EVERY 12 HOURS
Qty: 20 TABLET | Refills: 0 | Status: SHIPPED | OUTPATIENT
Start: 2022-11-26 | End: 2022-12-06

## 2022-11-26 RX ADMIN — OXYCODONE HYDROCHLORIDE AND ACETAMINOPHEN 1 TABLET: 10; 325 TABLET ORAL at 04:11

## 2022-11-26 RX ADMIN — CEFTRIAXONE SODIUM 1 G: 1 INJECTION, POWDER, FOR SOLUTION INTRAMUSCULAR; INTRAVENOUS at 05:11
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
## 2022-11-27 LAB — BACTERIA UR CULT: NO GROWTH

## 2022-11-30 ENCOUNTER — PATIENT MESSAGE (OUTPATIENT)
Dept: UROLOGY | Facility: CLINIC | Age: 37
End: 2022-11-30
Payer: MEDICAID

## 2022-11-30 DIAGNOSIS — N31.9 NEUROGENIC BLADDER: ICD-10-CM

## 2022-11-30 DIAGNOSIS — Z93.59 CHRONIC SUPRAPUBIC CATHETER: ICD-10-CM

## 2022-11-30 DIAGNOSIS — N32.89 BLADDER SPASMS: Primary | ICD-10-CM

## 2022-12-01 RX ORDER — OXYBUTYNIN CHLORIDE 5 MG/1
5 TABLET ORAL 3 TIMES DAILY PRN
Qty: 30 TABLET | Refills: 1 | Status: SHIPPED | OUTPATIENT
Start: 2022-12-01

## 2022-12-09 ENCOUNTER — PATIENT MESSAGE (OUTPATIENT)
Dept: PHYSICAL MEDICINE AND REHAB | Facility: CLINIC | Age: 37
End: 2022-12-09
Payer: MEDICAID

## 2022-12-12 ENCOUNTER — TELEPHONE (OUTPATIENT)
Dept: PHYSICAL MEDICINE AND REHAB | Facility: CLINIC | Age: 37
End: 2022-12-12
Payer: MEDICAID

## 2022-12-12 NOTE — TELEPHONE ENCOUNTER
Called and spoke with Mr. Edgar.  Patient states  that his pump has been going off for over a week.  I offered him tomorrow or Wednesday per Dr Davis.  Patient declined.  I asked which day can he come in.  Mr Edgar said, he wants the Doctor to send someone to his home and take care of this today or tomorrow like he always does.  Mr Edgar states that he will not be able to get transportation for another week and the Doctor knows this.  I informed Mr Edgar that I will let Dr Davis know and contact him once I receive an answer.    Mr Edgar was ok with this.

## 2022-12-13 ENCOUNTER — PROCEDURE VISIT (OUTPATIENT)
Dept: PHYSICAL MEDICINE AND REHAB | Facility: CLINIC | Age: 37
End: 2022-12-13
Payer: MEDICAID

## 2022-12-13 ENCOUNTER — PATIENT MESSAGE (OUTPATIENT)
Dept: PHYSICAL MEDICINE AND REHAB | Facility: CLINIC | Age: 37
End: 2022-12-13

## 2022-12-13 VITALS
BODY MASS INDEX: 25.84 KG/M2 | DIASTOLIC BLOOD PRESSURE: 61 MMHG | HEART RATE: 58 BPM | SYSTOLIC BLOOD PRESSURE: 104 MMHG | HEIGHT: 69 IN

## 2022-12-13 DIAGNOSIS — G83.9 SPASTIC PARALYSIS: Primary | ICD-10-CM

## 2022-12-13 DIAGNOSIS — G82.20 PARAPLEGIA FOLLOWING SPINAL CORD INJURY: ICD-10-CM

## 2022-12-13 PROCEDURE — 62370 ANL SP INF PMP W/MDREPRG&FIL: CPT | Mod: PBBFAC | Performed by: PHYSICAL MEDICINE & REHABILITATION

## 2022-12-13 PROCEDURE — 62370 PR ANL SP INF PMP W/MDREPRG&FIL: ICD-10-PCS | Mod: S$PBB,,, | Performed by: PHYSICAL MEDICINE & REHABILITATION

## 2022-12-13 PROCEDURE — 62370 ANL SP INF PMP W/MDREPRG&FIL: CPT | Mod: S$PBB,,, | Performed by: PHYSICAL MEDICINE & REHABILITATION

## 2022-12-13 RX ADMIN — BACLOFEN 80 MG: 40 INJECTION INTRATHECAL at 05:12

## 2022-12-15 NOTE — PLAN OF CARE
Problem: Patient Care Overview  Goal: Plan of Care Review  Outcome: Ongoing (interventions implemented as appropriate)  Repositioning at intervals with use of wedges x2 and pillows to support lower extremities in alignment.  Side rails x2 up at all times.  Paraplegic. Suprapubic catheter draining and secured to leg.  Pain control with medication and repositioning.         Lives with sister  Patient stated would want daughter to make health care decisions for her Lives with sister  Patient stated would want daughter to make health care decisions for her  [employer] - has own
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
## 2022-12-27 ENCOUNTER — PATIENT MESSAGE (OUTPATIENT)
Dept: INTERNAL MEDICINE | Facility: CLINIC | Age: 37
End: 2022-12-27
Payer: MEDICAID

## 2022-12-27 ENCOUNTER — PATIENT MESSAGE (OUTPATIENT)
Dept: PHYSICAL MEDICINE AND REHAB | Facility: CLINIC | Age: 37
End: 2022-12-27
Payer: MEDICAID

## 2022-12-27 DIAGNOSIS — G82.20 PARAPLEGIA FOLLOWING SPINAL CORD INJURY: Chronic | ICD-10-CM

## 2022-12-27 DIAGNOSIS — M79.2 NEUROPATHIC PAIN: Chronic | ICD-10-CM

## 2022-12-27 RX ORDER — OXYCODONE HCL 10 MG/1
10 TABLET, FILM COATED, EXTENDED RELEASE ORAL EVERY 12 HOURS PRN
Qty: 60 TABLET | Refills: 0 | Status: SHIPPED | OUTPATIENT
Start: 2022-12-27 | End: 2023-03-06 | Stop reason: SDUPTHER

## 2023-01-04 ENCOUNTER — PATIENT MESSAGE (OUTPATIENT)
Dept: UROLOGY | Facility: CLINIC | Age: 38
End: 2023-01-04
Payer: MEDICAID

## 2023-01-18 ENCOUNTER — OFFICE VISIT (OUTPATIENT)
Dept: UROLOGY | Facility: CLINIC | Age: 38
End: 2023-01-18
Payer: MEDICAID

## 2023-01-18 VITALS
HEIGHT: 69 IN | WEIGHT: 178.56 LBS | DIASTOLIC BLOOD PRESSURE: 70 MMHG | SYSTOLIC BLOOD PRESSURE: 111 MMHG | BODY MASS INDEX: 26.45 KG/M2

## 2023-01-18 DIAGNOSIS — Z93.59 CHRONIC SUPRAPUBIC CATHETER: ICD-10-CM

## 2023-01-18 DIAGNOSIS — N39.0 COMPLICATED UTI (URINARY TRACT INFECTION): ICD-10-CM

## 2023-01-18 DIAGNOSIS — N31.9 NEUROGENIC BLADDER: Primary | Chronic | ICD-10-CM

## 2023-01-18 PROCEDURE — 51705 PR CHANGE OF BLADDER TUBE,SIMPLE: ICD-10-PCS | Mod: S$PBB,,,

## 2023-01-18 PROCEDURE — 51705 CHANGE OF BLADDER TUBE: CPT | Mod: S$PBB,,,

## 2023-01-18 PROCEDURE — 1160F RVW MEDS BY RX/DR IN RCRD: CPT | Mod: CPTII,,,

## 2023-01-18 PROCEDURE — 99999 PR PBB SHADOW E&M-EST. PATIENT-LVL III: ICD-10-PCS | Mod: PBBFAC,,,

## 2023-01-18 PROCEDURE — 51702 INSERT TEMP BLADDER CATH: CPT | Mod: PBBFAC

## 2023-01-18 PROCEDURE — 99213 OFFICE O/P EST LOW 20 MIN: CPT | Mod: PBBFAC

## 2023-01-18 PROCEDURE — 1159F PR MEDICATION LIST DOCUMENTED IN MEDICAL RECORD: ICD-10-PCS | Mod: CPTII,,,

## 2023-01-18 PROCEDURE — 1159F MED LIST DOCD IN RCRD: CPT | Mod: CPTII,,,

## 2023-01-18 PROCEDURE — 1160F PR REVIEW ALL MEDS BY PRESCRIBER/CLIN PHARMACIST DOCUMENTED: ICD-10-PCS | Mod: CPTII,,,

## 2023-01-18 PROCEDURE — 99499 NO LOS: ICD-10-PCS | Mod: S$PBB,,,

## 2023-01-18 PROCEDURE — 99999 PR PBB SHADOW E&M-EST. PATIENT-LVL III: CPT | Mod: PBBFAC,,,

## 2023-01-18 PROCEDURE — 99499 UNLISTED E&M SERVICE: CPT | Mod: S$PBB,,,
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
## 2023-02-01 DIAGNOSIS — G83.9 SPASTIC PARALYSIS: Primary | ICD-10-CM

## 2023-02-07 ENCOUNTER — PATIENT MESSAGE (OUTPATIENT)
Dept: PHYSICAL MEDICINE AND REHAB | Facility: CLINIC | Age: 38
End: 2023-02-07
Payer: MEDICAID

## 2023-02-13 ENCOUNTER — PATIENT MESSAGE (OUTPATIENT)
Dept: UROLOGY | Facility: CLINIC | Age: 38
End: 2023-02-13
Payer: MEDICAID

## 2023-02-13 ENCOUNTER — PATIENT MESSAGE (OUTPATIENT)
Dept: PHYSICAL MEDICINE AND REHAB | Facility: CLINIC | Age: 38
End: 2023-02-13
Payer: MEDICAID

## 2023-02-15 ENCOUNTER — PROCEDURE VISIT (OUTPATIENT)
Dept: PHYSICAL MEDICINE AND REHAB | Facility: CLINIC | Age: 38
End: 2023-02-15
Payer: MEDICAID

## 2023-02-15 ENCOUNTER — PATIENT MESSAGE (OUTPATIENT)
Dept: PHYSICAL MEDICINE AND REHAB | Facility: CLINIC | Age: 38
End: 2023-02-15

## 2023-02-15 DIAGNOSIS — G83.9 SPASTIC PARALYSIS: Primary | ICD-10-CM

## 2023-02-15 DIAGNOSIS — G82.20 PARAPLEGIA FOLLOWING SPINAL CORD INJURY: ICD-10-CM

## 2023-02-15 PROCEDURE — 62370 PR ANL SP INF PMP W/MDREPRG&FIL: ICD-10-PCS | Mod: S$PBB,,, | Performed by: PHYSICAL MEDICINE & REHABILITATION

## 2023-02-15 PROCEDURE — 62370 ANL SP INF PMP W/MDREPRG&FIL: CPT | Mod: PBBFAC | Performed by: PHYSICAL MEDICINE & REHABILITATION

## 2023-02-15 PROCEDURE — 62370 ANL SP INF PMP W/MDREPRG&FIL: CPT | Mod: S$PBB,,, | Performed by: PHYSICAL MEDICINE & REHABILITATION

## 2023-02-16 ENCOUNTER — PATIENT MESSAGE (OUTPATIENT)
Dept: PHYSICAL MEDICINE AND REHAB | Facility: CLINIC | Age: 38
End: 2023-02-16
Payer: MEDICAID

## 2023-02-20 ENCOUNTER — OFFICE VISIT (OUTPATIENT)
Dept: UROLOGY | Facility: CLINIC | Age: 38
End: 2023-02-20
Payer: MEDICAID

## 2023-02-20 VITALS
WEIGHT: 178 LBS | HEART RATE: 59 BPM | BODY MASS INDEX: 26.36 KG/M2 | DIASTOLIC BLOOD PRESSURE: 64 MMHG | SYSTOLIC BLOOD PRESSURE: 103 MMHG | HEIGHT: 69 IN

## 2023-02-20 DIAGNOSIS — R39.9 UTI SYMPTOMS: Primary | ICD-10-CM

## 2023-02-20 PROCEDURE — 3078F PR MOST RECENT DIASTOLIC BLOOD PRESSURE < 80 MM HG: ICD-10-PCS | Mod: CPTII,,,

## 2023-02-20 PROCEDURE — 51705 PR CHANGE OF BLADDER TUBE,SIMPLE: ICD-10-PCS | Mod: S$PBB,,,

## 2023-02-20 PROCEDURE — 1160F RVW MEDS BY RX/DR IN RCRD: CPT | Mod: CPTII,,,

## 2023-02-20 PROCEDURE — 87088 URINE BACTERIA CULTURE: CPT

## 2023-02-20 PROCEDURE — 99999 PR PBB SHADOW E&M-EST. PATIENT-LVL IV: CPT | Mod: PBBFAC,,,

## 2023-02-20 PROCEDURE — 1159F MED LIST DOCD IN RCRD: CPT | Mod: CPTII,,,

## 2023-02-20 PROCEDURE — 99499 UNLISTED E&M SERVICE: CPT | Mod: S$PBB,,,

## 2023-02-20 PROCEDURE — 3078F DIAST BP <80 MM HG: CPT | Mod: CPTII,,,

## 2023-02-20 PROCEDURE — 87077 CULTURE AEROBIC IDENTIFY: CPT

## 2023-02-20 PROCEDURE — 51720 TREATMENT OF BLADDER LESION: CPT | Mod: PBBFAC

## 2023-02-20 PROCEDURE — 99214 OFFICE O/P EST MOD 30 MIN: CPT | Mod: PBBFAC

## 2023-02-20 PROCEDURE — 1159F PR MEDICATION LIST DOCUMENTED IN MEDICAL RECORD: ICD-10-PCS | Mod: CPTII,,,

## 2023-02-20 PROCEDURE — 51702 INSERT TEMP BLADDER CATH: CPT | Mod: PBBFAC

## 2023-02-20 PROCEDURE — 3074F SYST BP LT 130 MM HG: CPT | Mod: CPTII,,,

## 2023-02-20 PROCEDURE — 99999 PR PBB SHADOW E&M-EST. PATIENT-LVL IV: ICD-10-PCS | Mod: PBBFAC,,,

## 2023-02-20 PROCEDURE — 3008F PR BODY MASS INDEX (BMI) DOCUMENTED: ICD-10-PCS | Mod: CPTII,,,

## 2023-02-20 PROCEDURE — 3008F BODY MASS INDEX DOCD: CPT | Mod: CPTII,,,

## 2023-02-20 PROCEDURE — 1160F PR REVIEW ALL MEDS BY PRESCRIBER/CLIN PHARMACIST DOCUMENTED: ICD-10-PCS | Mod: CPTII,,,

## 2023-02-20 PROCEDURE — 51705 CHANGE OF BLADDER TUBE: CPT | Mod: S$PBB,,,

## 2023-02-20 PROCEDURE — 87086 URINE CULTURE/COLONY COUNT: CPT

## 2023-02-20 PROCEDURE — 99499 NO LOS: ICD-10-PCS | Mod: S$PBB,,,

## 2023-02-20 PROCEDURE — 87186 SC STD MICRODIL/AGAR DIL: CPT

## 2023-02-20 PROCEDURE — 3074F PR MOST RECENT SYSTOLIC BLOOD PRESSURE < 130 MM HG: ICD-10-PCS | Mod: CPTII,,,
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
## 2023-02-21 NOTE — HOSPITAL COURSE
Having fewer chills.  Hematuria has resolved.  Doing better otherwise.  2/2-suprapubic pain has improved.  Urinalysis looks much better.  Still on IV fluids and IV antibiotics.    2/3 pt is on day 4 invanz for complicated UTI with klebsiella and ecoli both sensitive to this abx. He is afebrile. No elevated WBC. Blood cultures NGTD.     2/4 pt is on day 5 of Invanz for complicated resistant UTI. Was sent here after urologist called that his bacteria was not sensitive to po abx. He has completed a 5 day course. No longer febrile, no elevated WBC. He has no more complaints of dysuria or abd pain, repeat U/a improved.    normal for race

## 2023-02-22 ENCOUNTER — PATIENT MESSAGE (OUTPATIENT)
Dept: UROLOGY | Facility: CLINIC | Age: 38
End: 2023-02-22
Payer: MEDICAID

## 2023-02-22 DIAGNOSIS — A49.9 BACTERIAL UTI: Primary | ICD-10-CM

## 2023-02-22 DIAGNOSIS — G82.20 PARAPLEGIA FOLLOWING SPINAL CORD INJURY: Primary | ICD-10-CM

## 2023-02-22 DIAGNOSIS — N39.0 BACTERIAL UTI: Primary | ICD-10-CM

## 2023-02-22 DIAGNOSIS — R29.818 DIFFICULTY BALANCING WHEN SITTING: ICD-10-CM

## 2023-02-22 LAB — BACTERIA UR CULT: ABNORMAL

## 2023-02-22 RX ORDER — AMOXICILLIN 500 MG/1
500 TABLET, FILM COATED ORAL EVERY 8 HOURS
Qty: 21 TABLET | Refills: 0 | Status: SHIPPED | OUTPATIENT
Start: 2023-02-22 | End: 2023-03-01

## 2023-02-23 ENCOUNTER — PATIENT MESSAGE (OUTPATIENT)
Dept: PHYSICAL MEDICINE AND REHAB | Facility: CLINIC | Age: 38
End: 2023-02-23
Payer: MEDICAID

## 2023-02-23 RX ADMIN — BACLOFEN 80 MG: 40 INJECTION INTRATHECAL at 09:02
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
## 2023-02-28 ENCOUNTER — PATIENT MESSAGE (OUTPATIENT)
Dept: PHYSICAL MEDICINE AND REHAB | Facility: CLINIC | Age: 38
End: 2023-02-28
Payer: MEDICAID

## 2023-02-28 DIAGNOSIS — G82.20 PARAPLEGIA FOLLOWING SPINAL CORD INJURY: Chronic | ICD-10-CM

## 2023-02-28 DIAGNOSIS — M79.2 NEUROPATHIC PAIN: Chronic | ICD-10-CM

## 2023-02-28 RX ORDER — OXYCODONE HCL 10 MG/1
10 TABLET, FILM COATED, EXTENDED RELEASE ORAL EVERY 12 HOURS PRN
Qty: 60 TABLET | Refills: 0 | Status: CANCELLED | OUTPATIENT
Start: 2023-02-28 | End: 2023-03-30

## 2023-03-01 ENCOUNTER — PATIENT MESSAGE (OUTPATIENT)
Dept: PHYSICAL MEDICINE AND REHAB | Facility: CLINIC | Age: 38
End: 2023-03-01
Payer: MEDICAID

## 2023-03-01 DIAGNOSIS — G82.20 PARAPLEGIA FOLLOWING SPINAL CORD INJURY: Primary | ICD-10-CM

## 2023-03-01 DIAGNOSIS — M79.2 NEUROPATHIC PAIN: Chronic | ICD-10-CM

## 2023-03-06 RX ORDER — OXYCODONE HCL 10 MG/1
10 TABLET, FILM COATED, EXTENDED RELEASE ORAL EVERY 12 HOURS PRN
Qty: 60 TABLET | Refills: 0 | Status: SHIPPED | OUTPATIENT
Start: 2023-03-06 | End: 2023-04-10 | Stop reason: SDUPTHER

## 2023-03-15 ENCOUNTER — PATIENT MESSAGE (OUTPATIENT)
Dept: UROLOGY | Facility: CLINIC | Age: 38
End: 2023-03-15
Payer: MEDICAID

## 2023-03-15 DIAGNOSIS — N39.0 BACTERIAL UTI: Primary | ICD-10-CM

## 2023-03-15 DIAGNOSIS — A49.9 BACTERIAL UTI: Primary | ICD-10-CM

## 2023-03-17 ENCOUNTER — PATIENT MESSAGE (OUTPATIENT)
Dept: PHYSICAL MEDICINE AND REHAB | Facility: CLINIC | Age: 38
End: 2023-03-17
Payer: MEDICAID

## 2023-03-17 ENCOUNTER — PATIENT MESSAGE (OUTPATIENT)
Dept: UROLOGY | Facility: CLINIC | Age: 38
End: 2023-03-17
Payer: MEDICAID

## 2023-03-17 RX ORDER — AMPICILLIN 500 MG/1
500 CAPSULE ORAL 3 TIMES DAILY
Qty: 30 CAPSULE | Refills: 0 | Status: SHIPPED | OUTPATIENT
Start: 2023-03-17 | End: 2023-03-27

## 2023-03-22 DIAGNOSIS — M79.2 NEUROPATHIC PAIN: Chronic | ICD-10-CM

## 2023-03-22 DIAGNOSIS — G82.20 PARAPLEGIA FOLLOWING SPINAL CORD INJURY: Chronic | ICD-10-CM

## 2023-03-22 RX ORDER — OXYCODONE AND ACETAMINOPHEN 10; 325 MG/1; MG/1
1 TABLET ORAL EVERY 6 HOURS PRN
Qty: 120 TABLET | Refills: 0 | Status: SHIPPED | OUTPATIENT
Start: 2023-03-22 | End: 2023-05-23 | Stop reason: SDUPTHER

## 2023-03-24 ENCOUNTER — PATIENT MESSAGE (OUTPATIENT)
Dept: UROLOGY | Facility: CLINIC | Age: 38
End: 2023-03-24

## 2023-03-24 ENCOUNTER — OFFICE VISIT (OUTPATIENT)
Dept: UROLOGY | Facility: CLINIC | Age: 38
End: 2023-03-24
Payer: MEDICAID

## 2023-03-24 VITALS
WEIGHT: 178 LBS | HEIGHT: 69 IN | DIASTOLIC BLOOD PRESSURE: 61 MMHG | SYSTOLIC BLOOD PRESSURE: 108 MMHG | BODY MASS INDEX: 26.36 KG/M2 | HEART RATE: 51 BPM

## 2023-03-24 DIAGNOSIS — A49.9 BACTERIAL UTI: ICD-10-CM

## 2023-03-24 DIAGNOSIS — Z93.59 CHRONIC SUPRAPUBIC CATHETER: ICD-10-CM

## 2023-03-24 DIAGNOSIS — N31.9 NEUROGENIC BLADDER: Primary | Chronic | ICD-10-CM

## 2023-03-24 DIAGNOSIS — N39.0 BACTERIAL UTI: ICD-10-CM

## 2023-03-24 PROCEDURE — 51705 CHANGE OF BLADDER TUBE: CPT | Mod: PBBFAC

## 2023-03-24 PROCEDURE — 99499 UNLISTED E&M SERVICE: CPT | Mod: S$PBB,,,

## 2023-03-24 PROCEDURE — 3008F BODY MASS INDEX DOCD: CPT | Mod: CPTII,,,

## 2023-03-24 PROCEDURE — 87086 URINE CULTURE/COLONY COUNT: CPT

## 2023-03-24 PROCEDURE — 1159F PR MEDICATION LIST DOCUMENTED IN MEDICAL RECORD: ICD-10-PCS | Mod: CPTII,,,

## 2023-03-24 PROCEDURE — 99999 PR PBB SHADOW E&M-EST. PATIENT-LVL IV: ICD-10-PCS | Mod: PBBFAC,,,

## 2023-03-24 PROCEDURE — 3078F DIAST BP <80 MM HG: CPT | Mod: CPTII,,,

## 2023-03-24 PROCEDURE — 1160F PR REVIEW ALL MEDS BY PRESCRIBER/CLIN PHARMACIST DOCUMENTED: ICD-10-PCS | Mod: CPTII,,,

## 2023-03-24 PROCEDURE — 1160F RVW MEDS BY RX/DR IN RCRD: CPT | Mod: CPTII,,,

## 2023-03-24 PROCEDURE — 51705 PR CHANGE OF BLADDER TUBE,SIMPLE: ICD-10-PCS | Mod: S$PBB,,,

## 2023-03-24 PROCEDURE — 3074F SYST BP LT 130 MM HG: CPT | Mod: CPTII,,,

## 2023-03-24 PROCEDURE — 99214 OFFICE O/P EST MOD 30 MIN: CPT | Mod: PBBFAC

## 2023-03-24 PROCEDURE — 99999 PR PBB SHADOW E&M-EST. PATIENT-LVL IV: CPT | Mod: PBBFAC,,,

## 2023-03-24 PROCEDURE — 1159F MED LIST DOCD IN RCRD: CPT | Mod: CPTII,,,

## 2023-03-24 PROCEDURE — 3078F PR MOST RECENT DIASTOLIC BLOOD PRESSURE < 80 MM HG: ICD-10-PCS | Mod: CPTII,,,

## 2023-03-24 PROCEDURE — 99499 NO LOS: ICD-10-PCS | Mod: S$PBB,,,

## 2023-03-24 PROCEDURE — 51705 CHANGE OF BLADDER TUBE: CPT | Mod: S$PBB,,,

## 2023-03-24 PROCEDURE — 3008F PR BODY MASS INDEX (BMI) DOCUMENTED: ICD-10-PCS | Mod: CPTII,,,

## 2023-03-24 PROCEDURE — 3074F PR MOST RECENT SYSTOLIC BLOOD PRESSURE < 130 MM HG: ICD-10-PCS | Mod: CPTII,,,
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
## 2023-03-25 LAB — BACTERIA UR CULT: NORMAL

## 2023-03-28 ENCOUNTER — TELEPHONE (OUTPATIENT)
Dept: UROLOGY | Facility: CLINIC | Age: 38
End: 2023-03-28
Payer: MEDICAID

## 2023-03-28 ENCOUNTER — PATIENT MESSAGE (OUTPATIENT)
Dept: INTERNAL MEDICINE | Facility: CLINIC | Age: 38
End: 2023-03-28
Payer: MEDICAID

## 2023-03-28 DIAGNOSIS — N31.9 NEUROGENIC BLADDER: Primary | ICD-10-CM

## 2023-03-28 NOTE — TELEPHONE ENCOUNTER
Neurogenic bladder  -     Case Request Operating Room: CYSTOSCOPY,WITH BOTULINUM TOXIN INJECTION

## 2023-04-04 ENCOUNTER — PATIENT MESSAGE (OUTPATIENT)
Dept: INTERNAL MEDICINE | Facility: CLINIC | Age: 38
End: 2023-04-04
Payer: MEDICAID

## 2023-04-07 ENCOUNTER — PATIENT MESSAGE (OUTPATIENT)
Dept: PHYSICAL MEDICINE AND REHAB | Facility: CLINIC | Age: 38
End: 2023-04-07
Payer: MEDICAID

## 2023-04-10 DIAGNOSIS — G82.20 PARAPLEGIA FOLLOWING SPINAL CORD INJURY: ICD-10-CM

## 2023-04-10 DIAGNOSIS — M79.2 NEUROPATHIC PAIN: Chronic | ICD-10-CM

## 2023-04-11 ENCOUNTER — PATIENT MESSAGE (OUTPATIENT)
Dept: PHYSICAL MEDICINE AND REHAB | Facility: CLINIC | Age: 38
End: 2023-04-11
Payer: MEDICAID

## 2023-04-11 RX ORDER — OXYCODONE HCL 10 MG/1
10 TABLET, FILM COATED, EXTENDED RELEASE ORAL EVERY 12 HOURS PRN
Qty: 60 TABLET | Refills: 0 | Status: SHIPPED | OUTPATIENT
Start: 2023-04-21 | End: 2023-06-12 | Stop reason: SDUPTHER

## 2023-04-13 DIAGNOSIS — G82.20 PARAPLEGIA FOLLOWING SPINAL CORD INJURY: ICD-10-CM

## 2023-04-13 DIAGNOSIS — M79.2 NEUROPATHIC PAIN: Chronic | ICD-10-CM

## 2023-04-13 RX ORDER — OXYCODONE HCL 10 MG/1
10 TABLET, FILM COATED, EXTENDED RELEASE ORAL EVERY 12 HOURS PRN
Qty: 60 TABLET | Refills: 0 | Status: CANCELLED | OUTPATIENT
Start: 2023-04-21 | End: 2023-05-21

## 2023-04-20 ENCOUNTER — PATIENT MESSAGE (OUTPATIENT)
Dept: PHYSICAL MEDICINE AND REHAB | Facility: CLINIC | Age: 38
End: 2023-04-20
Payer: MEDICAID

## 2023-04-20 DIAGNOSIS — G82.20 PARAPLEGIA FOLLOWING SPINAL CORD INJURY: Primary | ICD-10-CM

## 2023-04-24 ENCOUNTER — PATIENT MESSAGE (OUTPATIENT)
Dept: UROLOGY | Facility: CLINIC | Age: 38
End: 2023-04-24
Payer: MEDICAID

## 2023-04-26 ENCOUNTER — PATIENT MESSAGE (OUTPATIENT)
Dept: PHYSICAL MEDICINE AND REHAB | Facility: CLINIC | Age: 38
End: 2023-04-26

## 2023-04-26 ENCOUNTER — PATIENT MESSAGE (OUTPATIENT)
Dept: UROLOGY | Facility: CLINIC | Age: 38
End: 2023-04-26
Payer: MEDICAID

## 2023-04-26 ENCOUNTER — PROCEDURE VISIT (OUTPATIENT)
Dept: PHYSICAL MEDICINE AND REHAB | Facility: CLINIC | Age: 38
End: 2023-04-26
Payer: MEDICAID

## 2023-04-26 VITALS
WEIGHT: 180 LBS | DIASTOLIC BLOOD PRESSURE: 82 MMHG | HEIGHT: 69 IN | SYSTOLIC BLOOD PRESSURE: 137 MMHG | BODY MASS INDEX: 26.66 KG/M2 | HEART RATE: 61 BPM

## 2023-04-26 DIAGNOSIS — G82.20 PARAPLEGIA FOLLOWING SPINAL CORD INJURY: Chronic | ICD-10-CM

## 2023-04-26 DIAGNOSIS — G83.9 SPASTIC PARALYSIS: Primary | ICD-10-CM

## 2023-05-02 ENCOUNTER — TELEPHONE (OUTPATIENT)
Dept: UROLOGY | Facility: CLINIC | Age: 38
End: 2023-05-02
Payer: MEDICAID

## 2023-05-02 NOTE — TELEPHONE ENCOUNTER
Called the patient to give arrival time and pre-op instructions, when the patient informed me that he wanted to cancel the surgery because he was told if he didn't come in the office to give a urine sample to insure he did not have any UTI's or infections the surgery will be canceled. He stated he never made it into the clinic to give the sample and would like to cancel. I called the scheduling coordinators and had Claudette put the patient in the surgery depot until further discussed with Dr. Luna.
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
## 2023-05-09 ENCOUNTER — TELEPHONE (OUTPATIENT)
Dept: PHYSICAL MEDICINE AND REHAB | Facility: CLINIC | Age: 38
End: 2023-05-09
Payer: MEDICAID

## 2023-05-09 ENCOUNTER — HOSPITAL ENCOUNTER (EMERGENCY)
Facility: HOSPITAL | Age: 38
Discharge: HOME | End: 2023-05-09
Attending: STUDENT IN AN ORGANIZED HEALTH CARE EDUCATION/TRAINING PROGRAM
Payer: MEDICAID

## 2023-05-09 VITALS
OXYGEN SATURATION: 97 % | DIASTOLIC BLOOD PRESSURE: 54 MMHG | TEMPERATURE: 98 F | HEART RATE: 72 BPM | SYSTOLIC BLOOD PRESSURE: 100 MMHG | RESPIRATION RATE: 18 BRPM

## 2023-05-09 DIAGNOSIS — K85.90 ACUTE PANCREATITIS WITHOUT INFECTION OR NECROSIS, UNSPECIFIED PANCREATITIS TYPE: ICD-10-CM

## 2023-05-09 DIAGNOSIS — N39.0 COMPLICATED UTI (URINARY TRACT INFECTION): Primary | ICD-10-CM

## 2023-05-09 LAB
ALBUMIN SERPL BCP-MCNC: 3.8 G/DL (ref 3.5–5.2)
ALP SERPL-CCNC: 112 U/L (ref 55–135)
ALT SERPL W/O P-5'-P-CCNC: 19 U/L (ref 10–44)
ANION GAP SERPL CALC-SCNC: 8 MMOL/L (ref 8–16)
AST SERPL-CCNC: 16 U/L (ref 10–40)
BACTERIA #/AREA URNS HPF: ABNORMAL /HPF
BASOPHILS # BLD AUTO: 0.04 K/UL (ref 0–0.2)
BASOPHILS NFR BLD: 0.6 % (ref 0–1.9)
BILIRUB SERPL-MCNC: 0.3 MG/DL (ref 0.1–1)
BILIRUB UR QL STRIP: NEGATIVE
BUN SERPL-MCNC: 8 MG/DL (ref 6–20)
CALCIUM SERPL-MCNC: 8.7 MG/DL (ref 8.7–10.5)
CHLORIDE SERPL-SCNC: 105 MMOL/L (ref 95–110)
CLARITY UR: ABNORMAL
CO2 SERPL-SCNC: 25 MMOL/L (ref 23–29)
COLOR UR: YELLOW
CREAT SERPL-MCNC: 0.7 MG/DL (ref 0.5–1.4)
DIFFERENTIAL METHOD: ABNORMAL
EOSINOPHIL # BLD AUTO: 0.1 K/UL (ref 0–0.5)
EOSINOPHIL NFR BLD: 1.2 % (ref 0–8)
ERYTHROCYTE [DISTWIDTH] IN BLOOD BY AUTOMATED COUNT: 18.3 % (ref 11.5–14.5)
EST. GFR  (NO RACE VARIABLE): >60 ML/MIN/1.73 M^2
GLUCOSE SERPL-MCNC: 102 MG/DL (ref 70–110)
GLUCOSE UR QL STRIP: NEGATIVE
HCT VFR BLD AUTO: 34.2 % (ref 40–54)
HGB BLD-MCNC: 9.3 G/DL (ref 14–18)
HGB UR QL STRIP: ABNORMAL
HYALINE CASTS #/AREA URNS LPF: 0 /LPF
IMM GRANULOCYTES # BLD AUTO: 0.02 K/UL (ref 0–0.04)
IMM GRANULOCYTES NFR BLD AUTO: 0.3 % (ref 0–0.5)
KETONES UR QL STRIP: NEGATIVE
LEUKOCYTE ESTERASE UR QL STRIP: ABNORMAL
LIPASE SERPL-CCNC: 321 U/L (ref 4–60)
LYMPHOCYTES # BLD AUTO: 1.2 K/UL (ref 1–4.8)
LYMPHOCYTES NFR BLD: 16.7 % (ref 18–48)
MCH RBC QN AUTO: 21 PG (ref 27–31)
MCHC RBC AUTO-ENTMCNC: 27.2 G/DL (ref 32–36)
MCV RBC AUTO: 77 FL (ref 82–98)
MICROSCOPIC COMMENT: ABNORMAL
MONOCYTES # BLD AUTO: 0.6 K/UL (ref 0.3–1)
MONOCYTES NFR BLD: 9 % (ref 4–15)
NEUTROPHILS # BLD AUTO: 5 K/UL (ref 1.8–7.7)
NEUTROPHILS NFR BLD: 72.2 % (ref 38–73)
NITRITE UR QL STRIP: NEGATIVE
NRBC BLD-RTO: 0 /100 WBC
PH UR STRIP: 6 [PH] (ref 5–8)
PLATELET # BLD AUTO: 226 K/UL (ref 150–450)
PMV BLD AUTO: 10.4 FL (ref 9.2–12.9)
POTASSIUM SERPL-SCNC: 4 MMOL/L (ref 3.5–5.1)
PROT SERPL-MCNC: 7 G/DL (ref 6–8.4)
PROT UR QL STRIP: ABNORMAL
RBC # BLD AUTO: 4.42 M/UL (ref 4.6–6.2)
RBC #/AREA URNS HPF: 80 /HPF (ref 0–4)
SODIUM SERPL-SCNC: 138 MMOL/L (ref 136–145)
SP GR UR STRIP: 1.02 (ref 1–1.03)
SQUAMOUS #/AREA URNS HPF: 5 /HPF
URN SPEC COLLECT METH UR: ABNORMAL
UROBILINOGEN UR STRIP-ACNC: NEGATIVE EU/DL
WBC # BLD AUTO: 6.89 K/UL (ref 3.9–12.7)
WBC #/AREA URNS HPF: 30 /HPF (ref 0–5)

## 2023-05-09 PROCEDURE — 63600175 PHARM REV CODE 636 W HCPCS: Performed by: STUDENT IN AN ORGANIZED HEALTH CARE EDUCATION/TRAINING PROGRAM

## 2023-05-09 PROCEDURE — 85025 COMPLETE CBC W/AUTO DIFF WBC: CPT | Performed by: STUDENT IN AN ORGANIZED HEALTH CARE EDUCATION/TRAINING PROGRAM

## 2023-05-09 PROCEDURE — 87086 URINE CULTURE/COLONY COUNT: CPT | Performed by: STUDENT IN AN ORGANIZED HEALTH CARE EDUCATION/TRAINING PROGRAM

## 2023-05-09 PROCEDURE — 96365 THER/PROPH/DIAG IV INF INIT: CPT | Mod: 59

## 2023-05-09 PROCEDURE — 25000003 PHARM REV CODE 250: Performed by: STUDENT IN AN ORGANIZED HEALTH CARE EDUCATION/TRAINING PROGRAM

## 2023-05-09 PROCEDURE — 99285 EMERGENCY DEPT VISIT HI MDM: CPT | Mod: 25

## 2023-05-09 PROCEDURE — 83690 ASSAY OF LIPASE: CPT | Performed by: STUDENT IN AN ORGANIZED HEALTH CARE EDUCATION/TRAINING PROGRAM

## 2023-05-09 PROCEDURE — 36415 COLL VENOUS BLD VENIPUNCTURE: CPT | Performed by: STUDENT IN AN ORGANIZED HEALTH CARE EDUCATION/TRAINING PROGRAM

## 2023-05-09 PROCEDURE — 80053 COMPREHEN METABOLIC PANEL: CPT | Performed by: STUDENT IN AN ORGANIZED HEALTH CARE EDUCATION/TRAINING PROGRAM

## 2023-05-09 PROCEDURE — 81000 URINALYSIS NONAUTO W/SCOPE: CPT | Performed by: STUDENT IN AN ORGANIZED HEALTH CARE EDUCATION/TRAINING PROGRAM

## 2023-05-09 PROCEDURE — 25500020 PHARM REV CODE 255: Performed by: STUDENT IN AN ORGANIZED HEALTH CARE EDUCATION/TRAINING PROGRAM

## 2023-05-09 RX ORDER — CEFUROXIME AXETIL 500 MG/1
500 TABLET ORAL EVERY 12 HOURS
Qty: 20 TABLET | Refills: 0 | Status: SHIPPED | OUTPATIENT
Start: 2023-05-09 | End: 2023-05-19

## 2023-05-09 RX ADMIN — IOHEXOL 75 ML: 350 INJECTION, SOLUTION INTRAVENOUS at 04:05

## 2023-05-09 RX ADMIN — CEFTRIAXONE SODIUM 1 G: 1 INJECTION, POWDER, FOR SOLUTION INTRAMUSCULAR; INTRAVENOUS at 04:05
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
## 2023-05-10 ENCOUNTER — PATIENT MESSAGE (OUTPATIENT)
Dept: INTERNAL MEDICINE | Facility: CLINIC | Age: 38
End: 2023-05-10
Payer: MEDICAID

## 2023-05-11 LAB
BACTERIA UR CULT: NORMAL
BACTERIA UR CULT: NORMAL

## 2023-05-13 ENCOUNTER — HOSPITAL ENCOUNTER (EMERGENCY)
Facility: HOSPITAL | Age: 38
Discharge: HOME | End: 2023-05-14
Attending: STUDENT IN AN ORGANIZED HEALTH CARE EDUCATION/TRAINING PROGRAM
Payer: MEDICAID

## 2023-05-13 DIAGNOSIS — K29.70 GASTRITIS, PRESENCE OF BLEEDING UNSPECIFIED, UNSPECIFIED CHRONICITY, UNSPECIFIED GASTRITIS TYPE: ICD-10-CM

## 2023-05-13 DIAGNOSIS — N39.0 COMPLICATED UTI (URINARY TRACT INFECTION): Primary | ICD-10-CM

## 2023-05-13 DIAGNOSIS — K56.7 ILEUS: ICD-10-CM

## 2023-05-13 LAB
ALBUMIN SERPL BCP-MCNC: 3.8 G/DL (ref 3.5–5.2)
ALP SERPL-CCNC: 110 U/L (ref 55–135)
ALT SERPL W/O P-5'-P-CCNC: 59 U/L (ref 10–44)
ANION GAP SERPL CALC-SCNC: 9 MMOL/L (ref 8–16)
ANISOCYTOSIS BLD QL SMEAR: SLIGHT
AST SERPL-CCNC: 36 U/L (ref 10–40)
BASOPHILS # BLD AUTO: 0.05 K/UL (ref 0–0.2)
BASOPHILS NFR BLD: 0.7 % (ref 0–1.9)
BILIRUB SERPL-MCNC: 0.3 MG/DL (ref 0.1–1)
BUN SERPL-MCNC: 11 MG/DL (ref 6–20)
CALCIUM SERPL-MCNC: 9 MG/DL (ref 8.7–10.5)
CHLORIDE SERPL-SCNC: 105 MMOL/L (ref 95–110)
CO2 SERPL-SCNC: 24 MMOL/L (ref 23–29)
CREAT SERPL-MCNC: 0.6 MG/DL (ref 0.5–1.4)
DIFFERENTIAL METHOD: ABNORMAL
EOSINOPHIL # BLD AUTO: 0.1 K/UL (ref 0–0.5)
EOSINOPHIL NFR BLD: 1.5 % (ref 0–8)
ERYTHROCYTE [DISTWIDTH] IN BLOOD BY AUTOMATED COUNT: 18 % (ref 11.5–14.5)
EST. GFR  (NO RACE VARIABLE): >60 ML/MIN/1.73 M^2
GIANT PLATELETS BLD QL SMEAR: PRESENT
GLUCOSE SERPL-MCNC: 95 MG/DL (ref 70–110)
HCT VFR BLD AUTO: 35.3 % (ref 40–54)
HGB BLD-MCNC: 9.8 G/DL (ref 14–18)
HYPOCHROMIA BLD QL SMEAR: ABNORMAL
IMM GRANULOCYTES # BLD AUTO: 0.02 K/UL (ref 0–0.04)
IMM GRANULOCYTES NFR BLD AUTO: 0.3 % (ref 0–0.5)
LIPASE SERPL-CCNC: 33 U/L (ref 4–60)
LYMPHOCYTES # BLD AUTO: 1.4 K/UL (ref 1–4.8)
LYMPHOCYTES NFR BLD: 18.5 % (ref 18–48)
MCH RBC QN AUTO: 21.3 PG (ref 27–31)
MCHC RBC AUTO-ENTMCNC: 27.8 G/DL (ref 32–36)
MCV RBC AUTO: 77 FL (ref 82–98)
MONOCYTES # BLD AUTO: 0.7 K/UL (ref 0.3–1)
MONOCYTES NFR BLD: 9.2 % (ref 4–15)
NEUTROPHILS # BLD AUTO: 5.3 K/UL (ref 1.8–7.7)
NEUTROPHILS NFR BLD: 69.8 % (ref 38–73)
NRBC BLD-RTO: 0 /100 WBC
PLATELET # BLD AUTO: 203 K/UL (ref 150–450)
PLATELET BLD QL SMEAR: ABNORMAL
PMV BLD AUTO: 10.5 FL (ref 9.2–12.9)
POLYCHROMASIA BLD QL SMEAR: ABNORMAL
POTASSIUM SERPL-SCNC: 3.8 MMOL/L (ref 3.5–5.1)
PROT SERPL-MCNC: 6.6 G/DL (ref 6–8.4)
RBC # BLD AUTO: 4.6 M/UL (ref 4.6–6.2)
SODIUM SERPL-SCNC: 138 MMOL/L (ref 136–145)
WBC # BLD AUTO: 7.52 K/UL (ref 3.9–12.7)

## 2023-05-13 PROCEDURE — 36415 COLL VENOUS BLD VENIPUNCTURE: CPT | Performed by: STUDENT IN AN ORGANIZED HEALTH CARE EDUCATION/TRAINING PROGRAM

## 2023-05-13 PROCEDURE — 85025 COMPLETE CBC W/AUTO DIFF WBC: CPT | Performed by: STUDENT IN AN ORGANIZED HEALTH CARE EDUCATION/TRAINING PROGRAM

## 2023-05-13 PROCEDURE — 99285 EMERGENCY DEPT VISIT HI MDM: CPT | Mod: 25

## 2023-05-13 PROCEDURE — 96375 TX/PRO/DX INJ NEW DRUG ADDON: CPT

## 2023-05-13 PROCEDURE — 80053 COMPREHEN METABOLIC PANEL: CPT | Performed by: STUDENT IN AN ORGANIZED HEALTH CARE EDUCATION/TRAINING PROGRAM

## 2023-05-13 PROCEDURE — 63600175 PHARM REV CODE 636 W HCPCS: Performed by: STUDENT IN AN ORGANIZED HEALTH CARE EDUCATION/TRAINING PROGRAM

## 2023-05-13 PROCEDURE — 96374 THER/PROPH/DIAG INJ IV PUSH: CPT

## 2023-05-13 PROCEDURE — 83690 ASSAY OF LIPASE: CPT | Performed by: STUDENT IN AN ORGANIZED HEALTH CARE EDUCATION/TRAINING PROGRAM

## 2023-05-13 RX ORDER — MORPHINE SULFATE 2 MG/ML
6 INJECTION, SOLUTION INTRAMUSCULAR; INTRAVENOUS
Status: COMPLETED | OUTPATIENT
Start: 2023-05-13 | End: 2023-05-13

## 2023-05-13 RX ORDER — ONDANSETRON 2 MG/ML
4 INJECTION INTRAMUSCULAR; INTRAVENOUS
Status: COMPLETED | OUTPATIENT
Start: 2023-05-13 | End: 2023-05-13

## 2023-05-13 RX ADMIN — MORPHINE SULFATE 6 MG: 2 INJECTION, SOLUTION INTRAMUSCULAR; INTRAVENOUS at 11:05

## 2023-05-13 RX ADMIN — ONDANSETRON HYDROCHLORIDE 4 MG: 2 SOLUTION INTRAMUSCULAR; INTRAVENOUS at 11:05

## 2023-05-14 VITALS
DIASTOLIC BLOOD PRESSURE: 53 MMHG | TEMPERATURE: 98 F | WEIGHT: 180 LBS | HEART RATE: 51 BPM | HEIGHT: 69 IN | RESPIRATION RATE: 18 BRPM | OXYGEN SATURATION: 99 % | SYSTOLIC BLOOD PRESSURE: 115 MMHG | BODY MASS INDEX: 26.66 KG/M2

## 2023-05-14 LAB
BACTERIA #/AREA URNS HPF: ABNORMAL /HPF
BILIRUB UR QL STRIP: NEGATIVE
CLARITY UR: CLEAR
COLOR UR: YELLOW
GLUCOSE UR QL STRIP: NEGATIVE
HGB UR QL STRIP: ABNORMAL
KETONES UR QL STRIP: ABNORMAL
LEUKOCYTE ESTERASE UR QL STRIP: ABNORMAL
MICROSCOPIC COMMENT: ABNORMAL
NITRITE UR QL STRIP: NEGATIVE
PH UR STRIP: 6 [PH] (ref 5–8)
PROT UR QL STRIP: NEGATIVE
RBC #/AREA URNS HPF: 57 /HPF (ref 0–4)
SP GR UR STRIP: 1.01 (ref 1–1.03)
URN SPEC COLLECT METH UR: ABNORMAL
UROBILINOGEN UR STRIP-ACNC: NEGATIVE EU/DL
WBC #/AREA URNS HPF: 6 /HPF (ref 0–5)

## 2023-05-14 PROCEDURE — 25500020 PHARM REV CODE 255: Performed by: STUDENT IN AN ORGANIZED HEALTH CARE EDUCATION/TRAINING PROGRAM

## 2023-05-14 PROCEDURE — 81000 URINALYSIS NONAUTO W/SCOPE: CPT | Performed by: STUDENT IN AN ORGANIZED HEALTH CARE EDUCATION/TRAINING PROGRAM

## 2023-05-14 RX ORDER — ESOMEPRAZOLE MAGNESIUM 40 MG/1
40 CAPSULE, DELAYED RELEASE ORAL DAILY
Qty: 30 CAPSULE | Refills: 0 | Status: SHIPPED | OUTPATIENT
Start: 2023-05-14 | End: 2023-07-18

## 2023-05-14 RX ADMIN — IOHEXOL 75 ML: 350 INJECTION, SOLUTION INTRAVENOUS at 12:05
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
## 2023-05-14 NOTE — ED TRIAGE NOTES
Patient arrived to ED per [facility] with c/o lower abdominal pain, pt reports being dx with pancreatitis and a UTI in our ED 4 days ago. States he has been taking the antibiotics but there has been no relief in pain. Denies n/v/d. Rates pain 10/10. NADN in triage.

## 2023-05-22 ENCOUNTER — PATIENT MESSAGE (OUTPATIENT)
Dept: PHYSICAL MEDICINE AND REHAB | Facility: CLINIC | Age: 38
End: 2023-05-22
Payer: MEDICAID

## 2023-05-23 ENCOUNTER — OFFICE VISIT (OUTPATIENT)
Dept: INTERNAL MEDICINE | Facility: CLINIC | Age: 38
End: 2023-05-23
Payer: MEDICAID

## 2023-05-23 VITALS
BODY MASS INDEX: 26.64 KG/M2 | HEIGHT: 69 IN | OXYGEN SATURATION: 97 % | HEART RATE: 60 BPM | SYSTOLIC BLOOD PRESSURE: 110 MMHG | DIASTOLIC BLOOD PRESSURE: 62 MMHG | WEIGHT: 179.88 LBS

## 2023-05-23 DIAGNOSIS — M79.2 NEUROPATHIC PAIN: Chronic | ICD-10-CM

## 2023-05-23 DIAGNOSIS — G82.20 PARAPLEGIA FOLLOWING SPINAL CORD INJURY: Chronic | ICD-10-CM

## 2023-05-23 DIAGNOSIS — R25.2 SPASTICITY: ICD-10-CM

## 2023-05-23 DIAGNOSIS — K29.70 GASTRITIS, PRESENCE OF BLEEDING UNSPECIFIED, UNSPECIFIED CHRONICITY, UNSPECIFIED GASTRITIS TYPE: Primary | ICD-10-CM

## 2023-05-23 DIAGNOSIS — Z93.3 COLOSTOMY IN PLACE: ICD-10-CM

## 2023-05-23 DIAGNOSIS — K59.2 NEUROGENIC BOWEL: Chronic | ICD-10-CM

## 2023-05-23 DIAGNOSIS — R10.9 ABDOMINAL PAIN, UNSPECIFIED ABDOMINAL LOCATION: ICD-10-CM

## 2023-05-23 DIAGNOSIS — N31.9 NEUROGENIC BLADDER: Chronic | ICD-10-CM

## 2023-05-23 DIAGNOSIS — Z93.3 COLOSTOMY STATUS: Chronic | ICD-10-CM

## 2023-05-23 PROCEDURE — 99999 PR PBB SHADOW E&M-EST. PATIENT-LVL V: CPT | Mod: PBBFAC,,, | Performed by: INTERNAL MEDICINE

## 2023-05-23 PROCEDURE — 1159F PR MEDICATION LIST DOCUMENTED IN MEDICAL RECORD: ICD-10-PCS | Mod: CPTII,,, | Performed by: INTERNAL MEDICINE

## 2023-05-23 PROCEDURE — 3008F BODY MASS INDEX DOCD: CPT | Mod: CPTII,,, | Performed by: INTERNAL MEDICINE

## 2023-05-23 PROCEDURE — 3078F DIAST BP <80 MM HG: CPT | Mod: CPTII,,, | Performed by: INTERNAL MEDICINE

## 2023-05-23 PROCEDURE — 99215 OFFICE O/P EST HI 40 MIN: CPT | Mod: PBBFAC | Performed by: INTERNAL MEDICINE

## 2023-05-23 PROCEDURE — 99999 PR PBB SHADOW E&M-EST. PATIENT-LVL V: ICD-10-PCS | Mod: PBBFAC,,, | Performed by: INTERNAL MEDICINE

## 2023-05-23 PROCEDURE — 99214 PR OFFICE/OUTPT VISIT, EST, LEVL IV, 30-39 MIN: ICD-10-PCS | Mod: S$PBB,,, | Performed by: INTERNAL MEDICINE

## 2023-05-23 PROCEDURE — 3008F PR BODY MASS INDEX (BMI) DOCUMENTED: ICD-10-PCS | Mod: CPTII,,, | Performed by: INTERNAL MEDICINE

## 2023-05-23 PROCEDURE — 1159F MED LIST DOCD IN RCRD: CPT | Mod: CPTII,,, | Performed by: INTERNAL MEDICINE

## 2023-05-23 PROCEDURE — 1160F RVW MEDS BY RX/DR IN RCRD: CPT | Mod: CPTII,,, | Performed by: INTERNAL MEDICINE

## 2023-05-23 PROCEDURE — 3074F SYST BP LT 130 MM HG: CPT | Mod: CPTII,,, | Performed by: INTERNAL MEDICINE

## 2023-05-23 PROCEDURE — 99214 OFFICE O/P EST MOD 30 MIN: CPT | Mod: S$PBB,,, | Performed by: INTERNAL MEDICINE

## 2023-05-23 PROCEDURE — 1160F PR REVIEW ALL MEDS BY PRESCRIBER/CLIN PHARMACIST DOCUMENTED: ICD-10-PCS | Mod: CPTII,,, | Performed by: INTERNAL MEDICINE

## 2023-05-23 PROCEDURE — 3074F PR MOST RECENT SYSTOLIC BLOOD PRESSURE < 130 MM HG: ICD-10-PCS | Mod: CPTII,,, | Performed by: INTERNAL MEDICINE

## 2023-05-23 PROCEDURE — 3078F PR MOST RECENT DIASTOLIC BLOOD PRESSURE < 80 MM HG: ICD-10-PCS | Mod: CPTII,,, | Performed by: INTERNAL MEDICINE

## 2023-05-23 RX ORDER — DIAZEPAM 10 MG/1
10 TABLET ORAL 2 TIMES DAILY PRN
Qty: 80 TABLET | Refills: 0 | Status: SHIPPED | OUTPATIENT
Start: 2023-05-23 | End: 2024-02-20 | Stop reason: SDUPTHER

## 2023-05-23 RX ORDER — OXYCODONE AND ACETAMINOPHEN 10; 325 MG/1; MG/1
1 TABLET ORAL EVERY 6 HOURS PRN
Qty: 115 TABLET | Refills: 0 | Status: SHIPPED | OUTPATIENT
Start: 2023-05-23 | End: 2023-06-30 | Stop reason: SDUPTHER

## 2023-05-23 NOTE — PROGRESS NOTES
"Subjective:       Patient ID: Nghia Edgar Jr. is a 37 y.o. male.    Chief Complaint: Follow-up  This is a 37-year-old who presents today for follow-up he was recently in the ER when he had some abdominal discomfort reports he had some epigastric pain but no vomiting he had a mild elevated lipase but CT showed some gastric thickening he was treated for gastritis with Nexium which he is taking with improvement in his symptoms he denies current symptoms today.  Continues to follow with his physical medicine doctor for chronic pain and baclofen pump.  He continues to follow with urology for his neurogenic bladder    Follow-up    Review of Systems   Constitutional:         Seated in wheelchair    Gastrointestinal:         Recent gi issues resolved   Neurological:         Spasms  Has baclofen pump      Objective:    Blood pressure 110/62, pulse 60, height 5' 9" (1.753 m), weight 81.6 kg (179 lb 14.3 oz), SpO2 97 %.   Physical Exam  Constitutional:       General: He is not in acute distress.  HENT:      Head: Normocephalic.      Mouth/Throat:      Pharynx: Oropharynx is clear.   Eyes:      General: No scleral icterus.  Cardiovascular:      Rate and Rhythm: Normal rate and regular rhythm.      Heart sounds: Normal heart sounds. No murmur heard.    No friction rub. No gallop.   Pulmonary:      Effort: Pulmonary effort is normal. No respiratory distress.      Breath sounds: Normal breath sounds.   Abdominal:      General: Bowel sounds are normal.      Palpations: Abdomen is soft. There is no mass.      Tenderness: There is no abdominal tenderness.      Comments: Baclofen pump in abdomen    Colostomy/catheter        Musculoskeletal:      Cervical back: Neck supple.   Skin:     Findings: No erythema.   Neurological:      Mental Status: He is alert.       Assessment:       1. Gastritis, presence of bleeding unspecified, unspecified chronicity, unspecified gastritis type    2. Neurogenic bladder    3. Abdominal pain, " unspecified abdominal location    4. Neurogenic bowel    5. Colostomy status    6. Paraplegia following spinal cord injury        Plan:       Nghia was seen today for follow-up.    Diagnoses and all orders for this visit:    Gastritis, presence of bleeding unspecified, unspecified chronicity, unspecified gastritis type  -     Ambulatory referral/consult to Gastroenterology; Future      Abdominal pain, unspecified abdominal location  Minimal elevated lipase patient currently and asthmatic and that return to normal episode of some thickening gastritis being treated with Nexium he will continue course and call if additional medication needed referral to GI placed for scheduling  -     Ambulatory referral/consult to Gastroenterology; Future    Neurogenic bladder  Neurogenic bowel  Colostomy status  Since previous accident he continues to follow with urology    Paraplegia following spinal cord injury  Baclofen pump in place   Continues to follow with physical medicine for pain mangment

## 2023-06-12 ENCOUNTER — PATIENT MESSAGE (OUTPATIENT)
Dept: INTERNAL MEDICINE | Facility: CLINIC | Age: 38
End: 2023-06-12
Payer: MEDICAID

## 2023-06-12 DIAGNOSIS — M79.2 NEUROPATHIC PAIN: Chronic | ICD-10-CM

## 2023-06-12 DIAGNOSIS — G82.20 PARAPLEGIA FOLLOWING SPINAL CORD INJURY: ICD-10-CM

## 2023-06-13 ENCOUNTER — PATIENT MESSAGE (OUTPATIENT)
Dept: INTERNAL MEDICINE | Facility: CLINIC | Age: 38
End: 2023-06-13
Payer: MEDICAID

## 2023-06-13 RX ORDER — OXYCODONE HCL 10 MG/1
10 TABLET, FILM COATED, EXTENDED RELEASE ORAL EVERY 12 HOURS PRN
Qty: 60 TABLET | Refills: 0 | Status: SHIPPED | OUTPATIENT
Start: 2023-06-13 | End: 2023-07-19 | Stop reason: SDUPTHER

## 2023-06-19 ENCOUNTER — PATIENT MESSAGE (OUTPATIENT)
Dept: UROLOGY | Facility: CLINIC | Age: 38
End: 2023-06-19
Payer: MEDICAID

## 2023-06-21 ENCOUNTER — OFFICE VISIT (OUTPATIENT)
Dept: UROLOGY | Facility: CLINIC | Age: 38
End: 2023-06-21
Payer: MEDICAID

## 2023-06-21 VITALS
HEART RATE: 55 BPM | DIASTOLIC BLOOD PRESSURE: 71 MMHG | SYSTOLIC BLOOD PRESSURE: 120 MMHG | HEIGHT: 69 IN | BODY MASS INDEX: 25.96 KG/M2 | WEIGHT: 175.25 LBS

## 2023-06-21 DIAGNOSIS — A49.9 BACTERIAL UTI: Primary | ICD-10-CM

## 2023-06-21 DIAGNOSIS — N39.0 BACTERIAL UTI: Primary | ICD-10-CM

## 2023-06-21 LAB
BACTERIA #/AREA URNS AUTO: ABNORMAL /HPF
MICROSCOPIC COMMENT: ABNORMAL
RBC #/AREA URNS AUTO: >100 /HPF (ref 0–4)
SQUAMOUS #/AREA URNS AUTO: 1 /HPF
WBC #/AREA URNS AUTO: 6 /HPF (ref 0–5)

## 2023-06-21 PROCEDURE — 99999 PR PBB SHADOW E&M-EST. PATIENT-LVL IV: CPT | Mod: PBBFAC,,, | Performed by: NURSE PRACTITIONER

## 2023-06-21 PROCEDURE — 1159F PR MEDICATION LIST DOCUMENTED IN MEDICAL RECORD: ICD-10-PCS | Mod: CPTII,,, | Performed by: NURSE PRACTITIONER

## 2023-06-21 PROCEDURE — 99999 PR PBB SHADOW E&M-EST. PATIENT-LVL IV: ICD-10-PCS | Mod: PBBFAC,,, | Performed by: NURSE PRACTITIONER

## 2023-06-21 PROCEDURE — 87077 CULTURE AEROBIC IDENTIFY: CPT | Performed by: NURSE PRACTITIONER

## 2023-06-21 PROCEDURE — 87086 URINE CULTURE/COLONY COUNT: CPT | Performed by: NURSE PRACTITIONER

## 2023-06-21 PROCEDURE — 3008F BODY MASS INDEX DOCD: CPT | Mod: CPTII,,, | Performed by: NURSE PRACTITIONER

## 2023-06-21 PROCEDURE — 51705 CHANGE OF BLADDER TUBE: CPT | Mod: PBBFAC | Performed by: NURSE PRACTITIONER

## 2023-06-21 PROCEDURE — 3074F PR MOST RECENT SYSTOLIC BLOOD PRESSURE < 130 MM HG: ICD-10-PCS | Mod: CPTII,,, | Performed by: NURSE PRACTITIONER

## 2023-06-21 PROCEDURE — 99214 OFFICE O/P EST MOD 30 MIN: CPT | Mod: PBBFAC | Performed by: NURSE PRACTITIONER

## 2023-06-21 PROCEDURE — 3008F PR BODY MASS INDEX (BMI) DOCUMENTED: ICD-10-PCS | Mod: CPTII,,, | Performed by: NURSE PRACTITIONER

## 2023-06-21 PROCEDURE — 1160F RVW MEDS BY RX/DR IN RCRD: CPT | Mod: CPTII,,, | Performed by: NURSE PRACTITIONER

## 2023-06-21 PROCEDURE — 3078F DIAST BP <80 MM HG: CPT | Mod: CPTII,,, | Performed by: NURSE PRACTITIONER

## 2023-06-21 PROCEDURE — 51705 PR CHANGE OF BLADDER TUBE,SIMPLE: ICD-10-PCS | Mod: S$PBB,,, | Performed by: NURSE PRACTITIONER

## 2023-06-21 PROCEDURE — 99499 NO LOS: ICD-10-PCS | Mod: S$PBB,,, | Performed by: NURSE PRACTITIONER

## 2023-06-21 PROCEDURE — 87088 URINE BACTERIA CULTURE: CPT | Performed by: NURSE PRACTITIONER

## 2023-06-21 PROCEDURE — 99499 UNLISTED E&M SERVICE: CPT | Mod: S$PBB,,, | Performed by: NURSE PRACTITIONER

## 2023-06-21 PROCEDURE — 1159F MED LIST DOCD IN RCRD: CPT | Mod: CPTII,,, | Performed by: NURSE PRACTITIONER

## 2023-06-21 PROCEDURE — 3074F SYST BP LT 130 MM HG: CPT | Mod: CPTII,,, | Performed by: NURSE PRACTITIONER

## 2023-06-21 PROCEDURE — 87186 SC STD MICRODIL/AGAR DIL: CPT | Performed by: NURSE PRACTITIONER

## 2023-06-21 PROCEDURE — 3078F PR MOST RECENT DIASTOLIC BLOOD PRESSURE < 80 MM HG: ICD-10-PCS | Mod: CPTII,,, | Performed by: NURSE PRACTITIONER

## 2023-06-21 PROCEDURE — 1160F PR REVIEW ALL MEDS BY PRESCRIBER/CLIN PHARMACIST DOCUMENTED: ICD-10-PCS | Mod: CPTII,,, | Performed by: NURSE PRACTITIONER

## 2023-06-21 PROCEDURE — 81001 URINALYSIS AUTO W/SCOPE: CPT | Performed by: NURSE PRACTITIONER

## 2023-06-21 PROCEDURE — 51705 CHANGE OF BLADDER TUBE: CPT | Mod: S$PBB,,, | Performed by: NURSE PRACTITIONER
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
## 2023-06-23 LAB — BACTERIA UR CULT: ABNORMAL

## 2023-06-23 RX ORDER — CIPROFLOXACIN 500 MG/1
500 TABLET ORAL EVERY 12 HOURS
Qty: 14 TABLET | Refills: 0 | Status: SHIPPED | OUTPATIENT
Start: 2023-06-23 | End: 2023-06-30

## 2023-06-29 ENCOUNTER — PATIENT MESSAGE (OUTPATIENT)
Dept: PHYSICAL MEDICINE AND REHAB | Facility: CLINIC | Age: 38
End: 2023-06-29
Payer: MEDICAID

## 2023-06-29 DIAGNOSIS — M79.2 NEUROPATHIC PAIN: Chronic | ICD-10-CM

## 2023-06-29 DIAGNOSIS — G82.20 PARAPLEGIA FOLLOWING SPINAL CORD INJURY: Chronic | ICD-10-CM

## 2023-06-30 ENCOUNTER — PATIENT MESSAGE (OUTPATIENT)
Dept: PHYSICAL MEDICINE AND REHAB | Facility: CLINIC | Age: 38
End: 2023-06-30
Payer: MEDICAID

## 2023-07-03 ENCOUNTER — PATIENT MESSAGE (OUTPATIENT)
Dept: PHYSICAL MEDICINE AND REHAB | Facility: CLINIC | Age: 38
End: 2023-07-03
Payer: MEDICAID

## 2023-07-03 RX ORDER — OXYCODONE AND ACETAMINOPHEN 10; 325 MG/1; MG/1
1 TABLET ORAL EVERY 6 HOURS PRN
Qty: 115 TABLET | Refills: 0 | Status: SHIPPED | OUTPATIENT
Start: 2023-07-03 | End: 2023-08-07 | Stop reason: SDUPTHER

## 2023-07-18 ENCOUNTER — LAB VISIT (OUTPATIENT)
Dept: LAB | Facility: HOSPITAL | Age: 38
End: 2023-07-18
Attending: PHYSICAL MEDICINE & REHABILITATION
Payer: MEDICAID

## 2023-07-18 ENCOUNTER — PROCEDURE VISIT (OUTPATIENT)
Dept: PHYSICAL MEDICINE AND REHAB | Facility: CLINIC | Age: 38
End: 2023-07-18
Payer: MEDICAID

## 2023-07-18 ENCOUNTER — OFFICE VISIT (OUTPATIENT)
Dept: UROLOGY | Facility: CLINIC | Age: 38
End: 2023-07-18
Payer: MEDICAID

## 2023-07-18 VITALS
HEART RATE: 53 BPM | SYSTOLIC BLOOD PRESSURE: 106 MMHG | BODY MASS INDEX: 25.96 KG/M2 | HEIGHT: 69 IN | DIASTOLIC BLOOD PRESSURE: 57 MMHG | WEIGHT: 175.25 LBS

## 2023-07-18 DIAGNOSIS — Z43.5 ENCOUNTER FOR CARE OR REPLACEMENT OF SUPRAPUBIC TUBE: ICD-10-CM

## 2023-07-18 DIAGNOSIS — N31.9 NEUROGENIC BLADDER: ICD-10-CM

## 2023-07-18 DIAGNOSIS — G82.20 PARAPLEGIA FOLLOWING SPINAL CORD INJURY: ICD-10-CM

## 2023-07-18 DIAGNOSIS — G83.9 SPASTIC PARALYSIS: Primary | ICD-10-CM

## 2023-07-18 DIAGNOSIS — N32.89 BLADDER SPASMS: Primary | ICD-10-CM

## 2023-07-18 DIAGNOSIS — R33.9 URINARY RETENTION: ICD-10-CM

## 2023-07-18 DIAGNOSIS — G82.20 PARAPLEGIA FOLLOWING SPINAL CORD INJURY: Chronic | ICD-10-CM

## 2023-07-18 PROCEDURE — 51705 CHANGE OF BLADDER TUBE: CPT | Mod: S$PBB,,, | Performed by: NURSE PRACTITIONER

## 2023-07-18 PROCEDURE — 99999 PR PBB SHADOW E&M-EST. PATIENT-LVL IV: ICD-10-PCS | Mod: PBBFAC,,, | Performed by: NURSE PRACTITIONER

## 2023-07-18 PROCEDURE — 62368 PR ANALYZE INFUSN PUMP+REPROGRAM: ICD-10-PCS | Mod: S$PBB,,, | Performed by: PHYSICAL MEDICINE & REHABILITATION

## 2023-07-18 PROCEDURE — 62368 ANALYZE SP INF PUMP W/REPROG: CPT | Mod: PBBFAC | Performed by: PHYSICAL MEDICINE & REHABILITATION

## 2023-07-18 PROCEDURE — 99499 NO LOS: ICD-10-PCS | Mod: S$PBB,,, | Performed by: NURSE PRACTITIONER

## 2023-07-18 PROCEDURE — 1159F PR MEDICATION LIST DOCUMENTED IN MEDICAL RECORD: ICD-10-PCS | Mod: CPTII,,, | Performed by: NURSE PRACTITIONER

## 2023-07-18 PROCEDURE — 3008F BODY MASS INDEX DOCD: CPT | Mod: CPTII,,, | Performed by: NURSE PRACTITIONER

## 2023-07-18 PROCEDURE — 99999 PR PBB SHADOW E&M-EST. PATIENT-LVL IV: CPT | Mod: PBBFAC,,, | Performed by: NURSE PRACTITIONER

## 2023-07-18 PROCEDURE — 3078F DIAST BP <80 MM HG: CPT | Mod: CPTII,,, | Performed by: NURSE PRACTITIONER

## 2023-07-18 PROCEDURE — 1160F PR REVIEW ALL MEDS BY PRESCRIBER/CLIN PHARMACIST DOCUMENTED: ICD-10-PCS | Mod: CPTII,,, | Performed by: NURSE PRACTITIONER

## 2023-07-18 PROCEDURE — 87086 URINE CULTURE/COLONY COUNT: CPT | Performed by: NURSE PRACTITIONER

## 2023-07-18 PROCEDURE — 99214 OFFICE O/P EST MOD 30 MIN: CPT | Mod: PBBFAC | Performed by: NURSE PRACTITIONER

## 2023-07-18 PROCEDURE — 51705 PR CHANGE OF BLADDER TUBE,SIMPLE: ICD-10-PCS | Mod: S$PBB,,, | Performed by: NURSE PRACTITIONER

## 2023-07-18 PROCEDURE — 3008F PR BODY MASS INDEX (BMI) DOCUMENTED: ICD-10-PCS | Mod: CPTII,,, | Performed by: NURSE PRACTITIONER

## 2023-07-18 PROCEDURE — 3078F PR MOST RECENT DIASTOLIC BLOOD PRESSURE < 80 MM HG: ICD-10-PCS | Mod: CPTII,,, | Performed by: NURSE PRACTITIONER

## 2023-07-18 PROCEDURE — 62368 ANALYZE SP INF PUMP W/REPROG: CPT | Mod: S$PBB,,, | Performed by: PHYSICAL MEDICINE & REHABILITATION

## 2023-07-18 PROCEDURE — 51705 CHANGE OF BLADDER TUBE: CPT | Mod: PBBFAC | Performed by: NURSE PRACTITIONER

## 2023-07-18 PROCEDURE — 80326 AMPHETAMINES 5 OR MORE: CPT | Performed by: PHYSICAL MEDICINE & REHABILITATION

## 2023-07-18 PROCEDURE — 99499 UNLISTED E&M SERVICE: CPT | Mod: S$PBB,,, | Performed by: NURSE PRACTITIONER

## 2023-07-18 PROCEDURE — 1159F MED LIST DOCD IN RCRD: CPT | Mod: CPTII,,, | Performed by: NURSE PRACTITIONER

## 2023-07-18 PROCEDURE — 3074F PR MOST RECENT SYSTOLIC BLOOD PRESSURE < 130 MM HG: ICD-10-PCS | Mod: CPTII,,, | Performed by: NURSE PRACTITIONER

## 2023-07-18 PROCEDURE — 1160F RVW MEDS BY RX/DR IN RCRD: CPT | Mod: CPTII,,, | Performed by: NURSE PRACTITIONER

## 2023-07-18 PROCEDURE — 3074F SYST BP LT 130 MM HG: CPT | Mod: CPTII,,, | Performed by: NURSE PRACTITIONER

## 2023-07-18 RX ADMIN — BACLOFEN 80 MG: 40 INJECTION INTRATHECAL at 03:07
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
## 2023-07-19 ENCOUNTER — PATIENT MESSAGE (OUTPATIENT)
Dept: PHYSICAL MEDICINE AND REHAB | Facility: CLINIC | Age: 38
End: 2023-07-19
Payer: MEDICAID

## 2023-07-19 DIAGNOSIS — G82.20 PARAPLEGIA FOLLOWING SPINAL CORD INJURY: ICD-10-CM

## 2023-07-19 DIAGNOSIS — M79.2 NEUROPATHIC PAIN: Chronic | ICD-10-CM

## 2023-07-19 RX ORDER — OXYCODONE HCL 10 MG/1
10 TABLET, FILM COATED, EXTENDED RELEASE ORAL EVERY 12 HOURS PRN
Qty: 60 TABLET | Refills: 0 | Status: SHIPPED | OUTPATIENT
Start: 2023-07-19 | End: 2023-08-18 | Stop reason: SDUPTHER

## 2023-07-23 LAB
6MAM UR QL: NOT DETECTED
7AMINOCLONAZEPAM UR QL: NOT DETECTED
A-OH ALPRAZ UR QL: NOT DETECTED
ALPHA-OH-MIDAZOLAM: NOT DETECTED
ALPRAZ UR QL: NOT DETECTED
AMPHET UR QL SCN: NOT DETECTED
ANNOTATION COMMENT IMP: NORMAL
ANNOTATION COMMENT IMP: NORMAL
BARBITURATES UR QL: NOT DETECTED
BUPRENORPHINE UR QL: NOT DETECTED
BZE UR QL: NOT DETECTED
CARBOXYTHC UR QL: NOT DETECTED
CARISOPRODOL UR QL: NOT DETECTED
CLONAZEPAM UR QL: NOT DETECTED
CODEINE UR QL: NOT DETECTED
CREAT UR-MCNC: 267.5 MG/DL (ref 20–400)
DIAZEPAM UR QL: PRESENT
ETHYL GLUCURONIDE UR QL: NOT DETECTED
FENTANYL UR QL: NOT DETECTED
GABAPENTIN: NOT DETECTED
HYDROCODONE UR QL: NOT DETECTED
HYDROMORPHONE UR QL: NOT DETECTED
LORAZEPAM UR QL: NOT DETECTED
MDA UR QL: NOT DETECTED
MDEA UR QL: NOT DETECTED
MDMA UR QL: NOT DETECTED
ME-PHENIDATE UR QL: NOT DETECTED
METHADONE UR QL: NOT DETECTED
METHAMPHET UR QL: NOT DETECTED
MIDAZOLAM UR QL SCN: NOT DETECTED
MORPHINE UR QL: NOT DETECTED
NALOXONE: NOT DETECTED
NORBUPRENORPHINE UR QL CFM: NOT DETECTED
NORDIAZEPAM UR QL: NOT DETECTED
NORFENTANYL UR QL: NOT DETECTED
NORHYDROCODONE UR QL CFM: NOT DETECTED
NORMEPERIDINE UR QL CFM: NOT DETECTED
NOROXYCODONE UR QL CFM: NOT DETECTED
NOROXYMORPHONE UR QL SCN: NOT DETECTED
OXAZEPAM UR QL: NOT DETECTED
OXYCODONE UR QL: PRESENT
OXYMORPHONE UR QL: NOT DETECTED
PATHOLOGY STUDY: NORMAL
PCP UR QL: NOT DETECTED
PHENTERMINE UR QL: NOT DETECTED
PREGABALIN: NOT DETECTED
SERVICE CMNT-IMP: NORMAL
TAPENTADOL UR QL SCN: NOT DETECTED
TAPENTADOL UR QL SCN: NOT DETECTED
TEMAZEPAM UR QL: NOT DETECTED
TRAMADOL UR QL: NOT DETECTED
ZOLPIDEM METABOLITE: NOT DETECTED
ZOLPIDEM UR QL: NOT DETECTED

## 2023-07-24 ENCOUNTER — TELEPHONE (OUTPATIENT)
Dept: UROLOGY | Facility: CLINIC | Age: 38
End: 2023-07-24
Payer: MEDICAID
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
## 2023-07-28 ENCOUNTER — HOSPITAL ENCOUNTER (EMERGENCY)
Facility: HOSPITAL | Age: 38
Discharge: HOME | End: 2023-07-28
Attending: SURGERY
Payer: MEDICAID

## 2023-07-28 VITALS
BODY MASS INDEX: 25.92 KG/M2 | WEIGHT: 175 LBS | HEIGHT: 69 IN | RESPIRATION RATE: 17 BRPM | DIASTOLIC BLOOD PRESSURE: 52 MMHG | OXYGEN SATURATION: 100 % | SYSTOLIC BLOOD PRESSURE: 91 MMHG | HEART RATE: 65 BPM | TEMPERATURE: 98 F

## 2023-07-28 DIAGNOSIS — Z97.8 CHRONIC INDWELLING FOLEY CATHETER: ICD-10-CM

## 2023-07-28 DIAGNOSIS — R31.9 URINARY TRACT INFECTION WITH HEMATURIA, SITE UNSPECIFIED: Primary | ICD-10-CM

## 2023-07-28 DIAGNOSIS — N39.0 URINARY TRACT INFECTION WITH HEMATURIA, SITE UNSPECIFIED: Primary | ICD-10-CM

## 2023-07-28 LAB
BACTERIA #/AREA URNS HPF: ABNORMAL /HPF
BILIRUB UR QL STRIP: NEGATIVE
CLARITY UR: CLEAR
COLOR UR: YELLOW
GLUCOSE UR QL STRIP: NEGATIVE
HGB UR QL STRIP: ABNORMAL
HYALINE CASTS #/AREA URNS LPF: 0 /LPF
KETONES UR QL STRIP: NEGATIVE
LEUKOCYTE ESTERASE UR QL STRIP: ABNORMAL
MICROSCOPIC COMMENT: ABNORMAL
NITRITE UR QL STRIP: NEGATIVE
PH UR STRIP: 7 [PH] (ref 5–8)
PROT UR QL STRIP: ABNORMAL
RBC #/AREA URNS HPF: 6 /HPF (ref 0–4)
SP GR UR STRIP: 1.02 (ref 1–1.03)
SQUAMOUS #/AREA URNS HPF: 1 /HPF
URN SPEC COLLECT METH UR: ABNORMAL
UROBILINOGEN UR STRIP-ACNC: 1 EU/DL
WBC #/AREA URNS HPF: 80 /HPF (ref 0–5)

## 2023-07-28 PROCEDURE — 96372 THER/PROPH/DIAG INJ SC/IM: CPT | Performed by: EMERGENCY MEDICINE

## 2023-07-28 PROCEDURE — 87086 URINE CULTURE/COLONY COUNT: CPT | Performed by: EMERGENCY MEDICINE

## 2023-07-28 PROCEDURE — 63600175 PHARM REV CODE 636 W HCPCS: Performed by: EMERGENCY MEDICINE

## 2023-07-28 PROCEDURE — 99284 EMERGENCY DEPT VISIT MOD MDM: CPT

## 2023-07-28 PROCEDURE — 81000 URINALYSIS NONAUTO W/SCOPE: CPT | Performed by: EMERGENCY MEDICINE

## 2023-07-28 RX ORDER — CIPROFLOXACIN 500 MG/1
500 TABLET ORAL 2 TIMES DAILY
Qty: 14 TABLET | Refills: 0 | Status: SHIPPED | OUTPATIENT
Start: 2023-07-28 | End: 2023-08-04

## 2023-07-28 RX ORDER — CEFTRIAXONE 1 G/1
1 INJECTION, POWDER, FOR SOLUTION INTRAMUSCULAR; INTRAVENOUS
Status: COMPLETED | OUTPATIENT
Start: 2023-07-28 | End: 2023-07-28

## 2023-07-28 RX ADMIN — CEFTRIAXONE SODIUM 1 G: 1 INJECTION, POWDER, FOR SOLUTION INTRAMUSCULAR; INTRAVENOUS at 07:07
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
## 2023-07-30 LAB
BACTERIA UR CULT: NORMAL
BACTERIA UR CULT: NORMAL

## 2023-08-04 ENCOUNTER — PATIENT MESSAGE (OUTPATIENT)
Dept: PHYSICAL MEDICINE AND REHAB | Facility: CLINIC | Age: 38
End: 2023-08-04
Payer: MEDICAID

## 2023-08-07 DIAGNOSIS — M79.2 NEUROPATHIC PAIN: Chronic | ICD-10-CM

## 2023-08-07 DIAGNOSIS — G82.20 PARAPLEGIA FOLLOWING SPINAL CORD INJURY: Chronic | ICD-10-CM

## 2023-08-08 RX ORDER — OXYCODONE AND ACETAMINOPHEN 10; 325 MG/1; MG/1
1 TABLET ORAL EVERY 6 HOURS PRN
Qty: 115 TABLET | Refills: 0 | Status: SHIPPED | OUTPATIENT
Start: 2023-08-08 | End: 2023-09-11 | Stop reason: SDUPTHER

## 2023-08-15 ENCOUNTER — OFFICE VISIT (OUTPATIENT)
Dept: UROLOGY | Facility: CLINIC | Age: 38
End: 2023-08-15
Payer: MEDICAID

## 2023-08-15 ENCOUNTER — PATIENT MESSAGE (OUTPATIENT)
Dept: UROLOGY | Facility: CLINIC | Age: 38
End: 2023-08-15

## 2023-08-15 VITALS
HEIGHT: 69 IN | HEART RATE: 74 BPM | DIASTOLIC BLOOD PRESSURE: 64 MMHG | SYSTOLIC BLOOD PRESSURE: 113 MMHG | BODY MASS INDEX: 25.92 KG/M2 | WEIGHT: 175 LBS

## 2023-08-15 DIAGNOSIS — R33.9 URINARY RETENTION: ICD-10-CM

## 2023-08-15 DIAGNOSIS — Z93.59 CHRONIC SUPRAPUBIC CATHETER: ICD-10-CM

## 2023-08-15 DIAGNOSIS — N52.9 ED (ERECTILE DYSFUNCTION) OF ORGANIC ORIGIN: ICD-10-CM

## 2023-08-15 DIAGNOSIS — G82.50 TETRAPLEGIC: ICD-10-CM

## 2023-08-15 DIAGNOSIS — N32.89 BLADDER SPASMS: ICD-10-CM

## 2023-08-15 DIAGNOSIS — Z43.5 ENCOUNTER FOR CARE OR REPLACEMENT OF SUPRAPUBIC TUBE: ICD-10-CM

## 2023-08-15 DIAGNOSIS — N31.9 NEUROGENIC BLADDER: Primary | ICD-10-CM

## 2023-08-15 PROCEDURE — 3074F PR MOST RECENT SYSTOLIC BLOOD PRESSURE < 130 MM HG: ICD-10-PCS | Mod: CPTII,,, | Performed by: NURSE PRACTITIONER

## 2023-08-15 PROCEDURE — 99999 PR PBB SHADOW E&M-EST. PATIENT-LVL IV: CPT | Mod: PBBFAC,,, | Performed by: NURSE PRACTITIONER

## 2023-08-15 PROCEDURE — 51705 PR CHANGE OF BLADDER TUBE,SIMPLE: ICD-10-PCS | Mod: S$PBB,,, | Performed by: NURSE PRACTITIONER

## 2023-08-15 PROCEDURE — 99999 PR PBB SHADOW E&M-EST. PATIENT-LVL IV: ICD-10-PCS | Mod: PBBFAC,,, | Performed by: NURSE PRACTITIONER

## 2023-08-15 PROCEDURE — 51705 CHANGE OF BLADDER TUBE: CPT | Mod: PBBFAC | Performed by: NURSE PRACTITIONER

## 2023-08-15 PROCEDURE — 3008F BODY MASS INDEX DOCD: CPT | Mod: CPTII,,, | Performed by: NURSE PRACTITIONER

## 2023-08-15 PROCEDURE — 3078F PR MOST RECENT DIASTOLIC BLOOD PRESSURE < 80 MM HG: ICD-10-PCS | Mod: CPTII,,, | Performed by: NURSE PRACTITIONER

## 2023-08-15 PROCEDURE — 51705 CHANGE OF BLADDER TUBE: CPT | Mod: S$PBB,,, | Performed by: NURSE PRACTITIONER

## 2023-08-15 PROCEDURE — 99499 NO LOS: ICD-10-PCS | Mod: S$PBB,,, | Performed by: NURSE PRACTITIONER

## 2023-08-15 PROCEDURE — 1160F PR REVIEW ALL MEDS BY PRESCRIBER/CLIN PHARMACIST DOCUMENTED: ICD-10-PCS | Mod: CPTII,,, | Performed by: NURSE PRACTITIONER

## 2023-08-15 PROCEDURE — 1160F RVW MEDS BY RX/DR IN RCRD: CPT | Mod: CPTII,,, | Performed by: NURSE PRACTITIONER

## 2023-08-15 PROCEDURE — 1159F PR MEDICATION LIST DOCUMENTED IN MEDICAL RECORD: ICD-10-PCS | Mod: CPTII,,, | Performed by: NURSE PRACTITIONER

## 2023-08-15 PROCEDURE — 99214 OFFICE O/P EST MOD 30 MIN: CPT | Mod: PBBFAC | Performed by: NURSE PRACTITIONER

## 2023-08-15 PROCEDURE — 3078F DIAST BP <80 MM HG: CPT | Mod: CPTII,,, | Performed by: NURSE PRACTITIONER

## 2023-08-15 PROCEDURE — 99499 UNLISTED E&M SERVICE: CPT | Mod: S$PBB,,, | Performed by: NURSE PRACTITIONER

## 2023-08-15 PROCEDURE — 1159F MED LIST DOCD IN RCRD: CPT | Mod: CPTII,,, | Performed by: NURSE PRACTITIONER

## 2023-08-15 PROCEDURE — 3008F PR BODY MASS INDEX (BMI) DOCUMENTED: ICD-10-PCS | Mod: CPTII,,, | Performed by: NURSE PRACTITIONER

## 2023-08-15 PROCEDURE — 3074F SYST BP LT 130 MM HG: CPT | Mod: CPTII,,, | Performed by: NURSE PRACTITIONER

## 2023-08-15 RX ORDER — TADALAFIL 20 MG/1
20 TABLET ORAL DAILY PRN
Qty: 10 TABLET | Refills: 12 | Status: SHIPPED | OUTPATIENT
Start: 2023-08-15 | End: 2024-08-14
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
## 2023-08-18 DIAGNOSIS — G82.20 PARAPLEGIA FOLLOWING SPINAL CORD INJURY: ICD-10-CM

## 2023-08-18 DIAGNOSIS — M79.2 NEUROPATHIC PAIN: Chronic | ICD-10-CM

## 2023-08-22 ENCOUNTER — PATIENT MESSAGE (OUTPATIENT)
Dept: PHYSICAL MEDICINE AND REHAB | Facility: CLINIC | Age: 38
End: 2023-08-22
Payer: MEDICAID

## 2023-08-22 RX ORDER — OXYCODONE HCL 10 MG/1
10 TABLET, FILM COATED, EXTENDED RELEASE ORAL EVERY 12 HOURS PRN
Qty: 60 TABLET | Refills: 0 | Status: SHIPPED | OUTPATIENT
Start: 2023-08-22 | End: 2023-09-21

## 2023-08-22 RX ORDER — OXYCODONE HCL 10 MG/1
10 TABLET, FILM COATED, EXTENDED RELEASE ORAL EVERY 12 HOURS
Qty: 60 TABLET | Refills: 0 | Status: SHIPPED | OUTPATIENT
Start: 2023-09-22 | End: 2023-11-14 | Stop reason: SDUPTHER

## 2023-08-23 RX ORDER — PROMETHAZINE HYDROCHLORIDE 6.25 MG/5ML
SYRUP ORAL
Qty: 150 ML | Refills: 2 | Status: SHIPPED | OUTPATIENT
Start: 2023-08-23 | End: 2024-01-08

## 2023-08-23 NOTE — TELEPHONE ENCOUNTER
No care due was identified.  [facility] Embedded Care Due Messages. Reference number: [id number].   8/23/2023 8:51:23 AM CDT

## 2023-08-24 ENCOUNTER — PATIENT MESSAGE (OUTPATIENT)
Dept: UROLOGY | Facility: CLINIC | Age: 38
End: 2023-08-24
Payer: MEDICAID

## 2023-08-24 DIAGNOSIS — R39.9 UTI SYMPTOMS: Primary | ICD-10-CM

## 2023-08-25 RX ORDER — NITROFURANTOIN 25; 75 MG/1; MG/1
100 CAPSULE ORAL 2 TIMES DAILY
Qty: 20 CAPSULE | Refills: 0 | Status: SHIPPED | OUTPATIENT
Start: 2023-08-25 | End: 2023-09-04

## 2023-08-28 ENCOUNTER — HOSPITAL ENCOUNTER (EMERGENCY)
Facility: HOSPITAL | Age: 38
Discharge: HOME | End: 2023-08-28
Attending: EMERGENCY MEDICINE
Payer: MEDICAID

## 2023-08-28 VITALS
BODY MASS INDEX: 26.66 KG/M2 | OXYGEN SATURATION: 99 % | SYSTOLIC BLOOD PRESSURE: 109 MMHG | DIASTOLIC BLOOD PRESSURE: 57 MMHG | HEIGHT: 69 IN | HEART RATE: 56 BPM | WEIGHT: 180 LBS | RESPIRATION RATE: 18 BRPM | TEMPERATURE: 99 F

## 2023-08-28 DIAGNOSIS — R10.9 ABDOMINAL PAIN: ICD-10-CM

## 2023-08-28 DIAGNOSIS — K59.00 CONSTIPATION, UNSPECIFIED CONSTIPATION TYPE: Primary | ICD-10-CM

## 2023-08-28 LAB
ALBUMIN SERPL BCP-MCNC: 3.9 G/DL (ref 3.5–5.2)
ALP SERPL-CCNC: 113 U/L (ref 55–135)
ALT SERPL W/O P-5'-P-CCNC: 16 U/L (ref 10–44)
ANION GAP SERPL CALC-SCNC: 9 MMOL/L (ref 8–16)
AST SERPL-CCNC: 14 U/L (ref 10–40)
BASOPHILS # BLD AUTO: 0.05 K/UL (ref 0–0.2)
BASOPHILS NFR BLD: 0.8 % (ref 0–1.9)
BILIRUB SERPL-MCNC: 0.4 MG/DL (ref 0.1–1)
BILIRUB UR QL STRIP: NEGATIVE
BUN SERPL-MCNC: 8 MG/DL (ref 6–20)
CALCIUM SERPL-MCNC: 8.9 MG/DL (ref 8.7–10.5)
CHLORIDE SERPL-SCNC: 106 MMOL/L (ref 95–110)
CLARITY UR: CLEAR
CO2 SERPL-SCNC: 24 MMOL/L (ref 23–29)
COLOR UR: YELLOW
CREAT SERPL-MCNC: 0.6 MG/DL (ref 0.5–1.4)
DIFFERENTIAL METHOD: ABNORMAL
EOSINOPHIL # BLD AUTO: 0.1 K/UL (ref 0–0.5)
EOSINOPHIL NFR BLD: 1.2 % (ref 0–8)
ERYTHROCYTE [DISTWIDTH] IN BLOOD BY AUTOMATED COUNT: 17.8 % (ref 11.5–14.5)
EST. GFR  (NO RACE VARIABLE): >60 ML/MIN/1.73 M^2
GLUCOSE SERPL-MCNC: 105 MG/DL (ref 70–110)
GLUCOSE UR QL STRIP: ABNORMAL
HCT VFR BLD AUTO: 32.3 % (ref 40–54)
HGB BLD-MCNC: 9 G/DL (ref 14–18)
HGB UR QL STRIP: NEGATIVE
IMM GRANULOCYTES # BLD AUTO: 0.02 K/UL (ref 0–0.04)
IMM GRANULOCYTES NFR BLD AUTO: 0.3 % (ref 0–0.5)
KETONES UR QL STRIP: NEGATIVE
LEUKOCYTE ESTERASE UR QL STRIP: NEGATIVE
LIPASE SERPL-CCNC: 19 U/L (ref 4–60)
LYMPHOCYTES # BLD AUTO: 1.3 K/UL (ref 1–4.8)
LYMPHOCYTES NFR BLD: 19.5 % (ref 18–48)
MCH RBC QN AUTO: 20.4 PG (ref 27–31)
MCHC RBC AUTO-ENTMCNC: 27.9 G/DL (ref 32–36)
MCV RBC AUTO: 73 FL (ref 82–98)
MONOCYTES # BLD AUTO: 0.6 K/UL (ref 0.3–1)
MONOCYTES NFR BLD: 9.1 % (ref 4–15)
NEUTROPHILS # BLD AUTO: 4.5 K/UL (ref 1.8–7.7)
NEUTROPHILS NFR BLD: 69.1 % (ref 38–73)
NITRITE UR QL STRIP: NEGATIVE
NRBC BLD-RTO: 0 /100 WBC
PH UR STRIP: 6 [PH] (ref 5–8)
PLATELET # BLD AUTO: 249 K/UL (ref 150–450)
PMV BLD AUTO: 11.1 FL (ref 9.2–12.9)
POTASSIUM SERPL-SCNC: 4 MMOL/L (ref 3.5–5.1)
PROT SERPL-MCNC: 6.9 G/DL (ref 6–8.4)
PROT UR QL STRIP: NEGATIVE
RBC # BLD AUTO: 4.41 M/UL (ref 4.6–6.2)
SODIUM SERPL-SCNC: 139 MMOL/L (ref 136–145)
SP GR UR STRIP: >=1.03 (ref 1–1.03)
URN SPEC COLLECT METH UR: ABNORMAL
UROBILINOGEN UR STRIP-ACNC: NEGATIVE EU/DL
WBC # BLD AUTO: 6.47 K/UL (ref 3.9–12.7)

## 2023-08-28 PROCEDURE — 36415 COLL VENOUS BLD VENIPUNCTURE: CPT | Performed by: NURSE PRACTITIONER

## 2023-08-28 PROCEDURE — 85025 COMPLETE CBC W/AUTO DIFF WBC: CPT | Performed by: NURSE PRACTITIONER

## 2023-08-28 PROCEDURE — 99285 EMERGENCY DEPT VISIT HI MDM: CPT | Mod: 25

## 2023-08-28 PROCEDURE — 80053 COMPREHEN METABOLIC PANEL: CPT | Performed by: NURSE PRACTITIONER

## 2023-08-28 PROCEDURE — 25500020 PHARM REV CODE 255: Performed by: EMERGENCY MEDICINE

## 2023-08-28 PROCEDURE — 81003 URINALYSIS AUTO W/O SCOPE: CPT | Performed by: NURSE PRACTITIONER

## 2023-08-28 PROCEDURE — 83690 ASSAY OF LIPASE: CPT | Performed by: NURSE PRACTITIONER

## 2023-08-28 RX ORDER — PANTOPRAZOLE SODIUM 40 MG/1
40 TABLET, DELAYED RELEASE ORAL DAILY
Qty: 30 TABLET | Refills: 11 | Status: SHIPPED | OUTPATIENT
Start: 2023-08-28 | End: 2024-08-27

## 2023-08-28 RX ADMIN — IOHEXOL 100 ML: 350 INJECTION, SOLUTION INTRAVENOUS at 04:08

## 2023-08-28 NOTE — DISCHARGE INSTRUCTIONS
Please increase your fluid intake and take medications as prescribed  May take one dose of miralax for constipation

## 2023-08-29 ENCOUNTER — PATIENT MESSAGE (OUTPATIENT)
Dept: INTERNAL MEDICINE | Facility: CLINIC | Age: 38
End: 2023-08-29
Payer: MEDICAID
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
## 2023-09-08 ENCOUNTER — OFFICE VISIT (OUTPATIENT)
Dept: INTERNAL MEDICINE | Facility: CLINIC | Age: 38
End: 2023-09-08
Payer: MEDICAID

## 2023-09-08 VITALS — DIASTOLIC BLOOD PRESSURE: 50 MMHG | HEART RATE: 60 BPM | SYSTOLIC BLOOD PRESSURE: 100 MMHG | OXYGEN SATURATION: 97 %

## 2023-09-08 DIAGNOSIS — R10.9 ABDOMINAL PAIN, UNSPECIFIED ABDOMINAL LOCATION: Primary | ICD-10-CM

## 2023-09-08 DIAGNOSIS — K59.00 CONSTIPATION, UNSPECIFIED CONSTIPATION TYPE: ICD-10-CM

## 2023-09-08 PROCEDURE — 3074F PR MOST RECENT SYSTOLIC BLOOD PRESSURE < 130 MM HG: ICD-10-PCS | Mod: CPTII,,,

## 2023-09-08 PROCEDURE — 99214 PR OFFICE/OUTPT VISIT, EST, LEVL IV, 30-39 MIN: ICD-10-PCS | Mod: S$PBB,,,

## 2023-09-08 PROCEDURE — 99999 PR PBB SHADOW E&M-EST. PATIENT-LVL III: ICD-10-PCS | Mod: PBBFAC,,,

## 2023-09-08 PROCEDURE — 3078F PR MOST RECENT DIASTOLIC BLOOD PRESSURE < 80 MM HG: ICD-10-PCS | Mod: CPTII,,,

## 2023-09-08 PROCEDURE — 99214 OFFICE O/P EST MOD 30 MIN: CPT | Mod: S$PBB,,,

## 2023-09-08 PROCEDURE — 3074F SYST BP LT 130 MM HG: CPT | Mod: CPTII,,,

## 2023-09-08 PROCEDURE — 99999 PR PBB SHADOW E&M-EST. PATIENT-LVL III: CPT | Mod: PBBFAC,,,

## 2023-09-08 PROCEDURE — 3078F DIAST BP <80 MM HG: CPT | Mod: CPTII,,,

## 2023-09-08 PROCEDURE — 99213 OFFICE O/P EST LOW 20 MIN: CPT | Mod: PBBFAC

## 2023-09-08 RX ORDER — AMOXICILLIN 250 MG
1 CAPSULE ORAL DAILY
Qty: 30 TABLET | Refills: 1 | Status: SHIPPED | OUTPATIENT
Start: 2023-09-08
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
## 2023-09-11 ENCOUNTER — PATIENT MESSAGE (OUTPATIENT)
Dept: PHYSICAL MEDICINE AND REHAB | Facility: CLINIC | Age: 38
End: 2023-09-11
Payer: MEDICAID

## 2023-09-11 DIAGNOSIS — G82.20 PARAPLEGIA FOLLOWING SPINAL CORD INJURY: Chronic | ICD-10-CM

## 2023-09-11 DIAGNOSIS — M79.2 NEUROPATHIC PAIN: Chronic | ICD-10-CM

## 2023-09-11 RX ORDER — OXYCODONE AND ACETAMINOPHEN 10; 325 MG/1; MG/1
1 TABLET ORAL EVERY 8 HOURS PRN
Qty: 110 TABLET | Refills: 0 | Status: SHIPPED | OUTPATIENT
Start: 2023-09-11 | End: 2023-10-11 | Stop reason: SDUPTHER

## 2023-09-12 ENCOUNTER — PATIENT MESSAGE (OUTPATIENT)
Dept: PHYSICAL MEDICINE AND REHAB | Facility: CLINIC | Age: 38
End: 2023-09-12
Payer: MEDICAID

## 2023-09-15 ENCOUNTER — PATIENT MESSAGE (OUTPATIENT)
Dept: PHYSICAL MEDICINE AND REHAB | Facility: CLINIC | Age: 38
End: 2023-09-15
Payer: MEDICAID

## 2023-09-22 DIAGNOSIS — G82.20 PARAPLEGIA FOLLOWING SPINAL CORD INJURY: ICD-10-CM

## 2023-09-22 DIAGNOSIS — M79.2 NEUROPATHIC PAIN: Chronic | ICD-10-CM

## 2023-09-22 RX ORDER — OXYCODONE HCL 10 MG/1
10 TABLET, FILM COATED, EXTENDED RELEASE ORAL EVERY 12 HOURS PRN
Qty: 60 TABLET | Refills: 0 | Status: CANCELLED | OUTPATIENT
Start: 2023-09-22 | End: 2023-10-22

## 2023-09-26 ENCOUNTER — PATIENT MESSAGE (OUTPATIENT)
Dept: PHYSICAL MEDICINE AND REHAB | Facility: CLINIC | Age: 38
End: 2023-09-26
Payer: MEDICAID

## 2023-09-28 ENCOUNTER — PATIENT MESSAGE (OUTPATIENT)
Dept: UROLOGY | Facility: CLINIC | Age: 38
End: 2023-09-28
Payer: MEDICAID

## 2023-10-02 ENCOUNTER — OFFICE VISIT (OUTPATIENT)
Dept: UROLOGY | Facility: CLINIC | Age: 38
End: 2023-10-02
Payer: MEDICAID

## 2023-10-02 VITALS
HEART RATE: 60 BPM | DIASTOLIC BLOOD PRESSURE: 76 MMHG | HEIGHT: 69 IN | WEIGHT: 180 LBS | SYSTOLIC BLOOD PRESSURE: 92 MMHG | BODY MASS INDEX: 26.66 KG/M2

## 2023-10-02 DIAGNOSIS — G82.50 TETRAPLEGIC: ICD-10-CM

## 2023-10-02 DIAGNOSIS — N31.9 NEUROGENIC BLADDER: Primary | ICD-10-CM

## 2023-10-02 DIAGNOSIS — Z43.5 ENCOUNTER FOR CARE OR REPLACEMENT OF SUPRAPUBIC TUBE: ICD-10-CM

## 2023-10-02 DIAGNOSIS — R33.9 URINARY RETENTION: ICD-10-CM

## 2023-10-02 DIAGNOSIS — N32.89 BLADDER SPASMS: ICD-10-CM

## 2023-10-02 DIAGNOSIS — Z93.59 CHRONIC SUPRAPUBIC CATHETER: ICD-10-CM

## 2023-10-02 LAB
AMORPH CRY UR QL COMP ASSIST: ABNORMAL
BACTERIA #/AREA URNS AUTO: ABNORMAL /HPF
BILIRUB UR QL STRIP: NEGATIVE
CLARITY UR REFRACT.AUTO: ABNORMAL
COLOR UR AUTO: ABNORMAL
GLUCOSE UR QL STRIP: NEGATIVE
HGB UR QL STRIP: ABNORMAL
HYALINE CASTS UR QL AUTO: 0 /LPF
KETONES UR QL STRIP: NEGATIVE
LEUKOCYTE ESTERASE UR QL STRIP: ABNORMAL
MICROSCOPIC COMMENT: ABNORMAL
NITRITE UR QL STRIP: NEGATIVE
PH UR STRIP: 6 [PH] (ref 5–8)
PROT UR QL STRIP: ABNORMAL
RBC #/AREA URNS AUTO: 5 /HPF (ref 0–4)
SP GR UR STRIP: 1 (ref 1–1.03)
URN SPEC COLLECT METH UR: ABNORMAL
WBC #/AREA URNS AUTO: 49 /HPF (ref 0–5)

## 2023-10-02 PROCEDURE — 1160F PR REVIEW ALL MEDS BY PRESCRIBER/CLIN PHARMACIST DOCUMENTED: ICD-10-PCS | Mod: CPTII,,, | Performed by: NURSE PRACTITIONER

## 2023-10-02 PROCEDURE — 3078F DIAST BP <80 MM HG: CPT | Mod: CPTII,,, | Performed by: NURSE PRACTITIONER

## 2023-10-02 PROCEDURE — 1159F PR MEDICATION LIST DOCUMENTED IN MEDICAL RECORD: ICD-10-PCS | Mod: CPTII,,, | Performed by: NURSE PRACTITIONER

## 2023-10-02 PROCEDURE — 81001 URINALYSIS AUTO W/SCOPE: CPT | Performed by: NURSE PRACTITIONER

## 2023-10-02 PROCEDURE — 99499 NO LOS: ICD-10-PCS | Mod: S$PBB,,, | Performed by: NURSE PRACTITIONER

## 2023-10-02 PROCEDURE — 99214 OFFICE O/P EST MOD 30 MIN: CPT | Mod: PBBFAC,25 | Performed by: NURSE PRACTITIONER

## 2023-10-02 PROCEDURE — 3008F BODY MASS INDEX DOCD: CPT | Mod: CPTII,,, | Performed by: NURSE PRACTITIONER

## 2023-10-02 PROCEDURE — 3008F PR BODY MASS INDEX (BMI) DOCUMENTED: ICD-10-PCS | Mod: CPTII,,, | Performed by: NURSE PRACTITIONER

## 2023-10-02 PROCEDURE — 99499 UNLISTED E&M SERVICE: CPT | Mod: S$PBB,,, | Performed by: NURSE PRACTITIONER

## 2023-10-02 PROCEDURE — 3074F PR MOST RECENT SYSTOLIC BLOOD PRESSURE < 130 MM HG: ICD-10-PCS | Mod: CPTII,,, | Performed by: NURSE PRACTITIONER

## 2023-10-02 PROCEDURE — 51705 CHANGE OF BLADDER TUBE: CPT | Mod: PBBFAC | Performed by: NURSE PRACTITIONER

## 2023-10-02 PROCEDURE — 1159F MED LIST DOCD IN RCRD: CPT | Mod: CPTII,,, | Performed by: NURSE PRACTITIONER

## 2023-10-02 PROCEDURE — 51705 CHANGE OF BLADDER TUBE: CPT | Mod: S$PBB,,, | Performed by: NURSE PRACTITIONER

## 2023-10-02 PROCEDURE — 51705 PR CHANGE OF BLADDER TUBE,SIMPLE: ICD-10-PCS | Mod: S$PBB,,, | Performed by: NURSE PRACTITIONER

## 2023-10-02 PROCEDURE — 1160F RVW MEDS BY RX/DR IN RCRD: CPT | Mod: CPTII,,, | Performed by: NURSE PRACTITIONER

## 2023-10-02 PROCEDURE — 3074F SYST BP LT 130 MM HG: CPT | Mod: CPTII,,, | Performed by: NURSE PRACTITIONER

## 2023-10-02 PROCEDURE — 99999 PR PBB SHADOW E&M-EST. PATIENT-LVL IV: ICD-10-PCS | Mod: PBBFAC,,, | Performed by: NURSE PRACTITIONER

## 2023-10-02 PROCEDURE — 99999 PR PBB SHADOW E&M-EST. PATIENT-LVL IV: CPT | Mod: PBBFAC,,, | Performed by: NURSE PRACTITIONER

## 2023-10-02 PROCEDURE — 3078F PR MOST RECENT DIASTOLIC BLOOD PRESSURE < 80 MM HG: ICD-10-PCS | Mod: CPTII,,, | Performed by: NURSE PRACTITIONER
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
## 2023-10-03 ENCOUNTER — PATIENT MESSAGE (OUTPATIENT)
Dept: PHYSICAL MEDICINE AND REHAB | Facility: CLINIC | Age: 38
End: 2023-10-03

## 2023-10-03 ENCOUNTER — PROCEDURE VISIT (OUTPATIENT)
Dept: PHYSICAL MEDICINE AND REHAB | Facility: CLINIC | Age: 38
End: 2023-10-03
Payer: MEDICAID

## 2023-10-03 DIAGNOSIS — M79.2 NEUROPATHIC PAIN: ICD-10-CM

## 2023-10-03 DIAGNOSIS — G82.20 PARAPLEGIA FOLLOWING SPINAL CORD INJURY: Primary | ICD-10-CM

## 2023-10-03 DIAGNOSIS — G83.9 SPASTIC PARALYSIS: ICD-10-CM

## 2023-10-03 PROCEDURE — 62370 ANL SP INF PMP W/MDREPRG&FIL: CPT | Mod: PBBFAC | Performed by: PHYSICAL MEDICINE & REHABILITATION
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
## 2023-10-04 ENCOUNTER — PATIENT MESSAGE (OUTPATIENT)
Dept: PHYSICAL MEDICINE AND REHAB | Facility: CLINIC | Age: 38
End: 2023-10-04
Payer: MEDICAID

## 2023-10-10 ENCOUNTER — PATIENT MESSAGE (OUTPATIENT)
Dept: INTERNAL MEDICINE | Facility: CLINIC | Age: 38
End: 2023-10-10
Payer: MEDICAID

## 2023-10-10 NOTE — TELEPHONE ENCOUNTER
Give pt gi phone number to call and schedule  Evaluation not clear why they would not see ostomy pt  But he can call directly to [facility] or check with his insurance  If he needs to go to someone specific

## 2023-10-11 DIAGNOSIS — M79.2 NEUROPATHIC PAIN: Chronic | ICD-10-CM

## 2023-10-11 DIAGNOSIS — G82.20 PARAPLEGIA FOLLOWING SPINAL CORD INJURY: Chronic | ICD-10-CM

## 2023-10-11 RX ORDER — OXYCODONE AND ACETAMINOPHEN 10; 325 MG/1; MG/1
1 TABLET ORAL EVERY 6 HOURS PRN
Qty: 110 TABLET | Refills: 0 | Status: SHIPPED | OUTPATIENT
Start: 2023-10-11 | End: 2023-11-14 | Stop reason: SDUPTHER

## 2023-10-13 ENCOUNTER — PATIENT MESSAGE (OUTPATIENT)
Dept: PHYSICAL MEDICINE AND REHAB | Facility: CLINIC | Age: 38
End: 2023-10-13
Payer: MEDICAID

## 2023-10-17 ENCOUNTER — PATIENT MESSAGE (OUTPATIENT)
Dept: UROLOGY | Facility: CLINIC | Age: 38
End: 2023-10-17
Payer: MEDICAID

## 2023-10-17 DIAGNOSIS — N52.9 ED (ERECTILE DYSFUNCTION) OF ORGANIC ORIGIN: Primary | ICD-10-CM

## 2023-10-19 RX ORDER — PAPAVERINE HYDROCHLORIDE 30 MG/ML
INJECTION INTRAMUSCULAR; INTRAVENOUS
Qty: 5 ML | Refills: 0 | Status: SHIPPED | OUTPATIENT
Start: 2023-10-19 | End: 2023-11-06 | Stop reason: SDUPTHER

## 2023-10-23 ENCOUNTER — PATIENT MESSAGE (OUTPATIENT)
Dept: PHYSICAL MEDICINE AND REHAB | Facility: CLINIC | Age: 38
End: 2023-10-23
Payer: MEDICAID

## 2023-10-24 ENCOUNTER — PATIENT MESSAGE (OUTPATIENT)
Dept: PHYSICAL MEDICINE AND REHAB | Facility: CLINIC | Age: 38
End: 2023-10-24
Payer: MEDICAID

## 2023-10-25 ENCOUNTER — OFFICE VISIT (OUTPATIENT)
Dept: PHYSICAL MEDICINE AND REHAB | Facility: CLINIC | Age: 38
End: 2023-10-25
Payer: MEDICAID

## 2023-10-25 VITALS
HEART RATE: 43 BPM | DIASTOLIC BLOOD PRESSURE: 57 MMHG | HEIGHT: 67 IN | BODY MASS INDEX: 28.19 KG/M2 | SYSTOLIC BLOOD PRESSURE: 124 MMHG

## 2023-10-25 DIAGNOSIS — G83.9 SPASTIC PARALYSIS: Primary | ICD-10-CM

## 2023-10-25 PROCEDURE — 99499 NO LOS: ICD-10-PCS | Mod: S$PBB,,, | Performed by: PHYSICAL MEDICINE & REHABILITATION

## 2023-10-25 PROCEDURE — 99499 UNLISTED E&M SERVICE: CPT | Mod: S$PBB,,, | Performed by: PHYSICAL MEDICINE & REHABILITATION

## 2023-10-25 PROCEDURE — 95873 BOTULINUM INJECTION: ICD-10-PCS | Mod: 26,S$PBB,, | Performed by: PHYSICAL MEDICINE & REHABILITATION

## 2023-10-25 PROCEDURE — 99999PBSHW PR PBB SHADOW TECHNICAL ONLY FILED TO HB: ICD-10-PCS | Mod: JZ,PBBFAC,,

## 2023-10-25 PROCEDURE — 95873 GUIDE NERV DESTR ELEC STIM: CPT | Mod: PBBFAC | Performed by: PHYSICAL MEDICINE & REHABILITATION

## 2023-10-25 PROCEDURE — 99999PBSHW PR PBB SHADOW TECHNICAL ONLY FILED TO HB: Mod: JZ,PBBFAC,,

## 2023-10-25 PROCEDURE — 64642 CHEMODENERV 1 EXTREMITY 1-4: CPT | Mod: S$PBB,,, | Performed by: PHYSICAL MEDICINE & REHABILITATION

## 2023-10-25 PROCEDURE — 64642 BOTULINUM INJECTION: ICD-10-PCS | Mod: S$PBB,,, | Performed by: PHYSICAL MEDICINE & REHABILITATION

## 2023-10-25 RX ADMIN — ONABOTULINUMTOXINA 100 UNITS: 100 INJECTION, POWDER, LYOPHILIZED, FOR SOLUTION INTRADERMAL; INTRAMUSCULAR at 01:10

## 2023-10-25 RX ADMIN — BACLOFEN 80 MG: 40 INJECTION INTRATHECAL at 02:10

## 2023-10-25 RX ADMIN — ONABOTULINUMTOXINA 100 UNITS: 100 INJECTION, POWDER, LYOPHILIZED, FOR SOLUTION INTRADERMAL; INTRAMUSCULAR at 02:10

## 2023-10-25 NOTE — PROCEDURES
Botulinum Injection  Location: [facility]    Date/Time: 10/25/2023 1:40 PM    Performed by: Aleksandar Davis MD  Authorized by: Aleksandar Davis MD      Consent:      Consent given by:  Patient     Risks discussed:  Weakness     Alternatives discussed:  Alternative treatment    Universal protocol:      Relevant documents present and verified:  Yes       Site/side verified:  Yes       Immediately prior to procedure a time out was called:  Yes       Patient identity confirmed:  Verbally with patient    Procedure details:     EMG used?:  No     Electrical stimulation used?:  YesNo     Diluted by:  Preservative free saline     Toxin (Brand):  OnaBoNT-A (Botox)     Comments about dilution:  1:1     Concentration (u/mL):  100     Total number of units available:  100     Muscle area injected: leg and foot    Lower extremity - leg:      Left flexor digitorum longus:  20 units divided amongst 1 site(s)     Left lateral head gastrocnemius:  25 units divided amongst 1 site(s)     Left medial head gastrocnemius:  25 units divided amongst 1 site(s)       Ad hoc region injected:  30 with Flexor digitorum brevis units     Total units injected:  100     Total units wasted:  0    Medications: 100 Units Onabotulinumtoxina 100 unit inj solr    Post-procedure details:      Patient tolerance of procedure:  Tolerated well, no immediate complications

## 2023-11-06 ENCOUNTER — PATIENT MESSAGE (OUTPATIENT)
Dept: UROLOGY | Facility: CLINIC | Age: 38
End: 2023-11-06
Payer: MEDICAID

## 2023-11-06 DIAGNOSIS — N52.9 ED (ERECTILE DYSFUNCTION) OF ORGANIC ORIGIN: ICD-10-CM

## 2023-11-06 RX ORDER — PAPAVERINE HYDROCHLORIDE 30 MG/ML
INJECTION INTRAMUSCULAR; INTRAVENOUS
Qty: 5 ML | Refills: 0 | Status: SHIPPED | OUTPATIENT
Start: 2023-11-06 | End: 2023-12-20 | Stop reason: ALTCHOICE

## 2023-11-10 ENCOUNTER — PATIENT MESSAGE (OUTPATIENT)
Dept: PHYSICAL MEDICINE AND REHAB | Facility: CLINIC | Age: 38
End: 2023-11-10
Payer: MEDICAID

## 2023-11-11 ENCOUNTER — HOSPITAL ENCOUNTER (EMERGENCY)
Facility: HOSPITAL | Age: 38
Discharge: HOME | End: 2023-11-11
Attending: STUDENT IN AN ORGANIZED HEALTH CARE EDUCATION/TRAINING PROGRAM
Payer: MEDICAID

## 2023-11-11 VITALS
DIASTOLIC BLOOD PRESSURE: 93 MMHG | TEMPERATURE: 100 F | HEART RATE: 50 BPM | RESPIRATION RATE: 18 BRPM | SYSTOLIC BLOOD PRESSURE: 169 MMHG | OXYGEN SATURATION: 98 %

## 2023-11-11 DIAGNOSIS — K29.50 CHRONIC GASTRITIS, PRESENCE OF BLEEDING UNSPECIFIED, UNSPECIFIED GASTRITIS TYPE: Primary | ICD-10-CM

## 2023-11-11 LAB
ALBUMIN SERPL BCP-MCNC: 4 G/DL (ref 3.5–5.2)
ALP SERPL-CCNC: 111 U/L (ref 55–135)
ALT SERPL W/O P-5'-P-CCNC: 18 U/L (ref 10–44)
ANION GAP SERPL CALC-SCNC: 8 MMOL/L (ref 8–16)
AST SERPL-CCNC: 16 U/L (ref 10–40)
BACTERIA #/AREA URNS HPF: ABNORMAL /HPF
BASOPHILS # BLD AUTO: 0.04 K/UL (ref 0–0.2)
BASOPHILS NFR BLD: 0.3 % (ref 0–1.9)
BILIRUB SERPL-MCNC: 0.6 MG/DL (ref 0.1–1)
BILIRUB UR QL STRIP: ABNORMAL
BUN SERPL-MCNC: 5 MG/DL (ref 6–20)
CALCIUM SERPL-MCNC: 8.7 MG/DL (ref 8.7–10.5)
CHLORIDE SERPL-SCNC: 104 MMOL/L (ref 95–110)
CLARITY UR: ABNORMAL
CO2 SERPL-SCNC: 24 MMOL/L (ref 23–29)
COLOR UR: YELLOW
CREAT SERPL-MCNC: 0.6 MG/DL (ref 0.5–1.4)
DIFFERENTIAL METHOD: ABNORMAL
EOSINOPHIL # BLD AUTO: 0.1 K/UL (ref 0–0.5)
EOSINOPHIL NFR BLD: 0.4 % (ref 0–8)
ERYTHROCYTE [DISTWIDTH] IN BLOOD BY AUTOMATED COUNT: 18.6 % (ref 11.5–14.5)
EST. GFR  (NO RACE VARIABLE): >60 ML/MIN/1.73 M^2
GLUCOSE SERPL-MCNC: 90 MG/DL (ref 70–110)
GLUCOSE UR QL STRIP: NEGATIVE
HCT VFR BLD AUTO: 33.2 % (ref 40–54)
HGB BLD-MCNC: 9 G/DL (ref 14–18)
HGB UR QL STRIP: ABNORMAL
HYALINE CASTS #/AREA URNS LPF: 0 /LPF
IMM GRANULOCYTES # BLD AUTO: 0.05 K/UL (ref 0–0.04)
IMM GRANULOCYTES NFR BLD AUTO: 0.4 % (ref 0–0.5)
KETONES UR QL STRIP: ABNORMAL
LEUKOCYTE ESTERASE UR QL STRIP: ABNORMAL
LIPASE SERPL-CCNC: 16 U/L (ref 4–60)
LYMPHOCYTES # BLD AUTO: 1.8 K/UL (ref 1–4.8)
LYMPHOCYTES NFR BLD: 12.6 % (ref 18–48)
MCH RBC QN AUTO: 19.5 PG (ref 27–31)
MCHC RBC AUTO-ENTMCNC: 27.1 G/DL (ref 32–36)
MCV RBC AUTO: 72 FL (ref 82–98)
MICROSCOPIC COMMENT: ABNORMAL
MONOCYTES # BLD AUTO: 1.4 K/UL (ref 0.3–1)
MONOCYTES NFR BLD: 9.6 % (ref 4–15)
NEUTROPHILS # BLD AUTO: 10.8 K/UL (ref 1.8–7.7)
NEUTROPHILS NFR BLD: 76.7 % (ref 38–73)
NITRITE UR QL STRIP: NEGATIVE
NRBC BLD-RTO: 0 /100 WBC
PH UR STRIP: 6 [PH] (ref 5–8)
PLATELET # BLD AUTO: 220 K/UL (ref 150–450)
PMV BLD AUTO: 10.6 FL (ref 9.2–12.9)
POTASSIUM SERPL-SCNC: 4.1 MMOL/L (ref 3.5–5.1)
PROT SERPL-MCNC: 7 G/DL (ref 6–8.4)
PROT UR QL STRIP: ABNORMAL
RBC # BLD AUTO: 4.61 M/UL (ref 4.6–6.2)
RBC #/AREA URNS HPF: >100 /HPF (ref 0–4)
SODIUM SERPL-SCNC: 136 MMOL/L (ref 136–145)
SP GR UR STRIP: >=1.03 (ref 1–1.03)
URN SPEC COLLECT METH UR: ABNORMAL
UROBILINOGEN UR STRIP-ACNC: 1 EU/DL
WBC # BLD AUTO: 14.12 K/UL (ref 3.9–12.7)
WBC #/AREA URNS HPF: 4 /HPF (ref 0–5)

## 2023-11-11 PROCEDURE — 63600175 PHARM REV CODE 636 W HCPCS

## 2023-11-11 PROCEDURE — 81000 URINALYSIS NONAUTO W/SCOPE: CPT

## 2023-11-11 PROCEDURE — C9113 INJ PANTOPRAZOLE SODIUM, VIA: HCPCS

## 2023-11-11 PROCEDURE — 80053 COMPREHEN METABOLIC PANEL: CPT

## 2023-11-11 PROCEDURE — 36415 COLL VENOUS BLD VENIPUNCTURE: CPT

## 2023-11-11 PROCEDURE — 96374 THER/PROPH/DIAG INJ IV PUSH: CPT | Mod: 59

## 2023-11-11 PROCEDURE — 25500020 PHARM REV CODE 255: Performed by: STUDENT IN AN ORGANIZED HEALTH CARE EDUCATION/TRAINING PROGRAM

## 2023-11-11 PROCEDURE — 83690 ASSAY OF LIPASE: CPT

## 2023-11-11 PROCEDURE — 99285 EMERGENCY DEPT VISIT HI MDM: CPT | Mod: 25

## 2023-11-11 PROCEDURE — 85025 COMPLETE CBC W/AUTO DIFF WBC: CPT

## 2023-11-11 RX ORDER — PANTOPRAZOLE SODIUM 40 MG/10ML
40 INJECTION, POWDER, LYOPHILIZED, FOR SOLUTION INTRAVENOUS
Status: COMPLETED | OUTPATIENT
Start: 2023-11-11 | End: 2023-11-11

## 2023-11-11 RX ORDER — SUCRALFATE 1 G/1
1 TABLET ORAL
Qty: 120 TABLET | Refills: 0 | Status: SHIPPED | OUTPATIENT
Start: 2023-11-11 | End: 2023-12-11

## 2023-11-11 RX ORDER — PANTOPRAZOLE SODIUM 40 MG/1
40 TABLET, DELAYED RELEASE ORAL DAILY
Qty: 30 TABLET | Refills: 1 | Status: SHIPPED | OUTPATIENT
Start: 2023-11-11 | End: 2024-01-10

## 2023-11-11 RX ADMIN — PANTOPRAZOLE SODIUM 40 MG: 40 INJECTION, POWDER, FOR SOLUTION INTRAVENOUS at 02:11

## 2023-11-11 RX ADMIN — IOHEXOL 100 ML: 350 INJECTION, SOLUTION INTRAVENOUS at 05:11

## 2023-11-11 RX ADMIN — IOHEXOL 30 ML: 350 INJECTION, SOLUTION INTRAVENOUS at 05:11
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
## 2023-11-11 NOTE — ED TRIAGE NOTES
38 y.o. male presents to ER Room/bed info not found   Chief Complaint   Patient presents with    Abdominal Pain   .   C/O ABD DISCOMFORT PROGRESSIVELY GETTING WORSE

## 2023-11-12 ENCOUNTER — TELEPHONE (OUTPATIENT)
Dept: EMERGENCY MEDICINE | Facility: HOSPITAL | Age: 38
End: 2023-11-12
Payer: MEDICAID

## 2023-11-12 RX ORDER — CIPROFLOXACIN 500 MG/1
500 TABLET ORAL EVERY 12 HOURS
Qty: 12 TABLET | Refills: 0 | Status: SHIPPED | OUTPATIENT
Start: 2023-11-12 | End: 2023-11-18

## 2023-11-12 NOTE — DISCHARGE INSTRUCTIONS
Please follow-up with your GI doctor to get your EGD and colonoscopy done on Wednesday at your previously scheduled appointment.  Please take Carafate and Protonix as I discussed with you prior to discharge.

## 2023-11-13 ENCOUNTER — TELEPHONE (OUTPATIENT)
Dept: INTERNAL MEDICINE | Facility: CLINIC | Age: 38
End: 2023-11-13
Payer: MEDICAID

## 2023-11-13 ENCOUNTER — PATIENT MESSAGE (OUTPATIENT)
Dept: INTERNAL MEDICINE | Facility: CLINIC | Age: 38
End: 2023-11-13
Payer: MEDICAID

## 2023-11-13 ENCOUNTER — HOSPITAL ENCOUNTER (EMERGENCY)
Facility: HOSPITAL | Age: 38
Discharge: LEFT BEFORE DISCHARGE | End: 2023-11-13
Attending: SURGERY
Payer: MEDICAID

## 2023-11-13 VITALS
RESPIRATION RATE: 18 BRPM | DIASTOLIC BLOOD PRESSURE: 58 MMHG | OXYGEN SATURATION: 100 % | SYSTOLIC BLOOD PRESSURE: 106 MMHG | HEART RATE: 74 BPM | TEMPERATURE: 99 F

## 2023-11-13 DIAGNOSIS — Z53.29 LEFT AGAINST MEDICAL ADVICE: Primary | ICD-10-CM

## 2023-11-13 DIAGNOSIS — R05.9 COUGH: ICD-10-CM

## 2023-11-13 LAB
ALBUMIN SERPL BCP-MCNC: 3.7 G/DL (ref 3.5–5.2)
ALP SERPL-CCNC: 96 U/L (ref 55–135)
ALT SERPL W/O P-5'-P-CCNC: 17 U/L (ref 10–44)
ANION GAP SERPL CALC-SCNC: 10 MMOL/L (ref 8–16)
AST SERPL-CCNC: 17 U/L (ref 10–40)
BACTERIA #/AREA URNS HPF: NORMAL /HPF
BASOPHILS # BLD AUTO: 0.03 K/UL (ref 0–0.2)
BASOPHILS NFR BLD: 0.8 % (ref 0–1.9)
BILIRUB SERPL-MCNC: 0.6 MG/DL (ref 0.1–1)
BILIRUB UR QL STRIP: ABNORMAL
BUN SERPL-MCNC: 8 MG/DL (ref 6–20)
CALCIUM SERPL-MCNC: 8.8 MG/DL (ref 8.7–10.5)
CHLORIDE SERPL-SCNC: 102 MMOL/L (ref 95–110)
CLARITY UR: CLEAR
CO2 SERPL-SCNC: 23 MMOL/L (ref 23–29)
COLOR UR: YELLOW
CREAT SERPL-MCNC: 0.7 MG/DL (ref 0.5–1.4)
DIFFERENTIAL METHOD: ABNORMAL
EOSINOPHIL # BLD AUTO: 0 K/UL (ref 0–0.5)
EOSINOPHIL NFR BLD: 0 % (ref 0–8)
ERYTHROCYTE [DISTWIDTH] IN BLOOD BY AUTOMATED COUNT: 19 % (ref 11.5–14.5)
EST. GFR  (NO RACE VARIABLE): >60 ML/MIN/1.73 M^2
GLUCOSE SERPL-MCNC: 90 MG/DL (ref 70–110)
GLUCOSE UR QL STRIP: NEGATIVE
GROUP A STREP, MOLECULAR: NEGATIVE
HCT VFR BLD AUTO: 31.2 % (ref 40–54)
HGB BLD-MCNC: 8.5 G/DL (ref 14–18)
HGB UR QL STRIP: NEGATIVE
HYALINE CASTS #/AREA URNS LPF: 0 /LPF
IMM GRANULOCYTES # BLD AUTO: 0.01 K/UL (ref 0–0.04)
IMM GRANULOCYTES NFR BLD AUTO: 0.3 % (ref 0–0.5)
INFLUENZA A, MOLECULAR: NEGATIVE
INFLUENZA B, MOLECULAR: NEGATIVE
KETONES UR QL STRIP: ABNORMAL
LACTATE SERPL-SCNC: 0.8 MMOL/L (ref 0.5–2.2)
LEUKOCYTE ESTERASE UR QL STRIP: ABNORMAL
LIPASE SERPL-CCNC: 21 U/L (ref 4–60)
LYMPHOCYTES # BLD AUTO: 0.5 K/UL (ref 1–4.8)
LYMPHOCYTES NFR BLD: 12.5 % (ref 18–48)
MCH RBC QN AUTO: 19.9 PG (ref 27–31)
MCHC RBC AUTO-ENTMCNC: 27.2 G/DL (ref 32–36)
MCV RBC AUTO: 73 FL (ref 82–98)
MICROSCOPIC COMMENT: NORMAL
MONOCYTES # BLD AUTO: 0.5 K/UL (ref 0.3–1)
MONOCYTES NFR BLD: 13.3 % (ref 4–15)
NEUTROPHILS # BLD AUTO: 2.9 K/UL (ref 1.8–7.7)
NEUTROPHILS NFR BLD: 73.1 % (ref 38–73)
NITRITE UR QL STRIP: NEGATIVE
NRBC BLD-RTO: 0 /100 WBC
PH UR STRIP: 6 [PH] (ref 5–8)
PLATELET # BLD AUTO: 146 K/UL (ref 150–450)
PMV BLD AUTO: 11.4 FL (ref 9.2–12.9)
POTASSIUM SERPL-SCNC: 4.2 MMOL/L (ref 3.5–5.1)
PROT SERPL-MCNC: 7 G/DL (ref 6–8.4)
PROT UR QL STRIP: ABNORMAL
RBC # BLD AUTO: 4.27 M/UL (ref 4.6–6.2)
RBC #/AREA URNS HPF: 0 /HPF (ref 0–4)
SARS-COV-2 RDRP RESP QL NAA+PROBE: NEGATIVE
SODIUM SERPL-SCNC: 135 MMOL/L (ref 136–145)
SP GR UR STRIP: 1.02 (ref 1–1.03)
SPECIMEN SOURCE: NORMAL
URN SPEC COLLECT METH UR: ABNORMAL
UROBILINOGEN UR STRIP-ACNC: 1 EU/DL
WBC # BLD AUTO: 3.91 K/UL (ref 3.9–12.7)
WBC #/AREA URNS HPF: 1 /HPF (ref 0–5)

## 2023-11-13 PROCEDURE — 81000 URINALYSIS NONAUTO W/SCOPE: CPT | Performed by: NURSE PRACTITIONER

## 2023-11-13 PROCEDURE — 87651 STREP A DNA AMP PROBE: CPT | Performed by: SURGERY

## 2023-11-13 PROCEDURE — 80053 COMPREHEN METABOLIC PANEL: CPT | Performed by: NURSE PRACTITIONER

## 2023-11-13 PROCEDURE — 85025 COMPLETE CBC W/AUTO DIFF WBC: CPT | Performed by: NURSE PRACTITIONER

## 2023-11-13 PROCEDURE — 87040 BLOOD CULTURE FOR BACTERIA: CPT | Performed by: NURSE PRACTITIONER

## 2023-11-13 PROCEDURE — 83605 ASSAY OF LACTIC ACID: CPT | Performed by: NURSE PRACTITIONER

## 2023-11-13 PROCEDURE — 36415 COLL VENOUS BLD VENIPUNCTURE: CPT | Performed by: NURSE PRACTITIONER

## 2023-11-13 PROCEDURE — 83690 ASSAY OF LIPASE: CPT | Performed by: NURSE PRACTITIONER

## 2023-11-13 PROCEDURE — U0002 COVID-19 LAB TEST NON-CDC: HCPCS | Performed by: SURGERY

## 2023-11-13 PROCEDURE — 87502 INFLUENZA DNA AMP PROBE: CPT | Performed by: SURGERY

## 2023-11-13 PROCEDURE — 99284 EMERGENCY DEPT VISIT MOD MDM: CPT | Mod: 25

## 2023-11-13 NOTE — TELEPHONE ENCOUNTER
Called and spoke to patient. Patient states he has a wound in the crease/folds of his bottom. Was cleaning with a wound cleanser and applying an cream.Would like to be seen in the am by an available provider. Not able to come on Wednesday due to scheduled colonoscopy.
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
## 2023-11-14 ENCOUNTER — PATIENT MESSAGE (OUTPATIENT)
Dept: PHYSICAL MEDICINE AND REHAB | Facility: CLINIC | Age: 38
End: 2023-11-14
Payer: MEDICAID

## 2023-11-14 DIAGNOSIS — M79.2 NEUROPATHIC PAIN: Chronic | ICD-10-CM

## 2023-11-14 DIAGNOSIS — G82.20 PARAPLEGIA FOLLOWING SPINAL CORD INJURY: Chronic | ICD-10-CM

## 2023-11-14 RX ORDER — OXYCODONE AND ACETAMINOPHEN 10; 325 MG/1; MG/1
1 TABLET ORAL EVERY 6 HOURS PRN
Qty: 110 TABLET | Refills: 0 | Status: SHIPPED | OUTPATIENT
Start: 2023-11-14 | End: 2023-12-18 | Stop reason: SDUPTHER

## 2023-11-14 RX ORDER — OXYCODONE HCL 10 MG/1
10 TABLET, FILM COATED, EXTENDED RELEASE ORAL EVERY 12 HOURS
Qty: 60 TABLET | Refills: 0 | Status: SHIPPED | OUTPATIENT
Start: 2023-11-14 | End: 2024-02-20 | Stop reason: SDUPTHER

## 2023-11-14 NOTE — ED NOTES
"Pt reports, "I want to leave,I'm tired of waiting. I have cipro from my urine from 2 days ago when they changed my catheter. I will check my portal.". Niesha Notified. Risk and benefits were explained and pt signed AMA.  "
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
## 2023-11-16 ENCOUNTER — TELEPHONE (OUTPATIENT)
Dept: NEUROLOGY | Facility: CLINIC | Age: 38
End: 2023-11-16
Payer: MEDICAID

## 2023-11-16 NOTE — TELEPHONE ENCOUNTER
----- Message from Helen Sheehan sent at 11/16/2023  2:44 PM CST -----  Regarding: PA  Contact: Medicaid @ [phone]  Orly from [employer] is calling because PA is needed on oxyCODONE (OXYCONTIN) 10 mg 12 hr tablet. Asking to call company

## 2023-11-17 ENCOUNTER — PATIENT MESSAGE (OUTPATIENT)
Dept: INTERNAL MEDICINE | Facility: CLINIC | Age: 38
End: 2023-11-17
Payer: MEDICAID

## 2023-11-17 NOTE — TELEPHONE ENCOUNTER
Called and spoke with patient. Pt arrived at appointment but could not be seen. Offered to reschedule patient but pt states he will do so online.

## 2023-11-19 LAB — BACTERIA BLD CULT: NORMAL

## 2023-12-07 ENCOUNTER — PATIENT MESSAGE (OUTPATIENT)
Dept: UROLOGY | Facility: CLINIC | Age: 38
End: 2023-12-07
Payer: MEDICAID

## 2023-12-08 DIAGNOSIS — R39.9 UTI SYMPTOMS: Primary | ICD-10-CM

## 2023-12-08 RX ORDER — AMPICILLIN 500 MG/1
500 CAPSULE ORAL EVERY 8 HOURS
Qty: 30 CAPSULE | Refills: 0 | Status: SHIPPED | OUTPATIENT
Start: 2023-12-08 | End: 2023-12-18

## 2023-12-13 ENCOUNTER — PATIENT MESSAGE (OUTPATIENT)
Dept: PHYSICAL MEDICINE AND REHAB | Facility: CLINIC | Age: 38
End: 2023-12-13

## 2023-12-13 ENCOUNTER — PROCEDURE VISIT (OUTPATIENT)
Dept: PHYSICAL MEDICINE AND REHAB | Facility: CLINIC | Age: 38
End: 2023-12-13
Payer: MEDICAID

## 2023-12-13 VITALS
HEART RATE: 67 BPM | DIASTOLIC BLOOD PRESSURE: 63 MMHG | SYSTOLIC BLOOD PRESSURE: 103 MMHG | HEIGHT: 70 IN | BODY MASS INDEX: 26.2 KG/M2

## 2023-12-13 DIAGNOSIS — G82.20 PARAPLEGIA FOLLOWING SPINAL CORD INJURY: Primary | ICD-10-CM

## 2023-12-13 DIAGNOSIS — G82.21 CHRONIC COMPLETE PARAPLEGIA: ICD-10-CM

## 2023-12-13 DIAGNOSIS — G83.9 SPASTIC PARALYSIS: ICD-10-CM

## 2023-12-13 PROCEDURE — 62370 ANL SP INF PMP W/MDREPRG&FIL: CPT | Mod: S$PBB,,, | Performed by: PHYSICAL MEDICINE & REHABILITATION

## 2023-12-13 PROCEDURE — 99999PBSHW PR PBB SHADOW TECHNICAL ONLY FILED TO HB: Mod: JZ,PBBFAC,,

## 2023-12-13 PROCEDURE — 62370 PR ANL SP INF PMP W/MDREPRG&FIL: ICD-10-PCS | Mod: S$PBB,,, | Performed by: PHYSICAL MEDICINE & REHABILITATION

## 2023-12-13 PROCEDURE — 99999PBSHW PR PBB SHADOW TECHNICAL ONLY FILED TO HB: ICD-10-PCS | Mod: JZ,PBBFAC,,

## 2023-12-13 PROCEDURE — 62370 ANL SP INF PMP W/MDREPRG&FIL: CPT | Mod: PBBFAC | Performed by: PHYSICAL MEDICINE & REHABILITATION

## 2023-12-13 RX ADMIN — BACLOFEN 80 MG: 40 INJECTION INTRATHECAL at 04:12

## 2023-12-14 ENCOUNTER — PATIENT MESSAGE (OUTPATIENT)
Dept: PHYSICAL MEDICINE AND REHAB | Facility: CLINIC | Age: 38
End: 2023-12-14
Payer: MEDICAID
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
## 2023-12-14 NOTE — PROCEDURES
ITB PUMP REFILL PROCEDURE NOTE:    ITB Pump Record/Settings:   Pump Location: RLQ  Estimated Pump Replacement: March 2027  Last examined: 7/18/2023  Last change:  7/18/2023  Drug Concentration: 2000 mcg/mL  Infusion Mode: Flex dosing  Reservoir Volume: 40  Is Low [place] Alarm Date:   12/25/2023         The potential risks were discussed with the patient and the patient elected to proceed.  The patient was placed in a supine position and the pump was located by palpation.  The pump was interrogated and found to be delivering a dose of 909.8 ug/day with a residual volume of 7.8 ml.      Using sterile technique the area was cleaned with betadine and a sterile field applied.  Using a 22G needle the port was pierced with no difficulty and 10.5 ml residual diluent was aspirated.  The new diluent was prepared in a 40cc syringe and delivered using the supplied filter without difficulty.  The pump was then reprogrammed for the new volume.    The pump was also reprogrammed to deliver a total daily dose of 909.8 mcg/day.    The new low reservoir alarm date is 3/5/2024.

## 2023-12-15 ENCOUNTER — PATIENT MESSAGE (OUTPATIENT)
Dept: PHYSICAL MEDICINE AND REHAB | Facility: CLINIC | Age: 38
End: 2023-12-15
Payer: MEDICAID

## 2023-12-18 ENCOUNTER — PATIENT MESSAGE (OUTPATIENT)
Dept: PHYSICAL MEDICINE AND REHAB | Facility: CLINIC | Age: 38
End: 2023-12-18
Payer: MEDICAID

## 2023-12-18 DIAGNOSIS — G82.20 PARAPLEGIA FOLLOWING SPINAL CORD INJURY: Chronic | ICD-10-CM

## 2023-12-18 DIAGNOSIS — M79.2 NEUROPATHIC PAIN: Chronic | ICD-10-CM

## 2023-12-19 RX ORDER — OXYCODONE AND ACETAMINOPHEN 10; 325 MG/1; MG/1
1 TABLET ORAL EVERY 6 HOURS PRN
Qty: 110 TABLET | Refills: 0 | Status: SHIPPED | OUTPATIENT
Start: 2023-12-19 | End: 2024-01-23 | Stop reason: SDUPTHER

## 2023-12-20 ENCOUNTER — PATIENT MESSAGE (OUTPATIENT)
Dept: PHYSICAL MEDICINE AND REHAB | Facility: CLINIC | Age: 38
End: 2023-12-20
Payer: MEDICAID

## 2023-12-20 ENCOUNTER — OFFICE VISIT (OUTPATIENT)
Dept: UROLOGY | Facility: CLINIC | Age: 38
End: 2023-12-20
Payer: MEDICAID

## 2023-12-20 VITALS
BODY MASS INDEX: 24.34 KG/M2 | HEART RATE: 69 BPM | SYSTOLIC BLOOD PRESSURE: 93 MMHG | HEIGHT: 70 IN | WEIGHT: 170 LBS | DIASTOLIC BLOOD PRESSURE: 57 MMHG

## 2023-12-20 DIAGNOSIS — G82.20 PARAPLEGIA FOLLOWING SPINAL CORD INJURY: ICD-10-CM

## 2023-12-20 DIAGNOSIS — N31.9 NEUROGENIC BLADDER: ICD-10-CM

## 2023-12-20 DIAGNOSIS — N52.9 ED (ERECTILE DYSFUNCTION) OF ORGANIC ORIGIN: Primary | ICD-10-CM

## 2023-12-20 PROCEDURE — 3008F PR BODY MASS INDEX (BMI) DOCUMENTED: ICD-10-PCS | Mod: CPTII,,, | Performed by: NURSE PRACTITIONER

## 2023-12-20 PROCEDURE — 3078F PR MOST RECENT DIASTOLIC BLOOD PRESSURE < 80 MM HG: ICD-10-PCS | Mod: CPTII,,, | Performed by: NURSE PRACTITIONER

## 2023-12-20 PROCEDURE — 99215 PR OFFICE/OUTPT VISIT, EST, LEVL V, 40-54 MIN: ICD-10-PCS | Mod: S$PBB,25,, | Performed by: NURSE PRACTITIONER

## 2023-12-20 PROCEDURE — 3008F BODY MASS INDEX DOCD: CPT | Mod: CPTII,,, | Performed by: NURSE PRACTITIONER

## 2023-12-20 PROCEDURE — 99999 PR PBB SHADOW E&M-EST. PATIENT-LVL III: ICD-10-PCS | Mod: PBBFAC,,, | Performed by: NURSE PRACTITIONER

## 2023-12-20 PROCEDURE — 1159F PR MEDICATION LIST DOCUMENTED IN MEDICAL RECORD: ICD-10-PCS | Mod: CPTII,,, | Performed by: NURSE PRACTITIONER

## 2023-12-20 PROCEDURE — 54235 NJX CORPORA CAVERNOSA RX AGT: CPT | Mod: S$PBB,,, | Performed by: NURSE PRACTITIONER

## 2023-12-20 PROCEDURE — 54235 NJX CORPORA CAVERNOSA RX AGT: CPT | Mod: PBBFAC | Performed by: NURSE PRACTITIONER

## 2023-12-20 PROCEDURE — 1160F RVW MEDS BY RX/DR IN RCRD: CPT | Mod: CPTII,,, | Performed by: NURSE PRACTITIONER

## 2023-12-20 PROCEDURE — 1160F PR REVIEW ALL MEDS BY PRESCRIBER/CLIN PHARMACIST DOCUMENTED: ICD-10-PCS | Mod: CPTII,,, | Performed by: NURSE PRACTITIONER

## 2023-12-20 PROCEDURE — 1159F MED LIST DOCD IN RCRD: CPT | Mod: CPTII,,, | Performed by: NURSE PRACTITIONER

## 2023-12-20 PROCEDURE — 3074F SYST BP LT 130 MM HG: CPT | Mod: CPTII,,, | Performed by: NURSE PRACTITIONER

## 2023-12-20 PROCEDURE — 99215 OFFICE O/P EST HI 40 MIN: CPT | Mod: S$PBB,25,, | Performed by: NURSE PRACTITIONER

## 2023-12-20 PROCEDURE — 3074F PR MOST RECENT SYSTOLIC BLOOD PRESSURE < 130 MM HG: ICD-10-PCS | Mod: CPTII,,, | Performed by: NURSE PRACTITIONER

## 2023-12-20 PROCEDURE — 99999 PR PBB SHADOW E&M-EST. PATIENT-LVL III: CPT | Mod: PBBFAC,,, | Performed by: NURSE PRACTITIONER

## 2023-12-20 PROCEDURE — 54235 PR INJECT CORPORA CAVERN,PHARM AGNT: ICD-10-PCS | Mod: S$PBB,,, | Performed by: NURSE PRACTITIONER

## 2023-12-20 PROCEDURE — 99213 OFFICE O/P EST LOW 20 MIN: CPT | Mod: PBBFAC | Performed by: NURSE PRACTITIONER

## 2023-12-20 PROCEDURE — 3078F DIAST BP <80 MM HG: CPT | Mod: CPTII,,, | Performed by: NURSE PRACTITIONER

## 2023-12-20 RX ORDER — PAPAVERINE HYDROCHLORIDE 30 MG/ML
INJECTION INTRAMUSCULAR; INTRAVENOUS
Qty: 5 ML | Refills: 12 | Status: SHIPPED | OUTPATIENT
Start: 2023-12-20 | End: 2024-12-10

## 2023-12-20 RX ORDER — ASPIRIN 81 MG/1
81 TABLET ORAL
COMMUNITY

## 2023-12-20 NOTE — PATIENT INSTRUCTIONS
1. Wipe off top of medication vial with an alcohol prep.   2. With the vial held firmly on a flat surface, insert the syringe into the vial.  3. Now carefully  the vial with the needle in place and turn upside down.  4. You can then push in syringe (like you are giving a shot) to get all the air out of the syringe.  5. You can then pull the plunger of the syringe back down while keeping the needle inserted in the vial to get your desired results.   6. If having difficulty seeing the medication, rapidly pull back the syringe and then you will see the medication fill up.    You may never get all the tiny air bubbles out. It is ok.     He was instructed to keep the dosage at 5 units. If noticing a decrease in reaction he can increase the dosage by 5 units. He may also refill the Rx.   Refills were sent to [facility].
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
## 2024-01-08 RX ORDER — PROMETHAZINE HYDROCHLORIDE 6.25 MG/5ML
SYRUP ORAL
Qty: 150 ML | Refills: 2 | Status: SHIPPED | OUTPATIENT
Start: 2024-01-08

## 2024-01-08 NOTE — TELEPHONE ENCOUNTER
No care due was identified.  [facility] Embedded Care Due Messages. Reference number: [id number].   1/08/2024 10:59:55 AM CST

## 2024-01-10 NOTE — PRE ADMISSION SCREENING
Anesthesia Assessment: Preoperative EQUATION    Planned Procedure: Procedure(s) (LRB):  CYSTOSCOPY (N/A)  INJECTION, BOTULINUM TOXIN, TYPE A (N/A)  Requested Anesthesia Type:Monitor Anesthesia Care  Surgeon: Dk Luna MD  Service: Urology  Known or anticipated Date of Surgery:8/28/2019    Surgeon notes: reviewed    Electronic QUestionnaire Assessment completed via nurse interview with patient.        NO AQ        Triage considerations:     The patient has no apparent active cardiac condition (No unstable coronary Syndrome such as severe unstable angina or recent [<1 month] myocardial infarction, decompensated CHF, severe valvular   disease or significant arrhythmia)    Previous anesthesia records:GETA, No problems and Not available    Last PCP note: within 3 months , within [facility] 7/2/19  Subspecialty notes: PHYSICAL MED, UROLOGY    Other important co-morbidities: QUADRIPLEGIA, IMPLANTED BACLOFEN PUMP, AUTONOMIC DYSREFLEXIA     Tests already available:  Available tests,  within 3 months , within [facility] .7/2/19-TSH, LIPIDS, HEP FUNCTION, CBC, BMP            Instructions given. (See in Nurse's note)    Optimization:  Anesthesia Preop Clinic Assessment  Indicated-NOT INDICATED        Plan:    Testing:  N/A     Patient  has previously scheduled Medical Appointment:8/20,8/22,8/27    Navigation:Straight Line to surgery.               No tests, anesthesia preop clinic visit, or consult required.                  no

## 2024-01-11 ENCOUNTER — HOSPITAL ENCOUNTER (EMERGENCY)
Facility: HOSPITAL | Age: 39
Discharge: HOME | End: 2024-01-11
Attending: STUDENT IN AN ORGANIZED HEALTH CARE EDUCATION/TRAINING PROGRAM
Payer: MEDICAID

## 2024-01-11 VITALS
HEART RATE: 52 BPM | TEMPERATURE: 99 F | DIASTOLIC BLOOD PRESSURE: 81 MMHG | OXYGEN SATURATION: 99 % | RESPIRATION RATE: 20 BRPM | HEIGHT: 70 IN | SYSTOLIC BLOOD PRESSURE: 138 MMHG | BODY MASS INDEX: 24.34 KG/M2 | WEIGHT: 170 LBS

## 2024-01-11 DIAGNOSIS — N39.0 COMPLICATED UTI (URINARY TRACT INFECTION): Primary | ICD-10-CM

## 2024-01-11 LAB
ALBUMIN SERPL BCP-MCNC: 3.6 G/DL (ref 3.5–5.2)
ALP SERPL-CCNC: 132 U/L (ref 55–135)
ALT SERPL W/O P-5'-P-CCNC: 60 U/L (ref 10–44)
ANION GAP SERPL CALC-SCNC: 6 MMOL/L (ref 8–16)
AST SERPL-CCNC: 33 U/L (ref 10–40)
BACTERIA #/AREA URNS HPF: ABNORMAL /HPF
BASOPHILS # BLD AUTO: 0.04 K/UL (ref 0–0.2)
BASOPHILS NFR BLD: 0.6 % (ref 0–1.9)
BILIRUB SERPL-MCNC: 0.3 MG/DL (ref 0.1–1)
BILIRUB UR QL STRIP: NEGATIVE
BUN SERPL-MCNC: 10 MG/DL (ref 6–20)
CALCIUM SERPL-MCNC: 8.7 MG/DL (ref 8.7–10.5)
CHLORIDE SERPL-SCNC: 108 MMOL/L (ref 95–110)
CLARITY UR: ABNORMAL
CO2 SERPL-SCNC: 24 MMOL/L (ref 23–29)
COLOR UR: YELLOW
CREAT SERPL-MCNC: 0.5 MG/DL (ref 0.5–1.4)
DIFFERENTIAL METHOD BLD: ABNORMAL
EOSINOPHIL # BLD AUTO: 0.1 K/UL (ref 0–0.5)
EOSINOPHIL NFR BLD: 1.4 % (ref 0–8)
ERYTHROCYTE [DISTWIDTH] IN BLOOD BY AUTOMATED COUNT: 20 % (ref 11.5–14.5)
EST. GFR  (NO RACE VARIABLE): >60 ML/MIN/1.73 M^2
GLUCOSE SERPL-MCNC: 102 MG/DL (ref 70–110)
GLUCOSE UR QL STRIP: NEGATIVE
HCT VFR BLD AUTO: 31.8 % (ref 40–54)
HGB BLD-MCNC: 8.5 G/DL (ref 14–18)
HGB UR QL STRIP: ABNORMAL
HYALINE CASTS #/AREA URNS LPF: 0 /LPF
IMM GRANULOCYTES # BLD AUTO: 0.04 K/UL (ref 0–0.04)
IMM GRANULOCYTES NFR BLD AUTO: 0.6 % (ref 0–0.5)
INFLUENZA A, MOLECULAR: NEGATIVE
INFLUENZA B, MOLECULAR: NEGATIVE
KETONES UR QL STRIP: NEGATIVE
LEUKOCYTE ESTERASE UR QL STRIP: ABNORMAL
LIPASE SERPL-CCNC: 23 U/L (ref 4–60)
LYMPHOCYTES # BLD AUTO: 1.2 K/UL (ref 1–4.8)
LYMPHOCYTES NFR BLD: 16 % (ref 18–48)
MCH RBC QN AUTO: 19.2 PG (ref 27–31)
MCHC RBC AUTO-ENTMCNC: 26.7 G/DL (ref 32–36)
MCV RBC AUTO: 72 FL (ref 82–98)
MICROSCOPIC COMMENT: ABNORMAL
MONOCYTES # BLD AUTO: 0.6 K/UL (ref 0.3–1)
MONOCYTES NFR BLD: 8 % (ref 4–15)
NEUTROPHILS # BLD AUTO: 5.4 K/UL (ref 1.8–7.7)
NEUTROPHILS NFR BLD: 73.4 % (ref 38–73)
NITRITE UR QL STRIP: POSITIVE
NRBC BLD-RTO: 0 /100 WBC
OTHER ELEMENTS URNS MICRO: ABNORMAL
PH UR STRIP: 7 [PH] (ref 5–8)
PLATELET # BLD AUTO: 242 K/UL (ref 150–450)
PMV BLD AUTO: 10.9 FL (ref 9.2–12.9)
POTASSIUM SERPL-SCNC: 4.1 MMOL/L (ref 3.5–5.1)
PROT SERPL-MCNC: 6.7 G/DL (ref 6–8.4)
PROT UR QL STRIP: ABNORMAL
RBC # BLD AUTO: 4.42 M/UL (ref 4.6–6.2)
RBC #/AREA URNS HPF: 2 /HPF (ref 0–4)
SARS-COV-2 RDRP RESP QL NAA+PROBE: NEGATIVE
SODIUM SERPL-SCNC: 138 MMOL/L (ref 136–145)
SP GR UR STRIP: >=1.03 (ref 1–1.03)
SPECIMEN SOURCE: NORMAL
SQUAMOUS #/AREA URNS HPF: 2 /HPF
URN SPEC COLLECT METH UR: ABNORMAL
UROBILINOGEN UR STRIP-ACNC: 1 EU/DL
WBC # BLD AUTO: 7.27 K/UL (ref 3.9–12.7)
WBC #/AREA URNS HPF: 30 /HPF (ref 0–5)
WBC CLUMPS URNS QL MICRO: ABNORMAL

## 2024-01-11 PROCEDURE — 99283 EMERGENCY DEPT VISIT LOW MDM: CPT

## 2024-01-11 PROCEDURE — 83690 ASSAY OF LIPASE: CPT | Performed by: STUDENT IN AN ORGANIZED HEALTH CARE EDUCATION/TRAINING PROGRAM

## 2024-01-11 PROCEDURE — 36415 COLL VENOUS BLD VENIPUNCTURE: CPT | Performed by: STUDENT IN AN ORGANIZED HEALTH CARE EDUCATION/TRAINING PROGRAM

## 2024-01-11 PROCEDURE — 85025 COMPLETE CBC W/AUTO DIFF WBC: CPT | Performed by: STUDENT IN AN ORGANIZED HEALTH CARE EDUCATION/TRAINING PROGRAM

## 2024-01-11 PROCEDURE — 25000003 PHARM REV CODE 250: Performed by: STUDENT IN AN ORGANIZED HEALTH CARE EDUCATION/TRAINING PROGRAM

## 2024-01-11 PROCEDURE — 87502 INFLUENZA DNA AMP PROBE: CPT | Performed by: STUDENT IN AN ORGANIZED HEALTH CARE EDUCATION/TRAINING PROGRAM

## 2024-01-11 PROCEDURE — U0002 COVID-19 LAB TEST NON-CDC: HCPCS | Performed by: STUDENT IN AN ORGANIZED HEALTH CARE EDUCATION/TRAINING PROGRAM

## 2024-01-11 PROCEDURE — 80053 COMPREHEN METABOLIC PANEL: CPT | Performed by: STUDENT IN AN ORGANIZED HEALTH CARE EDUCATION/TRAINING PROGRAM

## 2024-01-11 PROCEDURE — 87186 SC STD MICRODIL/AGAR DIL: CPT | Performed by: STUDENT IN AN ORGANIZED HEALTH CARE EDUCATION/TRAINING PROGRAM

## 2024-01-11 PROCEDURE — 87088 URINE BACTERIA CULTURE: CPT | Performed by: STUDENT IN AN ORGANIZED HEALTH CARE EDUCATION/TRAINING PROGRAM

## 2024-01-11 PROCEDURE — 87077 CULTURE AEROBIC IDENTIFY: CPT | Performed by: STUDENT IN AN ORGANIZED HEALTH CARE EDUCATION/TRAINING PROGRAM

## 2024-01-11 PROCEDURE — 81000 URINALYSIS NONAUTO W/SCOPE: CPT | Performed by: STUDENT IN AN ORGANIZED HEALTH CARE EDUCATION/TRAINING PROGRAM

## 2024-01-11 PROCEDURE — 87086 URINE CULTURE/COLONY COUNT: CPT | Performed by: STUDENT IN AN ORGANIZED HEALTH CARE EDUCATION/TRAINING PROGRAM

## 2024-01-11 RX ORDER — CIPROFLOXACIN 500 MG/1
500 TABLET ORAL 2 TIMES DAILY
Qty: 14 TABLET | Refills: 0 | Status: SHIPPED | OUTPATIENT
Start: 2024-01-11 | End: 2024-01-18

## 2024-01-11 RX ORDER — CIPROFLOXACIN 500 MG/1
500 TABLET ORAL
Status: COMPLETED | OUTPATIENT
Start: 2024-01-11 | End: 2024-01-11

## 2024-01-11 RX ADMIN — CIPROFLOXACIN 500 MG: 500 TABLET, FILM COATED ORAL at 12:01
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
## 2024-01-13 LAB — BACTERIA UR CULT: ABNORMAL
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
## 2024-01-18 ENCOUNTER — PATIENT MESSAGE (OUTPATIENT)
Dept: PHYSICAL MEDICINE AND REHAB | Facility: CLINIC | Age: 39
End: 2024-01-18
Payer: MEDICAID

## 2024-01-19 ENCOUNTER — PATIENT MESSAGE (OUTPATIENT)
Dept: PHYSICAL MEDICINE AND REHAB | Facility: CLINIC | Age: 39
End: 2024-01-19
Payer: MEDICAID

## 2024-01-23 DIAGNOSIS — G82.20 PARAPLEGIA FOLLOWING SPINAL CORD INJURY: Chronic | ICD-10-CM

## 2024-01-23 DIAGNOSIS — M79.2 NEUROPATHIC PAIN: Chronic | ICD-10-CM

## 2024-01-23 RX ORDER — OXYCODONE AND ACETAMINOPHEN 10; 325 MG/1; MG/1
1 TABLET ORAL EVERY 6 HOURS PRN
Qty: 110 TABLET | Refills: 0 | Status: SHIPPED | OUTPATIENT
Start: 2024-01-23 | End: 2024-02-23 | Stop reason: SDUPTHER

## 2024-01-26 ENCOUNTER — PATIENT MESSAGE (OUTPATIENT)
Dept: UROLOGY | Facility: CLINIC | Age: 39
End: 2024-01-26
Payer: MEDICAID

## 2024-01-26 ENCOUNTER — PATIENT MESSAGE (OUTPATIENT)
Dept: INTERNAL MEDICINE | Facility: CLINIC | Age: 39
End: 2024-01-26
Payer: MEDICAID

## 2024-01-31 ENCOUNTER — HOSPITAL ENCOUNTER (EMERGENCY)
Facility: HOSPITAL | Age: 39
Discharge: HOME | End: 2024-01-31
Attending: STUDENT IN AN ORGANIZED HEALTH CARE EDUCATION/TRAINING PROGRAM
Payer: MEDICAID

## 2024-01-31 VITALS
HEART RATE: 78 BPM | BODY MASS INDEX: 24.74 KG/M2 | WEIGHT: 170 LBS | DIASTOLIC BLOOD PRESSURE: 59 MMHG | SYSTOLIC BLOOD PRESSURE: 131 MMHG | OXYGEN SATURATION: 100 % | TEMPERATURE: 99 F | RESPIRATION RATE: 20 BRPM

## 2024-01-31 DIAGNOSIS — L02.91 ABSCESS: Primary | ICD-10-CM

## 2024-01-31 DIAGNOSIS — L73.9 FOLLICULITIS: ICD-10-CM

## 2024-01-31 PROCEDURE — 25000003 PHARM REV CODE 250: Performed by: STUDENT IN AN ORGANIZED HEALTH CARE EDUCATION/TRAINING PROGRAM

## 2024-01-31 PROCEDURE — 99283 EMERGENCY DEPT VISIT LOW MDM: CPT

## 2024-01-31 RX ORDER — SULFAMETHOXAZOLE AND TRIMETHOPRIM 800; 160 MG/1; MG/1
1 TABLET ORAL
Status: COMPLETED | OUTPATIENT
Start: 2024-01-31 | End: 2024-01-31

## 2024-01-31 RX ORDER — SULFAMETHOXAZOLE AND TRIMETHOPRIM 800; 160 MG/1; MG/1
1 TABLET ORAL 2 TIMES DAILY
Qty: 14 TABLET | Refills: 0 | Status: SHIPPED | OUTPATIENT
Start: 2024-01-31 | End: 2024-02-07

## 2024-01-31 RX ADMIN — SULFAMETHOXAZOLE AND TRIMETHOPRIM 1 TABLET: 800; 160 TABLET ORAL at 04:01
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
## 2024-01-31 NOTE — ED NOTES
Discharge instructions reviewed with patient. Patient verbalized understanding of all discharge information

## 2024-02-20 ENCOUNTER — OFFICE VISIT (OUTPATIENT)
Dept: UROLOGY | Facility: CLINIC | Age: 39
End: 2024-02-20
Payer: MEDICAID

## 2024-02-20 ENCOUNTER — PATIENT MESSAGE (OUTPATIENT)
Dept: UROLOGY | Facility: CLINIC | Age: 39
End: 2024-02-20

## 2024-02-20 DIAGNOSIS — Z93.59 CHRONIC SUPRAPUBIC CATHETER: ICD-10-CM

## 2024-02-20 DIAGNOSIS — Z43.5 ENCOUNTER FOR CARE OR REPLACEMENT OF SUPRAPUBIC TUBE: ICD-10-CM

## 2024-02-20 DIAGNOSIS — R39.9 UTI SYMPTOMS: ICD-10-CM

## 2024-02-20 DIAGNOSIS — R33.9 URINARY RETENTION: ICD-10-CM

## 2024-02-20 DIAGNOSIS — G82.50 TETRAPLEGIC: ICD-10-CM

## 2024-02-20 DIAGNOSIS — N31.9 NEUROGENIC BLADDER: Primary | ICD-10-CM

## 2024-02-20 DIAGNOSIS — N52.9 ED (ERECTILE DYSFUNCTION) OF ORGANIC ORIGIN: ICD-10-CM

## 2024-02-20 DIAGNOSIS — R25.2 SPASTICITY: ICD-10-CM

## 2024-02-20 PROCEDURE — 99214 OFFICE O/P EST MOD 30 MIN: CPT | Mod: S$PBB,25,, | Performed by: NURSE PRACTITIONER

## 2024-02-20 PROCEDURE — 87077 CULTURE AEROBIC IDENTIFY: CPT | Performed by: NURSE PRACTITIONER

## 2024-02-20 PROCEDURE — 51705 CHANGE OF BLADDER TUBE: CPT | Mod: PBBFAC | Performed by: NURSE PRACTITIONER

## 2024-02-20 PROCEDURE — 99999 PR PBB SHADOW E&M-EST. PATIENT-LVL IV: CPT | Mod: PBBFAC,,, | Performed by: NURSE PRACTITIONER

## 2024-02-20 PROCEDURE — 1160F RVW MEDS BY RX/DR IN RCRD: CPT | Mod: CPTII,,, | Performed by: NURSE PRACTITIONER

## 2024-02-20 PROCEDURE — 87186 SC STD MICRODIL/AGAR DIL: CPT | Performed by: NURSE PRACTITIONER

## 2024-02-20 PROCEDURE — 51705 CHANGE OF BLADDER TUBE: CPT | Mod: S$PBB,,, | Performed by: NURSE PRACTITIONER

## 2024-02-20 PROCEDURE — 87088 URINE BACTERIA CULTURE: CPT | Performed by: NURSE PRACTITIONER

## 2024-02-20 PROCEDURE — 99214 OFFICE O/P EST MOD 30 MIN: CPT | Mod: PBBFAC,25 | Performed by: NURSE PRACTITIONER

## 2024-02-20 PROCEDURE — 87086 URINE CULTURE/COLONY COUNT: CPT | Performed by: NURSE PRACTITIONER

## 2024-02-20 PROCEDURE — 1159F MED LIST DOCD IN RCRD: CPT | Mod: CPTII,,, | Performed by: NURSE PRACTITIONER

## 2024-02-20 RX ORDER — DIAZEPAM 10 MG/1
10 TABLET ORAL 2 TIMES DAILY PRN
Qty: 80 TABLET | Refills: 0 | Status: SHIPPED | OUTPATIENT
Start: 2024-02-20

## 2024-02-20 RX ORDER — OXYCODONE HCL 10 MG/1
10 TABLET, FILM COATED, EXTENDED RELEASE ORAL EVERY 12 HOURS
Qty: 60 TABLET | Refills: 0 | Status: SHIPPED | OUTPATIENT
Start: 2024-02-20 | End: 2024-02-23

## 2024-02-20 RX ORDER — OXYCODONE HCL 10 MG/1
10 TABLET, FILM COATED, EXTENDED RELEASE ORAL EVERY 12 HOURS
Qty: 60 TABLET | Refills: 0 | OUTPATIENT
Start: 2024-02-20 | End: 2024-03-21
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
## 2024-02-21 ENCOUNTER — PATIENT MESSAGE (OUTPATIENT)
Dept: PHYSICAL MEDICINE AND REHAB | Facility: CLINIC | Age: 39
End: 2024-02-21
Payer: MEDICAID

## 2024-02-22 ENCOUNTER — PATIENT MESSAGE (OUTPATIENT)
Dept: PHYSICAL MEDICINE AND REHAB | Facility: CLINIC | Age: 39
End: 2024-02-22
Payer: MEDICAID

## 2024-02-22 LAB — BACTERIA UR CULT: ABNORMAL

## 2024-02-23 ENCOUNTER — PATIENT MESSAGE (OUTPATIENT)
Dept: PHYSICAL MEDICINE AND REHAB | Facility: CLINIC | Age: 39
End: 2024-02-23
Payer: MEDICAID

## 2024-02-23 ENCOUNTER — PATIENT MESSAGE (OUTPATIENT)
Dept: INTERNAL MEDICINE | Facility: CLINIC | Age: 39
End: 2024-02-23
Payer: MEDICAID

## 2024-02-23 ENCOUNTER — PATIENT MESSAGE (OUTPATIENT)
Dept: UROLOGY | Facility: CLINIC | Age: 39
End: 2024-02-23
Payer: MEDICAID

## 2024-02-23 DIAGNOSIS — R39.9 UTI SYMPTOMS: ICD-10-CM

## 2024-02-23 DIAGNOSIS — N31.9 NEUROGENIC BLADDER: Primary | ICD-10-CM

## 2024-02-23 DIAGNOSIS — A49.9 BACTERIAL UTI: ICD-10-CM

## 2024-02-23 DIAGNOSIS — R30.0 DYSURIA: ICD-10-CM

## 2024-02-23 DIAGNOSIS — N39.0 BACTERIAL UTI: ICD-10-CM

## 2024-02-23 RX ORDER — NITROFURANTOIN 25; 75 MG/1; MG/1
100 CAPSULE ORAL 2 TIMES DAILY
Qty: 20 CAPSULE | Refills: 0 | Status: SHIPPED | OUTPATIENT
Start: 2024-02-23 | End: 2024-03-04

## 2024-02-27 ENCOUNTER — PROCEDURE VISIT (OUTPATIENT)
Dept: PHYSICAL MEDICINE AND REHAB | Facility: CLINIC | Age: 39
End: 2024-02-27
Payer: MEDICAID

## 2024-02-27 ENCOUNTER — PATIENT MESSAGE (OUTPATIENT)
Dept: PHYSICAL MEDICINE AND REHAB | Facility: CLINIC | Age: 39
End: 2024-02-27

## 2024-02-27 VITALS
HEART RATE: 62 BPM | SYSTOLIC BLOOD PRESSURE: 91 MMHG | DIASTOLIC BLOOD PRESSURE: 51 MMHG | BODY MASS INDEX: 26.63 KG/M2 | HEIGHT: 67 IN

## 2024-02-27 DIAGNOSIS — G82.21 CHRONIC COMPLETE PARAPLEGIA: ICD-10-CM

## 2024-02-27 DIAGNOSIS — G83.9 SPASTIC PARALYSIS: Primary | ICD-10-CM

## 2024-02-27 PROCEDURE — 62370 ANL SP INF PMP W/MDREPRG&FIL: CPT | Mod: S$PBB,,, | Performed by: PHYSICAL MEDICINE & REHABILITATION

## 2024-02-27 PROCEDURE — 62370 ANL SP INF PMP W/MDREPRG&FIL: CPT | Mod: PBBFAC | Performed by: PHYSICAL MEDICINE & REHABILITATION

## 2024-02-27 PROCEDURE — 99999PBSHW PR PBB SHADOW TECHNICAL ONLY FILED TO HB: Mod: JZ,PBBFAC,,

## 2024-02-27 RX ADMIN — BACLOFEN 80 MG: 40 INJECTION INTRATHECAL at 07:02
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
## 2024-02-28 NOTE — PROCEDURES
ITB PUMP REFILL PROCEDURE NOTE:    ITB Pump Record/Settings:   Pump Location: RLQ  Estimated Pump Replacement: March 2027  Last examined: 12/13/2023  Last change:  12/13/2023  Drug Concentration: 2000 mcg/mL  Infusion Mode: Flex dosing  Reservoir Volume: 40  Is [place] Alarm Date:   12/25/2023         The potential risks were discussed with the patient and the patient elected to proceed.  The patient was placed in a supine position and the pump was located by palpation.  The pump was interrogated and found to be delivering a dose of 909.8 ug/day with a residual volume of 5.5 ml.      Using sterile technique the area was cleaned with betadine and a sterile field applied.  Using a 22G needle the port was pierced with no difficulty and 5.5 ml residual diluent was aspirated.  The new diluent was prepared in a 40cc syringe and delivered using the supplied filter without difficulty.  The pump was then reprogrammed for the new volume.    The pump was also reprogrammed to deliver a total daily dose of 909.8 mcg/day.    The new low reservoir alarm date is 5/20/2024.

## 2024-03-05 NOTE — ASSESSMENT & PLAN NOTE
Colostomy status  Monitor output. Routine colostomy care.      Patient requests all Lab, Cardiology, and Radiology Results on their Discharge Instructions

## 2024-03-19 ENCOUNTER — PATIENT MESSAGE (OUTPATIENT)
Dept: PHYSICAL MEDICINE AND REHAB | Facility: CLINIC | Age: 39
End: 2024-03-19
Payer: MEDICAID

## 2024-03-22 ENCOUNTER — PATIENT MESSAGE (OUTPATIENT)
Dept: PHYSICAL MEDICINE AND REHAB | Facility: CLINIC | Age: 39
End: 2024-03-22
Payer: MEDICAID

## 2024-03-22 DIAGNOSIS — M79.2 NEUROPATHIC PAIN: Chronic | ICD-10-CM

## 2024-03-22 DIAGNOSIS — G82.20 PARAPLEGIA FOLLOWING SPINAL CORD INJURY: Chronic | ICD-10-CM

## 2024-03-25 RX ORDER — OXYCODONE HCL 10 MG/1
10 TABLET, FILM COATED, EXTENDED RELEASE ORAL EVERY 12 HOURS
Qty: 60 TABLET | Refills: 0 | Status: SHIPPED | OUTPATIENT
Start: 2024-03-25 | End: 2024-04-26 | Stop reason: SDUPTHER

## 2024-03-25 RX ORDER — OXYCODONE AND ACETAMINOPHEN 10; 325 MG/1; MG/1
1 TABLET ORAL EVERY 6 HOURS PRN
Qty: 110 TABLET | Refills: 0 | Status: SHIPPED | OUTPATIENT
Start: 2024-03-25 | End: 2024-04-26 | Stop reason: SDUPTHER

## 2024-04-08 ENCOUNTER — PATIENT MESSAGE (OUTPATIENT)
Dept: UROLOGY | Facility: CLINIC | Age: 39
End: 2024-04-08
Payer: MEDICAID

## 2024-04-08 DIAGNOSIS — R39.9 UTI SYMPTOMS: Primary | ICD-10-CM

## 2024-04-08 DIAGNOSIS — R30.0 DYSURIA: ICD-10-CM

## 2024-04-08 DIAGNOSIS — A49.9 BACTERIAL UTI: ICD-10-CM

## 2024-04-08 DIAGNOSIS — N32.89 BLADDER SPASMS: ICD-10-CM

## 2024-04-08 DIAGNOSIS — N39.0 BACTERIAL UTI: ICD-10-CM

## 2024-04-08 PROCEDURE — 99999PBSHW PR PBB SHADOW TECHNICAL ONLY FILED TO HB: Mod: JZ,PBBFAC,,

## 2024-04-08 RX ORDER — OXYBUTYNIN CHLORIDE 5 MG/1
5 TABLET, EXTENDED RELEASE ORAL DAILY
Qty: 30 TABLET | Refills: 11 | Status: SHIPPED | OUTPATIENT
Start: 2024-04-08 | End: 2025-04-08

## 2024-04-08 RX ORDER — AMOXICILLIN AND CLAVULANATE POTASSIUM 500; 125 MG/1; MG/1
1 TABLET, FILM COATED ORAL 2 TIMES DAILY
Qty: 20 TABLET | Refills: 0 | Status: SHIPPED | OUTPATIENT
Start: 2024-04-08 | End: 2024-04-18

## 2024-04-08 RX ADMIN — BACLOFEN 80 MG: 40 INJECTION INTRATHECAL at 09:04

## 2024-04-23 RX ORDER — PROMETHAZINE HYDROCHLORIDE 6.25 MG/5ML
SYRUP ORAL
Qty: 150 ML | Refills: 2 | Status: SHIPPED | OUTPATIENT
Start: 2024-04-23 | End: 2024-04-25 | Stop reason: SDUPTHER

## 2024-04-23 NOTE — TELEPHONE ENCOUNTER
No care due was identified.  [facility] Embedded Care Due Messages. Reference number: [id number].   4/23/2024 3:46:54 PM CDT

## 2024-04-25 ENCOUNTER — PATIENT MESSAGE (OUTPATIENT)
Dept: UROLOGY | Facility: CLINIC | Age: 39
End: 2024-04-25
Payer: MEDICAID

## 2024-04-25 ENCOUNTER — PATIENT MESSAGE (OUTPATIENT)
Dept: PHYSICAL MEDICINE AND REHAB | Facility: CLINIC | Age: 39
End: 2024-04-25
Payer: MEDICAID

## 2024-04-25 DIAGNOSIS — R39.9 UTI SYMPTOMS: Primary | ICD-10-CM

## 2024-04-25 DIAGNOSIS — M79.2 NEUROPATHIC PAIN: Chronic | ICD-10-CM

## 2024-04-25 DIAGNOSIS — G82.20 PARAPLEGIA FOLLOWING SPINAL CORD INJURY: Chronic | ICD-10-CM

## 2024-04-25 RX ORDER — OXYCODONE AND ACETAMINOPHEN 10; 325 MG/1; MG/1
1 TABLET ORAL EVERY 6 HOURS PRN
Qty: 110 TABLET | Refills: 0 | Status: CANCELLED | OUTPATIENT
Start: 2024-04-25 | End: 2024-05-25

## 2024-04-25 RX ORDER — NITROFURANTOIN 25; 75 MG/1; MG/1
100 CAPSULE ORAL 2 TIMES DAILY
Qty: 20 CAPSULE | Refills: 0 | Status: SHIPPED | OUTPATIENT
Start: 2024-04-25 | End: 2024-05-05

## 2024-04-25 RX ORDER — PROMETHAZINE HYDROCHLORIDE 6.25 MG/5ML
SYRUP ORAL
Qty: 150 ML | Refills: 2 | Status: SHIPPED | OUTPATIENT
Start: 2024-04-25 | End: 2024-06-07 | Stop reason: SDUPTHER

## 2024-04-25 NOTE — TELEPHONE ENCOUNTER
No care due was identified.  [facility] Embedded Care Due Messages. Reference number: [id number].   4/25/2024 10:38:14 AM CDT

## 2024-04-25 NOTE — TELEPHONE ENCOUNTER
Called pt with no answer.   Rx Oxycodone has refills until 2025. Pt to contact [facility]

## 2024-04-26 ENCOUNTER — PATIENT MESSAGE (OUTPATIENT)
Dept: PHYSICAL MEDICINE AND REHAB | Facility: CLINIC | Age: 39
End: 2024-04-26
Payer: MEDICAID

## 2024-04-26 DIAGNOSIS — G82.20 PARAPLEGIA FOLLOWING SPINAL CORD INJURY: Chronic | ICD-10-CM

## 2024-04-26 DIAGNOSIS — M79.2 NEUROPATHIC PAIN: Chronic | ICD-10-CM

## 2024-04-26 RX ORDER — OXYCODONE HCL 10 MG/1
10 TABLET, FILM COATED, EXTENDED RELEASE ORAL EVERY 12 HOURS
Qty: 60 TABLET | Refills: 0 | Status: SHIPPED | OUTPATIENT
Start: 2024-05-02 | End: 2024-05-08 | Stop reason: SDUPTHER

## 2024-04-26 RX ORDER — OXYCODONE AND ACETAMINOPHEN 10; 325 MG/1; MG/1
1 TABLET ORAL EVERY 6 HOURS PRN
Qty: 110 TABLET | Refills: 0 | Status: SHIPPED | OUTPATIENT
Start: 2024-04-26 | End: 2024-05-08 | Stop reason: SDUPTHER

## 2024-04-26 NOTE — TELEPHONE ENCOUNTER
----- Message from Shayy Root sent at 4/26/2024  7:48 AM CDT -----  Nghia Edgar calling for Refills  for  oxyCODONE (OXYCONTIN) 10 mg 12 hr tablet      [facility]/pharmacy #[id number] - [place] - [address]  [address]  [place] [address]  Phone: [phone] Fax: [phone]

## 2024-04-26 NOTE — TELEPHONE ENCOUNTER
LVM and patient portal message to patient x3 on yesterday.  Duplicate refill request message.    03/25/24 -04/24/24 last Rx refill  10/25/203 last appt   05/21/24 RTC

## 2024-04-29 RX ORDER — OXYCODONE HCL 10 MG/1
10 TABLET, FILM COATED, EXTENDED RELEASE ORAL EVERY 12 HOURS
Qty: 60 TABLET | Refills: 0 | Status: CANCELLED | OUTPATIENT
Start: 2024-04-29 | End: 2024-05-29

## 2024-04-30 ENCOUNTER — PATIENT OUTREACH (OUTPATIENT)
Dept: ADMINISTRATIVE | Facility: HOSPITAL | Age: 39
End: 2024-04-30
Payer: MEDICAID

## 2024-05-06 ENCOUNTER — PATIENT MESSAGE (OUTPATIENT)
Dept: UROLOGY | Facility: CLINIC | Age: 39
End: 2024-05-06
Payer: MEDICAID

## 2024-05-07 ENCOUNTER — PATIENT MESSAGE (OUTPATIENT)
Dept: PHYSICAL MEDICINE AND REHAB | Facility: CLINIC | Age: 39
End: 2024-05-07
Payer: MEDICAID

## 2024-05-08 ENCOUNTER — PATIENT MESSAGE (OUTPATIENT)
Dept: PHYSICAL MEDICINE AND REHAB | Facility: CLINIC | Age: 39
End: 2024-05-08
Payer: MEDICAID

## 2024-05-08 DIAGNOSIS — M79.2 NEUROPATHIC PAIN: Chronic | ICD-10-CM

## 2024-05-08 DIAGNOSIS — G82.20 PARAPLEGIA FOLLOWING SPINAL CORD INJURY: Chronic | ICD-10-CM

## 2024-05-09 ENCOUNTER — PATIENT MESSAGE (OUTPATIENT)
Dept: PHYSICAL MEDICINE AND REHAB | Facility: CLINIC | Age: 39
End: 2024-05-09
Payer: MEDICAID

## 2024-05-09 NOTE — TELEPHONE ENCOUNTER
----- Message from Alec Shaw sent at 5/9/2024  8:56 AM CDT -----  Regarding: Refills  Contact: [phone]  Patient called requesting a refill on  oxyCODONE-acetaminophen (PERCOCET)  mg per tablet andoxyCODONE (OXYCONTIN) 10 mg 12 hr tablet  medication. Please contact patient as soon as possible.     [place], [place]   Phone: [phone]

## 2024-05-10 RX ORDER — OXYCODONE AND ACETAMINOPHEN 10; 325 MG/1; MG/1
1 TABLET ORAL EVERY 6 HOURS PRN
Qty: 110 TABLET | Refills: 0 | Status: SHIPPED | OUTPATIENT
Start: 2024-05-10 | End: 2024-06-13 | Stop reason: SDUPTHER

## 2024-05-10 RX ORDER — OXYCODONE HCL 10 MG/1
10 TABLET, FILM COATED, EXTENDED RELEASE ORAL EVERY 12 HOURS
Qty: 60 TABLET | Refills: 0 | Status: SHIPPED | OUTPATIENT
Start: 2024-05-10 | End: 2024-06-16

## 2024-05-14 ENCOUNTER — LAB VISIT (OUTPATIENT)
Dept: LAB | Facility: HOSPITAL | Age: 39
End: 2024-05-14
Attending: INTERNAL MEDICINE
Payer: MEDICAID

## 2024-05-14 ENCOUNTER — OFFICE VISIT (OUTPATIENT)
Dept: INTERNAL MEDICINE | Facility: CLINIC | Age: 39
End: 2024-05-14
Payer: MEDICAID

## 2024-05-14 VITALS
HEIGHT: 67 IN | WEIGHT: 170 LBS | OXYGEN SATURATION: 96 % | DIASTOLIC BLOOD PRESSURE: 70 MMHG | BODY MASS INDEX: 26.68 KG/M2 | HEART RATE: 74 BPM | SYSTOLIC BLOOD PRESSURE: 122 MMHG

## 2024-05-14 DIAGNOSIS — L60.8 TOENAIL DEFORMITY: ICD-10-CM

## 2024-05-14 DIAGNOSIS — L98.9 SKIN LESION: ICD-10-CM

## 2024-05-14 DIAGNOSIS — K59.2 NEUROGENIC BOWEL: Chronic | ICD-10-CM

## 2024-05-14 DIAGNOSIS — Z93.3 COLOSTOMY STATUS: Chronic | ICD-10-CM

## 2024-05-14 DIAGNOSIS — K59.00 CONSTIPATION, UNSPECIFIED CONSTIPATION TYPE: ICD-10-CM

## 2024-05-14 DIAGNOSIS — J45.20 MILD INTERMITTENT ASTHMA WITHOUT COMPLICATION: ICD-10-CM

## 2024-05-14 DIAGNOSIS — G82.20 PARAPLEGIA FOLLOWING SPINAL CORD INJURY: Chronic | ICD-10-CM

## 2024-05-14 DIAGNOSIS — Z00.00 ANNUAL PHYSICAL EXAM: ICD-10-CM

## 2024-05-14 DIAGNOSIS — Z00.00 ANNUAL PHYSICAL EXAM: Primary | ICD-10-CM

## 2024-05-14 DIAGNOSIS — Z93.59 CHRONIC SUPRAPUBIC CATHETER: ICD-10-CM

## 2024-05-14 DIAGNOSIS — Z97.8 PRESENCE OF INTRATHECAL BACLOFEN PUMP: ICD-10-CM

## 2024-05-14 DIAGNOSIS — N31.9 NEUROGENIC BLADDER: Chronic | ICD-10-CM

## 2024-05-14 LAB
ALBUMIN SERPL BCP-MCNC: 3.8 G/DL (ref 3.5–5.2)
ALP SERPL-CCNC: 101 U/L (ref 55–135)
ALT SERPL W/O P-5'-P-CCNC: 12 U/L (ref 10–44)
ANION GAP SERPL CALC-SCNC: 12 MMOL/L (ref 8–16)
AST SERPL-CCNC: 15 U/L (ref 10–40)
BASOPHILS # BLD AUTO: 0.06 K/UL (ref 0–0.2)
BASOPHILS NFR BLD: 0.8 % (ref 0–1.9)
BILIRUB SERPL-MCNC: 0.2 MG/DL (ref 0.1–1)
BUN SERPL-MCNC: 10 MG/DL (ref 6–20)
CALCIUM SERPL-MCNC: 8.8 MG/DL (ref 8.7–10.5)
CHLORIDE SERPL-SCNC: 106 MMOL/L (ref 95–110)
CHOLEST SERPL-MCNC: 151 MG/DL (ref 120–199)
CHOLEST/HDLC SERPL: 3.1 {RATIO} (ref 2–5)
CO2 SERPL-SCNC: 22 MMOL/L (ref 23–29)
CREAT SERPL-MCNC: 0.7 MG/DL (ref 0.5–1.4)
DIFFERENTIAL METHOD BLD: ABNORMAL
EOSINOPHIL # BLD AUTO: 0.1 K/UL (ref 0–0.5)
EOSINOPHIL NFR BLD: 0.8 % (ref 0–8)
ERYTHROCYTE [DISTWIDTH] IN BLOOD BY AUTOMATED COUNT: 18.9 % (ref 11.5–14.5)
EST. GFR  (NO RACE VARIABLE): >60 ML/MIN/1.73 M^2
ESTIMATED AVG GLUCOSE: 91 MG/DL (ref 68–131)
FERRITIN SERPL-MCNC: 3 NG/ML (ref 20–300)
GLUCOSE SERPL-MCNC: 102 MG/DL (ref 70–110)
HBA1C MFR BLD: 4.8 % (ref 4–5.6)
HCT VFR BLD AUTO: 33.6 % (ref 40–54)
HDLC SERPL-MCNC: 49 MG/DL (ref 40–75)
HDLC SERPL: 32.5 % (ref 20–50)
HGB BLD-MCNC: 9 G/DL (ref 14–18)
IMM GRANULOCYTES # BLD AUTO: 0.02 K/UL (ref 0–0.04)
IMM GRANULOCYTES NFR BLD AUTO: 0.3 % (ref 0–0.5)
IRON SERPL-MCNC: 11 UG/DL (ref 45–160)
LDLC SERPL CALC-MCNC: 90.2 MG/DL (ref 63–159)
LYMPHOCYTES # BLD AUTO: 1.3 K/UL (ref 1–4.8)
LYMPHOCYTES NFR BLD: 15.9 % (ref 18–48)
MCH RBC QN AUTO: 20.5 PG (ref 27–31)
MCHC RBC AUTO-ENTMCNC: 26.8 G/DL (ref 32–36)
MCV RBC AUTO: 76 FL (ref 82–98)
MONOCYTES # BLD AUTO: 0.7 K/UL (ref 0.3–1)
MONOCYTES NFR BLD: 8.5 % (ref 4–15)
NEUTROPHILS # BLD AUTO: 5.8 K/UL (ref 1.8–7.7)
NEUTROPHILS NFR BLD: 73.7 % (ref 38–73)
NONHDLC SERPL-MCNC: 102 MG/DL
NRBC BLD-RTO: 0 /100 WBC
PLATELET # BLD AUTO: 357 K/UL (ref 150–450)
PMV BLD AUTO: 11.9 FL (ref 9.2–12.9)
POTASSIUM SERPL-SCNC: 3.8 MMOL/L (ref 3.5–5.1)
PROT SERPL-MCNC: 6.9 G/DL (ref 6–8.4)
RBC # BLD AUTO: 4.4 M/UL (ref 4.6–6.2)
SATURATED IRON: 2 % (ref 20–50)
SODIUM SERPL-SCNC: 140 MMOL/L (ref 136–145)
T4 FREE SERPL-MCNC: 0.96 NG/DL (ref 0.71–1.51)
TOTAL IRON BINDING CAPACITY: 525 UG/DL (ref 250–450)
TRANSFERRIN SERPL-MCNC: 355 MG/DL (ref 200–375)
TRIGL SERPL-MCNC: 59 MG/DL (ref 30–150)
TSH SERPL DL<=0.005 MIU/L-ACNC: 0.34 UIU/ML (ref 0.4–4)
WBC # BLD AUTO: 7.85 K/UL (ref 3.9–12.7)

## 2024-05-14 PROCEDURE — 80061 LIPID PANEL: CPT | Performed by: INTERNAL MEDICINE

## 2024-05-14 PROCEDURE — 83540 ASSAY OF IRON: CPT | Performed by: INTERNAL MEDICINE

## 2024-05-14 PROCEDURE — 3074F SYST BP LT 130 MM HG: CPT | Mod: CPTII,,, | Performed by: INTERNAL MEDICINE

## 2024-05-14 PROCEDURE — 82728 ASSAY OF FERRITIN: CPT | Performed by: INTERNAL MEDICINE

## 2024-05-14 PROCEDURE — 1159F MED LIST DOCD IN RCRD: CPT | Mod: CPTII,,, | Performed by: INTERNAL MEDICINE

## 2024-05-14 PROCEDURE — 84439 ASSAY OF FREE THYROXINE: CPT | Performed by: INTERNAL MEDICINE

## 2024-05-14 PROCEDURE — 85025 COMPLETE CBC W/AUTO DIFF WBC: CPT | Performed by: INTERNAL MEDICINE

## 2024-05-14 PROCEDURE — 80053 COMPREHEN METABOLIC PANEL: CPT | Performed by: INTERNAL MEDICINE

## 2024-05-14 PROCEDURE — 84443 ASSAY THYROID STIM HORMONE: CPT | Performed by: INTERNAL MEDICINE

## 2024-05-14 PROCEDURE — 83036 HEMOGLOBIN GLYCOSYLATED A1C: CPT | Performed by: INTERNAL MEDICINE

## 2024-05-14 PROCEDURE — 99999 PR PBB SHADOW E&M-EST. PATIENT-LVL V: CPT | Mod: PBBFAC,,, | Performed by: INTERNAL MEDICINE

## 2024-05-14 PROCEDURE — 99215 OFFICE O/P EST HI 40 MIN: CPT | Mod: PBBFAC | Performed by: INTERNAL MEDICINE

## 2024-05-14 PROCEDURE — 3078F DIAST BP <80 MM HG: CPT | Mod: CPTII,,, | Performed by: INTERNAL MEDICINE

## 2024-05-14 PROCEDURE — 3008F BODY MASS INDEX DOCD: CPT | Mod: CPTII,,, | Performed by: INTERNAL MEDICINE

## 2024-05-14 PROCEDURE — 99396 PREV VISIT EST AGE 40-64: CPT | Mod: S$PBB,,, | Performed by: INTERNAL MEDICINE

## 2024-05-14 PROCEDURE — 36415 COLL VENOUS BLD VENIPUNCTURE: CPT | Performed by: INTERNAL MEDICINE

## 2024-05-14 RX ORDER — ALBUTEROL SULFATE 90 UG/1
1-2 AEROSOL, METERED RESPIRATORY (INHALATION) EVERY 6 HOURS PRN
Qty: 18 G | Refills: 3 | Status: SHIPPED | OUTPATIENT
Start: 2024-05-14

## 2024-05-14 RX ORDER — IPRATROPIUM BROMIDE AND ALBUTEROL SULFATE 2.5; .5 MG/3ML; MG/3ML
3 SOLUTION RESPIRATORY (INHALATION) EVERY 6 HOURS PRN
Qty: 90 ML | Refills: 6 | Status: SHIPPED | OUTPATIENT
Start: 2024-05-14

## 2024-05-14 RX ORDER — POLYETHYLENE GLYCOL 3350 17 G/17G
17 POWDER, FOR SOLUTION ORAL DAILY PRN
Qty: 510 G | Refills: 6 | Status: SHIPPED | OUTPATIENT
Start: 2024-05-14

## 2024-05-14 RX ORDER — PANTOPRAZOLE SODIUM 40 MG/1
40 TABLET, DELAYED RELEASE ORAL DAILY
Qty: 90 TABLET | Refills: 3 | Status: SHIPPED | OUTPATIENT
Start: 2024-05-14 | End: 2025-05-14

## 2024-05-14 RX ORDER — AMOXICILLIN 250 MG
1 CAPSULE ORAL DAILY
Qty: 30 TABLET | Refills: 11 | Status: SHIPPED | OUTPATIENT
Start: 2024-05-14

## 2024-05-14 NOTE — PROGRESS NOTES
"Subjective:       Patient ID: Nghia Edgar Jr. is a 38 y.o. male.    Chief Complaint: Annual Exam  A 38-year-old who presents today for physical reports he needs some refills sent to new pharmacy.  Since last visit he did go in to see outlying gastroenterologist at [facility] and had EGD and a colonoscopy he reports he was told tests were normal he does get reflux on occasion and uses proton pump inhibitor .  He continues have issues on occasion with breakdown on his bottom he is getting a new wheelchair cushion later today and reports that he has had no active drainage but sometimes the area tears when he moves uses a bandage and has his sitters and daughter help monitor he has had no active drainage to the area.  He continues to follow with his urologist for neurogenic bladder and pain management for his baclofen pump and pain medications he does get constipated on occasion but will use stool softener or MiraLax and would like a refill    HPI  Review of Systems   Respiratory:          Occasional asthma cough in winter more   Gastrointestinal:  Positive for constipation.        Reflux at times    Genitourinary:         Neurogenic bladder   Musculoskeletal:         Pain/spasms since injury         Objective:    Blood pressure 122/70, pulse 74, height 5' 7" (1.702 m), weight 77.1 kg (169 lb 15.6 oz), SpO2 96%.   Physical Exam  Constitutional:       General: He is not in acute distress.     Comments: Seated in wheelchair    HENT:      Head: Normocephalic.      Mouth/Throat:      Pharynx: Oropharynx is clear.   Eyes:      General: No scleral icterus.  Cardiovascular:      Rate and Rhythm: Normal rate and regular rhythm.      Heart sounds: Normal heart sounds. No murmur heard.     No friction rub. No gallop.   Pulmonary:      Effort: Pulmonary effort is normal. No respiratory distress.      Breath sounds: Normal breath sounds.   Abdominal:      General: Bowel sounds are normal.      Palpations: Abdomen is soft. " There is no mass.      Tenderness: There is no abdominal tenderness.      Comments: Baclofen pump abdomen    Genitourinary:     Comments: Colostomy  Suprapubic catheter in place  Musculoskeletal:      Cervical back: Neck supple.   Skin:     Findings: No erythema.      Comments: Some breakdown from shearing ischum  No drainage    Neurological:      Mental Status: He is alert.   Psychiatric:         Mood and Affect: Mood normal.         Assessment:       1. Annual physical exam    2. Constipation, unspecified constipation type    3. Toenail deformity    4. Paraplegia following spinal cord injury    5. Mild intermittent asthma without complication    6. Chronic suprapubic catheter    7. Presence of intrathecal baclofen pump    8. Colostomy status    9. Neurogenic bladder    10. Neurogenic bowel    11. Skin lesion        Plan:       Nghia was seen today for annual exam.    Diagnoses and all orders for this visit:    Annual physical exam  -     CBC Auto Differential; Future  -     Comprehensive Metabolic Panel; Future  -     Hemoglobin A1C; Future  -     Lipid Panel; Future  -     TSH; Future  -     Ferritin; Future  -     Iron and TIBC; Future    Constipation, unspecified constipation type  Neurogenic bowel refill of stool softener and he can use MiraLax as well  -     senna-docusate 8.6-50 mg (PERICOLACE) 8.6-50 mg per tablet; Take 1 tablet by mouth once daily.    Toenail deformity  -     Ambulatory referral/consult to Podiatry; Future    Paraplegia following spinal cord injury  -     Ambulatory referral/consult to Podiatry; Future    Mild intermittent asthma without complication    Chronic suprapubic catheter  Neurogenic bladder   Continues to follow with urology    Presence of intrathecal baclofen pump  History of continues to follow with pain management    Colostomy status  Neurogenic bowel  Reflux at times  Reports he did see outlying gastroenterologist and had a colonoscopy and EGD at that time at [facility]  reports no significant concerns he does take reflux medication on occasion when he gets his symptoms    Neurogenic bowel   Continue precautions refills provided    Skin lesion  Chronic intermittent breakdown discussed precautions try to avoid sliding and tearing of the area which seems to be what happens he does have a new wheelchair cushion that he is planning to  later today and encouraged him to use that and avoid excess pressure he will continue to dress consider nonstick gauze and a bandage to protect he has home sitters can assist him and monitoring call if no resolution    Intermittent asthma with cough uses his inhaler DuoNeb when needed occasional cough syrup will call when refill needed    Other orders  -     albuterol (PROAIR HFA) 90 mcg/actuation inhaler; Inhale 1-2 puffs into the lungs every 6 (six) hours as needed for Wheezing or Shortness of Breath.  -     albuterol-ipratropium (DUO-NEB) 2.5 mg-0.5 mg/3 mL nebulizer solution; Take 3 mLs by nebulization every 6 (six) hours as needed for Wheezing. USE IN NEBULIZER 1 VIAL EVERY 6 HOURS AS NEEDED FOR WHEEZING AND COUGH  Strength: 0.5 MG-3 MG(2.5 mg base)/3 mL  -     polyethylene glycol (GLYCOLAX) 17 gram/dose powder; Take 17 g by mouth daily as needed for Constipation.  -     pantoprazole (PROTONIX) 40 MG tablet; Take 1 tablet (40 mg total) by mouth once daily.

## 2024-05-15 ENCOUNTER — TELEPHONE (OUTPATIENT)
Dept: INTERNAL MEDICINE | Facility: CLINIC | Age: 39
End: 2024-05-15
Payer: MEDICAID

## 2024-05-15 DIAGNOSIS — D64.9 ANEMIA, UNSPECIFIED TYPE: Primary | ICD-10-CM

## 2024-05-15 RX ORDER — FERROUS SULFATE 325(65) MG
325 TABLET ORAL DAILY
Qty: 30 TABLET | Refills: 4 | Status: SHIPPED | OUTPATIENT
Start: 2024-05-15

## 2024-05-15 NOTE — TELEPHONE ENCOUNTER
----- Message from Lisbeth Sheehan sent at 5/15/2024  9:05 AM CDT -----  Contact: [phone]  Type: Returning a call    Who left a message?Nohelia Hoover MD    When did the practice call? today    Comments:

## 2024-05-15 NOTE — TELEPHONE ENCOUNTER
Called reviewed with pt  Discussed labs  Anemia iron stores down  Recent scopes he reports  Some absorption prior colon surgery  Will resume iron rx sent  Recheck labs in 3 months  Schedule repeat labs in 3 months

## 2024-05-21 ENCOUNTER — PROCEDURE VISIT (OUTPATIENT)
Dept: PHYSICAL MEDICINE AND REHAB | Facility: CLINIC | Age: 39
End: 2024-05-21
Payer: MEDICAID

## 2024-05-21 VITALS
SYSTOLIC BLOOD PRESSURE: 132 MMHG | BODY MASS INDEX: 27.4 KG/M2 | WEIGHT: 185 LBS | DIASTOLIC BLOOD PRESSURE: 78 MMHG | HEIGHT: 69 IN | HEART RATE: 73 BPM

## 2024-05-21 DIAGNOSIS — G82.21 CHRONIC COMPLETE PARAPLEGIA: ICD-10-CM

## 2024-05-21 DIAGNOSIS — G82.20 PARAPLEGIA FOLLOWING SPINAL CORD INJURY: Primary | ICD-10-CM

## 2024-05-21 PROCEDURE — 99999PBSHW PR PBB SHADOW TECHNICAL ONLY FILED TO HB: Mod: JZ,PBBFAC,,

## 2024-05-21 PROCEDURE — 62370 ANL SP INF PMP W/MDREPRG&FIL: CPT | Mod: PBBFAC | Performed by: PHYSICAL MEDICINE & REHABILITATION

## 2024-05-21 PROCEDURE — 62370 ANL SP INF PMP W/MDREPRG&FIL: CPT | Mod: S$PBB,,, | Performed by: PHYSICAL MEDICINE & REHABILITATION

## 2024-05-21 RX ADMIN — BACLOFEN 80 MG: 40 INJECTION INTRATHECAL at 03:05

## 2024-05-21 NOTE — PROCEDURES
ITB PUMP REFILL PROCEDURE NOTE:    ITB Pump Record/Settings:   Pump Location: RLQ  Estimated Pump Replacement: March 2027  Last examined: 2/27/2024  Last change:  2/27/2024  Drug Concentration: 2000 mcg/mL  Infusion Mode: Flex dosing  Reservoir Volume: 40  Is Low [place] Alarm Date:   5/20/2023        The potential risks were discussed with the patient and the patient elected to proceed.  The patient was placed in a supine position and the pump was located by palpation.  The pump was interrogated and found to be delivering a dose of 909.8 ug/day with a residual volume of 1.9 ml.      Using sterile technique the area was cleaned with betadine and a sterile field applied.  Using a 22G needle the port was pierced with no difficulty and 6.5 ml residual diluent was aspirated.  The new diluent was prepared in a 40cc syringe and delivered using the supplied filter without difficulty.  The pump was then reprogrammed for the new volume.    The pump was also reprogrammed to deliver a total daily dose of 909.8 mcg/day.    The new low reservoir alarm date is 8/12/2024.
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
## 2024-06-03 ENCOUNTER — OFFICE VISIT (OUTPATIENT)
Dept: UROLOGY | Facility: CLINIC | Age: 39
End: 2024-06-03
Payer: MEDICAID

## 2024-06-03 VITALS
BODY MASS INDEX: 26.22 KG/M2 | HEIGHT: 69 IN | DIASTOLIC BLOOD PRESSURE: 45 MMHG | HEART RATE: 72 BPM | WEIGHT: 177 LBS | SYSTOLIC BLOOD PRESSURE: 96 MMHG

## 2024-06-03 DIAGNOSIS — R39.9 UTI SYMPTOMS: ICD-10-CM

## 2024-06-03 DIAGNOSIS — G82.50 TETRAPLEGIC: ICD-10-CM

## 2024-06-03 DIAGNOSIS — N31.9 NEUROGENIC BLADDER: Primary | ICD-10-CM

## 2024-06-03 DIAGNOSIS — Z93.59 CHRONIC SUPRAPUBIC CATHETER: ICD-10-CM

## 2024-06-03 DIAGNOSIS — Z43.5 ENCOUNTER FOR CARE OR REPLACEMENT OF SUPRAPUBIC TUBE: ICD-10-CM

## 2024-06-03 PROCEDURE — 1159F MED LIST DOCD IN RCRD: CPT | Mod: CPTII,,, | Performed by: NURSE PRACTITIONER

## 2024-06-03 PROCEDURE — 87077 CULTURE AEROBIC IDENTIFY: CPT | Performed by: NURSE PRACTITIONER

## 2024-06-03 PROCEDURE — 3008F BODY MASS INDEX DOCD: CPT | Mod: CPTII,,, | Performed by: NURSE PRACTITIONER

## 2024-06-03 PROCEDURE — 87088 URINE BACTERIA CULTURE: CPT | Performed by: NURSE PRACTITIONER

## 2024-06-03 PROCEDURE — 1160F RVW MEDS BY RX/DR IN RCRD: CPT | Mod: CPTII,,, | Performed by: NURSE PRACTITIONER

## 2024-06-03 PROCEDURE — 3074F SYST BP LT 130 MM HG: CPT | Mod: CPTII,,, | Performed by: NURSE PRACTITIONER

## 2024-06-03 PROCEDURE — 99499 UNLISTED E&M SERVICE: CPT | Mod: S$PBB,,, | Performed by: NURSE PRACTITIONER

## 2024-06-03 PROCEDURE — 51705 CHANGE OF BLADDER TUBE: CPT | Mod: PBBFAC | Performed by: NURSE PRACTITIONER

## 2024-06-03 PROCEDURE — 3078F DIAST BP <80 MM HG: CPT | Mod: CPTII,,, | Performed by: NURSE PRACTITIONER

## 2024-06-03 PROCEDURE — 99214 OFFICE O/P EST MOD 30 MIN: CPT | Mod: PBBFAC,25 | Performed by: NURSE PRACTITIONER

## 2024-06-03 PROCEDURE — 87086 URINE CULTURE/COLONY COUNT: CPT | Performed by: NURSE PRACTITIONER

## 2024-06-03 PROCEDURE — 3044F HG A1C LEVEL LT 7.0%: CPT | Mod: CPTII,,, | Performed by: NURSE PRACTITIONER

## 2024-06-03 PROCEDURE — 99999 PR PBB SHADOW E&M-EST. PATIENT-LVL IV: CPT | Mod: PBBFAC,,, | Performed by: NURSE PRACTITIONER

## 2024-06-03 PROCEDURE — 51705 CHANGE OF BLADDER TUBE: CPT | Mod: S$PBB,,, | Performed by: NURSE PRACTITIONER

## 2024-06-03 PROCEDURE — 87186 SC STD MICRODIL/AGAR DIL: CPT | Performed by: NURSE PRACTITIONER
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
## 2024-06-07 ENCOUNTER — PATIENT MESSAGE (OUTPATIENT)
Dept: UROLOGY | Facility: CLINIC | Age: 39
End: 2024-06-07
Payer: MEDICAID

## 2024-06-07 DIAGNOSIS — A49.9 BACTERIAL UTI: ICD-10-CM

## 2024-06-07 DIAGNOSIS — N39.0 BACTERIAL UTI: ICD-10-CM

## 2024-06-07 DIAGNOSIS — R39.9 UTI SYMPTOMS: Primary | ICD-10-CM

## 2024-06-07 LAB — BACTERIA UR CULT: ABNORMAL

## 2024-06-07 RX ORDER — AMPICILLIN 500 MG/1
500 CAPSULE ORAL 2 TIMES DAILY
Qty: 20 CAPSULE | Refills: 0 | Status: SHIPPED | OUTPATIENT
Start: 2024-06-07 | End: 2024-06-17

## 2024-06-07 RX ORDER — PROMETHAZINE HYDROCHLORIDE AND DEXTROMETHORPHAN HYDROBROMIDE 6.25; 15 MG/5ML; MG/5ML
SYRUP ORAL
Qty: 150 ML | Refills: 2 | Status: SHIPPED | OUTPATIENT
Start: 2024-06-07

## 2024-06-07 NOTE — TELEPHONE ENCOUNTER
No care due was identified.  [facility] Embedded Care Due Messages. Reference number: [id number].   6/07/2024 1:58:31 PM CDT

## 2024-06-10 ENCOUNTER — PATIENT MESSAGE (OUTPATIENT)
Dept: INTERNAL MEDICINE | Facility: CLINIC | Age: 39
End: 2024-06-10
Payer: MEDICAID

## 2024-06-12 ENCOUNTER — PATIENT MESSAGE (OUTPATIENT)
Dept: PHYSICAL MEDICINE AND REHAB | Facility: CLINIC | Age: 39
End: 2024-06-12
Payer: MEDICAID

## 2024-06-12 DIAGNOSIS — G82.20 PARAPLEGIA FOLLOWING SPINAL CORD INJURY: Chronic | ICD-10-CM

## 2024-06-12 DIAGNOSIS — M79.2 NEUROPATHIC PAIN: Chronic | ICD-10-CM

## 2024-06-13 ENCOUNTER — OFFICE VISIT (OUTPATIENT)
Dept: PODIATRY | Facility: CLINIC | Age: 39
End: 2024-06-13
Payer: MEDICAID

## 2024-06-13 DIAGNOSIS — M20.41 HAMMERTOE, BILATERAL: Primary | ICD-10-CM

## 2024-06-13 DIAGNOSIS — M79.2 NEUROPATHIC PAIN: Chronic | ICD-10-CM

## 2024-06-13 DIAGNOSIS — G82.20 PARAPLEGIA FOLLOWING SPINAL CORD INJURY: Chronic | ICD-10-CM

## 2024-06-13 DIAGNOSIS — M20.42 HAMMERTOE, BILATERAL: Primary | ICD-10-CM

## 2024-06-13 PROCEDURE — 3044F HG A1C LEVEL LT 7.0%: CPT | Mod: CPTII,,, | Performed by: PODIATRIST

## 2024-06-13 PROCEDURE — 99203 OFFICE O/P NEW LOW 30 MIN: CPT | Mod: S$PBB,,, | Performed by: PODIATRIST

## 2024-06-13 PROCEDURE — 99214 OFFICE O/P EST MOD 30 MIN: CPT | Mod: PBBFAC,PN | Performed by: PODIATRIST

## 2024-06-13 PROCEDURE — 1160F RVW MEDS BY RX/DR IN RCRD: CPT | Mod: CPTII,,, | Performed by: PODIATRIST

## 2024-06-13 PROCEDURE — 1159F MED LIST DOCD IN RCRD: CPT | Mod: CPTII,,, | Performed by: PODIATRIST

## 2024-06-13 PROCEDURE — 99999 PR PBB SHADOW E&M-EST. PATIENT-LVL IV: CPT | Mod: PBBFAC,,, | Performed by: PODIATRIST

## 2024-06-13 RX ORDER — DICLOFENAC SODIUM 10 MG/G
2 GEL TOPICAL 4 TIMES DAILY
Qty: 100 G | Refills: 2 | Status: SHIPPED | OUTPATIENT
Start: 2024-06-13 | End: 2024-06-26
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
## 2024-06-13 NOTE — PATIENT INSTRUCTIONS
Hammertoe    What Is Hammertoe?    Hammertoe is a contracture (bending) deformity of one or both joints of the second, third, fourth or fifth (little) toes. This abnormal bending can put pressure on the toe when wearing shoes, causing problems to develop.        Hammertoes usually start out as mild deformities and get progressively worse over time. In the earlier stages, hammertoes are flexible and the symptoms can often be managed with noninvasive measures. But if left untreated, hammertoes can become more rigid and will not respond to nonsurgical treatment.        Because of the progressive nature of hammertoes, they should receive early attention. Hammertoes never get better without some kind of intervention.    Causes  The most common cause of hammertoe is a muscle/tendon imbalance. This imbalance, which leads to a bending of the toe, results from mechanical (structural) or neurological changes in the foot that occur over time in some people.  Hammertoes may be aggravated by shoes that do not fit properly. A hammertoe may result if a toe is too long and is forced into a cramped position when a tight shoe is worn. Occasionally, hammertoe is the result of an earlier trauma to the toe. In some people, hammertoes are inherited.    Symptoms  Common symptoms of hammertoes include:    -Pain or irritation of the affected toe when wearing shoes.    -Corns and calluses (a buildup of skin) on the toe, between two toes or on the ball of the foot. Corns are caused by constant friction against the shoe. They may be soft or hard, depending on their location.    -Inflammation, redness or a burning sensation    -Contracture of the toe    -In more severe cases of hammertoe, open sores may form.     Diagnosis  Although hammertoes are readily apparent, to arrive at a diagnosis, the foot and ankle surgeon will obtain a thorough history of your symptoms and examine your foot. During the physical examination, the doctor may attempt to  reproduce your symptoms by manipulating your foot and will study the contractures of the toes. In addition, the foot and ankle surgeon may take x-rays to determine the degree of the deformities and assess any changes that may have occurred.    Hammertoes are progressive--they do not go away by themselves and usually they will get worse over time. However, not all cases are alike--some hammertoes progress more rapidly than others. Once your foot and ankle surgeon has evaluated your hammertoes, a treatment plan can be developed that is suited to your needs.    Nonsurgical Treatment  There is a variety of treatment options for hammertoe. The treatment your foot and ankle surgeon selects will depend on the severity of your hammertoe and other factors.    A number of nonsurgical measures can be undertaken:    -Padding corns and calluses. Your foot and ankle surgeon can provide or prescribe pads designed to shield corns from irritation. If you want to try over-the-counter pads, avoid the medicated types. Medicated pads are generally not recommended because they may contain a small amount of acid that can be harmful. Consult your surgeon about this option.    -Changes in shoewear. Avoid shoes with pointed toes, shoes that are too short, or shoes with high heels--conditions that can force your toe against the front of the shoe. Instead, choose comfortable shoes with a deep, roomy toebox and heels no higher than two inches.    -Orthotic devices. A custom orthotic device placed in your shoe may help control the muscle/tendon imbalance. Injection therapy. Corticosteroid injections are sometimes used to ease pain and inflammation caused by hammertoe.     -Medications. Oral nonsteroidal anti-inflammatory drugs (NSAIDs), such as ibuprofen, may be recommended to reduce pain and inflammation. Splinting/strapping. Splints or small straps may be applied by the surgeon to realign the bent toe.     When Is Surgery Needed?  In some  cases, usually when the hammertoe has become more rigid and painful or when an open sore has developed, surgery is needed.    Often, patients with hammertoe have bunions or other foot deformities corrected at the same time. In selecting the procedure or combination of procedures for your particular case, the foot and ankle surgeon will take into consideration the extent of your deformity, the number of toes involved, your age, your activity level and other factors. The length of the recovery period will vary, depending on the procedure or procedures performed.          What Are Mallet, Hammer, and Claw Toes?  Mallet, hammer, and claw toes are most often caused by wearing shoes that are too short or heels that are too high. This jams the toes against the front of the shoe and causes one or more joints to bend. Rarely, disease can cause the joints in the toes to bend. Mallet, hammer, and claw toes are among the most common toe problems. They occur most often in the longest of the four smaller toes.    Inside your toes  There are 3 bones in each of your 4 smaller toes. Where 2 bones connect is called a joint. Normally the toes lie flat. But pressure on the toes or the front of the foot can cause one or more joints to bend. This curls the toe. Toes that stay curled are called mallet toes, hammer toes, or claw toes, depending on which joints are bent.    Symptoms  You may feel pain in the toe or in the ball of your foot. A corn (a hard growth of skin on the top of the toe) may form where the toe rubs against the top of the shoe. Or a callus (a hard growth of skin on the bottom of the foot) may form under the tip of the toe or on the ball of the foot. Corns and calluses can also be painful.   Date Last Reviewed: 10/18/2015  © 4900-5280 [employer]. [address] [place] [address], [place] [address]. All rights reserved. This information is not intended as a substitute for professional medical care. Always follow your  healthcare professional's instructions.        Treating Mallet, Hammer, and Claw Toes  Definitions  A hammer toe has an abnormal bend in the middle joint of your toe (toe is bent upward at joint).  Mallet toe affects the joint nearest your toenail (toe is bent downward at joint).  Claw toe affects the joint at the ball of your foot (toe is bent upward at joint), as well as both toe joints (toe is bent downward at both joints).  Hammer toe and mallet toe are most likely to occur in the toe next to your big toe.  Causes  The most common cause for all 3 deformities is poorly fitting shoes and tight shoes, especially high heels for women. Trauma and nerve damage from various diseases like diabetes may also cause these deformities.  Treatment  Buying shoes with more room in the toes, filing down corns and calluses, and padding, taping, or strapping the toe most often relieve the pain. Toe stretching and exercises may also be helpful. If these steps dont work, you may need surgery to straighten your toes.  Shoes  Buy low-heeled shoes with plenty of room in the front. This keeps your toes from being jammed against the end of your shoe. It also keeps your shoe from rubbing the tops of your toes.  Corns and calluses    To file down a corn or callus, soak your foot in warm water. This softens the hard skin. Dry your foot. Then gently rub the corn or callus with a pumice stone or nail file.  Pads and splints  If you still have pain, you may need to put a pad or splint on your toe. This helps take pressure off the painful corn or callus.  For a mallet toe, you can put a gel pad on the toe. This keeps the tip of the toe from rubbing against the bottom of the shoe.  For a hammer or claw toe, you can put a felt or foam pad over the bent joint. This keeps the toe from rubbing on the top of the shoe.  For a hammer or claw toe that is still flexible, you can put a splint on the toe. This keeps it straight so it doesn't rub on the  top of the shoe.    Date Last Reviewed: 9/29/2015  © 4269-4146 [employer]. [address] [place] [address], [place] [address]. All rights reserved. This information is not intended as a substitute for professional medical care. Always follow your healthcare professional's instructions.          Foot Surgery: Flexible and Rigid Hammertoes  With hammertoes, one or more toes curl or bend abnormally. This can be caused by an inherited muscle problem, an abnormal bone length, or poor foot mechanics. The affected joints can rub inside shoes, causing corns (buildups of dead skin).  There are many nonsurgical treatments for hammertoes, but if these are not effective, you may want to consider surgery.    Flexible hammertoes  When hammertoes are flexible, you can straighten the buckled joints. Flexible hammertoes may become rigid over time.    Tendon release  This treatment helps release the buckled joint. The bottom (flexor) tendon may be repositioned to the top of the affected toe (flexor tendon transfer). Sometimes, the top or bottom tendon is released but not repositioned (tenotomy).    Rigid hammertoes  Rigid hammertoes are fixed (not flexible). You cannot straighten the buckled joints. Corns, pain, and loss of function may be more severe with rigid hammertoes than with flexible ones.    Arthroplasty  A part of the joint is removed, and the toe is straightened. In some cases, the entire joint may be replaced with an implant. When healed, the bones become connected with scar tissue, making your toe flexible.    Fusion  First, the cartilage and some bone on both sides of the joint are removed. Then, the toe is straightened, and the two bones are held together, often with a pin. The pin is removed after several weeks. Once your foot heals, the toe will be less flexible, but more stable.  Healing after surgery  The severity of your condition, number of toes involved, and type of surgery done will affect your recovery  time. Many people are able to walk right after surgery with a special surgical shoe. Full healing can take several weeks. Your healthcare provider can advise you on what to expect after surgery.     Date Last Reviewed: 10/15/2015  © 0379-1063 [employer]. [address] [place] [address]. All rights reserved. This information is not intended as a substitute for professional medical care. Always follow your healthcare professional's instructions.        Foot Surgery: Curled Fifth Toe  Hammertoes can make walking painful. With hammertoes, one or more toes curl or bend abnormally. This can be caused by an inherited muscle problem, an abnormal bone length, or poor foot mechanics. The affected joints can rub inside shoes, causing corns (buildups of dead skin).    Curled fifth toe  A curled fifth toe is most often inherited. When the fifth toe curls inward, it moves under the next toe. Then the nail of the curled toe starts to face outward. As a result, you may bear weight on the side of your toe instead of the bottom. This can cause corns and painful nails.  There are many nonsurgical treatments available. But if these are not effective, surgery is a choice.    Derotation arthroplasty  A wedge of skin and a section of bone are removed to help straighten (derotate) the toe. You can bear weight on your foot right after surgery. In some cases, you may need to wear a bandage, splint, and surgical shoe for a few weeks. When healed, the bones become connected with scar tissue.  Date Last Reviewed: 10/15/2015  © 3619-6205 [employer]. [address] [place] [address]. All rights reserved. This information is not intended as a substitute for professional medical care. Always follow your healthcare professional's instructions.

## 2024-06-17 DIAGNOSIS — M79.2 NEUROPATHIC PAIN: Chronic | ICD-10-CM

## 2024-06-17 DIAGNOSIS — G82.20 PARAPLEGIA FOLLOWING SPINAL CORD INJURY: Chronic | ICD-10-CM

## 2024-06-17 RX ORDER — OXYCODONE HCL 10 MG/1
10 TABLET, FILM COATED, EXTENDED RELEASE ORAL EVERY 12 HOURS
Qty: 60 TABLET | Refills: 0 | Status: CANCELLED | OUTPATIENT
Start: 2024-06-17 | End: 2024-07-17

## 2024-06-17 RX ORDER — OXYCODONE AND ACETAMINOPHEN 10; 325 MG/1; MG/1
1 TABLET ORAL EVERY 6 HOURS PRN
Qty: 110 TABLET | Refills: 0 | Status: SHIPPED | OUTPATIENT
Start: 2024-06-17 | End: 2024-07-17

## 2024-06-18 NOTE — TELEPHONE ENCOUNTER
----- Message from Jodie Jiménez sent at 6/14/2024 11:16 AM CDT -----  Regarding: rx refill  Contact: pt @ [phone]  Rx Refill/Request Is this a Refill or New Rx: refill    Rx Name and Strength: oxyCODONE-acetaminophen (PERCOCET)  mg per tablet    Preferred Pharmacy with phone number:   [facility] [address] [place] [address]  Phone: [phone] Fax: [phone]      Communication Preference: call back     Additional Information:  Pt is calling to advise that he has been out for 2 days and he has not heard back yet as to his refill. Pt is advising that they are waiting on Doctors approval. Please call to advise further as pt is in severe pain. Thank you for all you are doing.

## 2024-06-20 ENCOUNTER — HOSPITAL ENCOUNTER (EMERGENCY)
Facility: HOSPITAL | Age: 39
Discharge: HOME | End: 2024-06-20
Attending: EMERGENCY MEDICINE
Payer: MEDICAID

## 2024-06-20 VITALS
BODY MASS INDEX: 26.22 KG/M2 | TEMPERATURE: 98 F | WEIGHT: 177 LBS | HEART RATE: 52 BPM | RESPIRATION RATE: 16 BRPM | DIASTOLIC BLOOD PRESSURE: 80 MMHG | OXYGEN SATURATION: 99 % | HEIGHT: 69 IN | SYSTOLIC BLOOD PRESSURE: 141 MMHG

## 2024-06-20 DIAGNOSIS — N30.00 ACUTE CYSTITIS WITHOUT HEMATURIA: Primary | ICD-10-CM

## 2024-06-20 LAB
ALBUMIN SERPL BCP-MCNC: 3.8 G/DL (ref 3.5–5.2)
ALP SERPL-CCNC: 100 U/L (ref 55–135)
ALT SERPL W/O P-5'-P-CCNC: 17 U/L (ref 10–44)
ANION GAP SERPL CALC-SCNC: 10 MMOL/L (ref 8–16)
AST SERPL-CCNC: 16 U/L (ref 10–40)
BACTERIA #/AREA URNS HPF: ABNORMAL /HPF
BASOPHILS # BLD AUTO: 0.05 K/UL (ref 0–0.2)
BASOPHILS NFR BLD: 0.7 % (ref 0–1.9)
BILIRUB SERPL-MCNC: 0.4 MG/DL (ref 0.1–1)
BILIRUB UR QL STRIP: NEGATIVE
BUN SERPL-MCNC: 7 MG/DL (ref 6–20)
CALCIUM SERPL-MCNC: 8.8 MG/DL (ref 8.7–10.5)
CHLORIDE SERPL-SCNC: 106 MMOL/L (ref 95–110)
CLARITY UR: CLEAR
CO2 SERPL-SCNC: 21 MMOL/L (ref 23–29)
COLOR UR: YELLOW
CREAT SERPL-MCNC: 0.5 MG/DL (ref 0.5–1.4)
DIFFERENTIAL METHOD BLD: ABNORMAL
EOSINOPHIL # BLD AUTO: 0.1 K/UL (ref 0–0.5)
EOSINOPHIL NFR BLD: 1 % (ref 0–8)
ERYTHROCYTE [DISTWIDTH] IN BLOOD BY AUTOMATED COUNT: 21.8 % (ref 11.5–14.5)
EST. GFR  (NO RACE VARIABLE): >60 ML/MIN/1.73 M^2
GLUCOSE SERPL-MCNC: 93 MG/DL (ref 70–110)
GLUCOSE UR QL STRIP: NEGATIVE
HCT VFR BLD AUTO: 33.3 % (ref 40–54)
HGB BLD-MCNC: 9.6 G/DL (ref 14–18)
HGB UR QL STRIP: ABNORMAL
HYALINE CASTS #/AREA URNS LPF: 0 /LPF
IMM GRANULOCYTES # BLD AUTO: 0.03 K/UL (ref 0–0.04)
IMM GRANULOCYTES NFR BLD AUTO: 0.4 % (ref 0–0.5)
KETONES UR QL STRIP: NEGATIVE
LEUKOCYTE ESTERASE UR QL STRIP: ABNORMAL
LIPASE SERPL-CCNC: 48 U/L (ref 4–60)
LYMPHOCYTES # BLD AUTO: 1.3 K/UL (ref 1–4.8)
LYMPHOCYTES NFR BLD: 19 % (ref 18–48)
MCH RBC QN AUTO: 21.9 PG (ref 27–31)
MCHC RBC AUTO-ENTMCNC: 28.8 G/DL (ref 32–36)
MCV RBC AUTO: 76 FL (ref 82–98)
MICROSCOPIC COMMENT: ABNORMAL
MONOCYTES # BLD AUTO: 0.6 K/UL (ref 0.3–1)
MONOCYTES NFR BLD: 9.3 % (ref 4–15)
NEUTROPHILS # BLD AUTO: 4.7 K/UL (ref 1.8–7.7)
NEUTROPHILS NFR BLD: 69.6 % (ref 38–73)
NITRITE UR QL STRIP: POSITIVE
NRBC BLD-RTO: 0 /100 WBC
PH UR STRIP: 7 [PH] (ref 5–8)
PLATELET # BLD AUTO: 328 K/UL (ref 150–450)
PMV BLD AUTO: 11.6 FL (ref 9.2–12.9)
POTASSIUM SERPL-SCNC: 3.6 MMOL/L (ref 3.5–5.1)
PROT SERPL-MCNC: 6.9 G/DL (ref 6–8.4)
PROT UR QL STRIP: NEGATIVE
RBC # BLD AUTO: 4.39 M/UL (ref 4.6–6.2)
RBC #/AREA URNS HPF: 1 /HPF (ref 0–4)
SODIUM SERPL-SCNC: 137 MMOL/L (ref 136–145)
SP GR UR STRIP: <=1.005 (ref 1–1.03)
URN SPEC COLLECT METH UR: ABNORMAL
UROBILINOGEN UR STRIP-ACNC: NEGATIVE EU/DL
WBC # BLD AUTO: 6.8 K/UL (ref 3.9–12.7)
WBC #/AREA URNS HPF: 11 /HPF (ref 0–5)

## 2024-06-20 PROCEDURE — 25000003 PHARM REV CODE 250: Performed by: NURSE PRACTITIONER

## 2024-06-20 PROCEDURE — 80053 COMPREHEN METABOLIC PANEL: CPT | Performed by: NURSE PRACTITIONER

## 2024-06-20 PROCEDURE — 81000 URINALYSIS NONAUTO W/SCOPE: CPT | Performed by: EMERGENCY MEDICINE

## 2024-06-20 PROCEDURE — 63600175 PHARM REV CODE 636 W HCPCS: Performed by: NURSE PRACTITIONER

## 2024-06-20 PROCEDURE — 96365 THER/PROPH/DIAG IV INF INIT: CPT

## 2024-06-20 PROCEDURE — 99284 EMERGENCY DEPT VISIT MOD MDM: CPT

## 2024-06-20 PROCEDURE — 36415 COLL VENOUS BLD VENIPUNCTURE: CPT | Performed by: NURSE PRACTITIONER

## 2024-06-20 PROCEDURE — 83690 ASSAY OF LIPASE: CPT | Performed by: NURSE PRACTITIONER

## 2024-06-20 PROCEDURE — 85025 COMPLETE CBC W/AUTO DIFF WBC: CPT | Performed by: NURSE PRACTITIONER

## 2024-06-20 RX ORDER — CIPROFLOXACIN 500 MG/1
500 TABLET ORAL EVERY 12 HOURS
Qty: 14 TABLET | Refills: 0 | Status: SHIPPED | OUTPATIENT
Start: 2024-06-20 | End: 2024-06-28

## 2024-06-20 RX ADMIN — CEFTRIAXONE SODIUM 2 G: 2 INJECTION, POWDER, FOR SOLUTION INTRAMUSCULAR; INTRAVENOUS at 03:06
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
## 2024-06-20 NOTE — ED NOTES
Rounding on the patient has been done.   he has been updated on the plan of care and his current status.  Necessary items were placed with in his reach and he was advised when a reassessment would take place.   The call bell remains at the bedside for any additional patient needs.   The patient is resting comfortably on the stretcher  Respirations are even and unlabored, skin warm and dry.   Will continue to monitor.

## 2024-06-20 NOTE — ED TRIAGE NOTES
Patient arrived with c/o of possible urinary tract infection . Patient has c/o of burning, breaking out in cold sweats.

## 2024-06-21 DIAGNOSIS — G82.20 PARAPLEGIA FOLLOWING SPINAL CORD INJURY: Chronic | ICD-10-CM

## 2024-06-21 DIAGNOSIS — M79.2 NEUROPATHIC PAIN: Chronic | ICD-10-CM

## 2024-06-25 RX ORDER — OXYCODONE HCL 10 MG/1
10 TABLET, FILM COATED, EXTENDED RELEASE ORAL EVERY 12 HOURS
Qty: 60 TABLET | Refills: 0 | Status: SHIPPED | OUTPATIENT
Start: 2024-06-25 | End: 2024-07-26

## 2024-07-05 ENCOUNTER — PATIENT MESSAGE (OUTPATIENT)
Dept: UROLOGY | Facility: CLINIC | Age: 39
End: 2024-07-05
Payer: MEDICAID

## 2024-07-05 DIAGNOSIS — A49.9 BACTERIAL UTI: ICD-10-CM

## 2024-07-05 DIAGNOSIS — N39.0 BACTERIAL UTI: ICD-10-CM

## 2024-07-05 DIAGNOSIS — R39.9 UTI SYMPTOMS: Primary | ICD-10-CM

## 2024-07-08 ENCOUNTER — PATIENT MESSAGE (OUTPATIENT)
Dept: UROLOGY | Facility: CLINIC | Age: 39
End: 2024-07-08
Payer: MEDICAID

## 2024-07-11 RX ORDER — CIPROFLOXACIN 500 MG/1
500 TABLET ORAL 2 TIMES DAILY
Qty: 20 TABLET | Refills: 0 | Status: SHIPPED | OUTPATIENT
Start: 2024-07-11 | End: 2024-07-21

## 2024-07-16 ENCOUNTER — PATIENT MESSAGE (OUTPATIENT)
Dept: PHYSICAL MEDICINE AND REHAB | Facility: CLINIC | Age: 39
End: 2024-07-16
Payer: MEDICAID

## 2024-07-16 DIAGNOSIS — G82.20 PARAPLEGIA FOLLOWING SPINAL CORD INJURY: Chronic | ICD-10-CM

## 2024-07-16 DIAGNOSIS — M79.2 NEUROPATHIC PAIN: Chronic | ICD-10-CM

## 2024-07-16 RX ORDER — OXYCODONE AND ACETAMINOPHEN 10; 325 MG/1; MG/1
1 TABLET ORAL EVERY 6 HOURS PRN
Qty: 110 TABLET | Refills: 0 | Status: CANCELLED | OUTPATIENT
Start: 2024-07-16 | End: 2024-08-15

## 2024-07-16 RX ORDER — OXYCODONE AND ACETAMINOPHEN 10; 325 MG/1; MG/1
1 TABLET ORAL EVERY 6 HOURS PRN
Qty: 110 TABLET | Refills: 0 | Status: SHIPPED | OUTPATIENT
Start: 2024-07-16 | End: 2024-08-15

## 2024-07-16 RX ORDER — OXYCODONE HCL 10 MG/1
10 TABLET, FILM COATED, EXTENDED RELEASE ORAL EVERY 12 HOURS
Qty: 60 TABLET | Refills: 0 | Status: SHIPPED | OUTPATIENT
Start: 2024-07-26 | End: 2024-08-25

## 2024-07-17 DIAGNOSIS — R25.2 SPASTICITY: ICD-10-CM

## 2024-07-18 RX ORDER — DIAZEPAM 10 MG/1
10 TABLET ORAL 2 TIMES DAILY PRN
Qty: 80 TABLET | Refills: 0 | Status: SHIPPED | OUTPATIENT
Start: 2024-07-18

## 2024-07-24 ENCOUNTER — PATIENT MESSAGE (OUTPATIENT)
Dept: PHYSICAL MEDICINE AND REHAB | Facility: CLINIC | Age: 39
End: 2024-07-24
Payer: MEDICAID

## 2024-08-05 ENCOUNTER — HOSPITAL ENCOUNTER (EMERGENCY)
Facility: HOSPITAL | Age: 39
Discharge: HOME | End: 2024-08-06
Attending: STUDENT IN AN ORGANIZED HEALTH CARE EDUCATION/TRAINING PROGRAM
Payer: MEDICAID

## 2024-08-05 DIAGNOSIS — N39.0 URINARY TRACT INFECTION ASSOCIATED WITH INDWELLING URETHRAL CATHETER, INITIAL ENCOUNTER: ICD-10-CM

## 2024-08-05 DIAGNOSIS — R10.9 ABDOMINAL PAIN, UNSPECIFIED ABDOMINAL LOCATION: Primary | ICD-10-CM

## 2024-08-05 DIAGNOSIS — K59.00 CONSTIPATION, UNSPECIFIED CONSTIPATION TYPE: ICD-10-CM

## 2024-08-05 DIAGNOSIS — K29.70 GASTRITIS, PRESENCE OF BLEEDING UNSPECIFIED, UNSPECIFIED CHRONICITY, UNSPECIFIED GASTRITIS TYPE: ICD-10-CM

## 2024-08-05 DIAGNOSIS — T83.511A URINARY TRACT INFECTION ASSOCIATED WITH INDWELLING URETHRAL CATHETER, INITIAL ENCOUNTER: ICD-10-CM

## 2024-08-05 PROCEDURE — 96376 TX/PRO/DX INJ SAME DRUG ADON: CPT

## 2024-08-05 PROCEDURE — 96374 THER/PROPH/DIAG INJ IV PUSH: CPT

## 2024-08-05 PROCEDURE — 99285 EMERGENCY DEPT VISIT HI MDM: CPT | Mod: 25

## 2024-08-05 PROCEDURE — 96375 TX/PRO/DX INJ NEW DRUG ADDON: CPT

## 2024-08-05 RX ORDER — ONDANSETRON HYDROCHLORIDE 2 MG/ML
4 INJECTION, SOLUTION INTRAVENOUS
Status: COMPLETED | OUTPATIENT
Start: 2024-08-06 | End: 2024-08-06

## 2024-08-05 RX ORDER — MORPHINE SULFATE 4 MG/ML
4 INJECTION, SOLUTION INTRAMUSCULAR; INTRAVENOUS
Status: COMPLETED | OUTPATIENT
Start: 2024-08-06 | End: 2024-08-06

## 2024-08-06 ENCOUNTER — PROCEDURE VISIT (OUTPATIENT)
Dept: PHYSICAL MEDICINE AND REHAB | Facility: CLINIC | Age: 39
End: 2024-08-06
Payer: MEDICAID

## 2024-08-06 ENCOUNTER — OFFICE VISIT (OUTPATIENT)
Dept: PHYSICAL MEDICINE AND REHAB | Facility: CLINIC | Age: 39
End: 2024-08-06
Payer: MEDICAID

## 2024-08-06 VITALS
HEIGHT: 69 IN | SYSTOLIC BLOOD PRESSURE: 127 MMHG | WEIGHT: 177 LBS | HEART RATE: 59 BPM | OXYGEN SATURATION: 99 % | TEMPERATURE: 99 F | BODY MASS INDEX: 26.22 KG/M2 | DIASTOLIC BLOOD PRESSURE: 59 MMHG | RESPIRATION RATE: 16 BRPM

## 2024-08-06 VITALS
DIASTOLIC BLOOD PRESSURE: 59 MMHG | HEIGHT: 69 IN | SYSTOLIC BLOOD PRESSURE: 97 MMHG | BODY MASS INDEX: 26.14 KG/M2 | HEART RATE: 61 BPM

## 2024-08-06 DIAGNOSIS — G82.20 PARAPLEGIA FOLLOWING SPINAL CORD INJURY: ICD-10-CM

## 2024-08-06 DIAGNOSIS — G82.20 PARAPLEGIA FOLLOWING SPINAL CORD INJURY: Primary | ICD-10-CM

## 2024-08-06 DIAGNOSIS — G83.9 SPASTIC PARALYSIS: Primary | ICD-10-CM

## 2024-08-06 DIAGNOSIS — G82.21 CHRONIC COMPLETE PARAPLEGIA: ICD-10-CM

## 2024-08-06 DIAGNOSIS — G83.9 SPASTIC PARALYSIS: ICD-10-CM

## 2024-08-06 LAB
ALBUMIN SERPL BCP-MCNC: 3.9 G/DL (ref 3.5–5.2)
ALP SERPL-CCNC: 99 U/L (ref 55–135)
ALT SERPL W/O P-5'-P-CCNC: 11 U/L (ref 10–44)
ANION GAP SERPL CALC-SCNC: 12 MMOL/L (ref 8–16)
AST SERPL-CCNC: 15 U/L (ref 10–40)
BACTERIA #/AREA URNS AUTO: ABNORMAL /HPF
BASOPHILS # BLD AUTO: 0.05 K/UL (ref 0–0.2)
BASOPHILS NFR BLD: 0.8 % (ref 0–1.9)
BILIRUB SERPL-MCNC: 0.4 MG/DL (ref 0.1–1)
BILIRUB UR QL STRIP: NEGATIVE
BUN SERPL-MCNC: 10 MG/DL (ref 6–20)
BUN SERPL-MCNC: 9 MG/DL (ref 6–30)
CALCIUM SERPL-MCNC: 8.8 MG/DL (ref 8.7–10.5)
CHLORIDE SERPL-SCNC: 102 MMOL/L (ref 95–110)
CHLORIDE SERPL-SCNC: 105 MMOL/L (ref 95–110)
CLARITY UR REFRACT.AUTO: ABNORMAL
CO2 SERPL-SCNC: 23 MMOL/L (ref 23–29)
COLOR UR AUTO: YELLOW
CREAT SERPL-MCNC: 0.6 MG/DL (ref 0.5–1.4)
CREAT SERPL-MCNC: 0.6 MG/DL (ref 0.5–1.4)
DIFFERENTIAL METHOD BLD: ABNORMAL
EOSINOPHIL # BLD AUTO: 0.1 K/UL (ref 0–0.5)
EOSINOPHIL NFR BLD: 1.4 % (ref 0–8)
ERYTHROCYTE [DISTWIDTH] IN BLOOD BY AUTOMATED COUNT: 19.7 % (ref 11.5–14.5)
EST. GFR  (NO RACE VARIABLE): >60 ML/MIN/1.73 M^2
GLUCOSE SERPL-MCNC: 91 MG/DL (ref 70–110)
GLUCOSE SERPL-MCNC: 93 MG/DL (ref 70–110)
GLUCOSE UR QL STRIP: NEGATIVE
HCT VFR BLD AUTO: 31.2 % (ref 40–54)
HCT VFR BLD CALC: 31 %PCV (ref 36–54)
HGB BLD-MCNC: 8.7 G/DL (ref 14–18)
HGB UR QL STRIP: ABNORMAL
HYALINE CASTS UR QL AUTO: 0 /LPF
IMM GRANULOCYTES # BLD AUTO: 0.02 K/UL (ref 0–0.04)
IMM GRANULOCYTES NFR BLD AUTO: 0.3 % (ref 0–0.5)
KETONES UR QL STRIP: ABNORMAL
LEUKOCYTE ESTERASE UR QL STRIP: ABNORMAL
LIPASE SERPL-CCNC: 15 U/L (ref 4–60)
LYMPHOCYTES # BLD AUTO: 1.6 K/UL (ref 1–4.8)
LYMPHOCYTES NFR BLD: 26.6 % (ref 18–48)
MCH RBC QN AUTO: 21 PG (ref 27–31)
MCHC RBC AUTO-ENTMCNC: 27.9 G/DL (ref 32–36)
MCV RBC AUTO: 75 FL (ref 82–98)
MICROSCOPIC COMMENT: ABNORMAL
MONOCYTES # BLD AUTO: 0.6 K/UL (ref 0.3–1)
MONOCYTES NFR BLD: 10.7 % (ref 4–15)
NEUTROPHILS # BLD AUTO: 3.6 K/UL (ref 1.8–7.7)
NEUTROPHILS NFR BLD: 60.2 % (ref 38–73)
NITRITE UR QL STRIP: NEGATIVE
NRBC BLD-RTO: 0 /100 WBC
PH UR STRIP: 6 [PH] (ref 5–8)
PLATELET # BLD AUTO: 119 K/UL (ref 150–450)
PMV BLD AUTO: 12.5 FL (ref 9.2–12.9)
POC IONIZED CALCIUM: 1.15 MMOL/L (ref 1.06–1.42)
POC TCO2 (MEASURED): 27 MMOL/L (ref 23–29)
POTASSIUM BLD-SCNC: 3.7 MMOL/L (ref 3.5–5.1)
POTASSIUM SERPL-SCNC: 3.7 MMOL/L (ref 3.5–5.1)
PROT SERPL-MCNC: 6.7 G/DL (ref 6–8.4)
PROT UR QL STRIP: ABNORMAL
RBC # BLD AUTO: 4.15 M/UL (ref 4.6–6.2)
RBC #/AREA URNS AUTO: 21 /HPF (ref 0–4)
SAMPLE: ABNORMAL
SODIUM BLD-SCNC: 140 MMOL/L (ref 136–145)
SODIUM SERPL-SCNC: 140 MMOL/L (ref 136–145)
SP GR UR STRIP: 1.02 (ref 1–1.03)
SQUAMOUS #/AREA URNS AUTO: 2 /HPF
URN SPEC COLLECT METH UR: ABNORMAL
WBC # BLD AUTO: 5.9 K/UL (ref 3.9–12.7)
WBC #/AREA URNS AUTO: 41 /HPF (ref 0–5)

## 2024-08-06 PROCEDURE — 62370 ANL SP INF PMP W/MDREPRG&FIL: CPT | Mod: S$PBB,,, | Performed by: PHYSICAL MEDICINE & REHABILITATION

## 2024-08-06 PROCEDURE — 87088 URINE BACTERIA CULTURE: CPT | Performed by: STUDENT IN AN ORGANIZED HEALTH CARE EDUCATION/TRAINING PROGRAM

## 2024-08-06 PROCEDURE — 87086 URINE CULTURE/COLONY COUNT: CPT | Performed by: STUDENT IN AN ORGANIZED HEALTH CARE EDUCATION/TRAINING PROGRAM

## 2024-08-06 PROCEDURE — 62370 ANL SP INF PMP W/MDREPRG&FIL: CPT | Mod: PBBFAC | Performed by: PHYSICAL MEDICINE & REHABILITATION

## 2024-08-06 PROCEDURE — 63600175 PHARM REV CODE 636 W HCPCS: Performed by: STUDENT IN AN ORGANIZED HEALTH CARE EDUCATION/TRAINING PROGRAM

## 2024-08-06 PROCEDURE — 25500020 PHARM REV CODE 255: Performed by: STUDENT IN AN ORGANIZED HEALTH CARE EDUCATION/TRAINING PROGRAM

## 2024-08-06 PROCEDURE — 99999PBSHW PR PBB SHADOW TECHNICAL ONLY FILED TO HB: Mod: JZ,PBBFAC,,

## 2024-08-06 PROCEDURE — 87186 SC STD MICRODIL/AGAR DIL: CPT | Performed by: STUDENT IN AN ORGANIZED HEALTH CARE EDUCATION/TRAINING PROGRAM

## 2024-08-06 PROCEDURE — 95873 GUIDE NERV DESTR ELEC STIM: CPT | Mod: PBBFAC | Performed by: PHYSICAL MEDICINE & REHABILITATION

## 2024-08-06 PROCEDURE — 83690 ASSAY OF LIPASE: CPT | Performed by: STUDENT IN AN ORGANIZED HEALTH CARE EDUCATION/TRAINING PROGRAM

## 2024-08-06 PROCEDURE — 99999PBSHW PR PBB SHADOW TECHNICAL ONLY FILED TO HB: Mod: JW,PBBFAC,,

## 2024-08-06 PROCEDURE — 85025 COMPLETE CBC W/AUTO DIFF WBC: CPT | Performed by: STUDENT IN AN ORGANIZED HEALTH CARE EDUCATION/TRAINING PROGRAM

## 2024-08-06 PROCEDURE — 64642 CHEMODENERV 1 EXTREMITY 1-4: CPT | Mod: PBBFAC | Performed by: PHYSICAL MEDICINE & REHABILITATION

## 2024-08-06 PROCEDURE — 81001 URINALYSIS AUTO W/SCOPE: CPT | Performed by: STUDENT IN AN ORGANIZED HEALTH CARE EDUCATION/TRAINING PROGRAM

## 2024-08-06 PROCEDURE — 80053 COMPREHEN METABOLIC PANEL: CPT | Performed by: STUDENT IN AN ORGANIZED HEALTH CARE EDUCATION/TRAINING PROGRAM

## 2024-08-06 RX ORDER — CEPHALEXIN 500 MG/1
500 CAPSULE ORAL 2 TIMES DAILY
Qty: 14 CAPSULE | Refills: 0 | Status: SHIPPED | OUTPATIENT
Start: 2024-08-06 | End: 2024-08-08 | Stop reason: ALTCHOICE

## 2024-08-06 RX ORDER — SUCRALFATE 1 G/1
1 TABLET ORAL 3 TIMES DAILY
Qty: 30 TABLET | Refills: 0 | Status: SHIPPED | OUTPATIENT
Start: 2024-08-06 | End: 2024-08-17

## 2024-08-06 RX ORDER — MORPHINE SULFATE 4 MG/ML
4 INJECTION, SOLUTION INTRAMUSCULAR; INTRAVENOUS
Status: COMPLETED | OUTPATIENT
Start: 2024-08-06 | End: 2024-08-06

## 2024-08-06 RX ADMIN — BACLOFEN 40 MG: 40 INJECTION INTRATHECAL at 03:08

## 2024-08-06 RX ADMIN — ONDANSETRON 4 MG: 2 INJECTION INTRAMUSCULAR; INTRAVENOUS at 12:08

## 2024-08-06 RX ADMIN — PALOVAROTENE 500 UNITS: 5 CAPSULE ORAL at 02:08

## 2024-08-06 RX ADMIN — IOHEXOL 75 ML: 350 INJECTION, SOLUTION INTRAVENOUS at 01:08

## 2024-08-06 RX ADMIN — MORPHINE SULFATE 4 MG: 4 INJECTION INTRAVENOUS at 12:08

## 2024-08-06 RX ADMIN — MORPHINE SULFATE 4 MG: 4 INJECTION INTRAVENOUS at 02:08
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
## 2024-08-06 NOTE — ED NOTES
Pt called RN into room upset that CT results were taking too long.  RN explained to pt that CT results can take a long time to be read by radiology.  PT insists that he should be discharged and just wants a prescription for
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
## 2024-08-06 NOTE — PROCEDURES
Botulinum Injection  Location: Limbs/Trunk    Date/Time: 8/6/2024 2:20 PM    Performed by: Aleksandar Davis MD  Authorized by: Aleksandar Davis MD      Consent:      Consent obtained:  Verbal     Consent given by:  Patient     Risks discussed:  Bleeding, infection and pain     Alternatives discussed:  Observation    Universal protocol:      Relevant documents present and verified:  Yes       Site/side verified:  Yes       Immediately prior to procedure a time out was called:  Yes       Patient identity confirmed:  Verbally with patient and provided demographic data    Procedure details:      Electrical stimulation used?:  Yes     Diluted by:  Preservative free saline     Toxin (Brand):  AboBoNT-A (Dysport)     Comments about dilution:  1 vial/2.5cc     Concentration (u/mL):  200     Total number of units available:  500     Muscle area injected: foot and leg    Lower extremity - leg:      Right flexor digitorum longus:  100 units divided amongst 2 site(s)     Left flexor digitorum longus:  100 units divided amongst 2 site(s)       Ad hoc region injected:  200 with Flexor digitorum brevis units     Total units injected:  400     Total units wasted:  100    Medications: 500 Units abobotulinumtoxinA 500 unit    Post-procedure details:      Patient tolerance of procedure:  Tolerated well, no immediate complications  Comments: Injected 100 units in each FDB

## 2024-08-06 NOTE — DISCHARGE INSTRUCTIONS
You presented to the emergency department for evaluation of your abdominal pain and constipation.  You were incidentally found to have a UTI and we recommend taking antibiotics twice a day for 7 days.  You were also found to have gastritis on CT imaging which is inflammation of the lining of your stomach.  We recommend that you take a medication called omeprazole once daily for 14 days and follow-up with your PCP and a gastroenterology specialist.  Based on the CT scan it does not appear that you have a bowel obstruction but your pain is caused by your constipation likely secondary to your opioid use for pain control.  I recommend that you increase your dose of docusate senna to 2 times a day and adjust your MiraLax dosing to 2 to 3 times a day till you are having regular stool output in her ostomy.  Please follow-up with your primary care doctor for further management.    If you develop severe abdominal pain, vomiting, fevers/chills please return to the emergency department for further evaluation.

## 2024-08-06 NOTE — PROGRESS NOTES
ITB PUMP REFILL PROCEDURE NOTE:    ITB Pump Record/Settings:   Pump Location: RLQ  Estimated Pump Replacement: March 2027  Last examined: 5/21/2024  Last change:  5/21/2024  Drug Concentration: 2000 mcg/mL  Infusion Mode: Flex dosing  Reservoir Volume: 40  Is Low [place] Alarm Date:   8/12/2023        The potential risks were discussed with the patient and the patient elected to proceed.  The patient was placed in a supine position and the pump was located by palpation.  The pump was interrogated and found to be delivering a dose of 909.8 ug/day with a residual volume of 5.0 ml.      Using sterile technique the area was cleaned with betadine and a sterile field applied.  Using a 22G needle the port was pierced with no difficulty and 10 ml residual diluent was aspirated.  The new diluent was prepared in a 40cc syringe and delivered using the supplied filter without difficulty.  The pump was then reprogrammed for the new volume.    The pump was also reprogrammed to deliver a total daily dose of 909.8 mcg/day.    The new low reservoir alarm date is 10/28/2024.

## 2024-08-06 NOTE — ED NOTES
I-STAT Chem-8+ Results:   Value Reference Range   Sodium 140 136-145 mmol/L   Potassium  3.7 3.5-5.1 mmol/L   Chloride 102  mmol/L   Ionized Calcium 1.15 1.06-1.42 mmol/L   CO2 (measured) 27 23-29 mmol/L   Glucose 93  mg/dL   BUN 9 6-30 mg/dL   Creatinine 0.6 0.5-1.4 mg/dL   Hematocrit 31 36-54%

## 2024-08-08 LAB — BACTERIA UR CULT: ABNORMAL

## 2024-08-08 RX ORDER — CEFDINIR 300 MG/1
300 CAPSULE ORAL 2 TIMES DAILY
Qty: 14 CAPSULE | Refills: 0 | Status: SHIPPED | OUTPATIENT
Start: 2024-08-08 | End: 2024-08-16

## 2024-08-14 ENCOUNTER — PATIENT MESSAGE (OUTPATIENT)
Dept: PHYSICAL MEDICINE AND REHAB | Facility: CLINIC | Age: 39
End: 2024-08-14
Payer: MEDICAID

## 2024-08-14 DIAGNOSIS — G82.20 PARAPLEGIA FOLLOWING SPINAL CORD INJURY: Chronic | ICD-10-CM

## 2024-08-14 DIAGNOSIS — M79.2 NEUROPATHIC PAIN: Chronic | ICD-10-CM

## 2024-08-14 RX ORDER — OXYCODONE AND ACETAMINOPHEN 10; 325 MG/1; MG/1
1 TABLET ORAL EVERY 6 HOURS PRN
Qty: 110 TABLET | Refills: 0 | OUTPATIENT
Start: 2024-08-14 | End: 2024-09-13

## 2024-08-15 ENCOUNTER — PATIENT MESSAGE (OUTPATIENT)
Dept: INTERNAL MEDICINE | Facility: CLINIC | Age: 39
End: 2024-08-15
Payer: MEDICAID

## 2024-08-15 ENCOUNTER — LAB VISIT (OUTPATIENT)
Dept: LAB | Facility: HOSPITAL | Age: 39
End: 2024-08-15
Attending: INTERNAL MEDICINE
Payer: MEDICAID

## 2024-08-15 DIAGNOSIS — D64.9 ANEMIA, UNSPECIFIED TYPE: ICD-10-CM

## 2024-08-15 LAB
BASOPHILS # BLD AUTO: 0.04 K/UL (ref 0–0.2)
BASOPHILS NFR BLD: 0.7 % (ref 0–1.9)
DIFFERENTIAL METHOD BLD: ABNORMAL
EOSINOPHIL # BLD AUTO: 0.1 K/UL (ref 0–0.5)
EOSINOPHIL NFR BLD: 1.2 % (ref 0–8)
ERYTHROCYTE [DISTWIDTH] IN BLOOD BY AUTOMATED COUNT: 19.8 % (ref 11.5–14.5)
FERRITIN SERPL-MCNC: 3 NG/ML (ref 20–300)
HCT VFR BLD AUTO: 30.7 % (ref 40–54)
HGB BLD-MCNC: 8.2 G/DL (ref 14–18)
IMM GRANULOCYTES # BLD AUTO: 0.02 K/UL (ref 0–0.04)
IMM GRANULOCYTES NFR BLD AUTO: 0.3 % (ref 0–0.5)
IRON SERPL-MCNC: 10 UG/DL (ref 45–160)
LYMPHOCYTES # BLD AUTO: 1 K/UL (ref 1–4.8)
LYMPHOCYTES NFR BLD: 17.8 % (ref 18–48)
MCH RBC QN AUTO: 20.5 PG (ref 27–31)
MCHC RBC AUTO-ENTMCNC: 26.7 G/DL (ref 32–36)
MCV RBC AUTO: 77 FL (ref 82–98)
MONOCYTES # BLD AUTO: 0.4 K/UL (ref 0.3–1)
MONOCYTES NFR BLD: 6.6 % (ref 4–15)
NEUTROPHILS # BLD AUTO: 4.2 K/UL (ref 1.8–7.7)
NEUTROPHILS NFR BLD: 73.4 % (ref 38–73)
NRBC BLD-RTO: 0 /100 WBC
PLATELET # BLD AUTO: 250 K/UL (ref 150–450)
PMV BLD AUTO: 12.5 FL (ref 9.2–12.9)
RBC # BLD AUTO: 4 M/UL (ref 4.6–6.2)
SATURATED IRON: 2 % (ref 20–50)
TOTAL IRON BINDING CAPACITY: 468 UG/DL (ref 250–450)
TRANSFERRIN SERPL-MCNC: 316 MG/DL (ref 200–375)
WBC # BLD AUTO: 5.72 K/UL (ref 3.9–12.7)

## 2024-08-15 PROCEDURE — 85025 COMPLETE CBC W/AUTO DIFF WBC: CPT | Performed by: INTERNAL MEDICINE

## 2024-08-15 PROCEDURE — 83540 ASSAY OF IRON: CPT | Performed by: INTERNAL MEDICINE

## 2024-08-15 PROCEDURE — 82728 ASSAY OF FERRITIN: CPT | Performed by: INTERNAL MEDICINE

## 2024-08-15 PROCEDURE — 36415 COLL VENOUS BLD VENIPUNCTURE: CPT | Performed by: INTERNAL MEDICINE

## 2024-08-19 DIAGNOSIS — D64.9 ANEMIA, UNSPECIFIED TYPE: Primary | ICD-10-CM

## 2024-08-20 ENCOUNTER — TELEPHONE (OUTPATIENT)
Dept: INTERNAL MEDICINE | Facility: CLINIC | Age: 39
End: 2024-08-20
Payer: MEDICAID

## 2024-08-20 DIAGNOSIS — M79.2 NEUROPATHIC PAIN: Chronic | ICD-10-CM

## 2024-08-20 DIAGNOSIS — G82.20 PARAPLEGIA FOLLOWING SPINAL CORD INJURY: Chronic | ICD-10-CM

## 2024-08-20 RX ORDER — OXYCODONE AND ACETAMINOPHEN 10; 325 MG/1; MG/1
1 TABLET ORAL EVERY 6 HOURS PRN
Qty: 110 TABLET | Refills: 0 | Status: SHIPPED | OUTPATIENT
Start: 2024-08-20 | End: 2024-09-19

## 2024-08-20 RX ORDER — OXYCODONE AND ACETAMINOPHEN 10; 325 MG/1; MG/1
1 TABLET ORAL EVERY 6 HOURS PRN
Qty: 110 TABLET | Refills: 0 | Status: CANCELLED | OUTPATIENT
Start: 2024-08-20 | End: 2024-09-19

## 2024-08-20 NOTE — TELEPHONE ENCOUNTER
----- Message from Nohelia Hoover MD sent at 8/19/2024  4:31 PM CDT -----  Called reviewed with pt  Persitatnt anemia, iron lo  Taking iron daily some constipatino continue stool sotftener    hematology referral placed for scheduling

## 2024-08-22 ENCOUNTER — HOSPITAL ENCOUNTER (EMERGENCY)
Facility: HOSPITAL | Age: 39
Discharge: HOME | End: 2024-08-22
Attending: EMERGENCY MEDICINE
Payer: MEDICAID

## 2024-08-22 VITALS
OXYGEN SATURATION: 95 % | WEIGHT: 177 LBS | BODY MASS INDEX: 26.22 KG/M2 | DIASTOLIC BLOOD PRESSURE: 54 MMHG | RESPIRATION RATE: 16 BRPM | TEMPERATURE: 98 F | HEART RATE: 58 BPM | SYSTOLIC BLOOD PRESSURE: 112 MMHG | HEIGHT: 69 IN

## 2024-08-22 DIAGNOSIS — R10.30 LOWER ABDOMINAL PAIN: Primary | ICD-10-CM

## 2024-08-22 LAB
BILIRUB UR QL STRIP: NEGATIVE
CLARITY UR: CLEAR
COLOR UR: YELLOW
GLUCOSE UR QL STRIP: NEGATIVE
HGB UR QL STRIP: NEGATIVE
KETONES UR QL STRIP: NEGATIVE
LEUKOCYTE ESTERASE UR QL STRIP: NEGATIVE
NITRITE UR QL STRIP: NEGATIVE
PH UR STRIP: 6 [PH] (ref 5–8)
PROT UR QL STRIP: NEGATIVE
SP GR UR STRIP: <=1.005 (ref 1–1.03)
URN SPEC COLLECT METH UR: ABNORMAL
UROBILINOGEN UR STRIP-ACNC: 1 EU/DL

## 2024-08-22 PROCEDURE — 99284 EMERGENCY DEPT VISIT MOD MDM: CPT | Mod: 25

## 2024-08-22 PROCEDURE — 25000003 PHARM REV CODE 250: Performed by: SURGERY

## 2024-08-22 PROCEDURE — 25000003 PHARM REV CODE 250: Performed by: EMERGENCY MEDICINE

## 2024-08-22 PROCEDURE — 81003 URINALYSIS AUTO W/O SCOPE: CPT | Performed by: EMERGENCY MEDICINE

## 2024-08-22 RX ORDER — ALUMINUM HYDROXIDE, MAGNESIUM HYDROXIDE, AND SIMETHICONE 1200; 120; 1200 MG/30ML; MG/30ML; MG/30ML
30 SUSPENSION ORAL ONCE
Status: COMPLETED | OUTPATIENT
Start: 2024-08-22 | End: 2024-08-22

## 2024-08-22 RX ORDER — LIDOCAINE HYDROCHLORIDE 20 MG/ML
15 SOLUTION OROPHARYNGEAL ONCE
Status: COMPLETED | OUTPATIENT
Start: 2024-08-22 | End: 2024-08-22

## 2024-08-22 RX ORDER — HYDROCODONE BITARTRATE AND ACETAMINOPHEN 5; 325 MG/1; MG/1
1 TABLET ORAL
Status: COMPLETED | OUTPATIENT
Start: 2024-08-22 | End: 2024-08-22

## 2024-08-22 RX ORDER — SUCRALFATE 1 G/10ML
1 SUSPENSION ORAL 4 TIMES DAILY
Qty: 1200 ML | Refills: 0 | Status: SHIPPED | OUTPATIENT
Start: 2024-08-22 | End: 2024-09-25

## 2024-08-22 RX ADMIN — HYDROCODONE BITARTRATE AND ACETAMINOPHEN 1 TABLET: 5; 325 TABLET ORAL at 05:08

## 2024-08-22 RX ADMIN — LIDOCAINE HYDROCHLORIDE 15 ML: 20 SOLUTION ORAL at 07:08

## 2024-08-22 RX ADMIN — ALUMINUM HYDROXIDE, MAGNESIUM HYDROXIDE, AND SIMETHICONE 30 ML: 200; 200; 20 SUSPENSION ORAL at 07:08
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
## 2024-08-23 NOTE — ED TRIAGE NOTES
38 y.o. male presents to ER ED 03 /ED 03A   Chief Complaint   Patient presents with    Urinary Tract Infection     Pt arrives to the ed with c/o lower abd pain. Pt was placed on keflex for his uti, and finished antibiotics but no relief. Pt has suprapubic catheter that patient changed out last night.

## 2024-08-26 ENCOUNTER — PATIENT MESSAGE (OUTPATIENT)
Dept: INTERNAL MEDICINE | Facility: CLINIC | Age: 39
End: 2024-08-26
Payer: MEDICAID

## 2024-08-26 ENCOUNTER — TELEPHONE (OUTPATIENT)
Dept: INTERNAL MEDICINE | Facility: CLINIC | Age: 39
End: 2024-08-26
Payer: MEDICAID

## 2024-08-26 RX ORDER — LACTULOSE 10 G/15ML
10 SOLUTION ORAL; RECTAL EVERY 6 HOURS PRN
Qty: 300 ML | Refills: 2 | Status: SHIPPED | OUTPATIENT
Start: 2024-08-26

## 2024-08-26 NOTE — TELEPHONE ENCOUNTER
Called and spoke to patient about abdominal pain and constipation.Went to ER last week with the same issue with no relief. Patient states he is taking Dulcolox senna and Miralax  but is coming out soft. Not filling colostomy only smears.Is on iron percocet and oxycontin of which he has stopped the iron for now.Pain meds are PRN and has not been taking them because of the constipation issue.Has a GI Dr at [facility] of which I asked him to reach out to them as well for some assistance with this problem. Is requesting something for constipation relief.

## 2024-09-19 ENCOUNTER — PATIENT MESSAGE (OUTPATIENT)
Dept: UROLOGY | Facility: CLINIC | Age: 39
End: 2024-09-19
Payer: MEDICAID

## 2024-09-19 DIAGNOSIS — R39.9 UTI SYMPTOMS: Primary | ICD-10-CM

## 2024-09-19 DIAGNOSIS — N39.0 BACTERIAL UTI: ICD-10-CM

## 2024-09-19 DIAGNOSIS — M79.2 NEUROPATHIC PAIN: Chronic | ICD-10-CM

## 2024-09-19 DIAGNOSIS — G82.20 PARAPLEGIA FOLLOWING SPINAL CORD INJURY: Chronic | ICD-10-CM

## 2024-09-19 DIAGNOSIS — A49.9 BACTERIAL UTI: ICD-10-CM

## 2024-09-20 DIAGNOSIS — G82.20 PARAPLEGIA FOLLOWING SPINAL CORD INJURY: Chronic | ICD-10-CM

## 2024-09-20 DIAGNOSIS — M79.2 NEUROPATHIC PAIN: Chronic | ICD-10-CM

## 2024-09-20 RX ORDER — OXYCODONE HCL 10 MG/1
10 TABLET, FILM COATED, EXTENDED RELEASE ORAL EVERY 12 HOURS
Qty: 60 TABLET | Refills: 0 | Status: SHIPPED | OUTPATIENT
Start: 2024-10-04 | End: 2024-11-03

## 2024-09-20 RX ORDER — OXYCODONE AND ACETAMINOPHEN 10; 325 MG/1; MG/1
1 TABLET ORAL EVERY 6 HOURS PRN
Qty: 110 TABLET | Refills: 0 | Status: SHIPPED | OUTPATIENT
Start: 2024-09-20 | End: 2024-10-20

## 2024-09-20 RX ORDER — DOXYCYCLINE 100 MG/1
100 CAPSULE ORAL EVERY 12 HOURS
Qty: 20 CAPSULE | Refills: 0 | Status: SHIPPED | OUTPATIENT
Start: 2024-09-20 | End: 2024-09-30

## 2024-10-01 ENCOUNTER — PATIENT MESSAGE (OUTPATIENT)
Dept: PHYSICAL MEDICINE AND REHAB | Facility: CLINIC | Age: 39
End: 2024-10-01
Payer: MEDICAID

## 2024-10-01 DIAGNOSIS — R52 PAIN: ICD-10-CM

## 2024-10-01 RX ORDER — PREGABALIN 200 MG/1
CAPSULE ORAL
Qty: 90 CAPSULE | Refills: 6 | Status: SHIPPED | OUTPATIENT
Start: 2024-10-01

## 2024-10-02 NOTE — TELEPHONE ENCOUNTER
Labs reviewed; blood cultures NGTD. Urinalysis negative; urine culture negative.   No leukocytosis. Kidney function normal.  Inflammatory markers elevated, however, this is non specific. Another source of fevers could be his chronic wounds? May need to be seen in wound care sooner with ID to see upstairs with Sara. Attempted to call patient to see how he is feeling and see if still having fevers. No answer. Left voicemail to return call.   Pre procedure Diagnosis: lumbar radiculopathy   Post procedure Diagnosis: Same  Procedure performed: left L5 and S1 transforaminal epidural steroid injection, CPT code 47605, 99328  Anesthesia: none  Complications: none immediately  Operators: Armaan Sanchez MD, Jamie Zaidi MD    Indications:   Rogelio Noel is a 31 year old male was sent for a lumbar transforaminal epidural steroid injection      Options/alternatives, benefits and risks were discussed with the patient including bleeding, infection, tissue trauma, numbness, weakness, paralysis, spinal cord injury, radiation exposure, headache and reaction to medications. Questions were answered to his satisfaction and he agrees to proceed. Voluntary informed consent was obtained and signed.     Vitals were reviewed: Yes  Allergies were reviewed:  Yes   Medications were reviewed:  Yes   Pre-procedure pain score: 7/10    Procedure:  After getting informed consent, patient was brought into the procedure suite and was placed in a prone position on the procedure table.   A Pause for the Cause was performed.  Patient was prepped and draped in sterile fashion.     After identifying the left L5-S1 and S1-2 neuroforamina, the C-arm was rotated to a left lateral oblique angle.  A total of 3ml of Lidocaine 1% was used to anesthetize the skin and the needle track at a skin entry site coaxial with the fluoroscopy beam, and overriding the superior aspect of the neuroforamen.  Two 22 gauge 5 inch spinal needles were advanced under intermittent fluoroscopy until they entered the foramen superiorly.    The position was then inspected from anteroposterior and lateral views, and the needles adjusted appropriately.  A total of 2.5ml of Omnipaque-180 was injected, confirming appropriate position, with spread into the nerve root sheath and the epidural space, with no intravascular uptake. Visualization of active injection under live fluoroscopy or digital subtraction angiography  or live fluoroscopy further confirmed the above appropriate contrast spread.    A total of 3ml of preservative free 1% lidocaine with 10mg of dexamethasone was injected, split evenly among the above two sites.  The needles were flushed with lidocaine and removed.    During the procedure, the patient DID experience paresthesias corresponding to the nerve root near final needle placement.    Hemostasis was achieved, the area was cleaned, and bandaids were placed when appropriate.    The patient tolerated the procedure well, and was taken to the recovery room. Images were saved to PACS.    Post-procedure pain score: 0/10  Follow-up includes:   -f/u with referring provider    Armaan Sanchez MD  Interventional Pain Medicine  [facility]

## 2024-10-09 ENCOUNTER — HOSPITAL ENCOUNTER (EMERGENCY)
Facility: HOSPITAL | Age: 39
Discharge: HOME | End: 2024-10-10
Attending: FAMILY MEDICINE
Payer: MEDICAID

## 2024-10-09 DIAGNOSIS — Z97.8 INDWELLING FOLEY CATHETER PRESENT: ICD-10-CM

## 2024-10-09 DIAGNOSIS — N30.00 ACUTE CYSTITIS WITHOUT HEMATURIA: Primary | ICD-10-CM

## 2024-10-09 PROCEDURE — 99284 EMERGENCY DEPT VISIT MOD MDM: CPT

## 2024-10-10 VITALS
RESPIRATION RATE: 16 BRPM | BODY MASS INDEX: 26.66 KG/M2 | SYSTOLIC BLOOD PRESSURE: 94 MMHG | HEART RATE: 72 BPM | HEIGHT: 69 IN | OXYGEN SATURATION: 99 % | WEIGHT: 180 LBS | DIASTOLIC BLOOD PRESSURE: 52 MMHG | TEMPERATURE: 99 F

## 2024-10-10 LAB
BACTERIA #/AREA URNS HPF: ABNORMAL /HPF
BILIRUB UR QL STRIP: NEGATIVE
CLARITY UR: CLEAR
COLOR UR: YELLOW
GLUCOSE UR QL STRIP: NEGATIVE
HGB UR QL STRIP: ABNORMAL
KETONES UR QL STRIP: NEGATIVE
LEUKOCYTE ESTERASE UR QL STRIP: ABNORMAL
MICROSCOPIC COMMENT: ABNORMAL
NITRITE UR QL STRIP: NEGATIVE
PH UR STRIP: 6 [PH] (ref 5–8)
PROT UR QL STRIP: NEGATIVE
RBC #/AREA URNS HPF: 1 /HPF (ref 0–4)
SP GR UR STRIP: 1.01 (ref 1–1.03)
SQUAMOUS #/AREA URNS HPF: 1 /HPF
URN SPEC COLLECT METH UR: ABNORMAL
UROBILINOGEN UR STRIP-ACNC: NEGATIVE EU/DL
WBC #/AREA URNS HPF: 20 /HPF (ref 0–5)

## 2024-10-10 PROCEDURE — 87086 URINE CULTURE/COLONY COUNT: CPT | Performed by: FAMILY MEDICINE

## 2024-10-10 PROCEDURE — 63600175 PHARM REV CODE 636 W HCPCS: Performed by: FAMILY MEDICINE

## 2024-10-10 PROCEDURE — 96372 THER/PROPH/DIAG INJ SC/IM: CPT | Performed by: FAMILY MEDICINE

## 2024-10-10 PROCEDURE — 87186 SC STD MICRODIL/AGAR DIL: CPT | Performed by: FAMILY MEDICINE

## 2024-10-10 PROCEDURE — 81000 URINALYSIS NONAUTO W/SCOPE: CPT | Performed by: FAMILY MEDICINE

## 2024-10-10 PROCEDURE — 25000003 PHARM REV CODE 250: Performed by: FAMILY MEDICINE

## 2024-10-10 PROCEDURE — 87088 URINE BACTERIA CULTURE: CPT | Performed by: FAMILY MEDICINE

## 2024-10-10 RX ORDER — CEFTRIAXONE 500 MG/1
1000 INJECTION, POWDER, FOR SOLUTION INTRAMUSCULAR; INTRAVENOUS ONCE
Status: COMPLETED | OUTPATIENT
Start: 2024-10-10 | End: 2024-10-10

## 2024-10-10 RX ORDER — CEFDINIR 300 MG/1
300 CAPSULE ORAL 2 TIMES DAILY
Qty: 20 CAPSULE | Refills: 0 | Status: SHIPPED | OUTPATIENT
Start: 2024-10-10 | End: 2024-10-20

## 2024-10-10 RX ORDER — KETOROLAC TROMETHAMINE 30 MG/ML
30 INJECTION, SOLUTION INTRAMUSCULAR; INTRAVENOUS
Status: COMPLETED | OUTPATIENT
Start: 2024-10-10 | End: 2024-10-10

## 2024-10-10 RX ORDER — HYDROCODONE BITARTRATE AND ACETAMINOPHEN 5; 325 MG/1; MG/1
1 TABLET ORAL
Status: COMPLETED | OUTPATIENT
Start: 2024-10-10 | End: 2024-10-10

## 2024-10-10 RX ADMIN — CEFTRIAXONE SODIUM 1000 MG: 500 INJECTION, POWDER, FOR SOLUTION INTRAMUSCULAR; INTRAVENOUS at 01:10

## 2024-10-10 RX ADMIN — HYDROCODONE BITARTRATE AND ACETAMINOPHEN 1 TABLET: 5; 325 TABLET ORAL at 01:10

## 2024-10-10 RX ADMIN — KETOROLAC TROMETHAMINE 30 MG: 30 INJECTION, SOLUTION INTRAMUSCULAR at 01:10

## 2024-10-10 NOTE — DISCHARGE INSTRUCTIONS
Please return to the ED with any chest pain, shortness of breath.  Please start your antibiotics tomorrow a.m..  Please take all medications as prescribed.

## 2024-10-10 NOTE — ED NOTES
Discharge instructions, prescriptions, follow up and strict return precautions gone over with patient and family member, who is at bedside. Verbalized understanding.   Patient agrees with waiting in room for shot time after medications administered.
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
## 2024-10-11 ENCOUNTER — PATIENT MESSAGE (OUTPATIENT)
Dept: UROLOGY | Facility: CLINIC | Age: 39
End: 2024-10-11
Payer: MEDICAID

## 2024-10-12 LAB — BACTERIA UR CULT: ABNORMAL

## 2024-10-16 ENCOUNTER — PATIENT MESSAGE (OUTPATIENT)
Dept: PHYSICAL MEDICINE AND REHAB | Facility: CLINIC | Age: 39
End: 2024-10-16
Payer: MEDICAID

## 2024-10-16 ENCOUNTER — HOSPITAL ENCOUNTER (EMERGENCY)
Facility: HOSPITAL | Age: 39
Discharge: HOME | End: 2024-10-16
Attending: STUDENT IN AN ORGANIZED HEALTH CARE EDUCATION/TRAINING PROGRAM
Payer: MEDICAID

## 2024-10-16 VITALS
OXYGEN SATURATION: 100 % | TEMPERATURE: 99 F | WEIGHT: 180 LBS | RESPIRATION RATE: 18 BRPM | SYSTOLIC BLOOD PRESSURE: 104 MMHG | HEART RATE: 72 BPM | DIASTOLIC BLOOD PRESSURE: 53 MMHG | BODY MASS INDEX: 26.58 KG/M2

## 2024-10-16 DIAGNOSIS — R10.30 LOWER ABDOMINAL PAIN: Primary | ICD-10-CM

## 2024-10-16 DIAGNOSIS — G82.20 PARAPLEGIA FOLLOWING SPINAL CORD INJURY: Chronic | ICD-10-CM

## 2024-10-16 DIAGNOSIS — M79.2 NEUROPATHIC PAIN: Chronic | ICD-10-CM

## 2024-10-16 LAB
ALBUMIN SERPL BCP-MCNC: 4 G/DL (ref 3.5–5.2)
ALP SERPL-CCNC: 106 U/L (ref 55–135)
ALT SERPL W/O P-5'-P-CCNC: 15 U/L (ref 10–44)
AMORPH CRY URNS QL MICRO: ABNORMAL
ANION GAP SERPL CALC-SCNC: 14 MMOL/L (ref 8–16)
ANISOCYTOSIS BLD QL SMEAR: SLIGHT
AST SERPL-CCNC: 15 U/L (ref 10–40)
BACTERIA #/AREA URNS HPF: ABNORMAL /HPF
BASOPHILS # BLD AUTO: 0.03 K/UL (ref 0–0.2)
BASOPHILS NFR BLD: 0.5 % (ref 0–1.9)
BILIRUB SERPL-MCNC: 0.3 MG/DL (ref 0.1–1)
BILIRUB UR QL STRIP: ABNORMAL
BUN SERPL-MCNC: 8 MG/DL (ref 6–20)
CALCIUM SERPL-MCNC: 9 MG/DL (ref 8.7–10.5)
CHLORIDE SERPL-SCNC: 108 MMOL/L (ref 95–110)
CLARITY UR: ABNORMAL
CO2 SERPL-SCNC: 21 MMOL/L (ref 23–29)
COLOR UR: YELLOW
CREAT SERPL-MCNC: 0.6 MG/DL (ref 0.5–1.4)
DIFFERENTIAL METHOD BLD: ABNORMAL
EOSINOPHIL # BLD AUTO: 0.1 K/UL (ref 0–0.5)
EOSINOPHIL NFR BLD: 1.8 % (ref 0–8)
ERYTHROCYTE [DISTWIDTH] IN BLOOD BY AUTOMATED COUNT: 25.7 % (ref 11.5–14.5)
EST. GFR  (NO RACE VARIABLE): >60 ML/MIN/1.73 M^2
GIANT PLATELETS BLD QL SMEAR: PRESENT
GLUCOSE SERPL-MCNC: 132 MG/DL (ref 70–110)
GLUCOSE UR QL STRIP: NEGATIVE
HCT VFR BLD AUTO: 29 % (ref 40–54)
HGB BLD-MCNC: 8 G/DL (ref 14–18)
HGB UR QL STRIP: NEGATIVE
HYALINE CASTS #/AREA URNS LPF: 0 /LPF
HYPOCHROMIA BLD QL SMEAR: ABNORMAL
IMM GRANULOCYTES # BLD AUTO: 0.03 K/UL (ref 0–0.04)
IMM GRANULOCYTES NFR BLD AUTO: 0.5 % (ref 0–0.5)
KETONES UR QL STRIP: ABNORMAL
LACTATE SERPL-SCNC: 1.2 MMOL/L (ref 0.5–2.2)
LEUKOCYTE ESTERASE UR QL STRIP: NEGATIVE
LYMPHOCYTES # BLD AUTO: 1 K/UL (ref 1–4.8)
LYMPHOCYTES NFR BLD: 16.5 % (ref 18–48)
MCH RBC QN AUTO: 20.4 PG (ref 27–31)
MCHC RBC AUTO-ENTMCNC: 27.6 G/DL (ref 32–36)
MCV RBC AUTO: 74 FL (ref 82–98)
MICROSCOPIC COMMENT: ABNORMAL
MONOCYTES # BLD AUTO: 0.4 K/UL (ref 0.3–1)
MONOCYTES NFR BLD: 6.7 % (ref 4–15)
NEUTROPHILS # BLD AUTO: 4.4 K/UL (ref 1.8–7.7)
NEUTROPHILS NFR BLD: 74 % (ref 38–73)
NITRITE UR QL STRIP: NEGATIVE
NRBC BLD-RTO: 0 /100 WBC
OVALOCYTES BLD QL SMEAR: ABNORMAL
PH UR STRIP: 6 [PH] (ref 5–8)
PLATELET # BLD AUTO: 206 K/UL (ref 150–450)
PLATELET BLD QL SMEAR: ABNORMAL
PMV BLD AUTO: ABNORMAL FL (ref 9.2–12.9)
POIKILOCYTOSIS BLD QL SMEAR: SLIGHT
POLYCHROMASIA BLD QL SMEAR: ABNORMAL
POTASSIUM SERPL-SCNC: 4 MMOL/L (ref 3.5–5.1)
PROT SERPL-MCNC: 7.2 G/DL (ref 6–8.4)
PROT UR QL STRIP: ABNORMAL
RBC # BLD AUTO: 3.93 M/UL (ref 4.6–6.2)
RBC #/AREA URNS HPF: 0 /HPF (ref 0–4)
SODIUM SERPL-SCNC: 143 MMOL/L (ref 136–145)
SP GR UR STRIP: >=1.03 (ref 1–1.03)
SQUAMOUS #/AREA URNS HPF: 2 /HPF
URN SPEC COLLECT METH UR: ABNORMAL
UROBILINOGEN UR STRIP-ACNC: NEGATIVE EU/DL
WBC # BLD AUTO: 5.99 K/UL (ref 3.9–12.7)
WBC #/AREA URNS HPF: 4 /HPF (ref 0–5)

## 2024-10-16 PROCEDURE — 63600175 PHARM REV CODE 636 W HCPCS: Performed by: STUDENT IN AN ORGANIZED HEALTH CARE EDUCATION/TRAINING PROGRAM

## 2024-10-16 PROCEDURE — 87040 BLOOD CULTURE FOR BACTERIA: CPT | Performed by: STUDENT IN AN ORGANIZED HEALTH CARE EDUCATION/TRAINING PROGRAM

## 2024-10-16 PROCEDURE — 81000 URINALYSIS NONAUTO W/SCOPE: CPT | Performed by: STUDENT IN AN ORGANIZED HEALTH CARE EDUCATION/TRAINING PROGRAM

## 2024-10-16 PROCEDURE — 96374 THER/PROPH/DIAG INJ IV PUSH: CPT

## 2024-10-16 PROCEDURE — 83605 ASSAY OF LACTIC ACID: CPT | Performed by: STUDENT IN AN ORGANIZED HEALTH CARE EDUCATION/TRAINING PROGRAM

## 2024-10-16 PROCEDURE — 85025 COMPLETE CBC W/AUTO DIFF WBC: CPT | Performed by: STUDENT IN AN ORGANIZED HEALTH CARE EDUCATION/TRAINING PROGRAM

## 2024-10-16 PROCEDURE — 80053 COMPREHEN METABOLIC PANEL: CPT | Performed by: STUDENT IN AN ORGANIZED HEALTH CARE EDUCATION/TRAINING PROGRAM

## 2024-10-16 PROCEDURE — 99284 EMERGENCY DEPT VISIT MOD MDM: CPT | Mod: 25

## 2024-10-16 RX ORDER — KETOROLAC TROMETHAMINE 30 MG/ML
15 INJECTION, SOLUTION INTRAMUSCULAR; INTRAVENOUS
Status: DISCONTINUED | OUTPATIENT
Start: 2024-10-16 | End: 2024-10-16

## 2024-10-16 RX ORDER — KETOROLAC TROMETHAMINE 30 MG/ML
15 INJECTION, SOLUTION INTRAMUSCULAR; INTRAVENOUS
Status: COMPLETED | OUTPATIENT
Start: 2024-10-16 | End: 2024-10-16

## 2024-10-16 RX ORDER — OXYCODONE AND ACETAMINOPHEN 10; 325 MG/1; MG/1
1 TABLET ORAL EVERY 6 HOURS PRN
Qty: 110 TABLET | Refills: 0 | Status: SHIPPED | OUTPATIENT
Start: 2024-10-19 | End: 2024-11-18

## 2024-10-16 RX ADMIN — KETOROLAC TROMETHAMINE 15 MG: 30 INJECTION, SOLUTION INTRAMUSCULAR at 09:10
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
## 2024-10-17 NOTE — DISCHARGE INSTRUCTIONS
Please follow up with your primary care physician within 2 days. Ensure that you review all lab work results and/or imaging results. If you have any questions about your discharge paperwork please call the Emergency Department.     Return to the ED for any fevers, nausea, vomiting, abdominal pain, diarrhea, inability to take food or water by mouth without vomiting, or any new or worsening symptoms.     If you were prescribed antibiotics, please take them to completion. If you were prescribed a narcotic or any sedating medication, do not drive or operate heavy equipment or machinery while taking these medications.  If you were diagnosed with a seizure, syncope, any loss of consciousness or decreased alertness, do not drive, swim, operate heavy machinery, or put yourself in any position where a sudden loss of consciousness could put yourself or others in danger.    Thank you for visiting [facility], Department of Emergency Medicine. Please see the entirety of the educational materials provided. Please note that a visit to the emergency department does not substitute ongoing care from a primary medical provider or specialist. Please ensure to follow up as recommended. However, please return to the emergency department immediately if symptoms do not improve as discussed, symptoms worsen, new symptoms develop, difficulty in following up or for any of your concerns or issues. Please note on discharge you are acknowledging understanding and agreement on medical evaluation, management recommendations and follow up recommendations.

## 2024-10-17 NOTE — ED NOTES
Multiple attempts made to obtain 2nd blood culture without success by ER staff and lab; MD notified.

## 2024-10-22 ENCOUNTER — PROCEDURE VISIT (OUTPATIENT)
Dept: PHYSICAL MEDICINE AND REHAB | Facility: CLINIC | Age: 39
End: 2024-10-22
Payer: MEDICAID

## 2024-10-22 VITALS
DIASTOLIC BLOOD PRESSURE: 61 MMHG | HEART RATE: 74 BPM | BODY MASS INDEX: 26.45 KG/M2 | HEIGHT: 69 IN | SYSTOLIC BLOOD PRESSURE: 101 MMHG | WEIGHT: 178.56 LBS

## 2024-10-22 DIAGNOSIS — G82.20 PARAPLEGIA FOLLOWING SPINAL CORD INJURY: Primary | Chronic | ICD-10-CM

## 2024-10-22 DIAGNOSIS — Z98.890 S/P INSERTION OF INTRATHECAL PUMP: ICD-10-CM

## 2024-10-22 LAB
BACTERIA BLD CULT: NORMAL
BACTERIA BLD CULT: NORMAL

## 2024-10-22 PROCEDURE — 62370 ANL SP INF PMP W/MDREPRG&FIL: CPT | Mod: S$PBB,,, | Performed by: PHYSICAL MEDICINE & REHABILITATION

## 2024-10-22 PROCEDURE — 62370 ANL SP INF PMP W/MDREPRG&FIL: CPT | Mod: PBBFAC | Performed by: PHYSICAL MEDICINE & REHABILITATION

## 2024-10-23 ENCOUNTER — PATIENT MESSAGE (OUTPATIENT)
Dept: INTERNAL MEDICINE | Facility: CLINIC | Age: 39
End: 2024-10-23
Payer: MEDICAID

## 2024-10-23 NOTE — CONSULTS
"20G 1-3/4" placed to RFA using U/S guidance.  " General Sunscreen Counseling: I recommended a broad spectrum sunscreen with a SPF of 30 or higher.  I explained that SPF 30 sunscreens block approximately 97 percent of the sun's harmful rays.  Sunscreens should be applied at least 15 minutes prior to expected sun exposure and then every 2 hours after that as long as sun exposure continues. If swimming or exercising sunscreen should be reapplied every 45 minutes to an hour after getting wet or sweating.  One ounce, or the equivalent of a shot glass full of sunscreen, is adequate to protect the skin not covered by a bathing suit. I also recommended a lip balm with a sunscreen as well. Sun protective clothing can be used in lieu of sunscreen but must be worn the entire time you are exposed to the sun's rays. Detail Level: Detailed

## 2024-10-25 ENCOUNTER — OFFICE VISIT (OUTPATIENT)
Dept: UROLOGY | Facility: CLINIC | Age: 39
End: 2024-10-25
Payer: MEDICAID

## 2024-10-25 ENCOUNTER — PATIENT MESSAGE (OUTPATIENT)
Dept: UROLOGY | Facility: CLINIC | Age: 39
End: 2024-10-25
Payer: MEDICAID

## 2024-10-25 VITALS
HEART RATE: 65 BPM | WEIGHT: 178.56 LBS | SYSTOLIC BLOOD PRESSURE: 101 MMHG | DIASTOLIC BLOOD PRESSURE: 56 MMHG | HEIGHT: 68 IN | BODY MASS INDEX: 27.06 KG/M2

## 2024-10-25 DIAGNOSIS — N32.89 BLADDER SPASMS: ICD-10-CM

## 2024-10-25 DIAGNOSIS — N31.9 NEUROGENIC BLADDER: Primary | ICD-10-CM

## 2024-10-25 DIAGNOSIS — Z43.5 ENCOUNTER FOR CARE OR REPLACEMENT OF SUPRAPUBIC TUBE: ICD-10-CM

## 2024-10-25 DIAGNOSIS — Z93.59 CHRONIC SUPRAPUBIC CATHETER: ICD-10-CM

## 2024-10-25 DIAGNOSIS — G82.50 TETRAPLEGIC: ICD-10-CM

## 2024-10-25 PROCEDURE — 99999 PR PBB SHADOW E&M-EST. PATIENT-LVL IV: CPT | Mod: PBBFAC,,, | Performed by: NURSE PRACTITIONER

## 2024-10-25 PROCEDURE — 99214 OFFICE O/P EST MOD 30 MIN: CPT | Mod: PBBFAC | Performed by: NURSE PRACTITIONER
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
## 2024-10-28 ENCOUNTER — OFFICE VISIT (OUTPATIENT)
Dept: INTERNAL MEDICINE | Facility: CLINIC | Age: 39
End: 2024-10-28
Payer: MEDICAID

## 2024-10-28 ENCOUNTER — PATIENT MESSAGE (OUTPATIENT)
Dept: INTERNAL MEDICINE | Facility: CLINIC | Age: 39
End: 2024-10-28

## 2024-10-28 VITALS
OXYGEN SATURATION: 98 % | SYSTOLIC BLOOD PRESSURE: 100 MMHG | WEIGHT: 178.56 LBS | BODY MASS INDEX: 27.06 KG/M2 | HEIGHT: 68 IN | DIASTOLIC BLOOD PRESSURE: 68 MMHG | HEART RATE: 63 BPM

## 2024-10-28 DIAGNOSIS — K59.00 CONSTIPATION, UNSPECIFIED CONSTIPATION TYPE: Primary | ICD-10-CM

## 2024-10-28 DIAGNOSIS — K59.2 NEUROGENIC BOWEL: Chronic | ICD-10-CM

## 2024-10-28 DIAGNOSIS — Z97.8 PRESENCE OF INTRATHECAL BACLOFEN PUMP: ICD-10-CM

## 2024-10-28 DIAGNOSIS — N31.9 NEUROGENIC BLADDER: Chronic | ICD-10-CM

## 2024-10-28 DIAGNOSIS — R10.9 ABDOMINAL PAIN, UNSPECIFIED ABDOMINAL LOCATION: ICD-10-CM

## 2024-10-28 DIAGNOSIS — G82.20 PARAPLEGIA FOLLOWING SPINAL CORD INJURY: Chronic | ICD-10-CM

## 2024-10-28 DIAGNOSIS — Z93.3 COLOSTOMY STATUS: Chronic | ICD-10-CM

## 2024-10-28 DIAGNOSIS — Z93.59 CHRONIC SUPRAPUBIC CATHETER: ICD-10-CM

## 2024-10-28 DIAGNOSIS — G89.29 OTHER CHRONIC PAIN: ICD-10-CM

## 2024-10-28 DIAGNOSIS — K21.9 GASTROESOPHAGEAL REFLUX DISEASE, UNSPECIFIED WHETHER ESOPHAGITIS PRESENT: ICD-10-CM

## 2024-10-28 PROBLEM — Z01.818 PREOPERATIVE TESTING: Status: RESOLVED | Noted: 2021-04-07 | Resolved: 2024-10-28

## 2024-10-28 PROCEDURE — 3044F HG A1C LEVEL LT 7.0%: CPT | Mod: CPTII,,, | Performed by: INTERNAL MEDICINE

## 2024-10-28 PROCEDURE — 99999 PR PBB SHADOW E&M-EST. PATIENT-LVL V: CPT | Mod: PBBFAC,,, | Performed by: INTERNAL MEDICINE

## 2024-10-28 PROCEDURE — 1160F RVW MEDS BY RX/DR IN RCRD: CPT | Mod: CPTII,,, | Performed by: INTERNAL MEDICINE

## 2024-10-28 PROCEDURE — 1159F MED LIST DOCD IN RCRD: CPT | Mod: CPTII,,, | Performed by: INTERNAL MEDICINE

## 2024-10-28 PROCEDURE — 99215 OFFICE O/P EST HI 40 MIN: CPT | Mod: PBBFAC | Performed by: INTERNAL MEDICINE

## 2024-10-28 PROCEDURE — 3078F DIAST BP <80 MM HG: CPT | Mod: CPTII,,, | Performed by: INTERNAL MEDICINE

## 2024-10-28 PROCEDURE — G2211 COMPLEX E/M VISIT ADD ON: HCPCS | Mod: S$PBB,,, | Performed by: INTERNAL MEDICINE

## 2024-10-28 PROCEDURE — 3074F SYST BP LT 130 MM HG: CPT | Mod: CPTII,,, | Performed by: INTERNAL MEDICINE

## 2024-10-28 PROCEDURE — 3008F BODY MASS INDEX DOCD: CPT | Mod: CPTII,,, | Performed by: INTERNAL MEDICINE

## 2024-10-28 PROCEDURE — 99214 OFFICE O/P EST MOD 30 MIN: CPT | Mod: S$PBB,,, | Performed by: INTERNAL MEDICINE

## 2024-10-28 RX ORDER — NALOXEGOL OXALATE 25 MG/1
25 TABLET, FILM COATED ORAL DAILY
Qty: 30 TABLET | Refills: 4 | Status: SHIPPED | OUTPATIENT
Start: 2024-10-28

## 2024-10-29 PROCEDURE — 99999PBSHW PR PBB SHADOW TECHNICAL ONLY FILED TO HB: Mod: JZ,PBBFAC,,

## 2024-10-29 RX ADMIN — BACLOFEN 80 MG: 40 INJECTION INTRATHECAL at 05:10

## 2024-11-06 RX ORDER — LACTULOSE 10 G/15ML
10 SOLUTION ORAL; RECTAL EVERY 6 HOURS PRN
Qty: 300 ML | Refills: 2 | Status: SHIPPED | OUTPATIENT
Start: 2024-11-06

## 2024-11-06 NOTE — TELEPHONE ENCOUNTER
No care due was identified.  [facility] Embedded Care Due Messages. Reference number: [id number].   11/06/2024 9:54:17 AM CST

## 2024-11-14 ENCOUNTER — PATIENT MESSAGE (OUTPATIENT)
Dept: UROLOGY | Facility: CLINIC | Age: 39
End: 2024-11-14
Payer: MEDICAID

## 2024-11-19 DIAGNOSIS — R39.9 UTI SYMPTOMS: ICD-10-CM

## 2024-11-19 DIAGNOSIS — G82.20 PARAPLEGIA FOLLOWING SPINAL CORD INJURY: Chronic | ICD-10-CM

## 2024-11-19 DIAGNOSIS — A49.9 BACTERIAL UTI: ICD-10-CM

## 2024-11-19 DIAGNOSIS — N39.0 BACTERIAL UTI: ICD-10-CM

## 2024-11-19 DIAGNOSIS — R30.0 DYSURIA: ICD-10-CM

## 2024-11-19 DIAGNOSIS — M79.2 NEUROPATHIC PAIN: Chronic | ICD-10-CM

## 2024-11-19 DIAGNOSIS — N32.89 BLADDER SPASMS: ICD-10-CM

## 2024-11-19 DIAGNOSIS — R25.2 SPASTICITY: ICD-10-CM

## 2024-11-19 RX ORDER — OXYCODONE AND ACETAMINOPHEN 10; 325 MG/1; MG/1
1 TABLET ORAL EVERY 6 HOURS PRN
Qty: 110 TABLET | Refills: 0 | Status: SHIPPED | OUTPATIENT
Start: 2024-11-19 | End: 2024-12-19

## 2024-11-19 RX ORDER — PROMETHAZINE HYDROCHLORIDE AND DEXTROMETHORPHAN HYDROBROMIDE 6.25; 15 MG/5ML; MG/5ML
SYRUP ORAL
Qty: 150 ML | Refills: 2 | Status: SHIPPED | OUTPATIENT
Start: 2024-11-19

## 2024-11-19 RX ORDER — DIAZEPAM 10 MG/1
10 TABLET ORAL 2 TIMES DAILY PRN
Qty: 80 TABLET | Refills: 0 | Status: SHIPPED | OUTPATIENT
Start: 2024-11-19

## 2024-11-20 ENCOUNTER — HOSPITAL ENCOUNTER (EMERGENCY)
Facility: HOSPITAL | Age: 39
Discharge: LEFT BEFORE DISCHARGE | End: 2024-11-20
Attending: SURGERY
Payer: MEDICAID

## 2024-11-20 VITALS
BODY MASS INDEX: 25.76 KG/M2 | OXYGEN SATURATION: 99 % | TEMPERATURE: 99 F | SYSTOLIC BLOOD PRESSURE: 86 MMHG | WEIGHT: 170 LBS | DIASTOLIC BLOOD PRESSURE: 50 MMHG | RESPIRATION RATE: 18 BRPM | HEIGHT: 68 IN | HEART RATE: 81 BPM

## 2024-11-20 DIAGNOSIS — N30.00 ACUTE CYSTITIS WITHOUT HEMATURIA: ICD-10-CM

## 2024-11-20 DIAGNOSIS — Z53.29 LEFT AGAINST MEDICAL ADVICE: Primary | ICD-10-CM

## 2024-11-20 LAB
ALBUMIN SERPL BCP-MCNC: 3.8 G/DL (ref 3.5–5.2)
ALP SERPL-CCNC: 91 U/L (ref 40–150)
ALT SERPL W/O P-5'-P-CCNC: 18 U/L (ref 10–44)
ANION GAP SERPL CALC-SCNC: 10 MMOL/L (ref 8–16)
ANISOCYTOSIS BLD QL SMEAR: SLIGHT
AST SERPL-CCNC: 16 U/L (ref 10–40)
BACTERIA #/AREA URNS HPF: ABNORMAL /HPF
BASOPHILS # BLD AUTO: 0.04 K/UL (ref 0–0.2)
BASOPHILS NFR BLD: 0.4 % (ref 0–1.9)
BILIRUB SERPL-MCNC: 0.3 MG/DL (ref 0.1–1)
BILIRUB UR QL STRIP: NEGATIVE
BUN SERPL-MCNC: 9 MG/DL (ref 6–20)
CALCIUM SERPL-MCNC: 8.8 MG/DL (ref 8.7–10.5)
CHLORIDE SERPL-SCNC: 107 MMOL/L (ref 95–110)
CLARITY UR: ABNORMAL
CO2 SERPL-SCNC: 24 MMOL/L (ref 23–29)
COLOR UR: YELLOW
CREAT SERPL-MCNC: 0.6 MG/DL (ref 0.5–1.4)
DIFFERENTIAL METHOD BLD: ABNORMAL
EOSINOPHIL # BLD AUTO: 0.1 K/UL (ref 0–0.5)
EOSINOPHIL NFR BLD: 0.8 % (ref 0–8)
ERYTHROCYTE [DISTWIDTH] IN BLOOD BY AUTOMATED COUNT: 28 % (ref 11.5–14.5)
EST. GFR  (NO RACE VARIABLE): >60 ML/MIN/1.73 M^2
GIANT PLATELETS BLD QL SMEAR: PRESENT
GLUCOSE SERPL-MCNC: 101 MG/DL (ref 70–110)
GLUCOSE UR QL STRIP: NEGATIVE
HCT VFR BLD AUTO: 24.8 % (ref 40–54)
HGB BLD-MCNC: 6.7 G/DL (ref 14–18)
HGB UR QL STRIP: ABNORMAL
HYALINE CASTS #/AREA URNS LPF: 0 /LPF
HYPOCHROMIA BLD QL SMEAR: ABNORMAL
IMM GRANULOCYTES # BLD AUTO: 0.07 K/UL (ref 0–0.04)
IMM GRANULOCYTES NFR BLD AUTO: 0.7 % (ref 0–0.5)
KETONES UR QL STRIP: ABNORMAL
LACTATE SERPL-SCNC: 1.1 MMOL/L (ref 0.5–2.2)
LEUKOCYTE ESTERASE UR QL STRIP: ABNORMAL
LYMPHOCYTES # BLD AUTO: 1.4 K/UL (ref 1–4.8)
LYMPHOCYTES NFR BLD: 13.5 % (ref 18–48)
MCH RBC QN AUTO: 19.4 PG (ref 27–31)
MCHC RBC AUTO-ENTMCNC: 27 G/DL (ref 32–36)
MCV RBC AUTO: 72 FL (ref 82–98)
MICROSCOPIC COMMENT: ABNORMAL
MONOCYTES # BLD AUTO: 0.6 K/UL (ref 0.3–1)
MONOCYTES NFR BLD: 6.1 % (ref 4–15)
NEUTROPHILS # BLD AUTO: 8 K/UL (ref 1.8–7.7)
NEUTROPHILS NFR BLD: 78.5 % (ref 38–73)
NITRITE UR QL STRIP: POSITIVE
NRBC BLD-RTO: 0 /100 WBC
PH UR STRIP: 6 [PH] (ref 5–8)
PLATELET # BLD AUTO: 477 K/UL (ref 150–450)
PLATELET BLD QL SMEAR: ABNORMAL
PMV BLD AUTO: 10.9 FL (ref 9.2–12.9)
POLYCHROMASIA BLD QL SMEAR: ABNORMAL
POTASSIUM SERPL-SCNC: 3.8 MMOL/L (ref 3.5–5.1)
PROT SERPL-MCNC: 6.5 G/DL (ref 6–8.4)
PROT UR QL STRIP: ABNORMAL
RBC # BLD AUTO: 3.45 M/UL (ref 4.6–6.2)
RBC #/AREA URNS HPF: 10 /HPF (ref 0–4)
SODIUM SERPL-SCNC: 141 MMOL/L (ref 136–145)
SP GR UR STRIP: 1.02 (ref 1–1.03)
SPHEROCYTES BLD QL SMEAR: ABNORMAL
SQUAMOUS #/AREA URNS HPF: 2 /HPF
TARGETS BLD QL SMEAR: ABNORMAL
URN SPEC COLLECT METH UR: ABNORMAL
UROBILINOGEN UR STRIP-ACNC: 1 EU/DL
WBC # BLD AUTO: 10.15 K/UL (ref 3.9–12.7)
WBC #/AREA URNS HPF: >100 /HPF (ref 0–5)

## 2024-11-20 PROCEDURE — 87088 URINE BACTERIA CULTURE: CPT | Performed by: NURSE PRACTITIONER

## 2024-11-20 PROCEDURE — 87040 BLOOD CULTURE FOR BACTERIA: CPT | Performed by: NURSE PRACTITIONER

## 2024-11-20 PROCEDURE — 80053 COMPREHEN METABOLIC PANEL: CPT | Performed by: NURSE PRACTITIONER

## 2024-11-20 PROCEDURE — 81000 URINALYSIS NONAUTO W/SCOPE: CPT | Performed by: NURSE PRACTITIONER

## 2024-11-20 PROCEDURE — 87086 URINE CULTURE/COLONY COUNT: CPT | Performed by: NURSE PRACTITIONER

## 2024-11-20 PROCEDURE — 87186 SC STD MICRODIL/AGAR DIL: CPT | Performed by: NURSE PRACTITIONER

## 2024-11-20 PROCEDURE — 85025 COMPLETE CBC W/AUTO DIFF WBC: CPT | Performed by: NURSE PRACTITIONER

## 2024-11-20 PROCEDURE — 99283 EMERGENCY DEPT VISIT LOW MDM: CPT

## 2024-11-20 PROCEDURE — 83605 ASSAY OF LACTIC ACID: CPT | Performed by: NURSE PRACTITIONER

## 2024-11-20 RX ORDER — OXYBUTYNIN CHLORIDE 5 MG/1
5 TABLET, EXTENDED RELEASE ORAL DAILY
Qty: 30 TABLET | Refills: 11 | Status: SHIPPED | OUTPATIENT
Start: 2024-11-20 | End: 2025-11-20

## 2024-11-21 ENCOUNTER — OFFICE VISIT (OUTPATIENT)
Dept: UROLOGY | Facility: CLINIC | Age: 39
End: 2024-11-21
Payer: MEDICAID

## 2024-11-21 VITALS — WEIGHT: 171.94 LBS | BODY MASS INDEX: 26.06 KG/M2 | HEIGHT: 68 IN

## 2024-11-21 DIAGNOSIS — R53.83 FATIGUE, UNSPECIFIED TYPE: ICD-10-CM

## 2024-11-21 DIAGNOSIS — Z93.59 CHRONIC SUPRAPUBIC CATHETER: ICD-10-CM

## 2024-11-21 DIAGNOSIS — N39.0 BACTERIAL UTI: ICD-10-CM

## 2024-11-21 DIAGNOSIS — R30.0 DYSURIA: ICD-10-CM

## 2024-11-21 DIAGNOSIS — G82.50 TETRAPLEGIC: ICD-10-CM

## 2024-11-21 DIAGNOSIS — Z43.5 ENCOUNTER FOR CARE OR REPLACEMENT OF SUPRAPUBIC TUBE: ICD-10-CM

## 2024-11-21 DIAGNOSIS — R39.9 UTI SYMPTOMS: ICD-10-CM

## 2024-11-21 DIAGNOSIS — N31.9 NEUROGENIC BLADDER: Primary | ICD-10-CM

## 2024-11-21 DIAGNOSIS — N32.89 BLADDER SPASMS: ICD-10-CM

## 2024-11-21 DIAGNOSIS — A49.9 BACTERIAL UTI: ICD-10-CM

## 2024-11-21 PROCEDURE — 99999 PR PBB SHADOW E&M-EST. PATIENT-LVL III: CPT | Mod: PBBFAC,,, | Performed by: NURSE PRACTITIONER

## 2024-11-21 PROCEDURE — 51705 CHANGE OF BLADDER TUBE: CPT | Mod: PBBFAC | Performed by: NURSE PRACTITIONER

## 2024-11-21 PROCEDURE — 99999PBSHW PR PBB SHADOW TECHNICAL ONLY FILED TO HB: Mod: PBBFAC,,,

## 2024-11-21 PROCEDURE — 99213 OFFICE O/P EST LOW 20 MIN: CPT | Mod: PBBFAC | Performed by: NURSE PRACTITIONER

## 2024-11-21 PROCEDURE — 96372 THER/PROPH/DIAG INJ SC/IM: CPT | Mod: PBBFAC

## 2024-11-21 RX ORDER — GENTAMICIN 40 MG/ML
160 INJECTION, SOLUTION INTRAMUSCULAR; INTRAVENOUS ONCE
Status: COMPLETED | OUTPATIENT
Start: 2024-11-21 | End: 2024-11-21

## 2024-11-21 RX ORDER — CEFDINIR 300 MG/1
300 CAPSULE ORAL 2 TIMES DAILY
Qty: 20 CAPSULE | Refills: 0 | Status: SHIPPED | OUTPATIENT
Start: 2024-11-21 | End: 2024-12-01

## 2024-11-21 RX ADMIN — GENTAMICIN SULFATE 160 MG: 40 INJECTION, SOLUTION INTRAMUSCULAR; INTRAVENOUS at 02:11
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
## 2024-11-23 LAB — BACTERIA UR CULT: ABNORMAL

## 2024-11-26 LAB
BACTERIA BLD CULT: NORMAL
BACTERIA BLD CULT: NORMAL

## 2024-12-16 DIAGNOSIS — G82.20 PARAPLEGIA FOLLOWING SPINAL CORD INJURY: Chronic | ICD-10-CM

## 2024-12-16 DIAGNOSIS — M79.2 NEUROPATHIC PAIN: Chronic | ICD-10-CM

## 2024-12-16 RX ORDER — OXYCODONE HCL 10 MG/1
10 TABLET, FILM COATED, EXTENDED RELEASE ORAL EVERY 12 HOURS
Qty: 60 TABLET | Refills: 0 | OUTPATIENT
Start: 2024-12-16 | End: 2025-01-15

## 2024-12-17 ENCOUNTER — LAB VISIT (OUTPATIENT)
Dept: LAB | Facility: OTHER | Age: 39
End: 2024-12-17
Attending: INTERNAL MEDICINE
Payer: MEDICAID

## 2024-12-17 DIAGNOSIS — Z93.3 COLOSTOMY STATUS: ICD-10-CM

## 2024-12-17 DIAGNOSIS — K59.00 CONSTIPATION: Primary | ICD-10-CM

## 2024-12-17 LAB
ALBUMIN SERPL BCP-MCNC: 3.9 G/DL (ref 3.5–5.2)
ALP SERPL-CCNC: 98 U/L (ref 40–150)
ALT SERPL W/O P-5'-P-CCNC: 11 U/L (ref 10–44)
ANION GAP SERPL CALC-SCNC: 13 MMOL/L (ref 8–16)
AST SERPL-CCNC: 10 U/L (ref 10–40)
BASOPHILS # BLD AUTO: 0.04 K/UL (ref 0–0.2)
BASOPHILS NFR BLD: 0.3 % (ref 0–1.9)
BILIRUB SERPL-MCNC: 0.5 MG/DL (ref 0.1–1)
BUN SERPL-MCNC: 7 MG/DL (ref 6–20)
CALCIUM SERPL-MCNC: 9 MG/DL (ref 8.7–10.5)
CHLORIDE SERPL-SCNC: 104 MMOL/L (ref 95–110)
CO2 SERPL-SCNC: 21 MMOL/L (ref 23–29)
CREAT SERPL-MCNC: 0.6 MG/DL (ref 0.5–1.4)
DIFFERENTIAL METHOD BLD: ABNORMAL
EOSINOPHIL # BLD AUTO: 0 K/UL (ref 0–0.5)
EOSINOPHIL NFR BLD: 0.1 % (ref 0–8)
ERYTHROCYTE [DISTWIDTH] IN BLOOD BY AUTOMATED COUNT: 32.4 % (ref 11.5–14.5)
EST. GFR  (NO RACE VARIABLE): >60 ML/MIN/1.73 M^2
GLUCOSE SERPL-MCNC: 100 MG/DL (ref 70–110)
HCT VFR BLD AUTO: 21.9 % (ref 40–54)
HGB BLD-MCNC: 6.1 G/DL (ref 14–18)
IMM GRANULOCYTES # BLD AUTO: 0.13 K/UL (ref 0–0.04)
IMM GRANULOCYTES NFR BLD AUTO: 1 % (ref 0–0.5)
LYMPHOCYTES # BLD AUTO: 0.9 K/UL (ref 1–4.8)
LYMPHOCYTES NFR BLD: 7 % (ref 18–48)
MCH RBC QN AUTO: 21 PG (ref 27–31)
MCHC RBC AUTO-ENTMCNC: 27.9 G/DL (ref 32–36)
MCV RBC AUTO: 75 FL (ref 82–98)
MONOCYTES # BLD AUTO: 1 K/UL (ref 0.3–1)
MONOCYTES NFR BLD: 7.9 % (ref 4–15)
NEUTROPHILS # BLD AUTO: 10.9 K/UL (ref 1.8–7.7)
NEUTROPHILS NFR BLD: 83.7 % (ref 38–73)
NRBC BLD-RTO: 0 /100 WBC
PLATELET # BLD AUTO: 157 K/UL (ref 150–450)
PMV BLD AUTO: ABNORMAL FL (ref 9.2–12.9)
POTASSIUM SERPL-SCNC: 3.5 MMOL/L (ref 3.5–5.1)
PROT SERPL-MCNC: 7.1 G/DL (ref 6–8.4)
RBC # BLD AUTO: 2.91 M/UL (ref 4.6–6.2)
SODIUM SERPL-SCNC: 138 MMOL/L (ref 136–145)
T4 SERPL-MCNC: 5.9 UG/DL (ref 4.5–11.5)
TSH SERPL DL<=0.005 MIU/L-ACNC: 0.66 UIU/ML (ref 0.4–4)
WBC # BLD AUTO: 13.07 K/UL (ref 3.9–12.7)

## 2024-12-17 PROCEDURE — 85025 COMPLETE CBC W/AUTO DIFF WBC: CPT | Performed by: INTERNAL MEDICINE

## 2024-12-17 PROCEDURE — 36415 COLL VENOUS BLD VENIPUNCTURE: CPT | Performed by: INTERNAL MEDICINE

## 2024-12-17 PROCEDURE — 84436 ASSAY OF TOTAL THYROXINE: CPT | Performed by: INTERNAL MEDICINE

## 2024-12-17 PROCEDURE — 80053 COMPREHEN METABOLIC PANEL: CPT | Performed by: INTERNAL MEDICINE

## 2024-12-17 PROCEDURE — 84443 ASSAY THYROID STIM HORMONE: CPT | Performed by: INTERNAL MEDICINE

## 2024-12-17 RX ORDER — OXYCODONE AND ACETAMINOPHEN 10; 325 MG/1; MG/1
1 TABLET ORAL EVERY 6 HOURS PRN
Qty: 110 TABLET | Refills: 0 | Status: ON HOLD | OUTPATIENT
Start: 2024-12-20 | End: 2025-01-19

## 2024-12-19 ENCOUNTER — HOSPITAL ENCOUNTER (EMERGENCY)
Facility: HOSPITAL | Age: 39
Discharge: ANOTHER HOSPITAL | End: 2024-12-20
Payer: MEDICAID

## 2024-12-19 ENCOUNTER — OFFICE VISIT (OUTPATIENT)
Dept: UROLOGY | Facility: CLINIC | Age: 39
End: 2024-12-19
Payer: MEDICAID

## 2024-12-19 VITALS
HEIGHT: 68 IN | SYSTOLIC BLOOD PRESSURE: 102 MMHG | BODY MASS INDEX: 26.06 KG/M2 | WEIGHT: 171.94 LBS | HEART RATE: 74 BPM | DIASTOLIC BLOOD PRESSURE: 57 MMHG

## 2024-12-19 DIAGNOSIS — R53.83 FATIGUE, UNSPECIFIED TYPE: ICD-10-CM

## 2024-12-19 DIAGNOSIS — D64.9 SYMPTOMATIC ANEMIA: Primary | ICD-10-CM

## 2024-12-19 DIAGNOSIS — K92.2 GI BLEED: ICD-10-CM

## 2024-12-19 DIAGNOSIS — R68.83 CHILLS: ICD-10-CM

## 2024-12-19 DIAGNOSIS — Z93.59 CHRONIC SUPRAPUBIC CATHETER: ICD-10-CM

## 2024-12-19 DIAGNOSIS — G82.50 TETRAPLEGIC: ICD-10-CM

## 2024-12-19 DIAGNOSIS — N39.0 BACTERIAL UTI: Primary | ICD-10-CM

## 2024-12-19 DIAGNOSIS — R39.9 UTI SYMPTOMS: ICD-10-CM

## 2024-12-19 DIAGNOSIS — A49.9 BACTERIAL UTI: Primary | ICD-10-CM

## 2024-12-19 DIAGNOSIS — Z43.5 ENCOUNTER FOR CARE OR REPLACEMENT OF SUPRAPUBIC TUBE: ICD-10-CM

## 2024-12-19 LAB
ABO + RH BLD: NORMAL
ALBUMIN SERPL BCP-MCNC: 3.2 G/DL (ref 3.5–5.2)
ALP SERPL-CCNC: 69 U/L (ref 40–150)
ALT SERPL W/O P-5'-P-CCNC: 9 U/L (ref 10–44)
ANION GAP SERPL CALC-SCNC: 11 MMOL/L (ref 8–16)
ANISOCYTOSIS BLD QL SMEAR: ABNORMAL
AST SERPL-CCNC: 10 U/L (ref 10–40)
BASOPHILS # BLD AUTO: 0.04 K/UL (ref 0–0.2)
BASOPHILS NFR BLD: 0.4 % (ref 0–1.9)
BILIRUB SERPL-MCNC: 0.4 MG/DL (ref 0.1–1)
BLD GP AB SCN CELLS X3 SERPL QL: NORMAL
BUN SERPL-MCNC: 8 MG/DL (ref 6–20)
CALCIUM SERPL-MCNC: 8.4 MG/DL (ref 8.7–10.5)
CHLORIDE SERPL-SCNC: 103 MMOL/L (ref 95–110)
CO2 SERPL-SCNC: 25 MMOL/L (ref 23–29)
CREAT SERPL-MCNC: 0.6 MG/DL (ref 0.5–1.4)
DACRYOCYTES BLD QL SMEAR: ABNORMAL
DIFFERENTIAL METHOD BLD: ABNORMAL
EOSINOPHIL # BLD AUTO: 0.1 K/UL (ref 0–0.5)
EOSINOPHIL NFR BLD: 0.5 % (ref 0–8)
ERYTHROCYTE [DISTWIDTH] IN BLOOD BY AUTOMATED COUNT: 31.9 % (ref 11.5–14.5)
EST. GFR  (NO RACE VARIABLE): >60 ML/MIN/1.73 M^2
GLUCOSE SERPL-MCNC: 100 MG/DL (ref 70–110)
HCT VFR BLD AUTO: 18.4 % (ref 40–54)
HGB BLD-MCNC: 4.9 G/DL (ref 14–18)
HIV1+2 IGG SERPL QL IA.RAPID: NORMAL
HYPOCHROMIA BLD QL SMEAR: ABNORMAL
IMM GRANULOCYTES # BLD AUTO: 0.11 K/UL (ref 0–0.04)
IMM GRANULOCYTES NFR BLD AUTO: 1.2 % (ref 0–0.5)
LACTATE SERPL-SCNC: 1.3 MMOL/L (ref 0.5–2.2)
LYMPHOCYTES # BLD AUTO: 0.9 K/UL (ref 1–4.8)
LYMPHOCYTES NFR BLD: 10.1 % (ref 18–48)
MCH RBC QN AUTO: 20.1 PG (ref 27–31)
MCHC RBC AUTO-ENTMCNC: 26.6 G/DL (ref 32–36)
MCV RBC AUTO: 75 FL (ref 82–98)
MONOCYTES # BLD AUTO: 0.7 K/UL (ref 0.3–1)
MONOCYTES NFR BLD: 7.7 % (ref 4–15)
NEUTROPHILS # BLD AUTO: 7.4 K/UL (ref 1.8–7.7)
NEUTROPHILS NFR BLD: 80.1 % (ref 38–73)
NRBC BLD-RTO: 1 /100 WBC
OVALOCYTES BLD QL SMEAR: ABNORMAL
PLATELET # BLD AUTO: 472 K/UL (ref 150–450)
PLATELET BLD QL SMEAR: ABNORMAL
PMV BLD AUTO: 10.9 FL (ref 9.2–12.9)
POIKILOCYTOSIS BLD QL SMEAR: SLIGHT
POLYCHROMASIA BLD QL SMEAR: ABNORMAL
POTASSIUM SERPL-SCNC: 3.5 MMOL/L (ref 3.5–5.1)
PROT SERPL-MCNC: 6.4 G/DL (ref 6–8.4)
RBC # BLD AUTO: 2.44 M/UL (ref 4.6–6.2)
SCHISTOCYTES BLD QL SMEAR: ABNORMAL
SODIUM SERPL-SCNC: 139 MMOL/L (ref 136–145)
SPECIMEN OUTDATE: NORMAL
WBC # BLD AUTO: 9.22 K/UL (ref 3.9–12.7)

## 2024-12-19 PROCEDURE — 83605 ASSAY OF LACTIC ACID: CPT

## 2024-12-19 PROCEDURE — 96372 THER/PROPH/DIAG INJ SC/IM: CPT

## 2024-12-19 PROCEDURE — 63600175 PHARM REV CODE 636 W HCPCS

## 2024-12-19 PROCEDURE — 93005 ELECTROCARDIOGRAM TRACING: CPT

## 2024-12-19 PROCEDURE — 36556 INSERT NON-TUNNEL CV CATH: CPT

## 2024-12-19 PROCEDURE — 86850 RBC ANTIBODY SCREEN: CPT

## 2024-12-19 PROCEDURE — 87086 URINE CULTURE/COLONY COUNT: CPT | Performed by: NURSE PRACTITIONER

## 2024-12-19 PROCEDURE — 51705 CHANGE OF BLADDER TUBE: CPT | Mod: PBBFAC | Performed by: NURSE PRACTITIONER

## 2024-12-19 PROCEDURE — 96361 HYDRATE IV INFUSION ADD-ON: CPT

## 2024-12-19 PROCEDURE — 80053 COMPREHEN METABOLIC PANEL: CPT

## 2024-12-19 PROCEDURE — 87186 SC STD MICRODIL/AGAR DIL: CPT | Mod: 59 | Performed by: NURSE PRACTITIONER

## 2024-12-19 PROCEDURE — 80074 ACUTE HEPATITIS PANEL: CPT

## 2024-12-19 PROCEDURE — 86703 HIV-1/HIV-2 1 RESULT ANTBDY: CPT

## 2024-12-19 PROCEDURE — 99900035 HC TECH TIME PER 15 MIN (STAT)

## 2024-12-19 PROCEDURE — 99285 EMERGENCY DEPT VISIT HI MDM: CPT | Mod: 25,27

## 2024-12-19 PROCEDURE — 85025 COMPLETE CBC W/AUTO DIFF WBC: CPT

## 2024-12-19 PROCEDURE — 99214 OFFICE O/P EST MOD 30 MIN: CPT | Mod: PBBFAC | Performed by: NURSE PRACTITIONER

## 2024-12-19 PROCEDURE — 96372 THER/PROPH/DIAG INJ SC/IM: CPT | Mod: PBBFAC

## 2024-12-19 PROCEDURE — 25000003 PHARM REV CODE 250

## 2024-12-19 PROCEDURE — 93010 ELECTROCARDIOGRAM REPORT: CPT | Mod: ,,, | Performed by: INTERNAL MEDICINE

## 2024-12-19 PROCEDURE — 87088 URINE BACTERIA CULTURE: CPT | Performed by: NURSE PRACTITIONER

## 2024-12-19 PROCEDURE — 94760 N-INVAS EAR/PLS OXIMETRY 1: CPT

## 2024-12-19 PROCEDURE — 99999PBSHW PR PBB SHADOW TECHNICAL ONLY FILED TO HB: Mod: PBBFAC,,,

## 2024-12-19 PROCEDURE — 96360 HYDRATION IV INFUSION INIT: CPT | Mod: 59

## 2024-12-19 PROCEDURE — 99999 PR PBB SHADOW E&M-EST. PATIENT-LVL IV: CPT | Mod: PBBFAC,,, | Performed by: NURSE PRACTITIONER

## 2024-12-19 RX ORDER — CEFTRIAXONE 1 G/1
1 INJECTION, POWDER, FOR SOLUTION INTRAMUSCULAR; INTRAVENOUS
Status: COMPLETED | OUTPATIENT
Start: 2024-12-19 | End: 2024-12-19

## 2024-12-19 RX ORDER — HYDROMORPHONE HYDROCHLORIDE 1 MG/ML
1 INJECTION, SOLUTION INTRAMUSCULAR; INTRAVENOUS; SUBCUTANEOUS
Status: COMPLETED | OUTPATIENT
Start: 2024-12-19 | End: 2024-12-19

## 2024-12-19 RX ORDER — HYDROCODONE BITARTRATE AND ACETAMINOPHEN 500; 5 MG/1; MG/1
TABLET ORAL
Status: DISCONTINUED | OUTPATIENT
Start: 2024-12-19 | End: 2024-12-20 | Stop reason: HOSPADM

## 2024-12-19 RX ORDER — CEFDINIR 300 MG/1
300 CAPSULE ORAL 2 TIMES DAILY
Qty: 20 CAPSULE | Refills: 0 | Status: ON HOLD | OUTPATIENT
Start: 2024-12-19 | End: 2024-12-29

## 2024-12-19 RX ADMIN — SODIUM CHLORIDE 1000 ML: 9 INJECTION, SOLUTION INTRAVENOUS at 08:12

## 2024-12-19 RX ADMIN — CEFTRIAXONE SODIUM 1 G: 1 INJECTION, POWDER, FOR SOLUTION INTRAMUSCULAR; INTRAVENOUS at 02:12

## 2024-12-19 RX ADMIN — HYDROMORPHONE HYDROCHLORIDE 1 MG: 1 INJECTION, SOLUTION INTRAMUSCULAR; INTRAVENOUS; SUBCUTANEOUS at 09:12
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
## 2024-12-20 ENCOUNTER — HOSPITAL ENCOUNTER (INPATIENT)
Facility: HOSPITAL | Age: 39
LOS: 2 days | Discharge: HOME | DRG: 812 | End: 2024-12-23
Attending: INTERNAL MEDICINE
Payer: MEDICAID

## 2024-12-20 VITALS
RESPIRATION RATE: 16 BRPM | TEMPERATURE: 99 F | OXYGEN SATURATION: 100 % | HEIGHT: 68 IN | BODY MASS INDEX: 26.22 KG/M2 | DIASTOLIC BLOOD PRESSURE: 55 MMHG | HEART RATE: 81 BPM | WEIGHT: 173 LBS | SYSTOLIC BLOOD PRESSURE: 117 MMHG

## 2024-12-20 DIAGNOSIS — D50.9 IRON DEFICIENCY ANEMIA, UNSPECIFIED IRON DEFICIENCY ANEMIA TYPE: ICD-10-CM

## 2024-12-20 DIAGNOSIS — D64.9 SYMPTOMATIC ANEMIA: ICD-10-CM

## 2024-12-20 DIAGNOSIS — D64.9 ANEMIA: Primary | ICD-10-CM

## 2024-12-20 LAB
ANISOCYTOSIS BLD QL SMEAR: ABNORMAL
BASOPHILS # BLD AUTO: 0.05 K/UL (ref 0–0.2)
BASOPHILS NFR BLD: 0.6 % (ref 0–1.9)
BLD PROD TYP BPU: NORMAL
BLD PROD TYP BPU: NORMAL
BLOOD UNIT EXPIRATION DATE: NORMAL
BLOOD UNIT EXPIRATION DATE: NORMAL
BLOOD UNIT TYPE CODE: 5100
BLOOD UNIT TYPE CODE: 5100
BLOOD UNIT TYPE: NORMAL
BLOOD UNIT TYPE: NORMAL
CODING SYSTEM: NORMAL
CODING SYSTEM: NORMAL
CROSSMATCH INTERPRETATION: NORMAL
CROSSMATCH INTERPRETATION: NORMAL
DACRYOCYTES BLD QL SMEAR: ABNORMAL
DIFFERENTIAL METHOD BLD: ABNORMAL
DISPENSE STATUS: NORMAL
DISPENSE STATUS: NORMAL
EOSINOPHIL # BLD AUTO: 0.1 K/UL (ref 0–0.5)
EOSINOPHIL NFR BLD: 0.6 % (ref 0–8)
ERYTHROCYTE [DISTWIDTH] IN BLOOD BY AUTOMATED COUNT: 28.9 % (ref 11.5–14.5)
HAV IGM SERPL QL IA: NORMAL
HBV CORE IGM SERPL QL IA: NORMAL
HBV SURFACE AG SERPL QL IA: NORMAL
HCT VFR BLD AUTO: 24.6 % (ref 40–54)
HCV AB SERPL QL IA: NORMAL
HGB BLD-MCNC: 7.3 G/DL (ref 14–18)
HYPOCHROMIA BLD QL SMEAR: ABNORMAL
IMM GRANULOCYTES # BLD AUTO: 0.09 K/UL (ref 0–0.04)
IMM GRANULOCYTES NFR BLD AUTO: 1 % (ref 0–0.5)
LYMPHOCYTES # BLD AUTO: 1.1 K/UL (ref 1–4.8)
LYMPHOCYTES NFR BLD: 11.8 % (ref 18–48)
MCH RBC QN AUTO: 23.2 PG (ref 27–31)
MCHC RBC AUTO-ENTMCNC: 29.7 G/DL (ref 32–36)
MCV RBC AUTO: 78 FL (ref 82–98)
MONOCYTES # BLD AUTO: 0.8 K/UL (ref 0.3–1)
MONOCYTES NFR BLD: 9.3 % (ref 4–15)
NEUTROPHILS # BLD AUTO: 6.9 K/UL (ref 1.8–7.7)
NEUTROPHILS NFR BLD: 76.7 % (ref 38–73)
NRBC BLD-RTO: 2 /100 WBC
NUM UNITS TRANS PACKED RBC: NORMAL
NUM UNITS TRANS PACKED RBC: NORMAL
OB PNL STL: POSITIVE
OHS QRS DURATION: 82 MS
OHS QTC CALCULATION: 419 MS
OVALOCYTES BLD QL SMEAR: ABNORMAL
PLATELET # BLD AUTO: 527 K/UL (ref 150–450)
PLATELET BLD QL SMEAR: ABNORMAL
PMV BLD AUTO: 10.7 FL (ref 9.2–12.9)
POIKILOCYTOSIS BLD QL SMEAR: ABNORMAL
POLYCHROMASIA BLD QL SMEAR: ABNORMAL
RBC # BLD AUTO: 3.14 M/UL (ref 4.6–6.2)
SPHEROCYTES BLD QL SMEAR: ABNORMAL
TARGETS BLD QL SMEAR: ABNORMAL
WBC # BLD AUTO: 8.95 K/UL (ref 3.9–12.7)

## 2024-12-20 PROCEDURE — 85025 COMPLETE CBC W/AUTO DIFF WBC: CPT | Performed by: STUDENT IN AN ORGANIZED HEALTH CARE EDUCATION/TRAINING PROGRAM

## 2024-12-20 PROCEDURE — 63600175 PHARM REV CODE 636 W HCPCS: Performed by: INTERNAL MEDICINE

## 2024-12-20 PROCEDURE — G0378 HOSPITAL OBSERVATION PER HR: HCPCS

## 2024-12-20 PROCEDURE — 96375 TX/PRO/DX INJ NEW DRUG ADDON: CPT

## 2024-12-20 PROCEDURE — G0379 DIRECT REFER HOSPITAL OBSERV: HCPCS

## 2024-12-20 PROCEDURE — 36430 TRANSFUSION BLD/BLD COMPNT: CPT

## 2024-12-20 PROCEDURE — 96376 TX/PRO/DX INJ SAME DRUG ADON: CPT

## 2024-12-20 PROCEDURE — 86922 COMPATIBILITY TEST ANTIGLOB: CPT

## 2024-12-20 PROCEDURE — 82272 OCCULT BLD FECES 1-3 TESTS: CPT

## 2024-12-20 PROCEDURE — 96374 THER/PROPH/DIAG INJ IV PUSH: CPT | Mod: 59

## 2024-12-20 PROCEDURE — 63600175 PHARM REV CODE 636 W HCPCS

## 2024-12-20 PROCEDURE — 25000003 PHARM REV CODE 250: Performed by: INTERNAL MEDICINE

## 2024-12-20 PROCEDURE — P9016 RBC LEUKOCYTES REDUCED: HCPCS

## 2024-12-20 PROCEDURE — 96361 HYDRATE IV INFUSION ADD-ON: CPT

## 2024-12-20 RX ORDER — DIAZEPAM 5 MG/1
10 TABLET ORAL 2 TIMES DAILY PRN
Status: DISCONTINUED | OUTPATIENT
Start: 2024-12-20 | End: 2024-12-23 | Stop reason: HOSPADM

## 2024-12-20 RX ORDER — ACETAMINOPHEN 325 MG/1
650 TABLET ORAL EVERY 4 HOURS PRN
Status: DISCONTINUED | OUTPATIENT
Start: 2024-12-20 | End: 2024-12-21

## 2024-12-20 RX ORDER — CEFTRIAXONE 1 G/1
1 INJECTION, POWDER, FOR SOLUTION INTRAMUSCULAR; INTRAVENOUS
Status: DISCONTINUED | OUTPATIENT
Start: 2024-12-20 | End: 2024-12-21

## 2024-12-20 RX ORDER — PANTOPRAZOLE SODIUM 40 MG/10ML
80 INJECTION, POWDER, LYOPHILIZED, FOR SOLUTION INTRAVENOUS
Status: COMPLETED | OUTPATIENT
Start: 2024-12-20 | End: 2024-12-20

## 2024-12-20 RX ORDER — OXYCODONE AND ACETAMINOPHEN 10; 325 MG/1; MG/1
1 TABLET ORAL EVERY 6 HOURS PRN
Status: DISCONTINUED | OUTPATIENT
Start: 2024-12-20 | End: 2024-12-21

## 2024-12-20 RX ORDER — LANOLIN ALCOHOL/MO/W.PET/CERES
1 CREAM (GRAM) TOPICAL DAILY
Status: DISCONTINUED | OUTPATIENT
Start: 2024-12-21 | End: 2024-12-23 | Stop reason: HOSPADM

## 2024-12-20 RX ORDER — PANTOPRAZOLE SODIUM 40 MG/10ML
40 INJECTION, POWDER, LYOPHILIZED, FOR SOLUTION INTRAVENOUS 2 TIMES DAILY
Status: DISCONTINUED | OUTPATIENT
Start: 2024-12-20 | End: 2024-12-23

## 2024-12-20 RX ORDER — HYDROCODONE BITARTRATE AND ACETAMINOPHEN 5; 325 MG/1; MG/1
1 TABLET ORAL EVERY 6 HOURS PRN
Status: DISCONTINUED | OUTPATIENT
Start: 2024-12-20 | End: 2024-12-21

## 2024-12-20 RX ORDER — ONDANSETRON HYDROCHLORIDE 2 MG/ML
4 INJECTION, SOLUTION INTRAVENOUS EVERY 12 HOURS PRN
Status: DISCONTINUED | OUTPATIENT
Start: 2024-12-20 | End: 2024-12-23 | Stop reason: HOSPADM

## 2024-12-20 RX ORDER — OXYBUTYNIN CHLORIDE 5 MG/1
5 TABLET, EXTENDED RELEASE ORAL DAILY
Status: DISCONTINUED | OUTPATIENT
Start: 2024-12-21 | End: 2024-12-23 | Stop reason: HOSPADM

## 2024-12-20 RX ORDER — HYDROMORPHONE HYDROCHLORIDE 1 MG/ML
0.5 INJECTION, SOLUTION INTRAMUSCULAR; INTRAVENOUS; SUBCUTANEOUS EVERY 4 HOURS PRN
Status: DISCONTINUED | OUTPATIENT
Start: 2024-12-20 | End: 2024-12-20 | Stop reason: HOSPADM

## 2024-12-20 RX ORDER — NALOXONE HCL 0.4 MG/ML
0.4 VIAL (ML) INJECTION
Status: DISCONTINUED | OUTPATIENT
Start: 2024-12-20 | End: 2024-12-23 | Stop reason: HOSPADM

## 2024-12-20 RX ORDER — AMOXICILLIN 250 MG
1 CAPSULE ORAL DAILY
Status: DISCONTINUED | OUTPATIENT
Start: 2024-12-21 | End: 2024-12-23 | Stop reason: HOSPADM

## 2024-12-20 RX ORDER — LACTULOSE 10 G/15ML
10 SOLUTION ORAL EVERY 6 HOURS PRN
Status: DISCONTINUED | OUTPATIENT
Start: 2024-12-20 | End: 2024-12-23 | Stop reason: HOSPADM

## 2024-12-20 RX ADMIN — HYDROMORPHONE HYDROCHLORIDE 0.5 MG: 1 INJECTION, SOLUTION INTRAMUSCULAR; INTRAVENOUS; SUBCUTANEOUS at 10:12

## 2024-12-20 RX ADMIN — OXYCODONE HYDROCHLORIDE AND ACETAMINOPHEN 1 TABLET: 10; 325 TABLET ORAL at 09:12

## 2024-12-20 RX ADMIN — PANTOPRAZOLE SODIUM 40 MG: 40 INJECTION, POWDER, FOR SOLUTION INTRAVENOUS at 09:12

## 2024-12-20 RX ADMIN — CEFTRIAXONE SODIUM 1 G: 1 INJECTION, POWDER, FOR SOLUTION INTRAMUSCULAR; INTRAVENOUS at 09:12

## 2024-12-20 RX ADMIN — HYDROMORPHONE HYDROCHLORIDE 0.5 MG: 1 INJECTION, SOLUTION INTRAMUSCULAR; INTRAVENOUS; SUBCUTANEOUS at 01:12

## 2024-12-20 RX ADMIN — PREGABALIN 200 MG: 75 CAPSULE ORAL at 09:12

## 2024-12-20 RX ADMIN — PANTOPRAZOLE SODIUM 80 MG: 40 INJECTION, POWDER, LYOPHILIZED, FOR SOLUTION INTRAVENOUS at 02:12

## 2024-12-20 NOTE — PROGRESS NOTES
VIRTUAL NURSE:  Called patient's room via telephone; verified spelling of patient's name and birthdate.  Introduced as VN for night shift that will be working with floor nurse and nursing assistant.  Educated patient on VN's role in patient care and VIP model.  Plan of care reviewed with patient.  Education per flowsheet.   Informed patient that staff will round on them every 2 hours but to use call light for any other needs they may have; informed of fall risk and fall precautions.  Patient verbalized understanding.  Call light within reach per patient.  Opportunity given for questions and questions answered.  Admission assessment questions answered.  Patient denies asking for food and water. Notified charge nurse and bedside nurse of patient needs at this time. Awaiting MD orders. Instructed to call for assistance.  Will cont to monitor and intervene as needed.     Labs, notes, orders, and careplan initiated.        12/20/24 1755   Admission   Initial VN Admission Questions Complete   Communication Issues? Technical Issue  (no camera in room 477)   Shift   Virtual Nurse - Patient Verbalized Approval Of Other (See Comments)  (telephone admit)

## 2024-12-20 NOTE — PLAN OF CARE
Pt resting in bed .   Pt awake and alert .   Hob elevated .   Vitals stable .   POC discussed .   IV intact   Safety Measures in place   Waffle mattress intact   Heels supported   Chart check complete .   Will  continue to monitor .
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
## 2024-12-20 NOTE — ED TRIAGE NOTES
Patient sent to ED per Dr. Mckeon for a low Hemoglobin and hematocrit. Patient reports has been feeling fatigued. Patient also reports received Rocephin IM for a UTI today.
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
## 2024-12-21 ENCOUNTER — ANESTHESIA (OUTPATIENT)
Dept: ENDOSCOPY | Facility: HOSPITAL | Age: 39
End: 2024-12-21
Payer: MEDICAID

## 2024-12-21 ENCOUNTER — ANESTHESIA EVENT (OUTPATIENT)
Dept: ENDOSCOPY | Facility: HOSPITAL | Age: 39
End: 2024-12-21
Payer: MEDICAID

## 2024-12-21 PROBLEM — N30.00 ACUTE CYSTITIS: Status: ACTIVE | Noted: 2024-12-21

## 2024-12-21 PROBLEM — K92.2 GI BLEED: Status: ACTIVE | Noted: 2024-12-21

## 2024-12-21 LAB
ABO + RH BLD: NORMAL
ANION GAP SERPL CALC-SCNC: 9 MMOL/L (ref 8–16)
ANISOCYTOSIS BLD QL SMEAR: SLIGHT
BACTERIA UR CULT: ABNORMAL
BACTERIA UR CULT: ABNORMAL
BASOPHILS # BLD AUTO: 0.05 K/UL (ref 0–0.2)
BASOPHILS # BLD AUTO: 0.06 K/UL (ref 0–0.2)
BASOPHILS NFR BLD: 0.5 % (ref 0–1.9)
BASOPHILS NFR BLD: 0.6 % (ref 0–1.9)
BLD GP AB SCN CELLS X3 SERPL QL: NORMAL
BLD PROD TYP BPU: NORMAL
BLOOD UNIT EXPIRATION DATE: NORMAL
BLOOD UNIT TYPE CODE: 9500
BLOOD UNIT TYPE: NORMAL
BUN SERPL-MCNC: 3 MG/DL (ref 6–20)
CALCIUM SERPL-MCNC: 8.3 MG/DL (ref 8.7–10.5)
CHLORIDE SERPL-SCNC: 105 MMOL/L (ref 95–110)
CO2 SERPL-SCNC: 27 MMOL/L (ref 23–29)
CODING SYSTEM: NORMAL
CREAT SERPL-MCNC: 0.6 MG/DL (ref 0.5–1.4)
CROSSMATCH INTERPRETATION: NORMAL
DIFFERENTIAL METHOD BLD: ABNORMAL
DIFFERENTIAL METHOD BLD: ABNORMAL
DISPENSE STATUS: NORMAL
EOSINOPHIL # BLD AUTO: 0.1 K/UL (ref 0–0.5)
EOSINOPHIL # BLD AUTO: 0.1 K/UL (ref 0–0.5)
EOSINOPHIL NFR BLD: 0.5 % (ref 0–8)
EOSINOPHIL NFR BLD: 0.8 % (ref 0–8)
ERYTHROCYTE [DISTWIDTH] IN BLOOD BY AUTOMATED COUNT: 28 % (ref 11.5–14.5)
ERYTHROCYTE [DISTWIDTH] IN BLOOD BY AUTOMATED COUNT: 28.8 % (ref 11.5–14.5)
ERYTHROCYTE [DISTWIDTH] IN BLOOD BY AUTOMATED COUNT: 29 % (ref 11.5–14.5)
EST. GFR  (NO RACE VARIABLE): >60 ML/MIN/1.73 M^2
FERRITIN SERPL-MCNC: 12 NG/ML (ref 20–300)
GLUCOSE SERPL-MCNC: 91 MG/DL (ref 70–110)
HCT VFR BLD AUTO: 23.9 % (ref 40–54)
HCT VFR BLD AUTO: 24.8 % (ref 40–54)
HCT VFR BLD AUTO: 27.1 % (ref 40–54)
HGB BLD-MCNC: 6.9 G/DL (ref 14–18)
HGB BLD-MCNC: 7.2 G/DL (ref 14–18)
HGB BLD-MCNC: 8 G/DL (ref 14–18)
HYPOCHROMIA BLD QL SMEAR: ABNORMAL
IMM GRANULOCYTES # BLD AUTO: 0.09 K/UL (ref 0–0.04)
IMM GRANULOCYTES # BLD AUTO: 0.11 K/UL (ref 0–0.04)
IMM GRANULOCYTES NFR BLD AUTO: 0.9 % (ref 0–0.5)
IMM GRANULOCYTES NFR BLD AUTO: 1.1 % (ref 0–0.5)
IRON SERPL-MCNC: 14 UG/DL (ref 45–160)
LYMPHOCYTES # BLD AUTO: 1.4 K/UL (ref 1–4.8)
LYMPHOCYTES # BLD AUTO: 1.5 K/UL (ref 1–4.8)
LYMPHOCYTES NFR BLD: 13.9 % (ref 18–48)
LYMPHOCYTES NFR BLD: 14 % (ref 18–48)
MCH RBC QN AUTO: 22.7 PG (ref 27–31)
MCH RBC QN AUTO: 22.8 PG (ref 27–31)
MCH RBC QN AUTO: 23.4 PG (ref 27–31)
MCHC RBC AUTO-ENTMCNC: 28.9 G/DL (ref 32–36)
MCHC RBC AUTO-ENTMCNC: 29 G/DL (ref 32–36)
MCHC RBC AUTO-ENTMCNC: 29.5 G/DL (ref 32–36)
MCV RBC AUTO: 78 FL (ref 82–98)
MCV RBC AUTO: 79 FL (ref 82–98)
MCV RBC AUTO: 79 FL (ref 82–98)
MONOCYTES # BLD AUTO: 1.1 K/UL (ref 0.3–1)
MONOCYTES # BLD AUTO: 1.2 K/UL (ref 0.3–1)
MONOCYTES NFR BLD: 10.8 % (ref 4–15)
MONOCYTES NFR BLD: 11.1 % (ref 4–15)
NEUTROPHILS # BLD AUTO: 7.4 K/UL (ref 1.8–7.7)
NEUTROPHILS # BLD AUTO: 7.6 K/UL (ref 1.8–7.7)
NEUTROPHILS NFR BLD: 72.8 % (ref 38–73)
NEUTROPHILS NFR BLD: 73 % (ref 38–73)
NRBC BLD-RTO: 1 /100 WBC
NRBC BLD-RTO: 2 /100 WBC
NUM UNITS TRANS PACKED RBC: NORMAL
PLATELET # BLD AUTO: 658 K/UL (ref 150–450)
PLATELET # BLD AUTO: 659 K/UL (ref 150–450)
PLATELET # BLD AUTO: 705 K/UL (ref 150–450)
PLATELET BLD QL SMEAR: ABNORMAL
PMV BLD AUTO: 10.3 FL (ref 9.2–12.9)
PMV BLD AUTO: 10.6 FL (ref 9.2–12.9)
PMV BLD AUTO: 11 FL (ref 9.2–12.9)
POIKILOCYTOSIS BLD QL SMEAR: SLIGHT
POLYCHROMASIA BLD QL SMEAR: ABNORMAL
POTASSIUM SERPL-SCNC: 3.4 MMOL/L (ref 3.5–5.1)
RBC # BLD AUTO: 3.02 M/UL (ref 4.6–6.2)
RBC # BLD AUTO: 3.17 M/UL (ref 4.6–6.2)
RBC # BLD AUTO: 3.42 M/UL (ref 4.6–6.2)
SATURATED IRON: 3 % (ref 20–50)
SODIUM SERPL-SCNC: 141 MMOL/L (ref 136–145)
SPECIMEN OUTDATE: NORMAL
TOTAL IRON BINDING CAPACITY: 401 UG/DL (ref 250–450)
TRANSFERRIN SERPL-MCNC: 271 MG/DL (ref 200–375)
WBC # BLD AUTO: 10.12 K/UL (ref 3.9–12.7)
WBC # BLD AUTO: 10.41 K/UL (ref 3.9–12.7)
WBC # BLD AUTO: 11.58 K/UL (ref 3.9–12.7)

## 2024-12-21 PROCEDURE — D9220A PRA ANESTHESIA: Mod: ANES,,, | Performed by: STUDENT IN AN ORGANIZED HEALTH CARE EDUCATION/TRAINING PROGRAM

## 2024-12-21 PROCEDURE — 83540 ASSAY OF IRON: CPT | Performed by: INTERNAL MEDICINE

## 2024-12-21 PROCEDURE — 63600175 PHARM REV CODE 636 W HCPCS

## 2024-12-21 PROCEDURE — 86901 BLOOD TYPING SEROLOGIC RH(D): CPT

## 2024-12-21 PROCEDURE — 36430 TRANSFUSION BLD/BLD COMPNT: CPT

## 2024-12-21 PROCEDURE — 82728 ASSAY OF FERRITIN: CPT | Performed by: INTERNAL MEDICINE

## 2024-12-21 PROCEDURE — 63600175 PHARM REV CODE 636 W HCPCS: Performed by: NURSE ANESTHETIST, CERTIFIED REGISTERED

## 2024-12-21 PROCEDURE — 36415 COLL VENOUS BLD VENIPUNCTURE: CPT | Performed by: INTERNAL MEDICINE

## 2024-12-21 PROCEDURE — 25000003 PHARM REV CODE 250: Performed by: STUDENT IN AN ORGANIZED HEALTH CARE EDUCATION/TRAINING PROGRAM

## 2024-12-21 PROCEDURE — 02HV33Z INSERTION OF INFUSION DEVICE INTO SUPERIOR VENA CAVA, PERCUTANEOUS APPROACH: ICD-10-PCS

## 2024-12-21 PROCEDURE — 63600175 PHARM REV CODE 636 W HCPCS: Performed by: INTERNAL MEDICINE

## 2024-12-21 PROCEDURE — 88305 TISSUE EXAM BY PATHOLOGIST: CPT | Performed by: PATHOLOGY

## 2024-12-21 PROCEDURE — 37000008 HC ANESTHESIA 1ST 15 MINUTES: Performed by: INTERNAL MEDICINE

## 2024-12-21 PROCEDURE — 25000003 PHARM REV CODE 250

## 2024-12-21 PROCEDURE — 27201012 HC FORCEPS, HOT/COLD, DISP: Performed by: INTERNAL MEDICINE

## 2024-12-21 PROCEDURE — 11000001 HC ACUTE MED/SURG PRIVATE ROOM

## 2024-12-21 PROCEDURE — 86922 COMPATIBILITY TEST ANTIGLOB: CPT

## 2024-12-21 PROCEDURE — 0DB68ZX EXCISION OF STOMACH, VIA NATURAL OR ARTIFICIAL OPENING ENDOSCOPIC, DIAGNOSTIC: ICD-10-PCS

## 2024-12-21 PROCEDURE — C1751 CATH, INF, PER/CENT/MIDLINE: HCPCS

## 2024-12-21 PROCEDURE — 36569 INSJ PICC 5 YR+ W/O IMAGING: CPT

## 2024-12-21 PROCEDURE — D9220A PRA ANESTHESIA: Mod: CRNA,,, | Performed by: NURSE ANESTHETIST, CERTIFIED REGISTERED

## 2024-12-21 PROCEDURE — P9016 RBC LEUKOCYTES REDUCED: HCPCS

## 2024-12-21 PROCEDURE — 25000003 PHARM REV CODE 250: Performed by: INTERNAL MEDICINE

## 2024-12-21 PROCEDURE — 99223 1ST HOSP IP/OBS HIGH 75: CPT | Mod: 25,,, | Performed by: INTERNAL MEDICINE

## 2024-12-21 PROCEDURE — 80048 BASIC METABOLIC PNL TOTAL CA: CPT | Performed by: INTERNAL MEDICINE

## 2024-12-21 PROCEDURE — 37000009 HC ANESTHESIA EA ADD 15 MINS: Performed by: INTERNAL MEDICINE

## 2024-12-21 PROCEDURE — 30233N1 TRANSFUSION OF NONAUTOLOGOUS RED BLOOD CELLS INTO PERIPHERAL VEIN, PERCUTANEOUS APPROACH: ICD-10-PCS

## 2024-12-21 PROCEDURE — 85025 COMPLETE CBC W/AUTO DIFF WBC: CPT | Performed by: INTERNAL MEDICINE

## 2024-12-21 PROCEDURE — 85027 COMPLETE CBC AUTOMATED: CPT

## 2024-12-21 RX ORDER — SODIUM CHLORIDE 9 MG/ML
INJECTION, SOLUTION INTRAVENOUS CONTINUOUS PRN
Status: DISCONTINUED | OUTPATIENT
Start: 2024-12-21 | End: 2024-12-21

## 2024-12-21 RX ORDER — PROPOFOL 10 MG/ML
VIAL (ML) INTRAVENOUS
Status: DISCONTINUED | OUTPATIENT
Start: 2024-12-21 | End: 2024-12-21

## 2024-12-21 RX ORDER — ACETAMINOPHEN 325 MG/1
650 TABLET ORAL EVERY 6 HOURS PRN
Status: DISCONTINUED | OUTPATIENT
Start: 2024-12-22 | End: 2024-12-23 | Stop reason: HOSPADM

## 2024-12-21 RX ORDER — HYDROCODONE BITARTRATE AND ACETAMINOPHEN 500; 5 MG/1; MG/1
TABLET ORAL
Status: DISCONTINUED | OUTPATIENT
Start: 2024-12-21 | End: 2024-12-23 | Stop reason: HOSPADM

## 2024-12-21 RX ORDER — SODIUM CHLORIDE 0.9 % (FLUSH) 0.9 %
10 SYRINGE (ML) INJECTION EVERY 12 HOURS PRN
Status: DISCONTINUED | OUTPATIENT
Start: 2024-12-21 | End: 2024-12-23 | Stop reason: HOSPADM

## 2024-12-21 RX ORDER — LIDOCAINE HYDROCHLORIDE 20 MG/ML
INJECTION, SOLUTION EPIDURAL; INFILTRATION; INTRACAUDAL; PERINEURAL
Status: DISCONTINUED | OUTPATIENT
Start: 2024-12-21 | End: 2024-12-21

## 2024-12-21 RX ORDER — HYDROMORPHONE HYDROCHLORIDE 1 MG/ML
1 INJECTION, SOLUTION INTRAMUSCULAR; INTRAVENOUS; SUBCUTANEOUS EVERY 4 HOURS PRN
Status: DISCONTINUED | OUTPATIENT
Start: 2024-12-21 | End: 2024-12-23 | Stop reason: HOSPADM

## 2024-12-21 RX ADMIN — PREGABALIN 200 MG: 75 CAPSULE ORAL at 04:12

## 2024-12-21 RX ADMIN — DIAZEPAM 10 MG: 5 TABLET ORAL at 10:12

## 2024-12-21 RX ADMIN — PREGABALIN 200 MG: 75 CAPSULE ORAL at 10:12

## 2024-12-21 RX ADMIN — HYDROMORPHONE HYDROCHLORIDE 1 MG: 1 INJECTION, SOLUTION INTRAMUSCULAR; INTRAVENOUS; SUBCUTANEOUS at 01:12

## 2024-12-21 RX ADMIN — DIAZEPAM 10 MG: 5 TABLET ORAL at 09:12

## 2024-12-21 RX ADMIN — PANTOPRAZOLE SODIUM 40 MG: 40 INJECTION, POWDER, FOR SOLUTION INTRAVENOUS at 09:12

## 2024-12-21 RX ADMIN — LIDOCAINE HYDROCHLORIDE 5 ML: 20 INJECTION, SOLUTION EPIDURAL; INFILTRATION; INTRACAUDAL; PERINEURAL at 02:12

## 2024-12-21 RX ADMIN — PANTOPRAZOLE SODIUM 40 MG: 40 INJECTION, POWDER, FOR SOLUTION INTRAVENOUS at 10:12

## 2024-12-21 RX ADMIN — HYDROMORPHONE HYDROCHLORIDE 1 MG: 1 INJECTION, SOLUTION INTRAMUSCULAR; INTRAVENOUS; SUBCUTANEOUS at 06:12

## 2024-12-21 RX ADMIN — SODIUM CHLORIDE: 0.9 INJECTION, SOLUTION INTRAVENOUS at 01:12

## 2024-12-21 RX ADMIN — PREGABALIN 200 MG: 75 CAPSULE ORAL at 09:12

## 2024-12-21 RX ADMIN — PIPERACILLIN SODIUM AND TAZOBACTAM SODIUM 4.5 G: 4; .5 INJECTION, POWDER, LYOPHILIZED, FOR SOLUTION INTRAVENOUS at 09:12

## 2024-12-21 RX ADMIN — OXYCODONE HYDROCHLORIDE AND ACETAMINOPHEN 1 TABLET: 10; 325 TABLET ORAL at 05:12

## 2024-12-21 RX ADMIN — PIPERACILLIN SODIUM AND TAZOBACTAM SODIUM 4.5 G: 4; .5 INJECTION, POWDER, LYOPHILIZED, FOR SOLUTION INTRAVENOUS at 06:12

## 2024-12-21 RX ADMIN — PROPOFOL 30 MG: 10 INJECTION, EMULSION INTRAVENOUS at 02:12

## 2024-12-21 RX ADMIN — SENNOSIDES AND DOCUSATE SODIUM 1 TABLET: 50; 8.6 TABLET ORAL at 09:12

## 2024-12-21 RX ADMIN — OXYBUTYNIN CHLORIDE 5 MG: 5 TABLET, EXTENDED RELEASE ORAL at 09:12

## 2024-12-21 RX ADMIN — NALOXEGOL OXALATE 25 MG: 25 TABLET, FILM COATED ORAL at 04:12

## 2024-12-21 RX ADMIN — FERROUS SULFATE TAB 325 MG (65 MG ELEMENTAL FE) 1 EACH: 325 (65 FE) TAB at 09:12

## 2024-12-21 RX ADMIN — PROPOFOL 120 MG: 10 INJECTION, EMULSION INTRAVENOUS at 02:12

## 2024-12-21 NOTE — [employer]OVATION PATIENT INSTRUCTIONS
Discharge Summary/Instructions after an Endoscopic Procedure  Patient Name: Nghia Edgar  Patient MRN: [id number]  Patient YOB: 1985 Saturday, December 21, 2024  Benjamin Isaac MD  Dear patient,  As a result of recent federal legislation (The Federal Cures Act), you may   receive lab or pathology results from your procedure in your [facility]   account before your physician is able to contact you. Your physician or   their representative will relay the results to you with their   recommendations at their soonest availability.  Thank you,  Your health is very important to us during the Covid Crisis. Following your   procedure today, you will receive a daily text for 2 weeks asking about   signs or symptoms of Covid 19.  Please respond to this text when you   receive it so we can follow up and keep you as safe as possible.   RESTRICTIONS:  During your procedure today, you received medications for sedation.  These   medications may affect your judgment, balance and coordination.  Therefore,   for 24 hours, you have the following restrictions:   - DO NOT drive a car, operate machinery, make legal/financial decisions,   sign important papers or drink alcohol.    ACTIVITY:  Today: no heavy lifting, straining or running due to procedural   sedation/anesthesia.  The following day: return to full activity including work.  DIET:  Eat and drink normally unless instructed otherwise.     TREATMENT FOR COMMON SIDE EFFECTS:  - Mild abdominal pain, nausea, belching, bloating or excessive gas:  rest,   eat lightly and use a heating pad.  - Sore Throat: treat with throat lozenges and/or gargle with warm salt   water.  - Because air was used during the procedure, expelling large amounts of air   from your rectum or belching is normal.  - If a bowel prep was taken, you may not have a bowel movement for 1-3 days.    This is normal.  SYMPTOMS TO WATCH FOR AND REPORT TO YOUR PHYSICIAN:  1. Abdominal pain or bloating, other  than gas cramps.  2. Chest pain.  3. Back pain.  4. Signs of infection such as: chills or fever occurring within 24 hours   after the procedure.  5. Rectal bleeding, which would show as bright red, maroon, or black stools.   (A tablespoon of blood from the rectum is not serious, especially if   hemorrhoids are present.)  6. Vomiting.  7. Weakness or dizziness.  GO DIRECTLY TO THE NEAREST EMERGENCY ROOM IF YOU HAVE ANY OF THE FOLLOWING:      Difficulty breathing              Chills and/or fever over 101 F   Persistent vomiting and/or vomiting blood   Severe abdominal pain   Severe chest pain   Black, tarry stools   Bleeding- more than one tablespoon   Any other symptom or condition that you feel may need urgent attention  Your doctor recommends these additional instructions:  If any biopsies were taken, your doctors clinic will contact you in 1 to 2   weeks with any results.  - Return patient to [facility] for possible discharge same day.   - Resume previous diet.   - Continue present medications.   - Await pathology results.   - Next step would likely be video capsule endoscopy, can follow up with his   primary GI Dr. Mckeon and discuss this as outpatient.  For questions, problems or results please call your physician - Benjamin Isaac MD.  EMERGENCY PHONE NUMBER: [phone],  LAB RESULTS: [phone]  IF A COMPLICATION OR EMERGENCY SITUATION ARISES AND YOU ARE UNABLE TO REACH   YOUR PHYSICIAN - GO DIRECTLY TO THE EMERGENCY ROOM.  Benjamin Isaac MD  12/21/2024 2:17:27 PM  This report has been verified and signed electronically.  Dear patient,  As a result of recent federal legislation (The Federal Cures Act), you may   receive lab or pathology results from your procedure in your [facility]   account before your physician is able to contact you. Your physician or   their representative will relay the results to you with their   recommendations at their soonest availability.  Thank you,  [employer]ATION
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
## 2024-12-21 NOTE — ASSESSMENT & PLAN NOTE
Anemia is likely due to acute blood loss which was from gastrointestinal tract . Most recent hemoglobin and hematocrit are listed below.  Recent Labs     12/19/24  2215 12/20/24  1247   HGB 4.9* 7.3*   HCT 18.4* 24.6*     Plan  - Monitor serial CBC: Daily  - Transfuse PRBC if patient becomes hemodynamically unstable, symptomatic or H/H drops below 7/21.  - Patient has received 2 units of PRBCs on 12/20/24  - Patient's anemia is currently improving  - we will consult Gastroenterology
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
## 2024-12-21 NOTE — PLAN OF CARE
Problem: Adult Inpatient Plan of Care  Goal: Plan of Care Review  Outcome: Progressing  Goal: Patient-Specific Goal (Individualized)  Outcome: Progressing  Goal: Absence of Hospital-Acquired Illness or Injury  Outcome: Progressing  Goal: Optimal Comfort and Wellbeing  Outcome: Progressing  Goal: Readiness for Transition of Care  Outcome: Progressing     Problem: Infection  Goal: Absence of Infection Signs and Symptoms  Outcome: Progressing     Problem: Skin Injury Risk Increased  Goal: Skin Health and Integrity  Outcome: Progressing     Problem: UTI (Urinary Tract Infection)  Goal: Improved Infection Symptoms  Outcome: Progressing     Problem: Gastrointestinal Bleeding  Goal: Optimal Coping with Acute Illness  Outcome: Progressing  Goal: Hemostasis  Outcome: Progressing     Problem: Fall Injury Risk  Goal: Absence of Fall and Fall-Related Injury  Outcome: Progressing     Problem: Anemia  Goal: Anemia Symptom Improvement  Outcome: Progressing     Problem: Skin or Soft Tissue Infection  Goal: Absence of Infection Signs and Symptoms  Outcome: Progressing
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
## 2024-12-21 NOTE — ANESTHESIA POSTPROCEDURE EVALUATION
Anesthesia Post Evaluation    Patient: Nghia Edgar Jr.    Procedure(s) Performed: Procedure(s) (LRB):  EGD (ESOPHAGOGASTRODUODENOSCOPY) (N/A)    Final Anesthesia Type: general      Patient location during evaluation: PACU  Patient participation: Yes- Able to Participate  Level of consciousness: awake  Post-procedure vital signs: reviewed and stable  Pain management: adequate  Airway patency: patent    PONV status at discharge: No PONV  Anesthetic complications: no      Cardiovascular status: blood pressure returned to baseline  Respiratory status: unassisted  Hydration status: euvolemic  Follow-up not needed.              Vitals Value Taken Time   /59 12/21/24 1441   Temp 98.4 F 12/21/24 1441   Pulse 65 12/21/24 1441   Resp 10 12/21/24 1441   SpO2 99 % 12/21/24 1441         Event Time   Out of Recovery 12/21/2024 14:54:27         Pain/Carmina Score: Pain Rating Prior to Med Admin: 10 (12/21/2024  1:16 PM)  Pain Rating Post Med Admin: 8 (12/21/2024  6:20 AM)  Carmina Score: 9 (12/21/2024  2:41 PM)
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
## 2024-12-21 NOTE — PLAN OF CARE
Recovery complete. Patient recovered to baseline. Discharge instructions reviewed with patient. VSS.   Report called to brooklyn avila at bedside

## 2024-12-21 NOTE — ASSESSMENT & PLAN NOTE
Patient's hemorrhage is due to gastrointestinal bleed, patient does not have a propensity for bleeding.. Patients most recent Hgb, Hct, platelets, and INR are listed below.  Recent Labs     12/20/24  1247 12/21/24  0212 12/21/24  1007   HGB 7.3* 7.2* 6.9*   HCT 24.6* 24.8* 23.9*   * 658* 659*       Plan  - Will trend hemoglobin/hematocrit twice Daily  - Will monitor and correct any coagulation defects  - Will transfuse if Hgb is <7g/dl (<8g/dl in cases of active ACS) or if patient has rapid bleeding leading to hemodynamic instability  - patient received 2 units PRBC before transfer  - continue with pantoprazole  -GI consult placed, EGD negative for bleed
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
## 2024-12-21 NOTE — ASSESSMENT & PLAN NOTE
Anemia is likely due to  unknown . Most recent hemoglobin and hematocrit are listed below.  Recent Labs     12/20/24  1247 12/21/24  0212 12/21/24  1007   HGB 7.3* 7.2* 6.9*   HCT 24.6* 24.8* 23.9*       Plan  - Monitor serial CBC: Daily  - Transfuse PRBC if patient becomes hemodynamically unstable, symptomatic or H/H drops below 7/21.  - Patient has received 2 units of PRBCs on 12/20/24  -patient received 1 U pRBCs on 12/21  - Patient's anemia is currently stable  - we will consult Gastroenterology  -EGD performed today without evidence of bleeding

## 2024-12-21 NOTE — SUBJECTIVE & OBJECTIVE
Interval History: Nursing notes reviewed and no acute events overnight.  Soft blood pressures today.  Pain controlled on current regimen.  Transfused 1 U pRBCs for hemoglobin 6.9. EGD today revealed no source of bleed.  Femoral IV access removed.    Review of Systems   Constitutional:  Negative for activity change, chills and fever.   HENT:  Negative for congestion, rhinorrhea and sore throat.    Eyes:  Negative for discharge and redness.   Respiratory:  Negative for cough, chest tightness, shortness of breath and wheezing.    Cardiovascular:  Negative for chest pain, palpitations and leg swelling.   Gastrointestinal:  Positive for blood in stool. Negative for abdominal pain, constipation, diarrhea, nausea and vomiting.   Genitourinary:  Negative for dysuria, flank pain and hematuria.   Musculoskeletal:  Negative for back pain, myalgias and neck pain.        Weakness in all extremities   Skin:  Negative for pallor, rash and wound.   Neurological:  Positive for weakness. Negative for dizziness, tremors, syncope, numbness and headaches.   Psychiatric/Behavioral:  Negative for agitation, confusion and hallucinations.      Objective:     Vital Signs (Most Recent):  Temp: 98.4 °F (36.9 °C) (12/21/24 1622)  Pulse: 64 (12/21/24 1626)  Resp: 18 (12/21/24 1622)  BP: (!) 95/49 (12/21/24 1622)  SpO2: 96 % (12/21/24 1622) Vital Signs (24h Range):  Temp:  [98.2 °F (36.8 °C)-101.3 °F (38.5 °C)] 98.4 °F (36.9 °C)  Pulse:  [63-82] 64  Resp:  [9-20] 18  SpO2:  [95 %-100 %] 96 %  BP: ()/(49-70) 95/49     Weight: 80 kg (176 lb 5.9 oz)  Body mass index is 26.82 kg/m².    Intake/Output Summary (Last 24 hours) at 12/21/2024 1636  Last data filed at 12/21/2024 1611  Gross per 24 hour   Intake 553.51 ml   Output 3500 ml   Net -2946.49 ml         Physical Exam  Vitals and nursing note reviewed.   Constitutional:       General: He is not in acute distress.  HENT:      Head: Normocephalic and atraumatic.      Mouth/Throat:      Mouth:  Mucous membranes are moist.   Eyes:      General:         Right eye: No discharge.         Left eye: No discharge.      Conjunctiva/sclera: Conjunctivae normal.   Cardiovascular:      Rate and Rhythm: Normal rate and regular rhythm.      Pulses: Normal pulses.   Pulmonary:      Effort: Pulmonary effort is normal.      Breath sounds: Normal breath sounds.   Abdominal:      General: Bowel sounds are normal.      Palpations: Abdomen is soft.      Tenderness: There is no abdominal tenderness.      Comments: Colostomy bag in place   Genitourinary:     Comments: Suprapubic cath in place  Musculoskeletal:         General: No swelling. Normal range of motion.      Cervical back: Normal range of motion and neck supple.   Skin:     General: Skin is warm and dry.   Neurological:      Mental Status: He is alert and oriented to person, place, and time. Mental status is at baseline.      Motor: Weakness present.      Comments: Weakness in all 4 extremities   Psychiatric:         Mood and Affect: Mood normal.             Significant Labs: All pertinent labs within the past 24 hours have been reviewed.    Significant Imaging: I have reviewed all pertinent imaging results/findings within the past 24 hours.

## 2024-12-21 NOTE — TRANSFER OF CARE
"Anesthesia Transfer of Care Note    Patient: Nghia Edgar Jr.    Procedure(s) Performed: Procedure(s) (LRB):  EGD (ESOPHAGOGASTRODUODENOSCOPY) (N/A)    Patient location: GI    Anesthesia Type: general    Transport from OR: Transported from OR on room air with adequate spontaneous ventilation    Post pain: adequate analgesia    Post assessment: no apparent anesthetic complications    Post vital signs: stable    Level of consciousness: sedated    Nausea/Vomiting: no nausea/vomiting    Complications: none    Transfer of care protocol was followed      Last vitals: Visit Vitals  /70   Pulse 70   Temp 36.9 °C (98.5 °F) (Oral)   Resp 18   Ht 5' 8" (1.727 m)   Wt 80 kg (176 lb 5.9 oz)   SpO2 98%   BMI 26.82 kg/m²     "

## 2024-12-21 NOTE — PROCEDURES
Nghia Edgar Jr. is a 39 y.o. male patient.    Temp: 98.2 °F (36.8 °C) (12/21/24 0330)  Pulse: 79 (12/21/24 0330)  Resp: 16 (12/21/24 0330)  BP: (!) 97/52 (12/21/24 0330)  SpO2: 98 % (12/21/24 0330)    PICC  Date/Time: 12/21/2024 4:45 AM  Performed by: Amador Garcia RN  Consent Done: Yes  Time out: Immediately prior to procedure a time out was called to verify the correct patient, procedure, equipment, support staff and site/side marked as required  Indications: vascular access and med administration  Anesthesia: local infiltration  Local anesthetic: lidocaine 1% without epinephrine  Anesthetic Total (mL): 1  Description of findings: PICC  Preparation: skin prepped with chlorhexidine (without alcohol)  Skin prep agent dried: skin prep agent completely dried prior to procedure  Sterile barriers: all five maximum sterile barriers used - cap, mask, sterile gown, sterile gloves, and large sterile sheet  Hand hygiene: hand hygiene performed prior to central venous catheter insertion  Location details: right basilic  Catheter type: double lumen  Catheter size: 5 Fr  Catheter Length: 42cm    Ultrasound guidance: yes  Vessel Caliber: large and patent, compressibility normal  Vascular Doppler: not done  Needle advanced into vessel with real time Ultrasound guidance.  Guidewire confirmed in vessel.  Image recorded and saved.  Sterile sheath used.  Manometry: esophageal manometry  Number of attempts: 1  Post-procedure: blood return through all ports, chlorhexidine patch and sterile dressing applied  Technical procedures used: PICC  Significant surgical tasks conducted by the assistant(s): NA  Estimated blood loss (mL): 0  Specimens: No  Implants: No  Assessment: placement verified by x-ray, free fluid flow, no pneumothorax on x-ray, tip termination and successful placement  Complications: none          Name Amador Garcia RN  12/21/2024
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
## 2024-12-21 NOTE — ASSESSMENT & PLAN NOTE
Patient's hemorrhage is due to gastrointestinal bleed, patient does not have a propensity for bleeding.. Patients most recent Hgb, Hct, platelets, and INR are listed below.  Recent Labs     12/19/24  2215 12/20/24  1247   HGB 4.9* 7.3*   HCT 18.4* 24.6*   * 527*     Plan  - Will trend hemoglobin/hematocrit twice Daily  - Will monitor and correct any coagulation defects  - Will transfuse if Hgb is <7g/dl (<8g/dl in cases of active ACS) or if patient has rapid bleeding leading to hemodynamic instability  - patient received 2 units PRBC before transfer  - continue with pantoprazole  -GI consult placed

## 2024-12-21 NOTE — ASSESSMENT & PLAN NOTE
Reported melena and occult stool positive but no obvious bleeding since arrival here.  Pt reports egd / colon (thru stoma) negative last year    Recs  Will plan egd today.  Npo  Ppi iv bid    If egd negative, likely will need to consider video capsule, which can likely be done in the outpatient setting - further recs pending egd.
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
## 2024-12-22 LAB
ALBUMIN SERPL BCP-MCNC: 2.9 G/DL (ref 3.5–5.2)
ALBUMIN SERPL BCP-MCNC: 2.9 G/DL (ref 3.5–5.2)
ALP SERPL-CCNC: 67 U/L (ref 40–150)
ALP SERPL-CCNC: 67 U/L (ref 40–150)
ALT SERPL W/O P-5'-P-CCNC: 5 U/L (ref 10–44)
ALT SERPL W/O P-5'-P-CCNC: 5 U/L (ref 10–44)
ANION GAP SERPL CALC-SCNC: 8 MMOL/L (ref 8–16)
ANION GAP SERPL CALC-SCNC: 8 MMOL/L (ref 8–16)
AST SERPL-CCNC: 9 U/L (ref 10–40)
AST SERPL-CCNC: 9 U/L (ref 10–40)
BASOPHILS # BLD AUTO: 0.07 K/UL (ref 0–0.2)
BASOPHILS NFR BLD: 0.7 % (ref 0–1.9)
BILIRUB SERPL-MCNC: 0.4 MG/DL (ref 0.1–1)
BILIRUB SERPL-MCNC: 0.4 MG/DL (ref 0.1–1)
BUN SERPL-MCNC: 5 MG/DL (ref 6–20)
BUN SERPL-MCNC: 5 MG/DL (ref 6–20)
CALCIUM SERPL-MCNC: 8.3 MG/DL (ref 8.7–10.5)
CALCIUM SERPL-MCNC: 8.3 MG/DL (ref 8.7–10.5)
CHLORIDE SERPL-SCNC: 106 MMOL/L (ref 95–110)
CHLORIDE SERPL-SCNC: 106 MMOL/L (ref 95–110)
CO2 SERPL-SCNC: 28 MMOL/L (ref 23–29)
CO2 SERPL-SCNC: 28 MMOL/L (ref 23–29)
CREAT SERPL-MCNC: 0.6 MG/DL (ref 0.5–1.4)
CREAT SERPL-MCNC: 0.6 MG/DL (ref 0.5–1.4)
DIFFERENTIAL METHOD BLD: ABNORMAL
EOSINOPHIL # BLD AUTO: 0.1 K/UL (ref 0–0.5)
EOSINOPHIL NFR BLD: 1.2 % (ref 0–8)
ERYTHROCYTE [DISTWIDTH] IN BLOOD BY AUTOMATED COUNT: 28.7 % (ref 11.5–14.5)
EST. GFR  (NO RACE VARIABLE): >60 ML/MIN/1.73 M^2
EST. GFR  (NO RACE VARIABLE): >60 ML/MIN/1.73 M^2
GLUCOSE SERPL-MCNC: 110 MG/DL (ref 70–110)
GLUCOSE SERPL-MCNC: 110 MG/DL (ref 70–110)
HCT VFR BLD AUTO: 27.5 % (ref 40–54)
HGB BLD-MCNC: 7.9 G/DL (ref 14–18)
IMM GRANULOCYTES # BLD AUTO: 0.09 K/UL (ref 0–0.04)
IMM GRANULOCYTES NFR BLD AUTO: 0.9 % (ref 0–0.5)
LYMPHOCYTES # BLD AUTO: 1.1 K/UL (ref 1–4.8)
LYMPHOCYTES NFR BLD: 10.8 % (ref 18–48)
MCH RBC QN AUTO: 22.8 PG (ref 27–31)
MCHC RBC AUTO-ENTMCNC: 28.7 G/DL (ref 32–36)
MCV RBC AUTO: 79 FL (ref 82–98)
MONOCYTES # BLD AUTO: 0.8 K/UL (ref 0.3–1)
MONOCYTES NFR BLD: 7.8 % (ref 4–15)
NEUTROPHILS # BLD AUTO: 7.8 K/UL (ref 1.8–7.7)
NEUTROPHILS NFR BLD: 78.6 % (ref 38–73)
NRBC BLD-RTO: 1 /100 WBC
PLATELET # BLD AUTO: 766 K/UL (ref 150–450)
PMV BLD AUTO: 10.5 FL (ref 9.2–12.9)
POTASSIUM SERPL-SCNC: 3.6 MMOL/L (ref 3.5–5.1)
POTASSIUM SERPL-SCNC: 3.6 MMOL/L (ref 3.5–5.1)
PROT SERPL-MCNC: 5.8 G/DL (ref 6–8.4)
PROT SERPL-MCNC: 5.8 G/DL (ref 6–8.4)
RBC # BLD AUTO: 3.47 M/UL (ref 4.6–6.2)
SODIUM SERPL-SCNC: 142 MMOL/L (ref 136–145)
SODIUM SERPL-SCNC: 142 MMOL/L (ref 136–145)
WBC # BLD AUTO: 9.86 K/UL (ref 3.9–12.7)

## 2024-12-22 PROCEDURE — 11000001 HC ACUTE MED/SURG PRIVATE ROOM

## 2024-12-22 PROCEDURE — 25000003 PHARM REV CODE 250: Performed by: INTERNAL MEDICINE

## 2024-12-22 PROCEDURE — 63600175 PHARM REV CODE 636 W HCPCS: Performed by: INTERNAL MEDICINE

## 2024-12-22 PROCEDURE — 25000003 PHARM REV CODE 250

## 2024-12-22 PROCEDURE — 63600175 PHARM REV CODE 636 W HCPCS

## 2024-12-22 PROCEDURE — 80053 COMPREHEN METABOLIC PANEL: CPT

## 2024-12-22 PROCEDURE — 85025 COMPLETE CBC W/AUTO DIFF WBC: CPT

## 2024-12-22 PROCEDURE — 99233 SBSQ HOSP IP/OBS HIGH 50: CPT | Mod: ,,, | Performed by: INTERNAL MEDICINE

## 2024-12-22 RX ORDER — CEFTRIAXONE 1 G/1
1 INJECTION, POWDER, FOR SOLUTION INTRAMUSCULAR; INTRAVENOUS
Status: DISCONTINUED | OUTPATIENT
Start: 2024-12-22 | End: 2024-12-23 | Stop reason: HOSPADM

## 2024-12-22 RX ORDER — METOCLOPRAMIDE HYDROCHLORIDE 5 MG/ML
5 INJECTION INTRAMUSCULAR; INTRAVENOUS EVERY 4 HOURS
Status: COMPLETED | OUTPATIENT
Start: 2024-12-23 | End: 2024-12-23

## 2024-12-22 RX ORDER — MUPIROCIN 20 MG/G
OINTMENT TOPICAL 2 TIMES DAILY
Status: DISCONTINUED | OUTPATIENT
Start: 2024-12-22 | End: 2024-12-23 | Stop reason: HOSPADM

## 2024-12-22 RX ADMIN — FERROUS SULFATE TAB 325 MG (65 MG ELEMENTAL FE) 1 EACH: 325 (65 FE) TAB at 09:12

## 2024-12-22 RX ADMIN — HYDROMORPHONE HYDROCHLORIDE 1 MG: 1 INJECTION, SOLUTION INTRAMUSCULAR; INTRAVENOUS; SUBCUTANEOUS at 09:12

## 2024-12-22 RX ADMIN — DIAZEPAM 10 MG: 5 TABLET ORAL at 09:12

## 2024-12-22 RX ADMIN — HYDROMORPHONE HYDROCHLORIDE 1 MG: 1 INJECTION, SOLUTION INTRAMUSCULAR; INTRAVENOUS; SUBCUTANEOUS at 10:12

## 2024-12-22 RX ADMIN — PANTOPRAZOLE SODIUM 40 MG: 40 INJECTION, POWDER, FOR SOLUTION INTRAVENOUS at 09:12

## 2024-12-22 RX ADMIN — SENNOSIDES AND DOCUSATE SODIUM 1 TABLET: 50; 8.6 TABLET ORAL at 09:12

## 2024-12-22 RX ADMIN — HYDROMORPHONE HYDROCHLORIDE 1 MG: 1 INJECTION, SOLUTION INTRAMUSCULAR; INTRAVENOUS; SUBCUTANEOUS at 06:12

## 2024-12-22 RX ADMIN — PREGABALIN 200 MG: 75 CAPSULE ORAL at 09:12

## 2024-12-22 RX ADMIN — HYDROMORPHONE HYDROCHLORIDE 1 MG: 1 INJECTION, SOLUTION INTRAMUSCULAR; INTRAVENOUS; SUBCUTANEOUS at 12:12

## 2024-12-22 RX ADMIN — MUPIROCIN: 20 OINTMENT TOPICAL at 09:12

## 2024-12-22 RX ADMIN — NALOXEGOL OXALATE 25 MG: 25 TABLET, FILM COATED ORAL at 09:12

## 2024-12-22 RX ADMIN — PIPERACILLIN SODIUM AND TAZOBACTAM SODIUM 4.5 G: 4; .5 INJECTION, POWDER, LYOPHILIZED, FOR SOLUTION INTRAVENOUS at 03:12

## 2024-12-22 RX ADMIN — CEFTRIAXONE SODIUM 1 G: 1 INJECTION, POWDER, FOR SOLUTION INTRAMUSCULAR; INTRAVENOUS at 09:12

## 2024-12-22 RX ADMIN — DIAZEPAM 10 MG: 5 TABLET ORAL at 11:12

## 2024-12-22 RX ADMIN — PREGABALIN 200 MG: 75 CAPSULE ORAL at 03:12

## 2024-12-22 RX ADMIN — SODIUM CHLORIDE 125 MG: 9 INJECTION, SOLUTION INTRAVENOUS at 10:12

## 2024-12-22 RX ADMIN — HYDROMORPHONE HYDROCHLORIDE 1 MG: 1 INJECTION, SOLUTION INTRAMUSCULAR; INTRAVENOUS; SUBCUTANEOUS at 03:12

## 2024-12-22 RX ADMIN — OXYBUTYNIN CHLORIDE 5 MG: 5 TABLET, EXTENDED RELEASE ORAL at 09:12

## 2024-12-22 NOTE — PLAN OF CARE
Problem: Adult Inpatient Plan of Care  Goal: Plan of Care Review  Outcome: Progressing  Goal: Patient-Specific Goal (Individualized)  Outcome: Progressing  Goal: Absence of Hospital-Acquired Illness or Injury  Outcome: Progressing  Goal: Optimal Comfort and Wellbeing  Outcome: Progressing  Goal: Readiness for Transition of Care  Outcome: Progressing     Problem: Skin Injury Risk Increased  Goal: Skin Health and Integrity  Outcome: Progressing     Problem: Gastrointestinal Bleeding  Goal: Hemostasis  Outcome: Progressing     Problem: Fall Injury Risk  Goal: Absence of Fall and Fall-Related Injury  Outcome: Progressing

## 2024-12-22 NOTE — NURSING
Spoke with Dr. Isaac regarding pt having a diet and information on capsule study. States pt can have regular diet now and clear liquid diet effective midnight and plan for capsule study tomorrow. MD states will be by to see the patient and explain.
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
## 2024-12-22 NOTE — ASSESSMENT & PLAN NOTE
Patient's hemorrhage is due to gastrointestinal bleed, patient does not have a propensity for bleeding.. Patients most recent Hgb, Hct, platelets, and INR are listed below.  Recent Labs     12/21/24  1007 12/21/24  2228 12/22/24  0354   HGB 6.9* 8.0* 7.9*   HCT 23.9* 27.1* 27.5*   * 705* 766*       Plan  - Will trend hemoglobin/hematocrit twice Daily  - Will monitor and correct any coagulation defects  - Will transfuse if Hgb is <7g/dl (<8g/dl in cases of active ACS) or if patient has rapid bleeding leading to hemodynamic instability  - patient received 2 units PRBC before transfer  - continue with pantoprazole  -GI consult placed, EGD negative for bleed  -possible capsule endoscopy today

## 2024-12-22 NOTE — SUBJECTIVE & OBJECTIVE
Interval History: Nursing notes reviewed and no acute events overnight.  Patient found in no acute distress and with no new complaints.  VSS.  Pain controlled on current regimen.  Hemoglobin stable.  CLD today for possible capsule video endoscopy.      Review of Systems   Constitutional:  Negative for activity change, chills and fever.   HENT:  Negative for congestion, rhinorrhea and sore throat.    Eyes:  Negative for discharge and redness.   Respiratory:  Negative for cough, chest tightness, shortness of breath and wheezing.    Cardiovascular:  Negative for chest pain, palpitations and leg swelling.   Gastrointestinal:  Positive for blood in stool. Negative for abdominal pain, constipation, diarrhea, nausea and vomiting.   Genitourinary:  Negative for dysuria, flank pain and hematuria.   Musculoskeletal:  Negative for back pain, myalgias and neck pain.        Weakness in all extremities   Skin:  Negative for pallor, rash and wound.   Neurological:  Positive for weakness. Negative for dizziness, tremors, syncope, numbness and headaches.   Psychiatric/Behavioral:  Negative for agitation, confusion and hallucinations.      Objective:     Vital Signs (Most Recent):  Temp: 97.7 °F (36.5 °C) (12/22/24 1130)  Pulse: 70 (12/22/24 1130)  Resp: 18 (12/22/24 1130)  BP: (!) 113/56 (12/22/24 1130)  SpO2: 98 % (12/22/24 1130) Vital Signs (24h Range):  Temp:  [97.7 °F (36.5 °C)-98.8 °F (37.1 °C)] 97.7 °F (36.5 °C)  Pulse:  [63-81] 70  Resp:  [16-18] 18  SpO2:  [96 %-98 %] 98 %  BP: ()/(42-58) 113/56     Weight: 81.5 kg (179 lb 10.8 oz)  Body mass index is 27.32 kg/m².    Intake/Output Summary (Last 24 hours) at 12/22/2024 1530  Last data filed at 12/22/2024 0839  Gross per 24 hour   Intake 194.31 ml   Output 4150 ml   Net -3955.69 ml         Physical Exam  Vitals and nursing note reviewed.   Constitutional:       General: He is not in acute distress.  HENT:      Head: Normocephalic and atraumatic.      Mouth/Throat:       Mouth: Mucous membranes are moist.   Eyes:      General:         Right eye: No discharge.         Left eye: No discharge.      Conjunctiva/sclera: Conjunctivae normal.   Cardiovascular:      Rate and Rhythm: Normal rate and regular rhythm.      Pulses: Normal pulses.   Pulmonary:      Effort: Pulmonary effort is normal.      Breath sounds: Normal breath sounds.   Abdominal:      General: Bowel sounds are normal.      Palpations: Abdomen is soft.      Tenderness: There is no abdominal tenderness.      Comments: Colostomy bag in place   Genitourinary:     Comments: Suprapubic cath in place  Musculoskeletal:         General: No swelling. Normal range of motion.      Cervical back: Normal range of motion and neck supple.   Skin:     General: Skin is warm and dry.   Neurological:      Mental Status: He is alert and oriented to person, place, and time. Mental status is at baseline.      Motor: Weakness present.      Comments: Weakness in all 4 extremities   Psychiatric:         Mood and Affect: Mood normal.             Significant Labs: All pertinent labs within the past 24 hours have been reviewed.    Significant Imaging: I have reviewed all pertinent imaging results/findings within the past 24 hours.   Cimetidine Counseling:  I discussed with the patient the risks of Cimetidine including but not limited to gynecomastia, headache, diarrhea, nausea, drowsiness, arrhythmias, pancreatitis, skin rashes, psychosis, bone marrow suppression and kidney toxicity.

## 2024-12-22 NOTE — ASSESSMENT & PLAN NOTE
Anemia is likely due to  unknown . Most recent hemoglobin and hematocrit are listed below.  Recent Labs     12/21/24  1007 12/21/24  2228 12/22/24  0354   HGB 6.9* 8.0* 7.9*   HCT 23.9* 27.1* 27.5*       Plan  - Monitor serial CBC: Daily  - Transfuse PRBC if patient becomes hemodynamically unstable, symptomatic or H/H drops below 7/21.  - Patient has received 2 units of PRBCs on 12/20/24  -patient received 1 U pRBCs on 12/21  - Patient's anemia is currently stable  - we will consult Gastroenterology  -EGD performed today without evidence of bleeding  -CLD for possible video capsule endoscopy today

## 2024-12-22 NOTE — SUBJECTIVE & OBJECTIVE
Subjective:     Interval History:   No events, no abd pain, exam stable. No vomiting.  Tolerating diet    He wants to proceed with vce as inpatient given that he has ttrouble with transportation to [facility]    Review of Systems   Constitutional:  Negative for chills and fever.   Respiratory:  Negative for choking and chest tightness.    Gastrointestinal:  Negative for abdominal pain and vomiting.   Neurological:  Positive for weakness. Negative for dizziness and headaches.   Psychiatric/Behavioral:  Negative for agitation and behavioral problems.      Objective:     Vital Signs (Most Recent):  Temp: 97.7 °F (36.5 °C) (12/22/24 1130)  Pulse: 70 (12/22/24 1130)  Resp: 18 (12/22/24 1130)  BP: (!) 113/56 (12/22/24 1130)  SpO2: 98 % (12/22/24 1130) Vital Signs (24h Range):  Temp:  [97.7 °F (36.5 °C)-98.8 °F (37.1 °C)] 97.7 °F (36.5 °C)  Pulse:  [63-81] 70  Resp:  [9-18] 18  SpO2:  [96 %-100 %] 98 %  BP: ()/(42-70) 113/56     Weight: 81.5 kg (179 lb 10.8 oz) (12/22/24 0613)  Body mass index is 27.32 kg/m².      Intake/Output Summary (Last 24 hours) at 12/22/2024 1416  Last data filed at 12/22/2024 0839  Gross per 24 hour   Intake 673.31 ml   Output 4150 ml   Net -3476.69 ml       Lines/Drains/Airways       Peripherally Inserted Central Catheter Line  Duration             PICC Double Lumen 12/21/24 0445 right basilic 1 day              Drain  Duration                  Colostomy 06/15/13 2248 LLQ 4207 days         Suprapubic Catheter 12/20/24 1204 latex 16 Fr. 2 days                     Physical Exam  Vitals and nursing note reviewed.   Constitutional:       Appearance: Normal appearance. He is not ill-appearing.   Eyes:      General: No scleral icterus.     Conjunctiva/sclera: Conjunctivae normal.   Abdominal:      General: There is no distension.      Palpations: Abdomen is soft.      Tenderness: There is no abdominal tenderness.      Comments: Ostomy llq without stool   Psychiatric:         Mood and Affect: Mood  "normal.         Behavior: Behavior normal.          Significant Labs:  CBC:   Recent Labs   Lab 12/21/24  1007 12/21/24  2228 12/22/24  0354   WBC 10.12 11.58 9.86   HGB 6.9* 8.0* 7.9*   HCT 23.9* 27.1* 27.5*   * 705* 766*     BMP:   Recent Labs   Lab 12/22/24  0354     110     142   K 3.6  3.6     106   CO2 28  28   BUN 5*  5*   CREATININE 0.6  0.6   CALCIUM 8.3*  8.3*     CMP:   Recent Labs   Lab 12/22/24  0354     110   CALCIUM 8.3*  8.3*   ALBUMIN 2.9*  2.9*   PROT 5.8*  5.8*     142   K 3.6  3.6   CO2 28  28     106   BUN 5*  5*   CREATININE 0.6  0.6   ALKPHOS 67  67   ALT 5*  5*   AST 9*  9*   BILITOT 0.4  0.4     Coagulation: No results for input(s): "PT", "INR", "APTT" in the last 48 hours.      Significant Imaging:  Imaging results within the past 24 hours have been reviewed.  "

## 2024-12-22 NOTE — PROGRESS NOTES
[place] [facility]  Progress Note    Patient Name: Nghia Edgar Jr.  MRN: [id number]  Admission Date: 12/20/2024  Hospital Length of Stay: 1 days  Code Status: Full Code   Attending Provider: Judson Estrella MD  Consulting Provider: Benjamin Isaac MD  Primary Care Physician: Nohelia Hoover MD  Principal Problem: Anemia        Subjective:     Interval History:   No events, no abd pain, exam stable. No vomiting.  Tolerating diet    He wants to proceed with vce as inpatient given that he has ttrouble with transportation to [facility]    Review of Systems   Constitutional:  Negative for chills and fever.   Respiratory:  Negative for choking and chest tightness.    Gastrointestinal:  Negative for abdominal pain and vomiting.   Neurological:  Positive for weakness. Negative for dizziness and headaches.   Psychiatric/Behavioral:  Negative for agitation and behavioral problems.      Objective:     Vital Signs (Most Recent):  Temp: 97.7 °F (36.5 °C) (12/22/24 1130)  Pulse: 70 (12/22/24 1130)  Resp: 18 (12/22/24 1130)  BP: (!) 113/56 (12/22/24 1130)  SpO2: 98 % (12/22/24 1130) Vital Signs (24h Range):  Temp:  [97.7 °F (36.5 °C)-98.8 °F (37.1 °C)] 97.7 °F (36.5 °C)  Pulse:  [63-81] 70  Resp:  [9-18] 18  SpO2:  [96 %-100 %] 98 %  BP: ()/(42-70) 113/56     Weight: 81.5 kg (179 lb 10.8 oz) (12/22/24 0613)  Body mass index is 27.32 kg/m².      Intake/Output Summary (Last 24 hours) at 12/22/2024 1416  Last data filed at 12/22/2024 0839  Gross per 24 hour   Intake 673.31 ml   Output 4150 ml   Net -3476.69 ml       Lines/Drains/Airways       Peripherally Inserted Central Catheter Line  Duration             PICC Double Lumen 12/21/24 0445 right basilic 1 day              Drain  Duration                  Colostomy 06/15/13 2248 LLQ 4207 days         Suprapubic Catheter 12/20/24 1204 latex 16 Fr. 2 days                     Physical Exam  Vitals and nursing note reviewed.   Constitutional:        "Appearance: Normal appearance. He is not ill-appearing.   Eyes:      General: No scleral icterus.     Conjunctiva/sclera: Conjunctivae normal.   Abdominal:      General: There is no distension.      Palpations: Abdomen is soft.      Tenderness: There is no abdominal tenderness.      Comments: Ostomy llq without stool   Psychiatric:         Mood and Affect: Mood normal.         Behavior: Behavior normal.          Significant Labs:  CBC:   Recent Labs   Lab 12/21/24  1007 12/21/24  2228 12/22/24  0354   WBC 10.12 11.58 9.86   HGB 6.9* 8.0* 7.9*   HCT 23.9* 27.1* 27.5*   * 705* 766*     BMP:   Recent Labs   Lab 12/22/24  0354     110     142   K 3.6  3.6     106   CO2 28  28   BUN 5*  5*   CREATININE 0.6  0.6   CALCIUM 8.3*  8.3*     CMP:   Recent Labs   Lab 12/22/24  0354     110   CALCIUM 8.3*  8.3*   ALBUMIN 2.9*  2.9*   PROT 5.8*  5.8*     142   K 3.6  3.6   CO2 28  28     106   BUN 5*  5*   CREATININE 0.6  0.6   ALKPHOS 67  67   ALT 5*  5*   AST 9*  9*   BILITOT 0.4  0.4     Coagulation: No results for input(s): "PT", "INR", "APTT" in the last 48 hours.      Significant Imaging:  Imaging results within the past 24 hours have been reviewed.  Assessment/Plan:     GI  GI bleed  Reported melena and occult stool positive but no obvious bleeding since arrival here.  Pt reports egd / colon (thru stoma) negative last year    Egd this admit negative to 4th portion duo    He desires inpatient capsule    Recs  Diet today  Npo past Mn    Will have him swallow capsule tomorrow AM   Consent obtained on chart now    Plan should be to d/c home tomorrow afternoon, capsule can be taken off 8 hours after initiation.        Thank you for your consult. I will follow-up with patient. Please contact us if you have any additional questions.    Benjamin Isaac MD  Gastroenterology  [place] [facility]  "

## 2024-12-22 NOTE — ASSESSMENT & PLAN NOTE
Reported melena and occult stool positive but no obvious bleeding since arrival here.  Pt reports egd / colon (thru stoma) negative last year    Egd this admit negative to 4th portion duo    He desires inpatient capsule    Recs  Diet today  Npo past Mn    Will have him swallow capsule tomorrow AM   Consent obtained on chart now    Plan should be to d/c home tomorrow afternoon, capsule can be taken off 8 hours after initiation.

## 2024-12-23 VITALS
RESPIRATION RATE: 18 BRPM | SYSTOLIC BLOOD PRESSURE: 126 MMHG | HEIGHT: 68 IN | BODY MASS INDEX: 27.23 KG/M2 | HEART RATE: 69 BPM | WEIGHT: 179.69 LBS | TEMPERATURE: 98 F | DIASTOLIC BLOOD PRESSURE: 77 MMHG | OXYGEN SATURATION: 99 %

## 2024-12-23 LAB
ALBUMIN SERPL BCP-MCNC: 2.9 G/DL (ref 3.5–5.2)
ALP SERPL-CCNC: 69 U/L (ref 40–150)
ALT SERPL W/O P-5'-P-CCNC: 7 U/L (ref 10–44)
ANION GAP SERPL CALC-SCNC: 8 MMOL/L (ref 8–16)
ANISOCYTOSIS BLD QL SMEAR: ABNORMAL
AST SERPL-CCNC: 12 U/L (ref 10–40)
BASOPHILS NFR BLD: 0 % (ref 0–1.9)
BILIRUB SERPL-MCNC: 0.3 MG/DL (ref 0.1–1)
BUN SERPL-MCNC: 5 MG/DL (ref 6–20)
CALCIUM SERPL-MCNC: 8.3 MG/DL (ref 8.7–10.5)
CHLORIDE SERPL-SCNC: 107 MMOL/L (ref 95–110)
CO2 SERPL-SCNC: 27 MMOL/L (ref 23–29)
CREAT SERPL-MCNC: 0.6 MG/DL (ref 0.5–1.4)
DIFFERENTIAL METHOD BLD: ABNORMAL
EOSINOPHIL NFR BLD: 1 % (ref 0–8)
ERYTHROCYTE [DISTWIDTH] IN BLOOD BY AUTOMATED COUNT: 29.8 % (ref 11.5–14.5)
EST. GFR  (NO RACE VARIABLE): >60 ML/MIN/1.73 M^2
GLUCOSE SERPL-MCNC: 116 MG/DL (ref 70–110)
HCT VFR BLD AUTO: 27.6 % (ref 40–54)
HGB BLD-MCNC: 7.9 G/DL (ref 14–18)
HYPOCHROMIA BLD QL SMEAR: ABNORMAL
IMM GRANULOCYTES # BLD AUTO: ABNORMAL K/UL (ref 0–0.04)
IMM GRANULOCYTES NFR BLD AUTO: ABNORMAL % (ref 0–0.5)
LYMPHOCYTES NFR BLD: 14 % (ref 18–48)
MCH RBC QN AUTO: 23.4 PG (ref 27–31)
MCHC RBC AUTO-ENTMCNC: 28.6 G/DL (ref 32–36)
MCV RBC AUTO: 82 FL (ref 82–98)
METAMYELOCYTES NFR BLD MANUAL: 1 %
MONOCYTES NFR BLD: 6 % (ref 4–15)
MYELOCYTES NFR BLD MANUAL: 1 %
NEUTROPHILS NFR BLD: 77 % (ref 38–73)
NRBC BLD-RTO: 1 /100 WBC
OVALOCYTES BLD QL SMEAR: ABNORMAL
PLATELET # BLD AUTO: 862 K/UL (ref 150–450)
PLATELET BLD QL SMEAR: ABNORMAL
PMV BLD AUTO: 10.4 FL (ref 9.2–12.9)
POIKILOCYTOSIS BLD QL SMEAR: SLIGHT
POLYCHROMASIA BLD QL SMEAR: ABNORMAL
POTASSIUM SERPL-SCNC: 4.2 MMOL/L (ref 3.5–5.1)
PROT SERPL-MCNC: 6 G/DL (ref 6–8.4)
RBC # BLD AUTO: 3.37 M/UL (ref 4.6–6.2)
SODIUM SERPL-SCNC: 142 MMOL/L (ref 136–145)
WBC # BLD AUTO: 10.04 K/UL (ref 3.9–12.7)

## 2024-12-23 PROCEDURE — 85027 COMPLETE CBC AUTOMATED: CPT | Performed by: INTERNAL MEDICINE

## 2024-12-23 PROCEDURE — 63600175 PHARM REV CODE 636 W HCPCS: Performed by: INTERNAL MEDICINE

## 2024-12-23 PROCEDURE — 99232 SBSQ HOSP IP/OBS MODERATE 35: CPT | Mod: ,,, | Performed by: INTERNAL MEDICINE

## 2024-12-23 PROCEDURE — 85007 BL SMEAR W/DIFF WBC COUNT: CPT | Performed by: INTERNAL MEDICINE

## 2024-12-23 PROCEDURE — 25000003 PHARM REV CODE 250: Performed by: INTERNAL MEDICINE

## 2024-12-23 PROCEDURE — 80053 COMPREHEN METABOLIC PANEL: CPT

## 2024-12-23 PROCEDURE — 25000003 PHARM REV CODE 250

## 2024-12-23 PROCEDURE — 63600175 PHARM REV CODE 636 W HCPCS

## 2024-12-23 RX ORDER — PANTOPRAZOLE SODIUM 40 MG/1
40 TABLET, DELAYED RELEASE ORAL DAILY
Status: DISCONTINUED | OUTPATIENT
Start: 2024-12-24 | End: 2024-12-23 | Stop reason: HOSPADM

## 2024-12-23 RX ORDER — SULFAMETHOXAZOLE AND TRIMETHOPRIM 800; 160 MG/1; MG/1
1 TABLET ORAL 2 TIMES DAILY
Qty: 14 TABLET | Refills: 0 | Status: SHIPPED | OUTPATIENT
Start: 2024-12-23 | End: 2024-12-30

## 2024-12-23 RX ORDER — FERROUS SULFATE 325(65) MG
325 TABLET ORAL EVERY OTHER DAY
Start: 2024-12-23

## 2024-12-23 RX ORDER — NITROFURANTOIN 25; 75 MG/1; MG/1
100 CAPSULE ORAL 2 TIMES DAILY
Qty: 14 CAPSULE | Refills: 0 | Status: SHIPPED | OUTPATIENT
Start: 2024-12-23 | End: 2024-12-30

## 2024-12-23 RX ADMIN — PANTOPRAZOLE SODIUM 40 MG: 40 INJECTION, POWDER, FOR SOLUTION INTRAVENOUS at 08:12

## 2024-12-23 RX ADMIN — HYDROMORPHONE HYDROCHLORIDE 1 MG: 1 INJECTION, SOLUTION INTRAMUSCULAR; INTRAVENOUS; SUBCUTANEOUS at 05:12

## 2024-12-23 RX ADMIN — FERROUS SULFATE TAB 325 MG (65 MG ELEMENTAL FE) 1 EACH: 325 (65 FE) TAB at 08:12

## 2024-12-23 RX ADMIN — PREGABALIN 200 MG: 75 CAPSULE ORAL at 02:12

## 2024-12-23 RX ADMIN — MUPIROCIN: 20 OINTMENT TOPICAL at 08:12

## 2024-12-23 RX ADMIN — SODIUM CHLORIDE 125 MG: 9 INJECTION, SOLUTION INTRAVENOUS at 01:12

## 2024-12-23 RX ADMIN — SENNOSIDES AND DOCUSATE SODIUM 1 TABLET: 50; 8.6 TABLET ORAL at 08:12

## 2024-12-23 RX ADMIN — METOCLOPRAMIDE 5 MG: 5 INJECTION, SOLUTION INTRAMUSCULAR; INTRAVENOUS at 12:12

## 2024-12-23 RX ADMIN — HYDROMORPHONE HYDROCHLORIDE 1 MG: 1 INJECTION, SOLUTION INTRAMUSCULAR; INTRAVENOUS; SUBCUTANEOUS at 08:12

## 2024-12-23 RX ADMIN — HYDROMORPHONE HYDROCHLORIDE 1 MG: 1 INJECTION, SOLUTION INTRAMUSCULAR; INTRAVENOUS; SUBCUTANEOUS at 01:12

## 2024-12-23 RX ADMIN — NALOXEGOL OXALATE 25 MG: 25 TABLET, FILM COATED ORAL at 08:12

## 2024-12-23 RX ADMIN — METOCLOPRAMIDE 5 MG: 5 INJECTION, SOLUTION INTRAMUSCULAR; INTRAVENOUS at 08:12

## 2024-12-23 RX ADMIN — DIAZEPAM 10 MG: 5 TABLET ORAL at 02:12

## 2024-12-23 RX ADMIN — CEFTRIAXONE SODIUM 1 G: 1 INJECTION, POWDER, FOR SOLUTION INTRAMUSCULAR; INTRAVENOUS at 08:12

## 2024-12-23 RX ADMIN — OXYBUTYNIN CHLORIDE 5 MG: 5 TABLET, EXTENDED RELEASE ORAL at 08:12

## 2024-12-23 RX ADMIN — PREGABALIN 200 MG: 75 CAPSULE ORAL at 08:12

## 2024-12-23 NOTE — CONSULTS
Ostomy consult received. Nurse reports pt is independent with ostomy care with no needs for additional teaching or supplies.

## 2024-12-23 NOTE — PLAN OF CARE
Pt is agreeable with discharge to home today. Follow up information added for AVS. Pt will be transported home by his daughter Keya Edgar [phone].     12/23/24 1732   Final Note   Assessment Type Final Discharge Note   Anticipated Discharge Disposition Home   What phone number can be called within the next 1-3 days to see how you are doing after discharge? [phone]   Hospital Resources/Appts/Education Provided Appointments scheduled and added to AVS   Post-Acute Status   Discharge Delays None known at this time

## 2024-12-23 NOTE — PROGRESS NOTES
Pharmacist Intervention IV to PO Note    Nghia Edgar Jr. is a 39 y.o. male being treated with IV medication pantoprazole    Patient Data:    Vital Signs (Most Recent):  Temp: 98.5 °F (36.9 °C) (12/23/24 1125)  Pulse: 77 (12/23/24 1238)  Resp: 18 (12/23/24 1305)  BP: 111/62 (12/23/24 1125)  SpO2: 98 % (12/23/24 1125) Vital Signs (72h Range):  Temp:  [97.6 °F (36.4 °C)-101.3 °F (38.5 °C)]   Pulse:  [63-83]   Resp:  [9-20]   BP: ()/(42-70)   SpO2:  [95 %-100 %]      CBC:  Recent Labs   Lab 12/21/24  2228 12/22/24  0354 12/23/24  0511   WBC 11.58 9.86 10.04   RBC 3.42* 3.47* 3.37*   HGB 8.0* 7.9* 7.9*   HCT 27.1* 27.5* 27.6*   * 766* 862*   MCV 79* 79* 82   MCH 23.4* 22.8* 23.4*   MCHC 29.5* 28.7* 28.6*     CMP:     Recent Labs   Lab 12/19/24  2215 12/21/24  1200 12/22/24  0354 12/23/24  0511    91 110  110 116*   CALCIUM 8.4* 8.3* 8.3*  8.3* 8.3*   ALBUMIN 3.2*  --  2.9*  2.9* 2.9*   PROT 6.4  --  5.8*  5.8* 6.0    141 142  142 142   K 3.5 3.4* 3.6  3.6 4.2   CO2 25 27 28  28 27    105 106  106 107   BUN 8 3* 5*  5* 5*   CREATININE 0.6 0.6 0.6  0.6 0.6   ALKPHOS 69  --  67  67 69   ALT 9*  --  5*  5* 7*   AST 10  --  9*  9* 12   BILITOT 0.4  --  0.4  0.4 0.3       Dietary Orders:  Diet Orders            Diet GI Soft: GI Soft starting at 12/23 1012            Based on the following criteria, this patient qualifies for intravenous to oral conversion:  [x] The patients gastrointestinal tract is functioning (tolerating medications via oral or enteral route for 24 hours and tolerating food or enteral feeds for 24 hours).  [x] The patient is hemodynamically stable for 24 hours (heart rate <100 beats per minute, systolic blood pressure >99 mm Hg, and respiratory rate <20 breaths per minute).  [x] The patient shows clinical improvement (afebrile for at least 24 hours and white blood cell count downtrending or normalized). Additionally, the patient must be non-neutropenic  (absolute neutrophil count >500 cells/mm3).  [x] For antimicrobials, the patient has received IV therapy for at least 24 hours.    IV medication pantoprazole will be changed to oral medication pantoprazole    Pharmacist's Name: Akosua Chen  Pharmacist's Extension: [phone]

## 2024-12-23 NOTE — PLAN OF CARE
CM met with pt at bedside to discuss discharge planning. Pt lives alone. Has family support from his (daughter) Keya Edgar [phone]. He is quadriplegic. Pt is independent with ADL's. Pt has multiple DME (see list below), no HH services. Pt requesting Heel protector boots. Upon discharge, pts family member daughter Keya will provide transport home. Pt made aware to contact CM with any questions or concerns. CM will continue to follow and assist with any discharge needs. Heel protector boots delivered at bedside.    Future Appointments   Date Time Provider Department Center   1/8/2025  1:40 PM Aleksandar Davis MD [facility] PHYSMD [place]   1/16/2025  2:20 PM Sherry Knight NP [facility] UROLOG [place]        12/23/24 1600   Discharge Planning   Assessment Type Discharge Planning Assessment   Resource/Environmental Concerns none   Support Systems Children   Equipment Currently Used at Home lift device;wheelchair;shower chair;power chair;raised toilet  (electric WC)   Current Living Arrangements apartment   Patient/Family Anticipates Transition to home with family   Patient/Family Anticipated Services at Transition none   DME Needed Upon Discharge  none   Discharge Plan A Home;Home with family   Physical Activity   On average, how many days per week do you engage in moderate to strenuous exercise (like a brisk walk)? 0 days   On average, how many minutes do you engage in exercise at this level? 0 min   Financial Resource Strain   How hard is it for you to pay for the very basics like food, housing, medical care, and heating? Not hard   Housing Stability   In the last 12 months, was there a time when you were not able to pay the mortgage or rent on time? N   At any time in the past 12 months, were you homeless or living in a shelter (including now)? N   Transportation Needs   Has the lack of transportation kept you from medical appointments, meetings, work or from getting things needed for daily living? No    Food Insecurity   Within the past 12 months, you worried that your food would run out before you got the money to buy more. Never true   Within the past 12 months, the food you bought just didn't last and you didn't have money to get more. Never true   Stress   Do you feel stress - tense, restless, nervous, or anxious, or unable to sleep at night because your mind is troubled all the time - these days? Not at all   Social Isolation   How often do you feel lonely or isolated from those around you?  Never   Alcohol Use   Q1: How often do you have a drink containing alcohol? Never   Q2: How many drinks containing alcohol do you have on a typical day when you are drinking? None   Q3: How often do you have six or more drinks on one occasion? Never   Utilities   In the past 12 months has the electric, gas, oil, or water company threatened to shut off services in your home? No   Health Literacy   How often do you need to have someone help you when you read instructions, pamphlets, or other written material from your doctor or pharmacy? Never

## 2024-12-23 NOTE — ASSESSMENT & PLAN NOTE
Reported melena and occult stool positive but no obvious bleeding since arrival here.  Pt reports egd / colon (thru stoma) negative last year    Egd this admit negative to 4th portion duodenum    VCE deployed this am     Recs  NPO until 1030  Liquid diet at 1230; regular diet for dinner   Remove capsule at 1630    Plan should be to d/c home later today after capsule is taken off. GI will review and will call to follow up results

## 2024-12-23 NOTE — PLAN OF CARE
Problem: Adult Inpatient Plan of Care  Goal: Plan of Care Review  Outcome: Progressing  Goal: Patient-Specific Goal (Individualized)  Outcome: Progressing  Goal: Absence of Hospital-Acquired Illness or Injury  Outcome: Progressing  Goal: Optimal Comfort and Wellbeing  Outcome: Progressing  Goal: Readiness for Transition of Care  Outcome: Progressing     Problem: Skin Injury Risk Increased  Goal: Skin Health and Integrity  Outcome: Progressing     Problem: UTI (Urinary Tract Infection)  Goal: Improved Infection Symptoms  Outcome: Progressing     Problem: Fall Injury Risk  Goal: Absence of Fall and Fall-Related Injury  Outcome: Progressing     Problem: Anemia  Goal: Anemia Symptom Improvement  Outcome: Progressing

## 2024-12-23 NOTE — SUBJECTIVE & OBJECTIVE
Subjective:     Interval History: Patient seen at the bedside this morning. No acute events over night. Denies abdominal pain or overt GI bleeding.     VCE swallowed ~0815    Review of Systems   Gastrointestinal:  Negative for abdominal pain, anal bleeding, blood in stool, constipation, diarrhea, nausea, rectal pain and vomiting.     Objective:     Vital Signs (Most Recent):  Temp: 98.5 °F (36.9 °C) (12/23/24 1125)  Pulse: 71 (12/23/24 1125)  Resp: 18 (12/23/24 1125)  BP: 111/62 (12/23/24 1125)  SpO2: 98 % (12/23/24 1125) Vital Signs (24h Range):  Temp:  [97.6 °F (36.4 °C)-99.9 °F (37.7 °C)] 98.5 °F (36.9 °C)  Pulse:  [69-83] 71  Resp:  [16-18] 18  SpO2:  [97 %-100 %] 98 %  BP: ()/(50-68) 111/62     Weight: 81.5 kg (179 lb 10.8 oz) (12/22/24 0613)  Body mass index is 27.32 kg/m².      Intake/Output Summary (Last 24 hours) at 12/23/2024 1127  Last data filed at 12/23/2024 0949  Gross per 24 hour   Intake --   Output 3275 ml   Net -3275 ml       Lines/Drains/Airways       Peripherally Inserted Central Catheter Line  Duration             PICC Double Lumen 12/21/24 0445 right basilic 2 days              Drain  Duration                  Colostomy 06/15/13 2248 LLQ 4208 days         Suprapubic Catheter 12/20/24 1204 latex 16 Fr. 2 days                     Physical Exam  Constitutional:       General: He is not in acute distress.     Appearance: Normal appearance. He is not ill-appearing or toxic-appearing.   Abdominal:      General: Abdomen is flat. There is no distension.      Palpations: Abdomen is soft.      Tenderness: There is no abdominal tenderness. There is no guarding.   Neurological:      Mental Status: He is alert.          Significant Labs:  CBC:   Recent Labs   Lab 12/21/24  2228 12/22/24  0354 12/23/24  0511   WBC 11.58 9.86 10.04   HGB 8.0* 7.9* 7.9*   HCT 27.1* 27.5* 27.6*   * 766* 862*         Significant Imaging:  Imaging results within the past 24 hours have been reviewed.

## 2024-12-23 NOTE — PLAN OF CARE
Pt swallowed capsule without difficulty. Instructions reviewed with patient and nurse. Pt verbalizes understanding of instructions

## 2024-12-23 NOTE — PROGRESS NOTES
[place] [facility]  Gastroenterology  Progress Note    Patient Name: Nghia Edgar Jr.  MRN: [id number]  Admission Date: 12/20/2024  Hospital Length of Stay: 2 days  Code Status: Full Code   Attending Provider: Judson Estrella MD  Consulting Provider: Westley Back MD  Primary Care Physician: Nohelia Hoover MD  Principal Problem: Anemia        Subjective:     Interval History: Patient seen at the bedside this morning. No acute events over night. Denies abdominal pain or overt GI bleeding.     VCE swallowed ~0815    Review of Systems   Gastrointestinal:  Negative for abdominal pain, anal bleeding, blood in stool, constipation, diarrhea, nausea, rectal pain and vomiting.     Objective:     Vital Signs (Most Recent):  Temp: 98.5 °F (36.9 °C) (12/23/24 1125)  Pulse: 71 (12/23/24 1125)  Resp: 18 (12/23/24 1125)  BP: 111/62 (12/23/24 1125)  SpO2: 98 % (12/23/24 1125) Vital Signs (24h Range):  Temp:  [97.6 °F (36.4 °C)-99.9 °F (37.7 °C)] 98.5 °F (36.9 °C)  Pulse:  [69-83] 71  Resp:  [16-18] 18  SpO2:  [97 %-100 %] 98 %  BP: ()/(50-68) 111/62     Weight: 81.5 kg (179 lb 10.8 oz) (12/22/24 0613)  Body mass index is 27.32 kg/m².      Intake/Output Summary (Last 24 hours) at 12/23/2024 1127  Last data filed at 12/23/2024 0949  Gross per 24 hour   Intake --   Output 3275 ml   Net -3275 ml       Lines/Drains/Airways       Peripherally Inserted Central Catheter Line  Duration             PICC Double Lumen 12/21/24 0445 right basilic 2 days              Drain  Duration                  Colostomy 06/15/13 2248 LLQ 4208 days         Suprapubic Catheter 12/20/24 1204 latex 16 Fr. 2 days                     Physical Exam  Constitutional:       General: He is not in acute distress.     Appearance: Normal appearance. He is not ill-appearing or toxic-appearing.   Abdominal:      General: Abdomen is flat. There is no distension.      Palpations: Abdomen is soft.      Tenderness: There is no abdominal tenderness.  There is no guarding.   Neurological:      Mental Status: He is alert.          Significant Labs:  CBC:   Recent Labs   Lab 12/21/24  2228 12/22/24  0354 12/23/24  0511   WBC 11.58 9.86 10.04   HGB 8.0* 7.9* 7.9*   HCT 27.1* 27.5* 27.6*   * 766* 862*         Significant Imaging:  Imaging results within the past 24 hours have been reviewed.  Assessment/Plan:     GI  GI bleed  Reported melena and occult stool positive but no obvious bleeding since arrival here.  Pt reports egd / colon (thru stoma) negative last year    Egd this admit negative to 4th portion duodenum    VCE deployed this am     Recs  NPO until 1030  Liquid diet at 1230; regular diet for dinner   Remove capsule at 1630    Plan should be to d/c home later today after capsule is taken off. GI will review and will call to follow up results         Thank you for your consult. I will sign off. Please contact us if you have any additional questions.    Case discussed with Dr. Arnulfo Back MD  Gastroenterology Fellow   [facility]

## 2024-12-24 NOTE — PLAN OF CARE
VN reviewed discharge instructions with pt.via telephone. No vidyo in room.  Using teach back method.  AVS printed and handed to pt by bedside nurse.  Reviewed follow-up appointments, medications, diet, and importance of medication compliance.  Reviewed home care instructions, treatment plan, self-management, and when to seek medical attention.  Allowed time for questions.  All questions answered.  Patient verbalized complete understanding of discharge instructions and voices no concerns.     Discharge instructions complete.  Bedside delivery done.  Pt waiting on ride home.  Bedside nurse notified.

## 2024-12-26 LAB
FINAL PATHOLOGIC DIAGNOSIS: NORMAL
GROSS: NORMAL
Lab: NORMAL

## 2024-12-27 ENCOUNTER — PATIENT MESSAGE (OUTPATIENT)
Dept: GASTROENTEROLOGY | Facility: HOSPITAL | Age: 39
End: 2024-12-27
Payer: MEDICAID

## 2024-12-27 NOTE — PROVATION PATIENT INSTRUCTIONS
Discharge Summary/Instructions after an Endoscopic Procedure  Patient Name: Nghia Edgar  Patient MRN: [id number]  Patient YOB: 1985 Monday, December 23, 2024  Benjamin Isaac MD  Dear patient,  As a result of recent federal legislation (The Federal Cures Act), you may   receive lab or pathology results from your procedure in your [facility]   account before your physician is able to contact you. Your physician or   their representative will relay the results to you with their   recommendations at their soonest availability.  Thank you,  Your health is very important to us during the Covid Crisis. Following your   procedure today, you will receive a daily text for 2 weeks asking about   signs or symptoms of Covid 19.  Please respond to this text when you   receive it so we can follow up and keep you as safe as possible.   RESTRICTIONS:  During your procedure today, you received medications for sedation.  These   medications may affect your judgment, balance and coordination.  Therefore,   for 24 hours, you have the following restrictions:   - DO NOT drive a car, operate machinery, make legal/financial decisions,   sign important papers or drink alcohol.    ACTIVITY:  Today: no heavy lifting, straining or running due to procedural   sedation/anesthesia.  The following day: return to full activity including work.  DIET:  Eat and drink normally unless instructed otherwise.     TREATMENT FOR COMMON SIDE EFFECTS:  - Mild abdominal pain, nausea, belching, bloating or excessive gas:  rest,   eat lightly and use a heating pad.  - Sore Throat: treat with throat lozenges and/or gargle with warm salt   water.  - Because air was used during the procedure, expelling large amounts of air   from your rectum or belching is normal.  - If a bowel prep was taken, you may not have a bowel movement for 1-3 days.    This is normal.  SYMPTOMS TO WATCH FOR AND REPORT TO YOUR PHYSICIAN:  1. Abdominal pain or bloating, other  than gas cramps.  2. Chest pain.  3. Back pain.  4. Signs of infection such as: chills or fever occurring within 24 hours   after the procedure.  5. Rectal bleeding, which would show as bright red, maroon, or black stools.   (A tablespoon of blood from the rectum is not serious, especially if   hemorrhoids are present.)  6. Vomiting.  7. Weakness or dizziness.  GO DIRECTLY TO THE NEAREST EMERGENCY ROOM IF YOU HAVE ANY OF THE FOLLOWING:      Difficulty breathing              Chills and/or fever over 101 F   Persistent vomiting and/or vomiting blood   Severe abdominal pain   Severe chest pain   Black, tarry stools   Bleeding- more than one tablespoon   Any other symptom or condition that you feel may need urgent attention  Your doctor recommends these additional instructions:  If any biopsies were taken, your doctors clinic will contact you in 1 to 2   weeks with any results.  - Discharge patient to home.   - Resume previous diet.   - Return to GI office as previously scheduled.   - Return to primary GI MD for further evaluation / workup.  For questions, problems or results please call your physician - Benjamin Isaac MD.  EMERGENCY PHONE NUMBER: [phone],  LAB RESULTS: [phone]  IF A COMPLICATION OR EMERGENCY SITUATION ARISES AND YOU ARE UNABLE TO REACH   YOUR PHYSICIAN - GO DIRECTLY TO THE EMERGENCY ROOM.  Benjamin Isaac MD  12/27/2024 5:10:10 PM  This report has been verified and signed electronically.  Dear patient,  As a result of recent federal legislation (The Federal Cures Act), you may   receive lab or pathology results from your procedure in your [facility]   account before your physician is able to contact you. Your physician or   their representative will relay the results to you with their   recommendations at their soonest availability.  Thank you,  PROVATION

## 2024-12-30 ENCOUNTER — PATIENT MESSAGE (OUTPATIENT)
Dept: INTERNAL MEDICINE | Facility: CLINIC | Age: 39
End: 2024-12-30
Payer: MEDICAID

## 2025-01-08 ENCOUNTER — LAB VISIT (OUTPATIENT)
Dept: LAB | Facility: OTHER | Age: 40
End: 2025-01-08
Attending: INTERNAL MEDICINE
Payer: MEDICAID

## 2025-01-08 ENCOUNTER — PROCEDURE VISIT (OUTPATIENT)
Dept: PHYSICAL MEDICINE AND REHAB | Facility: CLINIC | Age: 40
End: 2025-01-08
Payer: MEDICAID

## 2025-01-08 VITALS
BODY MASS INDEX: 27.23 KG/M2 | HEART RATE: 60 BPM | DIASTOLIC BLOOD PRESSURE: 52 MMHG | WEIGHT: 179.69 LBS | HEIGHT: 68 IN | SYSTOLIC BLOOD PRESSURE: 92 MMHG

## 2025-01-08 DIAGNOSIS — M79.2 NEUROPATHIC PAIN: Chronic | ICD-10-CM

## 2025-01-08 DIAGNOSIS — Z98.890 S/P INSERTION OF INTRATHECAL PUMP: ICD-10-CM

## 2025-01-08 DIAGNOSIS — G82.21 CHRONIC COMPLETE PARAPLEGIA: ICD-10-CM

## 2025-01-08 DIAGNOSIS — D64.9 ANEMIA: Primary | ICD-10-CM

## 2025-01-08 DIAGNOSIS — K59.00 CONSTIPATION: ICD-10-CM

## 2025-01-08 DIAGNOSIS — G82.20 PARAPLEGIA: ICD-10-CM

## 2025-01-08 DIAGNOSIS — G82.20 PARAPLEGIA FOLLOWING SPINAL CORD INJURY: Primary | ICD-10-CM

## 2025-01-08 DIAGNOSIS — Z93.3 COLOSTOMY STATUS: ICD-10-CM

## 2025-01-08 LAB
ERYTHROCYTE [DISTWIDTH] IN BLOOD BY AUTOMATED COUNT: 25.1 % (ref 11.5–14.5)
FERRITIN SERPL-MCNC: 12 NG/ML (ref 20–300)
HCT VFR BLD AUTO: 35.3 % (ref 40–54)
HGB BLD-MCNC: 9.9 G/DL (ref 14–18)
IRON SERPL-MCNC: 30 UG/DL (ref 45–160)
MCH RBC QN AUTO: 23.3 PG (ref 27–31)
MCHC RBC AUTO-ENTMCNC: 28 G/DL (ref 32–36)
MCV RBC AUTO: 83 FL (ref 82–98)
PLATELET # BLD AUTO: 294 K/UL (ref 150–450)
PMV BLD AUTO: 10.6 FL (ref 9.2–12.9)
RBC # BLD AUTO: 4.24 M/UL (ref 4.6–6.2)
SATURATED IRON: 6 % (ref 20–50)
TOTAL IRON BINDING CAPACITY: 481 UG/DL (ref 250–450)
TRANSFERRIN SERPL-MCNC: 325 MG/DL (ref 200–375)
WBC # BLD AUTO: 5.6 K/UL (ref 3.9–12.7)

## 2025-01-08 PROCEDURE — 36415 COLL VENOUS BLD VENIPUNCTURE: CPT | Performed by: INTERNAL MEDICINE

## 2025-01-08 PROCEDURE — 83540 ASSAY OF IRON: CPT | Performed by: INTERNAL MEDICINE

## 2025-01-08 PROCEDURE — 85027 COMPLETE CBC AUTOMATED: CPT | Performed by: INTERNAL MEDICINE

## 2025-01-08 PROCEDURE — 62370 ANL SP INF PMP W/MDREPRG&FIL: CPT | Mod: PBBFAC | Performed by: PHYSICAL MEDICINE & REHABILITATION

## 2025-01-08 PROCEDURE — 62370 ANL SP INF PMP W/MDREPRG&FIL: CPT | Mod: S$PBB,,, | Performed by: PHYSICAL MEDICINE & REHABILITATION

## 2025-01-08 PROCEDURE — 82728 ASSAY OF FERRITIN: CPT | Performed by: INTERNAL MEDICINE

## 2025-01-10 RX ORDER — OXYCODONE AND ACETAMINOPHEN 10; 325 MG/1; MG/1
1 TABLET ORAL EVERY 6 HOURS PRN
Qty: 110 TABLET | Refills: 0 | Status: SHIPPED | OUTPATIENT
Start: 2025-01-23 | End: 2025-02-22

## 2025-01-10 RX ADMIN — BACLOFEN 80 MG: 40 INJECTION INTRATHECAL at 12:01

## 2025-01-10 NOTE — PROCEDURES
ITB PUMP REFILL PROCEDURE NOTE:    ITB Pump Record/Settings:   Pump Location: RLQ  Estimated Pump Replacement: March 2027  Last examined: 10/22/2024  Last change: 10/22/2024  Drug Concentration: 2000 mcg/mL  Infusion Mode: Flex dosing  Reservoir Volume: 40  Is Low [place] Alarm Date:   1/13/2025        The potential risks were discussed with the patient and the patient elected to proceed.  The patient was placed in a supine position and the pump was located by palpation.  The pump was interrogated and found to be delivering a dose of 909.8 ug/day with a residual volume of 5.0 ml.      Using sterile technique the area was cleaned with betadine and a sterile field applied.  Using a 22G needle the port was pierced with no difficulty and 8.5 ml residual diluent was aspirated.  The new diluent was prepared in a 40cc syringe and delivered using the supplied filter without difficulty.  The pump was then reprogrammed for the new volume.    The pump was also reprogrammed to deliver a total daily dose of 909.8 mcg/day.    The new low reservoir alarm date is 4/26/2025.
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
## 2025-01-16 ENCOUNTER — PATIENT MESSAGE (OUTPATIENT)
Dept: PHYSICAL MEDICINE AND REHAB | Facility: CLINIC | Age: 40
End: 2025-01-16
Payer: MEDICAID

## 2025-01-16 ENCOUNTER — OFFICE VISIT (OUTPATIENT)
Dept: UROLOGY | Facility: CLINIC | Age: 40
End: 2025-01-16
Payer: MEDICAID

## 2025-01-16 DIAGNOSIS — G82.20 PARAPLEGIA FOLLOWING SPINAL CORD INJURY: ICD-10-CM

## 2025-01-16 DIAGNOSIS — G82.50 TETRAPLEGIC: ICD-10-CM

## 2025-01-16 DIAGNOSIS — M79.2 NEUROPATHIC PAIN: Chronic | ICD-10-CM

## 2025-01-16 DIAGNOSIS — R33.9 URINARY RETENTION: ICD-10-CM

## 2025-01-16 DIAGNOSIS — N31.9 NEUROGENIC BLADDER: Primary | ICD-10-CM

## 2025-01-16 DIAGNOSIS — Z93.59 CHRONIC SUPRAPUBIC CATHETER: ICD-10-CM

## 2025-01-16 DIAGNOSIS — Z43.5 ENCOUNTER FOR CARE OR REPLACEMENT OF SUPRAPUBIC TUBE: ICD-10-CM

## 2025-01-16 PROCEDURE — 1159F MED LIST DOCD IN RCRD: CPT | Mod: CPTII,,, | Performed by: NURSE PRACTITIONER

## 2025-01-16 PROCEDURE — 1160F RVW MEDS BY RX/DR IN RCRD: CPT | Mod: CPTII,,, | Performed by: NURSE PRACTITIONER

## 2025-01-16 PROCEDURE — 99999 PR PBB SHADOW E&M-EST. PATIENT-LVL III: CPT | Mod: PBBFAC,,, | Performed by: NURSE PRACTITIONER

## 2025-01-16 PROCEDURE — 99215 OFFICE O/P EST HI 40 MIN: CPT | Mod: S$PBB,25,, | Performed by: NURSE PRACTITIONER

## 2025-01-16 PROCEDURE — 51705 CHANGE OF BLADDER TUBE: CPT | Mod: PBBFAC | Performed by: NURSE PRACTITIONER

## 2025-01-16 PROCEDURE — 51705 CHANGE OF BLADDER TUBE: CPT | Mod: S$PBB,,, | Performed by: NURSE PRACTITIONER

## 2025-01-16 PROCEDURE — 99213 OFFICE O/P EST LOW 20 MIN: CPT | Mod: PBBFAC,25 | Performed by: NURSE PRACTITIONER

## 2025-01-16 PROCEDURE — 1111F DSCHRG MED/CURRENT MED MERGE: CPT | Mod: CPTII,,, | Performed by: NURSE PRACTITIONER
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
## 2025-01-20 DIAGNOSIS — R25.2 SPASTICITY: ICD-10-CM

## 2025-01-21 ENCOUNTER — PATIENT MESSAGE (OUTPATIENT)
Dept: UROLOGY | Facility: CLINIC | Age: 40
End: 2025-01-21
Payer: MEDICAID

## 2025-01-21 DIAGNOSIS — N52.9 ED (ERECTILE DYSFUNCTION) OF ORGANIC ORIGIN: ICD-10-CM

## 2025-01-21 DIAGNOSIS — N39.0 BACTERIAL UTI: Primary | ICD-10-CM

## 2025-01-21 DIAGNOSIS — G82.20 PARAPLEGIA FOLLOWING SPINAL CORD INJURY: ICD-10-CM

## 2025-01-21 DIAGNOSIS — A49.9 BACTERIAL UTI: Primary | ICD-10-CM

## 2025-01-21 NOTE — TELEPHONE ENCOUNTER
diazePAM (VALIUM) 10 MG Tab         Sig: Take 1 tablet (10 mg total) by mouth 2 (two) times daily as needed.    Disp: 80 tablet    Refills: 0    Start: 1/20/2025    Class: Normal    For: Spasticity    To pharmacy: Not to exceed 5 additional fills before 06/25/2023    Last ordered: 2 months ago (11/19/2024) by Aleksandar Davis MD    Last dispensed: 11/21/2024

## 2025-01-23 RX ORDER — OXYCODONE AND ACETAMINOPHEN 10; 325 MG/1; MG/1
1 TABLET ORAL EVERY 6 HOURS PRN
Qty: 110 TABLET | Refills: 0 | Status: SHIPPED | OUTPATIENT
Start: 2025-01-23 | End: 2025-02-22

## 2025-01-23 RX ORDER — DIAZEPAM 10 MG/1
10 TABLET ORAL 2 TIMES DAILY PRN
Qty: 80 TABLET | Refills: 0 | Status: SHIPPED | OUTPATIENT
Start: 2025-01-23

## 2025-01-24 ENCOUNTER — PATIENT MESSAGE (OUTPATIENT)
Dept: UROLOGY | Facility: CLINIC | Age: 40
End: 2025-01-24
Payer: MEDICAID

## 2025-01-24 RX ORDER — NITROFURANTOIN 25; 75 MG/1; MG/1
100 CAPSULE ORAL 2 TIMES DAILY
Qty: 20 CAPSULE | Refills: 0 | Status: SHIPPED | OUTPATIENT
Start: 2025-01-24 | End: 2025-02-07

## 2025-01-24 RX ORDER — PAPAVERINE HYDROCHLORIDE 30 MG/ML
INJECTION INTRAMUSCULAR; INTRAVENOUS
Qty: 10 ML | Refills: 12 | Status: SHIPPED | OUTPATIENT
Start: 2025-01-24 | End: 2026-01-22

## 2025-02-07 ENCOUNTER — PATIENT MESSAGE (OUTPATIENT)
Dept: PHYSICAL MEDICINE AND REHAB | Facility: CLINIC | Age: 40
End: 2025-02-07
Payer: MEDICAID

## 2025-02-11 ENCOUNTER — TELEPHONE (OUTPATIENT)
Dept: PHYSICAL MEDICINE AND REHAB | Facility: CLINIC | Age: 40
End: 2025-02-11
Payer: MEDICAID

## 2025-02-11 DIAGNOSIS — G82.20 PARAPLEGIA FOLLOWING SPINAL CORD INJURY: Chronic | ICD-10-CM

## 2025-02-11 DIAGNOSIS — G83.9 SPASTIC PARALYSIS: Primary | ICD-10-CM

## 2025-02-11 DIAGNOSIS — R25.2 SPASTICITY: ICD-10-CM

## 2025-02-11 RX ORDER — OXYCODONE HCL 10 MG/1
10 TABLET, FILM COATED, EXTENDED RELEASE ORAL EVERY 12 HOURS
Qty: 60 TABLET | Refills: 0 | OUTPATIENT
Start: 2025-02-11 | End: 2025-03-13

## 2025-02-13 ENCOUNTER — PATIENT MESSAGE (OUTPATIENT)
Dept: PHYSICAL MEDICINE AND REHAB | Facility: CLINIC | Age: 40
End: 2025-02-13
Payer: MEDICAID

## 2025-02-17 ENCOUNTER — PATIENT MESSAGE (OUTPATIENT)
Dept: UROLOGY | Facility: CLINIC | Age: 40
End: 2025-02-17
Payer: MEDICAID

## 2025-02-19 ENCOUNTER — PATIENT MESSAGE (OUTPATIENT)
Dept: PHYSICAL MEDICINE AND REHAB | Facility: CLINIC | Age: 40
End: 2025-02-19
Payer: MEDICAID

## 2025-02-24 ENCOUNTER — PATIENT MESSAGE (OUTPATIENT)
Dept: INTERNAL MEDICINE | Facility: CLINIC | Age: 40
End: 2025-02-24
Payer: MEDICAID

## 2025-02-24 ENCOUNTER — PATIENT MESSAGE (OUTPATIENT)
Dept: PHYSICAL MEDICINE AND REHAB | Facility: CLINIC | Age: 40
End: 2025-02-24
Payer: MEDICAID

## 2025-02-24 DIAGNOSIS — G82.20 PARAPLEGIA FOLLOWING SPINAL CORD INJURY: Primary | ICD-10-CM

## 2025-02-24 DIAGNOSIS — N39.0 BACTERIAL UTI: ICD-10-CM

## 2025-02-24 DIAGNOSIS — R30.0 DYSURIA: ICD-10-CM

## 2025-02-24 DIAGNOSIS — N32.89 BLADDER SPASMS: ICD-10-CM

## 2025-02-24 DIAGNOSIS — G82.21 CHRONIC COMPLETE PARAPLEGIA: ICD-10-CM

## 2025-02-24 DIAGNOSIS — A49.9 BACTERIAL UTI: ICD-10-CM

## 2025-02-24 DIAGNOSIS — R39.9 UTI SYMPTOMS: ICD-10-CM

## 2025-02-24 RX ORDER — OXYBUTYNIN CHLORIDE 5 MG/1
5 TABLET, EXTENDED RELEASE ORAL DAILY
Qty: 30 TABLET | Refills: 11 | Status: SHIPPED | OUTPATIENT
Start: 2025-02-24 | End: 2026-02-24

## 2025-02-24 NOTE — TELEPHONE ENCOUNTER
Good Morning I spoke with pt and confirm appt with pt for 3/18/25 tues for 3pm pt asking for refill for his meds until his appt date.

## 2025-02-25 RX ORDER — OXYCODONE AND ACETAMINOPHEN 10; 325 MG/1; MG/1
1 TABLET ORAL EVERY 6 HOURS PRN
Qty: 110 TABLET | Refills: 0 | Status: SHIPPED | OUTPATIENT
Start: 2025-02-25 | End: 2025-03-27

## 2025-03-05 ENCOUNTER — PATIENT MESSAGE (OUTPATIENT)
Dept: UROLOGY | Facility: CLINIC | Age: 40
End: 2025-03-05
Payer: MEDICAID

## 2025-03-05 ENCOUNTER — OFFICE VISIT (OUTPATIENT)
Dept: INTERNAL MEDICINE | Facility: CLINIC | Age: 40
End: 2025-03-05
Payer: MEDICAID

## 2025-03-05 ENCOUNTER — PATIENT MESSAGE (OUTPATIENT)
Dept: INTERNAL MEDICINE | Facility: CLINIC | Age: 40
End: 2025-03-05

## 2025-03-05 ENCOUNTER — LAB VISIT (OUTPATIENT)
Dept: LAB | Facility: HOSPITAL | Age: 40
End: 2025-03-05
Attending: INTERNAL MEDICINE
Payer: MEDICAID

## 2025-03-05 VITALS
HEART RATE: 61 BPM | HEIGHT: 68 IN | SYSTOLIC BLOOD PRESSURE: 102 MMHG | BODY MASS INDEX: 27.23 KG/M2 | WEIGHT: 179.69 LBS | DIASTOLIC BLOOD PRESSURE: 60 MMHG | OXYGEN SATURATION: 97 %

## 2025-03-05 DIAGNOSIS — Z00.00 ANNUAL PHYSICAL EXAM: Primary | ICD-10-CM

## 2025-03-05 DIAGNOSIS — M79.2 NEUROPATHIC PAIN: Chronic | ICD-10-CM

## 2025-03-05 DIAGNOSIS — Z93.59 CHRONIC SUPRAPUBIC CATHETER: ICD-10-CM

## 2025-03-05 DIAGNOSIS — Z97.8 PRESENCE OF INTRATHECAL BACLOFEN PUMP: ICD-10-CM

## 2025-03-05 DIAGNOSIS — J45.20 MILD INTERMITTENT ASTHMA WITHOUT COMPLICATION: ICD-10-CM

## 2025-03-05 DIAGNOSIS — K92.2 GASTROINTESTINAL HEMORRHAGE, UNSPECIFIED GASTROINTESTINAL HEMORRHAGE TYPE: ICD-10-CM

## 2025-03-05 DIAGNOSIS — G89.29 OTHER CHRONIC PAIN: ICD-10-CM

## 2025-03-05 DIAGNOSIS — K59.2 NEUROGENIC BOWEL: Chronic | ICD-10-CM

## 2025-03-05 DIAGNOSIS — N31.9 NEUROGENIC BLADDER: Chronic | ICD-10-CM

## 2025-03-05 DIAGNOSIS — Z00.00 ANNUAL PHYSICAL EXAM: ICD-10-CM

## 2025-03-05 DIAGNOSIS — Z93.3 COLOSTOMY STATUS: Chronic | ICD-10-CM

## 2025-03-05 DIAGNOSIS — G82.20 PARAPLEGIA FOLLOWING SPINAL CORD INJURY: Chronic | ICD-10-CM

## 2025-03-05 DIAGNOSIS — D64.9 ANEMIA, UNSPECIFIED TYPE: ICD-10-CM

## 2025-03-05 LAB
ALBUMIN SERPL BCP-MCNC: 3.6 G/DL (ref 3.5–5.2)
ALP SERPL-CCNC: 101 U/L (ref 40–150)
ALT SERPL W/O P-5'-P-CCNC: 19 U/L (ref 10–44)
ANION GAP SERPL CALC-SCNC: 10 MMOL/L (ref 8–16)
AST SERPL-CCNC: 28 U/L (ref 10–40)
BASOPHILS # BLD AUTO: 0.02 K/UL (ref 0–0.2)
BASOPHILS NFR BLD: 0.4 % (ref 0–1.9)
BILIRUB SERPL-MCNC: 0.3 MG/DL (ref 0.1–1)
BUN SERPL-MCNC: 8 MG/DL (ref 6–20)
CALCIUM SERPL-MCNC: 8.5 MG/DL (ref 8.7–10.5)
CHLORIDE SERPL-SCNC: 108 MMOL/L (ref 95–110)
CHOLEST SERPL-MCNC: 134 MG/DL (ref 120–199)
CHOLEST/HDLC SERPL: 2.7 {RATIO} (ref 2–5)
CO2 SERPL-SCNC: 21 MMOL/L (ref 23–29)
CREAT SERPL-MCNC: 0.5 MG/DL (ref 0.5–1.4)
DIFFERENTIAL METHOD BLD: ABNORMAL
EOSINOPHIL # BLD AUTO: 0.1 K/UL (ref 0–0.5)
EOSINOPHIL NFR BLD: 1.5 % (ref 0–8)
ERYTHROCYTE [DISTWIDTH] IN BLOOD BY AUTOMATED COUNT: 18.6 % (ref 11.5–14.5)
EST. GFR  (NO RACE VARIABLE): >60 ML/MIN/1.73 M^2
ESTIMATED AVG GLUCOSE: 91 MG/DL (ref 68–131)
FERRITIN SERPL-MCNC: 6 NG/ML (ref 20–300)
GLUCOSE SERPL-MCNC: 93 MG/DL (ref 70–110)
HBA1C MFR BLD: 4.8 % (ref 4–5.6)
HCT VFR BLD AUTO: 35 % (ref 40–54)
HDLC SERPL-MCNC: 49 MG/DL (ref 40–75)
HDLC SERPL: 36.6 % (ref 20–50)
HGB BLD-MCNC: 10.4 G/DL (ref 14–18)
IMM GRANULOCYTES # BLD AUTO: 0.02 K/UL (ref 0–0.04)
IMM GRANULOCYTES NFR BLD AUTO: 0.4 % (ref 0–0.5)
IRON SERPL-MCNC: 23 UG/DL (ref 45–160)
LDLC SERPL CALC-MCNC: 76.2 MG/DL (ref 63–159)
LYMPHOCYTES # BLD AUTO: 1.2 K/UL (ref 1–4.8)
LYMPHOCYTES NFR BLD: 22.1 % (ref 18–48)
MCH RBC QN AUTO: 25 PG (ref 27–31)
MCHC RBC AUTO-ENTMCNC: 29.7 G/DL (ref 32–36)
MCV RBC AUTO: 84 FL (ref 82–98)
MONOCYTES # BLD AUTO: 0.4 K/UL (ref 0.3–1)
MONOCYTES NFR BLD: 7.8 % (ref 4–15)
NEUTROPHILS # BLD AUTO: 3.7 K/UL (ref 1.8–7.7)
NEUTROPHILS NFR BLD: 67.8 % (ref 38–73)
NONHDLC SERPL-MCNC: 85 MG/DL
NRBC BLD-RTO: 0 /100 WBC
PLATELET # BLD AUTO: 178 K/UL (ref 150–450)
PMV BLD AUTO: ABNORMAL FL (ref 9.2–12.9)
POTASSIUM SERPL-SCNC: 3.9 MMOL/L (ref 3.5–5.1)
PROT SERPL-MCNC: 6.4 G/DL (ref 6–8.4)
RBC # BLD AUTO: 4.16 M/UL (ref 4.6–6.2)
SATURATED IRON: 5 % (ref 20–50)
SODIUM SERPL-SCNC: 139 MMOL/L (ref 136–145)
TOTAL IRON BINDING CAPACITY: 468 UG/DL (ref 250–450)
TRANSFERRIN SERPL-MCNC: 316 MG/DL (ref 200–375)
TRIGL SERPL-MCNC: 44 MG/DL (ref 30–150)
TSH SERPL DL<=0.005 MIU/L-ACNC: 0.63 UIU/ML (ref 0.4–4)
WBC # BLD AUTO: 5.39 K/UL (ref 3.9–12.7)

## 2025-03-05 PROCEDURE — 99396 PREV VISIT EST AGE 40-64: CPT | Mod: S$PBB,,, | Performed by: INTERNAL MEDICINE

## 2025-03-05 PROCEDURE — 3008F BODY MASS INDEX DOCD: CPT | Mod: CPTII,,, | Performed by: INTERNAL MEDICINE

## 2025-03-05 PROCEDURE — 36415 COLL VENOUS BLD VENIPUNCTURE: CPT | Performed by: INTERNAL MEDICINE

## 2025-03-05 PROCEDURE — 80053 COMPREHEN METABOLIC PANEL: CPT | Performed by: INTERNAL MEDICINE

## 2025-03-05 PROCEDURE — 80061 LIPID PANEL: CPT | Performed by: INTERNAL MEDICINE

## 2025-03-05 PROCEDURE — 99215 OFFICE O/P EST HI 40 MIN: CPT | Mod: PBBFAC | Performed by: INTERNAL MEDICINE

## 2025-03-05 PROCEDURE — 1159F MED LIST DOCD IN RCRD: CPT | Mod: CPTII,,, | Performed by: INTERNAL MEDICINE

## 2025-03-05 PROCEDURE — 83540 ASSAY OF IRON: CPT | Performed by: INTERNAL MEDICINE

## 2025-03-05 PROCEDURE — 99999 PR PBB SHADOW E&M-EST. PATIENT-LVL V: CPT | Mod: PBBFAC,,, | Performed by: INTERNAL MEDICINE

## 2025-03-05 PROCEDURE — 85025 COMPLETE CBC W/AUTO DIFF WBC: CPT | Performed by: INTERNAL MEDICINE

## 2025-03-05 PROCEDURE — 84443 ASSAY THYROID STIM HORMONE: CPT | Performed by: INTERNAL MEDICINE

## 2025-03-05 PROCEDURE — 82728 ASSAY OF FERRITIN: CPT | Performed by: INTERNAL MEDICINE

## 2025-03-05 PROCEDURE — 3078F DIAST BP <80 MM HG: CPT | Mod: CPTII,,, | Performed by: INTERNAL MEDICINE

## 2025-03-05 PROCEDURE — 3074F SYST BP LT 130 MM HG: CPT | Mod: CPTII,,, | Performed by: INTERNAL MEDICINE

## 2025-03-05 PROCEDURE — 1160F RVW MEDS BY RX/DR IN RCRD: CPT | Mod: CPTII,,, | Performed by: INTERNAL MEDICINE

## 2025-03-05 PROCEDURE — 83036 HEMOGLOBIN GLYCOSYLATED A1C: CPT | Performed by: INTERNAL MEDICINE

## 2025-03-05 RX ORDER — MUPIROCIN 20 MG/G
OINTMENT TOPICAL 2 TIMES DAILY PRN
Qty: 30 G | Refills: 1 | Status: SHIPPED | OUTPATIENT
Start: 2025-03-05

## 2025-03-05 NOTE — PROGRESS NOTES
"Subjective:       Patient ID: Nghia Edgar Jr. is a 39 y.o. male.    Chief Complaint: Annual Exam  Is a 39-year-old who presents today for checkup he continues to follow with urology has suprapubic catheter and has had infections intermittently he had issues recently with sudden change in his H&H and GI bleed was noted but he had previous colonoscopy he had a EGD and then a video capsule with no significant abnormalities noted.  He has been taking iron has some issues constipation takes it every other day he was given some blood in iron in the hospital.  He plans to make GI follow-up reports that it did not realize that he was bleeding but had sudden change in his energy was harder for him to put on his clothes and do his normal routine around the house.  He feels his fatigue level has improved closer to baseline.  He did injure himself cutting his toe on his bed recently last week he reports that the wound seems to be getting better and he has not had any active drainage to the area he has had no decubitus recently and continues to use precautions.  He continues to have chronic constipation has been taking stool softener along with Movantik since on chronic pain medications    HPI  Review of Systems   Gastrointestinal:  Positive for constipation.   Musculoskeletal:         Foot injury abrasion left toe        Objective:      Blood pressure 102/60, pulse 61, height 5' 8" (1.727 m), weight 81.5 kg (179 lb 10.8 oz), SpO2 97%.   Physical Exam  Constitutional:       General: He is not in acute distress.     Comments: Seated in wheelchair   HENT:      Head: Normocephalic.      Mouth/Throat:      Pharynx: Oropharynx is clear.   Eyes:      General: No scleral icterus.  Cardiovascular:      Rate and Rhythm: Normal rate and regular rhythm.      Heart sounds: Normal heart sounds. No murmur heard.     No friction rub. No gallop.   Pulmonary:      Effort: Pulmonary effort is normal. No respiratory distress.      Breath " sounds: Normal breath sounds.   Abdominal:      General: Bowel sounds are normal.      Palpations: Abdomen is soft. There is no mass.      Tenderness: There is no abdominal tenderness.      Comments: Colostomy  Suprabupic catheter     Pain pump    Musculoskeletal:      Cervical back: Neck supple.      Comments: Abrasion left toe healing scab    Skin:     Findings: No erythema.   Neurological:      Mental Status: He is alert.   Psychiatric:         Mood and Affect: Mood normal.         Assessment:       1. Annual physical exam    2. Anemia, unspecified type    3. Neurogenic bladder    4. Neurogenic bowel    5. Paraplegia following spinal cord injury    6. Colostomy status    7. Neuropathic pain    8. Chronic suprapubic catheter    9. Presence of intrathecal baclofen pump    10. Gastrointestinal hemorrhage, unspecified gastrointestinal hemorrhage type    11. Other chronic pain    12. Mild intermittent asthma without complication        Plan:       Nghia was seen today for annual exam.    Diagnoses and all orders for this visit:    Annual physical exam  -     CBC Auto Differential; Future  -     Comprehensive Metabolic Panel; Future  -     Hemoglobin A1C; Future  -     Lipid Panel; Future  -     TSH; Future  -     Ferritin; Future  -     Iron and TIBC; Future    Anemia, unspecified type  Discussed iron deficiency with no source of bleeding found he is following with a gastroenterologist outlying [facility] has had EGD colonoscopy and video capsule he has some constipation with iron takes it every other day did recommend a hematology follow-up referral placed he will check with his insurance and call if he needs alternative referral for continued surveillance  -     Ambulatory referral/consult to Hematology / Oncology; Future    Neurogenic bladder history of continues to follow with urology    Paraplegia following spinal cord injury  History of decubitus precautions    Colostomy status  Neurogenic bowel gastrointestinal  hemorrhage without source identified following with Gastroenterology      Chronic suprapubic catheter  Following with urology      Other chronic pain  Neuropathic pain  Presence of intrathecal baclofen pump  He is following physical medicine    Mild intermittent asthma without complication  Stable he has inhaler when needed     Toe injury we discussed trial of monitor for signs of infection or no resolution call if podiatry referral needed  -     mupirocin (BACTROBAN) 2 % ointment; Apply topically 2 (two) times daily as needed (wound).

## 2025-03-07 ENCOUNTER — PATIENT MESSAGE (OUTPATIENT)
Dept: PHYSICAL MEDICINE AND REHAB | Facility: CLINIC | Age: 40
End: 2025-03-07
Payer: MEDICAID

## 2025-03-07 ENCOUNTER — RESULTS FOLLOW-UP (OUTPATIENT)
Dept: INTERNAL MEDICINE | Facility: CLINIC | Age: 40
End: 2025-03-07
Payer: MEDICAID

## 2025-03-07 DIAGNOSIS — G82.20 PARAPLEGIA FOLLOWING SPINAL CORD INJURY: ICD-10-CM

## 2025-03-07 DIAGNOSIS — G82.21 CHRONIC COMPLETE PARAPLEGIA: Primary | ICD-10-CM

## 2025-03-07 DIAGNOSIS — D64.9 ANEMIA, UNSPECIFIED TYPE: Primary | ICD-10-CM

## 2025-03-07 RX ORDER — OXYCODONE HCL 10 MG/1
10 TABLET, FILM COATED, EXTENDED RELEASE ORAL EVERY 12 HOURS
Qty: 20 TABLET | Refills: 0 | Status: SHIPPED | OUTPATIENT
Start: 2025-03-07 | End: 2025-03-17

## 2025-03-07 NOTE — TELEPHONE ENCOUNTER
----- Message from Nohelia Hoover MD sent at 3/7/2025  8:42 AM CST -----  Called reviewed with pt hematology number provided for him to [email or web address]  He will call if outlying consult needed    Repeat cbc, iron/tibc ferritin in end April beginning of may   Please call to [facility]   ----- Message -----  From: Aaron, [employer]  Sent: 3/5/2025   4:19 PM CST  To: Nohelia Hoover MD

## 2025-03-18 ENCOUNTER — PATIENT MESSAGE (OUTPATIENT)
Dept: INTERNAL MEDICINE | Facility: CLINIC | Age: 40
End: 2025-03-18
Payer: MEDICAID

## 2025-03-18 ENCOUNTER — PROCEDURE VISIT (OUTPATIENT)
Dept: PHYSICAL MEDICINE AND REHAB | Facility: CLINIC | Age: 40
End: 2025-03-18
Payer: MEDICAID

## 2025-03-18 DIAGNOSIS — G82.21 CHRONIC COMPLETE PARAPLEGIA: Primary | ICD-10-CM

## 2025-03-18 DIAGNOSIS — G83.9 SPASTIC PARALYSIS: ICD-10-CM

## 2025-03-18 DIAGNOSIS — M79.2 NEUROPATHIC PAIN: ICD-10-CM

## 2025-03-18 PROCEDURE — 62370 ANL SP INF PMP W/MDREPRG&FIL: CPT | Mod: S$PBB,,, | Performed by: PHYSICAL MEDICINE & REHABILITATION

## 2025-03-18 PROCEDURE — 62370 ANL SP INF PMP W/MDREPRG&FIL: CPT | Mod: PBBFAC | Performed by: PHYSICAL MEDICINE & REHABILITATION

## 2025-03-18 RX ORDER — NITROFURANTOIN 25; 75 MG/1; MG/1
100 CAPSULE ORAL 2 TIMES DAILY
Qty: 20 CAPSULE | Refills: 0 | Status: SHIPPED | OUTPATIENT
Start: 2025-03-18

## 2025-03-20 PROCEDURE — 99999PBSHW PR PBB SHADOW TECHNICAL ONLY FILED TO HB: Mod: JZ,PBBFAC,,

## 2025-03-20 NOTE — PROCEDURES
ITB PUMP REFILL PROCEDURE NOTE:    ITB Pump Record/Settings:   Pump Location: RLQ  Estimated Pump Replacement: March 2027  Last examined: 1/8/2025  Last change: 1/8/2025  Drug Concentration: 2000 mcg/mL  Infusion Mode: Flex dosing  Reservoir Volume: 40  Is Low [place] Alarm Date:   4/26/2025        The potential risks were discussed with the patient and the patient elected to proceed.  The patient was placed in a supine position and the pump was located by palpation.  The pump was interrogated and found to be delivering a dose of 909.8 ug/day with a residual volume of 8.7 ml.      Using sterile technique the area was cleaned with betadine and a sterile field applied.  Using a 22G needle the port was pierced with no difficulty and 10.5 ml residual diluent was aspirated.  The new diluent was prepared in a 40cc syringe and delivered using the supplied filter without difficulty.  The pump was then reprogrammed for the new volume.    The pump was also reprogrammed to deliver a total daily dose of 909.8 mcg/day (+0.0%).    The new low reservoir alarm date is 6/9/2025.
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
## 2025-03-24 ENCOUNTER — PATIENT MESSAGE (OUTPATIENT)
Dept: PHYSICAL MEDICINE AND REHAB | Facility: CLINIC | Age: 40
End: 2025-03-24
Payer: MEDICAID

## 2025-03-24 DIAGNOSIS — G82.21 CHRONIC COMPLETE PARAPLEGIA: ICD-10-CM

## 2025-03-24 DIAGNOSIS — G82.20 PARAPLEGIA FOLLOWING SPINAL CORD INJURY: ICD-10-CM

## 2025-03-24 NOTE — TELEPHONE ENCOUNTER
Last ordered:oxyCODONE-acetaminophen (PERCOCET)  mg per tablet  02/25/2025   oxycontin 10mg Er     Last seen:03/18/2025  Upcoming appt:

## 2025-03-25 DIAGNOSIS — G82.20 PARAPLEGIA FOLLOWING SPINAL CORD INJURY: ICD-10-CM

## 2025-03-25 DIAGNOSIS — M70.21 OLECRANON BURSITIS OF RIGHT ELBOW: Primary | ICD-10-CM

## 2025-03-25 DIAGNOSIS — G82.21 CHRONIC COMPLETE PARAPLEGIA: ICD-10-CM

## 2025-03-25 RX ORDER — OXYCODONE AND ACETAMINOPHEN 10; 325 MG/1; MG/1
1 TABLET ORAL EVERY 6 HOURS PRN
Qty: 110 TABLET | Refills: 0 | Status: SHIPPED | OUTPATIENT
Start: 2025-03-25 | End: 2025-04-24

## 2025-03-25 RX ORDER — OXYCODONE HCL 10 MG/1
10 TABLET, FILM COATED, EXTENDED RELEASE ORAL EVERY 12 HOURS
Qty: 20 TABLET | Refills: 0 | OUTPATIENT
Start: 2025-03-25 | End: 2025-04-04

## 2025-03-25 RX ORDER — OXYCODONE AND ACETAMINOPHEN 10; 325 MG/1; MG/1
1 TABLET ORAL EVERY 6 HOURS PRN
Qty: 110 TABLET | Refills: 0 | OUTPATIENT
Start: 2025-03-25 | End: 2025-04-24

## 2025-03-25 RX ORDER — METHYLPREDNISOLONE 4 MG/1
TABLET ORAL
Qty: 21 EACH | Refills: 0 | Status: SHIPPED | OUTPATIENT
Start: 2025-03-25

## 2025-03-26 DIAGNOSIS — G82.21 CHRONIC COMPLETE PARAPLEGIA: ICD-10-CM

## 2025-03-26 DIAGNOSIS — R25.2 SPASTICITY: ICD-10-CM

## 2025-03-26 DIAGNOSIS — G82.20 PARAPLEGIA FOLLOWING SPINAL CORD INJURY: ICD-10-CM

## 2025-03-28 RX ORDER — DIAZEPAM 10 MG/1
10 TABLET ORAL 2 TIMES DAILY PRN
Qty: 70 TABLET | Refills: 0 | Status: SHIPPED | OUTPATIENT
Start: 2025-03-28

## 2025-03-28 RX ORDER — OXYCODONE HCL 10 MG/1
10 TABLET, FILM COATED, EXTENDED RELEASE ORAL EVERY 12 HOURS
Qty: 60 TABLET | Refills: 0 | Status: SHIPPED | OUTPATIENT
Start: 2025-03-28 | End: 2025-04-27

## 2025-04-01 ENCOUNTER — TELEPHONE (OUTPATIENT)
Dept: PHYSICAL MEDICINE AND REHAB | Facility: CLINIC | Age: 40
End: 2025-04-01
Payer: MEDICAID

## 2025-04-01 NOTE — TELEPHONE ENCOUNTER
----- Message from Mary sent at 3/31/2025  1:47 PM CDT -----  Regarding: [employer] called regarding paperwork needed for Pt to get Wheelchair repaired states faxed over info  Name of Who is Calling:Daniele  What is the request in detail:[employer] called regarding paperwork needed for Pt to get Wheelchair repaired states faxed over info waiting for it to be returned. JOYSTICK, THIGH PADS ALONG  WITH TIRES need to be repaired. Please advise Fax# [phone]# [phone] Can the clinic reply by [facility]:  What Number to Call Back if not in [facility]:

## 2025-04-16 ENCOUNTER — HOSPITAL ENCOUNTER (EMERGENCY)
Facility: HOSPITAL | Age: 40
Discharge: HOME | End: 2025-04-16
Attending: STUDENT IN AN ORGANIZED HEALTH CARE EDUCATION/TRAINING PROGRAM
Payer: MEDICAID

## 2025-04-16 VITALS
TEMPERATURE: 98 F | RESPIRATION RATE: 18 BRPM | SYSTOLIC BLOOD PRESSURE: 103 MMHG | OXYGEN SATURATION: 98 % | BODY MASS INDEX: 27.37 KG/M2 | WEIGHT: 180 LBS | DIASTOLIC BLOOD PRESSURE: 49 MMHG | HEART RATE: 60 BPM

## 2025-04-16 DIAGNOSIS — N39.0 URINARY TRACT INFECTION WITHOUT HEMATURIA, SITE UNSPECIFIED: ICD-10-CM

## 2025-04-16 DIAGNOSIS — R30.0 DYSURIA: Primary | ICD-10-CM

## 2025-04-16 LAB
BACTERIA #/AREA URNS HPF: ABNORMAL /HPF
BILIRUB UR QL STRIP.AUTO: NEGATIVE
CLARITY UR: CLEAR
COLOR UR AUTO: YELLOW
GLUCOSE UR QL STRIP: NEGATIVE
HGB UR QL STRIP: NEGATIVE
HOLD SPECIMEN: NORMAL
HYALINE CASTS #/AREA URNS LPF: 0 /LPF (ref 0–1)
KETONES UR QL STRIP: NEGATIVE
LEUKOCYTE ESTERASE UR QL STRIP: ABNORMAL
MICROSCOPIC COMMENT: ABNORMAL
NITRITE UR QL STRIP: POSITIVE
PH UR STRIP: 6 [PH]
PROT UR QL STRIP: ABNORMAL
RBC #/AREA URNS HPF: 4 /HPF (ref 0–4)
SP GR UR STRIP: >=1.03
SQUAMOUS #/AREA URNS HPF: 2 /HPF
UROBILINOGEN UR STRIP-ACNC: 1 EU/DL
WBC #/AREA URNS HPF: 50 /HPF (ref 0–5)

## 2025-04-16 PROCEDURE — 81003 URINALYSIS AUTO W/O SCOPE: CPT | Performed by: STUDENT IN AN ORGANIZED HEALTH CARE EDUCATION/TRAINING PROGRAM

## 2025-04-16 PROCEDURE — 99284 EMERGENCY DEPT VISIT MOD MDM: CPT | Mod: 25

## 2025-04-16 PROCEDURE — 87186 SC STD MICRODIL/AGAR DIL: CPT | Performed by: STUDENT IN AN ORGANIZED HEALTH CARE EDUCATION/TRAINING PROGRAM

## 2025-04-16 PROCEDURE — 63600175 PHARM REV CODE 636 W HCPCS: Performed by: STUDENT IN AN ORGANIZED HEALTH CARE EDUCATION/TRAINING PROGRAM

## 2025-04-16 PROCEDURE — 96372 THER/PROPH/DIAG INJ SC/IM: CPT | Performed by: STUDENT IN AN ORGANIZED HEALTH CARE EDUCATION/TRAINING PROGRAM

## 2025-04-16 RX ORDER — CEFDINIR 300 MG/1
300 CAPSULE ORAL 2 TIMES DAILY
Qty: 20 CAPSULE | Refills: 0 | Status: SHIPPED | OUTPATIENT
Start: 2025-04-16 | End: 2025-04-26

## 2025-04-16 RX ORDER — CEFTRIAXONE 1 G/1
1 INJECTION, POWDER, FOR SOLUTION INTRAMUSCULAR; INTRAVENOUS
Status: COMPLETED | OUTPATIENT
Start: 2025-04-16 | End: 2025-04-16

## 2025-04-16 RX ORDER — CEFTRIAXONE 1 G/1
1 INJECTION, POWDER, FOR SOLUTION INTRAMUSCULAR; INTRAVENOUS
Status: DISCONTINUED | OUTPATIENT
Start: 2025-04-16 | End: 2025-04-16

## 2025-04-16 RX ADMIN — CEFTRIAXONE SODIUM 1 G: 1 INJECTION, POWDER, FOR SOLUTION INTRAMUSCULAR; INTRAVENOUS at 10:04
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
## 2025-04-17 NOTE — ED NOTES
Discharge instructions, prescriptions, follow up and strict return precautions gone over with patient; verbalized understanding. Wheeled out of ED  with family members.

## 2025-04-20 LAB
BACTERIA UR CULT: ABNORMAL
BACTERIA UR CULT: ABNORMAL

## 2025-04-23 ENCOUNTER — PATIENT MESSAGE (OUTPATIENT)
Dept: PHYSICAL MEDICINE AND REHAB | Facility: CLINIC | Age: 40
End: 2025-04-23
Payer: MEDICAID

## 2025-04-23 DIAGNOSIS — G82.20 PARAPLEGIA FOLLOWING SPINAL CORD INJURY: ICD-10-CM

## 2025-04-23 DIAGNOSIS — G82.21 CHRONIC COMPLETE PARAPLEGIA: ICD-10-CM

## 2025-04-24 RX ORDER — OXYCODONE AND ACETAMINOPHEN 10; 325 MG/1; MG/1
1 TABLET ORAL EVERY 6 HOURS PRN
Qty: 110 TABLET | Refills: 0 | Status: SHIPPED | OUTPATIENT
Start: 2025-04-25 | End: 2025-05-25

## 2025-04-24 RX ORDER — OXYCODONE HCL 10 MG/1
10 TABLET, FILM COATED, EXTENDED RELEASE ORAL EVERY 12 HOURS
Qty: 60 TABLET | Refills: 0 | Status: SHIPPED | OUTPATIENT
Start: 2025-04-25 | End: 2025-05-25

## 2025-04-29 ENCOUNTER — LAB VISIT (OUTPATIENT)
Dept: LAB | Facility: HOSPITAL | Age: 40
End: 2025-04-29
Attending: INTERNAL MEDICINE
Payer: MEDICAID

## 2025-04-29 DIAGNOSIS — R25.2 SPASTICITY: ICD-10-CM

## 2025-04-29 DIAGNOSIS — D64.9 ANEMIA, UNSPECIFIED TYPE: ICD-10-CM

## 2025-04-29 LAB
ABSOLUTE EOSINOPHIL (OHS): 0.1 K/UL
ABSOLUTE MONOCYTE (OHS): 0.41 K/UL (ref 0.3–1)
ABSOLUTE NEUTROPHIL COUNT (OHS): 4.18 K/UL (ref 1.8–7.7)
BASOPHILS # BLD AUTO: 0.02 K/UL
BASOPHILS NFR BLD AUTO: 0.3 %
ERYTHROCYTE [DISTWIDTH] IN BLOOD BY AUTOMATED COUNT: 17.7 % (ref 11.5–14.5)
HCT VFR BLD AUTO: 37.8 % (ref 40–54)
HGB BLD-MCNC: 11.2 GM/DL (ref 14–18)
IMM GRANULOCYTES # BLD AUTO: 0.01 K/UL (ref 0–0.04)
IMM GRANULOCYTES NFR BLD AUTO: 0.2 % (ref 0–0.5)
LYMPHOCYTES # BLD AUTO: 1.3 K/UL (ref 1–4.8)
MCH RBC QN AUTO: 25.6 PG (ref 27–31)
MCHC RBC AUTO-ENTMCNC: 29.6 G/DL (ref 32–36)
MCV RBC AUTO: 87 FL (ref 82–98)
NUCLEATED RBC (/100WBC) (OHS): 0 /100 WBC
PLATELET # BLD AUTO: 208 K/UL (ref 150–450)
PMV BLD AUTO: 11.8 FL (ref 9.2–12.9)
RBC # BLD AUTO: 4.37 M/UL (ref 4.6–6.2)
RELATIVE EOSINOPHIL (OHS): 1.7 %
RELATIVE LYMPHOCYTE (OHS): 21.6 % (ref 18–48)
RELATIVE MONOCYTE (OHS): 6.8 % (ref 4–15)
RELATIVE NEUTROPHIL (OHS): 69.4 % (ref 38–73)
WBC # BLD AUTO: 6.02 K/UL (ref 3.9–12.7)

## 2025-04-29 PROCEDURE — 85025 COMPLETE CBC W/AUTO DIFF WBC: CPT

## 2025-04-29 PROCEDURE — 36415 COLL VENOUS BLD VENIPUNCTURE: CPT

## 2025-04-29 PROCEDURE — 82728 ASSAY OF FERRITIN: CPT

## 2025-04-29 PROCEDURE — 83540 ASSAY OF IRON: CPT

## 2025-04-29 RX ORDER — DIAZEPAM 10 MG/1
10 TABLET ORAL 2 TIMES DAILY PRN
Qty: 70 TABLET | Refills: 0 | Status: SHIPPED | OUTPATIENT
Start: 2025-04-29

## 2025-04-30 LAB
FERRITIN SERPL-MCNC: 6 NG/ML (ref 20–300)
IRON SATN MFR SERPL: 6 % (ref 20–50)
IRON SERPL-MCNC: 28 UG/DL (ref 45–160)
TIBC SERPL-MCNC: 496 UG/DL (ref 250–450)
TRANSFERRIN SERPL-MCNC: 335 MG/DL (ref 200–375)

## 2025-05-02 ENCOUNTER — PATIENT MESSAGE (OUTPATIENT)
Dept: UROLOGY | Facility: CLINIC | Age: 40
End: 2025-05-02
Payer: MEDICAID

## 2025-05-02 DIAGNOSIS — A49.9 BACTERIAL UTI: Primary | ICD-10-CM

## 2025-05-02 DIAGNOSIS — N39.0 BACTERIAL UTI: Primary | ICD-10-CM

## 2025-05-02 RX ORDER — NITROFURANTOIN 25; 75 MG/1; MG/1
100 CAPSULE ORAL 2 TIMES DAILY
Qty: 20 CAPSULE | Refills: 0 | Status: SHIPPED | OUTPATIENT
Start: 2025-05-02

## 2025-05-04 ENCOUNTER — RESULTS FOLLOW-UP (OUTPATIENT)
Dept: INTERNAL MEDICINE | Facility: CLINIC | Age: 40
End: 2025-05-04

## 2025-05-12 ENCOUNTER — HOSPITAL ENCOUNTER (EMERGENCY)
Facility: HOSPITAL | Age: 40
Discharge: HOME | End: 2025-05-12
Attending: STUDENT IN AN ORGANIZED HEALTH CARE EDUCATION/TRAINING PROGRAM
Payer: MEDICAID

## 2025-05-12 VITALS
OXYGEN SATURATION: 99 % | WEIGHT: 180 LBS | HEIGHT: 68 IN | BODY MASS INDEX: 27.28 KG/M2 | SYSTOLIC BLOOD PRESSURE: 102 MMHG | DIASTOLIC BLOOD PRESSURE: 51 MMHG | RESPIRATION RATE: 18 BRPM | TEMPERATURE: 98 F | HEART RATE: 61 BPM

## 2025-05-12 DIAGNOSIS — N32.89 BLADDER SPASM: Primary | ICD-10-CM

## 2025-05-12 LAB
ABSOLUTE EOSINOPHIL (OHS): 0.11 K/UL
ABSOLUTE MONOCYTE (OHS): 1.17 K/UL (ref 0.3–1)
ABSOLUTE NEUTROPHIL COUNT (OHS): 8.24 K/UL (ref 1.8–7.7)
ALBUMIN SERPL BCP-MCNC: 4 G/DL (ref 3.5–5.2)
ALP SERPL-CCNC: 118 UNIT/L (ref 40–150)
ALT SERPL W/O P-5'-P-CCNC: 32 UNIT/L (ref 10–44)
ANION GAP (OHS): 9 MMOL/L (ref 8–16)
AST SERPL-CCNC: 32 UNIT/L (ref 11–45)
BACTERIA #/AREA URNS HPF: ABNORMAL /HPF
BASOPHILS # BLD AUTO: 0.05 K/UL
BASOPHILS NFR BLD AUTO: 0.4 %
BILIRUB SERPL-MCNC: 0.3 MG/DL (ref 0.1–1)
BILIRUB UR QL STRIP.AUTO: ABNORMAL
BUN SERPL-MCNC: 12 MG/DL (ref 6–20)
CALCIUM SERPL-MCNC: 9.3 MG/DL (ref 8.7–10.5)
CHLORIDE SERPL-SCNC: 107 MMOL/L (ref 95–110)
CLARITY UR: ABNORMAL
CO2 SERPL-SCNC: 25 MMOL/L (ref 23–29)
COLOR UR AUTO: YELLOW
CREAT SERPL-MCNC: 0.7 MG/DL (ref 0.5–1.4)
ERYTHROCYTE [DISTWIDTH] IN BLOOD BY AUTOMATED COUNT: 17.8 % (ref 11.5–14.5)
GFR SERPLBLD CREATININE-BSD FMLA CKD-EPI: >60 ML/MIN/1.73/M2
GLUCOSE SERPL-MCNC: 105 MG/DL (ref 70–110)
GLUCOSE UR QL STRIP: NEGATIVE
HCT VFR BLD AUTO: 41.3 % (ref 40–54)
HGB BLD-MCNC: 12.5 GM/DL (ref 14–18)
HGB UR QL STRIP: ABNORMAL
HOLD SPECIMEN: NORMAL
HYALINE CASTS #/AREA URNS LPF: 1 /LPF (ref 0–1)
IMM GRANULOCYTES # BLD AUTO: 0.03 K/UL (ref 0–0.04)
IMM GRANULOCYTES NFR BLD AUTO: 0.3 % (ref 0–0.5)
KETONES UR QL STRIP: ABNORMAL
LACTATE SERPL-SCNC: 0.8 MMOL/L (ref 0.5–2.2)
LACTATE SERPL-SCNC: 2.4 MMOL/L (ref 0.5–2.2)
LEUKOCYTE ESTERASE UR QL STRIP: ABNORMAL
LYMPHOCYTES # BLD AUTO: 2.39 K/UL (ref 1–4.8)
MCH RBC QN AUTO: 26.5 PG (ref 27–31)
MCHC RBC AUTO-ENTMCNC: 30.3 G/DL (ref 32–36)
MCV RBC AUTO: 88 FL (ref 82–98)
MICROSCOPIC COMMENT: ABNORMAL
NITRITE UR QL STRIP: NEGATIVE
NUCLEATED RBC (/100WBC) (OHS): 0 /100 WBC
PH UR STRIP: 6 [PH]
PLATELET # BLD AUTO: 294 K/UL (ref 150–450)
PMV BLD AUTO: 11.5 FL (ref 9.2–12.9)
POTASSIUM SERPL-SCNC: 3.5 MMOL/L (ref 3.5–5.1)
PROT SERPL-MCNC: 7.4 GM/DL (ref 6–8.4)
PROT UR QL STRIP: ABNORMAL
RBC # BLD AUTO: 4.72 M/UL (ref 4.6–6.2)
RBC #/AREA URNS HPF: >100 /HPF (ref 0–4)
RELATIVE EOSINOPHIL (OHS): 0.9 %
RELATIVE LYMPHOCYTE (OHS): 19.9 % (ref 18–48)
RELATIVE MONOCYTE (OHS): 9.8 % (ref 4–15)
RELATIVE NEUTROPHIL (OHS): 68.7 % (ref 38–73)
SODIUM SERPL-SCNC: 141 MMOL/L (ref 136–145)
SP GR UR STRIP: >=1.03
SQUAMOUS #/AREA URNS HPF: 2 /HPF
UROBILINOGEN UR STRIP-ACNC: ABNORMAL EU/DL
WBC # BLD AUTO: 11.99 K/UL (ref 3.9–12.7)
WBC #/AREA URNS HPF: 13 /HPF (ref 0–5)
YEAST URNS QL MICRO: ABNORMAL /HPF

## 2025-05-12 PROCEDURE — 96360 HYDRATION IV INFUSION INIT: CPT

## 2025-05-12 PROCEDURE — 87186 SC STD MICRODIL/AGAR DIL: CPT | Performed by: STUDENT IN AN ORGANIZED HEALTH CARE EDUCATION/TRAINING PROGRAM

## 2025-05-12 PROCEDURE — 25000003 PHARM REV CODE 250: Performed by: STUDENT IN AN ORGANIZED HEALTH CARE EDUCATION/TRAINING PROGRAM

## 2025-05-12 PROCEDURE — 83605 ASSAY OF LACTIC ACID: CPT | Performed by: STUDENT IN AN ORGANIZED HEALTH CARE EDUCATION/TRAINING PROGRAM

## 2025-05-12 PROCEDURE — 81003 URINALYSIS AUTO W/O SCOPE: CPT | Performed by: STUDENT IN AN ORGANIZED HEALTH CARE EDUCATION/TRAINING PROGRAM

## 2025-05-12 PROCEDURE — 99284 EMERGENCY DEPT VISIT MOD MDM: CPT | Mod: 25

## 2025-05-12 PROCEDURE — 85025 COMPLETE CBC W/AUTO DIFF WBC: CPT | Performed by: STUDENT IN AN ORGANIZED HEALTH CARE EDUCATION/TRAINING PROGRAM

## 2025-05-12 PROCEDURE — 80053 COMPREHEN METABOLIC PANEL: CPT | Performed by: STUDENT IN AN ORGANIZED HEALTH CARE EDUCATION/TRAINING PROGRAM

## 2025-05-12 RX ORDER — OXYCODONE AND ACETAMINOPHEN 5; 325 MG/1; MG/1
1 TABLET ORAL
Refills: 0 | Status: COMPLETED | OUTPATIENT
Start: 2025-05-12 | End: 2025-05-12

## 2025-05-12 RX ADMIN — SODIUM CHLORIDE 1000 ML: 9 INJECTION, SOLUTION INTRAVENOUS at 09:05

## 2025-05-12 RX ADMIN — OXYCODONE HYDROCHLORIDE AND ACETAMINOPHEN 1 TABLET: 5; 325 TABLET ORAL at 10:05

## 2025-05-13 ENCOUNTER — TELEPHONE (OUTPATIENT)
Dept: PHYSICAL MEDICINE AND REHAB | Facility: CLINIC | Age: 40
End: 2025-05-13
Payer: MEDICAID

## 2025-05-13 DIAGNOSIS — G82.20 PARAPLEGIA FOLLOWING SPINAL CORD INJURY: Primary | Chronic | ICD-10-CM

## 2025-05-13 DIAGNOSIS — R25.2 SPASTICITY: ICD-10-CM

## 2025-05-13 DIAGNOSIS — Z98.890 S/P INSERTION OF INTRATHECAL PUMP: ICD-10-CM
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
## 2025-05-15 LAB — BACTERIA UR CULT: ABNORMAL

## 2025-05-21 DIAGNOSIS — G82.20 PARAPLEGIA FOLLOWING SPINAL CORD INJURY: Primary | Chronic | ICD-10-CM

## 2025-05-21 DIAGNOSIS — G82.20 PARAPLEGIA FOLLOWING SPINAL CORD INJURY: ICD-10-CM

## 2025-05-21 DIAGNOSIS — G82.21 CHRONIC COMPLETE PARAPLEGIA: ICD-10-CM

## 2025-05-22 RX ORDER — OXYCODONE AND ACETAMINOPHEN 10; 325 MG/1; MG/1
1 TABLET ORAL EVERY 6 HOURS PRN
Qty: 110 TABLET | Refills: 0 | Status: SHIPPED | OUTPATIENT
Start: 2025-05-26 | End: 2025-06-25

## 2025-06-03 ENCOUNTER — PATIENT MESSAGE (OUTPATIENT)
Dept: PHYSICAL MEDICINE AND REHAB | Facility: CLINIC | Age: 40
End: 2025-06-03

## 2025-06-03 ENCOUNTER — PROCEDURE VISIT (OUTPATIENT)
Dept: PHYSICAL MEDICINE AND REHAB | Facility: CLINIC | Age: 40
End: 2025-06-03
Payer: MEDICAID

## 2025-06-03 VITALS
SYSTOLIC BLOOD PRESSURE: 97 MMHG | WEIGHT: 179.88 LBS | HEIGHT: 68 IN | BODY MASS INDEX: 27.26 KG/M2 | HEART RATE: 60 BPM | DIASTOLIC BLOOD PRESSURE: 64 MMHG

## 2025-06-03 DIAGNOSIS — G82.20 PARAPLEGIA FOLLOWING SPINAL CORD INJURY: Primary | Chronic | ICD-10-CM

## 2025-06-03 DIAGNOSIS — R25.2 SPASTICITY: ICD-10-CM

## 2025-06-03 PROCEDURE — 62370 ANL SP INF PMP W/MDREPRG&FIL: CPT | Mod: S$PBB,,, | Performed by: PHYSICAL MEDICINE & REHABILITATION

## 2025-06-03 PROCEDURE — 62370 ANL SP INF PMP W/MDREPRG&FIL: CPT | Mod: PBBFAC | Performed by: PHYSICAL MEDICINE & REHABILITATION

## 2025-06-03 RX ORDER — DIAZEPAM 10 MG/1
10 TABLET ORAL 2 TIMES DAILY PRN
Qty: 70 TABLET | Refills: 0 | Status: SHIPPED | OUTPATIENT
Start: 2025-06-03

## 2025-06-03 RX ORDER — OXYCODONE HCL 10 MG/1
10 TABLET, FILM COATED, EXTENDED RELEASE ORAL EVERY 12 HOURS
Qty: 60 TABLET | Refills: 0 | Status: SHIPPED | OUTPATIENT
Start: 2025-06-03 | End: 2025-07-06

## 2025-06-03 RX ORDER — OXYCODONE HCL 10 MG/1
10 TABLET, FILM COATED, EXTENDED RELEASE ORAL EVERY 12 HOURS
Qty: 60 TABLET | Refills: 0 | Status: SHIPPED | OUTPATIENT
Start: 2025-06-03 | End: 2025-06-03

## 2025-06-03 RX ORDER — OXYCODONE HCL 10 MG/1
10 TABLET, FILM COATED, EXTENDED RELEASE ORAL EVERY 12 HOURS
Qty: 60 TABLET | Refills: 0 | Status: SHIPPED | OUTPATIENT
Start: 2025-07-05 | End: 2025-08-04

## 2025-06-11 ENCOUNTER — PATIENT MESSAGE (OUTPATIENT)
Dept: UROLOGY | Facility: CLINIC | Age: 40
End: 2025-06-11
Payer: MEDICAID

## 2025-06-11 DIAGNOSIS — A49.9 BACTERIAL UTI: ICD-10-CM

## 2025-06-11 DIAGNOSIS — N39.0 BACTERIAL UTI: ICD-10-CM

## 2025-06-12 ENCOUNTER — PATIENT MESSAGE (OUTPATIENT)
Dept: INTERNAL MEDICINE | Facility: CLINIC | Age: 40
End: 2025-06-12
Payer: MEDICAID

## 2025-06-12 RX ORDER — NITROFURANTOIN 25; 75 MG/1; MG/1
100 CAPSULE ORAL 2 TIMES DAILY
Qty: 20 CAPSULE | Refills: 0 | Status: SHIPPED | OUTPATIENT
Start: 2025-06-12

## 2025-06-12 NOTE — TELEPHONE ENCOUNTER
Pt states urology out of office and he needs marcobid for 7 days for UTI. Informed pt PCP was out of office.

## 2025-06-13 ENCOUNTER — HOSPITAL ENCOUNTER (EMERGENCY)
Facility: HOSPITAL | Age: 40
Discharge: HOME | End: 2025-06-13
Attending: STUDENT IN AN ORGANIZED HEALTH CARE EDUCATION/TRAINING PROGRAM
Payer: MEDICAID

## 2025-06-13 VITALS
HEIGHT: 69 IN | RESPIRATION RATE: 16 BRPM | DIASTOLIC BLOOD PRESSURE: 64 MMHG | TEMPERATURE: 98 F | OXYGEN SATURATION: 98 % | SYSTOLIC BLOOD PRESSURE: 106 MMHG | BODY MASS INDEX: 26.66 KG/M2 | WEIGHT: 180 LBS | HEART RATE: 50 BPM

## 2025-06-13 DIAGNOSIS — R10.30 LOWER ABDOMINAL PAIN: Primary | ICD-10-CM

## 2025-06-13 DIAGNOSIS — N39.0 URINARY TRACT INFECTION WITHOUT HEMATURIA, SITE UNSPECIFIED: ICD-10-CM

## 2025-06-13 LAB
BACTERIA #/AREA URNS HPF: ABNORMAL /HPF
BILIRUB UR QL STRIP.AUTO: ABNORMAL
CLARITY UR: ABNORMAL
COLOR UR AUTO: YELLOW
GLUCOSE UR QL STRIP: NEGATIVE
HGB UR QL STRIP: ABNORMAL
HOLD SPECIMEN: NORMAL
HYALINE CASTS #/AREA URNS LPF: 1 /LPF (ref 0–1)
KETONES UR QL STRIP: ABNORMAL
LEUKOCYTE ESTERASE UR QL STRIP: ABNORMAL
MICROSCOPIC COMMENT: ABNORMAL
NITRITE UR QL STRIP: NEGATIVE
PH UR STRIP: 6 [PH]
PROT UR QL STRIP: ABNORMAL
RBC #/AREA URNS HPF: 5 /HPF (ref 0–4)
SP GR UR STRIP: 1.02
SQUAMOUS #/AREA URNS HPF: 2 /HPF
UROBILINOGEN UR STRIP-ACNC: 1 EU/DL
WBC #/AREA URNS HPF: >100 /HPF (ref 0–5)

## 2025-06-13 PROCEDURE — 25000003 PHARM REV CODE 250: Performed by: STUDENT IN AN ORGANIZED HEALTH CARE EDUCATION/TRAINING PROGRAM

## 2025-06-13 PROCEDURE — 81001 URINALYSIS AUTO W/SCOPE: CPT | Performed by: STUDENT IN AN ORGANIZED HEALTH CARE EDUCATION/TRAINING PROGRAM

## 2025-06-13 PROCEDURE — 99283 EMERGENCY DEPT VISIT LOW MDM: CPT

## 2025-06-13 PROCEDURE — 87088 URINE BACTERIA CULTURE: CPT | Performed by: STUDENT IN AN ORGANIZED HEALTH CARE EDUCATION/TRAINING PROGRAM

## 2025-06-13 RX ORDER — CEFPODOXIME PROXETIL 200 MG/1
200 TABLET, FILM COATED ORAL
Status: COMPLETED | OUTPATIENT
Start: 2025-06-13 | End: 2025-06-13

## 2025-06-13 RX ORDER — CEFDINIR 300 MG/1
300 CAPSULE ORAL 2 TIMES DAILY
Qty: 20 CAPSULE | Refills: 0 | Status: SHIPPED | OUTPATIENT
Start: 2025-06-13 | End: 2025-06-23

## 2025-06-13 RX ADMIN — CEFPODOXIME PROXETIL 200 MG: 200 TABLET, FILM COATED ORAL at 01:06
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
## 2025-06-16 LAB
BACTERIA UR CULT: ABNORMAL
BACTERIA UR CULT: ABNORMAL

## 2025-06-20 ENCOUNTER — PATIENT MESSAGE (OUTPATIENT)
Dept: UROLOGY | Facility: CLINIC | Age: 40
End: 2025-06-20
Payer: MEDICAID

## 2025-06-23 ENCOUNTER — PATIENT MESSAGE (OUTPATIENT)
Dept: PHYSICAL MEDICINE AND REHAB | Facility: CLINIC | Age: 40
End: 2025-06-23
Payer: MEDICAID

## 2025-06-24 DIAGNOSIS — G82.21 CHRONIC COMPLETE PARAPLEGIA: ICD-10-CM

## 2025-06-24 DIAGNOSIS — G82.20 PARAPLEGIA FOLLOWING SPINAL CORD INJURY: ICD-10-CM

## 2025-06-24 RX ORDER — OXYCODONE AND ACETAMINOPHEN 10; 325 MG/1; MG/1
1 TABLET ORAL EVERY 6 HOURS PRN
Qty: 110 TABLET | Refills: 0 | Status: SHIPPED | OUTPATIENT
Start: 2025-06-26 | End: 2025-06-27 | Stop reason: SDUPTHER

## 2025-06-26 ENCOUNTER — PATIENT MESSAGE (OUTPATIENT)
Dept: PHYSICAL MEDICINE AND REHAB | Facility: CLINIC | Age: 40
End: 2025-06-26
Payer: MEDICAID

## 2025-06-29 ENCOUNTER — HOSPITAL ENCOUNTER (EMERGENCY)
Facility: HOSPITAL | Age: 40
Discharge: HOME | End: 2025-06-30
Attending: STUDENT IN AN ORGANIZED HEALTH CARE EDUCATION/TRAINING PROGRAM
Payer: MEDICAID

## 2025-06-29 ENCOUNTER — PATIENT MESSAGE (OUTPATIENT)
Dept: UROLOGY | Facility: CLINIC | Age: 40
End: 2025-06-29
Payer: MEDICAID

## 2025-06-29 DIAGNOSIS — Z13.6 SCREENING FOR CARDIOVASCULAR CONDITION: ICD-10-CM

## 2025-06-29 DIAGNOSIS — Z43.5 ENCOUNTER FOR CARE OR REPLACEMENT OF SUPRAPUBIC TUBE: ICD-10-CM

## 2025-06-29 DIAGNOSIS — Z93.59 CHRONIC SUPRAPUBIC CATHETER: Primary | ICD-10-CM

## 2025-06-29 DIAGNOSIS — N39.0 URINARY TRACT INFECTION WITH HEMATURIA, SITE UNSPECIFIED: Primary | ICD-10-CM

## 2025-06-29 DIAGNOSIS — N31.9 NEUROGENIC BLADDER: ICD-10-CM

## 2025-06-29 DIAGNOSIS — R31.9 URINARY TRACT INFECTION WITH HEMATURIA, SITE UNSPECIFIED: Primary | ICD-10-CM

## 2025-06-29 LAB
ABSOLUTE EOSINOPHIL (OHS): 0.06 K/UL
ABSOLUTE MONOCYTE (OHS): 0.99 K/UL (ref 0.3–1)
ABSOLUTE NEUTROPHIL COUNT (OHS): 12.49 K/UL (ref 1.8–7.7)
ALBUMIN SERPL BCP-MCNC: 3.7 G/DL (ref 3.5–5.2)
ALP SERPL-CCNC: 109 UNIT/L (ref 40–150)
ALT SERPL W/O P-5'-P-CCNC: 21 UNIT/L (ref 10–44)
ANION GAP (OHS): 11 MMOL/L (ref 8–16)
AST SERPL-CCNC: 22 UNIT/L (ref 11–45)
BACTERIA #/AREA URNS HPF: ABNORMAL /HPF
BASOPHILS # BLD AUTO: 0.05 K/UL
BASOPHILS NFR BLD AUTO: 0.3 %
BILIRUB SERPL-MCNC: 0.6 MG/DL (ref 0.1–1)
BILIRUB UR QL STRIP.AUTO: NEGATIVE
BUN SERPL-MCNC: 11 MG/DL (ref 6–20)
CALCIUM SERPL-MCNC: 8.6 MG/DL (ref 8.7–10.5)
CHLORIDE SERPL-SCNC: 103 MMOL/L (ref 95–110)
CLARITY UR: ABNORMAL
CO2 SERPL-SCNC: 21 MMOL/L (ref 23–29)
COLOR UR AUTO: YELLOW
CREAT SERPL-MCNC: 0.6 MG/DL (ref 0.5–1.4)
ERYTHROCYTE [DISTWIDTH] IN BLOOD BY AUTOMATED COUNT: 16.1 % (ref 11.5–14.5)
GFR SERPLBLD CREATININE-BSD FMLA CKD-EPI: >60 ML/MIN/1.73/M2
GLUCOSE SERPL-MCNC: 99 MG/DL (ref 70–110)
GLUCOSE UR QL STRIP: NEGATIVE
HCT VFR BLD AUTO: 36.8 % (ref 40–54)
HGB BLD-MCNC: 11.2 GM/DL (ref 14–18)
HGB UR QL STRIP: ABNORMAL
IMM GRANULOCYTES # BLD AUTO: 0.08 K/UL (ref 0–0.04)
IMM GRANULOCYTES NFR BLD AUTO: 0.5 % (ref 0–0.5)
KETONES UR QL STRIP: NEGATIVE
LACTATE SERPL-SCNC: 1.1 MMOL/L (ref 0.5–2.2)
LEUKOCYTE ESTERASE UR QL STRIP: ABNORMAL
LYMPHOCYTES # BLD AUTO: 1.35 K/UL (ref 1–4.8)
MCH RBC QN AUTO: 26 PG (ref 27–31)
MCHC RBC AUTO-ENTMCNC: 30.4 G/DL (ref 32–36)
MCV RBC AUTO: 85 FL (ref 82–98)
MICROSCOPIC COMMENT: ABNORMAL
NITRITE UR QL STRIP: NEGATIVE
NUCLEATED RBC (/100WBC) (OHS): 0 /100 WBC
PH UR STRIP: 6 [PH]
PLATELET # BLD AUTO: 204 K/UL (ref 150–450)
PLATELET BLD QL SMEAR: NORMAL
PMV BLD AUTO: 11.8 FL (ref 9.2–12.9)
POTASSIUM SERPL-SCNC: 4.6 MMOL/L (ref 3.5–5.1)
PROT SERPL-MCNC: 7.9 GM/DL (ref 6–8.4)
PROT UR QL STRIP: NEGATIVE
RBC # BLD AUTO: 4.31 M/UL (ref 4.6–6.2)
RBC #/AREA URNS HPF: 2 /HPF (ref 0–4)
RELATIVE EOSINOPHIL (OHS): 0.4 %
RELATIVE LYMPHOCYTE (OHS): 9 % (ref 18–48)
RELATIVE MONOCYTE (OHS): 6.6 % (ref 4–15)
RELATIVE NEUTROPHIL (OHS): 83.2 % (ref 38–73)
SODIUM SERPL-SCNC: 135 MMOL/L (ref 136–145)
SP GR UR STRIP: 1.01
SQUAMOUS #/AREA URNS HPF: 2 /HPF
UROBILINOGEN UR STRIP-ACNC: ABNORMAL EU/DL
WBC # BLD AUTO: 15.02 K/UL (ref 3.9–12.7)
WBC #/AREA URNS HPF: 50 /HPF (ref 0–5)
WBC CLUMPS URNS QL MICRO: ABNORMAL
YEAST URNS QL MICRO: ABNORMAL /HPF

## 2025-06-29 PROCEDURE — 93010 ELECTROCARDIOGRAM REPORT: CPT | Mod: ,,, | Performed by: INTERNAL MEDICINE

## 2025-06-29 PROCEDURE — 80053 COMPREHEN METABOLIC PANEL: CPT | Performed by: STUDENT IN AN ORGANIZED HEALTH CARE EDUCATION/TRAINING PROGRAM

## 2025-06-29 PROCEDURE — 99900035 HC TECH TIME PER 15 MIN (STAT)

## 2025-06-29 PROCEDURE — 25000003 PHARM REV CODE 250: Performed by: STUDENT IN AN ORGANIZED HEALTH CARE EDUCATION/TRAINING PROGRAM

## 2025-06-29 PROCEDURE — 63600175 PHARM REV CODE 636 W HCPCS: Performed by: STUDENT IN AN ORGANIZED HEALTH CARE EDUCATION/TRAINING PROGRAM

## 2025-06-29 PROCEDURE — 81003 URINALYSIS AUTO W/O SCOPE: CPT | Performed by: STUDENT IN AN ORGANIZED HEALTH CARE EDUCATION/TRAINING PROGRAM

## 2025-06-29 PROCEDURE — 87086 URINE CULTURE/COLONY COUNT: CPT | Performed by: STUDENT IN AN ORGANIZED HEALTH CARE EDUCATION/TRAINING PROGRAM

## 2025-06-29 PROCEDURE — 96374 THER/PROPH/DIAG INJ IV PUSH: CPT

## 2025-06-29 PROCEDURE — 93005 ELECTROCARDIOGRAM TRACING: CPT

## 2025-06-29 PROCEDURE — 96361 HYDRATE IV INFUSION ADD-ON: CPT

## 2025-06-29 PROCEDURE — 85025 COMPLETE CBC W/AUTO DIFF WBC: CPT | Performed by: STUDENT IN AN ORGANIZED HEALTH CARE EDUCATION/TRAINING PROGRAM

## 2025-06-29 PROCEDURE — 83605 ASSAY OF LACTIC ACID: CPT | Performed by: STUDENT IN AN ORGANIZED HEALTH CARE EDUCATION/TRAINING PROGRAM

## 2025-06-29 PROCEDURE — 51705 CHANGE OF BLADDER TUBE: CPT

## 2025-06-29 PROCEDURE — 87040 BLOOD CULTURE FOR BACTERIA: CPT | Performed by: STUDENT IN AN ORGANIZED HEALTH CARE EDUCATION/TRAINING PROGRAM

## 2025-06-29 RX ORDER — ACETAMINOPHEN 500 MG
1000 TABLET ORAL
Status: COMPLETED | OUTPATIENT
Start: 2025-06-29 | End: 2025-06-29

## 2025-06-29 RX ORDER — HYDROMORPHONE HYDROCHLORIDE 1 MG/ML
1 INJECTION, SOLUTION INTRAMUSCULAR; INTRAVENOUS; SUBCUTANEOUS
Refills: 0 | Status: COMPLETED | OUTPATIENT
Start: 2025-06-29 | End: 2025-06-29

## 2025-06-29 RX ORDER — NITROFURANTOIN 25; 75 MG/1; MG/1
100 CAPSULE ORAL
Status: COMPLETED | OUTPATIENT
Start: 2025-06-29 | End: 2025-06-29

## 2025-06-29 RX ADMIN — HYDROMORPHONE HYDROCHLORIDE 1 MG: 1 INJECTION, SOLUTION INTRAMUSCULAR; INTRAVENOUS; SUBCUTANEOUS at 11:06

## 2025-06-29 RX ADMIN — NITROFURANTOIN (MONOHYDRATE/MACROCRYSTALS) 100 MG: 25; 75 CAPSULE ORAL at 11:06

## 2025-06-29 RX ADMIN — ACETAMINOPHEN 1000 MG: 500 TABLET ORAL at 10:06

## 2025-06-29 RX ADMIN — SODIUM CHLORIDE 1000 ML: 9 INJECTION, SOLUTION INTRAVENOUS at 11:06

## 2025-06-30 VITALS
WEIGHT: 175 LBS | HEART RATE: 64 BPM | HEIGHT: 69 IN | DIASTOLIC BLOOD PRESSURE: 58 MMHG | TEMPERATURE: 98 F | BODY MASS INDEX: 25.92 KG/M2 | RESPIRATION RATE: 16 BRPM | SYSTOLIC BLOOD PRESSURE: 100 MMHG | OXYGEN SATURATION: 98 %

## 2025-06-30 LAB
OHS QRS DURATION: 76 MS
OHS QTC CALCULATION: 370 MS

## 2025-06-30 PROCEDURE — 63600175 PHARM REV CODE 636 W HCPCS: Performed by: STUDENT IN AN ORGANIZED HEALTH CARE EDUCATION/TRAINING PROGRAM

## 2025-06-30 PROCEDURE — 99285 EMERGENCY DEPT VISIT HI MDM: CPT

## 2025-06-30 PROCEDURE — 96376 TX/PRO/DX INJ SAME DRUG ADON: CPT

## 2025-06-30 RX ORDER — NITROFURANTOIN 25; 75 MG/1; MG/1
100 CAPSULE ORAL 2 TIMES DAILY
Qty: 14 CAPSULE | Refills: 0 | Status: SHIPPED | OUTPATIENT
Start: 2025-06-30 | End: 2025-06-30

## 2025-06-30 RX ORDER — NITROFURANTOIN 25; 75 MG/1; MG/1
100 CAPSULE ORAL 2 TIMES DAILY
Qty: 14 CAPSULE | Refills: 0 | Status: SHIPPED | OUTPATIENT
Start: 2025-06-30 | End: 2025-07-07

## 2025-06-30 RX ORDER — HYDROMORPHONE HYDROCHLORIDE 1 MG/ML
1 INJECTION, SOLUTION INTRAMUSCULAR; INTRAVENOUS; SUBCUTANEOUS
Refills: 0 | Status: COMPLETED | OUTPATIENT
Start: 2025-06-30 | End: 2025-06-30

## 2025-06-30 RX ORDER — WATER 1 ML/ML
100 INJECTION IRRIGATION
Qty: 500 ML | Refills: 6 | Status: SHIPPED | OUTPATIENT
Start: 2025-06-30

## 2025-06-30 RX ADMIN — HYDROMORPHONE HYDROCHLORIDE 1 MG: 1 INJECTION, SOLUTION INTRAMUSCULAR; INTRAVENOUS; SUBCUTANEOUS at 01:06

## 2025-06-30 NOTE — ED TRIAGE NOTES
"39 y.o. male presents to ER ED 05/ED 05   Chief Complaint   Patient presents with    Dysuria     Patient states "I have a bad UTI right now."  Reported symptoms include fever, runny nose.  Also reports having a lot of sediment and mucous in urine.  Suprapublic catheter in place on arrival.     "
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
## 2025-07-01 DIAGNOSIS — R25.2 SPASTICITY: Primary | ICD-10-CM

## 2025-07-01 DIAGNOSIS — G82.20 PARAPLEGIA FOLLOWING SPINAL CORD INJURY: ICD-10-CM

## 2025-07-01 LAB — BACTERIA UR CULT: NORMAL

## 2025-07-02 NOTE — PROGRESS NOTES
Staff Handoff  Bedside handoff report received from LOLITA Brito RN. POC discussed. Care assumed. Will monitor.              Resident Handoff     Chest pain and dizziness for five days

## 2025-07-08 LAB
BACTERIA BLD CULT: NORMAL
BACTERIA BLD CULT: NORMAL

## 2025-07-09 ENCOUNTER — PATIENT MESSAGE (OUTPATIENT)
Dept: PHYSICAL MEDICINE AND REHAB | Facility: CLINIC | Age: 40
End: 2025-07-09

## 2025-07-10 DIAGNOSIS — G82.20 PARAPLEGIA FOLLOWING SPINAL CORD INJURY: Chronic | ICD-10-CM

## 2025-07-10 RX ORDER — OXYCODONE HCL 10 MG/1
10 TABLET, FILM COATED, EXTENDED RELEASE ORAL EVERY 12 HOURS
Qty: 60 TABLET | Refills: 0 | Status: CANCELLED | OUTPATIENT
Start: 2025-07-10 | End: 2025-08-09

## 2025-07-30 ENCOUNTER — PATIENT MESSAGE (OUTPATIENT)
Dept: PHYSICAL MEDICINE AND REHAB | Facility: CLINIC | Age: 40
End: 2025-07-30
Payer: MEDICAID

## 2025-07-31 RX ORDER — LACTULOSE 10 G/15ML
10 SOLUTION ORAL; RECTAL EVERY 6 HOURS PRN
Qty: 300 ML | Refills: 2 | Status: SHIPPED | OUTPATIENT
Start: 2025-07-31

## 2025-07-31 NOTE — TELEPHONE ENCOUNTER
No care due was identified.  [facility] Embedded Care Due Messages. Reference number: [id number].   7/31/2025 8:09:52 AM CDT

## 2025-07-31 NOTE — TELEPHONE ENCOUNTER
Refill Routing Note   Medication(s) are not appropriate for processing by [facility] for the following reason(s):        Outside of protocol    [facility] action(s):  Route             Appointments  past 12m or future 3m with PCP    Date Provider   Last Visit   3/5/2025 Nohelia Hoover MD   Next Visit   8/26/2025 Nohelia Hoover MD   ED visits in past 90 days: 3        Note composed:9:28 AM 07/31/2025

## 2025-08-01 ENCOUNTER — PATIENT MESSAGE (OUTPATIENT)
Dept: PHYSICAL MEDICINE AND REHAB | Facility: CLINIC | Age: 40
End: 2025-08-01
Payer: MEDICAID

## 2025-08-04 DIAGNOSIS — R25.2 SPASTICITY: ICD-10-CM

## 2025-08-04 DIAGNOSIS — G82.20 PARAPLEGIA FOLLOWING SPINAL CORD INJURY: Chronic | ICD-10-CM

## 2025-08-04 RX ORDER — OXYCODONE HCL 10 MG/1
10 TABLET, FILM COATED, EXTENDED RELEASE ORAL EVERY 12 HOURS
Qty: 60 TABLET | Refills: 0 | Status: CANCELLED | OUTPATIENT
Start: 2025-08-04 | End: 2025-09-03

## 2025-08-04 RX ORDER — DIAZEPAM 10 MG/1
10 TABLET ORAL 2 TIMES DAILY PRN
Qty: 70 TABLET | Refills: 0 | Status: CANCELLED | OUTPATIENT
Start: 2025-08-04

## 2025-08-04 NOTE — TELEPHONE ENCOUNTER
Last ordered:diazePAM (VALIUM) 10 MG Tab 06/03/2025     oxyCODONE (OXYCONTIN) 10 mg 12 hr tablet 06/03/2025     Last seen:06/03/2025  Upcoming appt:08/19/2025

## 2025-08-05 ENCOUNTER — PATIENT MESSAGE (OUTPATIENT)
Dept: PHYSICAL MEDICINE AND REHAB | Facility: CLINIC | Age: 40
End: 2025-08-05
Payer: MEDICAID

## 2025-08-05 RX ORDER — DIAZEPAM 10 MG/1
10 TABLET ORAL 2 TIMES DAILY PRN
Qty: 70 TABLET | Refills: 0 | Status: SHIPPED | OUTPATIENT
Start: 2025-08-05

## 2025-08-05 RX ORDER — OXYCODONE HCL 10 MG/1
10 TABLET, FILM COATED, EXTENDED RELEASE ORAL EVERY 12 HOURS
Qty: 60 TABLET | Refills: 0 | Status: SHIPPED | OUTPATIENT
Start: 2025-08-08 | End: 2025-09-07

## 2025-08-06 DIAGNOSIS — G82.21 CHRONIC COMPLETE PARAPLEGIA: ICD-10-CM

## 2025-08-06 DIAGNOSIS — G82.20 PARAPLEGIA FOLLOWING SPINAL CORD INJURY: ICD-10-CM

## 2025-08-06 RX ORDER — OXYCODONE AND ACETAMINOPHEN 10; 325 MG/1; MG/1
1 TABLET ORAL EVERY 6 HOURS PRN
Qty: 110 TABLET | Refills: 0 | Status: CANCELLED | OUTPATIENT
Start: 2025-08-06 | End: 2025-09-05

## 2025-08-06 RX ORDER — OXYCODONE AND ACETAMINOPHEN 10; 325 MG/1; MG/1
1 TABLET ORAL EVERY 6 HOURS PRN
Qty: 110 TABLET | Refills: 0 | Status: SHIPPED | OUTPATIENT
Start: 2025-08-08 | End: 2025-09-07

## 2025-08-20 ENCOUNTER — PROCEDURE VISIT (OUTPATIENT)
Dept: PHYSICAL MEDICINE AND REHAB | Facility: CLINIC | Age: 40
End: 2025-08-20
Payer: MEDICAID

## 2025-08-20 VITALS
HEIGHT: 69 IN | DIASTOLIC BLOOD PRESSURE: 75 MMHG | WEIGHT: 175.06 LBS | SYSTOLIC BLOOD PRESSURE: 100 MMHG | HEART RATE: 68 BPM | BODY MASS INDEX: 25.93 KG/M2

## 2025-08-20 DIAGNOSIS — G82.20 PARAPLEGIA FOLLOWING SPINAL CORD INJURY: ICD-10-CM

## 2025-08-20 DIAGNOSIS — G82.21 CHRONIC COMPLETE PARAPLEGIA: Primary | ICD-10-CM

## 2025-08-20 PROCEDURE — 99999PBSHW PR PBB SHADOW TECHNICAL ONLY FILED TO HB: Mod: JZ,PBBFAC,,

## 2025-08-20 PROCEDURE — 62370 ANL SP INF PMP W/MDREPRG&FIL: CPT | Mod: PBBFAC | Performed by: PHYSICAL MEDICINE & REHABILITATION

## 2025-08-20 PROCEDURE — 64642 CHEMODENERV 1 EXTREMITY 1-4: CPT | Mod: S$PBB,,, | Performed by: PHYSICAL MEDICINE & REHABILITATION

## 2025-08-20 PROCEDURE — 62370 ANL SP INF PMP W/MDREPRG&FIL: CPT | Mod: S$PBB,,, | Performed by: PHYSICAL MEDICINE & REHABILITATION

## 2025-08-20 PROCEDURE — 95873 GUIDE NERV DESTR ELEC STIM: CPT | Mod: PBBFAC | Performed by: PHYSICAL MEDICINE & REHABILITATION

## 2025-08-20 RX ADMIN — ONABOTULINUMTOXINA 100 UNITS: 100 INJECTION, POWDER, LYOPHILIZED, FOR SOLUTION INTRADERMAL; INTRAMUSCULAR at 02:08

## 2025-08-26 PROCEDURE — 99999PBSHW PR PBB SHADOW TECHNICAL ONLY FILED TO HB: Mod: JZ,PBBFAC,,

## 2025-08-26 RX ADMIN — BACLOFEN 80 MG: 40 INJECTION INTRATHECAL at 07:08

## (undated) DEVICE — BLADE SURG CARBON STEEL #10

## (undated) DEVICE — STAPLER SKIN PROXIMATE WIDE

## (undated) DEVICE — DRAPE INCISE IOBAN 2 23X17IN

## (undated) DEVICE — NDL WILLIAMS CYSTOSCOPIC

## (undated) DEVICE — ELECTRODE REM PLYHSV RETURN 9

## (undated) DEVICE — CLOSURE SKIN STERI STRIP 1/2X4

## (undated) DEVICE — TRAY CYSTO BASIN

## (undated) DEVICE — SOL IRR WATER STRL 3000 ML

## (undated) DEVICE — CHLORAPREP 10.5 ML APPLICATOR

## (undated) DEVICE — SEE MEDLINE ITEM 156905

## (undated) DEVICE — CORD BIPOLAR 12 FOOT

## (undated) DEVICE — SUT SILK 3-0 SH 18IN BLACK

## (undated) DEVICE — SUT 2-0 VICRYL / CT-1

## (undated) DEVICE — DIFFUSER

## (undated) DEVICE — SET CYSTO IRRIGATION UNIV SPIK

## (undated) DEVICE — PAD ABDOMINAL 5X9 STERILE

## (undated) DEVICE — ELECTRODE EXTENDED BLADE

## (undated) DEVICE — SUT VICRYL PLUS 2-0

## (undated) DEVICE — SUT MONOCRYL 3-0 PS-2 UND

## (undated) DEVICE — SUT ETHIBOND 0 CR/CTX 8-18

## (undated) DEVICE — ADHESIVE MASTISOL VIAL 48/BX

## (undated) DEVICE — PACK CYSTO

## (undated) DEVICE — GOWN SURGICAL X-LARGE

## (undated) DEVICE — SKINMARKER & RULER REGULAR X-F

## (undated) DEVICE — DRESSING SURGICAL 1X3

## (undated) DEVICE — SEE MEDLINE ITEM 146417

## (undated) DEVICE — SEE MEDLINE ITEM 157131

## (undated) DEVICE — DRAIN CHANNEL ROUND 15FR

## (undated) DEVICE — TUBE FRAZIER 5MM 2FT SOFT TIP

## (undated) DEVICE — INTRODUCER SUPRA ONE STE 18FR

## (undated) DEVICE — URINARY DRAINAGE BAG

## (undated) DEVICE — DRESSING TELFA STRL 4X3 LF

## (undated) DEVICE — DRAPE STERI-DRAPE 1000 17X11IN

## (undated) DEVICE — ELECTRODE BLADE INSULATED 1 IN

## (undated) DEVICE — SUT VICRYL PLUS 0 CT1 18IN

## (undated) DEVICE — SPONGE IV DRAIN 4X4 STERILE

## (undated) DEVICE — SUT 4/0 18IN NUROLON BLK B

## (undated) DEVICE — DRESSING COVER AQUACEL AG SURG

## (undated) DEVICE — GOWN SMARTGOWN LVL4 X-LONG XL

## (undated) DEVICE — DURAPREP SURG SCRUB 26ML

## (undated) DEVICE — SUT VICRYL PLUS 4-0 P3 18IN

## (undated) DEVICE — SUT CHROMIC 2-0 SH 27IN BRN

## (undated) DEVICE — BLADE SURG #15 CARBON STEEL

## (undated) DEVICE — CLIPPER BLADE MOD 4406 (CAREF)

## (undated) DEVICE — PACK UNIVERSAL SPLIT II

## (undated) DEVICE — PANTIES FEMININE NAPKIN LG/XLG

## (undated) DEVICE — SUT CTD VICRYL VIL BR UR-5

## (undated) DEVICE — TRAY MINOR GEN SURG

## (undated) DEVICE — GOWN AERO CHROME W/ TOWEL XL

## (undated) DEVICE — DRAPE STERI INSTRUMENT 1018

## (undated) DEVICE — SUT VICRYL PLUS 3-0 SH 18IN

## (undated) DEVICE — SYR ONLY LUER LOCK 20CC

## (undated) DEVICE — SYR SLIP TIP 1CC

## (undated) DEVICE — TAPE MEDIPORE 4IN X 2YDS

## (undated) DEVICE — TAPE SILK 3IN

## (undated) DEVICE — DRESSING TRANS 4X4 TEGADERM

## (undated) DEVICE — SYR 10CC LUER LOCK

## (undated) DEVICE — SPONGE LAP 18X18 PREWASHED

## (undated) DEVICE — DRAPE C ARM 42 X 120 10/BX

## (undated) DEVICE — Device

## (undated) DEVICE — SEE MEDLINE ITEM 157148

## (undated) DEVICE — NDL HYPO REG 25G X 1 1/2

## (undated) DEVICE — SEE MEDLINE ITEM 154981

## (undated) DEVICE — DRAPE LAP TIBURON 77X122IN

## (undated) DEVICE — SYR TB 1ML LL 27GX1/2

## (undated) DEVICE — GLOVE BIOGEL 7.5

## (undated) DEVICE — SEE MEDLINE ITEM 157128

## (undated) DEVICE — SUT ETHILON 2-0 PSLX 30IN

## (undated) DEVICE — CATH FOLEY SIL 2WAY 5CC 20FR

## (undated) DEVICE — CARTRIDGE OIL

## (undated) DEVICE — BINDER ABDOMINAL 9 46-62

## (undated) DEVICE — NDL SPINAL 25GX3.5 SPINOCAN

## (undated) DEVICE — DRAIN CHANNEL ROUND 10FR

## (undated) DEVICE — NDL HYPODERMIC BLUNT 18G 1.5IN

## (undated) DEVICE — SYR 3CC LUER LOC

## (undated) DEVICE — SUT SILK 2-0 SH 18IN BLACK

## (undated) DEVICE — SUT MCRYL PLUS 4-0 PS2 27IN

## (undated) DEVICE — SOL SOD CHLORIDE 0.9% 10ML